# Patient Record
Sex: MALE | Race: WHITE | NOT HISPANIC OR LATINO | Employment: UNEMPLOYED | URBAN - METROPOLITAN AREA
[De-identification: names, ages, dates, MRNs, and addresses within clinical notes are randomized per-mention and may not be internally consistent; named-entity substitution may affect disease eponyms.]

---

## 2019-11-15 ENCOUNTER — OFFICE VISIT (OUTPATIENT)
Dept: FAMILY MEDICINE CLINIC | Facility: CLINIC | Age: 64
End: 2019-11-15

## 2019-11-15 VITALS
OXYGEN SATURATION: 98 % | HEIGHT: 71 IN | TEMPERATURE: 97.7 F | BODY MASS INDEX: 29.26 KG/M2 | WEIGHT: 209 LBS | DIASTOLIC BLOOD PRESSURE: 108 MMHG | HEART RATE: 88 BPM | SYSTOLIC BLOOD PRESSURE: 182 MMHG

## 2019-11-15 DIAGNOSIS — Z91.199 NONCOMPLIANCE: ICD-10-CM

## 2019-11-15 DIAGNOSIS — Z12.5 PROSTATE CANCER SCREENING: ICD-10-CM

## 2019-11-15 DIAGNOSIS — Z13.220 LIPID SCREENING: ICD-10-CM

## 2019-11-15 DIAGNOSIS — I10 ESSENTIAL HYPERTENSION: ICD-10-CM

## 2019-11-15 DIAGNOSIS — Z11.59 NEED FOR HEPATITIS C SCREENING TEST: ICD-10-CM

## 2019-11-15 DIAGNOSIS — Z00.00 HEALTHCARE MAINTENANCE: Primary | ICD-10-CM

## 2019-11-15 PROBLEM — Z85.820 HISTORY OF MELANOMA: Status: ACTIVE | Noted: 2019-11-15

## 2019-11-15 LAB
ALBUMIN SERPL-MCNC: 4.7 G/DL (ref 3.5–5.2)
ALBUMIN/GLOB SERPL: 1.5 G/DL
ALP SERPL-CCNC: 57 U/L (ref 39–117)
ALT SERPL W P-5'-P-CCNC: 13 U/L (ref 1–41)
ANION GAP SERPL CALCULATED.3IONS-SCNC: 13 MMOL/L (ref 5–15)
AST SERPL-CCNC: 18 U/L (ref 1–40)
BASOPHILS # BLD AUTO: 0.09 10*3/MM3 (ref 0–0.2)
BASOPHILS NFR BLD AUTO: 1.3 % (ref 0–1.5)
BILIRUB BLD-MCNC: NEGATIVE MG/DL
BILIRUB SERPL-MCNC: 0.3 MG/DL (ref 0.2–1.2)
BUN BLD-MCNC: 18 MG/DL (ref 8–23)
BUN/CREAT SERPL: 20.5 (ref 7–25)
CALCIUM SPEC-SCNC: 9.4 MG/DL (ref 8.6–10.5)
CHLORIDE SERPL-SCNC: 97 MMOL/L (ref 98–107)
CHOLEST SERPL-MCNC: 184 MG/DL (ref 0–200)
CLARITY, POC: CLEAR
CO2 SERPL-SCNC: 28 MMOL/L (ref 22–29)
COLOR UR: YELLOW
CREAT BLD-MCNC: 0.88 MG/DL (ref 0.76–1.27)
DEPRECATED RDW RBC AUTO: 46.6 FL (ref 37–54)
EOSINOPHIL # BLD AUTO: 0.31 10*3/MM3 (ref 0–0.4)
EOSINOPHIL NFR BLD AUTO: 4.5 % (ref 0.3–6.2)
ERYTHROCYTE [DISTWIDTH] IN BLOOD BY AUTOMATED COUNT: 13.6 % (ref 12.3–15.4)
EXPIRATION DATE: NORMAL
GFR SERPL CREATININE-BSD FRML MDRD: 87 ML/MIN/1.73
GLOBULIN UR ELPH-MCNC: 3.1 GM/DL
GLUCOSE BLD-MCNC: 91 MG/DL (ref 65–99)
GLUCOSE UR STRIP-MCNC: NEGATIVE MG/DL
HBA1C MFR BLD: 5.3 % (ref 4.8–5.6)
HCT VFR BLD AUTO: 39.7 % (ref 37.5–51)
HCV AB SER DONR QL: NORMAL
HDLC SERPL QL: 2.52
HDLC SERPL-MCNC: 73 MG/DL (ref 40–60)
HGB BLD-MCNC: 13.1 G/DL (ref 13–17.7)
IMM GRANULOCYTES # BLD AUTO: 0.02 10*3/MM3 (ref 0–0.05)
IMM GRANULOCYTES NFR BLD AUTO: 0.3 % (ref 0–0.5)
KETONES UR QL: NEGATIVE
LDLC SERPL CALC-MCNC: 101 MG/DL (ref 0–100)
LEUKOCYTE EST, POC: NEGATIVE
LYMPHOCYTES # BLD AUTO: 1.56 10*3/MM3 (ref 0.7–3.1)
LYMPHOCYTES NFR BLD AUTO: 22.7 % (ref 19.6–45.3)
Lab: NORMAL
MCH RBC QN AUTO: 31 PG (ref 26.6–33)
MCHC RBC AUTO-ENTMCNC: 33 G/DL (ref 31.5–35.7)
MCV RBC AUTO: 93.9 FL (ref 79–97)
MONOCYTES # BLD AUTO: 0.58 10*3/MM3 (ref 0.1–0.9)
MONOCYTES NFR BLD AUTO: 8.4 % (ref 5–12)
NEUTROPHILS # BLD AUTO: 4.31 10*3/MM3 (ref 1.7–7)
NEUTROPHILS NFR BLD AUTO: 62.8 % (ref 42.7–76)
NITRITE UR-MCNC: NEGATIVE MG/ML
NRBC BLD AUTO-RTO: 0 /100 WBC (ref 0–0.2)
PH UR: 7 [PH] (ref 5–8)
PLATELET # BLD AUTO: 305 10*3/MM3 (ref 140–450)
PMV BLD AUTO: 9.5 FL (ref 6–12)
POTASSIUM BLD-SCNC: 4.8 MMOL/L (ref 3.5–5.2)
PROT SERPL-MCNC: 7.8 G/DL (ref 6–8.5)
PROT UR STRIP-MCNC: NEGATIVE MG/DL
PSA SERPL-MCNC: 0.4 NG/ML (ref 0–4)
RBC # BLD AUTO: 4.23 10*6/MM3 (ref 4.14–5.8)
RBC # UR STRIP: NEGATIVE /UL
SODIUM BLD-SCNC: 138 MMOL/L (ref 136–145)
SP GR UR: 1.01 (ref 1–1.03)
TRIGL SERPL-MCNC: 48 MG/DL (ref 0–150)
TSH SERPL DL<=0.05 MIU/L-ACNC: 25.7 UIU/ML (ref 0.27–4.2)
UROBILINOGEN UR QL: NORMAL
VIT B12 BLD-MCNC: 419 PG/ML (ref 211–946)
VLDLC SERPL-MCNC: 9.6 MG/DL (ref 5–40)
WBC NRBC COR # BLD: 6.87 10*3/MM3 (ref 3.4–10.8)

## 2019-11-15 PROCEDURE — 81003 URINALYSIS AUTO W/O SCOPE: CPT | Performed by: NURSE PRACTITIONER

## 2019-11-15 PROCEDURE — 99203 OFFICE O/P NEW LOW 30 MIN: CPT | Performed by: NURSE PRACTITIONER

## 2019-11-15 PROCEDURE — 80061 LIPID PANEL: CPT | Performed by: NURSE PRACTITIONER

## 2019-11-15 PROCEDURE — 84443 ASSAY THYROID STIM HORMONE: CPT | Performed by: NURSE PRACTITIONER

## 2019-11-15 PROCEDURE — 80053 COMPREHEN METABOLIC PANEL: CPT | Performed by: NURSE PRACTITIONER

## 2019-11-15 PROCEDURE — 83036 HEMOGLOBIN GLYCOSYLATED A1C: CPT | Performed by: NURSE PRACTITIONER

## 2019-11-15 PROCEDURE — 82607 VITAMIN B-12: CPT | Performed by: NURSE PRACTITIONER

## 2019-11-15 PROCEDURE — 82652 VIT D 1 25-DIHYDROXY: CPT | Performed by: NURSE PRACTITIONER

## 2019-11-15 PROCEDURE — 86803 HEPATITIS C AB TEST: CPT | Performed by: NURSE PRACTITIONER

## 2019-11-15 PROCEDURE — G0103 PSA SCREENING: HCPCS | Performed by: NURSE PRACTITIONER

## 2019-11-15 PROCEDURE — 85025 COMPLETE CBC W/AUTO DIFF WBC: CPT | Performed by: NURSE PRACTITIONER

## 2019-11-15 NOTE — PROGRESS NOTES
"Monty Nagel is a 64 y.o. male who presents today to establish care and get referral for MRI.    Chief Complaint   Patient presents with   • Melanoma   • Eye Problem       Patient is an old patient of Dr. Ryan's and is here today to establish care. He is also here to get a referral for an MRI or CT scan. Patient had treatment for retinal melanoma in 2017 and states that he pronounced \"free and clear\", although we do not have those records. He brings with him today a paper outlining instructions from his surgeon for follow up care as she has moved to [place], [place]. She wanted him to have a CT scan with contrast of his abdomen yearly for 5 years to check for metastasis. Patient states that he had contrast one time and that it made him \"deathly sick\" and that it took his kidneys almost one year to recover. He states that he has no other problems but I pointed out to him that his blood pressure is very high today and he probably needed to be treated for that.    Hypertension   This is a chronic problem. The problem has been waxing and waning since onset. The problem is uncontrolled. There are no associated agents to hypertension. Risk factors for coronary artery disease include male gender and sedentary lifestyle. Past treatments include nothing. Current antihypertension treatment includes nothing. The current treatment provides no improvement. Compliance problems include exercise, diet and psychosocial issues.        The following portions of the patient's history were reviewed and updated as appropriate: allergies, current medications, past family history, past medical history, past social history, past surgical history and problem list.     Past Medical History:   Diagnosis Date   • Arthritis    • Hypertension    • Melanoma (CMS/HCC) 2017    retina       Past Surgical History:   Procedure Laterality Date   • EYE SURGERY  2017    EYE CANCER RIGHT EYE   • TONSILLECTOMY         Family History   Problem Relation Age " "of Onset   • Diabetes Mother    • Heart disease Father        Social History     Socioeconomic History   • Marital status: Single     Spouse name: Not on file   • Number of children: Not on file   • Years of education: Not on file   • Highest education level: Not on file   Tobacco Use   • Smoking status: Former Smoker     Last attempt to quit: 1990     Years since quittin.9   • Smokeless tobacco: Former User     Quit date: 1990   Substance and Sexual Activity   • Alcohol use: Yes     Comment: WEEKLY DRINKS   • Drug use: No   • Sexual activity: No       No Known Allergies    No current outpatient medications on file.    Health Maintenance   Topic Date Due   • TDAP/TD VACCINES (1 - Tdap) 04/10/1972   • ZOSTER VACCINE (1 of 2) 04/10/2003   • PNEUMOCOCCAL VACCINES (65+ LOW/MEDIUM RISK) (1 of 2 - PCV13) 04/10/2018   • MEDICARE ANNUAL WELLNESS  2019   • COLONOSCOPY  2019   • AAA SCREEN (ONE-TIME)  11/15/2019   • HEPATITIS C SCREENING  Completed   • INFLUENZA VACCINE  Addressed        ROS    Review of Systems   Musculoskeletal: Positive for arthralgias and myalgias.   Psychiatric/Behavioral: Positive for agitation.   All other systems reviewed and are negative.      Visit Vitals  BP (!) 182/108   Pulse 88   Temp 97.7 °F (36.5 °C)   Ht 180.3 cm (71\")   Wt 94.8 kg (209 lb)   SpO2 98%   BMI 29.15 kg/m²       Physical Exam     Physical Exam   Constitutional: He is oriented to person, place, and time. He appears well-developed and well-nourished.   HENT:   Head: Normocephalic and atraumatic.   Right Ear: External ear normal.   Left Ear: External ear normal.   Nose: Nose normal.   Mouth/Throat: Oropharynx is clear and moist.   Eyes: Conjunctivae and EOM are normal. Pupils are equal, round, and reactive to light.   Neck: Normal range of motion. Neck supple. No JVD present. No tracheal deviation present. No thyromegaly present.   Cardiovascular: Normal rate, regular rhythm and normal heart sounds. Exam " "reveals no gallop and no friction rub.   No murmur heard.  Pulmonary/Chest: Effort normal. No stridor. No respiratory distress. He has decreased breath sounds. He has no wheezes. He has no rales.   Abdominal: Soft. Bowel sounds are normal. He exhibits no distension and no mass. There is no tenderness. There is no rebound and no guarding. No hernia.   Lymphadenopathy:     He has no cervical adenopathy.   Neurological: He is alert and oriented to person, place, and time.   Skin: Skin is dry.   Psychiatric: His speech is normal. Thought content normal. His affect is blunt. He is agitated.   Nursing note and vitals reviewed.      Assessment/Plan    Problem List Items Addressed This Visit        Cardiovascular and Mediastinum    Essential hypertension      Other Visit Diagnoses     Healthcare maintenance    -  Primary    Relevant Orders    CBC Auto Differential (Completed)    Comprehensive Metabolic Panel (Completed)    TSH (Completed)    Lipid Panel With / Chol / HDL Ratio (Completed)    Vitamin D 1,25 Dihydroxy (Completed)    Vitamin B12 (Completed)    POC Urinalysis Dipstick, Automated (Completed)    PSA Screen (Completed)    Hemoglobin A1c (Completed)    Lipid screening        Relevant Orders    Lipid Panel With / Chol / HDL Ratio (Completed)    Prostate cancer screening        Relevant Orders    PSA Screen (Completed)    Need for hepatitis C screening test        Relevant Orders    Hepatitis C Antibody (Completed)    Noncompliance          Patient's doctor recommend yearly CT scans of his liver with contrast for 5 year. Patient refuses to have contrast because he states it is \"poison\". He will agree to have an MRI instead. Spoke with Dr. Chi, radiologist at [facility], regarding this and he recommends that if patient refuses the contrast the MRI is the best choice to be able to visualize any lesions that may be present.  Strongly advised patient that he needed to be on an anti-hypertensive " "for his blood pressure. I advised him that his blood pressure today was 182/108 and his comment was \"that's not that high\". He refused any medication for his blood pressure. He was warned about the risks of letting his blood pressure go untreated.     Will contact patient with lab results and referral for further testing.    LYNETTE Ernandez  "

## 2019-11-18 LAB — 1,25(OH)2D3 SERPL-MCNC: 27.4 PG/ML (ref 19.9–79.3)

## 2019-11-27 ENCOUNTER — TELEPHONE (OUTPATIENT)
Dept: FAMILY MEDICINE CLINIC | Facility: CLINIC | Age: 64
End: 2019-11-27

## 2020-01-02 ENCOUNTER — TELEPHONE (OUTPATIENT)
Dept: FAMILY MEDICINE CLINIC | Facility: CLINIC | Age: 65
End: 2020-01-02

## 2020-01-02 NOTE — TELEPHONE ENCOUNTER
Pt called in regards to a medication that Kailyn was suppose to call in for him at his last appt (11/27/2019) and states he never received that medication and would like to know if she could call that into his [facility] pharm that is in his chart? If there is any questions or concerns the patient can be reached at [phone]

## 2020-01-03 DIAGNOSIS — E03.9 ACQUIRED HYPOTHYROIDISM: Primary | ICD-10-CM

## 2020-01-03 RX ORDER — LEVOTHYROXINE SODIUM 0.05 MG/1
50 TABLET ORAL DAILY
Qty: 30 TABLET | Refills: 2 | Status: SHIPPED | OUTPATIENT
Start: 2020-01-03 | End: 2020-03-19

## 2020-01-03 NOTE — TELEPHONE ENCOUNTER
I do not know what medication he is talking about. I am not his PCP nor do I want to be. He was very argumentative and non-compliant with treatment.

## 2020-01-03 NOTE — TELEPHONE ENCOUNTER
Patient advised med sent and to return in 1 month to recheck his levels. He has an appt for Feb 11th.

## 2020-03-17 ENCOUNTER — OFFICE VISIT (OUTPATIENT)
Dept: FAMILY MEDICINE CLINIC | Facility: CLINIC | Age: 65
End: 2020-03-17

## 2020-03-17 VITALS
BODY MASS INDEX: 29.12 KG/M2 | DIASTOLIC BLOOD PRESSURE: 98 MMHG | WEIGHT: 208 LBS | HEART RATE: 75 BPM | SYSTOLIC BLOOD PRESSURE: 144 MMHG | RESPIRATION RATE: 14 BRPM | HEIGHT: 71 IN | TEMPERATURE: 97.6 F | OXYGEN SATURATION: 94 %

## 2020-03-17 DIAGNOSIS — E89.0 POSTABLATIVE HYPOTHYROIDISM: Primary | ICD-10-CM

## 2020-03-17 LAB
T4 FREE SERPL-MCNC: 0.92 NG/DL (ref 0.93–1.7)
TSH SERPL DL<=0.05 MIU/L-ACNC: 10.5 UIU/ML (ref 0.27–4.2)

## 2020-03-17 PROCEDURE — 84439 ASSAY OF FREE THYROXINE: CPT | Performed by: NURSE PRACTITIONER

## 2020-03-17 PROCEDURE — 99214 OFFICE O/P EST MOD 30 MIN: CPT | Performed by: NURSE PRACTITIONER

## 2020-03-17 PROCEDURE — 84443 ASSAY THYROID STIM HORMONE: CPT | Performed by: NURSE PRACTITIONER

## 2020-03-17 NOTE — PROGRESS NOTES
"Monty Nagel is a 64 y.o. male who presents for follow up of hypothyroid and hypertension.    Chief Complaint   Patient presents with   • Hyperthyroidism     fu       Hypothyroidism   This is a chronic problem. The current episode started more than 1 year ago. The problem occurs constantly. Associated symptoms include fatigue. Nothing aggravates the symptoms. Treatments tried: Levothyroxine. The treatment provided mild relief.   Hypertension   This is a recurrent problem. The current episode started more than 1 year ago. The problem has been waxing and waning since onset. The problem is uncontrolled. (Patient states she has no symptoms  ) Agents associated with hypertension include thyroid hormones. Risk factors for coronary artery disease include obesity and male gender. Past treatments include nothing. Treatments tried: patient has refused medication. The current treatment provides mild improvement. Compliance problems include exercise, diet and psychosocial issues.  Identifiable causes of hypertension include a thyroid problem.         No Known Allergies    Health Maintenance   Topic Date Due   • TDAP/TD VACCINES (1 - Tdap) 04/10/1964   • ZOSTER VACCINE (1 of 2) 04/10/2003   • Pneumococcal Vaccine Once at 65 Years Old  04/10/2018   • MEDICARE ANNUAL WELLNESS  11/11/2019   • COLONOSCOPY  11/11/2019   • AAA SCREEN (ONE-TIME)  11/15/2019   • HEPATITIS C SCREENING  Completed   • INFLUENZA VACCINE  Addressed        ROS    Review of Systems   Constitutional: Positive for fatigue.       Vitals:    03/17/20 0804   BP: 144/98   Pulse: 75   Resp: 14   Temp: 97.6 °F (36.4 °C)   SpO2: 94%   Weight: 94.3 kg (208 lb)   Height: 180.3 cm (71\")         Current Outpatient Medications:   •  levothyroxine (SYNTHROID) 50 MCG tablet, Take 1 tablet by mouth Daily., Disp: 30 tablet, Rfl: 2    PE    Physical Exam   Constitutional: He is oriented to person, place, and time. He appears well-developed and well-nourished.   HENT:   Head: " "Normocephalic and atraumatic.   Neck: Normal range of motion. Neck supple. No JVD present. No tracheal deviation present. No thyromegaly present.   Cardiovascular: Normal rate, intact distal pulses and normal pulses. An irregular rhythm present.   Pulmonary/Chest: Effort normal and breath sounds normal. No stridor. No respiratory distress. He has no wheezes. He has no rales. He exhibits no tenderness.   Lymphadenopathy:     He has no cervical adenopathy.   Neurological: He is alert and oriented to person, place, and time.   Skin: Skin is warm and dry. Capillary refill takes less than 2 seconds.   Psychiatric: His speech is normal. His affect is angry. He is agitated. Thought content is paranoid. Cognition and memory are normal.   Nursing note and vitals reviewed.       A/P    Problem List Items Addressed This Visit        Endocrine    Postablative hypothyroidism - Primary    Relevant Medications    levothyroxine (SYNTHROID) 50 MCG tablet    Other Relevant Orders    T4, Free    TSH        Patient refuses to take medication for his hypertension. I advised him that he needs to be on medication to control his blood pressure and that it could prevent him from having a stroke or MI. He states that there are too many commercials on TV regarding medications. He states that he has never taken any medication for his blood pressure.   Patient refuses EKG. I advised him that his heart rate was irregular and we needed to see what his rhythm was. He states that it is probably because he is \"pissed off\" or that he only got 3 hours of sleep last night and this is his \"midnight\" so his circadian rhythm is off. He would rather wait until he feels \"normal\".    Plan of care reviewed with patient at the conclusion of today's visit. Education was provided regarding diagnosis, management and any prescribed or recommended OTC medications.  Patient verbalizes understanding of and agreement with management plan.    Return in about 4 weeks " (around 4/14/2020) for Medicare Wellness.     Kailyn West, APRN

## 2020-03-19 ENCOUNTER — TELEPHONE (OUTPATIENT)
Dept: FAMILY MEDICINE CLINIC | Facility: CLINIC | Age: 65
End: 2020-03-19

## 2020-03-19 DIAGNOSIS — E03.9 ACQUIRED HYPOTHYROIDISM: ICD-10-CM

## 2020-03-19 RX ORDER — LEVOTHYROXINE SODIUM 0.1 MG/1
100 TABLET ORAL DAILY
Qty: 30 TABLET | Refills: 5 | Status: SHIPPED | OUTPATIENT
Start: 2020-03-19 | End: 2020-03-25 | Stop reason: DRUGHIGH

## 2020-03-19 RX ORDER — LEVOTHYROXINE SODIUM 0.05 MG/1
100 TABLET ORAL DAILY
Qty: 30 TABLET | Refills: 2 | Status: SHIPPED | OUTPATIENT
Start: 2020-03-19 | End: 2020-03-19 | Stop reason: SDUPTHER

## 2020-03-19 NOTE — TELEPHONE ENCOUNTER
DONNA FROM [facility] CALLED REQUESTING A CALL BACK REGARDING THE FOLLOWING MEDICATION:    levothyroxine (SYNTHROID, LEVOTHROID) 100 MCG tablet      CONTACT: [phone]

## 2020-03-25 DIAGNOSIS — E89.0 POSTABLATIVE HYPOTHYROIDISM: Primary | ICD-10-CM

## 2020-03-25 RX ORDER — LEVOTHYROXINE SODIUM 112 UG/1
112 TABLET ORAL DAILY
Qty: 30 TABLET | Refills: 2 | Status: SHIPPED | OUTPATIENT
Start: 2020-03-25 | End: 2020-07-17 | Stop reason: SDUPTHER

## 2020-06-23 ENCOUNTER — PATIENT OUTREACH (OUTPATIENT)
Dept: FAMILY MEDICINE CLINIC | Facility: CLINIC | Age: 65
End: 2020-06-23

## 2020-06-30 ENCOUNTER — PATIENT OUTREACH (OUTPATIENT)
Dept: FAMILY MEDICINE CLINIC | Facility: CLINIC | Age: 65
End: 2020-06-30

## 2020-07-17 DIAGNOSIS — E89.0 POSTABLATIVE HYPOTHYROIDISM: ICD-10-CM

## 2020-07-17 RX ORDER — LEVOTHYROXINE SODIUM 112 UG/1
112 TABLET ORAL DAILY
Qty: 30 TABLET | Refills: 0 | Status: SHIPPED | OUTPATIENT
Start: 2020-07-17 | End: 2020-08-12 | Stop reason: SDUPTHER

## 2020-07-17 NOTE — TELEPHONE ENCOUNTER
Caller: Monty Nagel    Relationship: Self    Best call back number: [phone]     Medication needed:   Requested Prescriptions     Pending Prescriptions Disp Refills   • levothyroxine (Synthroid) 112 MCG tablet 30 tablet 2     Sig: Take 1 tablet by mouth Daily.       When do you need the refill by: SOON    What details did the patient provide when requesting the medication: PT HAS FIVE LEFT.    Does the patient have less than a 3 day supply:  [] Yes  [x] No    What is the patient's preferred pharmacy:  [facility] [address] [facility] [phone] [facility] [phone] FX

## 2020-08-12 ENCOUNTER — OFFICE VISIT (OUTPATIENT)
Dept: FAMILY MEDICINE CLINIC | Facility: CLINIC | Age: 65
End: 2020-08-12

## 2020-08-12 VITALS
HEIGHT: 71 IN | SYSTOLIC BLOOD PRESSURE: 168 MMHG | TEMPERATURE: 98 F | WEIGHT: 203.6 LBS | DIASTOLIC BLOOD PRESSURE: 108 MMHG | BODY MASS INDEX: 28.5 KG/M2 | HEART RATE: 85 BPM | OXYGEN SATURATION: 97 %

## 2020-08-12 DIAGNOSIS — I10 ESSENTIAL HYPERTENSION: ICD-10-CM

## 2020-08-12 DIAGNOSIS — E89.0 POSTABLATIVE HYPOTHYROIDISM: Primary | ICD-10-CM

## 2020-08-12 DIAGNOSIS — Z85.820 HISTORY OF MELANOMA: ICD-10-CM

## 2020-08-12 DIAGNOSIS — Z12.11 SCREEN FOR COLON CANCER: ICD-10-CM

## 2020-08-12 PROCEDURE — 86803 HEPATITIS C AB TEST: CPT | Performed by: FAMILY MEDICINE

## 2020-08-12 PROCEDURE — 80061 LIPID PANEL: CPT | Performed by: FAMILY MEDICINE

## 2020-08-12 PROCEDURE — 85025 COMPLETE CBC W/AUTO DIFF WBC: CPT | Performed by: FAMILY MEDICINE

## 2020-08-12 PROCEDURE — 99214 OFFICE O/P EST MOD 30 MIN: CPT | Performed by: FAMILY MEDICINE

## 2020-08-12 PROCEDURE — 80053 COMPREHEN METABOLIC PANEL: CPT | Performed by: FAMILY MEDICINE

## 2020-08-12 PROCEDURE — 84443 ASSAY THYROID STIM HORMONE: CPT | Performed by: FAMILY MEDICINE

## 2020-08-12 PROCEDURE — 36415 COLL VENOUS BLD VENIPUNCTURE: CPT | Performed by: FAMILY MEDICINE

## 2020-08-12 RX ORDER — LISINOPRIL AND HYDROCHLOROTHIAZIDE 20; 12.5 MG/1; MG/1
1 TABLET ORAL DAILY
Qty: 90 TABLET | Refills: 2 | Status: SHIPPED | OUTPATIENT
Start: 2020-08-12 | End: 2021-05-24 | Stop reason: SDUPTHER

## 2020-08-12 RX ORDER — LEVOTHYROXINE SODIUM 112 UG/1
112 TABLET ORAL DAILY
Qty: 90 TABLET | Refills: 3 | Status: SHIPPED | OUTPATIENT
Start: 2020-08-12 | End: 2021-06-07 | Stop reason: SDUPTHER

## 2020-08-12 NOTE — PROGRESS NOTES
Subjective   Monty Nagel is a 65 y.o. male.     Pt is here to transfer care from Kailynstanley West and  on tsh.  Refills needed on levothyroxine.    History of Present Illness this is a new patient to me today to follow-up on hypothyroidism.  He is currently on 112 mcg he is tolerating that dose well.  He does not report any changes in hair skin or nails.  No weight loss or gain.  His blood pressure has been slightly elevated the last 3 visits.    He does not report any chest pain or shortness of breath.  He does not have shortness of breath when he lays flat.  His legs are not swelling.  He does not have family history of chest pain.  He has never been on a blood pressure medicine.    He is agreeable to getting a colonoscopy.  It has been more than 10 years since his last colonoscopy.    The following portions of the patient's history were reviewed and updated as appropriate: allergies, current medications, past family history, past medical history, past social history, past surgical history and problem list.    Review of Systems   Constitutional: Negative for chills, fatigue, fever and unexpected weight change.   HENT: Negative for congestion, ear pain, nosebleeds, rhinorrhea, sinus pressure, sneezing, sore throat and trouble swallowing.    Eyes: Negative for itching and visual disturbance.   Respiratory: Negative for cough, chest tightness, shortness of breath and wheezing.    Cardiovascular: Negative for chest pain, palpitations and leg swelling.   Gastrointestinal: Negative for abdominal pain, anal bleeding, blood in stool, constipation, diarrhea, nausea and vomiting.   Endocrine: Negative for cold intolerance, heat intolerance, polydipsia and polyuria.   Genitourinary: Negative for difficulty urinating, frequency, hematuria and urgency.   Musculoskeletal: Negative for back pain, gait problem and myalgias.   Skin: Negative for rash and wound.   Neurological: Negative for dizziness, weakness, numbness and  "headaches.   Hematological: Negative for adenopathy. Does not bruise/bleed easily.   Psychiatric/Behavioral: Negative for agitation, confusion, decreased concentration and suicidal ideas. The patient is not nervous/anxious.        Objective     Vitals:    08/12/20 0836   BP: (!) 168/108   Pulse: 85   Temp: 98 °F (36.7 °C)   SpO2: 97%   Weight: 92.4 kg (203 lb 9.6 oz)   Height: 180.3 cm (71\")       Physical Exam   Constitutional: He is oriented to person, place, and time. He appears well-developed and well-nourished.   HENT:   Head: Normocephalic and atraumatic.   Eyes: Pupils are equal, round, and reactive to light. Conjunctivae and EOM are normal. Right eye exhibits no discharge. Left eye exhibits no discharge. No scleral icterus.   Neck: Normal range of motion. Neck supple. No JVD present. No tracheal deviation present. No thyromegaly present.   Cardiovascular: Normal rate, regular rhythm and normal heart sounds. Exam reveals no gallop and no friction rub.   No murmur heard.  Pulmonary/Chest: Effort normal and breath sounds normal. No respiratory distress. He has no wheezes. He has no rales. He exhibits no tenderness.   Abdominal: Soft. Bowel sounds are normal. He exhibits no distension and no mass. There is no tenderness.   Musculoskeletal: Normal range of motion. He exhibits no edema.   Neurological: He is alert and oriented to person, place, and time. He has normal reflexes. He displays normal reflexes. No cranial nerve deficit. Coordination normal.   Skin: Skin is warm and dry.   Psychiatric: He has a normal mood and affect. His behavior is normal. Judgment and thought content normal.   Nursing note and vitals reviewed.      Assessment/Plan     Problem List Items Addressed This Visit        Cardiovascular and Mediastinum    Essential hypertension    Relevant Medications    lisinopril-hydrochlorothiazide (PRINZIDE,ZESTORETIC) 20-12.5 MG per tablet       Endocrine    Postablative hypothyroidism - Primary    " Relevant Medications    levothyroxine (Synthroid) 112 MCG tablet    Other Relevant Orders    CBC & Differential (Completed)    TSH (Completed)    Comprehensive Metabolic Panel (Completed)    Lipid Panel (Completed)    Hepatitis C Antibody (Completed)    CBC Auto Differential (Completed)       Other    History of melanoma    Screen for colon cancer    Relevant Orders    Amb referral for Screening Colonoscopy

## 2020-08-13 LAB
ALBUMIN SERPL-MCNC: 4 G/DL (ref 3.5–5.2)
ALBUMIN/GLOB SERPL: 1.3 G/DL
ALP SERPL-CCNC: 56 U/L (ref 39–117)
ALT SERPL W P-5'-P-CCNC: 13 U/L (ref 1–41)
ANION GAP SERPL CALCULATED.3IONS-SCNC: 16.8 MMOL/L (ref 5–15)
AST SERPL-CCNC: 14 U/L (ref 1–40)
BASOPHILS # BLD AUTO: 0.07 10*3/MM3 (ref 0–0.2)
BASOPHILS NFR BLD AUTO: 1.2 % (ref 0–1.5)
BILIRUB SERPL-MCNC: 0.7 MG/DL (ref 0–1.2)
BUN SERPL-MCNC: 10 MG/DL (ref 8–23)
BUN/CREAT SERPL: 11.9 (ref 7–25)
CALCIUM SPEC-SCNC: 9.6 MG/DL (ref 8.6–10.5)
CHLORIDE SERPL-SCNC: 101 MMOL/L (ref 98–107)
CHOLEST SERPL-MCNC: 156 MG/DL (ref 0–200)
CO2 SERPL-SCNC: 22.2 MMOL/L (ref 22–29)
CREAT SERPL-MCNC: 0.84 MG/DL (ref 0.76–1.27)
DEPRECATED RDW RBC AUTO: 46.3 FL (ref 37–54)
EOSINOPHIL # BLD AUTO: 0.15 10*3/MM3 (ref 0–0.4)
EOSINOPHIL NFR BLD AUTO: 2.6 % (ref 0.3–6.2)
ERYTHROCYTE [DISTWIDTH] IN BLOOD BY AUTOMATED COUNT: 13.7 % (ref 12.3–15.4)
GFR SERPL CREATININE-BSD FRML MDRD: 91 ML/MIN/1.73
GLOBULIN UR ELPH-MCNC: 3 GM/DL
GLUCOSE SERPL-MCNC: 68 MG/DL (ref 65–99)
HCT VFR BLD AUTO: 37.3 % (ref 37.5–51)
HCV AB SER DONR QL: NORMAL
HDLC SERPL-MCNC: 57 MG/DL (ref 40–60)
HGB BLD-MCNC: 12.5 G/DL (ref 13–17.7)
IMM GRANULOCYTES # BLD AUTO: 0.01 10*3/MM3 (ref 0–0.05)
IMM GRANULOCYTES NFR BLD AUTO: 0.2 % (ref 0–0.5)
LDLC SERPL CALC-MCNC: 91 MG/DL (ref 0–100)
LDLC/HDLC SERPL: 1.59 {RATIO}
LYMPHOCYTES # BLD AUTO: 1.46 10*3/MM3 (ref 0.7–3.1)
LYMPHOCYTES NFR BLD AUTO: 25.7 % (ref 19.6–45.3)
MCH RBC QN AUTO: 30.9 PG (ref 26.6–33)
MCHC RBC AUTO-ENTMCNC: 33.5 G/DL (ref 31.5–35.7)
MCV RBC AUTO: 92.3 FL (ref 79–97)
MONOCYTES # BLD AUTO: 0.52 10*3/MM3 (ref 0.1–0.9)
MONOCYTES NFR BLD AUTO: 9.1 % (ref 5–12)
NEUTROPHILS NFR BLD AUTO: 3.48 10*3/MM3 (ref 1.7–7)
NEUTROPHILS NFR BLD AUTO: 61.2 % (ref 42.7–76)
NRBC BLD AUTO-RTO: 0 /100 WBC (ref 0–0.2)
PLATELET # BLD AUTO: 305 10*3/MM3 (ref 140–450)
PMV BLD AUTO: 10.2 FL (ref 6–12)
POTASSIUM SERPL-SCNC: 4.6 MMOL/L (ref 3.5–5.2)
PROT SERPL-MCNC: 7 G/DL (ref 6–8.5)
RBC # BLD AUTO: 4.04 10*6/MM3 (ref 4.14–5.8)
SODIUM SERPL-SCNC: 140 MMOL/L (ref 136–145)
TRIGL SERPL-MCNC: 41 MG/DL (ref 0–150)
TSH SERPL DL<=0.05 MIU/L-ACNC: 1.56 UIU/ML (ref 0.27–4.2)
VLDLC SERPL-MCNC: 8.2 MG/DL (ref 5–40)
WBC # BLD AUTO: 5.69 10*3/MM3 (ref 3.4–10.8)

## 2020-11-05 ENCOUNTER — LAB (OUTPATIENT)
Dept: FAMILY MEDICINE CLINIC | Facility: CLINIC | Age: 65
End: 2020-11-05

## 2020-11-05 DIAGNOSIS — Z23 NEED FOR INFLUENZA VACCINATION: ICD-10-CM

## 2020-11-05 PROCEDURE — 90694 VACC AIIV4 NO PRSRV 0.5ML IM: CPT | Performed by: FAMILY MEDICINE

## 2020-11-05 PROCEDURE — G0008 ADMIN INFLUENZA VIRUS VAC: HCPCS | Performed by: FAMILY MEDICINE

## 2021-05-24 DIAGNOSIS — I10 ESSENTIAL HYPERTENSION: ICD-10-CM

## 2021-06-01 RX ORDER — LISINOPRIL AND HYDROCHLOROTHIAZIDE 20; 12.5 MG/1; MG/1
1 TABLET ORAL DAILY
Qty: 90 TABLET | Refills: 2 | Status: SHIPPED | OUTPATIENT
Start: 2021-06-01 | End: 2021-06-07 | Stop reason: SDUPTHER

## 2021-06-03 DIAGNOSIS — I10 ESSENTIAL HYPERTENSION: ICD-10-CM

## 2021-06-04 RX ORDER — LISINOPRIL AND HYDROCHLOROTHIAZIDE 20; 12.5 MG/1; MG/1
TABLET ORAL
Qty: 30 TABLET | Refills: 1 | OUTPATIENT
Start: 2021-06-04

## 2021-06-05 DIAGNOSIS — I10 ESSENTIAL HYPERTENSION: ICD-10-CM

## 2021-06-07 DIAGNOSIS — E89.0 POSTABLATIVE HYPOTHYROIDISM: ICD-10-CM

## 2021-06-07 DIAGNOSIS — I10 ESSENTIAL HYPERTENSION: ICD-10-CM

## 2021-06-07 RX ORDER — LISINOPRIL AND HYDROCHLOROTHIAZIDE 20; 12.5 MG/1; MG/1
TABLET ORAL
Qty: 30 TABLET | Refills: 1 | OUTPATIENT
Start: 2021-06-07

## 2021-06-07 RX ORDER — LISINOPRIL AND HYDROCHLOROTHIAZIDE 20; 12.5 MG/1; MG/1
1 TABLET ORAL DAILY
Qty: 90 TABLET | Refills: 2 | Status: SHIPPED | OUTPATIENT
Start: 2021-06-07 | End: 2022-03-17 | Stop reason: SDUPTHER

## 2021-06-07 RX ORDER — LEVOTHYROXINE SODIUM 112 UG/1
112 TABLET ORAL DAILY
Qty: 90 TABLET | Refills: 3 | Status: SHIPPED | OUTPATIENT
Start: 2021-06-07 | End: 2022-03-17 | Stop reason: SDUPTHER

## 2022-03-17 ENCOUNTER — OFFICE VISIT (OUTPATIENT)
Dept: FAMILY MEDICINE CLINIC | Facility: CLINIC | Age: 67
End: 2022-03-17

## 2022-03-17 ENCOUNTER — LAB (OUTPATIENT)
Dept: LAB | Facility: HOSPITAL | Age: 67
End: 2022-03-17

## 2022-03-17 VITALS
RESPIRATION RATE: 18 BRPM | SYSTOLIC BLOOD PRESSURE: 112 MMHG | TEMPERATURE: 98.6 F | HEIGHT: 69 IN | HEART RATE: 83 BPM | OXYGEN SATURATION: 98 % | WEIGHT: 212 LBS | BODY MASS INDEX: 31.4 KG/M2 | DIASTOLIC BLOOD PRESSURE: 72 MMHG

## 2022-03-17 DIAGNOSIS — E89.0 POSTABLATIVE HYPOTHYROIDISM: ICD-10-CM

## 2022-03-17 DIAGNOSIS — I10 ESSENTIAL HYPERTENSION: ICD-10-CM

## 2022-03-17 DIAGNOSIS — Z13.220 SCREENING, LIPID: ICD-10-CM

## 2022-03-17 DIAGNOSIS — Z85.820 HISTORY OF MELANOMA: ICD-10-CM

## 2022-03-17 DIAGNOSIS — E03.9 ACQUIRED HYPOTHYROIDISM: Primary | ICD-10-CM

## 2022-03-17 DIAGNOSIS — Z12.5 PROSTATE CANCER SCREENING: ICD-10-CM

## 2022-03-17 DIAGNOSIS — M25.449 SWELLING OF HAND JOINT, UNSPECIFIED LATERALITY: ICD-10-CM

## 2022-03-17 LAB
BASOPHILS # BLD AUTO: 0.06 10*3/MM3 (ref 0–0.2)
BASOPHILS NFR BLD AUTO: 0.8 % (ref 0–1.5)
BILIRUB UR QL STRIP: NEGATIVE
CHOLEST SERPL-MCNC: 167 MG/DL (ref 0–200)
CLARITY UR: CLEAR
COLOR UR: YELLOW
DEPRECATED RDW RBC AUTO: 44.3 FL (ref 37–54)
EOSINOPHIL # BLD AUTO: 0.27 10*3/MM3 (ref 0–0.4)
EOSINOPHIL NFR BLD AUTO: 3.7 % (ref 0.3–6.2)
ERYTHROCYTE [DISTWIDTH] IN BLOOD BY AUTOMATED COUNT: 13.4 % (ref 12.3–15.4)
GLUCOSE UR STRIP-MCNC: NEGATIVE MG/DL
HCT VFR BLD AUTO: 38.3 % (ref 37.5–51)
HDLC SERPL-MCNC: 72 MG/DL (ref 40–60)
HGB BLD-MCNC: 12.6 G/DL (ref 13–17.7)
HGB UR QL STRIP.AUTO: NEGATIVE
IMM GRANULOCYTES # BLD AUTO: 0.02 10*3/MM3 (ref 0–0.05)
IMM GRANULOCYTES NFR BLD AUTO: 0.3 % (ref 0–0.5)
KETONES UR QL STRIP: ABNORMAL
LDLC SERPL CALC-MCNC: 87 MG/DL (ref 0–100)
LDLC/HDLC SERPL: 1.23 {RATIO}
LEUKOCYTE ESTERASE UR QL STRIP.AUTO: ABNORMAL
LYMPHOCYTES # BLD AUTO: 1.61 10*3/MM3 (ref 0.7–3.1)
LYMPHOCYTES NFR BLD AUTO: 22.3 % (ref 19.6–45.3)
MCH RBC QN AUTO: 29.9 PG (ref 26.6–33)
MCHC RBC AUTO-ENTMCNC: 32.9 G/DL (ref 31.5–35.7)
MCV RBC AUTO: 91 FL (ref 79–97)
MONOCYTES # BLD AUTO: 0.72 10*3/MM3 (ref 0.1–0.9)
MONOCYTES NFR BLD AUTO: 10 % (ref 5–12)
NEUTROPHILS NFR BLD AUTO: 4.54 10*3/MM3 (ref 1.7–7)
NEUTROPHILS NFR BLD AUTO: 62.9 % (ref 42.7–76)
NITRITE UR QL STRIP: NEGATIVE
NRBC BLD AUTO-RTO: 0 /100 WBC (ref 0–0.2)
PH UR STRIP.AUTO: 6.5 [PH] (ref 5–8)
PLATELET # BLD AUTO: 305 10*3/MM3 (ref 140–450)
PMV BLD AUTO: 9.6 FL (ref 6–12)
PROT UR QL STRIP: NEGATIVE
RBC # BLD AUTO: 4.21 10*6/MM3 (ref 4.14–5.8)
SP GR UR STRIP: 1.02 (ref 1–1.03)
TRIGL SERPL-MCNC: 33 MG/DL (ref 0–150)
UROBILINOGEN UR QL STRIP: ABNORMAL
VLDLC SERPL-MCNC: 8 MG/DL (ref 5–40)
WBC NRBC COR # BLD: 7.22 10*3/MM3 (ref 3.4–10.8)

## 2022-03-17 PROCEDURE — 1170F FXNL STATUS ASSESSED: CPT | Performed by: NURSE PRACTITIONER

## 2022-03-17 PROCEDURE — 80061 LIPID PANEL: CPT

## 2022-03-17 PROCEDURE — 80053 COMPREHEN METABOLIC PANEL: CPT

## 2022-03-17 PROCEDURE — 85025 COMPLETE CBC W/AUTO DIFF WBC: CPT

## 2022-03-17 PROCEDURE — 81001 URINALYSIS AUTO W/SCOPE: CPT

## 2022-03-17 PROCEDURE — G0439 PPPS, SUBSEQ VISIT: HCPCS | Performed by: NURSE PRACTITIONER

## 2022-03-17 PROCEDURE — 84443 ASSAY THYROID STIM HORMONE: CPT

## 2022-03-17 PROCEDURE — G0103 PSA SCREENING: HCPCS

## 2022-03-17 PROCEDURE — 1126F AMNT PAIN NOTED NONE PRSNT: CPT | Performed by: NURSE PRACTITIONER

## 2022-03-17 PROCEDURE — 1160F RVW MEDS BY RX/DR IN RCRD: CPT | Performed by: NURSE PRACTITIONER

## 2022-03-17 RX ORDER — LEVOTHYROXINE SODIUM 112 UG/1
112 TABLET ORAL DAILY
Qty: 90 TABLET | Refills: 3 | Status: SHIPPED | OUTPATIENT
Start: 2022-03-17 | End: 2022-04-01

## 2022-03-17 RX ORDER — LISINOPRIL AND HYDROCHLOROTHIAZIDE 20; 12.5 MG/1; MG/1
1 TABLET ORAL DAILY
Qty: 90 TABLET | Refills: 2 | Status: ON HOLD | OUTPATIENT
Start: 2022-03-17

## 2022-03-17 NOTE — PATIENT INSTRUCTIONS
Medicare Wellness  Personal Prevention Plan of Service     Date of Office Visit:    Encounter Provider:  LYNETTE Munoz  Place of Service:  [facility]  Patient Name: Monty Nagel  :  1955    As part of the Medicare Wellness portion of your visit today, we are providing you with this personalized preventive plan of services (PPPS). This plan is based upon recommendations of the [place] Preventive Services Task Force (USPSTF) and the Advisory Committee on Immunization Practices (ACIP).    This lists the preventive care services that should be considered, and provides dates of when you are due. Items listed as completed are up-to-date and do not require any further intervention.    Health Maintenance   Topic Date Due    TDAP/TD VACCINES (1 - Tdap) Never done    ZOSTER VACCINE (1 of 2) Never done    AAA SCREEN (ONE-TIME)  Never done    COLORECTAL CANCER SCREENING  2023 (Originally 1955)    Pneumococcal Vaccine 65+ (1 of 1 - PPSV23) 2024 (Originally 4/10/2018)    ANNUAL WELLNESS VISIT  2023    HEPATITIS C SCREENING  Completed    COVID-19 Vaccine  Completed    INFLUENZA VACCINE  Completed       Orders Placed This Encounter   Procedures    PSA Screen     Standing Status:   Future     Standing Expiration Date:   3/17/2023     Order Specific Question:   Release to patient     Answer:   Immediate    Lipid Panel     Standing Status:   Future     Standing Expiration Date:   3/17/2023    Comprehensive Metabolic Panel     Standing Status:   Future     Standing Expiration Date:   3/17/2023     Order Specific Question:   Release to patient     Answer:   Immediate    Urinalysis With Culture If Indicated -     Standing Status:   Future     Standing Expiration Date:   3/17/2023     Order Specific Question:   Release to patient     Answer:   Immediate    TSH     Standing Status:   Future     Standing Expiration Date:   3/17/2023     Order Specific Question:    Release to patient     Answer:   Immediate    CBC & Differential     Standing Status:   Future     Standing Expiration Date:   3/17/2023     Order Specific Question:   Manual Differential     Answer:   No       Return in about 6 months (around 9/17/2022), or htn, for Recheck.

## 2022-03-17 NOTE — PROGRESS NOTES
The ABCs of the Annual Wellness Visit  Subsequent Medicare Wellness Visit    Chief Complaint   Patient presents with   • Medicare Wellness-Initial Visit      Subjective    History of Present Illness:  Monty Nagel is a 66 y.o. male who presents for a Subsequent Medicare Wellness Visit.    The following portions of the patient's history were reviewed and   updated as appropriate: allergies, current medications, past family history, past medical history, past social history, past surgical history and problem list.    Compared to one year ago, the patient feels his physical   health is better.    Compared to one year ago, the patient feels his mental   health is the same.    Recent Hospitalizations:  He was not admitted to the hospital during the last year.       Current Medical Providers:  Patient Care Team:  Tiffany Morales MD as PCP - General (Family Medicine)  Tisha Barth MD as Consulting Physician (Ophthalmology)    Outpatient Medications Prior to Visit   Medication Sig Dispense Refill   • levothyroxine (Synthroid) 112 MCG tablet Take 1 tablet by mouth Daily. 90 tablet 3   • lisinopril-hydrochlorothiazide (PRINZIDE,ZESTORETIC) 20-12.5 MG per tablet Take 1 tablet by mouth Daily. 90 tablet 2     No facility-administered medications prior to visit.       No opioid medication identified on active medication list. I have reviewed chart for other potential  high risk medication/s and harmful drug interactions in the elderly.          Aspirin is not on active medication list.  Aspirin use is not indicated based on review of current medical condition/s. Risk of harm outweighs potential benefits.  .    Patient Active Problem List   Diagnosis   • Essential hypertension   • History of melanoma   • Postablative hypothyroidism   • Screen for colon cancer     Advance Care Planning  Advance Directive is not on file.  ACP discussion was held with the patient during this visit. Patient does not have an advance  "directive, information provided.    Review of Systems   Constitutional: Negative.    HENT: Negative.    Respiratory: Negative.    Cardiovascular: Negative.    Gastrointestinal: Negative.    Genitourinary: Positive for frequency.   Musculoskeletal: Negative.    Skin: Negative.    Hematological: Negative.    Psychiatric/Behavioral: Negative.         Objective    Vitals:    03/17/22 1319   BP: 112/72   Pulse: 83   Resp: 18   Temp: 98.6 °F (37 °C)   SpO2: 98%   Weight: 96.2 kg (212 lb)   Height: 175.3 cm (69\")     BMI Readings from Last 1 Encounters:   03/17/22 31.31 kg/m²   BMI is above normal parameters. Recommendations include: none (medical contraindication)    Does the patient have evidence of cognitive impairment? Yes    Physical Exam  Vitals reviewed.   Constitutional:       Appearance: He is well-developed. He is not ill-appearing.   HENT:      Head: Normocephalic.      Right Ear: Tympanic membrane, ear canal and external ear normal.      Left Ear: Tympanic membrane, ear canal and external ear normal.      Nose: Nose normal.      Mouth/Throat:      Mouth: Mucous membranes are moist.   Eyes:      Conjunctiva/sclera: Conjunctivae normal.      Pupils: Pupils are equal, round, and reactive to light.   Cardiovascular:      Rate and Rhythm: Normal rate and regular rhythm.      Heart sounds: Normal heart sounds.   Pulmonary:      Effort: Pulmonary effort is normal.      Breath sounds: Normal breath sounds.   Abdominal:      General: Bowel sounds are normal.      Palpations: Abdomen is soft.   Genitourinary:     Comments: Declined  exam   Musculoskeletal:         General: Swelling, tenderness and deformity present.      Cervical back: Normal range of motion.      Comments: Bilateral hands - arthritic joints      Lymphadenopathy:      Cervical: No cervical adenopathy.   Skin:     General: Skin is warm and dry.      Capillary Refill: Capillary refill takes less than 2 seconds.   Neurological:      Mental Status: He is " alert and oriented to person, place, and time.   Psychiatric:         Mood and Affect: Mood normal.         Speech: Speech normal.         Behavior: Behavior normal. Behavior is cooperative.       Lab Results   Component Value Date    TRIG 33 2022    HDL 72 (H) 2022    LDL 87 2022    VLDL 8 2022            HEALTH RISK ASSESSMENT    Smoking Status:  Social History     Tobacco Use   Smoking Status Former Smoker   • Packs/day: 1.00   • Years: 15.00   • Pack years: 15.00   • Start date: 3/26/1981   • Quit date: 1990   • Years since quittin.2   Smokeless Tobacco Former User   • Quit date: 1990     Alcohol Consumption:  Social History     Substance and Sexual Activity   Alcohol Use Yes    Comment: WEEKLY DRINKS     Fall Risk Screen:    GILDARDO Fall Risk Assessment was completed, and patient is at LOW risk for falls.Assessment completed on:3/17/2022    Depression Screening:  PHQ-2/PHQ-9 Depression Screening 3/17/2022   Retired Total Score -   Little Interest or Pleasure in Doing Things 0-->not at all   Feeling Down, Depressed or Hopeless 1-->several days   PHQ-9: Brief Depression Severity Measure Score 1       Health Habits and Functional and Cognitive Screening:  Functional & Cognitive Status 3/17/2022   Do you have difficulty preparing food and eating? No   Do you have difficulty bathing yourself, getting dressed or grooming yourself? No   Do you have difficulty using the toilet? No   Do you have difficulty moving around from place to place? No   Do you have trouble with steps or getting out of a bed or a chair? No   Current Diet Well Balanced Diet   Dental Exam Up to date   Eye Exam Up to date   Exercise (times per week) 1 times per week   Current Exercises Include Walking   Do you need help using the phone?  No   Are you deaf or do you have serious difficulty hearing?  No   Do you need help with transportation? No   Do you need help shopping? No   Do you need help preparing meals?  No    Do you need help with housework?  No   Do you need help with laundry? No   Do you need help taking your medications? No   Do you need help managing money? No   Do you ever drive or ride in a car without wearing a seat belt? No       Age-appropriate Screening Schedule:  Refer to the list below for future screening recommendations based on patient's age, sex and/or medical conditions. Orders for these recommended tests are listed in the plan section. The patient has been provided with a written plan.    Health Maintenance   Topic Date Due   • TDAP/TD VACCINES (1 - Tdap) Never done   • ZOSTER VACCINE (1 of 2) Never done   • INFLUENZA VACCINE  Completed              Assessment/Plan   CMS Preventative Services Quick Reference  Risk Factors Identified During Encounter  Alcohol Misuse  Fall Risk-High or Moderate  The above risks/problems have been discussed with the patient.  Follow up actions/plans if indicated are seen below in the Assessment/Plan Section.  Pertinent information has been shared with the patient in the After Visit Summary.    Diagnoses and all orders for this visit:    1. Acquired hypothyroidism (Primary)    2. Essential hypertension  -     lisinopril-hydrochlorothiazide (PRINZIDE,ZESTORETIC) 20-12.5 MG per tablet; Take 1 tablet by mouth Daily.  Dispense: 90 tablet; Refill: 2  -     Comprehensive Metabolic Panel; Future  -     CBC & Differential; Future  -     Urinalysis With Culture If Indicated -; Future    3. Postablative hypothyroidism  -     levothyroxine (Synthroid) 112 MCG tablet; Take 1 tablet by mouth Daily.  Dispense: 90 tablet; Refill: 3  -     TSH; Future    4. History of melanoma    5. Swelling of hand joint, unspecified laterality    6. Prostate cancer screening  -     PSA Screen; Future    7. Screening, lipid  -     Lipid Panel; Future        Follow Up:   Return in about 6 months (around 9/17/2022), or htn, for Recheck.     An After Visit Summary and PPPS were made available to the  patient.        I spent 33 minutes caring for Monty on this date of service. This time includes time spent by me in the following activities:preparing for the visit, reviewing tests, obtaining and/or reviewing a separately obtained history, performing a medically appropriate examination and/or evaluation , counseling and educating the patient/family/caregiver, ordering medications, tests, or procedures, referring and communicating with other health care professionals  and documenting information in the medical record

## 2022-03-18 LAB
ALBUMIN SERPL-MCNC: 4.4 G/DL (ref 3.5–5.2)
ALBUMIN/GLOB SERPL: 1.6 G/DL
ALP SERPL-CCNC: 72 U/L (ref 39–117)
ALT SERPL W P-5'-P-CCNC: 9 U/L (ref 1–41)
ANION GAP SERPL CALCULATED.3IONS-SCNC: 12.9 MMOL/L (ref 5–15)
AST SERPL-CCNC: 17 U/L (ref 1–40)
BACTERIA UR QL AUTO: ABNORMAL /HPF
BILIRUB SERPL-MCNC: 0.4 MG/DL (ref 0–1.2)
BUN SERPL-MCNC: 13 MG/DL (ref 8–23)
BUN/CREAT SERPL: 13 (ref 7–25)
CALCIUM SPEC-SCNC: 9.6 MG/DL (ref 8.6–10.5)
CHLORIDE SERPL-SCNC: 96 MMOL/L (ref 98–107)
CO2 SERPL-SCNC: 26.1 MMOL/L (ref 22–29)
CREAT SERPL-MCNC: 1 MG/DL (ref 0.76–1.27)
EGFRCR SERPLBLD CKD-EPI 2021: 82 ML/MIN/1.73
GLOBULIN UR ELPH-MCNC: 2.8 GM/DL
GLUCOSE SERPL-MCNC: 86 MG/DL (ref 65–99)
HYALINE CASTS UR QL AUTO: ABNORMAL /LPF
POTASSIUM SERPL-SCNC: 4.7 MMOL/L (ref 3.5–5.2)
PROT SERPL-MCNC: 7.2 G/DL (ref 6–8.5)
PSA SERPL-MCNC: 0.48 NG/ML (ref 0–4)
RBC # UR STRIP: ABNORMAL /HPF
REF LAB TEST METHOD: ABNORMAL
SODIUM SERPL-SCNC: 135 MMOL/L (ref 136–145)
SQUAMOUS #/AREA URNS HPF: ABNORMAL /HPF
TSH SERPL DL<=0.05 MIU/L-ACNC: 6.98 UIU/ML (ref 0.27–4.2)
WBC # UR STRIP: ABNORMAL /HPF

## 2022-04-01 DIAGNOSIS — E03.9 ACQUIRED HYPOTHYROIDISM: Primary | ICD-10-CM

## 2022-04-04 ENCOUNTER — TELEPHONE (OUTPATIENT)
Dept: FAMILY MEDICINE CLINIC | Facility: CLINIC | Age: 67
End: 2022-04-04

## 2022-04-14 ENCOUNTER — TELEPHONE (OUTPATIENT)
Dept: FAMILY MEDICINE CLINIC | Facility: CLINIC | Age: 67
End: 2022-04-14

## 2022-04-14 NOTE — TELEPHONE ENCOUNTER
Caller: Monty Nagel    Relationship: Self    Best call back number: [phone]     Caller requesting test results: PATIENT    What test was performed: LABS    When was the test performed: TWO WEEKS    Where was the test performed: [facility] OFFICE

## 2022-05-31 ENCOUNTER — TELEPHONE (OUTPATIENT)
Dept: FAMILY MEDICINE CLINIC | Facility: CLINIC | Age: 67
End: 2022-05-31

## 2022-05-31 NOTE — TELEPHONE ENCOUNTER
Caller: Monty Nagel    Relationship: Self    Best call back number: [phone]    What is the best time to reach you: ANYTIME    Who are you requesting to speak with (clinical staff, provider,  specific staff member): CLINICAL STAFF    Do you know the name of the person who called: SELF    What was the call regarding: PATIENT STATES THAT HE DID NOT RECEIVE HIS LAB RESULTS FROM 03/17/2022 AND HE WOULD LIKE A CALL ABOUT THIS.    Do you require a callback: YES

## 2022-06-02 ENCOUNTER — TELEPHONE (OUTPATIENT)
Dept: FAMILY MEDICINE CLINIC | Facility: CLINIC | Age: 67
End: 2022-06-02

## 2022-06-02 DIAGNOSIS — E03.9 ACQUIRED HYPOTHYROIDISM: Primary | ICD-10-CM

## 2022-06-02 RX ORDER — LEVOTHYROXINE SODIUM 0.12 MG/1
125 TABLET ORAL DAILY
Qty: 90 TABLET | Refills: 1 | Status: ON HOLD | OUTPATIENT
Start: 2022-06-02

## 2022-07-20 ENCOUNTER — TELEPHONE (OUTPATIENT)
Dept: FAMILY MEDICINE CLINIC | Facility: CLINIC | Age: 67
End: 2022-07-20

## 2022-07-20 NOTE — TELEPHONE ENCOUNTER
Caller: Monty Nagel    Relationship: Self    Best call back number: [phone]    What is the best time to reach you: anytime    Who are you requesting to speak with (clinical staff, provider,  specific staff member): Dr. Morales    Do you know the name of the person who called:     What was the call regarding: patient destroyed fit test unintentially and called to state that he needed another one    Do you require a callback: YES

## 2022-09-06 ENCOUNTER — LAB (OUTPATIENT)
Dept: LAB | Facility: HOSPITAL | Age: 67
End: 2022-09-06

## 2022-09-06 ENCOUNTER — OFFICE VISIT (OUTPATIENT)
Dept: FAMILY MEDICINE CLINIC | Facility: CLINIC | Age: 67
End: 2022-09-06

## 2022-09-06 VITALS
TEMPERATURE: 98.6 F | SYSTOLIC BLOOD PRESSURE: 104 MMHG | RESPIRATION RATE: 18 BRPM | WEIGHT: 209 LBS | DIASTOLIC BLOOD PRESSURE: 80 MMHG | HEART RATE: 60 BPM | BODY MASS INDEX: 30.86 KG/M2 | OXYGEN SATURATION: 99 %

## 2022-09-06 DIAGNOSIS — Z13.6 SCREENING FOR AAA (ABDOMINAL AORTIC ANEURYSM): ICD-10-CM

## 2022-09-06 DIAGNOSIS — R01.1 CARDIAC MURMUR: ICD-10-CM

## 2022-09-06 DIAGNOSIS — F33.8 OTHER RECURRENT DEPRESSIVE DISORDERS: ICD-10-CM

## 2022-09-06 DIAGNOSIS — Z85.820 HISTORY OF MELANOMA: ICD-10-CM

## 2022-09-06 DIAGNOSIS — E89.0 POSTABLATIVE HYPOTHYROIDISM: Chronic | ICD-10-CM

## 2022-09-06 DIAGNOSIS — E89.0 POSTABLATIVE HYPOTHYROIDISM: Primary | Chronic | ICD-10-CM

## 2022-09-06 DIAGNOSIS — I10 ESSENTIAL HYPERTENSION: ICD-10-CM

## 2022-09-06 LAB — TSH SERPL DL<=0.05 MIU/L-ACNC: 2.34 UIU/ML (ref 0.27–4.2)

## 2022-09-06 PROCEDURE — 99214 OFFICE O/P EST MOD 30 MIN: CPT | Performed by: FAMILY MEDICINE

## 2022-09-06 PROCEDURE — 84443 ASSAY THYROID STIM HORMONE: CPT

## 2022-09-06 RX ORDER — FLUOXETINE 10 MG/1
10 CAPSULE ORAL DAILY
Qty: 30 CAPSULE | Refills: 5 | Status: SHIPPED | OUTPATIENT
Start: 2022-09-06 | End: 2022-12-14 | Stop reason: SDDI

## 2022-09-06 NOTE — PROGRESS NOTES
Subjective   Monty Nagel is a 67 y.o. male.     History of Present Illness he had labs 4/1/22 and synthroid was increased from 112 mcg to 125mcg.  Pt has not noticed a difference. He his sp thyoid ablation due to graves dz 1995.  Prior to labs in 3/22 he had been off synthoid for 15 years.     He has not noticed changes in how he feels he has not noticed changes in hair skin or nail.      melanoma dx 2013.  He started seeing oncology in [place], he was treated 2017 radioactive patch on retina.  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  He has a retinal specialist that he follows up with. He has a cataract since radioactive treatment.      He got neg fit test.  8/22.     He reports 50 year alcohol addiction.  He reports mild dizziness, ringing in ears.  Headache.  Lack ambition and energy.  Feels like he is failure.  Paranoia fears panic attacks or seizures.  He is worried about alzheimer's.  He reports he drinks until he passes out.  Last drink 2 weeks ago.  Normally he drinks 10 drinks a night beer or vodka.      Smoked 20 years in 20's.       The following portions of the patient's history were reviewed and updated as appropriate: allergies, current medications, past family history, past medical history, past social history, past surgical history and problem list.    Review of Systems   Constitutional: Negative for chills, fatigue, fever and unexpected weight change.   HENT: Negative for congestion, ear pain, nosebleeds, rhinorrhea, sinus pressure, sneezing, sore throat and trouble swallowing.    Eyes: Negative for itching and visual disturbance.   Respiratory: Negative for cough, chest tightness, shortness of breath and wheezing.    Cardiovascular: Negative for chest pain, palpitations and leg swelling.   Gastrointestinal: Negative for abdominal pain, anal bleeding, blood in stool, constipation, diarrhea, nausea and vomiting.   Endocrine: Negative for cold  intolerance, heat intolerance, polydipsia and polyuria.   Genitourinary: Negative for difficulty urinating, frequency, hematuria and urgency.   Musculoskeletal: Negative for back pain, gait problem and myalgias.   Skin: Negative for rash and wound.   Neurological: Negative for dizziness, weakness, numbness and headaches.   Hematological: Negative for adenopathy. Does not bruise/bleed easily.   Psychiatric/Behavioral: Negative for agitation, confusion, decreased concentration and suicidal ideas. The patient is not nervous/anxious.        Objective     Vitals:    09/06/22 0915   BP: 104/80   Pulse: 60   Resp: 18   Temp: 98.6 °F (37 °C)   SpO2: 99%   Weight: 94.8 kg (209 lb)       Physical Exam  Vitals and nursing note reviewed.   Constitutional:       Appearance: He is well-developed.   HENT:      Head: Normocephalic and atraumatic.   Eyes:      General: No scleral icterus.        Right eye: No discharge.         Left eye: No discharge.      Conjunctiva/sclera: Conjunctivae normal.      Pupils: Pupils are equal, round, and reactive to light.   Neck:      Thyroid: No thyromegaly.      Vascular: No JVD.      Trachea: No tracheal deviation.   Cardiovascular:      Rate and Rhythm: Normal rate and regular rhythm.      Heart sounds: Normal heart sounds. No murmur heard.    No friction rub. No gallop.   Pulmonary:      Effort: Pulmonary effort is normal. No respiratory distress.      Breath sounds: Normal breath sounds. No wheezing or rales.   Chest:      Chest wall: No tenderness.   Abdominal:      General: Bowel sounds are normal. There is no distension.      Palpations: Abdomen is soft. There is no mass.      Tenderness: There is no abdominal tenderness.   Musculoskeletal:         General: Normal range of motion.      Cervical back: Normal range of motion and neck supple.   Skin:     General: Skin is warm and dry.   Neurological:      Mental Status: He is alert and oriented to person, place, and time.      Cranial Nerves:  No cranial nerve deficit.      Coordination: Coordination normal.      Deep Tendon Reflexes: Reflexes are normal and symmetric. Reflexes normal.   Psychiatric:         Behavior: Behavior normal.         Thought Content: Thought content normal.         Judgment: Judgment normal.         Assessment & Plan     Problem List Items Addressed This Visit        Cardiac and Vasculature    Essential hypertension (Chronic)    Current Assessment & Plan     Hypertension is improving with treatment.  Continue current treatment regimen.  Continue current medications.  Blood pressure will be reassessed at the next regular appointment.            Endocrine and Metabolic    Postablative hypothyroidism - Primary (Chronic)    Relevant Orders    TSH       Hematology and Neoplasia    History of melanoma       Mental Health    Other recurrent depressive disorders (HCC)    Relevant Medications    FLUoxetine (PROzac) 10 MG capsule      Other Visit Diagnoses     Cardiac murmur        Relevant Orders    Adult Transthoracic Echo Complete W/ Cont if Necessary Per Protocol    Screening for AAA (abdominal aortic aneurysm)        Relevant Orders    Abdominal Aortic Aneurysm Screening Medicare CAR

## 2022-09-07 DIAGNOSIS — Z13.6 SCREENING FOR AAA (ABDOMINAL AORTIC ANEURYSM): Primary | ICD-10-CM

## 2022-09-13 ENCOUNTER — NURSE TRIAGE (OUTPATIENT)
Dept: CALL CENTER | Facility: HOSPITAL | Age: 67
End: 2022-09-13

## 2022-09-13 NOTE — TELEPHONE ENCOUNTER
"    Answer Assessment - Initial Assessment Questions  1. ONSET: \"When did the pain start?\"      Thursday evening after a fall.  2. LOCATION: \"Where is the pain located?\"      Left Shoulder.  Left hand crippled with arthritis.    3. PAIN: \"How bad is the pain?\" (Scale 1-10; or mild, moderate, severe)    - MILD (1-3): doesn't interfere with normal activities    - MODERATE (4-7): interferes with normal activities (e.g., work or school) or awakens from sleep    - SEVERE (8-10): excruciating pain, unable to do any normal activities, unable to move arm at all due to pain      Severe.    4. WORK OR EXERCISE: \"Has there been any recent work or exercise that involved this part of the body?\"      Fell.    5. CAUSE: \"What do you think is causing the shoulder pain?\"      He thinks he has a dislocated shoulder.    6. OTHER SYMPTOMS: \"Do you have any other symptoms?\" (e.g., neck pain, swelling, rash, fever, numbness, weakness)      No neck pain, shoulder is swollen a little.  No rash or fever. Left hand and arm is weak but not numb.    7. PREGNANCY: \"Is there any chance you are pregnant?\" \"When was your last menstrual period?\"      Male    Protocols used: SHOULDER PAIN-ADULT-AH      "

## 2022-09-13 NOTE — TELEPHONE ENCOUNTER
"  Reason for Disposition  • [1] SEVERE pain AND [2] not improved 2 hours after pain medicine    Additional Information  • Negative: Passed out (i.e., lost consciousness, collapsed and was not responding)  • Negative: Shock suspected (e.g., cold/pale/clammy skin, too weak to stand, low BP, rapid pulse)  • Negative: [1] Similar pain previously AND [2] it was from \"heart attack\"  • Negative: [1] Similar pain previously AND [2] it was from \"angina\" AND [3] not relieved by nitroglycerin  • Negative: Sounds like a life-threatening emergency to the triager  • Negative: Followed a shoulder injury  • Negative: Chest pain  • Negative: Difficulty breathing or unusual sweating (e.g., sweating without exertion)  • Negative: [1] Pain lasting > 5 minutes AND [2] pain also present in chest  (Exception: pain is clearly made worse by movement)  • Negative: [1] Age > 40 AND [2] no obvious cause AND [3] pain even when not moving the arm    (Exception: pain is clearly made worse by moving arm or bending neck)    Protocols used: SHOULDER PAIN-ADULT-    "

## 2022-09-27 ENCOUNTER — APPOINTMENT (OUTPATIENT)
Dept: CARDIOLOGY | Facility: HOSPITAL | Age: 67
End: 2022-09-27

## 2022-12-06 ENCOUNTER — TELEPHONE (OUTPATIENT)
Dept: FAMILY MEDICINE CLINIC | Facility: CLINIC | Age: 67
End: 2022-12-06

## 2022-12-14 ENCOUNTER — OFFICE VISIT (OUTPATIENT)
Dept: FAMILY MEDICINE CLINIC | Facility: CLINIC | Age: 67
End: 2022-12-14

## 2022-12-14 VITALS
WEIGHT: 207 LBS | TEMPERATURE: 98.4 F | OXYGEN SATURATION: 98 % | HEART RATE: 85 BPM | SYSTOLIC BLOOD PRESSURE: 100 MMHG | DIASTOLIC BLOOD PRESSURE: 80 MMHG | RESPIRATION RATE: 20 BRPM | BODY MASS INDEX: 30.57 KG/M2

## 2022-12-14 DIAGNOSIS — F10.10 ALCOHOL ABUSE: ICD-10-CM

## 2022-12-14 DIAGNOSIS — R26.89 BALANCE PROBLEMS: ICD-10-CM

## 2022-12-14 DIAGNOSIS — F33.8 OTHER RECURRENT DEPRESSIVE DISORDERS: ICD-10-CM

## 2022-12-14 DIAGNOSIS — L84 PRE-ULCERATIVE CORN OR CALLOUS: ICD-10-CM

## 2022-12-14 DIAGNOSIS — I10 ESSENTIAL HYPERTENSION: Primary | Chronic | ICD-10-CM

## 2022-12-14 DIAGNOSIS — E89.0 POSTABLATIVE HYPOTHYROIDISM: Chronic | ICD-10-CM

## 2022-12-14 PROCEDURE — 99214 OFFICE O/P EST MOD 30 MIN: CPT | Performed by: FAMILY MEDICINE

## 2022-12-14 NOTE — PROGRESS NOTES
Subjective   Monty Nagel is a 67 y.o. male.     History of Present Illness     He reports he is worried about new things.  He took 5 of prozac. He continues to drink alcohol.  He reports he feels like he has aged.  He reports his balance is off.  He reports he cannot remember his last drink.  Trouble with balance and when he drinks he has fallen.  He didn't know what happened until 2 days later.  He noticed things knocked over in his bedroom. He is sleeping on water bed in floor with sleeping bag.  He reports he slipped and fell between headboard and wall. He does not have a computer and does not have a smart phone.  He had fall sept 9 and fractured humerus.  He was seen at [facility].  He then followed up with dr harmon.      He is taking synthroid.      He is taking bp med.      He wonders why his balance is so bad.  He has seen pt in past.  He is seeing pt for his shoulder.  He reports it was too much to get ready to go.  He is worried that MS is causing his ability to walk impairment and memory.     He reports he is having some issues with his feet. He has discolored nails and his right big toe is black and worn down.      He reports he has numbner for social servies but worreid if they come he willl get eviceted due to mess he reports his hot water heatere isnt working and he is having trouble with showering.      He reports that the roof on his houses caving in he has not been able to get that fixed.  He has been trying to get an apartment but has not been able to get that come through to fruition.  He has the number for  but has not called them.    He reports that when he broke his arm he thought that he would be admitted to the hospital and maybe they could fix his foot and his hernia but he was not admitted.    He reports that he has a very difficult time driving and that he is not quite as sharp as he used to be.    The following portions of the patient's history were reviewed and updated  as appropriate: allergies, current medications, past family history, past medical history, past social history, past surgical history and problem list.    Review of Systems   Constitutional: Positive for activity change and fatigue.   HENT: Negative.    Musculoskeletal: Positive for gait problem. Negative for joint swelling.   Skin: Positive for color change and wound.   Neurological: Positive for dizziness.   Psychiatric/Behavioral: Positive for dysphoric mood and sleep disturbance. The patient is nervous/anxious.        Objective     Vitals:    12/14/22 1325   BP: 100/80   Pulse: 85   Resp: 20   Temp: 98.4 °F (36.9 °C)   SpO2: 98%   Weight: 93.9 kg (207 lb)       Physical Exam  Vitals and nursing note reviewed.   Constitutional:       Appearance: He is well-developed.      Comments: Disheveled   HENT:      Head: Normocephalic and atraumatic.   Eyes:      General: No scleral icterus.        Right eye: No discharge.         Left eye: No discharge.      Conjunctiva/sclera: Conjunctivae normal.      Pupils: Pupils are equal, round, and reactive to light.   Neck:      Thyroid: No thyromegaly.      Vascular: No JVD.      Trachea: No tracheal deviation.   Cardiovascular:      Rate and Rhythm: Normal rate and regular rhythm.      Heart sounds: Murmur heard.     No friction rub. No gallop.   Pulmonary:      Effort: Pulmonary effort is normal. No respiratory distress.      Breath sounds: Normal breath sounds. No wheezing or rales.   Chest:      Chest wall: No tenderness.   Abdominal:      General: Bowel sounds are normal. There is no distension.      Palpations: Abdomen is soft. There is no mass.      Tenderness: There is no abdominal tenderness.   Musculoskeletal:         General: Normal range of motion.      Cervical back: Normal range of motion and neck supple.   Skin:     General: Skin is warm and dry.   Neurological:      Mental Status: He is oriented to person, place, and time.      Cranial Nerves: No cranial nerve  deficit.      Coordination: Coordination normal.      Deep Tendon Reflexes: Reflexes are normal and symmetric. Reflexes normal.   Psychiatric:         Behavior: Behavior normal.         Thought Content: Thought content normal.         Judgment: Judgment normal.         Assessment & Plan     Problem List Items Addressed This Visit        Cardiac and Vasculature    Essential hypertension - Primary (Chronic)       Endocrine and Metabolic    Postablative hypothyroidism (Chronic)    Relevant Orders    TSH       Mental Health    Other recurrent depressive disorders (HCC)    Relevant Orders    Ambulatory Referral to Psychiatry   Other Visit Diagnoses     Alcohol abuse        Relevant Orders    Comprehensive Metabolic Panel    CBC & Differential    Ambulatory Referral to Social Care Services (Amb Case Mgmt)    Balance problems        Relevant Orders    Ambulatory Referral to Physical Therapy Evaluate and treat, Vestibular    Ambulatory Referral to Social Care Services (Amb Case Mgmt)    Pre-ulcerative corn or callous        Relevant Orders    Ambulatory Referral to Podiatry    Ambulatory Referral to Social Care Services (Amb Case Mgmt)

## 2022-12-15 ENCOUNTER — REFERRAL TRIAGE (OUTPATIENT)
Dept: CASE MANAGEMENT | Facility: OTHER | Age: 67
End: 2022-12-15

## 2022-12-27 ENCOUNTER — PATIENT OUTREACH (OUTPATIENT)
Dept: CASE MANAGEMENT | Facility: OTHER | Age: 67
End: 2022-12-27

## 2022-12-27 NOTE — OUTREACH NOTE
Patient Outreach    SW made four attempts to contact pt and left voicemails requesting callback. SW will close referral at this time.    SADE KNAPP -   Ambulatory Case Management    12/27/2022, 13:09 EST

## 2022-12-28 NOTE — PROGRESS NOTES
Phone number was obtained and given to Dr. Morales who called and reported her concerns regarding the pt.

## 2023-03-11 ENCOUNTER — APPOINTMENT (OUTPATIENT)
Dept: GENERAL RADIOLOGY | Facility: HOSPITAL | Age: 68
End: 2023-03-11
Payer: MEDICARE

## 2023-03-11 ENCOUNTER — HOSPITAL ENCOUNTER (INPATIENT)
Facility: HOSPITAL | Age: 68
LOS: 17 days | Discharge: HOME | End: 2023-04-03
Attending: EMERGENCY MEDICINE | Admitting: NEUROLOGICAL SURGERY
Payer: MEDICARE

## 2023-03-11 ENCOUNTER — APPOINTMENT (OUTPATIENT)
Dept: MRI IMAGING | Facility: HOSPITAL | Age: 68
End: 2023-03-11
Payer: MEDICARE

## 2023-03-11 DIAGNOSIS — R53.1 GENERALIZED WEAKNESS: Primary | ICD-10-CM

## 2023-03-11 DIAGNOSIS — R41.0 CONFUSION: ICD-10-CM

## 2023-03-11 DIAGNOSIS — R26.2 INABILITY TO WALK: ICD-10-CM

## 2023-03-11 DIAGNOSIS — R41.841 COGNITIVE COMMUNICATION DEFICIT: ICD-10-CM

## 2023-03-11 DIAGNOSIS — Z74.09 IMPAIRED FUNCTIONAL MOBILITY, BALANCE, GAIT, AND ENDURANCE: ICD-10-CM

## 2023-03-11 DIAGNOSIS — E16.2 HYPOGLYCEMIA: ICD-10-CM

## 2023-03-11 DIAGNOSIS — F10.21 HISTORY OF ALCOHOLISM: ICD-10-CM

## 2023-03-11 DIAGNOSIS — D49.6 BRAIN TUMOR: ICD-10-CM

## 2023-03-11 DIAGNOSIS — E63.9 POOR NUTRITION: ICD-10-CM

## 2023-03-11 PROBLEM — I48.91 ATRIAL FIBRILLATION: Status: ACTIVE | Noted: 2023-03-11

## 2023-03-11 PROBLEM — R26.89 BALANCE PROBLEM: Status: ACTIVE | Noted: 2023-03-11

## 2023-03-11 LAB
ALBUMIN SERPL-MCNC: 4 G/DL (ref 3.5–5.2)
ALBUMIN/GLOB SERPL: 1.4 G/DL
ALP SERPL-CCNC: 71 U/L (ref 39–117)
ALT SERPL W P-5'-P-CCNC: 14 U/L (ref 1–41)
AMPHET+METHAMPHET UR QL: NEGATIVE
AMPHETAMINES UR QL: NEGATIVE
ANION GAP SERPL CALCULATED.3IONS-SCNC: 12 MMOL/L (ref 5–15)
AST SERPL-CCNC: 20 U/L (ref 1–40)
BARBITURATES UR QL SCN: NEGATIVE
BASOPHILS # BLD AUTO: 0.03 10*3/MM3 (ref 0–0.2)
BASOPHILS NFR BLD AUTO: 0.5 % (ref 0–1.5)
BENZODIAZ UR QL SCN: NEGATIVE
BILIRUB SERPL-MCNC: 1 MG/DL (ref 0–1.2)
BILIRUB UR QL STRIP: NEGATIVE
BUN SERPL-MCNC: 14 MG/DL (ref 8–23)
BUN/CREAT SERPL: 14.1 (ref 7–25)
BUPRENORPHINE SERPL-MCNC: NEGATIVE NG/ML
CALCIUM SPEC-SCNC: 9.3 MG/DL (ref 8.6–10.5)
CANNABINOIDS SERPL QL: NEGATIVE
CHLORIDE SERPL-SCNC: 95 MMOL/L (ref 98–107)
CLARITY UR: CLEAR
CO2 SERPL-SCNC: 28 MMOL/L (ref 22–29)
COCAINE UR QL: NEGATIVE
COLOR UR: ABNORMAL
CREAT SERPL-MCNC: 0.99 MG/DL (ref 0.76–1.27)
CRP SERPL-MCNC: <0.3 MG/DL (ref 0–0.5)
DEPRECATED RDW RBC AUTO: 44.4 FL (ref 37–54)
EGFRCR SERPLBLD CKD-EPI 2021: 82.5 ML/MIN/1.73
EOSINOPHIL # BLD AUTO: 0.07 10*3/MM3 (ref 0–0.4)
EOSINOPHIL NFR BLD AUTO: 1.3 % (ref 0.3–6.2)
ERYTHROCYTE [DISTWIDTH] IN BLOOD BY AUTOMATED COUNT: 14.3 % (ref 12.3–15.4)
ETHANOL BLD-MCNC: <10 MG/DL (ref 0–10)
GLOBULIN UR ELPH-MCNC: 2.9 GM/DL
GLUCOSE BLDC GLUCOMTR-MCNC: 65 MG/DL (ref 70–130)
GLUCOSE SERPL-MCNC: 95 MG/DL (ref 65–99)
GLUCOSE UR STRIP-MCNC: NEGATIVE MG/DL
HBA1C MFR BLD: 5.4 % (ref 4.8–5.6)
HCT VFR BLD AUTO: 43.7 % (ref 37.5–51)
HGB BLD-MCNC: 14.8 G/DL (ref 13–17.7)
HGB UR QL STRIP.AUTO: NEGATIVE
HOLD SPECIMEN: NORMAL
IMM GRANULOCYTES # BLD AUTO: 0.02 10*3/MM3 (ref 0–0.05)
IMM GRANULOCYTES NFR BLD AUTO: 0.4 % (ref 0–0.5)
KETONES UR QL STRIP: ABNORMAL
LEUKOCYTE ESTERASE UR QL STRIP.AUTO: NEGATIVE
LYMPHOCYTES # BLD AUTO: 1.82 10*3/MM3 (ref 0.7–3.1)
LYMPHOCYTES NFR BLD AUTO: 32.6 % (ref 19.6–45.3)
MAGNESIUM SERPL-MCNC: 1.8 MG/DL (ref 1.6–2.4)
MCH RBC QN AUTO: 29.2 PG (ref 26.6–33)
MCHC RBC AUTO-ENTMCNC: 33.9 G/DL (ref 31.5–35.7)
MCV RBC AUTO: 86.2 FL (ref 79–97)
METHADONE UR QL SCN: NEGATIVE
MONOCYTES # BLD AUTO: 0.53 10*3/MM3 (ref 0.1–0.9)
MONOCYTES NFR BLD AUTO: 9.5 % (ref 5–12)
NEUTROPHILS NFR BLD AUTO: 3.11 10*3/MM3 (ref 1.7–7)
NEUTROPHILS NFR BLD AUTO: 55.7 % (ref 42.7–76)
NITRITE UR QL STRIP: NEGATIVE
NRBC BLD AUTO-RTO: 0 /100 WBC (ref 0–0.2)
OPIATES UR QL: NEGATIVE
OXYCODONE UR QL SCN: NEGATIVE
PCP UR QL SCN: NEGATIVE
PH UR STRIP.AUTO: 5.5 [PH] (ref 5–8)
PLATELET # BLD AUTO: 174 10*3/MM3 (ref 140–450)
PMV BLD AUTO: 10.1 FL (ref 6–12)
POTASSIUM SERPL-SCNC: 3.5 MMOL/L (ref 3.5–5.2)
PROPOXYPH UR QL: NEGATIVE
PROT SERPL-MCNC: 6.9 G/DL (ref 6–8.5)
PROT UR QL STRIP: NEGATIVE
RBC # BLD AUTO: 5.07 10*6/MM3 (ref 4.14–5.8)
SODIUM SERPL-SCNC: 135 MMOL/L (ref 136–145)
SP GR UR STRIP: 1.02 (ref 1–1.03)
TRICYCLICS UR QL SCN: NEGATIVE
TROPONIN T SERPL HS-MCNC: 10 NG/L
UROBILINOGEN UR QL STRIP: ABNORMAL
WBC NRBC COR # BLD: 5.58 10*3/MM3 (ref 3.4–10.8)
WHOLE BLOOD HOLD COAG: NORMAL
WHOLE BLOOD HOLD SPECIMEN: NORMAL

## 2023-03-11 PROCEDURE — 71045 X-RAY EXAM CHEST 1 VIEW: CPT

## 2023-03-11 PROCEDURE — A9577 INJ MULTIHANCE: HCPCS | Performed by: EMERGENCY MEDICINE

## 2023-03-11 PROCEDURE — 93005 ELECTROCARDIOGRAM TRACING: CPT

## 2023-03-11 PROCEDURE — 25010000002 THIAMINE PER 100 MG: Performed by: PSYCHIATRY & NEUROLOGY

## 2023-03-11 PROCEDURE — 80306 DRUG TEST PRSMV INSTRMNT: CPT | Performed by: EMERGENCY MEDICINE

## 2023-03-11 PROCEDURE — 81003 URINALYSIS AUTO W/O SCOPE: CPT | Performed by: EMERGENCY MEDICINE

## 2023-03-11 PROCEDURE — 85025 COMPLETE CBC W/AUTO DIFF WBC: CPT

## 2023-03-11 PROCEDURE — 84484 ASSAY OF TROPONIN QUANT: CPT | Performed by: EMERGENCY MEDICINE

## 2023-03-11 PROCEDURE — 83036 HEMOGLOBIN GLYCOSYLATED A1C: CPT | Performed by: NURSE PRACTITIONER

## 2023-03-11 PROCEDURE — 99285 EMERGENCY DEPT VISIT HI MDM: CPT

## 2023-03-11 PROCEDURE — 70553 MRI BRAIN STEM W/O & W/DYE: CPT

## 2023-03-11 PROCEDURE — 25010000002 THIAMINE PER 100 MG: Performed by: EMERGENCY MEDICINE

## 2023-03-11 PROCEDURE — 72156 MRI NECK SPINE W/O & W/DYE: CPT

## 2023-03-11 PROCEDURE — 0 LEVETIRACETAM IN NACL 0.75% 1000 MG/100ML SOLUTION: Performed by: PEDIATRICS

## 2023-03-11 PROCEDURE — 99222 1ST HOSP IP/OBS MODERATE 55: CPT | Performed by: PSYCHIATRY & NEUROLOGY

## 2023-03-11 PROCEDURE — 82077 ASSAY SPEC XCP UR&BREATH IA: CPT | Performed by: EMERGENCY MEDICINE

## 2023-03-11 PROCEDURE — 80053 COMPREHEN METABOLIC PANEL: CPT | Performed by: EMERGENCY MEDICINE

## 2023-03-11 PROCEDURE — 82962 GLUCOSE BLOOD TEST: CPT

## 2023-03-11 PROCEDURE — 99223 1ST HOSP IP/OBS HIGH 75: CPT | Performed by: PEDIATRICS

## 2023-03-11 PROCEDURE — 83735 ASSAY OF MAGNESIUM: CPT | Performed by: EMERGENCY MEDICINE

## 2023-03-11 PROCEDURE — 93005 ELECTROCARDIOGRAM TRACING: CPT | Performed by: EMERGENCY MEDICINE

## 2023-03-11 PROCEDURE — 86140 C-REACTIVE PROTEIN: CPT | Performed by: NURSE PRACTITIONER

## 2023-03-11 PROCEDURE — 0 GADOBENATE DIMEGLUMINE 529 MG/ML SOLUTION: Performed by: EMERGENCY MEDICINE

## 2023-03-11 RX ORDER — ONDANSETRON 2 MG/ML
4 INJECTION INTRAMUSCULAR; INTRAVENOUS EVERY 6 HOURS PRN
Status: DISCONTINUED | OUTPATIENT
Start: 2023-03-11 | End: 2023-04-03 | Stop reason: HOSPADM

## 2023-03-11 RX ORDER — DEXAMETHASONE SODIUM PHOSPHATE 4 MG/ML
4 INJECTION, SOLUTION INTRA-ARTICULAR; INTRALESIONAL; INTRAMUSCULAR; INTRAVENOUS; SOFT TISSUE EVERY 6 HOURS PRN
Status: DISCONTINUED | OUTPATIENT
Start: 2023-03-11 | End: 2023-03-12

## 2023-03-11 RX ORDER — ENOXAPARIN SODIUM 100 MG/ML
40 INJECTION SUBCUTANEOUS DAILY
Status: DISCONTINUED | OUTPATIENT
Start: 2023-03-12 | End: 2023-04-03 | Stop reason: HOSPADM

## 2023-03-11 RX ORDER — ONDANSETRON 4 MG/1
4 TABLET, FILM COATED ORAL EVERY 6 HOURS PRN
Status: DISCONTINUED | OUTPATIENT
Start: 2023-03-11 | End: 2023-04-03 | Stop reason: HOSPADM

## 2023-03-11 RX ORDER — LEVOTHYROXINE SODIUM 0.12 MG/1
125 TABLET ORAL DAILY
Status: DISCONTINUED | OUTPATIENT
Start: 2023-03-12 | End: 2023-04-03 | Stop reason: HOSPADM

## 2023-03-11 RX ORDER — AMOXICILLIN 250 MG
2 CAPSULE ORAL 2 TIMES DAILY
Status: DISCONTINUED | OUTPATIENT
Start: 2023-03-11 | End: 2023-04-03 | Stop reason: HOSPADM

## 2023-03-11 RX ORDER — SODIUM CHLORIDE 0.9 % (FLUSH) 0.9 %
10 SYRINGE (ML) INJECTION EVERY 12 HOURS SCHEDULED
Status: DISCONTINUED | OUTPATIENT
Start: 2023-03-11 | End: 2023-03-17

## 2023-03-11 RX ORDER — SODIUM CHLORIDE 9 MG/ML
40 INJECTION, SOLUTION INTRAVENOUS AS NEEDED
Status: DISCONTINUED | OUTPATIENT
Start: 2023-03-11 | End: 2023-03-17

## 2023-03-11 RX ORDER — SODIUM CHLORIDE 0.9 % (FLUSH) 0.9 %
10 SYRINGE (ML) INJECTION AS NEEDED
Status: DISCONTINUED | OUTPATIENT
Start: 2023-03-11 | End: 2023-04-03 | Stop reason: HOSPADM

## 2023-03-11 RX ORDER — BISACODYL 10 MG
10 SUPPOSITORY, RECTAL RECTAL DAILY PRN
Status: DISCONTINUED | OUTPATIENT
Start: 2023-03-11 | End: 2023-04-03 | Stop reason: HOSPADM

## 2023-03-11 RX ORDER — PANTOPRAZOLE SODIUM 40 MG/1
40 TABLET, DELAYED RELEASE ORAL
Status: DISCONTINUED | OUTPATIENT
Start: 2023-03-12 | End: 2023-03-12

## 2023-03-11 RX ORDER — SODIUM CHLORIDE 0.9 % (FLUSH) 0.9 %
10 SYRINGE (ML) INJECTION AS NEEDED
Status: DISCONTINUED | OUTPATIENT
Start: 2023-03-11 | End: 2023-03-17

## 2023-03-11 RX ORDER — ACETAMINOPHEN 160 MG/5ML
650 SOLUTION ORAL EVERY 4 HOURS PRN
Status: DISCONTINUED | OUTPATIENT
Start: 2023-03-11 | End: 2023-04-03 | Stop reason: HOSPADM

## 2023-03-11 RX ORDER — CHOLECALCIFEROL (VITAMIN D3) 125 MCG
5 CAPSULE ORAL NIGHTLY PRN
Status: DISCONTINUED | OUTPATIENT
Start: 2023-03-11 | End: 2023-04-03 | Stop reason: HOSPADM

## 2023-03-11 RX ORDER — POLYETHYLENE GLYCOL 3350 17 G/17G
17 POWDER, FOR SOLUTION ORAL DAILY PRN
Status: DISCONTINUED | OUTPATIENT
Start: 2023-03-11 | End: 2023-04-03 | Stop reason: HOSPADM

## 2023-03-11 RX ORDER — LEVETIRACETAM 10 MG/ML
1000 INJECTION INTRAVASCULAR EVERY 12 HOURS SCHEDULED
Status: DISCONTINUED | OUTPATIENT
Start: 2023-03-12 | End: 2023-03-17

## 2023-03-11 RX ORDER — ACETAMINOPHEN 650 MG/1
650 SUPPOSITORY RECTAL EVERY 4 HOURS PRN
Status: DISCONTINUED | OUTPATIENT
Start: 2023-03-11 | End: 2023-04-03 | Stop reason: HOSPADM

## 2023-03-11 RX ORDER — ACETAMINOPHEN 325 MG/1
650 TABLET ORAL EVERY 4 HOURS PRN
Status: DISCONTINUED | OUTPATIENT
Start: 2023-03-11 | End: 2023-04-03 | Stop reason: HOSPADM

## 2023-03-11 RX ORDER — BISACODYL 5 MG/1
5 TABLET, DELAYED RELEASE ORAL DAILY PRN
Status: DISCONTINUED | OUTPATIENT
Start: 2023-03-11 | End: 2023-04-03 | Stop reason: HOSPADM

## 2023-03-11 RX ADMIN — THIAMINE HYDROCHLORIDE 250 MG: 100 INJECTION, SOLUTION INTRAMUSCULAR; INTRAVENOUS at 21:13

## 2023-03-11 RX ADMIN — LEVETIRACETAM 1000 MG: 10 INJECTION INTRAVASCULAR at 23:30

## 2023-03-11 RX ADMIN — Medication 10 ML: at 21:12

## 2023-03-11 RX ADMIN — SODIUM CHLORIDE, POTASSIUM CHLORIDE, SODIUM LACTATE AND CALCIUM CHLORIDE 1000 ML: 600; 310; 30; 20 INJECTION, SOLUTION INTRAVENOUS at 11:22

## 2023-03-11 RX ADMIN — THIAMINE HYDROCHLORIDE 1000 ML/HR: 100 INJECTION, SOLUTION INTRAMUSCULAR; INTRAVENOUS at 17:55

## 2023-03-11 RX ADMIN — GADOBENATE DIMEGLUMINE 18 ML: 529 INJECTION, SOLUTION INTRAVENOUS at 19:35

## 2023-03-11 NOTE — CASE MANAGEMENT/SOCIAL WORK
Discharge Planning Assessment  [facility]     Patient Name: Monty Nagel  MRN: [id number]  Today's Date: 3/11/2023    Admit Date: 3/11/2023    Plan: IDP   Discharge Needs Assessment     Row Name 03/11/23 1826       Living Environment    People in Home alone    Current Living Arrangements home    Potentially Unsafe Housing Conditions insects/pests;no hot water    Primary Care Provided by self    Provides Primary Care For no one, unable/limited ability to care for self    Family Caregiver if Needed none       Resource/Environmental Concerns    Resource/Environmental Concerns environmental       Discharge Needs Assessment    Readmission Within the Last 30 Days no previous admission in last 30 days    Equipment Currently Used at Home none    Concerns to be Addressed discharge planning               Discharge Plan     Row Name 03/11/23 1829       Plan    Plan IDP    Plan Comments MSW met with Pt at bedside. Pt lives alone in [facility]. Pt confirmed demographics and PCP is Dr. Morales. Pt has [employer] Medicare. Pt is decreasing with ability to complete ADL’s; Pt has no DME or O2. Pt reports he is unable to care for himself. Pt reports unsafe living conditions; MSW filed an APS referral; WebID [id number]. CM and SW will continue to follow.    Final Discharge Disposition Code 30 - still a patient              Continued Care and Services - Admitted Since 3/11/2023    Coordination has not been started for this encounter.          Demographic Summary     Row Name 03/11/23 1826       General Information    Arrived From home    Referral Source admission list;emergency department    Reason for Consult discharge planning    Preferred Language English               Functional Status     Row Name 03/11/23 1826       Functional Status    Usual Activity Tolerance moderate    Current Activity Tolerance moderate       Functional Status, IADL    Medications independent    Meal Preparation independent    Housekeeping independent    Laundry  independent    Shopping independent                         CARIDAD Delarosa

## 2023-03-11 NOTE — CASE MANAGEMENT/SOCIAL WORK
Continued Stay Note  [facility]     Patient Name: Monty Nagel  MRN: [id number]  Today's Date: 3/11/2023    Admit Date: 3/11/2023    Plan: Resoruces and APS   Discharge Plan     Row Name 03/11/23 1530       Plan    Plan Resoruces and APS    Plan Comments MSW contacted by MD and RN regarding possible unsafe living conditions. MSW met with Pt at bedside to obtain more information. Pt confirmed his demographics and reports he lives alone. Pt denied any family, friends, or neighbors that are able to help provide care. Pt claimed he normally walks and drives to places but lately has been having weakness and trouble with balance. Pt reported “I feel I cannot take care of myself.” Pt reports having no warm water to bath or clean. Pt reports having trouble obtaining food. Pt report’s his dogs ripped up domingo and now he has to be careful of splinters; Pt reports no bed bugs, but reports seeing a brooke or two. MSW informed him that an APS referral would be made, and if his case met criteria a worker would reach out to help provide additional resources; Pt in agreement. MSW filed APS report: WebID: [id number]. MSW provided Meals for Wheels and additional food resources for Pt at bedside. MD and RN updated. MSW following and available.    Final Discharge Disposition Code 30 - still a patient               Discharge Codes    No documentation.                     CARIDAD Delarosa

## 2023-03-11 NOTE — CONSULTS
DOS: 3/11/2023  NAME: Monty Nagel   : 1955  PCP: Tiffany Morales MD  CC: 2-week history of progressive balance issues along with falls.  Referring MD: Carey Cheatham MD    Neurological Problem and Interval History:  67 y.o. right-handed white male with a Hx of alcoholism for long time but has been sober since 2022 has noticed over the last 2 to 3 weeks that he has been having problems with balance and falls and trying to use a walker at home.  He has not been driving for the last 2 weeks as if he tries to go to the grocery to get food he tends to lose balance and falls and does not feel comfortable driving a car anymore.  For that reason he does not have much nutrition and has not been eating well and has lost weight.  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  The left eye is fine.  He also has severe psoriatic arthritis in all the joints as well as history of psoriasis in the skin..    Past Medical/Surgical Hx:  Past Medical History:   Diagnosis Date   • Arthritis    • Hypertension    • Melanoma (HCC) 2017    retina     Past Surgical History:   Procedure Laterality Date   • EYE SURGERY  2017    EYE CANCER RIGHT EYE   • TONSILLECTOMY         Review of Systems:    Constitutional: Pleasant gentleman currently lost a lot of weight with a BMI of 30.57 kg/m² resting comfortably.  Cardiovascular: Denies any chest pain or palpitations but has got poor circulation both feet.  Respiratory: Denies any shortness of breath.  Gastrointestinal: Denies any nausea and vomiting.  Genitourinary: Denies bladder incontinence but having difficulty reaching the bathroom safely.  Musculoskeletal: Has arthritis in his joints including the left shoulder which she had a fracture before and also in his neck.  Dermatological: Has stigmata of psoriasis.  Neurological: Having issues with falls and balance.  Psychiatric: Has some  short-term memory issues.  Ophthalmological: No evidence of any nystagmus but has got a right eye melanoma which has been treated.          Medications On Admission  (Not in a hospital admission)      Allergies:  No Known Allergies    Social Hx:  Social History     Socioeconomic History   • Marital status: Single   Tobacco Use   • Smoking status: Former     Packs/day: 1.00     Years: 15.00     Pack years: 15.00     Types: Cigarettes     Start date: 3/26/1981     Quit date: 1990     Years since quittin.2   • Smokeless tobacco: Former     Quit date: 1990   Substance and Sexual Activity   • Alcohol use: Yes     Comment: WEEKLY DRINKS   • Drug use: No   • Sexual activity: Never       Family Hx:  Family History   Problem Relation Age of Onset   • Diabetes Mother    • Heart disease Father        Review of Imaging (Interpretation of images not reports): Portable chest x-ray shows the following:    Findings:  The heart is not definitely enlarged. The lungs seem relatively clear. It looks like the probably are some underlying emphysematous changes. There are no pleural effusions. There is some deformity of the surgical neck of the proximal left humerus which   may relate to old trauma.     IMPRESSION:  Impression:  1.An acute pulmonary process is not apparent.  2.Some underlying emphysematous changes suggested.    Additional Tests Performed: The urine drug screen shows the following:     Latest Reference Range & Units Most Recent   Amphetamine, Urine Qual Negative  Negative  3/11/23 10:58   Barbiturates Screen, Urine Negative  Negative  3/11/23 10:58   Benzodiazepine Screen, Urine Negative  Negative  3/11/23 10:58   Buprenorphine, Screen, Urine Negative  Negative  3/11/23 10:58   Cocaine Screen, Urine Negative  Negative  3/11/23 10:58   Methamphetamine, Ur Negative  Negative  3/11/23 10:58   Methadone Screen , Urine Negative  Negative  3/11/23 10:58   Opiate Screen Negative  Negative  3/11/23 10:58   Oxycodone  "Screen, Urine Negative  Negative  3/11/23 10:58   Phencyclidine (PCP), Urine Negative  Negative  3/11/23 10:58   Propoxyphene Screen Negative  Negative  3/11/23 10:58   THC Screen, Urine Negative  Negative  3/11/23 10:58   Tricyclic Antidepressants Screen Negative  Negative  3/11/23 10:58         Laboratory Results:   Lab Results   Component Value Date    GLUCOSE 95 03/11/2023    CALCIUM 9.3 03/11/2023     (L) 03/11/2023    K 3.5 03/11/2023    CO2 28.0 03/11/2023    CL 95 (L) 03/11/2023    BUN 14 03/11/2023    CREATININE 0.99 03/11/2023    EGFRIFNONA 91 08/12/2020    BCR 14.1 03/11/2023    ANIONGAP 12.0 03/11/2023     Lab Results   Component Value Date    WBC 5.58 03/11/2023    HGB 14.8 03/11/2023    HCT 43.7 03/11/2023    MCV 86.2 03/11/2023     03/11/2023     Lab Results   Component Value Date    CHOL 167 03/17/2022    CHOL 156 08/12/2020    CHOL 184 11/15/2019     Lab Results   Component Value Date    HDL 72 (H) 03/17/2022    HDL 57 08/12/2020    HDL 73 (H) 11/15/2019     Lab Results   Component Value Date    LDL 87 03/17/2022    LDL 91 08/12/2020     (H) 11/15/2019     Lab Results   Component Value Date    TRIG 33 03/17/2022    TRIG 41 08/12/2020    TRIG 48 11/15/2019     Lab Results   Component Value Date    HGBA1C 5.30 11/15/2019     No results found for: INR, PROTIME      Physical Examination:  BP 93/68   Pulse 60   Temp 98.2 °F (36.8 °C) (Oral)   Resp 20   Ht 175.3 cm (69\")   Wt 93.9 kg (207 lb)   SpO2 97%   BMI 30.57 kg/m²   General Appearance:   Well developed, well nourished, well groomed, alert, and cooperative.  HEENT: Normocephalic.  Keeps the right eye squinted because of the recent melanoma which is being treated..  Neck and Spine: Normal range of motion.  Normal alignment. No mass or tenderness. No bruits.  Cardiac: Irregular rate and rhythm. No murmurs.  Peripheral Vasculature: Radial  are equal and symmetric.  Has bilateral cold feet with loss of pulses in the arteria "     dorsalis pedis bilaterally..  Extremities:    No edema or deformities. Normal joint ROM. CHINO hoses and SCD's in place  Skin:    Stigmata for psoriasis..    Neurological examination:  Higher Integrative  Function: Oriented to time, place and person. Normal registration, recall, attention span and concentration. Normal language including comprehension, spontaneous speech, repetition, reading, writing, naming and vocabulary. No neglect with normal visual-spatial function and construction. Normal fund of knowledge and higher integrative function.  CN II: The left eye shows normal pupillary response and normal visual fields.  The right eye cannot be tested because of the recent melanoma and being aggressively treated for it..    CN III IV VI: Extraocular movements are full without nystagmus.   CN V: Normal facial sensation and strength of muscles of mastication.  CN VII: Facial movements are symmetric. No weakness.  CN VIII:   Auditory acuity is normal.  CN IX & X:   Symmetric palatal movement.  CN XI: Sternocleidomastoid and trapezius are normal.  No weakness.  CN XII:   The tongue is midline.  No atrophy or fasciculations.  Motor: The muscle bulk is poor because of weight loss and the muscle power and tone seems to be normal.  Reflexes: 3+ in the upper and lower extremities. Plantar responses are flexor.  Sensation: Normal to light touch, pinprick, vibration, temperature, and proprioception in arms and legs. Normal graphesthesia and no extinction on DSS.  Station and Gait: He walks with a wide base and he needs assistance in getting up and walking which we noticed when he walked from his room to the bathroom..    Coordination: Finger to nose test shows no dysmetria.      He has full range of motion of the neck without any signs of meningismus.    Diagnoses / Discussion:  67 y.o. who presents with Sx of progressive falls along with memory issues and wide-based gait.  As per Dr. Lopez he tends to  confabulate.    Plan:  We will get an MRI of the brain with and without contrast with thin sections through bilateral mamillary bodies.    We will also get an MRI of the cervical spine with and without contrast to look for any myelopathy..    We will check vitamin B12 and folic acid levels along with TSH.    We will also check a full rheumatoid panel which includes sed rate, TORIE titer, double-stranded DNA, Sjogren's factors, rheumatoid factor, anti-Bowen antibody.    We will have physical therapy and Occupational Therapy evaluate him to look for rehab potential    Nutritional consultation has also been requested by the hospitalist.    Also starting him on high-dose intravenous thiamine at 250 mg every 8 hours for 5 days.    I have discussed the above with the patient and hospitalist team and will follow-up.  Time spent with patient: 70 minutes in face-to-face evaluation and management of the patient.    Coding    Dictated using Dragon dictation.

## 2023-03-11 NOTE — H&P
[facility] Medicine Services  HISTORY AND PHYSICAL    Patient Name: Monty Nagel  : 1955  MRN: [id number]  Primary Care Physician: Tiffany Morales MD  Date of admission: 3/11/2023    Subjective   Subjective     Chief Complaint:  Balance difficulty    HPI:  Monty Nagel is a 67 y.o. male PMH significant for balance problems, alcohol abuse, depression, HTN, hypothyroidism s/p thyroidectomy for Graves dz who presents to [facility] ED with a 2-week history of difficulty with balance and gait.  Patient states he has been having to hang onto the walls walk from his bedroom to his bathroom.  He notes accompanying weakness feels this is due to to not the past couple weeks as he has not been to the grocery.  Denies any recent illness, fever/chills, headache.  He had an episode of diarrhea last night where he had an episode of vomiting 3 days ago that he attributes to some food he ate. Patient denies falling since the onset of symptoms.  He does note a bad fall in 2022 at which time he dislocated his shoulder and fractured his humerus secondary to alcohol intoxication.  He says he has since given up alcohol and has been sober since then.  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  He also notes a history of rheumatoid and psoriatic arthritis but has never been treated for either condition.  At time of exam, patient has noticeable contractures in both hands and feet bilaterally, R>L, and right eye ptosis.  Patient is in A-fib on the monitor but denies history of A-fib.  He states his PCP told him he had a murmur, and has click and murmur on physical exam. He has noticeably cool BLE and mikel pedal pulses bilaterally. He notes some numbness in hands and feet bilaterally. Neurology has been consulted, and Dr. Siddiqui has evaluated patient in ED.    Review of Systems   Constitutional: Positive for activity change and  appetite change. Negative for chills and fever.   HENT: Negative for trouble swallowing.    Eyes: Positive for visual disturbance (blind in right eye).   Respiratory: Negative for cough, chest tightness, shortness of breath and wheezing.    Cardiovascular: Negative for chest pain, palpitations and leg swelling.   Gastrointestinal: Positive for diarrhea. Negative for abdominal pain, nausea and vomiting.   Genitourinary: Positive for decreased urine volume. Negative for difficulty urinating.   Musculoskeletal: Positive for gait problem and joint swelling (contractures hands and feet bilaterally). Negative for neck pain and neck stiffness.   Skin: Negative for rash and wound.   Neurological: Positive for weakness. Negative for dizziness, syncope and headaches.   Psychiatric/Behavioral: Negative for confusion. The patient is not nervous/anxious.         Personal History     Past Medical History:   Diagnosis Date   • Arthritis    • Hypertension    • Melanoma (HCC) 2017    retina         Past Surgical History:   Procedure Laterality Date   • EYE SURGERY  2017    EYE CANCER RIGHT EYE   • TONSILLECTOMY         Family History:  family history includes Diabetes in his mother; Heart disease in his father.     Social History:  reports that he quit smoking about 33 years ago. His smoking use included cigarettes. He started smoking about 41 years ago. He has a 15.00 pack-year smoking history. He quit smokeless tobacco use about 33 years ago. He reports that he does not currently use alcohol. He reports that he does not use drugs.  Social History     Social History Narrative   • Not on file       Medications:  levothyroxine and lisinopril-hydrochlorothiazide    No Known Allergies    Objective   Objective     Vital Signs:   Temp:  [98.2 °F (36.8 °C)] 98.2 °F (36.8 °C)  Heart Rate:  [] 59  Resp:  [20] 20  BP: ()/(66-93) 114/84    Physical Exam   Constitutional: Awake, alert  Eyes: PERRLA, sclerae anicteric, no  conjunctival injection, ptosis right eye  HENT: NCAT, mucous membranes moist  Neck: Supple, no thyromegaly, no lymphadenopathy, trachea midline  Respiratory: Clear to auscultation bilaterally, nonlabored respirations, room air   Cardiovascular: Regularly irregular rhythm, +murmur, + click, 1+/5 pedal pulses bilaterally  Gastrointestinal: Positive bowel sounds, soft, nontender, nondistended  Musculoskeletal: No bilateral ankle edema, contractures bilateral hands and feet, BLE cool to touch  Psychiatric: Appropriate affect, cooperative  Neurologic: Oriented x 3, strength symmetric in all extremities, Cranial Nerves grossly intact to confrontation, speech clear  Skin: No rashes      Result Review:  I have personally reviewed the results from the time of this admission to 3/11/2023 19:13 EST and agree with these findings:  [x]  Laboratory list / accordion  []  Microbiology  [x]  Radiology  [x]  EKG/Telemetry   []  Cardiology/Vascular   []  Pathology  [x]  Old records  []  Other:  Most notable findings include:   LAB RESULTS:      Lab 03/11/23  1052   WBC 5.58   HEMOGLOBIN 14.8   HEMATOCRIT 43.7   PLATELETS 174   NEUTROS ABS 3.11   IMMATURE GRANS (ABS) 0.02   LYMPHS ABS 1.82   MONOS ABS 0.53   EOS ABS 0.07   MCV 86.2         Lab 03/11/23  1052   SODIUM 135*   POTASSIUM 3.5   CHLORIDE 95*   CO2 28.0   ANION GAP 12.0   BUN 14   CREATININE 0.99   EGFR 82.5   GLUCOSE 95   CALCIUM 9.3   MAGNESIUM 1.8         Lab 03/11/23  1052   TOTAL PROTEIN 6.9   ALBUMIN 4.0   GLOBULIN 2.9   ALT (SGPT) 14   AST (SGOT) 20   BILIRUBIN 1.0   ALK PHOS 71         Lab 03/11/23  1052   HSTROP T 10                 Brief Urine Lab Results  (Last result in the past 365 days)      Color   Clarity   Blood   Leuk Est   Nitrite   Protein   CREAT   Urine HCG        03/11/23 1058 Dark Yellow   Clear   Negative   Negative   Negative   Negative               Microbiology Results (last 10 days)     ** No results found for the last 240 hours. **          XR  Chest 1 View    Result Date: 3/11/2023  XR CHEST 1 VW Date of Exam: 3/11/2023 10:59 AM EST Indication: Weak/Dizzy/AMS triage protocol. Comparison: None available. Findings: The heart is not definitely enlarged. The lungs seem relatively clear. It looks like the probably are some underlying emphysematous changes. There are no pleural effusions. There is some deformity of the surgical neck of the proximal left humerus which may relate to old trauma.     Impression: Impression: 1.An acute pulmonary process is not apparent. 2.Some underlying emphysematous changes suggested. Electronically Signed: Jimmy Alec  3/11/2023 11:46 AM EST  Workstation ID: [id number]      Assessment & Plan   Assessment & Plan       Balance problem    Essential hypertension    Postablative hypothyroidism    Other recurrent depressive disorders (HCC)    Generalized weakness    Atrial fibrillation (HCC)    Monty Nagel is a 67 y.o. male PMH significant for balance problems, alcohol abuse, depression, HTN, hypothyroidism s/p thyroidectomy for Graves dz who presents to [facility] ED with a 2-week history of difficulty with balance and gait. He notes accompanying weakness feels this is due to to not the past couple weeks as he has not been to the grocery.  Denies any recent illness, fever/chills, headache.  Patient is in A-fib on the monitor but denies history of A-fib.      Balance problem  Generalized weakness  -- H/o melanoma right retina, blind in right eye  -- S/p LR bolus, thiamine bolus in ED  -- Labs mostly unremarkable  -- Neurology consulted, Dr. Siddiqui evaluated in ED  -- Continue IV thiamine  -- MRI brain and cervical spine pending  -- AM BMP, CBC, TSH, iron profile, vitamin B12, folate, vitamin D  -- Check A1c for BG 65  -- Check ABIs for cool BLE, 1/5 pedal pulses bilaterally  -- PT, OT, SLP consults  -- Nutrition consult  -- CM consult, APS referral made from ED  -- Fall precautions, up with assist    Atrial fibrillation  -- No history of  A-fib  -- Rate controlled  -- Echocardiogram, bilateral carotid duplex  -- EKG in a.m.  -- AM lipid panel, TSH, iron profile  -- Lovenox    HTN  -- Lisinopril-HCTZ 20 mg-12.5mg at home  -- Hold home meds, BPs running low and patient has not taken meds in 2 days    Hypothyroidism  H/o ablation for Graves  -- Continue home levothyroxine  -- Check TSH    Psoriatic arthritis  Rheumatoid arthritis  -- Patient states he has never received treatment  -- Neurology checking multiple autoimmune labs  -- Check CRP, ESR    H/o EtOH abuse  -- States sober since September 2022  -- Start PPI     DVT prophylaxis:  Medical and mechanical    CODE STATUS:    Level Of Support Discussed With: Patient  Code Status (Patient has no pulse and is not breathing): CPR (Attempt to Resuscitate)  Medical Interventions (Patient has pulse or is breathing): Full Support      Expected Discharge  Expected Discharge Date and Time     Expected Discharge Date Expected Discharge Time    Mar 13, 2023             This note has been completed as part of a split-shared workflow.     Signature: Electronically signed by LYNETTE Esquivel, 03/11/23, 5:04 PM EST        Attending   Admission Attestation       I have performed an independent face-to-face diagnostic evaluation including performing an independent physical examination as documented here.  The documented plan of care above was reviewed and developed with the advanced practice clinician (APC).      Brief Summary Statement:   Monty Nagel is a 67 y.o. male with a history of alcohol abuse, hypertension and hypothyroidism who presents with a 2-week history of progressive weakness and issues with his balance.  He also admits to having some intermittent confusion.  He states that due to his weakness in his balance he has been unable to make it to the grocery store and has not been able to care for himself properly.  He does not have anyone that is able to help him either.  He denies any fevers.  He  does admit to having worsening contractures especially on the right side in his upper and lower extremities.     Patient was seen by neurology in the emergency department and there was concern for possible nutritional deficiencies as well as concern for some autoimmune illnesses.  An MRI of the brain and cervical spine was ordered as well as a multitude of testing.    Remainder of detailed HPI is as noted by APC and has been reviewed and/or edited by me for completeness.    Attending Physical Exam:  Temp:  [97.4 °F (36.3 °C)-98.2 °F (36.8 °C)] 97.4 °F (36.3 °C)  Heart Rate:  [] 67  Resp:  [18-20] 18  BP: ()/(66-93) 114/78    Constitutional: Awake, alert, eating dinner, poor hygiene  Eyes: PERRLA, sclerae anicteric,   HENT: NCAT, mucous membranes moist  Neck: Supple, trachea midline  Respiratory: Clear to auscultation bilaterally, nonlabored respirations   Cardiovascular: Irregular rate and rhythm, 2 out of 6 systolic murmur, palpable pedal pulses bilaterally  Gastrointestinal: Positive bowel sounds, soft, nontender, nondistended  Musculoskeletal: No bilateral ankle edema, severe contractures in his right upper hand, less so noticeable in the left upper extremity.  He has 4 out of 5 strength in all extremities, slightly limited due to the contractures.   Psychiatric: Appropriate affect, cooperative  Neurologic: Oriented x 3, Cranial Nerves grossly intact to confrontation, speech clear  Skin: No rashes      Brief Assessment/Plan :  See detailed assessment and plan developed with APC which I have reviewed and/or edited for completeness.    Patient has a multitude of labs that are currently pending.  These will need to be followed up and addressed in the morning.    MRI results came back and show a 6.4 homogeneous enhancing mass in the right frontal convexity, favoring a meningioma.  It does cause significant mass effect and vasogenic edema in the right frontal lobe.  I called and spoke with neurosurgery and  had them look at the scans.  They suggested that I go ahead and start patient on Keppra IV as well as Decadron IV.  Most likely this has been going on for a while, and they will consult and see the patient in the morning.    Patient with high medical complexity and medical decision making, with possible life-threatening finding of a brain mass with significant mass effect.    Carey Mayer MD  03/12/23                       Yes

## 2023-03-11 NOTE — ED PROVIDER NOTES
Subjective   History of Present Illness  Patient is a pleasant 67-year-old male with a history of hypertension who presents today with generalized weakness.  He is not an excellent historian but is fully oriented.  He says that for the past 2 weeks he is felt generally weak to the point that he has not gone to the grocery and got food.  He is feeling more weak over the past 2 days and eventually decided to come to the emergency department for evaluation of this.  He says that he has not fallen over the past 2 weeks but has fallen in the past, most recently 1 year ago.  He states that he lives alone and is not homeless, although he does not appear to have bathed in the recent past.  He states that he has not gone to the grocery to get food or going out at all as of late due to fear of falling and significant generalized weakness.    Denies fever, chills, chest pain, difficulty breathing, abdominal pain, vomiting, or diarrhea.  Denies unilateral deficits, visual disturbance, or other acute complaint.        Review of Systems   All other systems reviewed and are negative.      Past Medical History:   Diagnosis Date   • Arthritis    • Hypertension    • Melanoma (HCC) 2017    retina       No Known Allergies    Past Surgical History:   Procedure Laterality Date   • EYE SURGERY  2017    EYE CANCER RIGHT EYE   • TONSILLECTOMY         Family History   Problem Relation Age of Onset   • Diabetes Mother    • Heart disease Father        Social History     Socioeconomic History   • Marital status: Single   Tobacco Use   • Smoking status: Former     Packs/day: 1.00     Years: 15.00     Pack years: 15.00     Types: Cigarettes     Start date: 3/26/1981     Quit date: 1990     Years since quittin.2   • Smokeless tobacco: Former     Quit date: 1990   Vaping Use   • Vaping Use: Never used   Substance and Sexual Activity   • Alcohol use: Not Currently   • Drug use: No   • Sexual activity: Never           Objective   Physical  Exam  Vitals and nursing note reviewed.   Constitutional:       General: He is not in acute distress.     Appearance: Normal appearance. He is ill-appearing.      Comments: Mild generalized weakness.  Poor hygiene.   HENT:      Head: Normocephalic and atraumatic.      Mouth/Throat:      Mouth: Mucous membranes are moist.   Eyes:      Extraocular Movements: Extraocular movements intact.      Conjunctiva/sclera: Conjunctivae normal.   Neck:      Thyroid: No thyromegaly.   Cardiovascular:      Rate and Rhythm: Normal rate and regular rhythm.      Heart sounds: Normal heart sounds. No murmur heard.    No friction rub. No gallop.   Pulmonary:      Effort: Pulmonary effort is normal. No respiratory distress.      Breath sounds: Normal breath sounds.   Abdominal:      General: Bowel sounds are normal.      Palpations: Abdomen is soft.      Tenderness: There is no abdominal tenderness.   Musculoskeletal:         General: Normal range of motion.      Cervical back: Normal range of motion and neck supple.   Lymphadenopathy:      Cervical: No cervical adenopathy.   Skin:     General: Skin is warm and dry.      Capillary Refill: Capillary refill takes less than 2 seconds.   Neurological:      General: No focal deficit present.      Mental Status: He is alert and oriented to person, place, and time.      Motor: Weakness (Generalized weakness.  Weakness is most pronounced in the lower extremities) present.   Psychiatric:         Behavior: Behavior normal.         Thought Content: Thought content normal.         Procedures           ED Course  ED Course as of 03/12/23 0940   Sat Mar 11, 2023   1642 Very odd presentation.  Patient appears that he has very poor hygiene.  He does not have a hot water in his house and he states that he is try to do his own plumbing and this has not worked out very well.  2 weeks ago he was able to drive to the grocery store and although he was not the best  he says he was able to functionally do  this was able to walk into the grocery store.  He would use the cart to stabilize himself.  At this time he has difficulty walking at all without the walker.  And he states that he feels like his reflexes are much slower and he would not be safe on the road.  I agree.      Patient used to have heavy alcohol problem.  He says he drank heavily during visit  Functional alcoholic for 50 years.  With his history and his inability to walk, working Korsakoff syndrome is also a concern.  The patient is confused and states that he has difficulty remembering basic things even from last week, but he is able to carry on a relatively fluent conversation.  He does have to stop frequently and does get sidetracked and relevant topics, but I would expect his level of confusion to be higher with the Korsakoff presentation.     [CP]   1738 Hello.  Very unusual case.  Unusual armaan.  He came in with confusion and with generalized weakness inability to take care of himself and walk.  He has probably been in a poor state of health for some time as... upon chart review I noticed Adult Protective Services consult several months ago (we also placed 1 today before admission disposition was decided).  [CP]   0518 He has a long history of alcoholism, 50 years of fairly heavy daily drinking, but states he has not drank in several months at this point.  Warnicke Korsakoff syndrome was an initial concern but his mentation is much better and I would expect and he does not have nystagmus.  He does have some difficulty remembering details of previous weeks but he is technically fully oriented.  In summary, nonspecific mental status changes, most importantly inability to walk.  As of 2 weeks ago he was able to drive to the grocery store and walk to the grocery store to get his food.  He says he has not been able to walk for the past 2 weeks due to this weakness and confusion.  I spoke with Dr. Siddiqui, neurology.  He has been to give the patient  high-dose thiamine and he is going to order an MRI to be performed upstairs.  Neurology will consult on the patient [CP]      ED Course User Index  [CP] Lamonte Lopez DO      Recent Results (from the past 24 hour(s))   Comprehensive Metabolic Panel    Collection Time: 03/11/23 10:52 AM    Specimen: Blood   Result Value Ref Range    Glucose 95 65 - 99 mg/dL    BUN 14 8 - 23 mg/dL    Creatinine 0.99 0.76 - 1.27 mg/dL    Sodium 135 (L) 136 - 145 mmol/L    Potassium 3.5 3.5 - 5.2 mmol/L    Chloride 95 (L) 98 - 107 mmol/L    CO2 28.0 22.0 - 29.0 mmol/L    Calcium 9.3 8.6 - 10.5 mg/dL    Total Protein 6.9 6.0 - 8.5 g/dL    Albumin 4.0 3.5 - 5.2 g/dL    ALT (SGPT) 14 1 - 41 U/L    AST (SGOT) 20 1 - 40 U/L    Alkaline Phosphatase 71 39 - 117 U/L    Total Bilirubin 1.0 0.0 - 1.2 mg/dL    Globulin 2.9 gm/dL    A/G Ratio 1.4 g/dL    BUN/Creatinine Ratio 14.1 7.0 - 25.0    Anion Gap 12.0 5.0 - 15.0 mmol/L    eGFR 82.5 >60.0 mL/min/1.73   Single High Sensitivity Troponin T    Collection Time: 03/11/23 10:52 AM    Specimen: Blood   Result Value Ref Range    HS Troponin T 10 <15 ng/L   Magnesium    Collection Time: 03/11/23 10:52 AM    Specimen: Blood   Result Value Ref Range    Magnesium 1.8 1.6 - 2.4 mg/dL   Green Top (Gel)    Collection Time: 03/11/23 10:52 AM   Result Value Ref Range    Extra Tube Hold for add-ons.    Lavender Top    Collection Time: 03/11/23 10:52 AM   Result Value Ref Range    Extra Tube hold for add-on    Gold Top - SST    Collection Time: 03/11/23 10:52 AM   Result Value Ref Range    Extra Tube Hold for add-ons.    Gray Top    Collection Time: 03/11/23 10:52 AM   Result Value Ref Range    Extra Tube Hold for add-ons.    Light Blue Top    Collection Time: 03/11/23 10:52 AM   Result Value Ref Range    Extra Tube Hold for add-ons.    CBC Auto Differential    Collection Time: 03/11/23 10:52 AM    Specimen: Blood   Result Value Ref Range    WBC 5.58 3.40 - 10.80 10*3/mm3    RBC 5.07 4.14 - 5.80 10*6/mm3     Hemoglobin 14.8 13.0 - 17.7 g/dL    Hematocrit 43.7 37.5 - 51.0 %    MCV 86.2 79.0 - 97.0 fL    MCH 29.2 26.6 - 33.0 pg    MCHC 33.9 31.5 - 35.7 g/dL    RDW 14.3 12.3 - 15.4 %    RDW-SD 44.4 37.0 - 54.0 fl    MPV 10.1 6.0 - 12.0 fL    Platelets 174 140 - 450 10*3/mm3    Neutrophil % 55.7 42.7 - 76.0 %    Lymphocyte % 32.6 19.6 - 45.3 %    Monocyte % 9.5 5.0 - 12.0 %    Eosinophil % 1.3 0.3 - 6.2 %    Basophil % 0.5 0.0 - 1.5 %    Immature Grans % 0.4 0.0 - 0.5 %    Neutrophils, Absolute 3.11 1.70 - 7.00 10*3/mm3    Lymphocytes, Absolute 1.82 0.70 - 3.10 10*3/mm3    Monocytes, Absolute 0.53 0.10 - 0.90 10*3/mm3    Eosinophils, Absolute 0.07 0.00 - 0.40 10*3/mm3    Basophils, Absolute 0.03 0.00 - 0.20 10*3/mm3    Immature Grans, Absolute 0.02 0.00 - 0.05 10*3/mm3    nRBC 0.0 0.0 - 0.2 /100 WBC   Ethanol    Collection Time: 03/11/23 10:52 AM    Specimen: Blood   Result Value Ref Range    Ethanol <10 0 - 10 mg/dL   Hemoglobin A1c    Collection Time: 03/11/23 10:52 AM    Specimen: Blood   Result Value Ref Range    Hemoglobin A1C 5.40 4.80 - 5.60 %   C-reactive Protein    Collection Time: 03/11/23 10:52 AM    Specimen: Blood   Result Value Ref Range    C-Reactive Protein <0.30 0.00 - 0.50 mg/dL   ECG 12 Lead ED Triage Standing Order; Weak / Dizzy / AMS    Collection Time: 03/11/23 10:52 AM   Result Value Ref Range    QT Interval 380 ms    QTC Interval 416 ms   Urinalysis With Microscopic If Indicated (No Culture) - Urine, Clean Catch    Collection Time: 03/11/23 10:58 AM    Specimen: Urine, Clean Catch   Result Value Ref Range    Color, UA Dark Yellow (A) Yellow, Straw    Appearance, UA Clear Clear    pH, UA 5.5 5.0 - 8.0    Specific Gravity, UA 1.023 1.001 - 1.030    Glucose, UA Negative Negative    Ketones, UA Trace (A) Negative    Bilirubin, UA Negative Negative    Blood, UA Negative Negative    Protein, UA Negative Negative    Leuk Esterase, UA Negative Negative    Nitrite, UA Negative Negative    Urobilinogen, UA 1.0  E.U./dL 0.2 - 1.0 E.U./dL   Urine Drug Screen - Urine, Clean Catch    Collection Time: 03/11/23 10:58 AM    Specimen: Urine, Clean Catch   Result Value Ref Range    THC, Screen, Urine Negative Negative    Phencyclidine (PCP), Urine Negative Negative    Cocaine Screen, Urine Negative Negative    Methamphetamine, Ur Negative Negative    Opiate Screen Negative Negative    Amphetamine Screen, Urine Negative Negative    Benzodiazepine Screen, Urine Negative Negative    Tricyclic Antidepressants Screen Negative Negative    Methadone Screen, Urine Negative Negative    Barbiturates Screen, Urine Negative Negative    Oxycodone Screen, Urine Negative Negative    Propoxyphene Screen Negative Negative    Buprenorphine, Screen, Urine Negative Negative   POC Glucose Once    Collection Time: 03/11/23 11:02 AM    Specimen: Blood   Result Value Ref Range    Glucose 65 (L) 70 - 130 mg/dL   Bilateral Carotid Duplex    Collection Time: 03/12/23  8:51 AM   Result Value Ref Range    Target HR (85%) 128 bpm    Max. Pred. HR (100%) 151 bpm    Prox CCA PSV 45.7 cm/sec    Prox CCA EDV 12.3 cm/sec    Right Mid CCA PSV 39.5 cm/sec    right Mid CCA EDV 13.2 cm/sec    Dist CCA PSV 32.6 cm/sec    Dist CCA EDV 12.6 cm/sec    Prox ICA PSV 30.1 cm/sec    Prox ICA EDV 9.5 cm/sec    Mid ICA PSV 30.5 cm/sec    Mid ICA EDV 13.0 cm/sec    Dist ICA PSV 48.0 cm/sec    Dist ICA EDV 20.3 cm/sec    Prox ECA PSV 36.8 cm/sec    Prox ECA EDV 8.8 cm/sec    Vertebral A PSV 22.8 cm/sec    Vertebral A EDV 8.1 cm/sec    Prox SCLA PSV 48.3 cm/sec    Prox CCA PSV 38.3 cm/sec    Prox CCA EDV 10.0 cm/sec    left Mid CCA PSV 35.7 cm/sec    left Mid CCA EDV 12.5 cm/sec    Dist CCA PSV 34.2 cm/sec    Dist CCA EDV 11.3 cm/sec    Prox ICA PSV 29.5 cm/sec    Prox ICA EDV 11.3 cm/sec    Mid ICA PSV 36.1 cm/sec    Mid ICA EDV 19.8 cm/sec    Dist ICA PSV 36.7 cm/sec    Dist ICA EDV 15.4 cm/sec    Prox ECA PSV 38.6 cm/sec    Prox ECA EDV 7.8 cm/sec    Vertebral A PSV 23.0 cm/sec  "   Vertebral A EDV 7.2 cm/sec    Prox SCLA PSV 80.6 cm/sec    ICA/CCA ratio 1.20     ICA/CCA ratio 1.00     Right arm BP 95/78 mmHg     Note: In addition to lab results from this visit, the labs listed above may include labs taken at another facility or during a different encounter within the last 24 hours. Please correlate lab times with ED admission and discharge times for further clarification of the services performed during this visit.    MRI Brain With & Without Contrast   Final Result   Impression:    1.6.4 cm homogeneous enhancing mass that appears to be extra-axial along right frontal convexity, favored to represent meningioma. This mass results in significant mass effect and vasogenic edema in the right frontal lobe with 1.3 cm right to left    midline shift.   2.Findings suggestive of mild chronic small vessel ischemic disease.      Electronically Signed: Monty Zhou     3/11/2023 8:01 PM EST     Workstation ID: [id number]      XR Chest 1 View   Final Result   Impression:   1.An acute pulmonary process is not apparent.   2.Some underlying emphysematous changes suggested.      Electronically Signed: Jimmy Michael     3/11/2023 11:46 AM EST     Workstation ID: [id number]      MRI Cervical Spine With & Without Contrast    (Results Pending)     Vitals:    03/12/23 0700 03/12/23 0800 03/12/23 0850 03/12/23 0900   BP:  95/78     BP Location:  Left arm     Patient Position:  Lying     Pulse: 72 67  77   Resp:  16     Temp:  97.8 °F (36.6 °C)     TempSrc:  Oral     SpO2: 97% 99%  98%   Weight:   93.9 kg (207 lb 0.2 oz)    Height:   175.3 cm (69.02\")      Medications   sodium chloride 0.9 % flush 10 mL (has no administration in time range)   thiamine (B-1) 250 mg in sodium chloride 0.9 % 100 mL IVPB (250 mg Intravenous New Bag 3/12/23 0510)   levothyroxine (SYNTHROID, LEVOTHROID) tablet 125 mcg (125 mcg Oral Given 3/12/23 0809)   sodium chloride 0.9 % flush 10 mL (10 mL Intravenous Given 3/11/23 2112)   sodium " chloride 0.9 % flush 10 mL (has no administration in time range)   sodium chloride 0.9 % infusion 40 mL (40 mL Intravenous Given 3/12/23 0510)   Enoxaparin Sodium (LOVENOX) syringe 40 mg (40 mg Subcutaneous Given 3/12/23 0808)   acetaminophen (TYLENOL) tablet 650 mg (has no administration in time range)     Or   acetaminophen (TYLENOL) 160 MG/5ML solution 650 mg (has no administration in time range)     Or   acetaminophen (TYLENOL) suppository 650 mg (has no administration in time range)   Potassium Replacement - Initiate Nurse / BPA Driven Protocol (has no administration in time range)   Magnesium Standard Dose Replacement - Initiate Nurse / BPA Driven Protocol (has no administration in time range)   Phosphorus Replacement - Initiate Nurse / BPA Driven Protocol (has no administration in time range)   melatonin tablet 5 mg (has no administration in time range)   sennosides-docusate (PERICOLACE) 8.6-50 MG per tablet 2 tablet (2 tablets Oral Not Given 3/12/23 0810)     And   polyethylene glycol (MIRALAX) packet 17 g (has no administration in time range)     And   bisacodyl (DULCOLAX) EC tablet 5 mg (has no administration in time range)     And   bisacodyl (DULCOLAX) suppository 10 mg (has no administration in time range)   ondansetron (ZOFRAN) tablet 4 mg (has no administration in time range)     Or   ondansetron (ZOFRAN) injection 4 mg (has no administration in time range)   pantoprazole (PROTONIX) EC tablet 40 mg (40 mg Oral Given 3/12/23 0633)   levETIRAcetam in NaCl 0.75% (KEPPRA) IVPB 1,000 mg (1,000 mg Intravenous New Bag 3/12/23 0809)   dexamethasone (DECADRON) injection 4 mg (4 mg Intravenous Given 3/12/23 0336)   lactated ringers bolus 1,000 mL (0 mL Intravenous Stopped 3/11/23 1317)   thiamine (B-1) 100 mg, folic acid 1 mg in sodium chloride 0.9 % 1,000 mL infusion (1,000 mL/hr Intravenous New Bag 3/11/23 7225)   gadobenate dimeglumine (MULTIHANCE) injection 18 mL (18 mL Intravenous Given 3/11/23 1935)      ECG/EMG Results (last 24 hours)     Procedure Component Value Units Date/Time    ECG 12 Lead ED Triage Standing Order; Weak / Dizzy / AMS [[id number]] Collected: 03/11/23 1052     Updated: 03/11/23 1051     QT Interval 380 ms      QTC Interval 416 ms     Narrative:      Test Reason : ED Triage Standing Order~  Blood Pressure :   */*   mmHG  Vent. Rate :  72 BPM     Atrial Rate :  80 BPM     P-R Int :   * ms          QRS Dur :  88 ms      QT Int : 380 ms       P-R-T Axes :   * -23  37 degrees     QTc Int : 416 ms    ** Poor data quality, interpretation may be adversely affected  Atrial fibrillation  Septal infarct , age undetermined  Abnormal ECG  No previous ECGs available    Referred By: EDMD           Confirmed By:         ECG 12 Lead ED Triage Standing Order; Weak / Dizzy / AMS   Preliminary Result   Test Reason : ED Triage Standing Order~   Blood Pressure :   */*   mmHG   Vent. Rate :  72 BPM     Atrial Rate :  80 BPM      P-R Int :   * ms          QRS Dur :  88 ms       QT Int : 380 ms       P-R-T Axes :   * -23  37 degrees      QTc Int : 416 ms      ** Poor data quality, interpretation may be adversely affected   Atrial fibrillation   Septal infarct , age undetermined   Abnormal ECG   No previous ECGs available      Referred By: EDMD           Confirmed By:       ECG 12 Lead Rhythm Change    (Results Pending)         Medical Decision Making  Case discussed with neurology and internal medicine attending.  Banana bag with thiamine given in the ED.  Patient remains a high fall risk and is not capable of caring for himself at home.  He does not have family or others to assist him.  Patient will be admitted for further evaluation and management.    Confusion: complicated acute illness or injury  Generalized weakness: complicated acute illness or injury  History of alcoholism (HCC): acute illness or injury  Hypoglycemia: complicated acute illness or injury  Inability to walk: complicated acute illness or  injury  Poor nutrition: complicated acute illness or injury  Amount and/or Complexity of Data Reviewed  Labs: ordered. Decision-making details documented in ED Course.  Radiology: ordered and independent interpretation performed. Decision-making details documented in ED Course.  ECG/medicine tests: ordered and independent interpretation performed. Decision-making details documented in ED Course.      Risk  Prescription drug management.  Decision regarding hospitalization.          Final diagnoses:   Generalized weakness   History of alcoholism (HCC)   Inability to walk   Confusion   Hypoglycemia   Poor nutrition       ED Disposition  ED Disposition     ED Disposition   Decision to Admit    Condition   --    Comment   Level of Care: Telemetry [5]   Diagnosis: Generalized weakness [645151]   Admitting Physician: ANDREA HARP [[id number]]   Attending Physician: ANDREA HARP [[id number]]                Lamonte Lopez DO  03/12/23 0941

## 2023-03-12 ENCOUNTER — APPOINTMENT (OUTPATIENT)
Dept: CARDIOLOGY | Facility: HOSPITAL | Age: 68
End: 2023-03-12
Payer: MEDICARE

## 2023-03-12 LAB
ANION GAP SERPL CALCULATED.3IONS-SCNC: 10 MMOL/L (ref 5–15)
ASCENDING AORTA: 4.2 CM
BH CV ECHO MEAS - AI P1/2T: 630.2 MSEC
BH CV ECHO MEAS - AO MAX PG: 18.7 MMHG
BH CV ECHO MEAS - AO MEAN PG: 10.3 MMHG
BH CV ECHO MEAS - AO ROOT AREA (BSA CORRECTED): 1.7 CM2
BH CV ECHO MEAS - AO ROOT DIAM: 3.4 CM
BH CV ECHO MEAS - AO V2 MAX: 215.7 CM/SEC
BH CV ECHO MEAS - AO V2 VTI: 40.9 CM
BH CV ECHO MEAS - AVA(I,D): 1.64 CM2
BH CV ECHO MEAS - EDV(CUBED): 64 ML
BH CV ECHO MEAS - EDV(MOD-SP2): 124 ML
BH CV ECHO MEAS - EDV(MOD-SP4): 136 ML
BH CV ECHO MEAS - EF(MOD-BP): 50.3 %
BH CV ECHO MEAS - EF(MOD-SP2): 52.3 %
BH CV ECHO MEAS - EF(MOD-SP4): 47.7 %
BH CV ECHO MEAS - ESV(CUBED): 29.8 ML
BH CV ECHO MEAS - ESV(MOD-SP2): 59.2 ML
BH CV ECHO MEAS - ESV(MOD-SP4): 71.1 ML
BH CV ECHO MEAS - FS: 22.5 %
BH CV ECHO MEAS - IVS/LVPW: 1 CM
BH CV ECHO MEAS - IVSD: 1 CM
BH CV ECHO MEAS - LA DIMENSION: 4.6 CM
BH CV ECHO MEAS - LAT PEAK E' VEL: 13.5 CM/SEC
BH CV ECHO MEAS - LV DIASTOLIC VOL/BSA (35-75): 69.3 CM2
BH CV ECHO MEAS - LV MASS(C)D: 127.1 GRAMS
BH CV ECHO MEAS - LV MAX PG: 5.4 MMHG
BH CV ECHO MEAS - LV MEAN PG: 2.7 MMHG
BH CV ECHO MEAS - LV SYSTOLIC VOL/BSA (12-30): 36.3 CM2
BH CV ECHO MEAS - LV V1 MAX: 115.7 CM/SEC
BH CV ECHO MEAS - LV V1 VTI: 21.4 CM
BH CV ECHO MEAS - LVIDD: 4 CM
BH CV ECHO MEAS - LVIDS: 3.1 CM
BH CV ECHO MEAS - LVOT AREA: 3.1 CM2
BH CV ECHO MEAS - LVOT DIAM: 2 CM
BH CV ECHO MEAS - LVPWD: 1 CM
BH CV ECHO MEAS - MED PEAK E' VEL: 8.8 CM/SEC
BH CV ECHO MEAS - MV DEC TIME: 0.19 MSEC
BH CV ECHO MEAS - MV E MAX VEL: 89.4 CM/SEC
BH CV ECHO MEAS - PA ACC TIME: 0.1 SEC
BH CV ECHO MEAS - PA PR(ACCEL): 33.1 MMHG
BH CV ECHO MEAS - RAP SYSTOLE: 3 MMHG
BH CV ECHO MEAS - RVSP: 15 MMHG
BH CV ECHO MEAS - SI(MOD-SP2): 33 ML/M2
BH CV ECHO MEAS - SI(MOD-SP4): 33.1 ML/M2
BH CV ECHO MEAS - SV(LVOT): 67.1 ML
BH CV ECHO MEAS - SV(MOD-SP2): 64.8 ML
BH CV ECHO MEAS - SV(MOD-SP4): 64.9 ML
BH CV ECHO MEAS - TAPSE (>1.6): 1.64 CM
BH CV ECHO MEAS - TR MAX PG: 12.4 MMHG
BH CV ECHO MEAS - TR MAX VEL: 170.3 CM/SEC
BH CV ECHO MEASUREMENTS AVERAGE E/E' RATIO: 8.02
BH CV LOWER ARTERIAL LEFT ABI RATIO: 1.24
BH CV LOWER ARTERIAL LEFT DORSALIS PEDIS SYS MAX: 99
BH CV LOWER ARTERIAL LEFT GREAT TOE SYS MAX: 82
BH CV LOWER ARTERIAL LEFT POST TIBIAL SYS MAX: 118
BH CV LOWER ARTERIAL LEFT TBI RATIO: 0.86
BH CV LOWER ARTERIAL RIGHT ABI RATIO: 1.31
BH CV LOWER ARTERIAL RIGHT DORSALIS PEDIS SYS MAX: 109
BH CV LOWER ARTERIAL RIGHT GREAT TOE SYS MAX: 91
BH CV LOWER ARTERIAL RIGHT POST TIBIAL SYS MAX: 124
BH CV LOWER ARTERIAL RIGHT TBI RATIO: 0.96
BH CV VAS BP RIGHT ARM: NORMAL MMHG
BH CV XLRA - RV BASE: 4.2 CM
BH CV XLRA - RV LENGTH: 7.6 CM
BH CV XLRA - RV MID: 3.4 CM
BH CV XLRA - TDI S': 7.5 CM/SEC
BH CV XLRA MEAS LEFT DIST CCA EDV: 11.3 CM/SEC
BH CV XLRA MEAS LEFT DIST CCA PSV: 34.2 CM/SEC
BH CV XLRA MEAS LEFT DIST ICA EDV: 15.4 CM/SEC
BH CV XLRA MEAS LEFT DIST ICA PSV: 36.7 CM/SEC
BH CV XLRA MEAS LEFT ICA/CCA RATIO: 1
BH CV XLRA MEAS LEFT MID CCA EDV: 12.5 CM/SEC
BH CV XLRA MEAS LEFT MID CCA PSV: 35.7 CM/SEC
BH CV XLRA MEAS LEFT MID ICA EDV: 19.8 CM/SEC
BH CV XLRA MEAS LEFT MID ICA PSV: 36.1 CM/SEC
BH CV XLRA MEAS LEFT PROX CCA EDV: 10 CM/SEC
BH CV XLRA MEAS LEFT PROX CCA PSV: 38.3 CM/SEC
BH CV XLRA MEAS LEFT PROX ECA EDV: 7.8 CM/SEC
BH CV XLRA MEAS LEFT PROX ECA PSV: 38.6 CM/SEC
BH CV XLRA MEAS LEFT PROX ICA EDV: 11.3 CM/SEC
BH CV XLRA MEAS LEFT PROX ICA PSV: 29.5 CM/SEC
BH CV XLRA MEAS LEFT PROX SCLA PSV: 80.6 CM/SEC
BH CV XLRA MEAS LEFT VERTEBRAL A EDV: 7.2 CM/SEC
BH CV XLRA MEAS LEFT VERTEBRAL A PSV: 23 CM/SEC
BH CV XLRA MEAS RIGHT DIST CCA EDV: 12.6 CM/SEC
BH CV XLRA MEAS RIGHT DIST CCA PSV: 32.6 CM/SEC
BH CV XLRA MEAS RIGHT DIST ICA EDV: 20.3 CM/SEC
BH CV XLRA MEAS RIGHT DIST ICA PSV: 48 CM/SEC
BH CV XLRA MEAS RIGHT ICA/CCA RATIO: 1.2
BH CV XLRA MEAS RIGHT MID CCA EDV: 13.2 CM/SEC
BH CV XLRA MEAS RIGHT MID CCA PSV: 39.5 CM/SEC
BH CV XLRA MEAS RIGHT MID ICA EDV: 13 CM/SEC
BH CV XLRA MEAS RIGHT MID ICA PSV: 30.5 CM/SEC
BH CV XLRA MEAS RIGHT PROX CCA EDV: 12.3 CM/SEC
BH CV XLRA MEAS RIGHT PROX CCA PSV: 45.7 CM/SEC
BH CV XLRA MEAS RIGHT PROX ECA EDV: 8.8 CM/SEC
BH CV XLRA MEAS RIGHT PROX ECA PSV: 36.8 CM/SEC
BH CV XLRA MEAS RIGHT PROX ICA EDV: 9.5 CM/SEC
BH CV XLRA MEAS RIGHT PROX ICA PSV: 30.1 CM/SEC
BH CV XLRA MEAS RIGHT PROX SCLA PSV: 48.3 CM/SEC
BH CV XLRA MEAS RIGHT VERTEBRAL A EDV: 8.1 CM/SEC
BH CV XLRA MEAS RIGHT VERTEBRAL A PSV: 22.8 CM/SEC
BUN SERPL-MCNC: 10 MG/DL (ref 8–23)
BUN/CREAT SERPL: 13.3 (ref 7–25)
CALCIUM SPEC-SCNC: 8.9 MG/DL (ref 8.6–10.5)
CHLORIDE SERPL-SCNC: 100 MMOL/L (ref 98–107)
CHOLEST SERPL-MCNC: 233 MG/DL (ref 0–200)
CHROMATIN AB SERPL-ACNC: <10 IU/ML (ref 0–14)
CO2 SERPL-SCNC: 28 MMOL/L (ref 22–29)
CREAT SERPL-MCNC: 0.75 MG/DL (ref 0.76–1.27)
DEPRECATED RDW RBC AUTO: 45.7 FL (ref 37–54)
EGFRCR SERPLBLD CKD-EPI 2021: 97.7 ML/MIN/1.73
ERYTHROCYTE [DISTWIDTH] IN BLOOD BY AUTOMATED COUNT: 14.4 % (ref 12.3–15.4)
ERYTHROCYTE [SEDIMENTATION RATE] IN BLOOD: 16 MM/HR (ref 0–20)
FOLATE SERPL-MCNC: 11 NG/ML (ref 4.78–24.2)
GLUCOSE SERPL-MCNC: 140 MG/DL (ref 65–99)
HCT VFR BLD AUTO: 39.5 % (ref 37.5–51)
HDLC SERPL-MCNC: 47 MG/DL (ref 40–60)
HGB BLD-MCNC: 13.3 G/DL (ref 13–17.7)
IRON 24H UR-MRATE: 122 MCG/DL (ref 59–158)
IRON SATN MFR SERPL: 56 % (ref 20–50)
LDLC SERPL CALC-MCNC: 178 MG/DL (ref 0–100)
LDLC/HDLC SERPL: 3.74 {RATIO}
LEFT ATRIUM VOLUME INDEX: 49.1 ML/M2
LV EF 2D ECHO EST: 55 %
MAXIMAL PREDICTED HEART RATE: 151 BPM
MCH RBC QN AUTO: 29.4 PG (ref 26.6–33)
MCHC RBC AUTO-ENTMCNC: 33.7 G/DL (ref 31.5–35.7)
MCV RBC AUTO: 87.4 FL (ref 79–97)
PLATELET # BLD AUTO: 152 10*3/MM3 (ref 140–450)
PMV BLD AUTO: 10.1 FL (ref 6–12)
POTASSIUM SERPL-SCNC: 4 MMOL/L (ref 3.5–5.2)
RBC # BLD AUTO: 4.52 10*6/MM3 (ref 4.14–5.8)
RIGHT ARM BP: NORMAL MMHG
SODIUM SERPL-SCNC: 138 MMOL/L (ref 136–145)
STRESS TARGET HR: 128 BPM
T4 FREE SERPL-MCNC: 0.59 NG/DL (ref 0.93–1.7)
TIBC SERPL-MCNC: 219 MCG/DL (ref 298–536)
TRANSFERRIN SERPL-MCNC: 147 MG/DL (ref 200–360)
TRIGL SERPL-MCNC: 50 MG/DL (ref 0–150)
TSH SERPL DL<=0.05 MIU/L-ACNC: 9.21 UIU/ML (ref 0.27–4.2)
UPPER ARTERIAL RIGHT ARM BRACHIAL SYS MAX: 95 MMHG
URATE SERPL-MCNC: 5.3 MG/DL (ref 3.4–7)
VIT B12 BLD-MCNC: 1689 PG/ML (ref 211–946)
VLDLC SERPL-MCNC: 8 MG/DL (ref 5–40)
WBC NRBC COR # BLD: 5.83 10*3/MM3 (ref 3.4–10.8)

## 2023-03-12 PROCEDURE — 99221 1ST HOSP IP/OBS SF/LOW 40: CPT | Performed by: NEUROLOGICAL SURGERY

## 2023-03-12 PROCEDURE — G0378 HOSPITAL OBSERVATION PER HR: HCPCS

## 2023-03-12 PROCEDURE — A9270 NON-COVERED ITEM OR SERVICE: HCPCS | Performed by: NURSE PRACTITIONER

## 2023-03-12 PROCEDURE — 25010000002 DEXAMETHASONE PER 1 MG: Performed by: PEDIATRICS

## 2023-03-12 PROCEDURE — 86431 RHEUMATOID FACTOR QUANT: CPT | Performed by: PSYCHIATRY & NEUROLOGY

## 2023-03-12 PROCEDURE — 93880 EXTRACRANIAL BILAT STUDY: CPT | Performed by: INTERNAL MEDICINE

## 2023-03-12 PROCEDURE — 84466 ASSAY OF TRANSFERRIN: CPT | Performed by: NURSE PRACTITIONER

## 2023-03-12 PROCEDURE — 99232 SBSQ HOSP IP/OBS MODERATE 35: CPT | Performed by: INTERNAL MEDICINE

## 2023-03-12 PROCEDURE — 85027 COMPLETE CBC AUTOMATED: CPT | Performed by: NURSE PRACTITIONER

## 2023-03-12 PROCEDURE — 0 LEVETIRACETAM IN NACL 0.75% 1000 MG/100ML SOLUTION: Performed by: PEDIATRICS

## 2023-03-12 PROCEDURE — 97162 PT EVAL MOD COMPLEX 30 MIN: CPT

## 2023-03-12 PROCEDURE — 93306 TTE W/DOPPLER COMPLETE: CPT | Performed by: INTERNAL MEDICINE

## 2023-03-12 PROCEDURE — 99232 SBSQ HOSP IP/OBS MODERATE 35: CPT | Performed by: PSYCHIATRY & NEUROLOGY

## 2023-03-12 PROCEDURE — 83540 ASSAY OF IRON: CPT | Performed by: NURSE PRACTITIONER

## 2023-03-12 PROCEDURE — 63710000001 LEVOTHYROXINE 125 MCG TABLET: Performed by: NURSE PRACTITIONER

## 2023-03-12 PROCEDURE — 93922 UPR/L XTREMITY ART 2 LEVELS: CPT | Performed by: INTERNAL MEDICINE

## 2023-03-12 PROCEDURE — 86038 ANTINUCLEAR ANTIBODIES: CPT | Performed by: PSYCHIATRY & NEUROLOGY

## 2023-03-12 PROCEDURE — 93880 EXTRACRANIAL BILAT STUDY: CPT

## 2023-03-12 PROCEDURE — 80048 BASIC METABOLIC PNL TOTAL CA: CPT | Performed by: NURSE PRACTITIONER

## 2023-03-12 PROCEDURE — 63710000001 PANTOPRAZOLE 40 MG TABLET DELAYED-RELEASE: Performed by: NURSE PRACTITIONER

## 2023-03-12 PROCEDURE — 86235 NUCLEAR ANTIGEN ANTIBODY: CPT | Performed by: PSYCHIATRY & NEUROLOGY

## 2023-03-12 PROCEDURE — 84443 ASSAY THYROID STIM HORMONE: CPT | Performed by: PSYCHIATRY & NEUROLOGY

## 2023-03-12 PROCEDURE — 82652 VIT D 1 25-DIHYDROXY: CPT | Performed by: NURSE PRACTITIONER

## 2023-03-12 PROCEDURE — 84550 ASSAY OF BLOOD/URIC ACID: CPT | Performed by: PSYCHIATRY & NEUROLOGY

## 2023-03-12 PROCEDURE — 25010000002 ENOXAPARIN PER 10 MG: Performed by: NURSE PRACTITIONER

## 2023-03-12 PROCEDURE — 85652 RBC SED RATE AUTOMATED: CPT | Performed by: PSYCHIATRY & NEUROLOGY

## 2023-03-12 PROCEDURE — 92523 SPEECH SOUND LANG COMPREHEN: CPT

## 2023-03-12 PROCEDURE — 84439 ASSAY OF FREE THYROXINE: CPT | Performed by: PSYCHIATRY & NEUROLOGY

## 2023-03-12 PROCEDURE — 25010000002 THIAMINE PER 100 MG: Performed by: PSYCHIATRY & NEUROLOGY

## 2023-03-12 PROCEDURE — 86225 DNA ANTIBODY NATIVE: CPT | Performed by: PSYCHIATRY & NEUROLOGY

## 2023-03-12 PROCEDURE — 86037 ANCA TITER EACH ANTIBODY: CPT | Performed by: PSYCHIATRY & NEUROLOGY

## 2023-03-12 PROCEDURE — 80061 LIPID PANEL: CPT | Performed by: NURSE PRACTITIONER

## 2023-03-12 PROCEDURE — 93922 UPR/L XTREMITY ART 2 LEVELS: CPT

## 2023-03-12 PROCEDURE — 97116 GAIT TRAINING THERAPY: CPT

## 2023-03-12 PROCEDURE — 82746 ASSAY OF FOLIC ACID SERUM: CPT | Performed by: PSYCHIATRY & NEUROLOGY

## 2023-03-12 PROCEDURE — 82607 VITAMIN B-12: CPT | Performed by: PSYCHIATRY & NEUROLOGY

## 2023-03-12 PROCEDURE — 93306 TTE W/DOPPLER COMPLETE: CPT

## 2023-03-12 RX ORDER — PANTOPRAZOLE SODIUM 40 MG/10ML
40 INJECTION, POWDER, LYOPHILIZED, FOR SOLUTION INTRAVENOUS
Status: DISCONTINUED | OUTPATIENT
Start: 2023-03-13 | End: 2023-03-17

## 2023-03-12 RX ORDER — DEXAMETHASONE SODIUM PHOSPHATE 4 MG/ML
4 INJECTION, SOLUTION INTRA-ARTICULAR; INTRALESIONAL; INTRAMUSCULAR; INTRAVENOUS; SOFT TISSUE EVERY 6 HOURS SCHEDULED
Status: DISCONTINUED | OUTPATIENT
Start: 2023-03-12 | End: 2023-03-17

## 2023-03-12 RX ADMIN — DEXAMETHASONE SODIUM PHOSPHATE 4 MG: 4 INJECTION INTRA-ARTICULAR; INTRALESIONAL; INTRAMUSCULAR; INTRAVENOUS; SOFT TISSUE at 17:17

## 2023-03-12 RX ADMIN — DEXAMETHASONE SODIUM PHOSPHATE 4 MG: 4 INJECTION INTRA-ARTICULAR; INTRALESIONAL; INTRAMUSCULAR; INTRAVENOUS; SOFT TISSUE at 03:36

## 2023-03-12 RX ADMIN — SODIUM CHLORIDE 40 ML: 9 INJECTION, SOLUTION INTRAVENOUS at 05:10

## 2023-03-12 RX ADMIN — LEVETIRACETAM 1000 MG: 10 INJECTION INTRAVASCULAR at 08:09

## 2023-03-12 RX ADMIN — DEXAMETHASONE SODIUM PHOSPHATE 4 MG: 4 INJECTION INTRA-ARTICULAR; INTRALESIONAL; INTRAMUSCULAR; INTRAVENOUS; SOFT TISSUE at 21:03

## 2023-03-12 RX ADMIN — Medication 10 ML: at 21:04

## 2023-03-12 RX ADMIN — THIAMINE HYDROCHLORIDE 250 MG: 100 INJECTION, SOLUTION INTRAMUSCULAR; INTRAVENOUS at 21:03

## 2023-03-12 RX ADMIN — THIAMINE HYDROCHLORIDE 250 MG: 100 INJECTION, SOLUTION INTRAMUSCULAR; INTRAVENOUS at 05:10

## 2023-03-12 RX ADMIN — PANTOPRAZOLE SODIUM 40 MG: 40 TABLET, DELAYED RELEASE ORAL at 06:33

## 2023-03-12 RX ADMIN — THIAMINE HYDROCHLORIDE 250 MG: 100 INJECTION, SOLUTION INTRAMUSCULAR; INTRAVENOUS at 14:26

## 2023-03-12 RX ADMIN — LEVETIRACETAM 1000 MG: 10 INJECTION INTRAVASCULAR at 21:03

## 2023-03-12 RX ADMIN — DEXAMETHASONE SODIUM PHOSPHATE 4 MG: 4 INJECTION INTRA-ARTICULAR; INTRALESIONAL; INTRAMUSCULAR; INTRAVENOUS; SOFT TISSUE at 11:35

## 2023-03-12 RX ADMIN — LEVOTHYROXINE SODIUM 125 MCG: 125 TABLET ORAL at 08:09

## 2023-03-12 RX ADMIN — ENOXAPARIN SODIUM 40 MG: 40 INJECTION SUBCUTANEOUS at 08:08

## 2023-03-12 NOTE — CONSULTS
"                    Clinical Nutrition     Patient Name: Monty Nagel  YOB: 1955  MRN: [id number]  Date of Encounter: 03/12/23 12:29 EDT  Admission date: 3/11/2023    Reason for Visit   Identified at risk by screening criteria, Reduced oral intake    EMR Reviewed: Yes     Diet Nutrition Related History:      Pt admitted d/t balance difficulties. Pt reports UBW of 206# in December. Observed bed wt of 167.6# (not zeroed). Pt is unsure on current body wt or if he has had wt change. Pt reports difficulties going to the store and buying $200 worth of groceries and bringing it back home. Pt states at some point during the last month he did not eat for two weeks d/t not going to the grocery store. Diet tech attempted to pin point exact two weeks within the last month and pt stated he is not good with dates and times. NKFA or difficulty chewing/swallowing per pt. Wrote RN to get standing scale wt or zero bed wt. Will add Boost + Plus TID. Will get RD to f/up with NFPE d/t suspected wasting.     Anthropometrics   Height: Height: 175.3 cm (69.02\")  Admission Weight:   Flowsheet Rows    Flowsheet Row First Filed Value   Admission Height 175.3 cm (69\") Documented at 03/11/2023 1049   Admission Weight 93.9 kg (207 lb) Documented at 03/11/2023 1049        Last Filed Weight:Weight: 93.9 kg (207 lb 0.2 oz) (03/12/23 1004)  Weight Method: Stated  BMI: BMI (Calculated): 30.6  BMI classification: Obese Class I: 30-34.9kg/m2   IBW: Ideal Body Weight (IBW) (kg): 73.73  EMR wts:  Weight Weight (kg) Weight (lbs) Weight Method VISIT REPORT   3/12/2023 93.9 kg 207 lb 0.2 oz - -   3/12/2023 93.9 kg 207 lb 0.2 oz - -   3/11/2023 93.895 kg 207 lb Stated -   12/14/2022 93.895 kg 207 lb - Report   9/6/2022 94.802 kg 209 lb - Report   3/17/2022 96.163 kg 212 lb - Report   8/12/2020 92.352 kg 203 lb 9.6 oz - Report   3/17/2020 94.348 kg 208 lb - Report   11/15/2019 94.802 kg 209 lb - Report     UBW: 206# per pt in December     " Current Nutrition Prescription     Diet: Regular/House Diet; Texture: Regular Texture (IDDSI 7); Fluid Consistency: Thin (IDDSI 0)    ONS: Will order Boost Plus TID    Average Intake from [facility]% x 1 meal     Intake History:     Intake Category  Unable to determine at this time.    Actions   Appropriateness for MSA:  Malnutrition exam warranted based on observation, RDN to assess/evaluate per protocol    Interventions:   Follow treatment progress, Care plan reviewed, Menu provided, Encourage intake, Supplement provided, obtain wt to determine current wt    Hyacinth Juarez,   Time Spent: 25 min

## 2023-03-12 NOTE — PLAN OF CARE
Problem: Fall Injury Risk  Goal: Absence of Fall and Fall-Related Injury  Outcome: Ongoing, Progressing  Intervention: Identify and Manage Contributors  Recent Flowsheet Documentation  Taken 3/11/2023 2000 by Amarilys Barber RN  Medication Review/Management: medications reviewed  Intervention: Promote Injury-Free Environment  Recent Flowsheet Documentation  Taken 3/12/2023 0400 by Amarilys Barber, RN  Safety Promotion/Fall Prevention:   assistive device/personal items within reach   clutter free environment maintained   fall prevention program maintained   nonskid shoes/slippers when out of bed   room organization consistent   safety round/check completed  Taken 3/12/2023 0159 by Amarilys Barber, RN  Safety Promotion/Fall Prevention:   activity supervised   assistive device/personal items within reach   clutter free environment maintained   fall prevention program maintained   nonskid shoes/slippers when out of bed   room organization consistent   safety round/check completed  Taken 3/12/2023 0000 by Amarilys Barber, RN  Safety Promotion/Fall Prevention:   activity supervised   assistive device/personal items within reach   clutter free environment maintained   fall prevention program maintained   nonskid shoes/slippers when out of bed   safety round/check completed   room organization consistent  Taken 3/11/2023 2200 by Amarilys Barber, RN  Safety Promotion/Fall Prevention:   activity supervised   assistive device/personal items within reach   clutter free environment maintained   fall prevention program maintained   nonskid shoes/slippers when out of bed   safety round/check completed   room organization consistent  Taken 3/11/2023 2000 by Amarilys Barber, RN  Safety Promotion/Fall Prevention:   activity supervised   assistive device/personal items within reach   clutter free environment maintained   fall prevention program maintained   nonskid shoes/slippers when out of bed   room organization consistent   safety round/check  completed   Goal Outcome Evaluation:              Outcome Evaluation: VSS, RA, A&O x4 with intermittent confusion, Keppra and thiamine infusing during shift, a-fib on monitor, Neuro consult due to MRI results, using urinal at bedside.  Will continue to follow plan of care.

## 2023-03-12 NOTE — CONSULTS
Reason for consultation: Meningioma    Referring Physician:  No ref. provider found     Chief complaint dizziness, unsteadiness    Admit Diagnosis:   Generalized weakness [R53.1]     History of present illness:  This is a 67-year-old man with a longstanding history of progressive balance difficulties and dizziness.  He ultimately presented to [facility] where he underwent imaging which demonstrated a large right frontal lobe lesion.  Neurosurgical consultation is requested.    His past medical history is significant for 50 years of alcohol abuse although he reports that he stopped drinking in September.  He also has some high blood pressure.    ROS:  A 12 point review of systems was performed.  All systems reviewed were negative with the exception of those mentioned in the history of present illness.    Past medical history:  Past Medical History:   Diagnosis Date   • Arthritis    • Hypertension    • Melanoma (HCC) 2017    retina       Past surgical history:  Past Surgical History:   Procedure Laterality Date   • EYE SURGERY  2017    EYE CANCER RIGHT EYE   • TONSILLECTOMY         Social history:  Social History     Socioeconomic History   • Marital status: Single   Tobacco Use   • Smoking status: Former     Packs/day: 1.00     Years: 15.00     Pack years: 15.00     Types: Cigarettes     Start date: 3/26/1981     Quit date: 1990     Years since quittin.2   • Smokeless tobacco: Former     Quit date: 1990   Vaping Use   • Vaping Use: Never used   Substance and Sexual Activity   • Alcohol use: Not Currently   • Drug use: No   • Sexual activity: Never       Family history:  Family History   Problem Relation Age of Onset   • Diabetes Mother    • Heart disease Father        Allergies:  No Known Allergies    Outpatient medications:  Scheduled Meds:dexamethasone, 4 mg, Intravenous, Q6H  enoxaparin, 40 mg, Subcutaneous, Daily  levETIRAcetam, 1,000 mg, Intravenous, Q12H  levothyroxine, 125 mcg, Oral,  Daily  pantoprazole, 40 mg, Oral, QAM AC  senna-docusate sodium, 2 tablet, Oral, BID  sodium chloride, 10 mL, Intravenous, Q12H  thiamine (B-1) IV, 250 mg, Intravenous, Q8H      Continuous Infusions:   PRN Meds:.•  acetaminophen **OR** acetaminophen **OR** acetaminophen  •  senna-docusate sodium **AND** polyethylene glycol **AND** bisacodyl **AND** bisacodyl  •  Magnesium Standard Dose Replacement - Initiate Nurse / BPA Driven Protocol  •  melatonin  •  ondansetron **OR** ondansetron  •  Phosphorus Replacement - Initiate Nurse / BPA Driven Protocol  •  Potassium Replacement - Initiate Nurse / BPA Driven Protocol  •  sodium chloride  •  sodium chloride  •  sodium chloride    Physical Examination:  General:  Vitals:    23 0900   BP:    Pulse: 77   Resp:    Temp:    SpO2: 98%     Systolic (24hrs), Av , Min:93 , Max:119     Diastolic (24hrs), Av, Min:66, Max:91    Pulse  Av.7  Min: 59  Max: 81    Temp (24hrs), Av.7 °F (36.5 °C), Min:97.2 °F (36.2 °C), Max:98.3 °F (36.8 °C)      He sitting up in bed eating breakfast.  He has significant weakness of his left upper extremity.    Imaging:  Available for my review is a MRI of the brain which was performed yesterday with and without contrast.  There is a large approximately 5 cm extra-axial homogeneously enhancing lesion consistent with a meningioma.      Assessment and Plan:    Generalized weakness [R53.1]     This tumor will need to come out.  My preference would be to treat him with 5 days of high-dose Decadron to reduce the perilesional edema.  He should also probably have an MMA embolization.  This can be scheduled as an outpatient.    He lives alone and does have some family in [place], so having him transferred I would certainly seems like a possibility.  We could also perform the surgery here depending on what his preferences.  He is cleared to be discharged home from my standpoint at this time.  I would like to follow-up with him next week to  discuss the surgery in more detail.  I can also arrange for him to have neurosurgical follow-up in [place] if necessary as well.

## 2023-03-12 NOTE — PLAN OF CARE
Goal Outcome Evaluation:  Plan of Care Reviewed With: patient           Outcome Evaluation: Pt presents with weakness, difficulty walking, and decreased safety awareness and is below baseline level of function for mobility. Pt amb a short distance in room, dem unsafe gait, dificulty with turning needing physical assist with walker. Pt limited by low BP this date and chronic R eye blindness. Pt alert and oriented, however dem situational confusion. Pt will benefit from IPPT services to address limitations. PT rec d/c rehab pending progress and possible surgery to remove meningioma, as well as dispo placement as patient has unsafe living conditions and unable to care for himself. Pt will benefit from IPPT services to address limitations.

## 2023-03-12 NOTE — PROGRESS NOTES
"DOS: 3/12/2023  NAME: Monty Nagel   : 1955  PCP: Tiffany Morales MD  Chief Complaint   Patient presents with   • Weakness - Generalized       Chief complaint: He was asleep when I came to see him this morning as he ate a good hearty breakfast.  Subjective: Easily arousable and then he was able to sit up at the side of the bed without assistance.  When he got up it seems that he was unsteady and he was walking with spastic gait with ataxia with his feet spread wide apart.  With 1 person assisting him he was able to walk from the bed to the door in a clumsy manner and returned back to the bed and I noticed that he is at a high risk of fall specially if he is living all by himself at home without any kind of help.  He says he has got family in [place], [place] where he can return once he had all his medical work-up completed and neurosurgical intervention completed.  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  Patient denies any headaches or any kind of seizure activities at any time.    Objective:  Vital signs: BP 95/78 (BP Location: Left arm, Patient Position: Lying)   Pulse 77   Temp 97.8 °F (36.6 °C) (Oral)   Resp 16   Ht 175.3 cm (69.02\")   Wt 93.9 kg (207 lb 0.2 oz)   SpO2 98%   BMI 30.56 kg/m²    Gen: NAD, vitals reviewed  MS: Seems normal.  CN: Cranials 2-12: No focal deficits noted.  Motor: Muscles of both upper and lower extremities show good bulk power and tone.  Sensory: No sensory loss noted.  DTRs: 3+ bilaterally with downgoing toes.  Coordination: Normal finger-to-nose coordination noted.  Gait: He is able to get up and walk with a wide base and a spastic gait which seems to be clumsy and he is at a high risk of fall if he is allowed to walk himself but with help he was able to get out of the bed to " the door and back.    ROS:  General: Pleasant gentleman has been losing a lot of weight as he is not eating properly.  Neurological: Mass effect from the effects of the meningeal sarcoma with midline shift.    Laboratory results:  Lab Results   Component Value Date    GLUCOSE 140 (H) 03/12/2023    CALCIUM 8.9 03/12/2023     03/12/2023    K 4.0 03/12/2023    CO2 28.0 03/12/2023     03/12/2023    BUN 10 03/12/2023    CREATININE 0.75 (L) 03/12/2023    EGFRIFNONA 91 08/12/2020    BCR 13.3 03/12/2023    ANIONGAP 10.0 03/12/2023     Lab Results   Component Value Date    WBC 5.83 03/12/2023    HGB 13.3 03/12/2023    HCT 39.5 03/12/2023    MCV 87.4 03/12/2023     03/12/2023     Lab Results   Component Value Date     (H) 03/12/2023    LDL 87 03/17/2022    LDL 91 08/12/2020         Lab 03/11/23  1052   HEMOGLOBIN A1C 5.40        Review of labs: The hemoglobin hematocrit and kidney functions are normal and the LDL is elevated at 178.  The sed rate is 16.  The iron studies are normal.  The total cholesterol is elevated 233.  The C-reactive protein is less than 0.3.  The urine analysis is unremarkable.    Review and interpretation of imaging: Reviewed the MRI of the brain with and without contrast that shows the following:    Findings:   No acute infarction, intracranial hemorrhage, or extra-axial collection is identified. There is a 5.0 x 6.4 x 4.2 cm homogeneously enhancing mass that appears to be extra-axial along the right frontal convexity with significant mass effect and vasogenic   edema along the right frontal lobe resulting in 1.3 cm right to left midline shift. The basal cisterns appear patent. Scattered foci of periventricular and subcortical white matter FLAIR hyperintensities are nonspecific, but likely the sequela of mild   chronic small vessel ischemic disease. The globes and orbits appear intact. The intracranial vascular flow-voids appear patent. The mammary bodies appear within normal  limits.      IMPRESSION:  Impression:   1.6.4 cm homogeneous enhancing mass that appears to be extra-axial along right frontal convexity, favored to represent meningioma. This mass results in significant mass effect and vasogenic edema in the right frontal lobe with 1.3 cm right to left   midline shift.  2.Findings suggestive of mild chronic small vessel ischemic disease    Workup to date: The MRI of the cervical spine performed with and without contrast showed the following:    Findings:   The alignment is anatomic. The craniocervical junction appears intact. The vertebral body heights appear normal. There are degenerative endplate changes at C3-4. There are moderate discogenic changes at C3-4, C5-6, and C6-7. The partially visualized   posterior fossa contents are unremarkable. The spinal cord appears normal in signal throughout. No pathological enhancement or mass is identified. The prevertebral soft tissues are unremarkable.     C2-3: No spinal canal or neural foraminal stenosis.     C3-4: Mild disc osteophyte complex with small left paracentral disc protrusion. Mild right and severe left facet arthropathy. Mild right and moderate left uncovertebral hypertrophy. No spinal canal stenosis. Moderate right and severe left neural   foraminal stenosis.     C4-5: Mild disc osteophyte complex with superimposed small central disc protrusion. Mild right and moderate left facet arthropathy with fluid in the left facet joint. Mild bilateral uncovertebral hypertrophy. Mild spinal canal stenosis. Mild to moderate   right and moderate left neural foraminal stenosis.     C5-6: Mild disc osteophyte complex. Mild right and moderate left facet arthropathy. Mild bilateral uncovertebral hypertrophy. No spinal canal stenosis. Moderate right and mild left neural foraminal stenosis.     C6-7: Mild disc osteophyte complex. Mild bilateral facet arthropathy. Mild left uncovertebral hypertrophy. No spinal canal stenosis. No neural  foraminal stenosis.     C7-T1: No spinal canal or neural foraminal stenosis.     IMPRESSION:  Impression:   1.Moderate multilevel degenerative changes of cervical spine as described above.  2.Fluid in left C4-5 facet joint, suggesting some nonspecific inflammation.    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      Comment: Patient also have cervical disc disease with arthritis involving the C4 and C5 with inflammation.    Plan:  1.  Patient is currently on dexamethasone 4 mg intravenous every 6 hours.  2.  Patient is also on Protonix 40 mg daily to prevent any ulcers.  3.  He will need debulking surgery for that huge brain tumor.  He is being followed by neurosurgery at this point.  4.  If the patient is going to be discharged from our hospital facility prior to his middle meningeal artery embolization and then debulking of the tumor as suggested by Dr. Mckee, the neurosurgeon, the intravenous steroids at the current dose of 4 mg every 6 hours could be switched to the oral dose every 6 hours for a week followed by 4 mg every 8 hours for the next 7 days followed by 4 mg every 12 hours for the third week and then 4 mg after that indefinitely.  5.  Patient does not have any family support in [facility] but he has family in [facility] and if he prefers to go back there he could get his surgical intervention down there or if he prefers to get it done here then Dr. Mckee will be seeing him back in the outpatient neurology clinic in a week's time.  6.  He will also be on Protonix extended release 40 mg daily for the next 8 weeks.  7.  Because he is so unsteady to get up and walk around he will need inpatient rehab assessment or nursing home placement as he is unsafe to go home independently.    Discussed with the patient and his nurse and   Rebecca Toledo and I do not have any further recommendations and will be signing off at this point.    Spent a total of 30 minutes in face-to-face evaluation and management of the patient.    Selin Siddiqui MD

## 2023-03-12 NOTE — PROGRESS NOTES
[facility] Medicine Services  PROGRESS NOTE    Patient Name: Monty Nagel  : 1955  MRN: [id number]    Date of Admission: 3/11/2023  Primary Care Physician: Tiffany Morales MD    Subjective   Subjective     CC:  Follow-up for balance issues    HPI:  I have seen and evaluated the patient this morning.  Comfortable in bed.  Denies any headache, dizziness.  Updated him about the results of the MRI showing large sized meningioma with midline shift and swelling and he was not happy about that.  No acute events overnight    ROS:  General : no fevers, chills  CVS: No chest pain, palpitations  Respiratory: No cough, dyspnea  GI: No N/V/D, abd pain  10 point review of systems is negative except for what is mentioned in HPI    Objective   Objective     Vital Signs:   Temp:  [97.2 °F (36.2 °C)-98.3 °F (36.8 °C)] 97.7 °F (36.5 °C)  Heart Rate:  [59-84] 84  Resp:  [16-18] 18  BP: ()/(66-91) 96/68     Physical Exam:  General: Comfortable, not in distress, conversant and cooperative  Head: Atraumatic and normocephalic  Eyes: No Icterus. No pallor  Ears:  Ears appear intact with no abnormalities noted  Throat: No oral lesions, no thrush  Neck: Supple, trachea midline  Lungs: Clear to auscultation bilaterally, equal air entry, no wheezing or crackles  Heart:  Normal S1 and S2, no murmur, no gallop, No JVD, no lower extremity swelling  Abdomen:  Soft, no tenderness, no organomegaly, normal bowel sounds, no organomegaly  Extremities: Poor dorsalis pedis bilaterally  Skin: No bleeding, bruising or rash, normal skin turgor and elasticity  Neurologic: Cranial nerves appear intact with no evidence of facial asymmetry, normal motor and sensory functions in all 4 extremities, ataxic gait  Psych: Alert and oriented x 3, normal mood    Results Reviewed:  LAB RESULTS:      Lab 23  1033 23  1052   WBC 5.83 5.58   HEMOGLOBIN 13.3 14.8   HEMATOCRIT 39.5 43.7   PLATELETS 152 174   NEUTROS ABS   --  3.11   IMMATURE GRANS (ABS)  --  0.02   LYMPHS ABS  --  1.82   MONOS ABS  --  0.53   EOS ABS  --  0.07   MCV 87.4 86.2   SED RATE 16  --    CRP  --  <0.30         Lab 03/12/23  1033 03/11/23  1052   SODIUM 138 135*   POTASSIUM 4.0 3.5   CHLORIDE 100 95*   CO2 28.0 28.0   ANION GAP 10.0 12.0   BUN 10 14   CREATININE 0.75* 0.99   EGFR 97.7 82.5   GLUCOSE 140* 95   CALCIUM 8.9 9.3   MAGNESIUM  --  1.8   HEMOGLOBIN A1C  --  5.40   TSH 9.210*  --          Lab 03/11/23  1052   TOTAL PROTEIN 6.9   ALBUMIN 4.0   GLOBULIN 2.9   ALT (SGPT) 14   AST (SGOT) 20   BILIRUBIN 1.0   ALK PHOS 71         Lab 03/11/23  1052   HSTROP T 10         Lab 03/12/23  1033   CHOLESTEROL 233*   LDL CHOL 178*   HDL CHOL 47   TRIGLYCERIDES 50         Lab 03/12/23  1033   IRON 122   IRON SATURATION 56*   TIBC 219*   TRANSFERRIN 147*         Brief Urine Lab Results  (Last result in the past 365 days)      Color   Clarity   Blood   Leuk Est   Nitrite   Protein   CREAT   Urine HCG        03/11/23 1058 Dark Yellow   Clear   Negative   Negative   Negative   Negative                 Microbiology Results Abnormal     None          MRI Brain With & Without Contrast    Result Date: 3/11/2023  MRI BRAIN W WO CONTRAST Date of Exam: 3/11/2023 6:59 PM EST Indication: With thin sections through bilateral mammillary bodies.  Comparison: None available. Technique:  Routine multiplanar/multisequence sequence images of the brain were obtained before and after the uneventful administration of  18 mL Multihance. Findings: No acute infarction, intracranial hemorrhage, or extra-axial collection is identified. There is a 5.0 x 6.4 x 4.2 cm homogeneously enhancing mass that appears to be extra-axial along the right frontal convexity with significant mass effect and vasogenic edema along the right frontal lobe resulting in 1.3 cm right to left midline shift. The basal cisterns appear patent. Scattered foci of periventricular and subcortical white matter FLAIR  hyperintensities are nonspecific, but likely the sequela of mild chronic small vessel ischemic disease. The globes and orbits appear intact. The intracranial vascular flow-voids appear patent. The mammary bodies appear within normal limits.     Impression: Impression: 1.6.4 cm homogeneous enhancing mass that appears to be extra-axial along right frontal convexity, favored to represent meningioma. This mass results in significant mass effect and vasogenic edema in the right frontal lobe with 1.3 cm right to left midline shift. 2.Findings suggestive of mild chronic small vessel ischemic disease. Electronically Signed: Monty Zhou  3/11/2023 8:01 PM EST  Workstation ID: [id number]    MRI Cervical Spine With & Without Contrast    Result Date: 3/12/2023  MRI CERVICAL SPINE W WO CONTRAST Date of Exam: 3/11/2023 7:00 PM EST Indication: Cervical myelopathy..  Comparison: None available. Technique:  Routine multiplanar/multisequence sequence images of the cervical spine were obtained before and after the uneventful administration of  18 mL Multihance.  Findings: The alignment is anatomic. The craniocervical junction appears intact. The vertebral body heights appear normal. There are degenerative endplate changes at C3-4. There are moderate discogenic changes at C3-4, C5-6, and C6-7. The partially visualized posterior fossa contents are unremarkable. The spinal cord appears normal in signal throughout. No pathological enhancement or mass is identified. The prevertebral soft tissues are unremarkable. C2-3: No spinal canal or neural foraminal stenosis. C3-4: Mild disc osteophyte complex with small left paracentral disc protrusion. Mild right and severe left facet arthropathy. Mild right and moderate left uncovertebral hypertrophy. No spinal canal stenosis. Moderate right and severe left neural foraminal stenosis. C4-5: Mild disc osteophyte complex with superimposed small central disc protrusion. Mild right and moderate left  facet arthropathy with fluid in the left facet joint. Mild bilateral uncovertebral hypertrophy. Mild spinal canal stenosis. Mild to moderate right and moderate left neural foraminal stenosis. C5-6: Mild disc osteophyte complex. Mild right and moderate left facet arthropathy. Mild bilateral uncovertebral hypertrophy. No spinal canal stenosis. Moderate right and mild left neural foraminal stenosis. C6-7: Mild disc osteophyte complex. Mild bilateral facet arthropathy. Mild left uncovertebral hypertrophy. No spinal canal stenosis. No neural foraminal stenosis. C7-T1: No spinal canal or neural foraminal stenosis.     Impression: Impression: 1.Moderate multilevel degenerative changes of cervical spine as described above. 2.Fluid in left C4-5 facet joint, suggesting some nonspecific inflammation. Electronically Signed: Monty Zhou  3/12/2023 10:48 AM EDT  Workstation ID: [id number]    XR Chest 1 View    Result Date: 3/11/2023  XR CHEST 1 VW Date of Exam: 3/11/2023 10:59 AM EST Indication: Weak/Dizzy/AMS triage protocol. Comparison: None available. Findings: The heart is not definitely enlarged. The lungs seem relatively clear. It looks like the probably are some underlying emphysematous changes. There are no pleural effusions. There is some deformity of the surgical neck of the proximal left humerus which may relate to old trauma.     Impression: Impression: 1.An acute pulmonary process is not apparent. 2.Some underlying emphysematous changes suggested. Electronically Signed: Jimmy Michael  3/11/2023 11:46 AM EST  Workstation ID: [id number]    Bilateral Carotid Duplex    Result Date: 3/12/2023  •  No evidence of hemodynamically significant stenosis of bilateral carotid arteries. •  Intimal thickening and mild bilateral scattered plaque is noted.           Current medications:  Scheduled Meds:dexamethasone, 4 mg, Intravenous, Q6H  enoxaparin, 40 mg, Subcutaneous, Daily  levETIRAcetam, 1,000 mg, Intravenous,  Q12H  levothyroxine, 125 mcg, Oral, Daily  pantoprazole, 40 mg, Oral, QAM AC  senna-docusate sodium, 2 tablet, Oral, BID  sodium chloride, 10 mL, Intravenous, Q12H  thiamine (B-1) IV, 250 mg, Intravenous, Q8H      Continuous Infusions:   PRN Meds:.•  acetaminophen **OR** acetaminophen **OR** acetaminophen  •  senna-docusate sodium **AND** polyethylene glycol **AND** bisacodyl **AND** bisacodyl  •  Magnesium Standard Dose Replacement - Initiate Nurse / BPA Driven Protocol  •  melatonin  •  ondansetron **OR** ondansetron  •  Phosphorus Replacement - Initiate Nurse / BPA Driven Protocol  •  Potassium Replacement - Initiate Nurse / BPA Driven Protocol  •  sodium chloride  •  sodium chloride  •  sodium chloride    Assessment & Plan   Assessment & Plan     Active Hospital Problems    Diagnosis  POA   • **Balance problem [R26.89]  Yes   • Generalized weakness [R53.1]  Yes   • Atrial fibrillation (HCC) [I48.91]  Yes   • Other recurrent depressive disorders (HCC) [F33.8]  Yes   • Postablative 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        Brief Hospital Course to date:  Monty Nagel is a 67 y.o. male PMH significant for balance problems, alcohol abuse, depression, HTN, hypothyroidism s/p thyroidectomy for Graves dz who presents to [facility] ED with a 2-week history of difficulty with balance and gait.      Assessment and plan:  Right frontal meningioma with vasogenic edema and midline shift  Generalized weakness  Ataxia with frequent falls  · MRI brain showed: 6.4 cm homogeneous enhancing mass that appears to be extra-axial along right frontal convexity, favored to represent meningioma. This mass results in significant mass effect and vasogenic edema in the right frontal lobe with 1.3 cm right to left midline shift.  · Start Decadron. Per Dr Siddiqui, he recommended Decadron 4 mg QID for 1 week, then TID for 1 week, then BID for 1 week then  once daily thereafter till surgery is done  · Neurosurgery consultation  · Neurology team/Dr. Siddiqui following.  Appreciate the help  · Continue high-dose thiamine per neurology  · Follow carotid ultrasound  · PT and OT consultation.  Might need rehab placement since he lives alone  · CM consult, APS referral made from ED    Possible peripheral tear disease  · Follow ABIs for cool BLE, 1/5 pedal pulses bilaterally    Moderate protein energy malnutrition  · Nutrition consult     Atrial fibrillation  · New diagnosis  · Currently rate controlled without medications  · Follow echocardiogram, bilateral carotid duplex  · Hold off anticoagulation given his new finding of extensive meningioma that will likely need surgical intervention  · Cardiology referral upon discharge    Dyslipidemia  ·   · Start high intensity statin     Essential hypertension  · Hold antihypertensive medication given his borderline blood pressure     Hypothyroidism  H/o ablation for Graves  · Thyroid panel is indicative of possible medication noncompliance (TSH elevated and free T4 is low)  · Continue levothyroxine 125 zaynab daily.  I had to increase the dose given his A-fib     Psoriatic arthritis  Rheumatoid arthritis  · Patient states he has never received treatment  · Neurology checking multiple autoimmune labs  · check CRP, ESR     H/o EtOH abuse  · States sober since September 2022    Acute debility  · PT and OT  · Patient is living alone and high risk for fall. I think he will benefit from acute rehab upon discharge     Total time spent: 50 min  Time spent includes time reviewing chart, face-to-face time, counseling patient/family/caregiver, ordering medications/tests/procedures, communicating with other health care professionals, documenting clinical information in the electronic health record, and coordination of care.      Expected Discharge Location and Transportation: Rehab  Expected Discharge   Expected Discharge Date and Time      Expected Discharge Date Expected Discharge Time    Mar 16, 2023            DVT prophylaxis:  Medical and mechanical DVT prophylaxis orders are present.     AM-PAC 6 Clicks Score (PT): 20 (03/11/23 2000)    CODE STATUS:   Code Status and Medical Interventions:   Ordered at: 03/11/23 1819     Level Of Support Discussed With:    Patient     Code Status (Patient has no pulse and is not breathing):    CPR (Attempt to Resuscitate)     Medical Interventions (Patient has pulse or is breathing):    Full Support       Richard Toledo MD  03/12/23

## 2023-03-12 NOTE — PLAN OF CARE
Goal Outcome Evaluation:  Plan of Care Reviewed With: patient            SLP evaluation completed. Will continue to address cog-comm deficits. Please see note for further details and recommendations.

## 2023-03-12 NOTE — ACP (ADVANCE CARE PLANNING)
Pt asked help in completing a living will.  A copy of pt's living will was faxed to HIM to be placed in pt's medical records.

## 2023-03-12 NOTE — THERAPY EVALUATION
Patient Name: Monty Nagel  : 1955    MRN: [id number]                              Today's Date: 3/12/2023       Admit Date: 3/11/2023    Visit Dx:     ICD-10-CM ICD-9-CM   1. Generalized weakness  R53.1 780.79   2. History of alcoholism (HCC)  F10.21 V11.3   3. Inability to walk  R26.2 719.7   4. Confusion  R41.0 298.9   5. Hypoglycemia  E16.2 251.2   6. Poor nutrition  E63.9 269.9   7. Cognitive communication deficit  R41.841 799.52   8. Impaired functional mobility, balance, gait, and endurance  Z74.09 V49.89     Patient Active Problem List   Diagnosis   • Essential hypertension   • History of melanoma   • Postablative hypothyroidism   • Screen for colon cancer   • Other recurrent depressive disorders (HCC)   • Balance problem   • Generalized weakness   • Atrial fibrillation (HCC)     Past Medical History:   Diagnosis Date   • Arthritis    • Hypertension    • Melanoma (HCC) 2017    retina     Past Surgical History:   Procedure Laterality Date   • EYE SURGERY  2017    EYE CANCER RIGHT EYE   • TONSILLECTOMY        General Information     Row Name 23 [facility]          Physical Therapy Time and Intention    Document Type evaluation  -     Mode of Treatment individual therapy;physical therapy  -     Row Name 23 1421          General Information    Patient Profile Reviewed yes  -SJ     Prior Level of Function independent:;all household mobility;gait;transfer;bed mobility  reports having much difficulty getting around his home, not currently using an assistive device  -     Existing Precautions/Restrictions fall;other (see comments)  monitor BP  -     Barriers to Rehab medically complex;previous functional deficit;cognitive status;environmental barriers;family issues  unsafe home dispo with no hot water, difficulty with self-care  -     Row Name 23 1421          Living Environment    People in Home alone  -     Row Name 23 1421          Stairs Within Home, Primary    Number of  "Stairs, Within Home, Primary none  -     Row Name 03/12/23 1421          Cognition    Orientation Status (Cognition) oriented x 3  situational confusion noted  -     Row Name 03/12/23 1421          Safety Issues, Functional Mobility    Safety Issues Affecting Function (Mobility) awareness of need for assistance;insight into deficits/self-awareness;judgment;positioning of assistive device;sequencing abilities;safety precaution awareness;problem-solving  -     Impairments Affecting Function (Mobility) balance;cognition;endurance/activity tolerance;range of motion (ROM);postural/trunk control;motor control  -     Cognitive Impairments, Mobility Safety/Performance problem-solving/reasoning;safety precaution awareness;safety precaution follow-through;attention  -     Comment, Safety Issues/Impairments (Mobility) BP low this date. Pt dem decreased safety awareness with mobility (pulled out IV during bed mobility) and with home safety. Pt stated in an emergency he would dial 911 but would make sure to dial the area code first, said he \"would dial [phone].\" Pt educated that he would just need to dial 911 with no area code.  -           User Key  (r) = Recorded By, (t) = Taken By, (c) = Cosigned By    Initials Name Provider Type    Samara Diaz, PT Physical Therapist               Mobility     Row Name 03/12/23 1525          Bed Mobility    Bed Mobility scooting/bridging;supine-sit;sit-supine  -SJ     Scooting/Bridging [place] (Bed Mobility) standby assist;verbal cues  -SJ     Supine-Sit [place] (Bed Mobility) standby assist;verbal cues  -SJ     Sit-Supine [place] (Bed Mobility) standby assist;verbal cues  -SJ     Assistive Device (Bed Mobility) bed rails  -     Comment, (Bed Mobility) Extra time and effort needed for mobility, decreased safety awareness throughout.  -     Row Name 03/12/23 1525          Transfers    Comment, (Transfers) STS from EOB x2 reps; 1st attempt with modA x1, " 2nd attempt with CGA. Orthostatics taken. BP overall low, slight drop in systolic, diastolic stable.  -SJ     Row Name 03/12/23 1525          Sit-Stand Transfer    Sit-Stand [place] (Transfers) moderate assist (50% patient effort);contact guard;verbal cues;nonverbal cues (demo/gesture);1 person assist;other (see comments)  -     Assistive Device (Sit-Stand Transfers) walker, front-wheeled  -SJ     Row Name 03/12/23 1525          Gait/Stairs (Locomotion)    [place] Level (Gait) minimum assist (75% patient effort);1 person assist;verbal cues  -SJ     Assistive Device (Gait) walker, front-wheeled  -SJ     Distance in Feet (Gait) 16ft  -SJ     Deviations/Abnormal Patterns (Gait) bilateral deviations;base of support, narrow;ataxic;dima decreased;festinating/shuffling;gait speed decreased;stride length decreased  -     Bilateral Gait Deviations forward flexed posture;heel strike decreased  -SJ     Comment, (Gait/Stairs) pt amb a short distance in room from EOB <-> door with RW and Randy. Pt dem unsafe gait, dificulty with turning needing physical assist with walker. Pt keeps R eye shut due to blindness. Upon approach to bed, pt seemed to freeze while turning and was unable to step all the way back to the edge of bed. Therapist assisted pt into sitting EOB.  -           User Key  (r) = Recorded By, (t) = Taken By, (c) = Cosigned By    Initials Name Provider Type    Samara Diaz PT Physical Therapist               Obj/Interventions     Row Name 03/12/23 1531          Range of Motion Comprehensive    General Range of Motion bilateral lower extremity ROM WFL;hand range of motion deficits identified  -     Comment, General Range of Motion decreased hand ROM due to RA, however able to  RW bilat  -     Row Name 03/12/23 1531          Strength Comprehensive (MMT)    General Manual Muscle Testing (MMT) Assessment lower extremity strength deficits identified  -     Comment, General Manual Muscle  Testing (MMT) Assessment [facility]'s 4+/5  -SJ     Row Name 03/12/23 1531          Balance    Balance Assessment --  -SJ     Static Sitting Balance --  -SJ     Position, Sitting Balance --  -SJ     Static Standing Balance --  -SJ     Position/Device Used, Standing Balance --  -SJ           User Key  (r) = Recorded By, (t) = Taken By, (c) = Cosigned By    Initials Name Provider Type    SJ Samara Harrison, PT Physical Therapist               Goals/Plan     Row Name 03/12/23 1540          Bed Mobility Goal 1 (PT)    Activity/Assistive Device (Bed Mobility Goal 1, PT) sit to supine;supine to sit;scooting  -SJ     [place] Level/Cues Needed (Bed Mobility Goal 1, PT) modified independence  -SJ     Time Frame (Bed Mobility Goal 1, PT) long term goal (LTG);2 weeks  -SJ     Row Name 03/12/23 1540          Transfer Goal 1 (PT)    Activity/Assistive Device (Transfer Goal 1, PT) sit-to-stand/stand-to-sit;bed-to-chair/chair-to-bed;walker, rolling  -SJ     [place] Level/Cues Needed (Transfer Goal 1, PT) modified independence  -SJ     Time Frame (Transfer Goal 1, PT) long term goal (LTG);2 weeks  -SJ     Row Name 03/12/23 1540          Gait Training Goal 1 (PT)    Activity/Assistive Device (Gait Training Goal 1, PT) gait (walking locomotion);improve balance and speed;walker, rolling  -SJ     [place] Level (Gait Training Goal 1, PT) standby assist  -SJ     Distance (Gait Training Goal 1, PT) 300  -SJ     Time Frame (Gait Training Goal 1, PT) long term goal (LTG);2 weeks  -SJ     Row Name 03/12/23 1540          Therapy Assessment/Plan (PT)    Planned Therapy Interventions (PT) balance training;bed mobility training;gait training;home exercise program;patient/family education;transfer training;neuromuscular re-education;strengthening;stair training;motor coordination training  -SJ           User Key  (r) = Recorded By, (t) = Taken By, (c) = Cosigned By    Initials Name Provider Type    Samara Diaz, PT Physical  "Therapist               Clinical Impression     Row Name 03/12/23 1532          Pain    Pretreatment Pain Rating 0/10 - no pain  -SJ     Posttreatment Pain Rating 0/10 - no pain  -SJ     Row Name 03/12/23 1532          Plan of Care Review    Plan of Care Reviewed With patient  -     Outcome Evaluation Pt presents with weakness, difficulty walking, and decreased safety awareness and is below baseline level of function for mobility. Pt amb a short distance in room, dem unsafe gait, dificulty with turning needing physical assist with walker. Pt limited by low BP this date and chronic R eye blindness. Pt alert and oriented, however dem situational confusion. Pt will benefit from IPPT services to address limitations. PT rec d/c rehab pending progress and possible surgery to remove meningioma, as well as dispo placement as patient has unsafe living conditions and unable to care for himself. Pt will benefit from IPPT services to address limitations.  -     Row Name 03/12/23 1532          Therapy Assessment/Plan (PT)    Patient/Family Therapy Goals Statement (PT) Pt reports he would like to return to live with family in [place], but that it's \"a pipe dream.\"  -     Rehab Potential (PT) fair, will monitor progress closely  -     Criteria for Skilled Interventions Met (PT) yes;skilled treatment is necessary  -     Therapy Frequency (PT) daily  -     Predicted Duration of Therapy Intervention (PT) 2wks  -     Row Name 03/12/23 1532          Vital Signs    Pre Systolic BP Rehab 91  supine  -SJ     Pre Treatment Diastolic BP 63  -SJ     Intra Systolic BP Rehab 84   sitting  -SJ     Intra Treatment Diastolic BP 70  -SJ     Post Systolic BP Rehab 82   standing. Once returned to supine 91/63  -SJ     Post Treatment Diastolic BP 65  -SJ     Pretreatment Heart Rate (beats/min) 70  -SJ     Intratreatment Heart Rate (beats/min) 91  -SJ     Pre SpO2 (%) 97  -SJ     O2 Delivery Pre Treatment room air  -SJ     Post SpO2 (%) 97  " -SJ     O2 Delivery Post Treatment room air  -SJ     Pre Patient Position Supine  -SJ     Intra Patient Position Sitting  -SJ     Post Patient Position Standing  -SJ     Row Name 03/12/23 1532          Positioning and Restraints    Pre-Treatment Position in bed  -SJ     Post Treatment Position bed  -SJ     In Bed notified nsg;supine;call light within reach;encouraged to call for assist;exit alarm on  -           User Key  (r) = Recorded By, (t) = Taken By, (c) = Cosigned By    Initials Name Provider Type    Samara Diaz PT Physical Therapist               Outcome Measures     Row Name 03/12/23 1541          How much help from another person do you currently need...    Turning from your back to your side while in flat bed without using bedrails? 4  -SJ     Moving from lying on back to sitting on the side of a flat bed without bedrails? 4  -SJ     Moving to and from a bed to a chair (including a wheelchair)? 3  -SJ     Standing up from a chair using your arms (e.g., wheelchair, bedside chair)? 3  -SJ     Climbing 3-5 steps with a railing? 3  -SJ     To walk in hospital room? 3  -SJ     AM-PAC 6 Clicks Score (PT) 20  -     Highest level of mobility 6 --> Walked 10 steps or more  -     Row Name 03/12/23 1541          Functional Assessment    Outcome Measure Options AM-PAC 6 Clicks Basic Mobility (PT)  -           User Key  (r) = Recorded By, (t) = Taken By, (c) = Cosigned By    Initials Name Provider Type    Samara Diaz PT Physical Therapist                             Physical Therapy Education     Title: PT OT SLP Therapies (In Progress)     Topic: Physical Therapy (In Progress)     Point: Mobility training (Done)     Learning Progress Summary           Patient Acceptance, E, VU,NR by  at 3/12/2023 1542                   Point: Home exercise program (Not Started)     Learner Progress:  Not documented in this visit.          Point: Body mechanics (Done)     Learning Progress Summary            Patient Acceptance, E, VU,NR by  at 3/12/2023 1542                   Point: Precautions (Done)     Learning Progress Summary           Patient Acceptance, E, VU,NR by  at 3/12/2023 1542                               User Key     Initials Effective Dates Name Provider Type Discipline     02/03/23 -  Samara Harrison, PT Physical Therapist PT              PT Recommendation and Plan  Planned Therapy Interventions (PT): balance training, bed mobility training, gait training, home exercise program, patient/family education, transfer training, neuromuscular re-education, strengthening, stair training, motor coordination training  Plan of Care Reviewed With: patient  Outcome Evaluation: Pt presents with weakness, difficulty walking, and decreased safety awareness and is below baseline level of function for mobility. Pt amb a short distance in room, dem unsafe gait, dificulty with turning needing physical assist with walker. Pt limited by low BP this date and chronic R eye blindness. Pt alert and oriented, however dem situational confusion. Pt will benefit from IPPT services to address limitations. PT rec d/c rehab pending progress and possible surgery to remove meningioma, as well as dispo placement as patient has unsafe living conditions and unable to care for himself. Pt will benefit from IPPT services to address limitations.     Time Calculation:    PT Charges     Row Name 03/12/23 1543             Time Calculation    Start Time 1421  -      PT Non-Billable Time (min) 55 min  -      PT Received On 03/12/23  -      PT Goal Re-Cert Due Date 03/22/23  -         Time Calculation- PT    Total Timed Code Minutes- PT 15 minute(s)  -SJ         Timed Charges    49052 - Gait Training Minutes  15  -SJ         Total Minutes    Timed Charges Total Minutes 15  -SJ       Total Minutes 15  -SJ            User Key  (r) = Recorded By, (t) = Taken By, (c) = Cosigned By    Initials Name Provider Type    Samara Diaz,  PT Physical Therapist              Therapy Charges for Today     Code Description Service Date Service Provider Modifiers Qty    24118160810 HC GAIT TRAINING EA 15 MIN 3/12/2023 Samara Harrison, PT GP 1    53873031513 HC PT EVAL MOD COMPLEXITY 4 3/12/2023 Samara Harrison, PT GP 1          PT G-Codes  Outcome Measure Options: AM-PAC 6 Clicks Basic Mobility (PT)  AM-PAC 6 Clicks Score (PT): 20  PT Discharge Summary  Anticipated Discharge Disposition (PT): inpatient rehabilitation facility    Samara Harrison PT  3/12/2023

## 2023-03-12 NOTE — THERAPY EVALUATION
Acute Care - Speech Language Pathology Initial Evaluation  [facility]   Cognitive-Communication Evaluation     Patient Name: Monty Nagel  : 1955  MRN: [id number]  Today's Date: 3/12/2023               Admit Date: 3/11/2023     Visit Dx:    ICD-10-CM ICD-9-CM   1. Generalized weakness  R53.1 780.79   2. History of alcoholism (HCC)  F10.21 V11.3   3. Inability to walk  R26.2 719.7   4. Confusion  R41.0 298.9   5. Hypoglycemia  E16.2 251.2   6. Poor nutrition  E63.9 269.9   7. Cognitive communication deficit  R41.841 799.52     Patient Active Problem List   Diagnosis   • Essential hypertension   • History of melanoma   • Postablative hypothyroidism   • Screen for colon cancer   • Other recurrent depressive disorders (HCC)   • Balance problem   • Generalized weakness   • Atrial fibrillation (HCC)     Past Medical History:   Diagnosis Date   • Arthritis    • Hypertension    • Melanoma (HCC) 2017    retina     Past Surgical History:   Procedure Laterality Date   • EYE SURGERY  2017    EYE CANCER RIGHT EYE   • TONSILLECTOMY         SLP Recommendation and Plan  SLP Diagnosis: mild-moderate, cognitive-linguistic disorder (23 1140)           [facility] Criteria for Skilled Therapy Interventions Met: yes (23 1140)                                      Plan of Care Reviewed With: patient (23 1357)      SLP EVALUATION (last 72 hours)     SLP SLC Evaluation     Row Name 23 1140                   Communication Assessment/Intervention    Document Type evaluation  -SM        Subjective Information no complaints  -SM        Patient Observations alert;cooperative  -SM           General Information    Patient Profile Reviewed yes  -SM        Pertinent History Of Current Problem Consulted as ? Warnicke Korsakoff syndrome. Pt with ataxia and impaired memory. Further impaging revealed R frontal memingioma.  -SM        Precautions/Limitations, Vision vision impairment, right  -SM        Plans/Goals Discussed with  patient  -SM           Pain    Additional Documentation Pain Scale: Numbers Pre/Post-Treatment (Group)  -           Pain Scale: Numbers Pre/Post-Treatment    Pretreatment Pain Rating 0/10 - no pain  -SM        Posttreatment Pain Rating 0/10 - no pain  -           Comprehension Assessment/Intervention    Comprehension Assessment/Intervention Auditory Comprehension  -           Auditory Comprehension Assessment/Intervention    Answers Questions (Communication) yes/no;wh questions;personal;simple;WFL;complex;abstract;mild impairment  -SM        Able to Follow Commands (Communication) 1-step;2-step;WFL  -SM        Narrative Discourse conversational level;WFL;mild impairment  -SM        Successful Auditory Strategies (Communication) other (see comments)  allowing for additional time  -        Auditory Comprehension Communication, Comment dealeyd responses  -           Expression Assessment/Intervention    Expression Assessment/Intervention verbal expression  -           Verbal Expression Assessment/Intervention    Conversational Discourse/Fluency WFL;mild impairment  -SM        Verbal Expression, Comment slowed and delayed at times  -           Motor Speech Assessment/Intervention    Motor Speech Function mild impairment  -SM        Characteristics Consistent with Dysarthria decreased intensity;other (see comments)  flat, more reflective of cognitive deficits  -           Standardized Tests    Cognitive/Memory Tests MOCA: [place] Cognitive Assessment  -           MOCA: [place] Cognitive Assessment    MOCA Total Score 23/30  -        MOCA Total Score Indicative Of: Mild Cognitive Impairment  -        MOCA Comments Errors related to memory/recall and mental manipulation. Pt reports has not been managing household tasks r/t decreased drive to initiate/manage, which he attributes to mental health.  -           SLP Evaluation Clinical Impressions    SLP Diagnosis  mild-moderate;cognitive-linguistic disorder  -SM        Rehab Potential/Prognosis good  -SM        SLC Criteria for Skilled Therapy Interventions Met yes  -SM        Functional Impact difficulty completing home management task  -SM              User Key  (r) = Recorded By, (t) = Taken By, (c) = Cosigned By    Initials Name Effective Dates    DARIO Briggs Brittni SWEENEY MS CCC-SLP 02/03/23 -                    EDUCATION  The patient has been educated in the following areas:     Cognitive Impairment Communication Impairment.           SLP GOALS     Row Name 03/12/23 1140             Patient will demonstrate functional cognitive-linguistic skills for return to discharge environment    [place] with minimal cues  -SM      Time frame by discharge  -         Memory Skills Goal 1 (SLP)    Improve Memory Skills Through Goal 1 (SLP) use memory strategies;80%;with minimal cues (75-90%)  -SM      Time Frame (Memory Skills Goal 1, SLP) short term goal (STG)  -         Executive Functional Skills Goal 1 (SLP)    Improve Executive Function Skills Goal 1 (SLP) identify anticipated needs;home management activity;exhibit cognitive flexibility;perform self-correction;perform self-evaluation;80%;with minimal cues (75-90%)  -      Time Frame (Executive Function Skills Goal 1, SLP) short term goal (STG)  -SM            User Key  (r) = Recorded By, (t) = Taken By, (c) = Cosigned By    Initials Name Provider Type    DARIO MolizzytinoBrittni MS CCC-SLP Speech and Language Pathologist                        Time Calculation:      Time Calculation- SLP     Row Name 03/12/23 1358             Time Calculation- SLP    SLP Start Time 1130  -SM      SLP Received On 03/12/23  -         Untimed Charges    85486-PL Eval Speech and Production w/ Language Minutes 55  -SM         Total Minutes    Untimed Charges Total Minutes 55  -SM       Total Minutes 55  -SM            User Key  (r) = Recorded By, (t) = Taken By, (c) = Cosigned By     Initials Name Provider Type     Brittni Briggs, MS CCC-SLP Speech and Language Pathologist                Therapy Charges for Today     Code Description Service Date Service Provider Modifiers Qty    22907079314 HC ST EVAL SPEECH AND PROD W LANG  4 3/12/2023 Brittni Briggs, MS CCC-SLP GN 1                     Brittni rBiggs, MS CCC-SLP  3/12/2023

## 2023-03-13 PROBLEM — E43 SEVERE MALNUTRITION: Status: ACTIVE | Noted: 2023-03-13

## 2023-03-13 LAB
ALBUMIN SERPL-MCNC: 3.5 G/DL (ref 3.5–5.2)
ALBUMIN/GLOB SERPL: 1.3 G/DL
ALP SERPL-CCNC: 61 U/L (ref 39–117)
ALT SERPL W P-5'-P-CCNC: 13 U/L (ref 1–41)
ANA SER QL: NEGATIVE
ANION GAP SERPL CALCULATED.3IONS-SCNC: 8 MMOL/L (ref 5–15)
AST SERPL-CCNC: 17 U/L (ref 1–40)
BASOPHILS # BLD AUTO: 0.01 10*3/MM3 (ref 0–0.2)
BASOPHILS NFR BLD AUTO: 0.1 % (ref 0–1.5)
BILIRUB SERPL-MCNC: 0.6 MG/DL (ref 0–1.2)
BUN SERPL-MCNC: 14 MG/DL (ref 8–23)
BUN/CREAT SERPL: 16.9 (ref 7–25)
CALCIUM SPEC-SCNC: 8.8 MG/DL (ref 8.6–10.5)
CHLORIDE SERPL-SCNC: 100 MMOL/L (ref 98–107)
CO2 SERPL-SCNC: 25 MMOL/L (ref 22–29)
CREAT SERPL-MCNC: 0.83 MG/DL (ref 0.76–1.27)
DEPRECATED RDW RBC AUTO: 46.8 FL (ref 37–54)
DSDNA AB SER-ACNC: 1 IU/ML (ref 0–9)
EGFRCR SERPLBLD CKD-EPI 2021: 94.7 ML/MIN/1.73
ENA SM AB SER-ACNC: <0.2 AI (ref 0–0.9)
ENA SS-A AB SER-ACNC: <0.2 AI (ref 0–0.9)
ENA SS-B AB SER-ACNC: <0.2 AI (ref 0–0.9)
EOSINOPHIL # BLD AUTO: 0 10*3/MM3 (ref 0–0.4)
EOSINOPHIL NFR BLD AUTO: 0 % (ref 0.3–6.2)
ERYTHROCYTE [DISTWIDTH] IN BLOOD BY AUTOMATED COUNT: 14.5 % (ref 12.3–15.4)
GLOBULIN UR ELPH-MCNC: 2.7 GM/DL
GLUCOSE SERPL-MCNC: 105 MG/DL (ref 65–99)
HCT VFR BLD AUTO: 38.3 % (ref 37.5–51)
HGB BLD-MCNC: 12.9 G/DL (ref 13–17.7)
IMM GRANULOCYTES # BLD AUTO: 0.05 10*3/MM3 (ref 0–0.05)
IMM GRANULOCYTES NFR BLD AUTO: 0.5 % (ref 0–0.5)
LYMPHOCYTES # BLD AUTO: 0.71 10*3/MM3 (ref 0.7–3.1)
LYMPHOCYTES NFR BLD AUTO: 6.8 % (ref 19.6–45.3)
MAGNESIUM SERPL-MCNC: 1.6 MG/DL (ref 1.6–2.4)
MCH RBC QN AUTO: 29.7 PG (ref 26.6–33)
MCHC RBC AUTO-ENTMCNC: 33.7 G/DL (ref 31.5–35.7)
MCV RBC AUTO: 88 FL (ref 79–97)
MONOCYTES # BLD AUTO: 0.3 10*3/MM3 (ref 0.1–0.9)
MONOCYTES NFR BLD AUTO: 2.9 % (ref 5–12)
NEUTROPHILS NFR BLD AUTO: 89.7 % (ref 42.7–76)
NEUTROPHILS NFR BLD AUTO: 9.41 10*3/MM3 (ref 1.7–7)
NRBC BLD AUTO-RTO: 0 /100 WBC (ref 0–0.2)
PHOSPHATE SERPL-MCNC: 2.4 MG/DL (ref 2.5–4.5)
PLATELET # BLD AUTO: 174 10*3/MM3 (ref 140–450)
PMV BLD AUTO: 11.1 FL (ref 6–12)
POTASSIUM SERPL-SCNC: 4.2 MMOL/L (ref 3.5–5.2)
PROT SERPL-MCNC: 6.2 G/DL (ref 6–8.5)
RBC # BLD AUTO: 4.35 10*6/MM3 (ref 4.14–5.8)
SODIUM SERPL-SCNC: 133 MMOL/L (ref 136–145)
WBC NRBC COR # BLD: 10.48 10*3/MM3 (ref 3.4–10.8)

## 2023-03-13 PROCEDURE — 97165 OT EVAL LOW COMPLEX 30 MIN: CPT

## 2023-03-13 PROCEDURE — 0 MAGNESIUM SULFATE 4 GM/100ML SOLUTION: Performed by: INTERNAL MEDICINE

## 2023-03-13 PROCEDURE — 0 LEVETIRACETAM IN NACL 0.75% 1000 MG/100ML SOLUTION: Performed by: PEDIATRICS

## 2023-03-13 PROCEDURE — 25010000002 THIAMINE PER 100 MG: Performed by: PSYCHIATRY & NEUROLOGY

## 2023-03-13 PROCEDURE — 97530 THERAPEUTIC ACTIVITIES: CPT

## 2023-03-13 PROCEDURE — G0378 HOSPITAL OBSERVATION PER HR: HCPCS

## 2023-03-13 PROCEDURE — 80053 COMPREHEN METABOLIC PANEL: CPT | Performed by: INTERNAL MEDICINE

## 2023-03-13 PROCEDURE — 99233 SBSQ HOSP IP/OBS HIGH 50: CPT | Performed by: INTERNAL MEDICINE

## 2023-03-13 PROCEDURE — 83735 ASSAY OF MAGNESIUM: CPT | Performed by: INTERNAL MEDICINE

## 2023-03-13 PROCEDURE — 84100 ASSAY OF PHOSPHORUS: CPT | Performed by: INTERNAL MEDICINE

## 2023-03-13 PROCEDURE — 63710000001 DIPHENHYDRAMINE PER 50 MG: Performed by: INTERNAL MEDICINE

## 2023-03-13 PROCEDURE — 25010000002 DEXAMETHASONE PER 1 MG: Performed by: PEDIATRICS

## 2023-03-13 PROCEDURE — 25010000002 ENOXAPARIN PER 10 MG: Performed by: NURSE PRACTITIONER

## 2023-03-13 PROCEDURE — 85025 COMPLETE CBC W/AUTO DIFF WBC: CPT | Performed by: INTERNAL MEDICINE

## 2023-03-13 RX ORDER — DIPHENHYDRAMINE HCL 50 MG
50 CAPSULE ORAL NIGHTLY
Status: DISCONTINUED | OUTPATIENT
Start: 2023-03-13 | End: 2023-03-17

## 2023-03-13 RX ORDER — MAGNESIUM SULFATE HEPTAHYDRATE 40 MG/ML
4 INJECTION, SOLUTION INTRAVENOUS ONCE
Status: COMPLETED | OUTPATIENT
Start: 2023-03-13 | End: 2023-03-13

## 2023-03-13 RX ADMIN — LEVETIRACETAM 1000 MG: 10 INJECTION INTRAVASCULAR at 20:24

## 2023-03-13 RX ADMIN — LEVETIRACETAM 1000 MG: 10 INJECTION INTRAVASCULAR at 08:01

## 2023-03-13 RX ADMIN — LEVOTHYROXINE SODIUM 125 MCG: 125 TABLET ORAL at 08:01

## 2023-03-13 RX ADMIN — Medication 10 ML: at 20:36

## 2023-03-13 RX ADMIN — DEXAMETHASONE SODIUM PHOSPHATE 4 MG: 4 INJECTION INTRA-ARTICULAR; INTRALESIONAL; INTRAMUSCULAR; INTRAVENOUS; SOFT TISSUE at 19:45

## 2023-03-13 RX ADMIN — ENOXAPARIN SODIUM 40 MG: 40 INJECTION SUBCUTANEOUS at 08:01

## 2023-03-13 RX ADMIN — DEXAMETHASONE SODIUM PHOSPHATE 4 MG: 4 INJECTION INTRA-ARTICULAR; INTRALESIONAL; INTRAMUSCULAR; INTRAVENOUS; SOFT TISSUE at 05:23

## 2023-03-13 RX ADMIN — THIAMINE HYDROCHLORIDE 250 MG: 100 INJECTION, SOLUTION INTRAMUSCULAR; INTRAVENOUS at 05:24

## 2023-03-13 RX ADMIN — PANTOPRAZOLE SODIUM 40 MG: 40 INJECTION, POWDER, LYOPHILIZED, FOR SOLUTION INTRAVENOUS at 05:23

## 2023-03-13 RX ADMIN — DIPHENHYDRAMINE HYDROCHLORIDE 50 MG: 50 CAPSULE ORAL at 20:24

## 2023-03-13 RX ADMIN — THIAMINE HYDROCHLORIDE 250 MG: 100 INJECTION, SOLUTION INTRAMUSCULAR; INTRAVENOUS at 14:49

## 2023-03-13 RX ADMIN — THIAMINE HYDROCHLORIDE 250 MG: 100 INJECTION, SOLUTION INTRAMUSCULAR; INTRAVENOUS at 20:24

## 2023-03-13 RX ADMIN — DEXAMETHASONE SODIUM PHOSPHATE 4 MG: 4 INJECTION INTRA-ARTICULAR; INTRALESIONAL; INTRAMUSCULAR; INTRAVENOUS; SOFT TISSUE at 11:21

## 2023-03-13 RX ADMIN — MAGNESIUM SULFATE HEPTAHYDRATE 4 G: 40 INJECTION, SOLUTION INTRAVENOUS at 11:22

## 2023-03-13 NOTE — PROGRESS NOTES
Malnutrition Severity Assessment    Patient Name:  Monty Nagel  YOB: 1955  MRN: [id number]  Admit Date:  3/11/2023    Patient meets criteria for : Severe Malnutrition (Pt meets criteria for severe acute malnutrition based on wt intake hx w wasting.)    Comments:      Malnutrition Severity Assessment  Malnutrition Type: Acute Disease or Injury - Related Malnutrition  Malnutrition Type (last 8 hours)     Malnutrition Severity Assessment     Row Name 03/12/23 2312       Malnutrition Severity Assessment    Malnutrition Type Acute Disease or Injury - Related Malnutrition    Row Name 03/12/23 2312       Insufficient Energy Intake     Insufficient Energy Intake Findings Severe    Insufficient Energy Intake  < or equal to 50% of est. energy requirement for > or equal to 5d)    Row Name 03/12/23 2312       Unintentional Weight Loss     Unintentional Weight Loss Findings Severe    Unintentional Weight Loss  Weight loss greater than 7.5% in three months    Row Name 03/12/23 2312       Muscle Loss    Loss of Muscle Mass Findings Mild    [place] Region --  mild    Clavicle Bone Region --  mild    Acromion Bone Region Moderate - acromion may slightly protrude    Scapular Bone Region --  mild    Dorsal Hand Region --  mild    Patellar Region --  mild    Anterior Thigh Region None    Posterior Calf Region --  mild    Row Name 03/12/23 2312       Fat Loss    Subcutaneous Fat Loss Findings Moderate    Orbital Region  Moderate -  somewhat hollowness, slightly dark circles    Upper Arm Region --  mild    Thoracic & Lumbar Region Moderate - ribs visible with mild depressions, iliac crest somewhat prominent    Row Name 03/12/23 2312       Criteria Met (Must meet criteria for severity in at least 2 of these categories: M Wasting, Fat Loss, Fluid, Secondary Signs, Wt. Status, Intake)    Patient meets criteria for  Severe Malnutrition  Pt meets criteria for severe acute malnutrition based on wt intake hx w wasting.                 Electronically signed by:  Alma Norris RD  03/12/23 23:33 EDT

## 2023-03-13 NOTE — THERAPY EVALUATION
Patient Name: Monty Nagel  : 1955    MRN: [id number]                              Today's Date: 3/13/2023       Admit Date: 3/11/2023    Visit Dx:     ICD-10-CM ICD-9-CM   1. Generalized weakness  R53.1 780.79   2. History of alcoholism (HCC)  F10.21 V11.3   3. Inability to walk  R26.2 719.7   4. Confusion  R41.0 298.9   5. Hypoglycemia  E16.2 251.2   6. Poor nutrition  E63.9 269.9   7. Cognitive communication deficit  R41.841 799.52   8. Impaired functional mobility, balance, gait, and endurance  Z74.09 V49.89     Patient Active Problem List   Diagnosis   • Essential hypertension   • History of melanoma   • Postablative hypothyroidism   • Screen for colon cancer   • Other recurrent depressive disorders (HCC)   • Balance problem   • Generalized weakness   • Atrial fibrillation (HCC)   • Severe malnutrition (HCC)     Past Medical History:   Diagnosis Date   • Arthritis    • Hypertension    • Melanoma (HCC) 2017    retina     Past Surgical History:   Procedure Laterality Date   • EYE SURGERY  2017    EYE CANCER RIGHT EYE   • TONSILLECTOMY        General Information     Row Name 23 1139          OT Time and Intention    Document Type evaluation  -HOPE     Mode of Treatment individual therapy;occupational therapy  -HOPE     Row Name 23 1139          General Information    Patient Profile Reviewed yes  -HOPE     Prior Level of Function independent:;all household mobility;community mobility;gait;transfer;bed mobility;feeding;grooming;dressing;bathing;driving;shopping  per pt. all task have progressively gotten more difficult, furniture walks, per pt. does microwave meals  -HOPE     Existing Precautions/Restrictions fall;other (see comments)  monitor BP  -HOPE     Barriers to Rehab medically complex;previous functional deficit;cognitive status;environmental barriers;family issues  -HOPE     Row Name 23 1139          Occupational Profile    Environmental Supports and Barriers (Occupational Profile) pt.  reports can't use kitchen or bathroom sink, tried to fix hot water heater and unable so no hot water, can only sponge off with a pan of heated water, tub shower available, but not using with no hot water, per pt. many items accumulated in his home  -     Row Name 03/13/23 1139          Living Environment    People in Home alone  -     Row Name 03/13/23 1139          Stairs Within Home, Primary    Number of Stairs, Within Home, Primary none  -     Row Name 03/13/23 1139          Cognition    Orientation Status (Cognition) oriented x 3  -     Row Name 03/13/23 1139          Safety Issues, Functional Mobility    Safety Issues Affecting Function (Mobility) safety precaution awareness;safety precautions follow-through/compliance;awareness of need for assistance;insight into deficits/self-awareness;judgment;problem-solving  -     Impairments Affecting Function (Mobility) balance;cognition;endurance/activity tolerance;strength;range of motion (ROM);motor control  -     Cognitive Impairments, Mobility Safety/Performance safety precaution awareness;safety precaution follow-through;judgment  -     Comment, Safety Issues/Impairments (Mobility) cues not to leave walker behind  -           User Key  (r) = Recorded By, (t) = Taken By, (c) = Cosigned By    Initials Name Provider Type    HOPE Monae Cohen, OT Occupational Therapist                 Mobility/ADL's     Row Name 03/13/23 1143          Bed Mobility    Supine-Sit [place] (Bed Mobility) independent  -     Sit-Supine [place] (Bed Mobility) independent  -     Assistive Device (Bed Mobility) bed rails;head of bed elevated  -     Row Name 03/13/23 1143          Transfers    Transfers sit-stand transfer;stand-sit transfer  -     Row Name 03/13/23 1143          Sit-Stand Transfer    Sit-Stand [place] (Transfers) contact guard  -     Assistive Device (Sit-Stand Transfers) walker, front-wheeled  -     Row Name 03/13/23 1143           Stand-Sit Transfer    Stand-Sit [place] (Transfers) contact guard;verbal cues  -     Assistive Device (Stand-Sit Transfers) walker, front-wheeled  -HOPE     Row Name 03/13/23 1143          Functional Mobility    Functional Mobility- Ind. Level contact guard assist  -HOPE     Functional Mobility- Device walker, front-wheeled  -HOPE     Functional Mobility-Distance (Feet) 20  -HOPE     Functional Mobility- Safety Issues step length decreased;weight-shifting ability decreased  -     Row Name 03/13/23 1143          Activities of Daily Living    BADL Assessment/Intervention lower body dressing;grooming;feeding;upper body dressing  -     Row Name 03/13/23 1143          Lower Body Dressing Assessment/Training    [place] Level (Lower Body Dressing) doff;don;socks;independent  -HOPE     Position (Lower Body Dressing) edge of bed sitting  -HOPE     Comment, (Lower Body Dressing) L and R big toes with nails curling out, per pt. difficulty getting shoes to fit right, using wire cutters to trim  -     Row Name 03/13/23 1143          Grooming Assessment/Training    [place] Level (Grooming) hair care, combing/brushing;contact guard assist  -HOPE     Position (Grooming) sink side  -     Row Name 03/13/23 1143          Self-Feeding Assessment/Training    [place] Level (Feeding) prepare tray/open items;moderate assist (50% patient effort);liquids to mouth;scoop food and bring to mouth;independent  -     Row Name 03/13/23 1143          Upper Body Dressing Assessment/Training    [place] Level (Upper Body Dressing) doff;don;pajama/robe;maximum assist (25% patient effort)  -HOPE     Position (Upper Body Dressing) edge of bed sitting  -HOPE           User Key  (r) = Recorded By, (t) = Taken By, (c) = Cosigned By    Initials Name Provider Type    Moane Pierce, OT Occupational Therapist               Obj/Interventions     Row Name 03/13/23 1145          Sensory Assessment (Somatosensory)    Sensory Assessment  (Somatosensory) UE sensation intact  -     Row Name 03/13/23 1145          Vision Assessment/Intervention    Vision Assessment Comment did not get to formally assess, pt. focused on eating lunch tray that arrived, pt. report poor R eye vision from cataracts and prior right eye melanoma  -     Row Name 03/13/23 1145          Range of Motion Comprehensive    General Range of Motion upper extremity range of motion deficits identified  -HOPE     Comment, General Range of Motion R  limited from arthritis grossly 20% ROM, but able to , L shoulder with prior fall and shoulder fracture per pt., shoulder abd grossly 90 degrees  -     Row Name 03/13/23 1145          Strength Comprehensive (MMT)    General Manual Muscle Testing (MMT) Assessment upper extremity strength deficits identified  -HOPE     Comment, General Manual Muscle Testing (MMT) Assessment BUE grossly 4 to 4+/5 minus L shoulder only gave min resistance due to still painful with mvmt.  -     Row Name 03/13/23 1141          Balance    Balance Assessment sitting static balance;sitting dynamic balance;standing static balance;standing dynamic balance  -HOPE     Static Sitting Balance independent  -HOPE     Dynamic Sitting Balance independent  LBD  -HOPE     Position, Sitting Balance unsupported;sitting edge of bed  -HOPE     Static Standing Balance contact guard  -HOPE     Dynamic Standing Balance contact guard  grooming sinkside  -HOPE     Position/Device Used, Standing Balance walker, rolling;supported  -HOPE           User Key  (r) = Recorded By, (t) = Taken By, (c) = Cosigned By    Initials Name Provider Type    Monae Pierce OT Occupational Therapist               Goals/Plan     Row Name 03/13/23 2771          Transfer Goal 1 (OT)    Activity/Assistive Device (Transfer Goal 1, OT) toilet;sit-to-stand/stand-to-sit;walker, rolling  -HOPE     Petroleum Level/Cues Needed (Transfer Goal 1, OT) standby assist  -HOPE     Time Frame (Transfer Goal 1, OT) long term  goal (LTG);10 days  -HOPE     Strategies/Barriers (Transfers Goal 1, OT) good safety  -HOPE     Progress/Outcome (Transfer Goal 1, OT) new goal  -HOPE     Row Name 03/13/23 1153          Bathing Goal 1 (OT)    Activity/Device (Bathing Goal 1, OT) bathing skills, all  -HOPE     [place] Level/Cues Needed (Bathing Goal 1, OT) minimum assist (75% or more patient effort);set-up required  -HOPE     Time Frame (Bathing Goal 1, OT) long term goal (LTG);10 days  -HOPE     Progress/Outcomes (Bathing Goal 1, OT) new goal  -HOPE     Row Name 03/13/23 1153          Dressing Goal 1 (OT)    Activity/Device (Dressing Goal 1, OT) lower body dressing  -HOPE     [place]/Cues Needed (Dressing Goal 1, OT) standby assist  -HOPE     Time Frame (Dressing Goal 1, OT) long term goal (LTG);10 days  -HOPE     Strategies/Barriers (Dressing Goal 1, OT) undergarment/pants  -HOPE     Progress/Outcome (Dressing Goal 1, OT) new goal  -HOPE     Row Name 03/13/23 1153          Toileting Goal 1 (OT)    Activity/Device (Toileting Goal 1, OT) toileting skills, all;commode;grab bar/safety frame  -HOPE     [place] Level/Cues Needed (Toileting Goal 1, OT) standby assist  -HOPE     Time Frame (Toileting Goal 1, OT) long term goal (LTG);10 days  -HOPE     Progress/Outcome (Toileting Goal 1, OT) new goal  -HOPE     Row Name 03/13/23 1153          Grooming Goal 1 (OT)    Activity/Device (Grooming Goal 1, OT) oral care;wash face, hands  -HOPE     [place] (Grooming Goal 1, OT) standby assist  -HOPE     Time Frame (Grooming Goal 1, OT) long term goal (LTG);10 days  -HOPE     Strategies/Barriers (Grooming Goal 1, OT) sinkside  -HOPE     Progress/Outcome (Grooming Goal 1, OT) new goal  -HOPE     Row Name 03/13/23 1153          Therapy Assessment/Plan (OT)    Planned Therapy Interventions (OT) activity tolerance training;functional balance retraining;occupation/activity based interventions;ROM/therapeutic exercise;strengthening exercise;patient/caregiver education/training;BADL  retraining;transfer/mobility retraining  -           User Key  (r) = Recorded By, (t) = Taken By, (c) = Cosigned By    Initials Name Provider Type    Monae Pierce, OT Occupational Therapist               Clinical Impression     Row Name 03/13/23 1149          Pain Assessment    Pretreatment Pain Rating 0/10 - no pain  -HOPE     Posttreatment Pain Rating 0/10 - no pain  -HOPE     Row Name 03/13/23 1149          Plan of Care Review    Plan of Care Reviewed With patient  -HOPE     Progress no change  -HOPE     Outcome Evaluation OT evaluation completed with pt. presenting with impaired balance, endurance and safety impacting prior ADL task performance.  Pt. with prior R eye vision loss and limited R hand ROM from arthritis and L shoulder from prior brake.  Pt. appears with unsafe home situation with no fully working sink or hot water with no family support. Recommend SNF at discharge.  -     Row Name 03/13/23 1149          Therapy Assessment/Plan (OT)    Patient/Family Therapy Goal Statement (OT) return to higher ADL independence level  -     Rehab Potential (OT) good, to achieve stated therapy goals  -     Criteria for Skilled Therapeutic Interventions Met (OT) yes;meets criteria;skilled treatment is necessary  -     Therapy Frequency (OT) daily  -     Row Name 03/13/23 1149          Therapy Plan Review/Discharge Plan (OT)    Anticipated Discharge Disposition (OT) skilled nursing facility  -     Row Name 03/13/23 1149          Vital Signs    Pre Systolic BP Rehab 107  -HOPE     Pre Treatment Diastolic BP 75  -HOPE     Intra Systolic BP Rehab 122  -HOPE     Intra Treatment Diastolic BP 85  -HOPE     Pretreatment Heart Rate (beats/min) 81  -HOPE     Posttreatment Heart Rate (beats/min) 86  -HOPE     O2 Delivery Pre Treatment room air  -HOPE     O2 Delivery Intra Treatment room air  -HOPE     Post SpO2 (%) 95  -HOPE     O2 Delivery Post Treatment room air  -HOPE     Pre Patient Position Supine  -HOPE     Intra Patient Position  Standing  -HOPE     Post Patient Position Sitting  -HOPE     Row Name 03/13/23 1149          Positioning and Restraints    Pre-Treatment Position in bed  -HOPE     Post Treatment Position bed  -HOPE     In Bed sitting EOB;encouraged to call for assist;call light within reach;notified nsg  nurse cleared pt. to sit at EOB to eat his lunch  -HOPE           User Key  (r) = Recorded By, (t) = Taken By, (c) = Cosigned By    Initials Name Provider Type    Monae Pierce OT Occupational Therapist               Outcome Measures     Row Name 03/13/23 1154          How much help from another is currently needed...    Putting on and taking off regular lower body clothing? 3  -HOPE     Bathing (including washing, rinsing, and drying) 3  -HOPE     Toileting (which includes using toilet bed pan or urinal) 3  -HOPE     Putting on and taking off regular upper body clothing 3  -HOPE     Taking care of personal grooming (such as brushing teeth) 3  -HOPE     Eating meals 3  -HOPE     AM-PAC 6 Clicks Score (OT) 18  -HOPE     Row Name 03/13/23 1154          Functional Assessment    Outcome Measure Options AM-PAC 6 Clicks Daily Activity (OT)  -HOPE           User Key  (r) = Recorded By, (t) = Taken By, (c) = Cosigned By    Initials Name Provider Type    Monae Pierce OT Occupational Therapist                Occupational Therapy Education     Title: PT OT SLP Therapies (In Progress)     Topic: Occupational Therapy (In Progress)     Point: ADL training (Done)     Description:   Instruct learner(s) on proper safety adaptation and remediation techniques during self care or transfers.   Instruct in proper use of assistive devices.              Learning Progress Summary           Patient Acceptance, E, VU,NR by HOPE at 3/13/2023 1156    Comment: reason for consult, noted deficits, walker safety, concern over home return                   Point: Home exercise program (Not Started)     Description:   Instruct learner(s) on appropriate technique for monitoring,  assisting and/or progressing therapeutic exercises/activities.              Learner Progress:  Not documented in this visit.          Point: Precautions (Done)     Description:   Instruct learner(s) on prescribed precautions during self-care and functional transfers.              Learning Progress Summary           Patient Acceptance, E, VU,NR by HOPE at 3/13/2023 8510    Comment: reason for consult, noted deficits, walker safety, concern over home return                   Point: Body mechanics (Not Started)     Description:   Instruct learner(s) on proper positioning and spine alignment during self-care, functional mobility activities and/or exercises.              Learner Progress:  Not documented in this visit.                      User Key     Initials Effective Dates Name Provider Type Discipline     02/03/23 -  Monae Cohen, OT Occupational Therapist OT              OT Recommendation and Plan  Planned Therapy Interventions (OT): activity tolerance training, functional balance retraining, occupation/activity based interventions, ROM/therapeutic exercise, strengthening exercise, patient/caregiver education/training, BADL retraining, transfer/mobility retraining  Therapy Frequency (OT): daily  Plan of Care Review  Plan of Care Reviewed With: patient  Progress: no change  Outcome Evaluation: OT evaluation completed with pt. presenting with impaired balance, endurance and safety impacting prior ADL task performance.  Pt. with prior R eye vision loss and limited R hand ROM from arthritis and L shoulder from prior brake.  Pt. appears with unsafe home situation with no fully working sink or hot water with no family support. Recommend SNF at discharge.     Time Calculation:    Time Calculation- OT     Row Name 03/13/23 1157             Time Calculation- OT    OT Start Time 1100  -HOPE      OT Received On 03/13/23  -HOPE      OT Goal Re-Cert Due Date 03/23/23  -HOPE         Timed Charges    30169 - OT Therapeutic Activity  Minutes 6  -HOPE      44212 - OT Self Care/Mgmt Minutes 6  -HOPE         Untimed Charges    OT Eval/Re-eval Minutes 50  -HOPE         Total Minutes    Timed Charges Total Minutes 12  -HOPE      Untimed Charges Total Minutes 50  -HOPE       Total Minutes 62  -HOPE            User Key  (r) = Recorded By, (t) = Taken By, (c) = Cosigned By    Initials Name Provider Type    Monae Pierce, OT Occupational Therapist              Therapy Charges for Today     Code Description Service Date Service Provider Modifiers Qty    [id number]  OT THERAPEUTIC ACT EA 15 MIN 3/13/2023 Monae Cohen, OT GO 1    [id number]  OT EVAL LOW COMPLEXITY 4 3/13/2023 Monae Cohen, OT GO 1               Monae Cohen OT  3/13/2023

## 2023-03-13 NOTE — PLAN OF CARE
Goal Outcome Evaluation:  Plan of Care Reviewed With: patient        Progress: no change  Outcome Evaluation: OT evaluation completed with pt. presenting with impaired balance, endurance and safety impacting prior ADL task performance.  Pt. with prior R eye vision loss and limited R hand ROM from arthritis and L shoulder from prior brake.  Pt. appears with unsafe home situation with no fully working sink or hot water with no family support. Recommend SNF at discharge.

## 2023-03-13 NOTE — PROGRESS NOTES
Clinical Nutrition     Nutrition Support Assessment  Reason for Visit: Malnutrition Severity Assessment      Patient Name: Monty Nagel  YOB: 1955  MRN: [id number]  Date of Encounter: 03/12/23 23:17 EDT  Admission date: 3/11/2023    Comments: Pt meets criteria for severe acute malnutrition based on wt intake hx w wasting. See MSA note.    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 Fall in Sept w dislocated shoulder and fx humerus    PSH:  He  has a past surgical history that includes Eye surgery (2017) and Tonsillectomy.      Applicable Nutrition Concerns:   Skin:  Oral:  GI:      Applicable Interval History:  Some contracture hands/feet noted on adm.   3/12 noted per SLP mild cognative linguistic d/o        Reported/Observed/Food/Nutrition Related History:     Pt allows period of no intake was about 5 days within last month, not 2 wk as had reported earlier today. But, was indeed period of ~ no intake. Rpts liking hospital food very much, eating now w enthusiasm.       Labs    Labs Reviewed: Yes     Results from last 7 days   Lab Units 03/12/23  1033 03/11/23  1052   GLUCOSE mg/dL 140* 95   BUN mg/dL 10 14   CREATININE mg/dL 0.75* 0.99   SODIUM mmol/L 138 135*   CHLORIDE mmol/L 100 95*   POTASSIUM mmol/L 4.0 3.5   MAGNESIUM mg/dL  --  1.8   ALT (SGPT) U/L  --  14       Results from last 7 days   Lab Units 03/12/23  1033 03/11/23  1052   ALBUMIN g/dL  --  4.0   CRP mg/dL  --  <0.30   CHOLESTEROL mg/dL 233*  --   "  TRIGLYCERIDES mg/dL 50  --        Results from last 7 days   Lab Units 03/11/23  1102   GLUCOSE mg/dL 65*     Lab Results   Lab Value Date/Time    HGBA1C 5.40 03/11/2023 1052    HGBA1C 5.30 11/15/2019 0936     Results from last 7 days   Lab Units 03/12/23  1033   URIC ACID mg/dL 5.3             Medications    Medications Reviewed: Yes  Pertinent: Steroid, Keppra, Protonix, Pericolace, Thiamin  Infusion:  PRN:       Intake/Ouptut 24 hrs (0701 - 0700)   I&O's Reviewed: Yes     Intake & Output (last day)       03/12 0701  03/13 0700    P.O. 500    IV Piggyback     Total Intake(mL/kg) 500 (6.2)    Urine (mL/kg/hr) 550 (0.4)    Total Output 550    Net -50             No stool recorded    Anthropometrics     Admission Height 175.3 cm (69\") Documented at 03/11/2023 1049   Admission Weight 93.9 kg (207 lb) Documented at 03/11/2023 1049Wt fr   Wt from Dec 2023      Height: Height: 175.3 cm (69.02\")  Last Filed Weight: Weight: 80.6 kg (177 lb 11.1 oz) (03/12/23 1450)  Method: Weight Method: Standing scale  BMI: BMI (Calculated): 26.2  BMI classification: Overweight: 25.0-29.9kg/m2     UBW: Wt of 207 lbs on 12/14/22  Weight change: Loss of 29 lbs 14% body wt over 3mo    Nutrition Focused Physical Exam     Date:     Patient meets criteria for severe acute alnutrition diagnosis, see MSA note.    Current Nutrition Prescription     PO: Diet: Regular/House Diet; Texture: Regular Texture (IDDSI 7); Fluid Consistency: Thin (IDDSI 0)  Oral Nutrition Supplement: Boost Plus 3x/da  Intake: 1 Day: 100% x 3 meals      Nutrition Diagnosis   Date: 3/12 Updated:   Problem Malnutrition  acute severe   Etiology Period of severe depressed intake    Signs/Symptoms Wt intake hx w wasting   Status:       Goal:   General: Nutrition to support treatment  PO: Maintain intake  EN/PN: N/A    Nutrition Intervention      Follow treatment progress, Care plan reviewed, Advise alternate selection, Menu provided        Monitoring/Evaluation:   Per protocol, " I&O, PO intake, Supplement intake, Pertinent labs, Weight, Symptoms      Alma Norris RD  Time Spent: 30 min

## 2023-03-13 NOTE — PROGRESS NOTES
[facility] Medicine Services  PROGRESS NOTE    Patient Name: Monty Nagel  : 1955  MRN: [id number]    Date of Admission: 3/11/2023  Primary Care Physician: Tiffany Morales MD    Subjective   Subjective     CC:  Follow-up for balance issues    HPI:  He thinks his speech is better.  No headaches.  Big appetite on steroids.     ROS:  General : no fevers, chills  CVS: No chest pain, palpitations  Respiratory: No cough, dyspnea  GI: No N/V/D, abd pain  Neuro - poor sleep (chronic)  GI - former etohic x 50 yrs, quit 5 months ago     10 point review of systems is negative except for what is mentioned in HPI    Objective   Objective     Vital Signs:   Temp:  [97.4 °F (36.3 °C)-97.8 °F (36.6 °C)] 97.5 °F (36.4 °C)  Heart Rate:  [] 68  Resp:  [16-18] 18  BP: ()/(54-78) 101/69     Physical Exam:    Gen:  Thin, looks chronically ill but NAD, speech clear   Neuro: alert and oriented, clear speech, follows commands, grossly nonfocal  HEENT:  NC/AT  Neck:  Supple, no LAD  Heart RRR  Lungs nonlabored on RA   Abd:  Soft, nontender, no rebound or guarding, pos BS  Extrem:  No c/c/e      Results Reviewed:  LAB RESULTS:      Lab 23  1033 23  1052   WBC 5.83 5.58   HEMOGLOBIN 13.3 14.8   HEMATOCRIT 39.5 43.7   PLATELETS 152 174   NEUTROS ABS  --  3.11   IMMATURE GRANS (ABS)  --  0.02   LYMPHS ABS  --  1.82   MONOS ABS  --  0.53   EOS ABS  --  0.07   MCV 87.4 86.2   SED RATE 16  --    CRP  --  <0.30         Lab 23  1033 23  1052   SODIUM 138 135*   POTASSIUM 4.0 3.5   CHLORIDE 100 95*   CO2 28.0 28.0   ANION GAP 10.0 12.0   BUN 10 14   CREATININE 0.75* 0.99   EGFR 97.7 82.5   GLUCOSE 140* 95   CALCIUM 8.9 9.3   MAGNESIUM  --  1.8   HEMOGLOBIN A1C  --  5.40   TSH 9.210*  --          Lab 23  1052   TOTAL PROTEIN 6.9   ALBUMIN 4.0   GLOBULIN 2.9   ALT (SGPT) 14   AST (SGOT) 20   BILIRUBIN 1.0   ALK PHOS 71         Lab 23  1052   HSTROP T 10         Lab  03/12/23  1033   CHOLESTEROL 233*   LDL CHOL 178*   HDL CHOL 47   TRIGLYCERIDES 50         Lab 03/12/23  1033   IRON 122   IRON SATURATION 56*   TIBC 219*   TRANSFERRIN 147*   FOLATE 11.00   VITAMIN B 12 1,689*         Brief Urine Lab Results  (Last result in the past 365 days)      Color   Clarity   Blood   Leuk Est   Nitrite   Protein   CREAT   Urine HCG        03/11/23 1058 Dark Yellow   Clear   Negative   Negative   Negative   Negative                 Microbiology Results Abnormal     None          Adult Transthoracic Echo Complete w/ Color, Spectral and Contrast if necessary per protocol    Result Date: 3/12/2023  •  Left ventricular systolic function is normal. Estimated left ventricular EF = 55% •  Biatrial enlargement. •  Calcified aortic valve with mild aortic stenosis, mean gradient 10 mmHg, BANG 1.6 cm². •  Moderate aortic insufficiency. •  Mild mitral regurgitation. •  Mild tricuspid regurgitation with normal RVSP. •  Mild dilation of the ascending aorta (4.2 cm) is present.     MRI Brain With & Without Contrast    Result Date: 3/11/2023  MRI BRAIN W WO CONTRAST Date of Exam: 3/11/2023 6:59 PM EST Indication: With thin sections through bilateral mammillary bodies.  Comparison: None available. Technique:  Routine multiplanar/multisequence sequence images of the brain were obtained before and after the uneventful administration of  18 mL Multihance. Findings: No acute infarction, intracranial hemorrhage, or extra-axial collection is identified. There is a 5.0 x 6.4 x 4.2 cm homogeneously enhancing mass that appears to be extra-axial along the right frontal convexity with significant mass effect and vasogenic edema along the right frontal lobe resulting in 1.3 cm right to left midline shift. The basal cisterns appear patent. Scattered foci of periventricular and subcortical white matter FLAIR hyperintensities are nonspecific, but likely the sequela of mild chronic small vessel ischemic disease. The globes  and orbits appear intact. The intracranial vascular flow-voids appear patent. The mammary bodies appear within normal limits.     Impression: Impression: 1.6.4 cm homogeneous enhancing mass that appears to be extra-axial along right frontal convexity, favored to represent meningioma. This mass results in significant mass effect and vasogenic edema in the right frontal lobe with 1.3 cm right to left midline shift. 2.Findings suggestive of mild chronic small vessel ischemic disease. Electronically Signed: Monty Zhou  3/11/2023 8:01 PM EST  Workstation ID: [id number]    MRI Cervical Spine With & Without Contrast    Result Date: 3/12/2023  MRI CERVICAL SPINE W WO CONTRAST Date of Exam: 3/11/2023 7:00 PM EST Indication: Cervical myelopathy..  Comparison: None available. Technique:  Routine multiplanar/multisequence sequence images of the cervical spine were obtained before and after the uneventful administration of  18 mL Multihance.  Findings: The alignment is anatomic. The craniocervical junction appears intact. The vertebral body heights appear normal. There are degenerative endplate changes at C3-4. There are moderate discogenic changes at C3-4, C5-6, and C6-7. The partially visualized posterior fossa contents are unremarkable. The spinal cord appears normal in signal throughout. No pathological enhancement or mass is identified. The prevertebral soft tissues are unremarkable. C2-3: No spinal canal or neural foraminal stenosis. C3-4: Mild disc osteophyte complex with small left paracentral disc protrusion. Mild right and severe left facet arthropathy. Mild right and moderate left uncovertebral hypertrophy. No spinal canal stenosis. Moderate right and severe left neural foraminal stenosis. C4-5: Mild disc osteophyte complex with superimposed small central disc protrusion. Mild right and moderate left facet arthropathy with fluid in the left facet joint. Mild bilateral uncovertebral hypertrophy. Mild spinal canal  stenosis. Mild to moderate right and moderate left neural foraminal stenosis. C5-6: Mild disc osteophyte complex. Mild right and moderate left facet arthropathy. Mild bilateral uncovertebral hypertrophy. No spinal canal stenosis. Moderate right and mild left neural foraminal stenosis. C6-7: Mild disc osteophyte complex. Mild bilateral facet arthropathy. Mild left uncovertebral hypertrophy. No spinal canal stenosis. No neural foraminal stenosis. C7-T1: No spinal canal or neural foraminal stenosis.     Impression: Impression: 1.Moderate multilevel degenerative changes of cervical spine as described above. 2.Fluid in left C4-5 facet joint, suggesting some nonspecific inflammation. Electronically Signed: Monty Zhou  3/12/2023 10:48 AM EDT  Workstation ID: [id number]    XR Chest 1 View    Result Date: 3/11/2023  XR CHEST 1 VW Date of Exam: 3/11/2023 10:59 AM EST Indication: Weak/Dizzy/AMS triage protocol. Comparison: None available. Findings: The heart is not definitely enlarged. The lungs seem relatively clear. It looks like the probably are some underlying emphysematous changes. There are no pleural effusions. There is some deformity of the surgical neck of the proximal left humerus which may relate to old trauma.     Impression: Impression: 1.An acute pulmonary process is not apparent. 2.Some underlying emphysematous changes suggested. Electronically Signed: Jimmy Michael  3/11/2023 11:46 AM EST  Workstation ID: [id number]    Bilateral Carotid Duplex    Result Date: 3/12/2023  •  No evidence of hemodynamically significant stenosis of bilateral carotid arteries. •  Intimal thickening and mild bilateral scattered plaque is noted.     Doppler Ankle Brachial Index Single Level CAR    Result Date: 3/12/2023  •  Right Conclusion: The right MATTHEW is normal. •  Left Conclusion: The left MATTHEW is normal.       Results for orders placed during the hospital encounter of 03/11/23    Adult Transthoracic Echo Complete w/ Color, Spectral  and Contrast if necessary per protocol    Interpretation Summary  •  Left ventricular systolic function is normal. Estimated left ventricular EF = 55%  •  Biatrial enlargement.  •  Calcified aortic valve with mild aortic stenosis, mean gradient 10 mmHg, BANG 1.6 cm².  •  Moderate aortic insufficiency.  •  Mild mitral regurgitation.  •  Mild tricuspid regurgitation with normal RVSP.  •  Mild dilation of the ascending aorta (4.2 cm) is present.      Current medications:  Scheduled Meds:dexamethasone, 4 mg, Intravenous, Q6H  enoxaparin, 40 mg, Subcutaneous, Daily  levETIRAcetam, 1,000 mg, Intravenous, Q12H  levothyroxine, 125 mcg, Oral, Daily  pantoprazole, 40 mg, Intravenous, Q AM  senna-docusate sodium, 2 tablet, Oral, BID  sodium chloride, 10 mL, Intravenous, Q12H  thiamine (B-1) IV, 250 mg, Intravenous, Q8H      Continuous Infusions:   PRN Meds:.•  acetaminophen **OR** acetaminophen **OR** acetaminophen  •  senna-docusate sodium **AND** polyethylene glycol **AND** bisacodyl **AND** bisacodyl  •  Magnesium Standard Dose Replacement - Initiate Nurse / BPA Driven Protocol  •  melatonin  •  ondansetron **OR** ondansetron  •  Phosphorus Replacement - Initiate Nurse / BPA Driven Protocol  •  Potassium Replacement - Initiate Nurse / BPA Driven Protocol  •  sodium chloride  •  sodium chloride  •  sodium chloride    Assessment & Plan   Assessment & Plan     Active Hospital Problems    Diagnosis  POA   • **Balance problem [R26.89]  Yes   • Generalized weakness [R53.1]  Yes   • Atrial fibrillation (HCC) [I48.91]  Yes   • Other recurrent depressive disorders (HCC) [F33.8]  Yes   • Postablative 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        Brief Hospital Course to date:  Monty Nagel is a 67 y.o. male PMH significant for balance problems, alcohol abuse, depression, HTN, hypothyroidism s/p thyroidectomy for Graves dz who presents to [facility]  ED with a several month history of difficulty with balance and gait.      Assessment and plan:    Right frontal meningioma with vasogenic edema and midline shift  Generalized weakness  Ataxia with frequent falls  · MRI brain showed: 6.4 cm homogeneous enhancing mass that appears to be extra-axial along right frontal convexity, favored to represent meningioma. This mass results in significant mass effect and vasogenic edema in the right frontal lobe with 1.3 cm right to left midline shift.  · Decadron. Recs per Dr Siddiqui:  decadron 4 mg QID for 1 week, then TID for 1 week, then BID for 1 week then once daily thereafter till surgery is done  · Neurosurgery consultation:  Recommending surgery; timing TBD   · - I had a long discussion w him re his inclination:  He thinks he does want surgery and will likely stay in [place] for it.    · - Did discuss timing w Dr Mckee.  Plan tentatively would be MMA on Monday, surgery on Tuesday (next week).  OK to DC from NS perspective.      Etohic x 50 yrs  Severe malnutrition   Unclear compliance w synthroid   - quit 5 months ago  · Continue high-dose thiamine per neurology  · PT and OT consultation.  Might need rehab placement since he lives alone  · CM consult, APS referral made from ED    Possible PAD  · Follow ABIs for cool BLE, 1/5 pedal pulses bilaterally  · - Carotid US no signif stenosis      Atrial fibrillation  · New diagnosis; currently rate controlled without medications  · Echo:  EF 55, biatrial enlargement, mod AI  · Hold off anticoagulation given his new finding of extensive meningioma that will likely need surgical intervention; discuss timing w NS   · Cardiology referral upon discharge    Dyslipidemia  ·   · Started high intensity statin     Essential hypertension  · Hold antihypertensive medication given his borderline blood pressure     Hypothyroidism  H/o ablation for Graves  · Thyroid panel is indicative of possible medication noncompliance (TSH  elevated and free T4 is low)  · Continue levothyroxine 125 zaynab daily     Psoriatic arthritis  Rheumatoid arthritis  · Patient states he has never received treatment  · Neurology checking multiple autoimmune labs  · Normal  CRP, ESR     H/o EtOH abuse  · States sober since September 2022    Acute debility  · PT and OT  · Patient is living alone and high risk for fall. I think he will benefit from acute rehab upon discharge     Total time spent: 50 min  Time spent includes time reviewing chart, face-to-face time, counseling patient/family/caregiver, ordering medications/tests/procedures, communicating with other health care professionals, documenting clinical information in the electronic health record, and coordination of care.      Expected Discharge Location and Transportation: Rehab  Expected Discharge   Expected Discharge Date and Time     Expected Discharge Date Expected Discharge Time    Mar 16, 2023            DVT prophylaxis:  Medical and mechanical DVT prophylaxis orders are present.     AM-PAC 6 Clicks Score (PT): 20 (03/12/23 2000)    CODE STATUS:   Code Status and Medical Interventions:   Ordered at: 03/11/23 7079     Level Of Support Discussed With:    Patient     Code Status (Patient has no pulse and is not breathing):    CPR (Attempt to Resuscitate)     Medical Interventions (Patient has pulse or is breathing):    Full Support       Ronda Royal MD  03/13/23

## 2023-03-13 NOTE — PLAN OF CARE
Goal Outcome Evaluation:              Outcome Evaluation: VSS. AOx4, but slow to respond. RA. Afib on monitor. Generalized weakness with ambulation. No complaints of pain or nausea. Able to tolerate ambulation in room. Pt recieved bed bath during the night and verbalized feeling better after hygiene care. No acute changes. Will continue to monitor and provide care as appropriate per provider orders.

## 2023-03-14 PROBLEM — D49.6 BRAIN TUMOR: Status: ACTIVE | Noted: 2023-03-11

## 2023-03-14 LAB
1,25(OH)2D SERPL-MCNC: 38.3 PG/ML (ref 24.8–81.5)
C-ANCA TITR SER IF: ABNORMAL TITER
MAGNESIUM SERPL-MCNC: 2 MG/DL (ref 1.6–2.4)
P-ANCA ATYPICAL TITR SER IF: ABNORMAL TITER
P-ANCA TITR SER IF: ABNORMAL TITER
QT INTERVAL: 380 MS
QTC INTERVAL: 416 MS

## 2023-03-14 PROCEDURE — 25010000002 THIAMINE PER 100 MG: Performed by: PSYCHIATRY & NEUROLOGY

## 2023-03-14 PROCEDURE — 25010000002 ENOXAPARIN PER 10 MG: Performed by: NURSE PRACTITIONER

## 2023-03-14 PROCEDURE — G0378 HOSPITAL OBSERVATION PER HR: HCPCS

## 2023-03-14 PROCEDURE — 63710000001 DIPHENHYDRAMINE PER 50 MG: Performed by: INTERNAL MEDICINE

## 2023-03-14 PROCEDURE — 99232 SBSQ HOSP IP/OBS MODERATE 35: CPT | Performed by: INTERNAL MEDICINE

## 2023-03-14 PROCEDURE — 0 LEVETIRACETAM IN NACL 0.75% 1000 MG/100ML SOLUTION: Performed by: PEDIATRICS

## 2023-03-14 PROCEDURE — 83735 ASSAY OF MAGNESIUM: CPT | Performed by: INTERNAL MEDICINE

## 2023-03-14 PROCEDURE — 25010000002 DEXAMETHASONE PER 1 MG: Performed by: PEDIATRICS

## 2023-03-14 PROCEDURE — 97530 THERAPEUTIC ACTIVITIES: CPT

## 2023-03-14 RX ORDER — SODIUM CHLORIDE 9 MG/ML
40 INJECTION, SOLUTION INTRAVENOUS AS NEEDED
Status: CANCELLED | OUTPATIENT
Start: 2023-03-14

## 2023-03-14 RX ORDER — SODIUM CHLORIDE 0.9 % (FLUSH) 0.9 %
10 SYRINGE (ML) INJECTION EVERY 12 HOURS SCHEDULED
Status: CANCELLED | OUTPATIENT
Start: 2023-03-14

## 2023-03-14 RX ADMIN — LEVETIRACETAM 1000 MG: 10 INJECTION INTRAVASCULAR at 08:35

## 2023-03-14 RX ADMIN — ENOXAPARIN SODIUM 40 MG: 40 INJECTION SUBCUTANEOUS at 08:35

## 2023-03-14 RX ADMIN — DIPHENHYDRAMINE HYDROCHLORIDE 50 MG: 50 CAPSULE ORAL at 20:20

## 2023-03-14 RX ADMIN — LEVETIRACETAM 1000 MG: 10 INJECTION INTRAVASCULAR at 20:19

## 2023-03-14 RX ADMIN — DEXAMETHASONE SODIUM PHOSPHATE 4 MG: 4 INJECTION INTRA-ARTICULAR; INTRALESIONAL; INTRAMUSCULAR; INTRAVENOUS; SOFT TISSUE at 08:35

## 2023-03-14 RX ADMIN — LEVOTHYROXINE SODIUM 125 MCG: 125 TABLET ORAL at 08:35

## 2023-03-14 RX ADMIN — PANTOPRAZOLE SODIUM 40 MG: 40 INJECTION, POWDER, LYOPHILIZED, FOR SOLUTION INTRAVENOUS at 06:04

## 2023-03-14 RX ADMIN — SENNOSIDES AND DOCUSATE SODIUM 2 TABLET: 8.6; 5 TABLET ORAL at 20:20

## 2023-03-14 RX ADMIN — THIAMINE HYDROCHLORIDE 250 MG: 100 INJECTION, SOLUTION INTRAMUSCULAR; INTRAVENOUS at 20:19

## 2023-03-14 RX ADMIN — THIAMINE HYDROCHLORIDE 250 MG: 100 INJECTION, SOLUTION INTRAMUSCULAR; INTRAVENOUS at 14:11

## 2023-03-14 RX ADMIN — THIAMINE HYDROCHLORIDE 250 MG: 100 INJECTION, SOLUTION INTRAMUSCULAR; INTRAVENOUS at 06:04

## 2023-03-14 RX ADMIN — Medication 10 ML: at 20:20

## 2023-03-14 RX ADMIN — DEXAMETHASONE SODIUM PHOSPHATE 4 MG: 4 INJECTION INTRA-ARTICULAR; INTRALESIONAL; INTRAMUSCULAR; INTRAVENOUS; SOFT TISSUE at 02:15

## 2023-03-14 RX ADMIN — DEXAMETHASONE SODIUM PHOSPHATE 4 MG: 4 INJECTION INTRA-ARTICULAR; INTRALESIONAL; INTRAMUSCULAR; INTRAVENOUS; SOFT TISSUE at 15:48

## 2023-03-14 RX ADMIN — DEXAMETHASONE SODIUM PHOSPHATE 4 MG: 4 INJECTION INTRA-ARTICULAR; INTRALESIONAL; INTRAMUSCULAR; INTRAVENOUS; SOFT TISSUE at 20:21

## 2023-03-14 NOTE — THERAPY TREATMENT NOTE
Patient Name: Monty Nagel  : 1955    MRN: [id number]                              Today's Date: 3/14/2023       Admit Date: 3/11/2023    Visit Dx:     ICD-10-CM ICD-9-CM   1. Generalized weakness  R53.1 780.79   2. History of alcoholism (HCC)  F10.21 V11.3   3. Inability to walk  R26.2 719.7   4. Confusion  R41.0 298.9   5. Hypoglycemia  E16.2 251.2   6. Poor nutrition  E63.9 269.9   7. Cognitive communication deficit  R41.841 799.52   8. Impaired functional mobility, balance, gait, and endurance  Z74.09 V49.89   9. Brain tumor (HCC)  D49.6 239.6     Patient Active Problem List   Diagnosis   • Essential hypertension   • History of melanoma   • Postablative hypothyroidism   • Screen for colon cancer   • Other recurrent depressive disorders (HCC)   • Balance problem   • Generalized weakness   • Atrial fibrillation (HCC)   • Severe malnutrition (HCC)   • Brain tumor (HCC)     Past Medical History:   Diagnosis Date   • Arthritis    • Hypertension    • Melanoma (HCC) 2017    retina     Past Surgical History:   Procedure Laterality Date   • EYE SURGERY  2017    EYE CANCER RIGHT EYE   • TONSILLECTOMY        General Information     Row Name 23 1524          Physical Therapy Time and Intention    Document Type therapy note (daily note)  -KG     Mode of Treatment physical therapy  -KG     Row Name 23 1524          General Information    Existing Precautions/Restrictions fall;other (see comments)  confusion; impulsive  -KG     Barriers to Rehab cognitive status  -KG     Row Name 23 1524          Cognition    Orientation Status (Cognition) oriented x 3  -KG     Row Name 23 1524          Safety Issues, Functional Mobility    Safety Issues Affecting Function (Mobility) awareness of need for assistance;insight into deficits/self-awareness;safety precaution awareness;safety precautions follow-through/compliance  -KG     Impairments Affecting Function (Mobility)  balance;cognition;coordination;endurance/activity tolerance;postural/trunk control;strength  -KG     Cognitive Impairments, Mobility Safety/Performance attention;awareness, need for assistance;insight into deficits/self-awareness;safety precaution awareness;safety precaution follow-through  -KG           User Key  (r) = Recorded By, (t) = Taken By, (c) = Cosigned By    Initials Name Provider Type    KG Cherelle Gaitan, PT Physical Therapist               Mobility     Row Name 03/14/23 1524          Bed Mobility    Bed Mobility supine-sit;sit-supine  -KG     Supine-Sit [place] (Bed Mobility) standby assist;verbal cues  -KG     Sit-Supine [place] (Bed Mobility) standby assist;verbal cues  -KG     Assistive Device (Bed Mobility) bed rails;head of bed elevated  -KG     Comment, (Bed Mobility) VC's for sequencing.  -KG     Row Name 03/14/23 1524          Transfers    Comment, (Transfers) VC's for sequencing and safe hand placement. Cues to push up from bed prior to reaching for RW.  -KG     Row Name 03/14/23 1524          Sit-Stand Transfer    Sit-Stand [place] (Transfers) contact guard;verbal cues  -KG     Assistive Device (Sit-Stand Transfers) walker, front-wheeled  -KG     Row Name 03/14/23 1524          Gait/Stairs (Locomotion)    [place] Level (Gait) minimum assist (75% patient effort);verbal cues  -KG     Assistive Device (Gait) walker, front-wheeled  -KG     Distance in Feet (Gait) 50  -KG     Deviations/Abnormal Patterns (Gait) bilateral deviations;base of support, narrow;dima decreased;festinating/shuffling;stride length decreased  -KG     Bilateral Gait Deviations forward flexed posture;heel strike decreased  -KG     Left Sided Gait Deviations foot drop/toe drag  -KG     Comment, (Gait/Stairs) Pt demonstrated step through gait pattern with slow dima and short, shuffled steps with very forward flexed posture. Pt required consistent cues for upright posture with forward gaze and to  keep RW close with feet inside middle. Minimal to no improvement despite cues. Pt with tendency to drap L LE; cues for weight shifting to properly advance L LE. Pt with mild instability throughout ambulation. Limited by c/o impaired balance and weakness.  -KG           User Key  (r) = Recorded By, (t) = Taken By, (c) = Cosigned By    Initials Name Provider Type    KG Cherelle Gaitan, PT Physical Therapist               Obj/Interventions     Row Name 03/14/23 1526          Balance    Balance Assessment sitting static balance;standing static balance;standing dynamic balance  -KG     Static Sitting Balance supervision  -KG     Position, Sitting Balance unsupported;sitting edge of bed  -KG     Static Standing Balance contact guard  -KG     Dynamic Standing Balance minimal assist  -KG     Position/Device Used, Standing Balance supported;walker, rolling  -KG           User Key  (r) = Recorded By, (t) = Taken By, (c) = Cosigned By    Initials Name Provider Type    KG Cherelle Gaitan, PT Physical Therapist               Goals/Plan    No documentation.                Clinical Impression     Row Name 03/14/23 1527          Pain    Pretreatment Pain Rating 0/10 - no pain  -KG     Posttreatment Pain Rating 0/10 - no pain  -KG     Row Name 03/14/23 1527          Plan of Care Review    Plan of Care Reviewed With patient  -KG     Progress improving  -KG     Outcome Evaluation Pt increased ambulation distance to 50ft with RW and Randy. Pt required frequent cues for upright posture, proper positioning of RW, and advancement of L LE. Pt limited by c/o impaired balance and weakness. Continues to be limited by confusion. Continue to recommend PT skilled care and D/C to IP rehab facility.  -KG     Row Name 03/14/23 1527          Vital Signs    Pre Systolic BP Rehab --  VSS; RN cleared for treatment  -KG     Pretreatment Heart Rate (beats/min) 90  -KG     Posttreatment Heart Rate (beats/min) 92  -KG     Pre SpO2 (%) 96  -KG      O2 Delivery Pre Treatment room air  -KG     Post SpO2 (%) 94  -KG     O2 Delivery Post Treatment room air  -KG     Pre Patient Position Supine  -KG     Intra Patient Position Standing  -KG     Post Patient Position Supine  -KG     Row Name 03/14/23 1527          Positioning and Restraints    Pre-Treatment Position in bed  -KG     Post Treatment Position bed  -KG     In Bed notified nsg;supine;call light within reach;encouraged to call for assist;exit alarm on;side rails up x2  -KG           User Key  (r) = Recorded By, (t) = Taken By, (c) = Cosigned By    Initials Name Provider Type    Cherelle Otero PT Physical Therapist               Outcome Measures     Row Name 03/14/23 1529 03/14/23 0820       How much help from another person do you currently need...    Turning from your back to your side while in flat bed without using bedrails? 3  -KG 4  -AM    Moving from lying on back to sitting on the side of a flat bed without bedrails? 3  -KG 4  -AM    Moving to and from a bed to a chair (including a wheelchair)? 3  -KG 3  -AM    Standing up from a chair using your arms (e.g., wheelchair, bedside chair)? 3  -KG 3  -AM    Climbing 3-5 steps with a railing? 2  -KG 2  -AM    To walk in hospital room? 3  -KG 3  -AM    AM-PAC 6 Clicks Score (PT) 17  -KG 19  -AM    Highest level of mobility 5 --> Static standing  -KG 6 --> Walked 10 steps or more  -AM    Row Name 03/14/23 1529          Functional Assessment    Outcome Measure Options AM-PAC 6 Clicks Basic Mobility (PT)  -KG           User Key  (r) = Recorded By, (t) = Taken By, (c) = Cosigned By    Initials Name Provider Type    Cherelle Otero, PT Physical Therapist    Sophie Arciniega RN Registered Nurse                             Physical Therapy Education     Title: PT OT SLP Therapies (In Progress)     Topic: Physical Therapy (In Progress)     Point: Mobility training (In Progress)     Learning Progress Summary           Patient Acceptance, E, NR  by KG at 3/14/2023 1428    Acceptance, E, VU,NR by  at 3/12/2023 1542                   Point: Home exercise program (In Progress)     Learning Progress Summary           Patient Acceptance, E, NR by KG at 3/14/2023 1428                   Point: Body mechanics (In Progress)     Learning Progress Summary           Patient Acceptance, E, NR by KG at 3/14/2023 1428    Acceptance, E, VU,NR by  at 3/12/2023 1542                   Point: Precautions (In Progress)     Learning Progress Summary           Patient Acceptance, E, NR by KG at 3/14/2023 1428    Acceptance, E, VU,NR by  at 3/12/2023 1542                               User Key     Initials Effective Dates Name Provider Type Discipline     02/03/23 - 03/12/23 Samara Harrison, PT Physical Therapist PT     05/22/20 -  Cherelle Gaitan PT Physical Therapist PT              PT Recommendation and Plan     Plan of Care Reviewed With: patient  Progress: improving  Outcome Evaluation: Pt increased ambulation distance to 50ft with RW and Randy. Pt required frequent cues for upright posture, proper positioning of RW, and advancement of L LE. Pt limited by c/o impaired balance and weakness. Continues to be limited by confusion. Continue to recommend PT skilled care and D/C to IP rehab facility.     Time Calculation:    PT Charges     Row Name 03/14/23 1428             Time Calculation    Start Time 1428  -KG      PT Received On 03/14/23  -KG      PT Goal Re-Cert Due Date 03/22/23  -KG         Time Calculation- PT    Total Timed Code Minutes- PT 24 minute(s)  -KG         Timed Charges    23283 - PT Therapeutic Activity Minutes 24  -KG         Total Minutes    Timed Charges Total Minutes 24  -KG       Total Minutes 24  -KG            User Key  (r) = Recorded By, (t) = Taken By, (c) = Cosigned By    Initials Name Provider Type    KG Cherelle Gaitan, PT Physical Therapist              Therapy Charges for Today     Code Description Service Date Service  Provider Modifiers Qty    [id number] HC PT THERAPEUTIC ACT EA 15 MIN 3/14/2023 Cherelle Gaitan, PT GP 2          PT G-Codes  Outcome Measure Options: AM-PAC 6 Clicks Basic Mobility (PT)  AM-PAC 6 Clicks Score (PT): 17  AM-PAC 6 Clicks Score (OT): 18       Melissa Gaitan, RIC  3/14/2023

## 2023-03-14 NOTE — PLAN OF CARE
Goal Outcome Evaluation:  Plan of Care Reviewed With: patient        Progress: no change  Outcome Evaluation: VSS. RA. No complaint of pain. Therapy worked with pt. Mag improved.

## 2023-03-14 NOTE — PROGRESS NOTES
[facility] Medicine Services  PROGRESS NOTE    Patient Name: Monty Nagel  : 1955  MRN: [id number]    Date of Admission: 3/11/2023  Primary Care Physician: Tiffany Morales MD    Subjective   Subjective     CC:  Follow-up for balance issues    HPI:  No new complaints.  States he doesn't have any questions.  Agrees that he is not safe to return home due to unsteadiness.      ROS:  Sleep - he is not sure if benadryl helped  No chest pain   No headache  Still eating very well  GI - former etohic x 50 yrs, quit 5 months ago     Objective   Objective     Vital Signs:   Temp:  [97.1 °F (36.2 °C)-97.8 °F (36.6 °C)] 97.7 °F (36.5 °C)  Heart Rate:  [] 80  Resp:  [14-18] 16  BP: (100-122)/(73-91) 117/91     Physical Exam:    Gen:  Thin, looks chronically ill but NAD, speech clear , ate everything for breakfast   Neuro: alert and oriented, clear speech, follows commands, grossly nonfocal  HEENT:  NC/AT  Neck:  Supple, no LAD  Heart RRR  Lungs nonlabored on RA   Abd:  Soft, nontender, no rebound or guarding, pos BS  Extrem:  No c/c/e      Results Reviewed:  LAB RESULTS:      Lab 23  0736 23  1033 23  1052   WBC 10.48 5.83 5.58   HEMOGLOBIN 12.9* 13.3 14.8   HEMATOCRIT 38.3 39.5 43.7   PLATELETS 174 152 174   NEUTROS ABS 9.41*  --  3.11   IMMATURE GRANS (ABS) 0.05  --  0.02   LYMPHS ABS 0.71  --  1.82   MONOS ABS 0.30  --  0.53   EOS ABS 0.00  --  0.07   MCV 88.0 87.4 86.2   SED RATE  --  16  --    CRP  --   --  <0.30         Lab 23  0736 23  1033 23  1052   SODIUM 133* 138 135*   POTASSIUM 4.2 4.0 3.5   CHLORIDE 100 100 95*   CO2 25.0 28.0 28.0   ANION GAP 8.0 10.0 12.0   BUN 14 10 14   CREATININE 0.83 0.75* 0.99   EGFR 94.7 97.7 82.5   GLUCOSE 105* 140* 95   CALCIUM 8.8 8.9 9.3   MAGNESIUM 1.6  --  1.8   PHOSPHORUS 2.4*  --   --    HEMOGLOBIN A1C  --   --  5.40   TSH  --  9.210*  --          Lab 23  0736 23  1052   TOTAL PROTEIN 6.2 6.9    ALBUMIN 3.5 4.0   GLOBULIN 2.7 2.9   ALT (SGPT) 13 14   AST (SGOT) 17 20   BILIRUBIN 0.6 1.0   ALK PHOS 61 71         Lab 03/11/23  1052   HSTROP T 10         Lab 03/12/23  1033   CHOLESTEROL 233*   LDL CHOL 178*   HDL CHOL 47   TRIGLYCERIDES 50         Lab 03/12/23  1033   IRON 122   IRON SATURATION 56*   TIBC 219*   TRANSFERRIN 147*   FOLATE 11.00   VITAMIN B 12 1,689*         Brief Urine Lab Results  (Last result in the past 365 days)      Color   Clarity   Blood   Leuk Est   Nitrite   Protein   CREAT   Urine HCG        03/11/23 1058 Dark Yellow   Clear   Negative   Negative   Negative   Negative                 Microbiology Results Abnormal     None          Adult Transthoracic Echo Complete w/ Color, Spectral and Contrast if necessary per protocol    Result Date: 3/12/2023  •  Left ventricular systolic function is normal. Estimated left ventricular EF = 55% •  Biatrial enlargement. •  Calcified aortic valve with mild aortic stenosis, mean gradient 10 mmHg, BANG 1.6 cm². •  Moderate aortic insufficiency. •  Mild mitral regurgitation. •  Mild tricuspid regurgitation with normal RVSP. •  Mild dilation of the ascending aorta (4.2 cm) is present.     Bilateral Carotid Duplex    Result Date: 3/12/2023  •  No evidence of hemodynamically significant stenosis of bilateral carotid arteries. •  Intimal thickening and mild bilateral scattered plaque is noted.     Doppler Ankle Brachial Index Single Level CAR    Result Date: 3/12/2023  •  Right Conclusion: The right MATTHEW is normal. •  Left Conclusion: The left MATTHEW is normal.       Results for orders placed during the hospital encounter of 03/11/23    Adult Transthoracic Echo Complete w/ Color, Spectral and Contrast if necessary per protocol    Interpretation Summary  •  Left ventricular systolic function is normal. Estimated left ventricular EF = 55%  •  Biatrial enlargement.  •  Calcified aortic valve with mild aortic stenosis, mean gradient 10 mmHg, BANG 1.6 cm².  •   Moderate aortic insufficiency.  •  Mild mitral regurgitation.  •  Mild tricuspid regurgitation with normal RVSP.  •  Mild dilation of the ascending aorta (4.2 cm) is present.      Current medications:  Scheduled Meds:dexamethasone, 4 mg, Intravenous, Q6H  diphenhydrAMINE, 50 mg, Oral, Nightly  enoxaparin, 40 mg, Subcutaneous, Daily  levETIRAcetam, 1,000 mg, Intravenous, Q12H  levothyroxine, 125 mcg, Oral, Daily  pantoprazole, 40 mg, Intravenous, Q AM  senna-docusate sodium, 2 tablet, Oral, BID  sodium chloride, 10 mL, Intravenous, Q12H  thiamine (B-1) IV, 250 mg, Intravenous, Q8H      Continuous Infusions:   PRN Meds:.•  acetaminophen **OR** acetaminophen **OR** acetaminophen  •  senna-docusate sodium **AND** polyethylene glycol **AND** bisacodyl **AND** bisacodyl  •  Magnesium Standard Dose Replacement - Initiate Nurse / BPA Driven Protocol  •  melatonin  •  ondansetron **OR** ondansetron  •  Phosphorus Replacement - Initiate Nurse / BPA Driven Protocol  •  Potassium Replacement - Initiate Nurse / BPA Driven Protocol  •  sodium chloride  •  sodium chloride  •  sodium chloride    Assessment & Plan   Assessment & Plan     Active Hospital Problems    Diagnosis  POA   • **Balance problem [R26.89]  Yes   • Severe malnutrition (HCC) [E43]  Yes   • Generalized weakness [R53.1]  Yes   • Atrial fibrillation (HCC) [I48.91]  Yes   • Other recurrent depressive disorders (HCC) [F33.8]  Yes   • Postablative 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        Brief Hospital Course to date:  Monty Nagel is a 67 y.o. male PMH significant for balance problems, alcohol abuse, depression, HTN, hypothyroidism s/p thyroidectomy for Graves dz who presents to [facility] ED with a several month history of difficulty with balance and gait.      Assessment and plan:    Right frontal meningioma with vasogenic edema and midline shift  Generalized weakness  Ataxia  with frequent falls  · MRI brain showed: 6.4 cm homogeneous enhancing mass that appears to be extra-axial along right frontal convexity, favored to represent meningioma. This mass results in significant mass effect and vasogenic edema in the right frontal lobe with 1.3 cm right to left midline shift.  · Decadron. Recs per Dr Siddiqui:  decadron 4 mg QID for 1 week, then TID for 1 week, then BID for 1 week then once daily thereafter till surgery is done  · Neurosurgery consultation:  Recommending surgery  · - I had a long discussion w him re his inclination:  He thinks he does want surgery and will likely stay in [place] for it.    · - Did discuss timing w Dr Mckee.  Plan tentatively would be MMA on Monday, surgery on Tuesday (next week).  OK to DC from NS perspective.      Etohic x 50 yrs  Severe malnutrition   Unclear compliance w synthroid   - quit 5 months ago  · Continue high-dose thiamine per neurology  · PT and OT consultation.  Might need rehab placement since he lives alone  · CM consult, APS referral made from ED    Possible PAD  · Follow ABIs for cool BLE, 1/5 pedal pulses bilaterally  · - Carotid US no signif stenosis      Atrial fibrillation  CHadsVasc of 2   · New diagnosis; currently rate controlled without medications  · Echo:  EF 55, biatrial enlargement, mod AI  · Hold off oral anticoagulation given his new finding of extensive meningioma that will likely need surgical intervention;   · Cardiology referral upon discharge    Dyslipidemia  ·   · Started high intensity statin     Essential hypertension  · Hold antihypertensive medication given his borderline blood pressure     Hypothyroidism  H/o ablation for Graves  · Thyroid panel is indicative of possible medication noncompliance (TSH elevated and free T4 is low)  · Continue levothyroxine 125 zaynab daily     Psoriatic arthritis  Rheumatoid arthritis  · Patient states he has never received treatment  · RF TORIE and Smith undetectable   · Normal   CRP, ESR     H/o EtOH abuse  · States sober since September 2022    Acute debility  · PT and OT  · Patient is living alone and high risk for fall. I think he will benefit from acute rehab upon discharge       Expected Discharge Location and Transportation: Rehab  Expected Discharge   Expected Discharge Date and Time     Expected Discharge Date Expected Discharge Time    Mar 16, 2023            DVT prophylaxis:  Medical and mechanical DVT prophylaxis orders are present.     AM-PAC 6 Clicks Score (PT): 19 (03/13/23 2000)    CODE STATUS:   Code Status and Medical Interventions:   Ordered at: 03/11/23 7585     Level Of Support Discussed With:    Patient     Code Status (Patient has no pulse and is not breathing):    CPR (Attempt to Resuscitate)     Medical Interventions (Patient has pulse or is breathing):    Full Support       Ronda Royal MD  03/14/23

## 2023-03-14 NOTE — CASE MANAGEMENT/SOCIAL WORK
Continued Stay Note  [facility]     Patient Name: Monty Nagel  MRN: [id number]  Today's Date: 3/14/2023    Admit Date: 3/11/2023    Plan: CM update   Discharge Plan     Row Name 03/14/23 1615       Plan    Plan CM update    Plan Comments CM continues to follow for dc planning- he is not safe to dc home at this time due to impaired balance and mobility but he is unable to go to rehab because he needs to return for further procedures on Monday of next week and rehab facilites are unable to accept for just a matter of 3-4 daysbefore discharging him back to the hospital. Will ask  to follow and assist with his social issues in the meantime- CM will continue to follow- [phone]               Discharge Codes    No documentation.               Expected Discharge Date and Time     Expected Discharge Date Expected Discharge Time    Mar 16, 2023             Gil Martínez RN

## 2023-03-14 NOTE — PROGRESS NOTES
"    [facility]    Monty Winter  1955  [id number]      Balance problem    Essential hypertension    Postablative hypothyroidism    Other recurrent depressive disorders (HCC)    Generalized weakness    Atrial fibrillation (HCC)    Severe malnutrition (HCC)      Admit Diagnosis: Generalized weakness [R53.1]  Date of Admission: 3/11/2023 10:43 AM     Interval History: Patient would like to stay in [place] the surgery done.  He has no new symptoms or concerns.  No acute events overnight.    Physical Exam:  Blood pressure 117/91, pulse 87, temperature 97.7 °F (36.5 °C), temperature source Oral, resp. rate 16, height 175.3 cm (69.02\"), weight 80.6 kg (177 lb 11.1 oz), SpO2 97 %.  I/O this shift:  In: 600 [P.O.:600]  Out: -   Physical Exam   Patient is sitting upright in the bed, he is pleasant, conversational, responds all commands.  Moves extremities spontaneously, has good strength and sensation in them.  Cranial nerves grossly intact.    Data Review:   (All imaging reviewed independently unless state otherwise)    Lab Results   Component Value Date    HGBA1C 5.40 03/11/2023       Tobacco Use: Medium Risk   • Smoking Tobacco Use: Former   • Smokeless Tobacco Use: Former   • Passive Exposure: Not on file       Body mass index is 26.23 kg/m².        Lab 03/13/23  0736 03/12/23  1033 03/11/23  1052   PLATELETS 174 152 174       Diagnosis:  (R53.1) Generalized weakness    (F10.21) History of alcoholism (HCC)    (R26.2) Inability to walk    (R41.0) Confusion    (E16.2) Hypoglycemia    (E63.9) Poor nutrition    (R41.841) Cognitive communication deficit    (Z74.09) Impaired functional mobility, balance, gait, and endurance  Generalized weakness [R53.1]      Medical Decision Making:   He will undergo embolization on next Monday, 3/20/2023 I will put the patient on for craniotomy for tumor resection next Tuesday, 3/21/2023.  He will be made n.p.o. that same day at 0001.  " Continue steroids up until that point.    Sherman Booth PA-C  3/14/2023

## 2023-03-14 NOTE — PLAN OF CARE
Goal Outcome Evaluation:              Outcome Evaluation: VSS on RA. NSR on monitor. No complaints of pain or nausea. Pt slept well during the night. Will continue to monitor closely and provide care as appropriate per provider orders.

## 2023-03-14 NOTE — PLAN OF CARE
Goal Outcome Evaluation:  Plan of Care Reviewed With: patient        Progress: improving  Outcome Evaluation: Pt increased ambulation distance to 50ft with RW and Randy. Pt required frequent cues for upright posture, proper positioning of RW, and advancement of L LE. Pt limited by c/o impaired balance and weakness. Continues to be limited by confusion. Continue to recommend PT skilled care and D/C to IP rehab facility.

## 2023-03-15 PROCEDURE — 97116 GAIT TRAINING THERAPY: CPT

## 2023-03-15 PROCEDURE — 97110 THERAPEUTIC EXERCISES: CPT

## 2023-03-15 PROCEDURE — 92507 TX SP LANG VOICE COMM INDIV: CPT

## 2023-03-15 PROCEDURE — 63710000001 DIPHENHYDRAMINE PER 50 MG: Performed by: INTERNAL MEDICINE

## 2023-03-15 PROCEDURE — 25010000002 ENOXAPARIN PER 10 MG: Performed by: NURSE PRACTITIONER

## 2023-03-15 PROCEDURE — 99232 SBSQ HOSP IP/OBS MODERATE 35: CPT | Performed by: INTERNAL MEDICINE

## 2023-03-15 PROCEDURE — 25010000002 THIAMINE PER 100 MG: Performed by: PSYCHIATRY & NEUROLOGY

## 2023-03-15 PROCEDURE — 0 LEVETIRACETAM IN NACL 0.75% 1000 MG/100ML SOLUTION: Performed by: PEDIATRICS

## 2023-03-15 PROCEDURE — 25010000002 DEXAMETHASONE PER 1 MG: Performed by: PEDIATRICS

## 2023-03-15 PROCEDURE — G0378 HOSPITAL OBSERVATION PER HR: HCPCS

## 2023-03-15 RX ADMIN — LEVETIRACETAM 1000 MG: 10 INJECTION INTRAVASCULAR at 21:01

## 2023-03-15 RX ADMIN — DEXAMETHASONE SODIUM PHOSPHATE 4 MG: 4 INJECTION INTRA-ARTICULAR; INTRALESIONAL; INTRAMUSCULAR; INTRAVENOUS; SOFT TISSUE at 01:23

## 2023-03-15 RX ADMIN — PANTOPRAZOLE SODIUM 40 MG: 40 INJECTION, POWDER, LYOPHILIZED, FOR SOLUTION INTRAVENOUS at 06:08

## 2023-03-15 RX ADMIN — THIAMINE HYDROCHLORIDE 250 MG: 100 INJECTION, SOLUTION INTRAMUSCULAR; INTRAVENOUS at 21:01

## 2023-03-15 RX ADMIN — LEVETIRACETAM 1000 MG: 10 INJECTION INTRAVASCULAR at 09:24

## 2023-03-15 RX ADMIN — Medication 10 ML: at 21:07

## 2023-03-15 RX ADMIN — THIAMINE HYDROCHLORIDE 250 MG: 100 INJECTION, SOLUTION INTRAMUSCULAR; INTRAVENOUS at 12:11

## 2023-03-15 RX ADMIN — DEXAMETHASONE SODIUM PHOSPHATE 4 MG: 4 INJECTION INTRA-ARTICULAR; INTRALESIONAL; INTRAMUSCULAR; INTRAVENOUS; SOFT TISSUE at 09:19

## 2023-03-15 RX ADMIN — DEXAMETHASONE SODIUM PHOSPHATE 4 MG: 4 INJECTION INTRA-ARTICULAR; INTRALESIONAL; INTRAMUSCULAR; INTRAVENOUS; SOFT TISSUE at 21:01

## 2023-03-15 RX ADMIN — DIPHENHYDRAMINE HYDROCHLORIDE 50 MG: 50 CAPSULE ORAL at 21:00

## 2023-03-15 RX ADMIN — DEXAMETHASONE SODIUM PHOSPHATE 4 MG: 4 INJECTION INTRA-ARTICULAR; INTRALESIONAL; INTRAMUSCULAR; INTRAVENOUS; SOFT TISSUE at 15:09

## 2023-03-15 RX ADMIN — ENOXAPARIN SODIUM 40 MG: 40 INJECTION SUBCUTANEOUS at 09:26

## 2023-03-15 RX ADMIN — LEVOTHYROXINE SODIUM 125 MCG: 125 TABLET ORAL at 09:23

## 2023-03-15 RX ADMIN — THIAMINE HYDROCHLORIDE 250 MG: 100 INJECTION, SOLUTION INTRAMUSCULAR; INTRAVENOUS at 06:08

## 2023-03-15 NOTE — PLAN OF CARE
Goal Outcome Evaluation:              Outcome Evaluation: VSS on RA. Afib on monitor. No complaints of pain or nausea. Pt mildly anxious throughout the night. Will continue to monitor closely and provide care as appropriate per provider orders.

## 2023-03-15 NOTE — THERAPY TREATMENT NOTE
Acute Care - Speech Language Pathology Treatment Note  [facility]     Patient Name: Monty Nagel  : 1955  MRN: [id number]  Today's Date: 3/15/2023               Admit Date: 3/11/2023     Visit Dx:    ICD-10-CM ICD-9-CM   1. Generalized weakness  R53.1 780.79   2. History of alcoholism (HCC)  F10.21 V11.3   3. Inability to walk  R26.2 719.7   4. Confusion  R41.0 298.9   5. Hypoglycemia  E16.2 251.2   6. Poor nutrition  E63.9 269.9   7. Cognitive communication deficit  R41.841 799.52   8. Impaired functional mobility, balance, gait, and endurance  Z74.09 V49.89   9. Brain tumor (HCC)  D49.6 239.6     Patient Active Problem List   Diagnosis   • Essential hypertension   • History of melanoma   • Postablative hypothyroidism   • Screen for colon cancer   • Other recurrent depressive disorders (HCC)   • Balance problem   • Generalized weakness   • Atrial fibrillation (HCC)   • Severe malnutrition (HCC)   • Brain tumor (HCC)     Past Medical History:   Diagnosis Date   • Arthritis    • Hypertension    • Melanoma (HCC) 2017    retina     Past Surgical History:   Procedure Laterality Date   • EYE SURGERY  2017    EYE CANCER RIGHT EYE   • TONSILLECTOMY         SLP Recommendation and Plan                 Anticipated Discharge Disposition (SLP): anticipate therapy at next level of care (03/15/23 114)                          Treatment Assessment (SLP): cognitive-linguistic disorder (03/15/23 114)  Treatment Assessment Comments (SLP): Discussion w/ pt re: hold on SLP services until post-crani & tumor resection, as deficits may improve/resolve. Pt in agrmt. Will f/u after procedure 3/21. (03/15/23 1140)  Plan for Continued Treatment (SLP): other (see comments) (hold on services until crani/resection 3/21) (03/15/23 1140)  Plan of Care Reviewed With: patient (03/15/23 3184)      SLP EVALUATION (last 72 hours)     SLP SLC Evaluation     Row Name 03/15/23 1140                   Communication Assessment/Intervention     Document Type therapy note (daily note)  -MP        Subjective Information no complaints  -MP        Patient Observations alert;cooperative  -MP        Patient Effort fair  -MP           Pain    Additional Documentation Pain Scale: FACES Pre/Post-Treatment (Group)  -MP           Pain Scale: FACES Pre/Post-Treatment    Pain: FACES Scale, Pretreatment 0-->no hurt  -MP        Posttreatment Pain Rating 0-->no hurt  -MP           SLP Treatment Clinical Impressions    Treatment Assessment (SLP) cognitive-linguistic disorder  -MP        Treatment Assessment Comments (SLP) Discussion w/ pt re: hold on SLP services until post-crani & tumor resection, as deficits may improve/resolve. Pt in agrmt. Will f/u after procedure 3/21.  -MP        Plan for Continued Treatment (SLP) other (see comments)  hold on services until crani/resection 3/21  -MP        Care Plan Review care plan/treatment goals reviewed;patient/other agree to care plan  -MP           Recommendations    Therapy Frequency (SLP SLC) PRN;other (see comments)  hold for now  -MP        Anticipated Discharge Disposition (SLP) anticipate therapy at next level of care  -MP              User Key  (r) = Recorded By, (t) = Taken By, (c) = Cosigned By    Initials Name Effective Dates    MP Sloan Aquino, MS CCC-SLP 12/28/21 -                    EDUCATION  The patient has been educated in the following areas:     Cognitive Impairment Communication Impairment.           SLP GOALS     Row Name 03/15/23 1140             Memory Skills Goal 1 (SLP)    Improve Memory Skills Through Goal 1 (SLP) use memory strategies;80%;with minimal cues (75-90%)  -MP      Time Frame (Memory Skills Goal 1, SLP) short term goal (STG)  -MP      Comment (Memory Skills Goal 1, SLP) hold for now  -MP         Executive Functional Skills Goal 1 (SLP)    Improve Executive Function Skills Goal 1 (SLP) identify anticipated needs;home management activity;exhibit cognitive flexibility;perform  self-correction;perform self-evaluation;80%;with minimal cues (75-90%)  -MP      Time Frame (Executive Function Skills Goal 1, SLP) short term goal (STG)  -MP      Comment (Executive Function Skills Goal 1, SLP) hold for now  -MP            User Key  (r) = Recorded By, (t) = Taken By, (c) = Cosigned By    Initials Name Provider Type    Sloan Victoria MS CCC-SLP Speech and Language Pathologist                        Time Calculation:      Time Calculation- SLP     Row Name 03/15/23 1155             Time Calculation- SLP    SLP Start Time 1140  -MP      SLP Received On 03/15/23  -MP         Untimed Charges    83604-ZD Treatment/ST Modification Prosth Aug Alter  12  -MP         Total Minutes    Untimed Charges Total Minutes 12  -MP       Total Minutes 12  -MP            User Key  (r) = Recorded By, (t) = Taken By, (c) = Cosigned By    Initials Name Provider Type    Sloan Victoria MS CCC-SLP Speech and Language Pathologist                Therapy Charges for Today     Code Description Service Date Service Provider Modifiers Qty    [id number]  ST TREATMENT SPEECH 1 3/15/2023 Sloan Aquino MS CCC-SLP GN 1                     MS MALIK Moreno  3/15/2023

## 2023-03-15 NOTE — PLAN OF CARE
Goal Outcome Evaluation:  Plan of Care Reviewed With: patient        Progress: no change  Outcome Evaluation: VSS. RA. No complaints of pain. Med BM this AM. worked with therapy.

## 2023-03-15 NOTE — PLAN OF CARE
Goal Outcome Evaluation:  Plan of Care Reviewed With: patient            SLP  education/treatment  completed. Will  hold on further SLP intervention until crani/resection next week . Will then f/u for cognitive-communication re-eval & determine needs @ that time. Please see note for further details and recommendations.

## 2023-03-15 NOTE — CASE MANAGEMENT/SOCIAL WORK
Continued Stay Note  [facility]     Patient Name: Monty Nagel  MRN: [id number]  Today's Date: 3/15/2023    Admit Date: 3/11/2023    Plan:    Discharge Plan     Row Name 03/15/23 0952       Plan    Plan SW    Plan Comments SW’er met with patient at bedside. Patient reports needing assistance with cleaning “junk” out of this house. Patient explains he is trying to get his mail but he wasn’t able to walk and check mail box. Per patients chart it is documented that patient may have a APS worker/case with Maame Nick, [phone]. SW’er will explore options to assist. SW’er contacted APS worker awaiting response. ’er available      @10:25 spoke with APS Maame Nick, [phone] who explains she is following and assisting patient. 'er available.                Discharge Codes    No documentation.               Expected Discharge Date and Time     Expected Discharge Date Expected Discharge Time    Mar 16, 2023             CARIDAD Oconnor (Kay)

## 2023-03-15 NOTE — PROGRESS NOTES
[facility] Medicine Services  PROGRESS NOTE    Patient Name: Monty Nagel  : 1955  MRN: [id number]    Date of Admission: 3/11/2023  Primary Care Physician: Tiffany Morales MD    Subjective   Subjective     CC:  Follow-up for balance issues    HPI:  No complaints.  Thought breakfast was good.  States he does not feel able to walk without falling and has nobody to help him at home.  In PT yesterday he continued to require assistance to walk 50 feet; poor balance noted, consistent cueing required.      ROS:  Sleep - benadryl not helping.   No pain anywhere.   GI - former etohic x 50 yrs, quit 5 months ago     Objective   Objective     Vital Signs:   Temp:  [97.8 °F (36.6 °C)-98.4 °F (36.9 °C)] 98.4 °F (36.9 °C)  Heart Rate:  [66-94] 94  Resp:  [16-18] 18  BP: (107-136)/(69-95) 136/91     Physical Exam:    Gen:  Thin, looks chronically ill , NAD, no change  Neuro: alert and oriented, clear speech, OVIEDO, no change   HEENT:  NC/AT  Lungs nonlabored on RA   Abd:  Soft, nontender, no rebound or guarding, pos BS  Extrem:  No c/c/e      Results Reviewed:  LAB RESULTS:      Lab 23  0736 23  1033 23  1052   WBC 10.48 5.83 5.58   HEMOGLOBIN 12.9* 13.3 14.8   HEMATOCRIT 38.3 39.5 43.7   PLATELETS 174 152 174   NEUTROS ABS 9.41*  --  3.11   IMMATURE GRANS (ABS) 0.05  --  0.02   LYMPHS ABS 0.71  --  1.82   MONOS ABS 0.30  --  0.53   EOS ABS 0.00  --  0.07   MCV 88.0 87.4 86.2   SED RATE  --  16  --    CRP  --   --  <0.30         Lab 23  0658 23  0736 23  1033 23  1052   SODIUM  --  133* 138 135*   POTASSIUM  --  4.2 4.0 3.5   CHLORIDE  --  100 100 95*   CO2  --  25.0 28.0 28.0   ANION GAP  --  8.0 10.0 12.0   BUN  --  14 10 14   CREATININE  --  0.83 0.75* 0.99   EGFR  --  94.7 97.7 82.5   GLUCOSE  --  105* 140* 95   CALCIUM  --  8.8 8.9 9.3   MAGNESIUM 2.0 1.6  --  1.8   PHOSPHORUS  --  2.4*  --   --    HEMOGLOBIN A1C  --   --   --  5.40   TSH  --   --   9.210*  --          Lab 03/13/23  0736 03/11/23  1052   TOTAL PROTEIN 6.2 6.9   ALBUMIN 3.5 4.0   GLOBULIN 2.7 2.9   ALT (SGPT) 13 14   AST (SGOT) 17 20   BILIRUBIN 0.6 1.0   ALK PHOS 61 71         Lab 03/11/23  1052   HSTROP T 10         Lab 03/12/23  1033   CHOLESTEROL 233*   LDL CHOL 178*   HDL CHOL 47   TRIGLYCERIDES 50         Lab 03/12/23  1033   IRON 122   IRON SATURATION 56*   TIBC 219*   TRANSFERRIN 147*   FOLATE 11.00   VITAMIN B 12 1,689*         Brief Urine Lab Results  (Last result in the past 365 days)      Color   Clarity   Blood   Leuk Est   Nitrite   Protein   CREAT   Urine HCG        03/11/23 1058 Dark Yellow   Clear   Negative   Negative   Negative   Negative                 Microbiology Results Abnormal     None          No radiology results from the last 24 hrs    Results for orders placed during the hospital encounter of 03/11/23    Adult Transthoracic Echo Complete w/ Color, Spectral and Contrast if necessary per protocol    Interpretation Summary  •  Left ventricular systolic function is normal. Estimated left ventricular EF = 55%  •  Biatrial enlargement.  •  Calcified aortic valve with mild aortic stenosis, mean gradient 10 mmHg, BANG 1.6 cm².  •  Moderate aortic insufficiency.  •  Mild mitral regurgitation.  •  Mild tricuspid regurgitation with normal RVSP.  •  Mild dilation of the ascending aorta (4.2 cm) is present.      Current medications:  Scheduled Meds:dexamethasone, 4 mg, Intravenous, Q6H  diphenhydrAMINE, 50 mg, Oral, Nightly  enoxaparin, 40 mg, Subcutaneous, Daily  levETIRAcetam, 1,000 mg, Intravenous, Q12H  levothyroxine, 125 mcg, Oral, Daily  pantoprazole, 40 mg, Intravenous, Q AM  senna-docusate sodium, 2 tablet, Oral, BID  sodium chloride, 10 mL, Intravenous, Q12H  thiamine (B-1) IV, 250 mg, Intravenous, Q8H      Continuous Infusions:   PRN Meds:.•  acetaminophen **OR** acetaminophen **OR** acetaminophen  •  senna-docusate sodium **AND** polyethylene glycol **AND** bisacodyl  **AND** bisacodyl  •  Magnesium Standard Dose Replacement - Initiate Nurse / BPA Driven Protocol  •  melatonin  •  ondansetron **OR** ondansetron  •  Phosphorus Replacement - Initiate Nurse / BPA Driven Protocol  •  Potassium Replacement - Initiate Nurse / BPA Driven Protocol  •  sodium chloride  •  sodium chloride  •  sodium chloride    Assessment & Plan   Assessment & Plan     Active Hospital Problems    Diagnosis  POA   • **Balance problem [R26.89]  Yes   • Severe malnutrition (HCC) [E43]  Yes   • Generalized weakness [R53.1]  Yes   • Atrial fibrillation (HCC) [I48.91]  Yes   • Brain tumor (HCC) [D49.6]  Unknown   • Other recurrent depressive disorders (HCC) [F33.8]  Yes   • Postablative 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        Brief Hospital Course to date:  Monty Nagel is a 67 y.o. male PMH significant for balance problems, alcohol abuse, depression, HTN, hypothyroidism s/p thyroidectomy for Graves dz who presents to [facility] ED with a several month history of difficulty with balance and gait.      Assessment and plan:    Right frontal meningioma with vasogenic edema and midline shift  Generalized weakness  Ataxia with frequent falls  · MRI brain showed: 6.4 cm homogeneous enhancing mass that appears to be extra-axial along right frontal convexity, favored to represent meningioma. This mass results in significant mass effect and vasogenic edema in the right frontal lobe with 1.3 cm right to left midline shift.  · Decadron. Recs per Dr Siddiqui:  decadron 4 mg QID for 1 week, then TID for 1 week, then BID for 1 week then once daily thereafter till surgery is done  · Neurosurgery consultation:  Recommending surgery  · - I had a long discussion w him re his inclination:  He thinks he does want surgery and will likely stay in [place] for it.    · - Did discuss timing w Dr Mckee.  Plan tentatively would be MMA on Monday,  surgery on Tuesday (next week).  OK to DC from NS perspective.  Dr Mckee will come by and talk to patient again.      Etohic x 50 yrs  Severe malnutrition   Unclear compliance w synthroid   - quit 5 months ago  · Continue high-dose thiamine per neurology  · PT and OT consultation.  Might need rehab placement since he lives alone  · CM consult, APS referral made from ED    Possible PAD  · Follow ABIs for cool BLE, 1/5 pedal pulses bilaterally  · - Carotid US no signif stenosis      Atrial fibrillation  CHadsVasc of 2   · New diagnosis; currently rate controlled without medications  · Echo:  EF 55, biatrial enlargement, mod AI  · Hold off oral anticoagulation given his new finding of extensive meningioma that will likely need surgical intervention;   · Cardiology referral upon discharge    Dyslipidemia  ·   · Started high intensity statin     Essential hypertension  · Hold antihypertensive medication given his borderline blood pressure     Hypothyroidism  H/o ablation for Graves  · Thyroid panel is indicative of possible medication noncompliance (TSH elevated and free T4 is low)  · Continue levothyroxine 125 zaynab daily     Psoriatic arthritis  Rheumatoid arthritis  · Patient states he has never received treatment  · RF TORIE and Smith undetectable   · Normal  CRP, ESR     H/o EtOH abuse  · States sober since September 2022    Acute debility  · PT and OT  · Patient is living alone and high risk for fall. I think he will benefit from acute rehab upon discharge       Expected Discharge Location and Transportation: Rehab  Expected Discharge   Expected Discharge Date and Time     Expected Discharge Date Expected Discharge Time    Mar 16, 2023            DVT prophylaxis:  Medical and mechanical DVT prophylaxis orders are present.     AM-PAC 6 Clicks Score (PT): 17 (03/15/23 7345)    CODE STATUS:   Code Status and Medical Interventions:   Ordered at: 03/11/23 0209     Level Of Support Discussed With:    Patient     Code  Status (Patient has no pulse and is not breathing):    CPR (Attempt to Resuscitate)     Medical Interventions (Patient has pulse or is breathing):    Full Support       Ronda Royal MD  03/15/23

## 2023-03-15 NOTE — THERAPY TREATMENT NOTE
Patient Name: Monty Nagel  : 1955    MRN: [id number]                              Today's Date: 3/15/2023       Admit Date: 3/11/2023    Visit Dx:     ICD-10-CM ICD-9-CM   1. Generalized weakness  R53.1 780.79   2. History of alcoholism (HCC)  F10.21 V11.3   3. Inability to walk  R26.2 719.7   4. Confusion  R41.0 298.9   5. Hypoglycemia  E16.2 251.2   6. Poor nutrition  E63.9 269.9   7. Cognitive communication deficit  R41.841 799.52   8. Impaired functional mobility, balance, gait, and endurance  Z74.09 V49.89   9. Brain tumor (HCC)  D49.6 239.6     Patient Active Problem List   Diagnosis   • Essential hypertension   • History of melanoma   • Postablative hypothyroidism   • Screen for colon cancer   • Other recurrent depressive disorders (HCC)   • Balance problem   • Generalized weakness   • Atrial fibrillation (HCC)   • Severe malnutrition (HCC)   • Brain tumor (HCC)     Past Medical History:   Diagnosis Date   • Arthritis    • Hypertension    • Melanoma (HCC) 2017    retina     Past Surgical History:   Procedure Laterality Date   • EYE SURGERY  2017    EYE CANCER RIGHT EYE   • TONSILLECTOMY        General Information     Row Name 03/15/23 1353          Physical Therapy Time and Intention    Document Type therapy note (daily note)  -     Mode of Treatment physical therapy;individual therapy  -     Row Name 03/15/23 1355          General Information    Existing Precautions/Restrictions fall;other (see comments)  confusion; impulsive  -     Barriers to Rehab cognitive status;visual deficit  -     Row Name 03/15/23 9937          Cognition    Orientation Status (Cognition) oriented x 3  -     Row Name 03/15/23 135          Safety Issues, Functional Mobility    Safety Issues Affecting Function (Mobility) awareness of need for assistance;insight into deficits/self-awareness;judgment;positioning of assistive device;safety precaution awareness;safety precautions follow-through/compliance;sequencing  abilities  -     Impairments Affecting Function (Mobility) balance;cognition;coordination;endurance/activity tolerance;postural/trunk control;strength  -           User Key  (r) = Recorded By, (t) = Taken By, (c) = Cosigned By    Initials Name Provider Type     Tristian Ravi, PT Physical Therapist               Mobility     Row Name 03/15/23 2123          Bed Mobility    Bed Mobility supine-sit;sit-supine  -     Supine-Sit Nash (Bed Mobility) supervision  -     Sit-Supine Nash (Bed Mobility) supervision  -     Assistive Device (Bed Mobility) bed rails;head of bed elevated  -     Comment, (Bed Mobility) Pt required multiple cues to continue with task as pt is easily distracted.  -     Row Name 03/15/23 0374          Sit-Stand Transfer    Sit-Stand Nash (Transfers) contact guard;verbal cues  -     Assistive Device (Sit-Stand Transfers) walker, front-wheeled  -     Comment, (Sit-Stand Transfer) Pt performed STS x2 from EOB requiring cues for proper hand placement. Cues to remain within RW during return to sitting.  -     Row Name 03/15/23 6910          Gait/Stairs (Locomotion)    Nash Level (Gait) minimum assist (75% patient effort);verbal cues  -     Assistive Device (Gait) walker, front-wheeled  -     Distance in Feet (Gait) 60  -     Deviations/Abnormal Patterns (Gait) bilateral deviations;base of support, narrow;dima decreased;festinating/shuffling;stride length decreased  -     Bilateral Gait Deviations forward flexed posture;heel strike decreased  -     Left Sided Gait Deviations foot drop/toe drag  -     Nash Level (Stairs) not tested  -     Comment, (Gait/Stairs) Pt ambulates with considerable L knee flexion and L ankle plantar flexion throughout gait cycle. Pt required frequent cues to increase L foot clearance as pt with L foot drag throughout and very short LLE step length. Pt required occasional assist for proper positioning  of RW. Distance limited d/t fatigue and weakness.  -           User Key  (r) = Recorded By, (t) = Taken By, (c) = Cosigned By    Initials Name Provider Type     Tristian Ravi, PT Physical Therapist               Obj/Interventions     Row Name 03/15/23 1403          Motor Skills    Therapeutic Exercise hip;knee;ankle  -[facility] Name 03/15/23 1403          Hip (Therapeutic Exercise)    Hip (Therapeutic Exercise) strengthening exercise  -     Hip Strengthening (Therapeutic Exercise) bilateral;marching while standing;standing;10 repetitions  -[facility] Name 03/15/23 1403          Knee (Therapeutic Exercise)    Knee (Therapeutic Exercise) strengthening exercise  -     Knee Strengthening (Therapeutic Exercise) bilateral;LAQ (long arc quad);sitting;10 repetitions  -[facility] Name 03/15/23 1403          Ankle (Therapeutic Exercise)    Ankle (Therapeutic Exercise) AROM (active range of motion)  -     Ankle AROM (Therapeutic Exercise) bilateral;plantarflexion;standing;10 repetitions  -[facility] Name 03/15/23 1403          Balance    Balance Assessment sitting static balance;sitting dynamic balance;standing static balance;standing dynamic balance  -     Static Sitting Balance supervision  -     Dynamic Sitting Balance supervision  -     Position, Sitting Balance unsupported;sitting edge of bed  -     Static Standing Balance contact guard  -     Dynamic Standing Balance minimal assist  -     Position/Device Used, Standing Balance supported;walker, rolling  -     Comment, Balance Pt with mild/moderate instability during ambulation d/t L foot drag and decreased ability to maintain safe positioning within RW for support.  -           User Key  (r) = Recorded By, (t) = Taken By, (c) = Cosigned By    Initials Name Provider Type     Tristian Ravi, PT Physical Therapist               Goals/Plan    No documentation.                Clinical Impression     Row Name 03/15/23 1405          Pain     Pretreatment Pain Rating 0/10 - no pain  -     Posttreatment Pain Rating 0/10 - no pain  -     Row Name 03/15/23 1405          Plan of Care Review    Plan of Care Reviewed With patient  -     Progress improving  -     Outcome Evaluation Pt progressed ambulation distance to 60ft with RW and MinAx although pt continuing with considerable safety deficits during OOB mobility d/t L foot drag and poor ability to maintain safe positioning of RW. PT reviewed HEP. PT plans to continue progressing PT POC as tolerated.  -     Row Name 03/15/23 1405          Vital Signs    Pre Systolic BP Rehab 120  -LH     Pre Treatment Diastolic BP 93  -     Pretreatment Heart Rate (beats/min) 96  -     Posttreatment Heart Rate (beats/min) 91  -LH     Pre SpO2 (%) 96  -     O2 Delivery Pre Treatment room air  -     O2 Delivery Intra Treatment room air  -     Post SpO2 (%) 96  -LH     O2 Delivery Post Treatment room air  -LH     Pre Patient Position Supine  -     Intra Patient Position Standing  -     Post Patient Position Supine  -     Row Name 03/15/23 1405          Positioning and Restraints    Pre-Treatment Position in bed  -     Post Treatment Position bed  -     In Bed fowlers;call light within reach;encouraged to call for assist;exit alarm on  [facility]           User Key  (r) = Recorded By, (t) = Taken By, (c) = Cosigned By    Initials Name Provider Type     Tristian Ravi, PT Physical Therapist               Outcome Measures     Row Name 03/15/23 1407 03/15/23 0850       How much help from another person do you currently need...    Turning from your back to your side while in flat bed without using bedrails? 3  - 3  -AM    Moving from lying on back to sitting on the side of a flat bed without bedrails? 3  - 3  -AM    Moving to and from a bed to a chair (including a wheelchair)? 3  - 3  -AM    Standing up from a chair using your arms (e.g., wheelchair, bedside chair)? 3  - 3  -AM    Climbing 3-5  steps with a railing? 2  - 2  -AM    To walk in hospital room? 3  - 3  -AM    AM-PAC 6 Clicks Score (PT) 17  - 17  -AM    Highest level of mobility 5 --> Static standing  - 5 --> Static standing  -AM    Row Name 03/15/23 1407          Functional Assessment    Outcome Measure Options AM-PAC 6 Clicks Basic Mobility (PT)  -           User Key  (r) = Recorded By, (t) = Taken By, (c) = Cosigned By    Initials Name Provider Type    AM Sophie Elizabeth, RN Registered Nurse     rTistian Ravi, PT Physical Therapist                             Physical Therapy Education     Title: PT OT SLP Therapies (In Progress)     Topic: Physical Therapy (Done)     Point: Mobility training (Done)     Learning Progress Summary           Patient Acceptance, E, VU,NR by  at 3/15/2023 1407    Acceptance, E, NR by  at 3/14/2023 1428    Acceptance, E, VU,NR by  at 3/12/2023 1542                   Point: Home exercise program (Done)     Learning Progress Summary           Patient Acceptance, E, VU,NR by  at 3/15/2023 1407    Acceptance, E, NR by KG at 3/14/2023 1428                   Point: Body mechanics (Done)     Learning Progress Summary           Patient Acceptance, E, VU,NR by  at 3/15/2023 1407    Acceptance, E, NR by KG at 3/14/2023 1428    Acceptance, E, VU,NR by  at 3/12/2023 1542                   Point: Precautions (Done)     Learning Progress Summary           Patient Acceptance, E, VU,NR by  at 3/15/2023 1407    Acceptance, E, NR by KG at 3/14/2023 1428    Acceptance, E, VU,NR by  at 3/12/2023 1542                               User Key     Initials Effective Dates Name Provider Type Discipline     02/03/23 - 03/12/23 Samara Harrison, PT Physical Therapist PT     05/22/20 -  Cherelle Gaitan, PT Physical Therapist PT     09/21/21 -  Tristian Ravi, PT Physical Therapist PT              PT Recommendation and Plan     Plan of Care Reviewed With: patient  Progress: improving  Outcome  Evaluation: Pt progressed ambulation distance to 60ft with RW and MinAx although pt continuing with considerable safety deficits during OOB mobility d/t L foot drag and poor ability to maintain safe positioning of RW. PT reviewed HEP. PT plans to continue progressing PT POC as tolerated.     Time Calculation:    PT Charges     Row Name 03/15/23 1408             Time Calculation    Start Time 1326  -LH      PT Received On 03/15/23  -      PT Goal Re-Cert Due Date 03/22/23  -         Timed Charges    70226 - PT Therapeutic Exercise Minutes 6  -LH      79209 - Gait Training Minutes  12  -LH      96596 - PT Therapeutic Activity Minutes 6  -         Total Minutes    Timed Charges Total Minutes 24  -       Total Minutes 24  -            User Key  (r) = Recorded By, (t) = Taken By, (c) = Cosigned By    Initials Name Provider Type     Tristian Ravi, PT Physical Therapist              Therapy Charges for Today     Code Description Service Date Service Provider Modifiers Qty    55994816371 HC PT THER PROC EA 15 MIN 3/15/2023 Tristian Ravi, PT GP 1    86335483846 HC GAIT TRAINING EA 15 MIN 3/15/2023 Tristian Ravi, PT GP 1          PT G-Codes  Outcome Measure Options: AM-PAC 6 Clicks Basic Mobility (PT)  AM-PAC 6 Clicks Score (PT): 17  AM-PAC 6 Clicks Score (OT): 18  PT Discharge Summary  Anticipated Discharge Disposition (PT): inpatient rehabilitation facility    Tristian Ravi PT  3/15/2023

## 2023-03-15 NOTE — PROGRESS NOTES
Chief complaint: Brain mass, clinically significant cerebral edema    Admit Diagnosis:   Generalized weakness [R53.1]     Subjective: No events overnight.  He is complaining of some worsening problems with hemorrhoids.  He is also very concerned about what is happening with the mail at his house.    Objective:    Vitals:    03/15/23 1512   BP: 111/89   Pulse: 82   Resp: 16   Temp: 98.2 °F (36.8 °C)   SpO2: 94%     Pulse  Av.1  Min: 66  Max: 94  Systolic (24hrs), Av , Min:111 , Max:136     Diastolic (24hrs), Av, Min:89, Max:95    Temp (24hrs), Av.3 °F (36.8 °C), Min:98.1 °F (36.7 °C), Max:98.4 °F (36.9 °C)      He is sleeping but arouses easily to voice.  He still has significant left upper extremity weakness.    Lab Results   Component Value Date     (L) 2023       A/P:   Admit Diagnosis:   Generalized weakness [R53.1]     Informed consent for a right middle meningeal artery embolization and a right-sided craniotomy for tumor resection was obtained from the patient.  He is tentatively scheduled for his embolization on Monday and surgery at 71 morning.    Hold aspirin and/or Plavix.  VTE prophylaxis is okay.  N.p.o. at midnight Monday morning.

## 2023-03-15 NOTE — PLAN OF CARE
Goal Outcome Evaluation:  Plan of Care Reviewed With: patient        Progress: improving  Outcome Evaluation: Pt progressed ambulation distance to 60ft with RW and MinAx although pt continuing with considerable safety deficits during OOB mobility d/t L foot drag and poor ability to maintain safe positioning of RW. PT reviewed HEP. PT plans to continue progressing PT POC as tolerated.

## 2023-03-16 PROCEDURE — G0378 HOSPITAL OBSERVATION PER HR: HCPCS

## 2023-03-16 PROCEDURE — 25010000002 THIAMINE PER 100 MG: Performed by: PSYCHIATRY & NEUROLOGY

## 2023-03-16 PROCEDURE — 63710000001 DIPHENHYDRAMINE PER 50 MG: Performed by: INTERNAL MEDICINE

## 2023-03-16 PROCEDURE — 25010000002 DEXAMETHASONE PER 1 MG: Performed by: PEDIATRICS

## 2023-03-16 PROCEDURE — 25010000002 ENOXAPARIN PER 10 MG: Performed by: NURSE PRACTITIONER

## 2023-03-16 PROCEDURE — 97530 THERAPEUTIC ACTIVITIES: CPT | Performed by: OCCUPATIONAL THERAPIST

## 2023-03-16 PROCEDURE — 97535 SELF CARE MNGMENT TRAINING: CPT | Performed by: OCCUPATIONAL THERAPIST

## 2023-03-16 PROCEDURE — 99232 SBSQ HOSP IP/OBS MODERATE 35: CPT | Performed by: INTERNAL MEDICINE

## 2023-03-16 PROCEDURE — 0 LEVETIRACETAM IN NACL 0.75% 1000 MG/100ML SOLUTION: Performed by: PEDIATRICS

## 2023-03-16 RX ORDER — POLYETHYLENE GLYCOL 3350 17 G/17G
17 POWDER, FOR SOLUTION ORAL DAILY
Status: DISCONTINUED | OUTPATIENT
Start: 2023-03-16 | End: 2023-04-03 | Stop reason: HOSPADM

## 2023-03-16 RX ADMIN — Medication 5 MG: at 20:09

## 2023-03-16 RX ADMIN — POLYETHYLENE GLYCOL 3350 17 G: 17 POWDER, FOR SOLUTION ORAL at 15:32

## 2023-03-16 RX ADMIN — DEXAMETHASONE SODIUM PHOSPHATE 4 MG: 4 INJECTION INTRA-ARTICULAR; INTRALESIONAL; INTRAMUSCULAR; INTRAVENOUS; SOFT TISSUE at 15:32

## 2023-03-16 RX ADMIN — DEXAMETHASONE SODIUM PHOSPHATE 4 MG: 4 INJECTION INTRA-ARTICULAR; INTRALESIONAL; INTRAMUSCULAR; INTRAVENOUS; SOFT TISSUE at 02:14

## 2023-03-16 RX ADMIN — LEVOTHYROXINE SODIUM 125 MCG: 125 TABLET ORAL at 08:54

## 2023-03-16 RX ADMIN — THIAMINE HYDROCHLORIDE 250 MG: 100 INJECTION, SOLUTION INTRAMUSCULAR; INTRAVENOUS at 06:04

## 2023-03-16 RX ADMIN — DIPHENHYDRAMINE HYDROCHLORIDE 50 MG: 50 CAPSULE ORAL at 20:09

## 2023-03-16 RX ADMIN — PANTOPRAZOLE SODIUM 40 MG: 40 INJECTION, POWDER, LYOPHILIZED, FOR SOLUTION INTRAVENOUS at 06:04

## 2023-03-16 RX ADMIN — DEXAMETHASONE SODIUM PHOSPHATE 4 MG: 4 INJECTION INTRA-ARTICULAR; INTRALESIONAL; INTRAMUSCULAR; INTRAVENOUS; SOFT TISSUE at 20:09

## 2023-03-16 RX ADMIN — LEVETIRACETAM 1000 MG: 10 INJECTION INTRAVASCULAR at 20:09

## 2023-03-16 RX ADMIN — LEVETIRACETAM 1000 MG: 10 INJECTION INTRAVASCULAR at 08:54

## 2023-03-16 RX ADMIN — SENNOSIDES AND DOCUSATE SODIUM 2 TABLET: 8.6; 5 TABLET ORAL at 20:09

## 2023-03-16 RX ADMIN — ENOXAPARIN SODIUM 40 MG: 40 INJECTION SUBCUTANEOUS at 08:53

## 2023-03-16 RX ADMIN — THIAMINE HYDROCHLORIDE 250 MG: 100 INJECTION, SOLUTION INTRAMUSCULAR; INTRAVENOUS at 15:32

## 2023-03-16 RX ADMIN — Medication 10 ML: at 20:09

## 2023-03-16 RX ADMIN — DEXAMETHASONE SODIUM PHOSPHATE 4 MG: 4 INJECTION INTRA-ARTICULAR; INTRALESIONAL; INTRAMUSCULAR; INTRAVENOUS; SOFT TISSUE at 08:53

## 2023-03-16 NOTE — PLAN OF CARE
Goal Outcome Evaluation:           Progress: no change  Outcome Evaluation: Patient has had a decent shift, sleeping on and off tonight. VSS, and patient has been alert and oriented times four. No complaints of pain tonight. Nursing staff will continue to monitor and assess the patient.

## 2023-03-16 NOTE — THERAPY TREATMENT NOTE
Patient Name: Monty Nagel  : 1955    MRN: [id number]                              Today's Date: 3/16/2023       Admit Date: 3/11/2023    Visit Dx:     ICD-10-CM ICD-9-CM   1. Generalized weakness  R53.1 780.79   2. History of alcoholism (HCC)  F10.21 V11.3   3. Inability to walk  R26.2 719.7   4. Confusion  R41.0 298.9   5. Hypoglycemia  E16.2 251.2   6. Poor nutrition  E63.9 269.9   7. Cognitive communication deficit  R41.841 799.52   8. Impaired functional mobility, balance, gait, and endurance  Z74.09 V49.89   9. Brain tumor (HCC)  D49.6 239.6     Patient Active Problem List   Diagnosis   • Essential hypertension   • History of melanoma   • Postablative hypothyroidism   • Screen for colon cancer   • Other recurrent depressive disorders (HCC)   • Balance problem   • Generalized weakness   • Atrial fibrillation (HCC)   • Severe malnutrition (HCC)   • Brain tumor (HCC)     Past Medical History:   Diagnosis Date   • Arthritis    • Hypertension    • Melanoma (HCC) 2017    retina     Past Surgical History:   Procedure Laterality Date   • EYE SURGERY  2017    EYE CANCER RIGHT EYE   • TONSILLECTOMY        General Information     Row Name 23 1027          OT Time and Intention    Document Type therapy note (daily note)  -SD     Mode of Treatment occupational therapy  -SD     Row Name 23 1027          General Information    Patient Profile Reviewed yes  -SD     Existing Precautions/Restrictions fall;other (see comments)  confusion  -SD     Barriers to Rehab cognitive status;visual deficit;medically complex  -SD     Row Name 23 1027          Cognition    Orientation Status (Cognition) oriented x 3  -SD     Row Name 23 1027          Safety Issues, Functional Mobility    Safety Issues Affecting Function (Mobility) awareness of need for assistance;insight into deficits/self-awareness;safety precaution awareness;safety precautions follow-through/compliance;sequencing abilities  -SD      Impairments Affecting Function (Mobility) balance;cognition;coordination;endurance/activity tolerance;strength;postural/trunk control  -SD     Cognitive Impairments, Mobility Safety/Performance attention;awareness, need for assistance;insight into deficits/self-awareness;safety precaution awareness;safety precaution follow-through;sequencing abilities  -SD           User Key  (r) = Recorded By, (t) = Taken By, (c) = Cosigned By    Initials Name Provider Type    Veena Moulton OT Occupational Therapist                 Mobility/ADL's     Row Name 03/16/23 1118          Bed Mobility    Bed Mobility rolling left;scooting/bridging;supine-sit  -SD     Rolling Left [place] (Bed Mobility) supervision  -SD     Scooting/Bridging [place] (Bed Mobility) supervision  -SD     Supine-Sit [place] (Bed Mobility) supervision  -SD     Assistive Device (Bed Mobility) bed rails;head of bed elevated  -SD     Row Name 03/16/23 1118          Transfers    Transfers sit-stand transfer;stand-sit transfer;bed-chair transfer  -SD     Row Name 03/16/23 1118          Bed-Chair Transfer    Bed-Chair [place] (Transfers) contact guard;verbal cues;1 person assist  -SD     Assistive Device (Bed-Chair Transfers) walker, front-wheeled  -SD     Row Name 03/16/23 1118          Sit-Stand Transfer    Sit-Stand [place] (Transfers) contact guard;verbal cues;1 person assist  -SD     Assistive Device (Sit-Stand Transfers) walker, front-wheeled  -SD     Row Name 03/16/23 1118          Stand-Sit Transfer    Stand-Sit [place] (Transfers) contact guard;verbal cues;1 person assist  -SD     Assistive Device (Stand-Sit Transfers) walker, front-wheeled  -SD     Row Name 03/16/23 1118          Activities of Daily Living    BADL Assessment/Intervention bathing;upper body dressing;lower body dressing;grooming;toileting  -SD     Row Name 03/16/23 1118          Lower Body Dressing Assessment/Training    [place] Level (Lower  Body Dressing) doff;don;socks;maximum assist (25% patient effort)  -SD     Position (Lower Body Dressing) edge of bed sitting  -SD     Row Name 03/16/23 1118          Grooming Assessment/Training    [place] Level (Grooming) hair care, combing/brushing;wash face, hands;set up  -SD     Position (Grooming) edge of bed sitting  -SD     Row Name 03/16/23 1118          Upper Body Dressing Assessment/Training    [place] Level (Upper Body Dressing) doff;don;pajama/robe;minimum assist (75% patient effort);verbal cues  -SD     Position (Upper Body Dressing) edge of bed sitting  -SD     Row Name 03/16/23 1118          Bathing Assessment/Intervention    [place] Level (Bathing) upper extremities;chest/trunk;set up;verbal cues;distal lower extremities/feet;perineal area;maximum assist (25% patient effort)  -SD     Position (Bathing) edge of bed sitting;supported standing  -SD     Row Name 03/16/23 1118          Toileting Assessment/Training    [place] Level (Toileting) perform perineal hygiene;maximum assist (25% patient effort)  -SD     Position (Toileting) supported standing  -SD           User Key  (r) = Recorded By, (t) = Taken By, (c) = Cosigned By    Initials Name Provider Type    Veena Moulton OT Occupational Therapist               Obj/Interventions     Row Name 03/16/23 1120          Balance    Static Sitting Balance supervision  -SD     Dynamic Sitting Balance supervision  -SD     Position, Sitting Balance unsupported;sitting edge of bed  -SD     Static Standing Balance contact guard  -SD     Dynamic Standing Balance contact guard  -SD     Position/Device Used, Standing Balance walker, front-wheeled  -SD     Balance Interventions sitting;occupation based/functional task;dynamic reaching;UE activity with balance activity  -SD           User Key  (r) = Recorded By, (t) = Taken By, (c) = Cosigned By    Initials Name Provider Type    Veena Moulton OT Occupational Therapist                Goals/Plan    No documentation.                Clinical Impression     Row Name 03/16/23 1121          Pain Assessment    Pretreatment Pain Rating 0/10 - no pain  -SD     Posttreatment Pain Rating 0/10 - no pain  -SD     [facility] Name 03/16/23 1121          Plan of Care Review    Plan of Care Reviewed With patient  -SD     Progress improving  -SD     Outcome Evaluation Pt. with increasing activity tolerance, improving balance during self care tasks, and increasing independence with transfers. Pt. talkative throughout tx session, asking appropriate questions, but lost train of thought multiple times. Will cont to progress pt as able per POC. Recommend IPRF upon d/c.  -SD     [facility] Name 03/16/23 1121          Therapy Assessment/Plan (OT)    Rehab Potential (OT) good, to achieve stated therapy goals  -SD     Criteria for Skilled Therapeutic Interventions Met (OT) yes;meets criteria;skilled treatment is necessary  -SD     Therapy Frequency (OT) daily  -SD     Row Name 03/16/23 1121          Therapy Plan Review/Discharge Plan (OT)    Anticipated Discharge Disposition (OT) inpatient rehabilitation facility  -SD     [facility] Name 03/16/23 1121          Vital Signs    Pre Systolic BP Rehab 108  -SD     Pre Treatment Diastolic BP 81  -SD     Posttreatment Heart Rate (beats/min) 75  -SD     O2 Delivery Pre Treatment room air  -SD     O2 Delivery Intra Treatment room air  -SD     Post SpO2 (%) 97  -SD     O2 Delivery Post Treatment room air  -SD     Pre Patient Position Supine  -SD     Intra Patient Position Sitting  -SD     Post Patient Position Sitting  -SD     Row Name 03/16/23 1121          Positioning and Restraints    Pre-Treatment Position in bed  -SD     Post Treatment Position chair  -SD     In Chair notified nsg;reclined;call light within reach;encouraged to call for assist;exit alarm on;legs elevated;waffle cushion  -SD           User Key  (r) = Recorded By, (t) = Taken By, (c) = Cosigned By    Initials Name Provider  Type    Veena Moulton, LUCERO Occupational Therapist               Outcome Measures     Row Name 03/16/23 1027          How much help from another is currently needed...    Putting on and taking off regular lower body clothing? 2  -SD     Bathing (including washing, rinsing, and drying) 3  -SD     Toileting (which includes using toilet bed pan or urinal) 3  -SD     Putting on and taking off regular upper body clothing 3  -SD     Taking care of personal grooming (such as brushing teeth) 3  -SD     Eating meals 3  -SD     AM-PAC 6 Clicks Score (OT) 17  -SD     Row Name 03/16/23 1027          Functional Assessment    Outcome Measure Options AM-PAC 6 Clicks Daily Activity (OT)  -SD           User Key  (r) = Recorded By, (t) = Taken By, (c) = Cosigned By    Initials Name Provider Type    Veena Moulton OT Occupational Therapist                Occupational Therapy Education     Title: PT OT SLP Therapies (In Progress)     Topic: Occupational Therapy (In Progress)     Point: ADL training (Done)     Description:   Instruct learner(s) on proper safety adaptation and remediation techniques during self care or transfers.   Instruct in proper use of assistive devices.              Learning Progress Summary           Patient Acceptance, E, VU,NR by [facility] at 3/13/2023 1156    Comment: reason for consult, noted deficits, walker safety, concern over home return                   Point: Home exercise program (Not Started)     Description:   Instruct learner(s) on appropriate technique for monitoring, assisting and/or progressing therapeutic exercises/activities.              Learner Progress:  Not documented in this visit.          Point: Precautions (Done)     Description:   Instruct learner(s) on prescribed precautions during self-care and functional transfers.              Learning Progress Summary           Patient Acceptance, E, VU,NR by [facility] at 3/13/2023 1156    Comment: reason for consult, noted deficits,  walker safety, concern over home return                   Point: Body mechanics (Not Started)     Description:   Instruct learner(s) on proper positioning and spine alignment during self-care, functional mobility activities and/or exercises.              Learner Progress:  Not documented in this visit.                      User Key     Initials Effective Dates Name Provider Type Discipline    [facility] 02/03/23 -  Monae Cohen OT Occupational Therapist OT              OT Recommendation and Plan  Therapy Frequency (OT): daily  Plan of Care Review  Plan of Care Reviewed With: patient  Progress: improving  Outcome Evaluation: Pt. with increasing activity tolerance, improving balance during self care tasks, and increasing independence with transfers. Pt. talkative throughout tx session, asking appropriate questions, but lost train of thought multiple times. Will cont to progress pt as able per POC. Recommend IPRF upon d/c.     Time Calculation:    Time Calculation- OT     Row Name 03/16/23 1124             Time Calculation- OT    OT Received On 03/16/23  -SD      OT Goal Re-Cert Due Date 03/23/23  -SD         Timed Charges    22998 - OT Therapeutic Activity Minutes 19  -SD      38149 - OT Self Care/Mgmt Minutes 30  -SD         Total Minutes    Timed Charges Total Minutes 49  -SD       Total Minutes 49  -SD            User Key  (r) = Recorded By, (t) = Taken By, (c) = Cosigned By    Initials Name Provider Type    Veena Moulton OT Occupational Therapist              Therapy Charges for Today     Code Description Service Date Service Provider Modifiers Qty    22433453675 HC OT THERAPEUTIC ACT EA 15 MIN 3/16/2023 Veena Saeed OT GO 1    41664794190 HC OT SELF CARE/MGMT/TRAIN EA 15 MIN 3/16/2023 Veena Saeed OT GO 2               Veena Saeed OT  3/16/2023

## 2023-03-16 NOTE — PLAN OF CARE
Problem: Adult Inpatient Plan of Care  Goal: Plan of Care Review  Recent Flowsheet Documentation  Taken 3/16/2023 1121 by Veena Saeed OT  Progress: improving  Plan of Care Reviewed With: patient  Outcome Evaluation: Pt. with increasing activity tolerance, improving balance during self care tasks, and increasing independence with transfers. Pt. talkative throughout tx session, asking appropriate questions, but lost train of thought multiple times. Will cont to progress pt as able per POC. Recommend IPRF upon d/c.   Goal Outcome Evaluation:  Plan of Care Reviewed With: patient        Progress: improving  Outcome Evaluation: Pt. with increasing activity tolerance, improving balance during self care tasks, and increasing independence with transfers. Pt. talkative throughout tx session, asking appropriate questions, but lost train of thought multiple times. Will cont to progress pt as able per POC. Recommend IPRF upon d/c.

## 2023-03-16 NOTE — PROGRESS NOTES
[facility] Medicine Services  PROGRESS NOTE    Patient Name: Monty Nagel  : 1955  MRN: [id number]    Date of Admission: 3/11/2023  Primary Care Physician: Tiffany Morales MD    Subjective   Subjective     CC:  Follow-up for balance issues    HPI:  No complaints. Up in chair awaiting lunch.     ROS:  Denies pain     Objective   Objective     Vital Signs:   Temp:  [97.6 °F (36.4 °C)-98.6 °F (37 °C)] 97.6 °F (36.4 °C)  Heart Rate:  [59-93] 76  Resp:  [16-18] 18  BP: ()/(73-93) 97/73     Physical Exam:    Gen:  Thin, looks chronically ill , NAD, no change, up in chair   Neuro: alert and oriented, clear speech, OVIEDO, no change   HEENT:  NC/AT  Lungs nonlabored on RA   Abd:  Soft, nontender, no rebound or guarding, pos BS  Extrem:  No c/c/e      Results Reviewed:  LAB RESULTS:      Lab 23  0736 23  1033 23  1052   WBC 10.48 5.83 5.58   HEMOGLOBIN 12.9* 13.3 14.8   HEMATOCRIT 38.3 39.5 43.7   PLATELETS 174 152 174   NEUTROS ABS 9.41*  --  3.11   IMMATURE GRANS (ABS) 0.05  --  0.02   LYMPHS ABS 0.71  --  1.82   MONOS ABS 0.30  --  0.53   EOS ABS 0.00  --  0.07   MCV 88.0 87.4 86.2   SED RATE  --  16  --    CRP  --   --  <0.30         Lab 23  0658 23  0736 23  1033 23  1052   SODIUM  --  133* 138 135*   POTASSIUM  --  4.2 4.0 3.5   CHLORIDE  --  100 100 95*   CO2  --  25.0 28.0 28.0   ANION GAP  --  8.0 10.0 12.0   BUN  --  14 10 14   CREATININE  --  0.83 0.75* 0.99   EGFR  --  94.7 97.7 82.5   GLUCOSE  --  105* 140* 95   CALCIUM  --  8.8 8.9 9.3   MAGNESIUM 2.0 1.6  --  1.8   PHOSPHORUS  --  2.4*  --   --    HEMOGLOBIN A1C  --   --   --  5.40   TSH  --   --  9.210*  --          Lab 23  0736 23  1052   TOTAL PROTEIN 6.2 6.9   ALBUMIN 3.5 4.0   GLOBULIN 2.7 2.9   ALT (SGPT) 13 14   AST (SGOT) 17 20   BILIRUBIN 0.6 1.0   ALK PHOS 61 71         Lab 23  1052   HSTROP T 10         Lab 23  1033   CHOLESTEROL 233*   LDL CHOL  178*   HDL CHOL 47   TRIGLYCERIDES 50         Lab 03/12/23  1033   IRON 122   IRON SATURATION 56*   TIBC 219*   TRANSFERRIN 147*   FOLATE 11.00   VITAMIN B 12 1,689*         Brief Urine Lab Results  (Last result in the past 365 days)      Color   Clarity   Blood   Leuk Est   Nitrite   Protein   CREAT   Urine HCG        03/11/23 1058 Dark Yellow   Clear   Negative   Negative   Negative   Negative                 Microbiology Results Abnormal     None          No radiology results from the last 24 hrs    Results for orders placed during the hospital encounter of 03/11/23    Adult Transthoracic Echo Complete w/ Color, Spectral and Contrast if necessary per protocol    Interpretation Summary  •  Left ventricular systolic function is normal. Estimated left ventricular EF = 55%  •  Biatrial enlargement.  •  Calcified aortic valve with mild aortic stenosis, mean gradient 10 mmHg, BANG 1.6 cm².  •  Moderate aortic insufficiency.  •  Mild mitral regurgitation.  •  Mild tricuspid regurgitation with normal RVSP.  •  Mild dilation of the ascending aorta (4.2 cm) is present.      Current medications:  Scheduled Meds:dexamethasone, 4 mg, Intravenous, Q6H  diphenhydrAMINE, 50 mg, Oral, Nightly  enoxaparin, 40 mg, Subcutaneous, Daily  levETIRAcetam, 1,000 mg, Intravenous, Q12H  levothyroxine, 125 mcg, Oral, Daily  pantoprazole, 40 mg, Intravenous, Q AM  senna-docusate sodium, 2 tablet, Oral, BID  sodium chloride, 10 mL, Intravenous, Q12H  thiamine (B-1) IV, 250 mg, Intravenous, Q8H      Continuous Infusions:   PRN Meds:.•  acetaminophen **OR** acetaminophen **OR** acetaminophen  •  senna-docusate sodium **AND** polyethylene glycol **AND** bisacodyl **AND** bisacodyl  •  Magnesium Standard Dose Replacement - Initiate Nurse / BPA Driven Protocol  •  melatonin  •  ondansetron **OR** ondansetron  •  Phosphorus Replacement - Initiate Nurse / BPA Driven Protocol  •  Potassium Replacement - Initiate Nurse / BPA Driven Protocol  •  sodium  chloride  •  sodium chloride  •  sodium chloride    Assessment & Plan   Assessment & Plan     Active Hospital Problems    Diagnosis  POA   • **Balance problem [R26.89]  Yes   • Severe malnutrition (HCC) [E43]  Yes   • Generalized weakness [R53.1]  Yes   • Atrial fibrillation (HCC) [I48.91]  Yes   • Brain tumor (HCC) [D49.6]  Unknown   • Other recurrent depressive disorders (HCC) [F33.8]  Yes   • Postablative 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        Brief Hospital Course to date:  Monty Nagel is a 67 y.o. male PMH significant for balance problems, alcohol abuse, depression, HTN, hypothyroidism s/p thyroidectomy for Graves dz who presents to [facility] ED with a several month history of difficulty with balance and gait.      Assessment and plan:    Right frontal meningioma with vasogenic edema and midline shift  Generalized weakness  Ataxia with frequent falls  · MRI brain showed: 6.4 cm homogeneous enhancing mass that appears to be extra-axial along right frontal convexity, favored to represent meningioma. This mass results in significant mass effect and vasogenic edema in the right frontal lobe with 1.3 cm right to left midline shift.  · Decadron. Continue QID  ·  Neurosurgery consultation:  Recommending surgery.  Patient amenable.  Plan tentatively would be MMA on Monday, surgery on Tuesday (next week).    · Unable to discharge patient because he cannot walk safely - requiring assistance and constant cueing when up with PT, lives alone and has no friends or family to help.  Could DC to SNF but none are available given that he is planned for CNS surgery in 4 days     Etohic x 50 yrs  Severe malnutrition   Unclear compliance w synthroid   - quit 5 months ago  · Continue high-dose thiamine per neurology  · PT and OT   · CM consult, APS referral made from ED, poor living conditions     Possible PAD  · - Carotid US no signif  stenosis      Atrial fibrillation  CHadsVasc of 2   · New diagnosis; currently rate controlled without medications  · Echo:  EF 55, biatrial enlargement, mod AI  · Hold off oral anticoagulation given his new finding of extensive meningioma that will likely need surgical intervention;   · Cardiology referral upon discharge    Dyslipidemia  ·   · Started high intensity statin     Essential hypertension  · Hold antihypertensive medication given his borderline blood pressure     Hypothyroidism  H/o ablation for Graves  · Thyroid panel is indicative of possible medication noncompliance (TSH elevated and free T4 is low)  · Continue levothyroxine 125 zaynab daily     Psoriatic arthritis  Rheumatoid arthritis  · Patient states he has never received treatment  · RF TORIE and Smith undetectable   · Normal  CRP, ESR     H/o EtOH abuse  · States sober since September 2022    Acute debility  · PT and OT  · Patient is living alone and high risk for fall. I think he will benefit from acute rehab upon discharge       Expected Discharge Location and Transportation: Rehab  Expected Discharge   Expected Discharge Date and Time     Expected Discharge Date Expected Discharge Time    Mar 24, 2023            DVT prophylaxis:  Medical and mechanical DVT prophylaxis orders are present.     AM-PAC 6 Clicks Score (PT): 17 (03/16/23 0800)    CODE STATUS:   Code Status and Medical Interventions:   Ordered at: 03/11/23 1819     Level Of Support Discussed With:    Patient     Code Status (Patient has no pulse and is not breathing):    CPR (Attempt to Resuscitate)     Medical Interventions (Patient has pulse or is breathing):    Full Support       Ronda Royal MD  03/16/23

## 2023-03-17 PROCEDURE — 97116 GAIT TRAINING THERAPY: CPT

## 2023-03-17 PROCEDURE — 63710000001 DEXAMETHASONE PER 0.25 MG: Performed by: INTERNAL MEDICINE

## 2023-03-17 PROCEDURE — 99232 SBSQ HOSP IP/OBS MODERATE 35: CPT | Performed by: NURSE PRACTITIONER

## 2023-03-17 PROCEDURE — 97530 THERAPEUTIC ACTIVITIES: CPT

## 2023-03-17 RX ORDER — ZOLPIDEM TARTRATE 5 MG/1
5 TABLET ORAL NIGHTLY
Status: COMPLETED | OUTPATIENT
Start: 2023-03-17 | End: 2023-03-23

## 2023-03-17 RX ORDER — PANTOPRAZOLE SODIUM 40 MG/1
40 TABLET, DELAYED RELEASE ORAL
Status: DISCONTINUED | OUTPATIENT
Start: 2023-03-17 | End: 2023-04-03 | Stop reason: HOSPADM

## 2023-03-17 RX ORDER — LEVETIRACETAM 500 MG/1
1000 TABLET ORAL EVERY 12 HOURS SCHEDULED
Status: DISCONTINUED | OUTPATIENT
Start: 2023-03-17 | End: 2023-04-03 | Stop reason: HOSPADM

## 2023-03-17 RX ORDER — DEXAMETHASONE 4 MG/1
4 TABLET ORAL EVERY 6 HOURS SCHEDULED
Status: DISCONTINUED | OUTPATIENT
Start: 2023-03-17 | End: 2023-03-31

## 2023-03-17 RX ORDER — ATORVASTATIN CALCIUM 40 MG/1
40 TABLET, FILM COATED ORAL NIGHTLY
Status: DISCONTINUED | OUTPATIENT
Start: 2023-03-17 | End: 2023-04-03 | Stop reason: HOSPADM

## 2023-03-17 RX ADMIN — DEXAMETHASONE 4 MG: 4 TABLET ORAL at 10:42

## 2023-03-17 RX ADMIN — LEVETIRACETAM 1000 MG: 500 TABLET, FILM COATED ORAL at 10:42

## 2023-03-17 RX ADMIN — ATORVASTATIN CALCIUM 40 MG: 40 TABLET, FILM COATED ORAL at 21:30

## 2023-03-17 RX ADMIN — LEVETIRACETAM 1000 MG: 500 TABLET, FILM COATED ORAL at 21:30

## 2023-03-17 RX ADMIN — DEXAMETHASONE 4 MG: 4 TABLET ORAL at 18:50

## 2023-03-17 RX ADMIN — ZOLPIDEM TARTRATE 5 MG: 5 TABLET, FILM COATED ORAL at 21:30

## 2023-03-17 RX ADMIN — PANTOPRAZOLE SODIUM 40 MG: 40 TABLET, DELAYED RELEASE ORAL at 10:42

## 2023-03-17 NOTE — NURSING NOTE
"Patient is increasingly confused and irritable. He insisted on speaking to the \"head nurse\" and refused to speak with this writer or anyone else about his concerns. He is upset that he is continually being offered a stool softener because \"I have never taken a stool softener in my life so why am I supposed to start now?\" Patient not easily redirected or educated to understand his current situation. Monitoring is ongoing.  "

## 2023-03-17 NOTE — PLAN OF CARE
Goal Outcome Evaluation:  Plan of Care Reviewed With: patient        Progress: improving  Outcome Evaluation: Patient increased ambulation distance to 90' with Randy and FWW with frequent safety cues to complete task. He continues to mobilize below his baseline indicating IPPT intervention. Will continue with current PT POC with D/C rec to IPR.

## 2023-03-17 NOTE — CASE MANAGEMENT/SOCIAL WORK
Continued Stay Note  [facility]     Patient Name: Monty Nagel  MRN: [id number]  Today's Date: 3/17/2023    Admit Date: 3/11/2023    Plan: CM Update   Discharge Plan     Row Name 03/17/23 1432       Plan    Plan CM Update    Plan Comments Patient is not ready for discharge at this time - Unable to return home due to being unsafe home alone with no support. Currently at this time, plan tentatively would be MMA on Monday, surgery on Tuesday (next week). Anticipate he will need rehab upon discharge - CM will continue to follow for dc planning- gil [phone]    Final Discharge Disposition Code 30 - still a patient               Discharge Codes    No documentation.               Expected Discharge Date and Time     Expected Discharge Date Expected Discharge Time    Mar 24, 2023             Gil Martínez RN

## 2023-03-17 NOTE — PROGRESS NOTES
[facility] Medicine Services  PROGRESS NOTE    Patient Name: Monty Nagel  : 1955  MRN: [id number]    Date of Admission: 3/11/2023  Primary Care Physician: Tiffany Morales MD    Subjective   Subjective     CC:  Follow-up for balance issues    HPI:  No new issues.  Aggrevated that he can't get up to the restroom    ROS:  Gen- No fevers, chills  CV- No chest pain, palpitations  Resp- No cough, dyspnea  GI- No N/V/D, abd pain      Objective   Objective     Vital Signs:   Temp:  [97.3 °F (36.3 °C)-98.2 °F (36.8 °C)] 97.7 °F (36.5 °C)  Heart Rate:  [70-87] 76  Resp:  [16-18] 16  BP: (101-150)/() 119/89     Physical Exam:  Constitutional: No acute distress, awake, alert  HENT: NCAT, mucous membranes moist  Respiratory: Clear to auscultation bilaterally, respiratory effort normal   Cardiovascular: RRR, no murmurs, rubs, or gallops  Gastrointestinal: Positive bowel sounds, soft, nontender, nondistended  Musculoskeletal: No bilateral ankle edema  Psychiatric: Appropriate affect, cooperative  Neurologic: Oriented x 3, OVIEDO, speech clear  Skin: No rashes noted    Results Reviewed:  LAB RESULTS:      Lab 23  0736 23  1033 23  1052   WBC 10.48 5.83 5.58   HEMOGLOBIN 12.9* 13.3 14.8   HEMATOCRIT 38.3 39.5 43.7   PLATELETS 174 152 174   NEUTROS ABS 9.41*  --  3.11   IMMATURE GRANS (ABS) 0.05  --  0.02   LYMPHS ABS 0.71  --  1.82   MONOS ABS 0.30  --  0.53   EOS ABS 0.00  --  0.07   MCV 88.0 87.4 86.2   SED RATE  --  16  --    CRP  --   --  <0.30         Lab 23  0658 23  0736 23  1033 23  1052   SODIUM  --  133* 138 135*   POTASSIUM  --  4.2 4.0 3.5   CHLORIDE  --  100 100 95*   CO2  --  25.0 28.0 28.0   ANION GAP  --  8.0 10.0 12.0   BUN  --  14 10 14   CREATININE  --  0.83 0.75* 0.99   EGFR  --  94.7 97.7 82.5   GLUCOSE  --  105* 140* 95   CALCIUM  --  8.8 8.9 9.3   MAGNESIUM 2.0 1.6  --  1.8   PHOSPHORUS  --  2.4*  --   --    HEMOGLOBIN A1C  --    --   --  5.40   TSH  --   --  9.210*  --          Lab 03/13/23  0736 03/11/23  1052   TOTAL PROTEIN 6.2 6.9   ALBUMIN 3.5 4.0   GLOBULIN 2.7 2.9   ALT (SGPT) 13 14   AST (SGOT) 17 20   BILIRUBIN 0.6 1.0   ALK PHOS 61 71         Lab 03/11/23  1052   HSTROP T 10         Lab 03/12/23  1033   CHOLESTEROL 233*   LDL CHOL 178*   HDL CHOL 47   TRIGLYCERIDES 50         Lab 03/12/23  1033   IRON 122   IRON SATURATION 56*   TIBC 219*   TRANSFERRIN 147*   FOLATE 11.00   VITAMIN B 12 1,689*         Brief Urine Lab Results  (Last result in the past 365 days)      Color   Clarity   Blood   Leuk Est   Nitrite   Protein   CREAT   Urine HCG        03/11/23 1058 Dark Yellow   Clear   Negative   Negative   Negative   Negative                 Microbiology Results Abnormal     None          No radiology results from the last 24 hrs    Results for orders placed during the hospital encounter of 03/11/23    Adult Transthoracic Echo Complete w/ Color, Spectral and Contrast if necessary per protocol    Interpretation Summary  •  Left ventricular systolic function is normal. Estimated left ventricular EF = 55%  •  Biatrial enlargement.  •  Calcified aortic valve with mild aortic stenosis, mean gradient 10 mmHg, BANG 1.6 cm².  •  Moderate aortic insufficiency.  •  Mild mitral regurgitation.  •  Mild tricuspid regurgitation with normal RVSP.  •  Mild dilation of the ascending aorta (4.2 cm) is present.      Current medications:  Scheduled Meds:dexamethasone, 4 mg, Oral, Q6H  enoxaparin, 40 mg, Subcutaneous, Daily  levETIRAcetam, 1,000 mg, Oral, Q12H  levothyroxine, 125 mcg, Oral, Daily  pantoprazole, 40 mg, Oral, Q AM  polyethylene glycol, 17 g, Oral, Daily  senna-docusate sodium, 2 tablet, Oral, BID  zolpidem, 5 mg, Oral, Nightly      Continuous Infusions:   PRN Meds:.•  acetaminophen **OR** acetaminophen **OR** acetaminophen  •  senna-docusate sodium **AND** polyethylene glycol **AND** bisacodyl **AND** bisacodyl  •  Magnesium Standard Dose  Replacement - Initiate Nurse / BPA Driven Protocol  •  melatonin  •  ondansetron **OR** ondansetron  •  Phosphorus Replacement - Initiate Nurse / BPA Driven Protocol  •  Potassium Replacement - Initiate Nurse / BPA Driven Protocol  •  sodium chloride    Assessment & Plan   Assessment & Plan     Active Hospital Problems    Diagnosis  POA   • **Balance problem [R26.89]  Yes   • Severe malnutrition (HCC) [E43]  Yes   • Generalized weakness [R53.1]  Yes   • Atrial fibrillation (HCC) [I48.91]  Yes   • Brain tumor (HCC) [D49.6]  Unknown   • Other recurrent depressive disorders (HCC) [F33.8]  Yes   • Postablative 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        Brief Hospital Course to date:  Monty Nagel is a 67 y.o. male PMH significant for balance problems, alcohol abuse, depression, HTN, hypothyroidism s/p thyroidectomy for Graves dz who presents to [facility] ED with a several month history of difficulty with balance and gait.      Assessment and plan:    Right frontal meningioma with vasogenic edema and midline shift  Generalized weakness  Ataxia with frequent falls  · MRI brain showed: 6.4 cm homogeneous enhancing mass that appears to be extra-axial along right frontal convexity, favored to represent meningioma. This mass results in significant mass effect and vasogenic edema in the right frontal lobe with 1.3 cm right to left midline shift.  · Decadron. Continue QID  · Neurosurgery consultation:  Recommending surgery.  Patient amenable.  Plan tentatively would be MMA on Monday, surgery on Tuesday (next week).    · Unable to discharge patient because he cannot walk safely - requiring assistance and constant cueing when up with PT, lives alone and has no friends or family to help.  Could DC to SNF but none are available given that he is planned for CNS surgery in 4 days     Etohic x 50 yrs  Severe malnutrition   Unclear compliance w  synthroid   · quit 5 months ago  · s/p IV thiamine  · PT and OT   · CM consult, APS referral made from ED, poor living conditions     Possible PAD  · Carotid US no signif stenosis      Atrial fibrillation  CHadsVasc of 2   · New diagnosis; currently rate controlled without medications  · Echo:  EF 55, biatrial enlargement, mod AI  · Hold off oral anticoagulation given his new finding of extensive meningioma that will likely need surgical intervention;   · Cardiology referral upon discharge    Dyslipidemia  ·   · Started high intensity statin, Lipitor     Essential hypertension  · Hold antihypertensive medication given his borderline blood pressure     Hypothyroidism  H/o ablation for Graves  · Thyroid panel is indicative of possible medication noncompliance (TSH elevated and free T4 is low)  · Continue levothyroxine 125 mcg daily     Psoriatic arthritis  Rheumatoid arthritis  · Patient states he has never received treatment  · RF TORIE and Smith undetectable   · Normal  CRP, ESR     H/o EtOH abuse  · States sober since September 2022    Acute debility  · PT and OT  · Patient is living alone and high risk for fall. I think he will benefit from acute rehab upon discharge       Expected Discharge Location and Transportation: Rehab  Expected Discharge   Expected Discharge Date and Time     Expected Discharge Date Expected Discharge Time    Mar 24, 2023            DVT prophylaxis:  Medical and mechanical DVT prophylaxis orders are present.     AM-PAC 6 Clicks Score (PT): 17 (03/17/23 0800)    CODE STATUS:   Code Status and Medical Interventions:   Ordered at: 03/11/23 1819     Level Of Support Discussed With:    Patient     Code Status (Patient has no pulse and is not breathing):    CPR (Attempt to Resuscitate)     Medical Interventions (Patient has pulse or is breathing):    Full Support       Ashwini Mckeon, LYNETTE  03/17/23

## 2023-03-17 NOTE — THERAPY TREATMENT NOTE
Patient Name: Monty Nagel  : 1955    MRN: [id number]                              Today's Date: 3/17/2023       Admit Date: 3/11/2023    Visit Dx:     ICD-10-CM ICD-9-CM   1. Generalized weakness  R53.1 780.79   2. History of alcoholism (HCC)  F10.21 V11.3   3. Inability to walk  R26.2 719.7   4. Confusion  R41.0 298.9   5. Hypoglycemia  E16.2 251.2   6. Poor nutrition  E63.9 269.9   7. Cognitive communication deficit  R41.841 799.52   8. Impaired functional mobility, balance, gait, and endurance  Z74.09 V49.89   9. Brain tumor (HCC)  D49.6 239.6     Patient Active Problem List   Diagnosis   • Essential hypertension   • History of melanoma   • Postablative hypothyroidism   • Screen for colon cancer   • Other recurrent depressive disorders (HCC)   • Balance problem   • Generalized weakness   • Atrial fibrillation (HCC)   • Severe malnutrition (HCC)   • Brain tumor (HCC)     Past Medical History:   Diagnosis Date   • Arthritis    • Hypertension    • Melanoma (HCC) 2017    retina     Past Surgical History:   Procedure Laterality Date   • EYE SURGERY  2017    EYE CANCER RIGHT EYE   • TONSILLECTOMY        General Information     Row Name 23 1504          Physical Therapy Time and Intention    Document Type therapy note (daily note)  -CM     Mode of Treatment physical therapy;individual therapy  -CM     Row Name 23 1504          General Information    Patient Profile Reviewed yes  -CM     Existing Precautions/Restrictions fall;other (see comments)  confused and impulsive  -CM     Barriers to Rehab medically complex;cognitive status;visual deficit  -CM     Row Name 23 1504          Cognition    Orientation Status (Cognition) oriented to;person  -CM     Row Name 23 1504          Safety Issues, Functional Mobility    Safety Issues Affecting Function (Mobility) awareness of need for assistance;impulsivity;insight into deficits/self-awareness;judgment;problem-solving;safety precaution  awareness;safety precautions follow-through/compliance;sequencing abilities  -CM     Impairments Affecting Function (Mobility) balance;cognition;coordination;endurance/activity tolerance;strength;postural/trunk control  -CM           User Key  (r) = Recorded By, (t) = Taken By, (c) = Cosigned By    Initials Name Provider Type    Yanet Mo, PT Physical Therapist               Mobility     Row Name 03/17/23 1506          Bed Mobility    Bed Mobility sit-supine  -CM     Scooting/Bridging [place] (Bed Mobility) supervision;verbal cues  -CM     Sit-Supine [place] (Bed Mobility) supervision;verbal cues  -CM     Assistive Device (Bed Mobility) bed rails;head of bed elevated  -CM     Comment, (Bed Mobility) standing in bathroom upon entry, for sit to sup no physical assist required, cues for task completion and sequencing  -CM     Row Name 03/17/23 1506          Sit-Stand Transfer    Sit-Stand [place] (Transfers) contact guard;verbal cues;1 person assist  -CM     Assistive Device (Sit-Stand Transfers) walker, front-wheeled  -CM     Comment, (Sit-Stand Transfer) standing in bathroom upon entry, CGA for stand to sit  -CM     Row Name 03/17/23 1506          Gait/Stairs (Locomotion)    [place] Level (Gait) minimum assist (75% patient effort);verbal cues;1 person assist  -CM     Assistive Device (Gait) walker, front-wheeled  -CM     Distance in Feet (Gait) 90  -CM     Deviations/Abnormal Patterns (Gait) bilateral deviations;base of support, narrow;dima decreased;festinating/shuffling;stride length decreased  -CM     Bilateral Gait Deviations forward flexed posture;heel strike decreased  -CM     Left Sided Gait Deviations foot drop/toe drag  -CM     Comment, (Gait/Stairs) Patient ambulated in hallway with variable gait pattern varying from step through to step to pattern with decreased LLE step length. Foot drag on L foot noted throughout ambulation. Cues provided for improved foot clearance  on LLE, upright posture, safe proximity of AD, and decreased gait speed as patient is initially impulsive and very quick with decreased safety awareness.  -CM           User Key  (r) = Recorded By, (t) = Taken By, (c) = Cosigned By    Initials Name Provider Type    Yanet Mo, PT Physical Therapist               Obj/Interventions     Row Name 03/17/23 1512          Balance    Balance Assessment sitting static balance;standing static balance;standing dynamic balance  -CM     Static Sitting Balance supervision  -CM     Position, Sitting Balance unsupported;sitting edge of bed  -CM     Static Standing Balance contact guard  -CM     Dynamic Standing Balance minimal assist  -CM     Position/Device Used, Standing Balance supported;walker, front-wheeled  -CM     Comment, Balance unsteady on feet with dynamic standing activity  -CM           User Key  (r) = Recorded By, (t) = Taken By, (c) = Cosigned By    Initials Name Provider Type    Yanet Mo, PT Physical Therapist               Goals/Plan    No documentation.                Clinical Impression     Row Name 03/17/23 1513          Pain Scale: FACES Pre/Post-Treatment    Pain: FACES Scale, Pretreatment 0-->no hurt  -CM     Posttreatment Pain Rating 0-->no hurt  -CM     Row Name 03/17/23 1513          Plan of Care Review    Plan of Care Reviewed With patient  -CM     Progress improving  -CM     Outcome Evaluation Patient increased ambulation distance to 90' with Randy and FWW with frequent safety cues to complete task. He continues to mobilize below his baseline indicating IPPT intervention. Will continue with current PT POC with D/C rec to IPR.  -CM     Row Name 03/17/23 1513          Vital Signs    Pre Systolic BP Rehab --  disconnected from telemetry, RN aware  -CM     Row Name 03/17/23 1513          Positioning and Restraints    Pre-Treatment Position standing in room  -CM     Post Treatment Position bed  -CM     In Bed supine;call light  within reach;encouraged to call for assist;exit alarm on;notified nsg  -CM           User Key  (r) = Recorded By, (t) = Taken By, (c) = Cosigned By    Initials Name Provider Type    Yanet Mo, RIC Physical Therapist               Outcome Measures     Row Name 03/17/23 1517 03/17/23 0800       How much help from another person do you currently need...    Turning from your back to your side while in flat bed without using bedrails? 3  -CM 3  -AN    Moving from lying on back to sitting on the side of a flat bed without bedrails? 3  -CM 3  -AN    Moving to and from a bed to a chair (including a wheelchair)? 3  -CM 3  -AN    Standing up from a chair using your arms (e.g., wheelchair, bedside chair)? 3  -CM 3  -AN    Climbing 3-5 steps with a railing? 2  -CM 2  -AN    To walk in hospital room? 3  -CM 3  -AN    AM-PAC 6 Clicks Score (PT) 17  -CM 17  -AN    Highest level of mobility 5 --> Static standing  -CM 5 --> Static standing  -AN    Row Name 03/17/23 1517          Functional Assessment    Outcome Measure Options AM-PAC 6 Clicks Basic Mobility (PT)  -CM           User Key  (r) = Recorded By, (t) = Taken By, (c) = Cosigned By    Initials Name Provider Type    Linda Fabian, RN Registered Nurse    Yanet Mo, RIC Physical Therapist                             Physical Therapy Education     Title: PT OT SLP Therapies (In Progress)     Topic: Physical Therapy (Done)     Point: Mobility training (Done)     Learning Progress Summary           Patient Acceptance, E, VU by CM at 3/17/2023 1517    Acceptance, E, VU,NR by LH at 3/15/2023 1407    Acceptance, E, NR by KG at 3/14/2023 1428    Acceptance, E, VU,NR by  at 3/12/2023 1542                   Point: Home exercise program (Done)     Learning Progress Summary           Patient Acceptance, E, VU,NR by  at 3/15/2023 1407    Acceptance, E, NR by KG at 3/14/2023 1428                   Point: Body mechanics (Done)     Learning Progress Summary            Patient Acceptance, E, VU by CM at 3/17/2023 1517    Acceptance, E, VU,NR by  at 3/15/2023 1407    Acceptance, E, NR by KG at 3/14/2023 1428    Acceptance, E, VU,NR by  at 3/12/2023 1542                   Point: Precautions (Done)     Learning Progress Summary           Patient Acceptance, E, VU by CM at 3/17/2023 1517    Acceptance, E, VU,NR by  at 3/15/2023 1407    Acceptance, E, NR by KG at 3/14/2023 1428    Acceptance, E, VU,NR by  at 3/12/2023 1542                               User Key     Initials Effective Dates Name Provider Type Discipline     02/03/23 - 03/12/23 Samara Harrison, PT Physical Therapist PT     05/22/20 -  Cherelle Gaitan, PT Physical Therapist PT     09/21/21 -  Tristian Ravi, PT Physical Therapist PT     09/22/22 -  Yanet Calvin, RIC Physical Therapist PT              PT Recommendation and Plan     Plan of Care Reviewed With: patient  Progress: improving  Outcome Evaluation: Patient increased ambulation distance to 90' with Randy and FWW with frequent safety cues to complete task. He continues to mobilize below his baseline indicating IPPT intervention. Will continue with current PT POC with D/C rec to IPR.     Time Calculation:    PT Charges     Row Name 03/17/23 1518             Time Calculation    Start Time 1435  -CM      PT Received On 03/17/23  -CM      PT Goal Re-Cert Due Date 03/22/23  -CM         Timed Charges    74755 - Gait Training Minutes  13  -CM      92713 - PT Therapeutic Activity Minutes 10  -CM         Total Minutes    Timed Charges Total Minutes 23  -CM       Total Minutes 23  -CM            User Key  (r) = Recorded By, (t) = Taken By, (c) = Cosigned By    Initials Name Provider Type    CM Yanet Calvin, PT Physical Therapist              Therapy Charges for Today     Code Description Service Date Service Provider Modifiers Qty    [id number] HC GAIT TRAINING EA 15 MIN 3/17/2023 Yanet Calvin, PT GP 1    [id number]  HC PT THERAPEUTIC ACT EA 15 MIN 3/17/2023 Yanet Calvin, PT GP 1          PT G-Codes  Outcome Measure Options: AM-PAC 6 Clicks Basic Mobility (PT)  AM-PAC 6 Clicks Score (PT): 17  AM-PAC 6 Clicks Score (OT): 17       Yanet Calvin, PT  3/17/2023

## 2023-03-17 NOTE — PLAN OF CARE
Goal Outcome Evaluation:      Patient has slept intermittently throughout shift with periods of increased confusion when awake. He has been argumentative and irritable at times. He is increasingly suspicious of staff and the treatments we offer to him. Monitoring is ongoing.

## 2023-03-17 NOTE — PAYOR COMM NOTE
"Converted from OBS to IP 3/17    Jonathan Barrera (67 y.o. Male)     Date of Birth   1955    Social Security Number       Address   [address] [place] [address]    Home Phone   [phone]    MRN   [id number]       Gnosticism   None    Marital Status   Single                            Admission Date   3/11/23    Admission Type   Emergency    Admitting Provider   Ronda Royal MD    Attending Provider   Ronda Royal MD    Department, Room/Bed   [facility] 2F, S204/1       Discharge Date       Discharge Disposition       Discharge Destination                               Attending Provider: Ronda Royal MD    Allergies: No Known Allergies    Isolation: None   Infection: None   Code Status: CPR    Ht: 175.3 cm (69.02\")   Wt: 80.6 kg (177 lb 11.1 oz)    Admission Cmt: None   Principal Problem: Balance problem [R26.89]                 Active Insurance as of 3/11/2023     Primary Coverage     Payor Plan Insurance Group Employer/Plan Group    ANTHEM MEDICARE REPLACEMENT ANTHEM MEDICARE ADVANTAGE [id number]     Payor Plan Address Payor Plan Phone Number Payor Plan Fax Number Effective Dates    [address] [phone]  2020 - None Entered    [facility] [address]       Subscriber Name Subscriber Birth Date Member ID       JONATHAN BARRERA 1955 [id number]                 Emergency Contacts      (Rel.) Home Phone Work Phone Mobile Phone    Marva Ku (Sister) [phone] -- [phone]               History & Physical      Linden-Carey Munguia MD at [address] [place] [address] Medicine Services  HISTORY AND PHYSICAL    Patient Name: Jonathan Barrera  : 1955  MRN: [id number]  Primary Care Physician: Tiffany Morales MD  Date of admission: 3/11/2023    Subjective   Subjective     Chief Complaint:  Balance difficulty    HPI:  Jonathan Barrera is a 67 y.o. male PMH significant for balance problems, alcohol abuse, depression, " HTN, hypothyroidism s/p thyroidectomy for Graves dz who presents to [facility] ED with a 2-week history of difficulty with balance and gait.  Patient states he has been having to hang onto the walls walk from his bedroom to his bathroom.  He notes accompanying weakness feels this is due to to not the past couple weeks as he has not been to the grocery.  Denies any recent illness, fever/chills, headache.  He had an episode of diarrhea last night where he had an episode of vomiting 3 days ago that he attributes to some food he ate. Patient denies falling since the onset of symptoms.  He does note a bad fall in September 2022 at which time he dislocated his shoulder and fractured his humerus secondary to alcohol intoxication.  He says he has since given up alcohol and has been sober since then.  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  He also notes a history of rheumatoid and psoriatic arthritis but has never been treated for either condition.  At time of exam, patient has noticeable contractures in both hands and feet bilaterally, R>L, and right eye ptosis.  Patient is in A-fib on the monitor but denies history of A-fib.  He states his PCP told him he had a murmur, and has click and murmur on physical exam. He has noticeably cool BLE and mikel pedal pulses bilaterally. He notes some numbness in hands and feet bilaterally. Neurology has been consulted, and Dr. Siddiqui has evaluated patient in ED.    Review of Systems   Constitutional: Positive for activity change and appetite change. Negative for chills and fever.   HENT: Negative for trouble swallowing.    Eyes: Positive for visual disturbance (blind in right eye).   Respiratory: Negative for cough, chest tightness, shortness of breath and wheezing.    Cardiovascular: Negative for chest pain, palpitations and leg swelling.   Gastrointestinal: Positive for diarrhea. Negative for abdominal pain,  nausea and vomiting.   Genitourinary: Positive for decreased urine volume. Negative for difficulty urinating.   Musculoskeletal: Positive for gait problem and joint swelling (contractures hands and feet bilaterally). Negative for neck pain and neck stiffness.   Skin: Negative for rash and wound.   Neurological: Positive for weakness. Negative for dizziness, syncope and headaches.   Psychiatric/Behavioral: Negative for confusion. The patient is not nervous/anxious.         Personal History     Past Medical History:   Diagnosis Date   • Arthritis    • Hypertension    • Melanoma (HCC) 2017    retina         Past Surgical History:   Procedure Laterality Date   • EYE SURGERY  2017    EYE CANCER RIGHT EYE   • TONSILLECTOMY         Family History:  family history includes Diabetes in his mother; Heart disease in his father.     Social History:  reports that he quit smoking about 33 years ago. His smoking use included cigarettes. He started smoking about 41 years ago. He has a 15.00 pack-year smoking history. He quit smokeless tobacco use about 33 years ago. He reports that he does not currently use alcohol. He reports that he does not use drugs.  Social History     Social History Narrative   • Not on file       Medications:  levothyroxine and lisinopril-hydrochlorothiazide    No Known Allergies    Objective   Objective     Vital Signs:   Temp:  [98.2 °F (36.8 °C)] 98.2 °F (36.8 °C)  Heart Rate:  [] 59  Resp:  [20] 20  BP: ()/(66-93) 114/84    Physical Exam   Constitutional: Awake, alert  Eyes: PERRLA, sclerae anicteric, no conjunctival injection, ptosis right eye  HENT: NCAT, mucous membranes moist  Neck: Supple, no thyromegaly, no lymphadenopathy, trachea midline  Respiratory: Clear to auscultation bilaterally, nonlabored respirations, room air   Cardiovascular: Regularly irregular rhythm, +murmur, + click, 1+/5 pedal pulses bilaterally  Gastrointestinal: Positive bowel sounds, soft, nontender,  nondistended  Musculoskeletal: No bilateral ankle edema, contractures bilateral hands and feet, BLE cool to touch  Psychiatric: Appropriate affect, cooperative  Neurologic: Oriented x 3, strength symmetric in all extremities, Cranial Nerves grossly intact to confrontation, speech clear  Skin: No rashes      Result Review:  I have personally reviewed the results from the time of this admission to 3/11/2023 19:13 EST and agree with these findings:  [x]  Laboratory list / accordion  []  Microbiology  [x]  Radiology  [x]  EKG/Telemetry   []  Cardiology/Vascular   []  Pathology  [x]  Old records  []  Other:  Most notable findings include:   LAB RESULTS:      Lab 03/11/23  1052   WBC 5.58   HEMOGLOBIN 14.8   HEMATOCRIT 43.7   PLATELETS 174   NEUTROS ABS 3.11   IMMATURE GRANS (ABS) 0.02   LYMPHS ABS 1.82   MONOS ABS 0.53   EOS ABS 0.07   MCV 86.2         Lab 03/11/23  1052   SODIUM 135*   POTASSIUM 3.5   CHLORIDE 95*   CO2 28.0   ANION GAP 12.0   BUN 14   CREATININE 0.99   EGFR 82.5   GLUCOSE 95   CALCIUM 9.3   MAGNESIUM 1.8         Lab 03/11/23  1052   TOTAL PROTEIN 6.9   ALBUMIN 4.0   GLOBULIN 2.9   ALT (SGPT) 14   AST (SGOT) 20   BILIRUBIN 1.0   ALK PHOS 71         Lab 03/11/23  1052   HSTROP T 10                 Brief Urine Lab Results  (Last result in the past 365 days)      Color   Clarity   Blood   Leuk Est   Nitrite   Protein   CREAT   Urine HCG        03/11/23 1058 Dark Yellow   Clear   Negative   Negative   Negative   Negative               Microbiology Results (last 10 days)     ** No results found for the last 240 hours. **          XR Chest 1 View    Result Date: 3/11/2023  XR CHEST 1 VW Date of Exam: 3/11/2023 10:59 AM EST Indication: Weak/Dizzy/AMS triage protocol. Comparison: None available. Findings: The heart is not definitely enlarged. The lungs seem relatively clear. It looks like the probably are some underlying emphysematous changes. There are no pleural effusions. There is some deformity of the  surgical neck of the proximal left humerus which may relate to old trauma.     Impression: Impression: 1.An acute pulmonary process is not apparent. 2.Some underlying emphysematous changes suggested. Electronically Signed: Jimmy Michael  3/11/2023 11:46 AM EST  Workstation ID: [id number]      Assessment & Plan   Assessment & Plan       Balance problem    Essential hypertension    Postablative hypothyroidism    Other recurrent depressive disorders (HCC)    Generalized weakness    Atrial fibrillation (HCC)    Monty Nagel is a 67 y.o. male PMH significant for balance problems, alcohol abuse, depression, HTN, hypothyroidism s/p thyroidectomy for Graves dz who presents to [facility] ED with a 2-week history of difficulty with balance and gait. He notes accompanying weakness feels this is due to to not the past couple weeks as he has not been to the grocery.  Denies any recent illness, fever/chills, headache.  Patient is in A-fib on the monitor but denies history of A-fib.      Balance problem  Generalized weakness  -- H/o melanoma right retina, blind in right eye  -- S/p LR bolus, thiamine bolus in ED  -- Labs mostly unremarkable  -- Neurology consulted, Dr. Siddiqui evaluated in ED  -- Continue IV thiamine  -- MRI brain and cervical spine pending  -- AM BMP, CBC, TSH, iron profile, vitamin B12, folate, vitamin D  -- Check A1c for BG 65  -- Check ABIs for cool BLE, 1/5 pedal pulses bilaterally  -- PT, OT, SLP consults  -- Nutrition consult  -- CM consult, APS referral made from ED  -- Fall precautions, up with assist    Atrial fibrillation  -- No history of A-fib  -- Rate controlled  -- Echocardiogram, bilateral carotid duplex  -- EKG in a.m.  -- AM lipid panel, TSH, iron profile  -- Lovenox    HTN  -- Lisinopril-HCTZ 20 mg-12.5mg at home  -- Hold home meds, BPs running low and patient has not taken meds in 2 days    Hypothyroidism  H/o ablation for Graves  -- Continue home levothyroxine  -- Check TSH    Psoriatic  arthritis  Rheumatoid arthritis  -- Patient states he has never received treatment  -- Neurology checking multiple autoimmune labs  -- Check CRP, ESR    H/o EtOH abuse  -- States sober since September 2022  -- Start PPI     DVT prophylaxis:  Medical and mechanical    CODE STATUS:    Level Of Support Discussed With: Patient  Code Status (Patient has no pulse and is not breathing): CPR (Attempt to Resuscitate)  Medical Interventions (Patient has pulse or is breathing): Full Support      Expected Discharge  Expected Discharge Date and Time     Expected Discharge Date Expected Discharge Time    Mar 13, 2023             This note has been completed as part of a split-shared workflow.     Signature: Electronically signed by LYNETTE Esquivel, 03/11/23, 5:04 PM EST        Attending   Admission Attestation       I have performed an independent face-to-face diagnostic evaluation including performing an independent physical examination as documented here.  The documented plan of care above was reviewed and developed with the advanced practice clinician (APC).      Brief Summary Statement:   Monty Nagel is a 67 y.o. male with a history of alcohol abuse, hypertension and hypothyroidism who presents with a 2-week history of progressive weakness and issues with his balance.  He also admits to having some intermittent confusion.  He states that due to his weakness in his balance he has been unable to make it to the grocery store and has not been able to care for himself properly.  He does not have anyone that is able to help him either.  He denies any fevers.  He does admit to having worsening contractures especially on the right side in his upper and lower extremities.     Patient was seen by neurology in the emergency department and there was concern for possible nutritional deficiencies as well as concern for some autoimmune illnesses.  An MRI of the brain and cervical spine was ordered as well as a multitude of  testing.    Remainder of detailed HPI is as noted by APC and has been reviewed and/or edited by me for completeness.    Attending Physical Exam:  Temp:  [97.4 °F (36.3 °C)-98.2 °F (36.8 °C)] 97.4 °F (36.3 °C)  Heart Rate:  [] 67  Resp:  [18-20] 18  BP: ()/(66-93) 114/78    Constitutional: Awake, alert, eating dinner, poor hygiene  Eyes: PERRLA, sclerae anicteric,   HENT: NCAT, mucous membranes moist  Neck: Supple, trachea midline  Respiratory: Clear to auscultation bilaterally, nonlabored respirations   Cardiovascular: Irregular rate and rhythm, 2 out of 6 systolic murmur, palpable pedal pulses bilaterally  Gastrointestinal: Positive bowel sounds, soft, nontender, nondistended  Musculoskeletal: No bilateral ankle edema, severe contractures in his right upper hand, less so noticeable in the left upper extremity.  He has 4 out of 5 strength in all extremities, slightly limited due to the contractures.   Psychiatric: Appropriate affect, cooperative  Neurologic: Oriented x 3, Cranial Nerves grossly intact to confrontation, speech clear  Skin: No rashes      Brief Assessment/Plan :  See detailed assessment and plan developed with APC which I have reviewed and/or edited for completeness.    Patient has a multitude of labs that are currently pending.  These will need to be followed up and addressed in the morning.    MRI results came back and show a 6.4 homogeneous enhancing mass in the right frontal convexity, favoring a meningioma.  It does cause significant mass effect and vasogenic edema in the right frontal lobe.  I called and spoke with neurosurgery and had them look at the scans.  They suggested that I go ahead and start patient on Keppra IV as well as Decadron IV.  Most likely this has been going on for a while, and they will consult and see the patient in the morning.    Patient with high medical complexity and medical decision making, with possible life-threatening finding of a brain mass with  significant mass effect.    Carey Mayer MD  23                        Electronically signed by Carey Mayer MD at 23 0022       Lab Results (last 72 hours)     Procedure Component Value Units Date/Time    Calcitriol (1,25 di-OH Vitamin D) [[id number]] Collected: 23 1033    Specimen: Blood Updated: 23 1412     1,25-Dihydroxy, Vitamin D 38.3 pg/mL     Narrative:      Performed at:  [address]  [id number]  : Elliott Schaffer MD, Phone:  [phone]          Imaging Results (Last 72 Hours)     ** No results found for the last 72 hours. **           Physician Progress Notes (last 72 hours)      Ronda Royal MD at 23 1208              [facility] Medicine Services  PROGRESS NOTE    Patient Name: Monty Nagel  : 1955  MRN: [id number]    Date of Admission: 3/11/2023  Primary Care Physician: Tiffany Morales MD    Subjective   Subjective     CC:  Follow-up for balance issues    HPI:  No complaints. Up in chair awaiting lunch.     ROS:  Denies pain     Objective   Objective     Vital Signs:   Temp:  [97.6 °F (36.4 °C)-98.6 °F (37 °C)] 97.6 °F (36.4 °C)  Heart Rate:  [59-93] 76  Resp:  [16-18] 18  BP: ()/(73-93) 97/73     Physical Exam:    Gen:  Thin, looks chronically ill , NAD, no change, up in chair   Neuro: alert and oriented, clear speech, OVIEDO, no change   HEENT:  NC/AT  Lungs nonlabored on RA   Abd:  Soft, nontender, no rebound or guarding, pos BS  Extrem:  No c/c/e      Results Reviewed:  LAB RESULTS:      Lab 23  0736 23  1033 23  1052   WBC 10.48 5.83 5.58   HEMOGLOBIN 12.9* 13.3 14.8   HEMATOCRIT 38.3 39.5 43.7   PLATELETS 174 152 174   NEUTROS ABS 9.41*  --  3.11   IMMATURE GRANS (ABS) 0.05  --  0.02   LYMPHS ABS 0.71  --  1.82   MONOS ABS 0.30  --  0.53   EOS ABS 0.00  --  0.07   MCV 88.0 87.4 86.2   SED RATE  --  16  --    CRP  --   --  <0.30         Lab  03/14/23  0658 03/13/23  0736 03/12/23  1033 03/11/23  1052   SODIUM  --  133* 138 135*   POTASSIUM  --  4.2 4.0 3.5   CHLORIDE  --  100 100 95*   CO2  --  25.0 28.0 28.0   ANION GAP  --  8.0 10.0 12.0   BUN  --  14 10 14   CREATININE  --  0.83 0.75* 0.99   EGFR  --  94.7 97.7 82.5   GLUCOSE  --  105* 140* 95   CALCIUM  --  8.8 8.9 9.3   MAGNESIUM 2.0 1.6  --  1.8   PHOSPHORUS  --  2.4*  --   --    HEMOGLOBIN A1C  --   --   --  5.40   TSH  --   --  9.210*  --          Lab 03/13/23  0736 03/11/23  1052   TOTAL PROTEIN 6.2 6.9   ALBUMIN 3.5 4.0   GLOBULIN 2.7 2.9   ALT (SGPT) 13 14   AST (SGOT) 17 20   BILIRUBIN 0.6 1.0   ALK PHOS 61 71         Lab 03/11/23  1052   HSTROP T 10         Lab 03/12/23  1033   CHOLESTEROL 233*   LDL CHOL 178*   HDL CHOL 47   TRIGLYCERIDES 50         Lab 03/12/23  1033   IRON 122   IRON SATURATION 56*   TIBC 219*   TRANSFERRIN 147*   FOLATE 11.00   VITAMIN B 12 1,689*         Brief Urine Lab Results  (Last result in the past 365 days)      Color   Clarity   Blood   Leuk Est   Nitrite   Protein   CREAT   Urine HCG        03/11/23 1058 Dark Yellow   Clear   Negative   Negative   Negative   Negative                 Microbiology Results Abnormal     None          No radiology results from the last 24 hrs    Results for orders placed during the hospital encounter of 03/11/23    Adult Transthoracic Echo Complete w/ Color, Spectral and Contrast if necessary per protocol    Interpretation Summary  •  Left ventricular systolic function is normal. Estimated left ventricular EF = 55%  •  Biatrial enlargement.  •  Calcified aortic valve with mild aortic stenosis, mean gradient 10 mmHg, BANG 1.6 cm².  •  Moderate aortic insufficiency.  •  Mild mitral regurgitation.  •  Mild tricuspid regurgitation with normal RVSP.  •  Mild dilation of the ascending aorta (4.2 cm) is present.      Current medications:  Scheduled Meds:dexamethasone, 4 mg, Intravenous, Q6H  diphenhydrAMINE, 50 mg, Oral,  Nightly  enoxaparin, 40 mg, Subcutaneous, Daily  levETIRAcetam, 1,000 mg, Intravenous, Q12H  levothyroxine, 125 mcg, Oral, Daily  pantoprazole, 40 mg, Intravenous, Q AM  senna-docusate sodium, 2 tablet, Oral, BID  sodium chloride, 10 mL, Intravenous, Q12H  thiamine (B-1) IV, 250 mg, Intravenous, Q8H      Continuous Infusions:   PRN Meds:.•  acetaminophen **OR** acetaminophen **OR** acetaminophen  •  senna-docusate sodium **AND** polyethylene glycol **AND** bisacodyl **AND** bisacodyl  •  Magnesium Standard Dose Replacement - Initiate Nurse / BPA Driven Protocol  •  melatonin  •  ondansetron **OR** ondansetron  •  Phosphorus Replacement - Initiate Nurse / BPA Driven Protocol  •  Potassium Replacement - Initiate Nurse / BPA Driven Protocol  •  sodium chloride  •  sodium chloride  •  sodium chloride    Assessment & Plan   Assessment & Plan     Active Hospital Problems    Diagnosis  POA   • **Balance problem [R26.89]  Yes   • Severe malnutrition (HCC) [E43]  Yes   • Generalized weakness [R53.1]  Yes   • Atrial fibrillation (HCC) [I48.91]  Yes   • Brain tumor (HCC) [D49.6]  Unknown   • Other recurrent depressive disorders (HCC) [F33.8]  Yes   • Postablative 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        Brief Hospital Course to date:  Monty Nagel is a 67 y.o. male PMH significant for balance problems, alcohol abuse, depression, HTN, hypothyroidism s/p thyroidectomy for Graves dz who presents to [facility] ED with a several month history of difficulty with balance and gait.      Assessment and plan:    Right frontal meningioma with vasogenic edema and midline shift  Generalized weakness  Ataxia with frequent falls  · MRI brain showed: 6.4 cm homogeneous enhancing mass that appears to be extra-axial along right frontal convexity, favored to represent meningioma. This mass results in significant mass effect and vasogenic edema in the  right frontal lobe with 1.3 cm right to left midline shift.  · Decadron. Continue QID  ·  Neurosurgery consultation:  Recommending surgery.  Patient amenable.  Plan tentatively would be MMA on Monday, surgery on Tuesday (next week).    · Unable to discharge patient because he cannot walk safely - requiring assistance and constant cueing when up with PT, lives alone and has no friends or family to help.  Could DC to SNF but none are available given that he is planned for CNS surgery in 4 days     Etohic x 50 yrs  Severe malnutrition   Unclear compliance w synthroid   - quit 5 months ago  · Continue high-dose thiamine per neurology  · PT and OT   · CM consult, APS referral made from ED, poor living conditions     Possible PAD  · - Carotid US no signif stenosis      Atrial fibrillation  CHadsVasc of 2   · New diagnosis; currently rate controlled without medications  · Echo:  EF 55, biatrial enlargement, mod AI  · Hold off oral anticoagulation given his new finding of extensive meningioma that will likely need surgical intervention;   · Cardiology referral upon discharge    Dyslipidemia  ·   · Started high intensity statin     Essential hypertension  · Hold antihypertensive medication given his borderline blood pressure     Hypothyroidism  H/o ablation for Graves  · Thyroid panel is indicative of possible medication noncompliance (TSH elevated and free T4 is low)  · Continue levothyroxine 125 zaynab daily     Psoriatic arthritis  Rheumatoid arthritis  · Patient states he has never received treatment  · RF TORIE and Smith undetectable   · Normal  CRP, ESR     H/o EtOH abuse  · States sober since September 2022    Acute debility  · PT and OT  · Patient is living alone and high risk for fall. I think he will benefit from acute rehab upon discharge       Expected Discharge Location and Transportation: Rehab  Expected Discharge   Expected Discharge Date and Time     Expected Discharge Date Expected Discharge Time    Mar  2023            DVT prophylaxis:  Medical and mechanical DVT prophylaxis orders are present.     AM-PAC 6 Clicks Score (PT): 17 (23 0800)    CODE STATUS:   Code Status and Medical Interventions:   Ordered at: 23 1819     Level Of Support Discussed With:    Patient     Code Status (Patient has no pulse and is not breathing):    CPR (Attempt to Resuscitate)     Medical Interventions (Patient has pulse or is breathing):    Full Support       Ronda Royal MD  23        Electronically signed by Ronda Royal MD at 23 1212     Marlon Mckee MD at 03/15/23 1619          Chief complaint: Brain mass, clinically significant cerebral edema    Admit Diagnosis:   Generalized weakness [R53.1]     Subjective: No events overnight.  He is complaining of some worsening problems with hemorrhoids.  He is also very concerned about what is happening with the mail at his house.    Objective:    Vitals:    03/15/23 1512   BP: 111/89   Pulse: 82   Resp: 16   Temp: 98.2 °F (36.8 °C)   SpO2: 94%     Pulse  Av.1  Min: 66  Max: 94  Systolic (24hrs), Av , Min:111 , Max:136     Diastolic (24hrs), Av, Min:89, Max:95    Temp (24hrs), Av.3 °F (36.8 °C), Min:98.1 °F (36.7 °C), Max:98.4 °F (36.9 °C)      He is sleeping but arouses easily to voice.  He still has significant left upper extremity weakness.    Lab Results   Component Value Date     (L) 2023       A/P:   Admit Diagnosis:   Generalized weakness [R53.1]     Informed consent for a right middle meningeal artery embolization and a right-sided craniotomy for tumor resection was obtained from the patient.  He is tentatively scheduled for his embolization on Monday and surgery at  morning.    Hold aspirin and/or Plavix.  VTE prophylaxis is okay.  N.p.o. at midnight Monday morning.      Electronically signed by Marlon Mceke MD at 03/15/23 1618     Ronda Royal MD at 03/15/23 1051              [facility]  [facility] Medicine Services  PROGRESS NOTE    Patient Name: Monty Nagel  : 1955  MRN: [id number]    Date of Admission: 3/11/2023  Primary Care Physician: Tiffany Morales MD    Subjective   Subjective     CC:  Follow-up for balance issues    HPI:  No complaints.  Thought breakfast was good.  States he does not feel able to walk without falling and has nobody to help him at home.  In PT yesterday he continued to require assistance to walk 50 feet; poor balance noted, consistent cueing required.      ROS:  Sleep - benadryl not helping.   No pain anywhere.   GI - former etohic x 50 yrs, quit 5 months ago     Objective   Objective     Vital Signs:   Temp:  [97.8 °F (36.6 °C)-98.4 °F (36.9 °C)] 98.4 °F (36.9 °C)  Heart Rate:  [66-94] 94  Resp:  [16-18] 18  BP: (107-136)/(69-95) 136/91     Physical Exam:    Gen:  Thin, looks chronically ill , NAD, no change  Neuro: alert and oriented, clear speech, OVIEDO, no change   HEENT:  NC/AT  Lungs nonlabored on RA   Abd:  Soft, nontender, no rebound or guarding, pos BS  Extrem:  No c/c/e      Results Reviewed:  LAB RESULTS:      Lab 23  0736 23  1033 23  1052   WBC 10.48 5.83 5.58   HEMOGLOBIN 12.9* 13.3 14.8   HEMATOCRIT 38.3 39.5 43.7   PLATELETS 174 152 174   NEUTROS ABS 9.41*  --  3.11   IMMATURE GRANS (ABS) 0.05  --  0.02   LYMPHS ABS 0.71  --  1.82   MONOS ABS 0.30  --  0.53   EOS ABS 0.00  --  0.07   MCV 88.0 87.4 86.2   SED RATE  --  16  --    CRP  --   --  <0.30         Lab 23  0658 23  0736 23  1033 23  1052   SODIUM  --  133* 138 135*   POTASSIUM  --  4.2 4.0 3.5   CHLORIDE  --  100 100 95*   CO2  --  25.0 28.0 28.0   ANION GAP  --  8.0 10.0 12.0   BUN  --  14 10 14   CREATININE  --  0.83 0.75* 0.99   EGFR  --  94.7 97.7 82.5   GLUCOSE  --  105* 140* 95   CALCIUM  --  8.8 8.9 9.3   MAGNESIUM 2.0 1.6  --  1.8   PHOSPHORUS  --  2.4*  --   --    HEMOGLOBIN A1C  --   --   --  5.40   TSH  --   --  9.210*  --           Lab 03/13/23  0736 03/11/23  1052   TOTAL PROTEIN 6.2 6.9   ALBUMIN 3.5 4.0   GLOBULIN 2.7 2.9   ALT (SGPT) 13 14   AST (SGOT) 17 20   BILIRUBIN 0.6 1.0   ALK PHOS 61 71         Lab 03/11/23  1052   HSTROP T 10         Lab 03/12/23  1033   CHOLESTEROL 233*   LDL CHOL 178*   HDL CHOL 47   TRIGLYCERIDES 50         Lab 03/12/23  1033   IRON 122   IRON SATURATION 56*   TIBC 219*   TRANSFERRIN 147*   FOLATE 11.00   VITAMIN B 12 1,689*         Brief Urine Lab Results  (Last result in the past 365 days)      Color   Clarity   Blood   Leuk Est   Nitrite   Protein   CREAT   Urine HCG        03/11/23 1058 Dark Yellow   Clear   Negative   Negative   Negative   Negative                 Microbiology Results Abnormal     None          No radiology results from the last 24 hrs    Results for orders placed during the hospital encounter of 03/11/23    Adult Transthoracic Echo Complete w/ Color, Spectral and Contrast if necessary per protocol    Interpretation Summary  •  Left ventricular systolic function is normal. Estimated left ventricular EF = 55%  •  Biatrial enlargement.  •  Calcified aortic valve with mild aortic stenosis, mean gradient 10 mmHg, BANG 1.6 cm².  •  Moderate aortic insufficiency.  •  Mild mitral regurgitation.  •  Mild tricuspid regurgitation with normal RVSP.  •  Mild dilation of the ascending aorta (4.2 cm) is present.      Current medications:  Scheduled Meds:dexamethasone, 4 mg, Intravenous, Q6H  diphenhydrAMINE, 50 mg, Oral, Nightly  enoxaparin, 40 mg, Subcutaneous, Daily  levETIRAcetam, 1,000 mg, Intravenous, Q12H  levothyroxine, 125 mcg, Oral, Daily  pantoprazole, 40 mg, Intravenous, Q AM  senna-docusate sodium, 2 tablet, Oral, BID  sodium chloride, 10 mL, Intravenous, Q12H  thiamine (B-1) IV, 250 mg, Intravenous, Q8H      Continuous Infusions:   PRN Meds:.•  acetaminophen **OR** acetaminophen **OR** acetaminophen  •  senna-docusate sodium **AND** polyethylene glycol **AND** bisacodyl **AND**  bisacodyl  •  Magnesium Standard Dose Replacement - Initiate Nurse / BPA Driven Protocol  •  melatonin  •  ondansetron **OR** ondansetron  •  Phosphorus Replacement - Initiate Nurse / BPA Driven Protocol  •  Potassium Replacement - Initiate Nurse / BPA Driven Protocol  •  sodium chloride  •  sodium chloride  •  sodium chloride    Assessment & Plan   Assessment & Plan     Active Hospital Problems    Diagnosis  POA   • **Balance problem [R26.89]  Yes   • Severe malnutrition (HCC) [E43]  Yes   • Generalized weakness [R53.1]  Yes   • Atrial fibrillation (HCC) [I48.91]  Yes   • Brain tumor (HCC) [D49.6]  Unknown   • Other recurrent depressive disorders (HCC) [F33.8]  Yes   • Postablative 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        Brief Hospital Course to date:  Monty Nagel is a 67 y.o. male PMH significant for balance problems, alcohol abuse, depression, HTN, hypothyroidism s/p thyroidectomy for Graves dz who presents to [facility] ED with a several month history of difficulty with balance and gait.      Assessment and plan:    Right frontal meningioma with vasogenic edema and midline shift  Generalized weakness  Ataxia with frequent falls  · MRI brain showed: 6.4 cm homogeneous enhancing mass that appears to be extra-axial along right frontal convexity, favored to represent meningioma. This mass results in significant mass effect and vasogenic edema in the right frontal lobe with 1.3 cm right to left midline shift.  · Decadron. Recs per Dr Siddiqui:  decadron 4 mg QID for 1 week, then TID for 1 week, then BID for 1 week then once daily thereafter till surgery is done  · Neurosurgery consultation:  Recommending surgery  · - I had a long discussion w him re his inclination:  He thinks he does want surgery and will likely stay in [place] for it.    · - Did discuss timing w Dr Mckee.  Plan tentatively would be MMA on Monday, surgery  on Tuesday (next week).  OK to DC from NS perspective.  Dr Mckee will come by and talk to patient again.      Etohic x 50 yrs  Severe malnutrition   Unclear compliance w synthroid   - quit 5 months ago  · Continue high-dose thiamine per neurology  · PT and OT consultation.  Might need rehab placement since he lives alone  · CM consult, APS referral made from ED    Possible PAD  · Follow ABIs for cool BLE, 1/5 pedal pulses bilaterally  · - Carotid US no signif stenosis      Atrial fibrillation  CHadsVasc of 2   · New diagnosis; currently rate controlled without medications  · Echo:  EF 55, biatrial enlargement, mod AI  · Hold off oral anticoagulation given his new finding of extensive meningioma that will likely need surgical intervention;   · Cardiology referral upon discharge    Dyslipidemia  ·   · Started high intensity statin     Essential hypertension  · Hold antihypertensive medication given his borderline blood pressure     Hypothyroidism  H/o ablation for Graves  · Thyroid panel is indicative of possible medication noncompliance (TSH elevated and free T4 is low)  · Continue levothyroxine 125 zaynab daily     Psoriatic arthritis  Rheumatoid arthritis  · Patient states he has never received treatment  · RF TORIE and Smith undetectable   · Normal  CRP, ESR     H/o EtOH abuse  · States sober since September 2022    Acute debility  · PT and OT  · Patient is living alone and high risk for fall. I think he will benefit from acute rehab upon discharge       Expected Discharge Location and Transportation: Rehab  Expected Discharge   Expected Discharge Date and Time     Expected Discharge Date Expected Discharge Time    Mar 16, 2023            DVT prophylaxis:  Medical and mechanical DVT prophylaxis orders are present.     AM-PAC 6 Clicks Score (PT): 17 (03/15/23 2146)    CODE STATUS:   Code Status and Medical Interventions:   Ordered at: 03/11/23 5702     Level Of Support Discussed With:    Patient     Code Status  (Patient has no pulse and is not breathing):    CPR (Attempt to Resuscitate)     Medical Interventions (Patient has pulse or is breathing):    Full Support       Ronda Royal MD  03/15/23        Electronically signed by Ronda Royal MD at 03/15/23 1318       Consult Notes (last 72 hours)  Notes from 03/14/23 1350 through 03/17/23 1350   No notes of this type exist for this encounter.

## 2023-03-17 NOTE — PROGRESS NOTES
Clinical Nutrition     Nutrition Support Assessment  Reason for Visit: Follow-up protocol      Patient Name: Monty Nagel  YOB: 1955  MRN: [id number]  Date of Encounter: 03/17/23 11:44 EDT  Admission date: 3/11/2023    Comments:         Nutrition Assessment   Admission Diagnosis:  Generalized weakness [R53.1]    Problem List:    Balance problem    Essential hypertension    Postablative hypothyroidism    Other recurrent depressive disorders (HCC)    Generalized weakness    Atrial fibrillation (HCC)    Severe malnutrition (HCC)    Brain tumor (HCC)    Blind in rt eye    Grade III Meningioma w edema - dx per Neuro 3/12      PMH:   He  has a past medical history of Arthritis, Hypertension, and Melanoma (HCC) (2017).Melanoma excised RA and Psoriatic Arthritis Hx EtOH use discontinued in Sept. Fall in Sept w dislocated shoulder and fx humerus    PSH:  He  has a past surgical history that includes Eye surgery (2017) and Tonsillectomy.      Applicable Nutrition Concerns:   Skin:  Oral:  GI:      Applicable Interval History:  Some contracture hands/feet noted on adm.   3/12 noted per SLP mild cognative linguistic d/o        Reported/Observed/Food/Nutrition Related History:     3/17  Pt with a good PO intake - observed 100% of breakfast. Expressed concern with consistency of oatmeal - educated on availability of food, verbalized understanding. Denies N/V/D.     3/12  Pt allows period of no intake was about 5 days within last month, not 2 wk as had reported earlier today. But, was indeed period of ~ no intake. Rpts liking hospital food very much, eating now w enthusiasm.       Labs    Labs Reviewed: Yes     Results from last 7 days   Lab Units 03/14/23  0658 03/13/23  0736 03/12/23  1033 03/11/23  1052   GLUCOSE mg/dL  --  105* 140* 95   BUN mg/dL  --  14 10 14   CREATININE mg/dL  --  0.83 0.75* 0.99   SODIUM mmol/L  --  133* 138 135*   CHLORIDE mmol/L  --  100 100 95*   POTASSIUM mmol/L  --   "4.2 4.0 3.5   PHOSPHORUS mg/dL  --  2.4*  --   --    MAGNESIUM mg/dL 2.0 1.6  --  1.8   ALT (SGPT) U/L  --  13  --  14       Results from last 7 days   Lab Units 03/13/23  0736 03/12/23  1033 03/11/23  1052   ALBUMIN g/dL 3.5  --  4.0   CRP mg/dL  --   --  <0.30   CHOLESTEROL mg/dL  --  233*  --    TRIGLYCERIDES mg/dL  --  50  --        Results from last 7 days   Lab Units 03/11/23  1102   GLUCOSE mg/dL 65*     Lab Results   Lab Value Date/Time    HGBA1C 5.40 03/11/2023 1052    HGBA1C 5.30 11/15/2019 0936     Results from last 7 days   Lab Units 03/12/23  1033   URIC ACID mg/dL 5.3             Medications    Medications Reviewed: Yes  Pertinent: Steroid, Keppra, Protonix, Pericolace, Thiamin  Infusion:  PRN:       Intake/Ouptut 24 hrs (0701 - 0700)   I&O's Reviewed: Yes     Intake & Output (last day)       03/16 0701  03/17 0700 03/17 0701  03/18 0700    P.O.  600    Total Intake(mL/kg)  600 (7.4)    Urine (mL/kg/hr) 800 (0.4)     Stool 0     Total Output 800     Net -800 +600          Urine Unmeasured Occurrence 1 x     Stool Unmeasured Occurrence 1 x         No stool recorded    Anthropometrics     Admission Height 175.3 cm (69\") Documented at 03/11/2023 1049   Admission Weight 93.9 kg (207 lb) Documented at 03/11/2023 1049Wt fr   Wt from Dec 2023      Height: Height: 175.3 cm (69.02\")  Last Filed Weight: Weight: 80.6 kg (177 lb 11.1 oz) (03/12/23 1450)  Method: Weight Method: Standing scale  BMI: BMI (Calculated): 26.2  BMI classification: Overweight: 25.0-29.9kg/m2     UBW: Wt of 207 lbs on 12/14/22  Weight change: Loss of 29 lbs 14% body wt over 3mo    Nutrition Focused Physical Exam     Date: 3/12    Patient meets criteria for severe acute alnutrition diagnosis, see MSA note.    Current Nutrition Prescription     PO: Diet: Regular/House Diet; Texture: Regular Texture (IDDSI 7); Fluid Consistency: Thin (IDDSI 0)  Oral Nutrition Supplement: Boost Plus 3x/da  Intake: 1 Day: 100% x 3 meals      Nutrition Diagnosis "   Date: 3/12 Updated:   Problem Malnutrition  acute severe   Etiology Period of severe depressed intake    Signs/Symptoms Wt intake hx w wasting   Status:       Goal:   General: Nutrition to support treatment  PO: Maintain intake  EN/PN: N/A    Nutrition Intervention      Follow treatment progress, Care plan reviewed, Advise alternate selection, Advised available snacks, Encourage intake        Monitoring/Evaluation:   Per protocol, I&O, PO intake, Supplement intake, Pertinent labs, Weight, Symptoms      Nicolle Benson, MS,RD,LD  Time Spent: 25 min

## 2023-03-17 NOTE — NURSING NOTE
"Charge RN spoke with patient about his concerns. Patient cannot specify what his exact complaints are only that \"what we're doing is not working for him\" and that he \"has tried to comply\". Charge RN did discern that patient is unhappy having his IV hooked up continuous and that he has a bed alarm on. Charge RN explained the need for the bed alarm for patient safety and unhooked the KVO IV fluids. Patient is oriented to self and place, somewhat oriented to situation. Patient still very repetitive in voicing his concerns even after education. Bed alarm remains on for patient safety.   "

## 2023-03-18 PROCEDURE — 99232 SBSQ HOSP IP/OBS MODERATE 35: CPT | Performed by: INTERNAL MEDICINE

## 2023-03-18 PROCEDURE — 63710000001 DEXAMETHASONE PER 0.25 MG: Performed by: INTERNAL MEDICINE

## 2023-03-18 PROCEDURE — 25010000002 ENOXAPARIN PER 10 MG: Performed by: NURSE PRACTITIONER

## 2023-03-18 RX ADMIN — THIAMINE HCL TAB 100 MG 100 MG: 100 TAB at 12:33

## 2023-03-18 RX ADMIN — DEXAMETHASONE 4 MG: 4 TABLET ORAL at 00:25

## 2023-03-18 RX ADMIN — ATORVASTATIN CALCIUM 40 MG: 40 TABLET, FILM COATED ORAL at 21:08

## 2023-03-18 RX ADMIN — PANTOPRAZOLE SODIUM 40 MG: 40 TABLET, DELAYED RELEASE ORAL at 06:13

## 2023-03-18 RX ADMIN — DEXAMETHASONE 4 MG: 4 TABLET ORAL at 12:33

## 2023-03-18 RX ADMIN — DEXAMETHASONE 4 MG: 4 TABLET ORAL at 06:13

## 2023-03-18 RX ADMIN — LEVETIRACETAM 1000 MG: 500 TABLET, FILM COATED ORAL at 21:07

## 2023-03-18 RX ADMIN — ZOLPIDEM TARTRATE 5 MG: 5 TABLET, FILM COATED ORAL at 21:09

## 2023-03-18 RX ADMIN — DEXAMETHASONE 4 MG: 4 TABLET ORAL at 17:26

## 2023-03-18 RX ADMIN — ENOXAPARIN SODIUM 40 MG: 40 INJECTION SUBCUTANEOUS at 09:57

## 2023-03-18 RX ADMIN — LEVOTHYROXINE SODIUM 125 MCG: 125 TABLET ORAL at 09:57

## 2023-03-18 RX ADMIN — LEVETIRACETAM 1000 MG: 500 TABLET, FILM COATED ORAL at 09:57

## 2023-03-18 NOTE — PROGRESS NOTES
[facility]  PROGRESS NOTE    Patient Name: Monty Nagel  : 1955  MRN: [id number]    Date of Admission: 3/11/2023  Primary Care Physician: Tiffany Morales MD    Subjective   Subjective     CC:  Follow-up for balance issues    HPI:  No new pain, no dyspnea, no n/v/d  ROS:  Gen- No fevers, chills  CV- No chest pain, palpitations  Resp- No cough, dyspnea  GI- No N/V/D, abd pain      Objective   Objective     Vital Signs:   Temp:  [97.8 °F (36.6 °C)-98.3 °F (36.8 °C)] 98.3 °F (36.8 °C)  Heart Rate:  [74-92] (P) 81  Resp:  [18] 18  BP: (112-135)/(70-98) 112/70     Physical Exam:  Constitutional: No acute distress, awake, alert, no distress, normal work of breathing  HENT: NCAT, mucous membranes moist  Respiratory: Clear to auscultation bilaterally, respiratory effort normal   Cardiovascular: RRR, no murmurs, rubs, or gallops  Gastrointestinal: Positive bowel sounds, soft, nontender, nondistended  Musculoskeletal: No bilateral ankle edema  Psychiatric: Appropriate affect, cooperative  Neurologic: Oriented x 3, OVIEDO, speech clear  Skin: No rashes noted    Results Reviewed:  LAB RESULTS:      Lab 23  0736 23  1033   WBC 10.48 5.83   HEMOGLOBIN 12.9* 13.3   HEMATOCRIT 38.3 39.5   PLATELETS 174 152   NEUTROS ABS 9.41*  --    IMMATURE GRANS (ABS) 0.05  --    LYMPHS ABS 0.71  --    MONOS ABS 0.30  --    EOS ABS 0.00  --    MCV 88.0 87.4   SED RATE  --  16         Lab 23  0658 23  0736 23  1033   SODIUM  --  133* 138   POTASSIUM  --  4.2 4.0   CHLORIDE  --  100 100   CO2  --  25.0 28.0   ANION GAP  --  8.0 10.0   BUN  --  14 10   CREATININE  --  0.83 0.75*   EGFR  --  94.7 97.7   GLUCOSE  --  105* 140*   CALCIUM  --  8.8 8.9   MAGNESIUM 2.0 1.6  --    PHOSPHORUS  --  2.4*  --    TSH  --   --  9.210*         Lab 23  0736   TOTAL PROTEIN 6.2   ALBUMIN 3.5   GLOBULIN 2.7   ALT (SGPT) 13   AST (SGOT) 17   BILIRUBIN 0.6   ALK PHOS 61              Lab 03/12/23  1033   CHOLESTEROL 233*   LDL CHOL 178*   HDL CHOL 47   TRIGLYCERIDES 50         Lab 03/12/23  1033   IRON 122   IRON SATURATION 56*   TIBC 219*   TRANSFERRIN 147*   FOLATE 11.00   VITAMIN B 12 1,689*         Brief Urine Lab Results  (Last result in the past 365 days)      Color   Clarity   Blood   Leuk Est   Nitrite   Protein   CREAT   Urine HCG        03/11/23 1058 Dark Yellow   Clear   Negative   Negative   Negative   Negative                 Microbiology Results Abnormal     None          No radiology results from the last 24 hrs    Results for orders placed during the hospital encounter of 03/11/23    Adult Transthoracic Echo Complete w/ Color, Spectral and Contrast if necessary per protocol    Interpretation Summary  •  Left ventricular systolic function is normal. Estimated left ventricular EF = 55%  •  Biatrial enlargement.  •  Calcified aortic valve with mild aortic stenosis, mean gradient 10 mmHg, BANG 1.6 cm².  •  Moderate aortic insufficiency.  •  Mild mitral regurgitation.  •  Mild tricuspid regurgitation with normal RVSP.  •  Mild dilation of the ascending aorta (4.2 cm) is present.      Current medications:  Scheduled Meds:atorvastatin, 40 mg, Oral, Nightly  dexamethasone, 4 mg, Oral, Q6H  enoxaparin, 40 mg, Subcutaneous, Daily  levETIRAcetam, 1,000 mg, Oral, Q12H  levothyroxine, 125 mcg, Oral, Daily  pantoprazole, 40 mg, Oral, Q AM  polyethylene glycol, 17 g, Oral, Daily  senna-docusate sodium, 2 tablet, Oral, BID  zolpidem, 5 mg, Oral, Nightly      Continuous Infusions:   PRN Meds:.•  acetaminophen **OR** acetaminophen **OR** acetaminophen  •  senna-docusate sodium **AND** polyethylene glycol **AND** bisacodyl **AND** bisacodyl  •  Magnesium Standard Dose Replacement - Initiate Nurse / BPA Driven Protocol  •  melatonin  •  ondansetron **OR** ondansetron  •  Phosphorus Replacement - Initiate Nurse / BPA Driven Protocol  •  Potassium Replacement - Initiate Nurse / BPA Driven Protocol  •   sodium chloride    Assessment & Plan   Assessment & Plan     Active Hospital Problems    Diagnosis  POA   • **Balance problem [R26.89]  Yes   • Severe malnutrition (HCC) [E43]  Yes   • Generalized weakness [R53.1]  Yes   • Atrial fibrillation (HCC) [I48.91]  Yes   • Brain tumor (HCC) [D49.6]  Unknown   • Other recurrent depressive disorders (HCC) [F33.8]  Yes   • Postablative 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        Brief Hospital Course to date:  Monty Nagel is a 67 y.o. male PMH significant for balance problems, alcohol abuse, depression, HTN, hypothyroidism s/p thyroidectomy for Graves dz who presents to [facility] ED with a several month history of difficulty with balance and gait.      Assessment and plan:    Right frontal meningioma with vasogenic edema and midline shift  Generalized weakness  Ataxia with frequent falls  · MRI brain showed: 6.4 cm homogeneous enhancing mass that appears to be extra-axial along right frontal convexity, favored to represent meningioma. This mass results in significant mass effect and vasogenic edema in the right frontal lobe with 1.3 cm right to left midline shift.  · Decadron. Continue QID and empiric keppra  · Neurosurgery consultation:  Recommending surgery.  Patient amenable.  Plan tentatively would be embolization on Monday 3/20/23 and , right craniotomy for tumor resection 07:30 Tuesday morning.  Holding asa/plavix (per neurosurg recs), dvt prophylaxis ok  · Unable to discharge patient because he cannot walk safely - requiring assistance and constant cueing when up with PT, lives alone and has no friends or family to help.      Etohic x 50 yrs  Severe malnutrition   Unclear compliance w synthroid   · quit 5 months ago  · s/p IV thiamine  · PT and OT   · CM consult, APS referral made from ED, poor living conditions   · thiamine    Possible PAD  · Carotid US no signif stenosis       Atrial fibrillation  CHadsVasc of 2   · New diagnosis; currently rate controlled without medications  · Echo:  EF 55, biatrial enlargement, mod AI  · Hold off oral anticoagulation given his new finding of extensive meningioma that will likely need surgical intervention;   · Cardiology referral upon discharge    Dyslipidemia  ·   · Started high intensity statin, Lipitor     Essential hypertension  · Hold antihypertensive medication given his borderline blood pressure     Hypothyroidism  H/o ablation for Graves  · Thyroid panel is indicative of possible medication noncompliance (TSH elevated and free T4 is low)  · Continue levothyroxine 125 mcg daily     Psoriatic arthritis  Rheumatoid arthritis  · Patient states he has never received treatment  · RF TORIE and Smith undetectable   · Normal  CRP, ESR     H/o EtOH abuse  · States sober since September 2022    Acute debility  · PT and OT  · Patient is living alone and high risk for fall. I think he will benefit from acute rehab upon discharge     Am labs: cbc,bmp,mag      Expected Discharge Location and Transportation: Rehab  Expected Discharge   Expected Discharge Date and Time     Expected Discharge Date Expected Discharge Time    Mar 24, 2023            DVT prophylaxis:  Medical and mechanical DVT prophylaxis orders are present.     AM-PAC 6 Clicks Score (PT): 17 (03/18/23 0800)    CODE STATUS:   Code Status and Medical Interventions:   Ordered at: 03/11/23 1819     Level Of Support Discussed With:    Patient     Code Status (Patient has no pulse and is not breathing):    CPR (Attempt to Resuscitate)     Medical Interventions (Patient has pulse or is breathing):    Full Support       Jay Jay Box MD  03/18/23

## 2023-03-18 NOTE — PLAN OF CARE
Goal Outcome Evaluation:   Patient is very unsteady on his feet. Encouraged use of the walker instead of the cane for better control. RA, non-tele, no IV access. VSS. Will continue to monitor.

## 2023-03-18 NOTE — PLAN OF CARE
Goal Outcome Evaluation:              Outcome Evaluation: (P) VSS on RA. Tolerating regular diet well. Ambulates with cane to bathroom, unsteady gait. Patient slept most of day. No complaints of pain or nausea. Plan of care ongoing.

## 2023-03-19 LAB
ANION GAP SERPL CALCULATED.3IONS-SCNC: 6 MMOL/L (ref 5–15)
BUN SERPL-MCNC: 22 MG/DL (ref 8–23)
BUN/CREAT SERPL: 26.8 (ref 7–25)
CALCIUM SPEC-SCNC: 7.9 MG/DL (ref 8.6–10.5)
CHLORIDE SERPL-SCNC: 97 MMOL/L (ref 98–107)
CO2 SERPL-SCNC: 29 MMOL/L (ref 22–29)
CREAT SERPL-MCNC: 0.82 MG/DL (ref 0.76–1.27)
DEPRECATED RDW RBC AUTO: 47.6 FL (ref 37–54)
EGFRCR SERPLBLD CKD-EPI 2021: 95.1 ML/MIN/1.73
ERYTHROCYTE [DISTWIDTH] IN BLOOD BY AUTOMATED COUNT: 15 % (ref 12.3–15.4)
GLUCOSE SERPL-MCNC: 109 MG/DL (ref 65–99)
HCT VFR BLD AUTO: 34.7 % (ref 37.5–51)
HGB BLD-MCNC: 11.9 G/DL (ref 13–17.7)
MAGNESIUM SERPL-MCNC: 1.9 MG/DL (ref 1.6–2.4)
MCH RBC QN AUTO: 30.1 PG (ref 26.6–33)
MCHC RBC AUTO-ENTMCNC: 34.3 G/DL (ref 31.5–35.7)
MCV RBC AUTO: 87.6 FL (ref 79–97)
PLATELET # BLD AUTO: 186 10*3/MM3 (ref 140–450)
PMV BLD AUTO: 9.5 FL (ref 6–12)
POTASSIUM SERPL-SCNC: 4.7 MMOL/L (ref 3.5–5.2)
RBC # BLD AUTO: 3.96 10*6/MM3 (ref 4.14–5.8)
SODIUM SERPL-SCNC: 132 MMOL/L (ref 136–145)
WBC NRBC COR # BLD: 7.48 10*3/MM3 (ref 3.4–10.8)

## 2023-03-19 PROCEDURE — 63710000001 DEXAMETHASONE PER 0.25 MG: Performed by: INTERNAL MEDICINE

## 2023-03-19 PROCEDURE — 80048 BASIC METABOLIC PNL TOTAL CA: CPT | Performed by: INTERNAL MEDICINE

## 2023-03-19 PROCEDURE — 97110 THERAPEUTIC EXERCISES: CPT

## 2023-03-19 PROCEDURE — 99232 SBSQ HOSP IP/OBS MODERATE 35: CPT | Performed by: INTERNAL MEDICINE

## 2023-03-19 PROCEDURE — 83735 ASSAY OF MAGNESIUM: CPT | Performed by: INTERNAL MEDICINE

## 2023-03-19 PROCEDURE — 97116 GAIT TRAINING THERAPY: CPT

## 2023-03-19 PROCEDURE — 25010000002 ENOXAPARIN PER 10 MG: Performed by: NURSE PRACTITIONER

## 2023-03-19 PROCEDURE — 85027 COMPLETE CBC AUTOMATED: CPT | Performed by: INTERNAL MEDICINE

## 2023-03-19 RX ADMIN — ZOLPIDEM TARTRATE 5 MG: 5 TABLET, FILM COATED ORAL at 20:19

## 2023-03-19 RX ADMIN — DEXAMETHASONE 4 MG: 4 TABLET ORAL at 12:38

## 2023-03-19 RX ADMIN — PANTOPRAZOLE SODIUM 40 MG: 40 TABLET, DELAYED RELEASE ORAL at 05:56

## 2023-03-19 RX ADMIN — DEXAMETHASONE 4 MG: 4 TABLET ORAL at 23:28

## 2023-03-19 RX ADMIN — LEVETIRACETAM 1000 MG: 500 TABLET, FILM COATED ORAL at 07:57

## 2023-03-19 RX ADMIN — ATORVASTATIN CALCIUM 40 MG: 40 TABLET, FILM COATED ORAL at 20:19

## 2023-03-19 RX ADMIN — ENOXAPARIN SODIUM 40 MG: 40 INJECTION SUBCUTANEOUS at 08:00

## 2023-03-19 RX ADMIN — DEXAMETHASONE 4 MG: 4 TABLET ORAL at 18:22

## 2023-03-19 RX ADMIN — DEXAMETHASONE 4 MG: 4 TABLET ORAL at 00:03

## 2023-03-19 RX ADMIN — POLYETHYLENE GLYCOL 3350 17 G: 17 POWDER, FOR SOLUTION ORAL at 07:57

## 2023-03-19 RX ADMIN — LEVOTHYROXINE SODIUM 125 MCG: 125 TABLET ORAL at 07:57

## 2023-03-19 RX ADMIN — LEVETIRACETAM 1000 MG: 500 TABLET, FILM COATED ORAL at 20:19

## 2023-03-19 RX ADMIN — THIAMINE HCL TAB 100 MG 100 MG: 100 TAB at 07:57

## 2023-03-19 RX ADMIN — DEXAMETHASONE 4 MG: 4 TABLET ORAL at 05:56

## 2023-03-19 NOTE — PLAN OF CARE
Goal Outcome Evaluation:  Plan of Care Reviewed With: patient        Progress: improving  Outcome Evaluation: Patient able to progress ambulation distance to 75'+75' with modAx1 and FWW taking 1 standing rest break. He continues to require multiple safety cues for completion of all mobility. He is primarily limited by his safety awareness, but also demonstrates fatigue with mobility. IPPT is indicated to address functional mobility below baseline and improve safety awareness. Will continue with current PT POC with D/C rec to IPR.

## 2023-03-19 NOTE — PROGRESS NOTES
Psychiatric Medicine Services  PROGRESS NOTE    Patient Name: Monty Nagel  : 1955  MRN: [id number]    Date of Admission: 3/11/2023  Primary Care Physician: Tiffany Morales MD    Subjective   Subjective     CC:  Follow-up for balance issues    HPI:  No pain, no dyspnea  ROS:  Gen- No fevers, chills  CV- No chest pain, palpitations  Resp- No cough, dyspnea  GI- No N/V/D, abd pain      Objective   Objective     Vital Signs:   Temp:  [97.8 °F (36.6 °C)-98.5 °F (36.9 °C)] 98.5 °F (36.9 °C)  Heart Rate:  [60-93] 90  Resp:  [16] 16  BP: (103-147)/() 117/80     Physical Exam:  Constitutional: No acute distress, awake, alert, no distress,normal work of breathing  HENT: NCAT, mucous membranes moist  Respiratory: ctab  Cardiovascular: RRR, no murmurs, rubs, or gallops  Gastrointestinal: Positive bowel sounds, soft, nontender, nondistended  Musculoskeletal: No bilateral ankle edema  Psychiatric: Appropriate affect, cooperative  Neurologic: oriented x 3, moves all extremities  Skin: No rashes noted    Results Reviewed:  LAB RESULTS:      Lab 23  0706 23  0736   WBC 7.48 10.48   HEMOGLOBIN 11.9* 12.9*   HEMATOCRIT 34.7* 38.3   PLATELETS 186 174   NEUTROS ABS  --  9.41*   IMMATURE GRANS (ABS)  --  0.05   LYMPHS ABS  --  0.71   MONOS ABS  --  0.30   EOS ABS  --  0.00   MCV 87.6 88.0         Lab 23  0706 23  0658 23  0736   SODIUM 132*  --  133*   POTASSIUM 4.7  --  4.2   CHLORIDE 97*  --  100   CO2 29.0  --  25.0   ANION GAP 6.0  --  8.0   BUN 22  --  14   CREATININE 0.82  --  0.83   EGFR 95.1  --  94.7   GLUCOSE 109*  --  105*   CALCIUM 7.9*  --  8.8   MAGNESIUM 1.9 2.0 1.6   PHOSPHORUS  --   --  2.4*         Lab 23  0736   TOTAL PROTEIN 6.2   ALBUMIN 3.5   GLOBULIN 2.7   ALT (SGPT) 13   AST (SGOT) 17   BILIRUBIN 0.6   ALK PHOS 61                     Brief Urine Lab Results  (Last result in the past 365 days)      Color   Clarity   Blood   Leuk Est    Nitrite   Protein   CREAT   Urine HCG        03/11/23 1058 Dark Yellow   Clear   Negative   Negative   Negative   Negative                 Microbiology Results Abnormal     None          No radiology results from the last 24 hrs    Results for orders placed during the hospital encounter of 03/11/23    Adult Transthoracic Echo Complete w/ Color, Spectral and Contrast if necessary per protocol    Interpretation Summary  •  Left ventricular systolic function is normal. Estimated left ventricular EF = 55%  •  Biatrial enlargement.  •  Calcified aortic valve with mild aortic stenosis, mean gradient 10 mmHg, BANG 1.6 cm².  •  Moderate aortic insufficiency.  •  Mild mitral regurgitation.  •  Mild tricuspid regurgitation with normal RVSP.  •  Mild dilation of the ascending aorta (4.2 cm) is present.      Current medications:  Scheduled Meds:atorvastatin, 40 mg, Oral, Nightly  dexamethasone, 4 mg, Oral, Q6H  enoxaparin, 40 mg, Subcutaneous, Daily  levETIRAcetam, 1,000 mg, Oral, Q12H  levothyroxine, 125 mcg, Oral, Daily  pantoprazole, 40 mg, Oral, Q AM  polyethylene glycol, 17 g, Oral, Daily  senna-docusate sodium, 2 tablet, Oral, BID  thiamine, 100 mg, Oral, Daily  zolpidem, 5 mg, Oral, Nightly      Continuous Infusions:   PRN Meds:.•  acetaminophen **OR** acetaminophen **OR** acetaminophen  •  senna-docusate sodium **AND** polyethylene glycol **AND** bisacodyl **AND** bisacodyl  •  Magnesium Standard Dose Replacement - Initiate Nurse / BPA Driven Protocol  •  melatonin  •  ondansetron **OR** ondansetron  •  Phosphorus Replacement - Initiate Nurse / BPA Driven Protocol  •  Potassium Replacement - Initiate Nurse / BPA Driven Protocol  •  sodium chloride    Assessment & Plan   Assessment & Plan     Active Hospital Problems    Diagnosis  POA   • **Balance problem [R26.89]  Yes   • Severe malnutrition (HCC) [E43]  Yes   • Generalized weakness [R53.1]  Yes   • Atrial fibrillation (HCC) [I48.91]  Yes   • Brain tumor (HCC) [D49.6]   Unknown   • Other recurrent depressive disorders (HCC) [F33.8]  Yes   • Postablative 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        Brief Hospital Course to date:  Monty Nagel is a 67 y.o. male PMH significant for balance problems, alcohol abuse, depression, HTN, hypothyroidism s/p thyroidectomy for Graves dz who presents to [facility] ED with a several month history of difficulty with balance and gait.      Assessment and plan:    Right frontal meningioma with vasogenic edema and midline shift  Generalized weakness  Ataxia with frequent falls  · MRI brain showed: 6.4 cm homogeneous enhancing mass that appears to be extra-axial along right frontal convexity, favored to represent meningioma. This mass results in significant mass effect and vasogenic edema in the right frontal lobe with 1.3 cm right to left midline shift.  · Continue decadron 4mg q6h.. Continue QID and empiric Keppra  · Neurosurgery consultation:  Recommending surgery.  Patient amenable.  Plan tentatively would be embolization on Monday 3/20/23 and , right craniotomy for tumor resection 07:30 Tuesday morning.  Holding asa/plavix (per neurosurg recs), dvt prophylaxis ok  · Unable to discharge patient because he cannot walk safely - requiring assistance and constant cueing when up with PT, lives alone and has no friends or family to help    Etohic x 50 yrs  Severe malnutrition   Unclear compliance w synthroid   · quit 5 months ago  · s/p IV thiamine  · PT and OT   · CM consult, APS referral made from ED, poor living conditions   · thiamine    Possible PAD  · Carotid US no signif stenosis      Atrial fibrillation  CHadsVasc of 2   · New diagnosis; currently rate controlled without medications  · Echo:  EF 55, biatrial enlargement, mod AI  · Hold off oral anticoagulation given his new finding of extensive meningioma that will likely need surgical intervention;    · Cardiology referral upon discharge    Dyslipidemia  ·   · Started high intensity statin, Lipitor     Essential hypertension  · Hold antihypertensive medication given his borderline blood pressure     Hypothyroidism  H/o ablation for Graves  · Thyroid panel is indicative of possible medication noncompliance (TSH elevated and free T4 is low)  · Continue levothyroxine 125 mcg daily     Psoriatic arthritis  Rheumatoid arthritis  · Patient states he has never received treatment  · RF TORIE and Smith undetectable   · Normal  CRP, ESR     H/o EtOH abuse  · States sober since September 2022    Acute debility  · PT and OT  · Patient is living alone and high risk for fall. I think he will benefit from acute rehab upon discharge         Expected Discharge Location and Transportation: Rehab  Expected Discharge ? 3/24/23 when ok w/ neurosurgery after procedure    DVT prophylaxis:  Medical and mechanical DVT prophylaxis orders are present.     AM-PAC 6 Clicks Score (PT): 17 (03/19/23 1149)    CODE STATUS:   Code Status and Medical Interventions:   Ordered at: 03/11/23 7719     Level Of Support Discussed With:    Patient     Code Status (Patient has no pulse and is not breathing):    CPR (Attempt to Resuscitate)     Medical Interventions (Patient has pulse or is breathing):    Full Support       Jay Jay Box MD  03/19/23

## 2023-03-19 NOTE — NURSING NOTE
Patient mag level 1.9. Per protocol Magnesium should be replaced. Patient continues to refuse having IV placed. Dr. Box notified. No new orders.

## 2023-03-19 NOTE — PLAN OF CARE
Goal Outcome Evaluation:           Progress: improving  Outcome Evaluation: VSS on RA. Ambulates to bathroom with walker and standby assist. Patient slept in and off throughout the night. No complaints of pain or nausea. Will continue with plan of care.

## 2023-03-19 NOTE — THERAPY TREATMENT NOTE
Patient Name: Monty Nagel  : 1955    MRN: [id number]                              Today's Date: 3/19/2023       Admit Date: 3/11/2023    Visit Dx:     ICD-10-CM ICD-9-CM   1. Generalized weakness  R53.1 780.79   2. History of alcoholism (HCC)  F10.21 V11.3   3. Inability to walk  R26.2 719.7   4. Confusion  R41.0 298.9   5. Hypoglycemia  E16.2 251.2   6. Poor nutrition  E63.9 269.9   7. Cognitive communication deficit  R41.841 799.52   8. Impaired functional mobility, balance, gait, and endurance  Z74.09 V49.89   9. Brain tumor (HCC)  D49.6 239.6     Patient Active Problem List   Diagnosis   • Essential hypertension   • History of melanoma   • Postablative hypothyroidism   • Screen for colon cancer   • Other recurrent depressive disorders (HCC)   • Balance problem   • Generalized weakness   • Atrial fibrillation (HCC)   • Severe malnutrition (HCC)   • Brain tumor (HCC)     Past Medical History:   Diagnosis Date   • Arthritis    • Hypertension    • Melanoma (HCC) 2017    retina     Past Surgical History:   Procedure Laterality Date   • EYE SURGERY  2017    EYE CANCER RIGHT EYE   • TONSILLECTOMY        General Information     Row Name 23 1137          Physical Therapy Time and Intention    Document Type therapy note (daily note)  -CM     Mode of Treatment physical therapy;individual therapy  -CM     Row Name 23 1137          General Information    Patient Profile Reviewed yes  -CM     Existing Precautions/Restrictions fall;other (see comments)  confused and impulsive  -CM     Barriers to Rehab medically complex;cognitive status;visual deficit  -CM     Row Name 23 1137          Cognition    Orientation Status (Cognition) oriented to;person  -CM     Row Name 23 1137          Safety Issues, Functional Mobility    Safety Issues Affecting Function (Mobility) awareness of need for assistance;insight into deficits/self-awareness;judgment;problem-solving;safety precaution awareness;safety  precautions follow-through/compliance;sequencing abilities  -CM     Impairments Affecting Function (Mobility) balance;cognition;coordination;endurance/activity tolerance;strength;postural/trunk control  -CM           User Key  (r) = Recorded By, (t) = Taken By, (c) = Cosigned By    Initials Name Provider Type    CM Yanet Calvin, PT Physical Therapist               Mobility     Row Name 03/19/23 1138          Bed Mobility    Bed Mobility supine-sit-supine  -CM     Supine-Sit-Supine Gill (Bed Mobility) supervision  -CM     Assistive Device (Bed Mobility) bed rails  -CM     Comment, (Bed Mobility) VCs provided for sequencing, no physical assist required  -CM     Row Name 03/19/23 1138          Sit-Stand Transfer    Sit-Stand Gill (Transfers) contact guard;verbal cues;1 person assist  -CM     Assistive Device (Sit-Stand Transfers) walker, front-wheeled  -CM     Comment, (Sit-Stand Transfer) cues for safe hand and foot placement inside walker, patient impulsive and stands with feet outside walker pulling up on walker with both hands. Patient is responsive to cues upon return to bed with ability to bring walker all the way back to bed and reach for bedrail to sit  -CM     Row Name 03/19/23 1138          Gait/Stairs (Locomotion)    Gill Level (Gait) moderate assist (50% patient effort);1 person assist;verbal cues  primarily Arturo Padilla for 2 LOB  -CM     Assistive Device (Gait) walker, front-wheeled  -CM     Distance in Feet (Gait) 75+75  -CM     Deviations/Abnormal Patterns (Gait) bilateral deviations;base of support, narrow;dima decreased;festinating/shuffling;stride length decreased  -CM     Bilateral Gait Deviations forward flexed posture;heel strike decreased  -CM     Left Sided Gait Deviations foot drop/toe drag  -CM     Comment, (Gait/Stairs) Patient ambulated in hallway with one standing rest break. He demonstrates a variable gait pattern initially step through progressing to step  to depending on LLE clearance. He demonstrates L foot drag requiring cues for improved foot clearance with moderate improvement in gait quickly reverting back to decreased foot clearance. He has 2 LOB due to L foot dragging ground requiring modA to correct, otherwise Randy. Cues for upright posture and safe proximity of AD, however patient minimally receptive to postural cues and continues to require assist with management of AD.  -CM           User Key  (r) = Recorded By, (t) = Taken By, (c) = Cosigned By    Initials Name Provider Type    CM Yanet Calvin, PT Physical Therapist               Obj/Interventions     Row Name 03/19/23 1144          Hip (Therapeutic Exercise)    Hip (Therapeutic Exercise) strengthening exercise  -CM     Hip Strengthening (Therapeutic Exercise) bilateral;marching while seated;aBduction;aDduction;10 repetitions  -CM     Row Name 03/19/23 1144          Knee (Therapeutic Exercise)    Knee (Therapeutic Exercise) strengthening exercise  -CM     Knee Strengthening (Therapeutic Exercise) LAQ (long arc quad);10 repetitions  -CM     Row Name 03/19/23 1144          Ankle (Therapeutic Exercise)    Ankle (Therapeutic Exercise) strengthening exercise  -CM     Ankle Strengthening (Therapeutic Exercise) bilateral;dorsiflexion;plantarflexion;10 repetitions  -CM     Row Name 03/19/23 1144          Balance    Balance Assessment sitting static balance;standing static balance;standing dynamic balance  -CM     Static Sitting Balance supervision  -CM     Dynamic Sitting Balance contact guard  -CM     Position, Sitting Balance unsupported;sitting edge of bed  -CM     Static Standing Balance contact guard  -CM     Dynamic Standing Balance moderate assist  -CM     Position/Device Used, Standing Balance supported;walker, front-wheeled  -CM     Comment, Balance 2 LOB with foot drag requiring modA to correct  -CM           User Key  (r) = Recorded By, (t) = Taken By, (c) = Cosigned By    Initials Name  Provider Type    Yanet Mo, PT Physical Therapist               Goals/Plan    No documentation.                Clinical Impression     Row Name 03/19/23 1144          Pain Scale: FACES Pre/Post-Treatment    Pain: FACES Scale, Pretreatment 0-->no hurt  -CM     Posttreatment Pain Rating 0-->no hurt  -CM     Row Name 03/19/23 1144          Plan of Care Review    Plan of Care Reviewed With patient  -CM     Progress improving  -CM     Outcome Evaluation Patient able to progress ambulation distance to 75'+75' with modAx1 and FWW taking 1 standing rest break. He continues to require multiple safety cues for completion of all mobility. He is primarily limited by his safety awareness, but also demonstrates fatigue with mobility. IPPT is indicated to address functional mobility below baseline and improve safety awareness. Will continue with current PT POC with D/C rec to IPR.  -CM     Row Name 03/19/23 1144          Vital Signs    Pre Systolic BP Rehab --  LIZZY RN cleared PT for treatment  -CM     O2 Delivery Pre Treatment room air  -CM     O2 Delivery Intra Treatment room air  -CM     O2 Delivery Post Treatment room air  -CM     Pre Patient Position Supine  -CM     Intra Patient Position Standing  -CM     Post Patient Position Supine  -CM     Row Name 03/19/23 1144          Positioning and Restraints    Pre-Treatment Position in bed  -CM     Post Treatment Position bed  -CM     In Bed fowlers;call light within reach;encouraged to call for assist;exit alarm on  -CM           User Key  (r) = Recorded By, (t) = Taken By, (c) = Cosigned By    Initials Name Provider Type    Yanet Mo, PT Physical Therapist               Outcome Measures     Row Name 03/19/23 1149 03/19/23 0800       How much help from another person do you currently need...    Turning from your back to your side while in flat bed without using bedrails? 4  -CM 3  -MR    Moving from lying on back to sitting on the side of a flat bed  without bedrails? 3  -CM 3  -MR    Moving to and from a bed to a chair (including a wheelchair)? 3  -CM 3  -MR    Standing up from a chair using your arms (e.g., wheelchair, bedside chair)? 3  -CM 3  -MR    Climbing 3-5 steps with a railing? 2  -CM 2  -MR    To walk in hospital room? 2  -CM 3  -MR    AM-PAC 6 Clicks Score (PT) 17  -CM 17  -MR    Highest level of mobility 5 --> Static standing  -CM 5 --> Static standing  -MR          User Key  (r) = Recorded By, (t) = Taken By, (c) = Cosigned By    Initials Name Provider Type    Sloan Shaffer, RN Registered Nurse    CM Yanet Calvin, PT Physical Therapist                             Physical Therapy Education     Title: PT OT SLP Therapies (In Progress)     Topic: Physical Therapy (Done)     Point: Mobility training (Done)     Learning Progress Summary           Patient Acceptance, E, VU,NR by CM at 3/19/2023 1149    Acceptance, E, VU by CM at 3/17/2023 1517    Acceptance, E, VU,NR by  at 3/15/2023 1407    Acceptance, E, NR by KG at 3/14/2023 1428    Acceptance, E, VU,NR by SJ at 3/12/2023 1542                   Point: Home exercise program (Done)     Learning Progress Summary           Patient Acceptance, E, VU,NR by CM at 3/19/2023 1149    Acceptance, E, VU,NR by LH at 3/15/2023 1407    Acceptance, E, NR by KG at 3/14/2023 1428                   Point: Body mechanics (Done)     Learning Progress Summary           Patient Acceptance, E, VU,NR by CM at 3/19/2023 1149    Acceptance, E, VU by CM at 3/17/2023 1517    Acceptance, E, VU,NR by  at 3/15/2023 1407    Acceptance, E, NR by KG at 3/14/2023 1428    Acceptance, E, VU,NR by  at 3/12/2023 1542                   Point: Precautions (Done)     Learning Progress Summary           Patient Acceptance, E, VU,NR by CM at 3/19/2023 1149    Acceptance, E, VU by CM at 3/17/2023 1517    Acceptance, E, VU,NR by  at 3/15/2023 1407    Acceptance, E, NR by KG at 3/14/2023 1428    Acceptance, E, VU,NR by BRUCE at  3/12/2023 1542                               User Key     Initials Effective Dates Name Provider Type Discipline    SJ 02/03/23 - 03/12/23 Samara Harrison, PT Physical Therapist PT    KG 05/22/20 -  Cherelle Gaitan, PT Physical Therapist PT    LH 09/21/21 -  Tristian Ravi, PT Physical Therapist PT    CM 09/22/22 -  Yanet Calvin PT Physical Therapist PT              PT Recommendation and Plan     Plan of Care Reviewed With: patient  Progress: improving  Outcome Evaluation: Patient able to progress ambulation distance to 75'+75' with modAx1 and FWW taking 1 standing rest break. He continues to require multiple safety cues for completion of all mobility. He is primarily limited by his safety awareness, but also demonstrates fatigue with mobility. IPPT is indicated to address functional mobility below baseline and improve safety awareness. Will continue with current PT POC with D/C rec to IPR.     Time Calculation:    PT Charges     Row Name 03/19/23 1149             Time Calculation    Start Time 1114  -CM      PT Received On 03/19/23  -CM      PT Goal Re-Cert Due Date 03/22/23  -CM         Timed Charges    74496 - PT Therapeutic Exercise Minutes 10  -CM      01489 - Gait Training Minutes  13  -CM         Total Minutes    Timed Charges Total Minutes 23  -CM       Total Minutes 23  -CM            User Key  (r) = Recorded By, (t) = Taken By, (c) = Cosigned By    Initials Name Provider Type    CM Yanet Calvin, RIC Physical Therapist              Therapy Charges for Today     Code Description Service Date Service Provider Modifiers Qty    47642013701 HC PT THER PROC EA 15 MIN 3/19/2023 Yanet Calvin, PT GP 1    67447246034 HC GAIT TRAINING EA 15 MIN 3/19/2023 Yanet Calvin, PT GP 1          PT G-Codes  Outcome Measure Options: AM-PAC 6 Clicks Basic Mobility (PT)  AM-PAC 6 Clicks Score (PT): 17  AM-PAC 6 Clicks Score (OT): 17  PT Discharge Summary  Anticipated Discharge Disposition  (PT): inpatient rehabilitation facility    Yanet Calvin, PT  3/19/2023

## 2023-03-19 NOTE — PLAN OF CARE
Goal Outcome Evaluation:               VSS. Awake and alert. On room air. Refuses IV access. Refuses to wear telemetry. MD aware. No complaints at present.

## 2023-03-20 ENCOUNTER — TELEPHONE (OUTPATIENT)
Dept: FAMILY MEDICINE CLINIC | Facility: CLINIC | Age: 68
End: 2023-03-20
Payer: MEDICARE

## 2023-03-20 ENCOUNTER — ANESTHESIA EVENT (OUTPATIENT)
Dept: PERIOP | Facility: HOSPITAL | Age: 68
End: 2023-03-20
Payer: MEDICARE

## 2023-03-20 PROBLEM — L40.50 PSORIATIC ARTHRITIS: Status: ACTIVE | Noted: 2023-03-20

## 2023-03-20 PROBLEM — D32.9 MENINGIOMA: Status: ACTIVE | Noted: 2023-03-11

## 2023-03-20 PROCEDURE — 36223 PLACE CATH CAROTID/INOM ART: CPT | Performed by: RADIOLOGY

## 2023-03-20 PROCEDURE — 76937 US GUIDE VASCULAR ACCESS: CPT | Performed by: RADIOLOGY

## 2023-03-20 PROCEDURE — 25010000002 HEPARIN (PORCINE) PER 1000 UNITS: Performed by: RADIOLOGY

## 2023-03-20 PROCEDURE — C1769 GUIDE WIRE: HCPCS | Performed by: RADIOLOGY

## 2023-03-20 PROCEDURE — B31CZZZ FLUOROSCOPY OF BILATERAL EXTERNAL CAROTID ARTERIES: ICD-10-PCS | Performed by: RADIOLOGY

## 2023-03-20 PROCEDURE — 61624 TCAT PERM OCCLS/EMBOLJ CNS: CPT | Performed by: RADIOLOGY

## 2023-03-20 PROCEDURE — 25010000002 FENTANYL CITRATE (PF) 50 MCG/ML SOLUTION: Performed by: RADIOLOGY

## 2023-03-20 PROCEDURE — C1887 CATHETER, GUIDING: HCPCS | Performed by: RADIOLOGY

## 2023-03-20 PROCEDURE — 99153 MOD SED SAME PHYS/QHP EA: CPT | Performed by: RADIOLOGY

## 2023-03-20 PROCEDURE — 99232 SBSQ HOSP IP/OBS MODERATE 35: CPT | Performed by: INTERNAL MEDICINE

## 2023-03-20 PROCEDURE — 36227 PLACE CATH XTRNL CAROTID: CPT | Performed by: RADIOLOGY

## 2023-03-20 PROCEDURE — 25010000002 MIDAZOLAM PER 1 MG: Performed by: RADIOLOGY

## 2023-03-20 PROCEDURE — 0 IODIXANOL PER 1 ML: Performed by: RADIOLOGY

## 2023-03-20 PROCEDURE — 75894 X-RAYS TRANSCATH THERAPY: CPT | Performed by: RADIOLOGY

## 2023-03-20 PROCEDURE — 63710000001 DEXAMETHASONE PER 0.25 MG

## 2023-03-20 PROCEDURE — C1894 INTRO/SHEATH, NON-LASER: HCPCS | Performed by: RADIOLOGY

## 2023-03-20 PROCEDURE — B315ZZZ FLUOROSCOPY OF BILATERAL COMMON CAROTID ARTERIES: ICD-10-PCS | Performed by: RADIOLOGY

## 2023-03-20 PROCEDURE — C1889 IMPLANT/INSERT DEVICE, NOC: HCPCS | Performed by: RADIOLOGY

## 2023-03-20 PROCEDURE — 75898 FOLLOW-UP ANGIOGRAPHY: CPT | Performed by: RADIOLOGY

## 2023-03-20 PROCEDURE — 03VG3DZ RESTRICTION OF INTRACRANIAL ARTERY WITH INTRALUMINAL DEVICE, PERCUTANEOUS APPROACH: ICD-10-PCS | Performed by: RADIOLOGY

## 2023-03-20 PROCEDURE — 63710000001 DEXAMETHASONE PER 0.25 MG: Performed by: INTERNAL MEDICINE

## 2023-03-20 PROCEDURE — 25010000002 LORAZEPAM PER 2 MG: Performed by: RADIOLOGY

## 2023-03-20 PROCEDURE — 99232 SBSQ HOSP IP/OBS MODERATE 35: CPT | Performed by: NURSE PRACTITIONER

## 2023-03-20 PROCEDURE — 99152 MOD SED SAME PHYS/QHP 5/>YRS: CPT | Performed by: RADIOLOGY

## 2023-03-20 DEVICE — 360 SOFT DETACHABLE COIL
Type: IMPLANTABLE DEVICE | Status: FUNCTIONAL
Brand: TARGET XL

## 2023-03-20 DEVICE — PARTICL EMB CONTRL PVA 250/355MH BX/5: Type: IMPLANTABLE DEVICE | Status: FUNCTIONAL

## 2023-03-20 RX ORDER — SODIUM CHLORIDE 9 MG/ML
40 INJECTION, SOLUTION INTRAVENOUS AS NEEDED
Status: DISCONTINUED | OUTPATIENT
Start: 2023-03-20 | End: 2023-04-03 | Stop reason: HOSPADM

## 2023-03-20 RX ORDER — FENTANYL CITRATE 50 UG/ML
INJECTION, SOLUTION INTRAMUSCULAR; INTRAVENOUS
Status: DISCONTINUED | OUTPATIENT
Start: 2023-03-20 | End: 2023-03-20 | Stop reason: HOSPADM

## 2023-03-20 RX ORDER — SODIUM CHLORIDE 0.9 % (FLUSH) 0.9 %
10 SYRINGE (ML) INJECTION EVERY 12 HOURS SCHEDULED
Status: DISCONTINUED | OUTPATIENT
Start: 2023-03-20 | End: 2023-04-03 | Stop reason: HOSPADM

## 2023-03-20 RX ORDER — SODIUM CHLORIDE 0.9 % (FLUSH) 0.9 %
10 SYRINGE (ML) INJECTION AS NEEDED
Status: DISCONTINUED | OUTPATIENT
Start: 2023-03-20 | End: 2023-04-03 | Stop reason: HOSPADM

## 2023-03-20 RX ORDER — NICARDIPINE HCL-0.9% SOD CHLOR 1 MG/10 ML
SYRINGE (ML) INTRAVENOUS
Status: DISCONTINUED | OUTPATIENT
Start: 2023-03-20 | End: 2023-03-20 | Stop reason: HOSPADM

## 2023-03-20 RX ORDER — LORAZEPAM 2 MG/ML
INJECTION INTRAMUSCULAR
Status: DISCONTINUED | OUTPATIENT
Start: 2023-03-20 | End: 2023-03-20 | Stop reason: HOSPADM

## 2023-03-20 RX ORDER — IODIXANOL 320 MG/ML
INJECTION, SOLUTION INTRAVASCULAR
Status: DISCONTINUED | OUTPATIENT
Start: 2023-03-20 | End: 2023-03-20 | Stop reason: HOSPADM

## 2023-03-20 RX ORDER — MIDAZOLAM HYDROCHLORIDE 1 MG/ML
INJECTION INTRAMUSCULAR; INTRAVENOUS
Status: DISCONTINUED | OUTPATIENT
Start: 2023-03-20 | End: 2023-03-20 | Stop reason: HOSPADM

## 2023-03-20 RX ORDER — LIDOCAINE HYDROCHLORIDE 10 MG/ML
INJECTION, SOLUTION EPIDURAL; INFILTRATION; INTRACAUDAL; PERINEURAL
Status: DISCONTINUED | OUTPATIENT
Start: 2023-03-20 | End: 2023-03-20 | Stop reason: HOSPADM

## 2023-03-20 RX ORDER — SODIUM CHLORIDE 9 MG/ML
75 INJECTION, SOLUTION INTRAVENOUS CONTINUOUS
Status: ACTIVE | OUTPATIENT
Start: 2023-03-20 | End: 2023-03-20

## 2023-03-20 RX ORDER — HEPARIN SODIUM 1000 [USP'U]/ML
INJECTION, SOLUTION INTRAVENOUS; SUBCUTANEOUS
Status: DISCONTINUED | OUTPATIENT
Start: 2023-03-20 | End: 2023-03-20 | Stop reason: HOSPADM

## 2023-03-20 RX ADMIN — DEXAMETHASONE 4 MG: 4 TABLET ORAL at 19:25

## 2023-03-20 RX ADMIN — DEXAMETHASONE 4 MG: 4 TABLET ORAL at 23:54

## 2023-03-20 RX ADMIN — DEXAMETHASONE 4 MG: 4 TABLET ORAL at 04:39

## 2023-03-20 RX ADMIN — ZOLPIDEM TARTRATE 5 MG: 5 TABLET, FILM COATED ORAL at 20:11

## 2023-03-20 RX ADMIN — LEVOTHYROXINE SODIUM 125 MCG: 125 TABLET ORAL at 04:39

## 2023-03-20 RX ADMIN — ATORVASTATIN CALCIUM 40 MG: 40 TABLET, FILM COATED ORAL at 20:11

## 2023-03-20 RX ADMIN — PANTOPRAZOLE SODIUM 40 MG: 40 TABLET, DELAYED RELEASE ORAL at 04:39

## 2023-03-20 RX ADMIN — SODIUM CHLORIDE 75 ML/HR: 9 INJECTION, SOLUTION INTRAVENOUS at 13:00

## 2023-03-20 RX ADMIN — LEVETIRACETAM 1000 MG: 500 TABLET, FILM COATED ORAL at 20:11

## 2023-03-20 RX ADMIN — Medication 10 ML: at 20:11

## 2023-03-20 NOTE — BRIEF OP NOTE
CV EMBOLIZATION CEREBRAL IR  Progress Note    Monty Winter  3/20/2023    Pre-op Diagnosis:   Meningioma       Post-Op Diagnosis Codes:  Meningioma    Procedure/CPT® Codes:        Procedure(s):  Tumor Emoblization radial access        Surgeon(s):  Ozzy Sebastian MD    Anesthesia: * No anesthesia type entered *    Staff:   Circulator: Belinda Ordaz RN; Nils Jimenez RN  Scrub Person: Micaela Stovall  Documenter: Kelsie Avila RN         Estimated Blood Loss: minimal    Urine Voided: * No values recorded between 3/20/2023 10:00 AM and 3/20/2023 11:17 AM *    Specimens:                None          Drains: * No LDAs found *    Findings: There is a large, hypervascular mass at the right frontal convexity, consistent with meningioma.  Arterial supply to the tumor is largely via the bilateral middle meningeal arteries (right greater than left).  The middle meningeal arteries were pre-- operatively embolized with PVA particles ranging in size from 250-350 µm, along with placement of Target XL microcoils, resulting in significant reduction in tumor vascularity.    Complications: None apparent.          Ozzy Sebastian MD     Date: 3/20/2023  Time: 11:28 EDT

## 2023-03-20 NOTE — PLAN OF CARE
Goal Outcome Evaluation:              Outcome Evaluation: Arrived to unit on stretcher approx. 19. with TR band in place on right wrist. deflated per protocal without difficulty or bleeding. Patient very has periods of impulsiveness attempting to climb out of bed. Can be demanding. Has areas to toes that are red and blanchable that he requests be looked at. WOC order placed. NPO after mid for crani tomorrow. Pt refused to sign consent until am. Consent on clipboard

## 2023-03-20 NOTE — PROGRESS NOTES
"Intensive Care Follow-up     Hospital:  LOS: 3 days   Mr. Monty Nagel, 67 y.o. male is followed for:   Meningioma (HCC)          History of present illness:   Monty Nagel is a 67 y.o. 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  He has had c/o gait instability and generalized weakness for 2-3 weeks.  He had lost weight because he is afraid to drive and go to the grocery due to his balance issues.  He does not have friends or family here to help him.  He has family in [place].  APS referral was made in the ED for poor living conditions.  He underwent MRI brain that revealed 6.4 cm homogeneous enhancing mass that appears to be extra-axial along right frontal convexity, favored to represent meningioma.  MRI c spine revealed moderate multi-level degenerative changes of cervical spine and fluid in left C4-5 facet suggesting some nonspecific inflammation.  NS was consulted for resection.  They recommended discharge with outpatient follow up and returning for surgery.  However, he was too weak and unable to walk safely so he stayed inpatient.  Today, he underwent a right middle meningeal artery embolization in anticipation for right crani for tumor resection tomorrow by Dr. Mckee.  He was transferred to the ICU post procedure.    Subjective   Interval History:  In the ICU, he is alert and slightly confused.  He wants an \"advocate\" for second opinions and bathroom privileges.   He states that his family all live in [place] or [place] and moving to be with them would be \"complicated\".  He lives alone with no one to help him.  He has no complaints.  States that the \"pressure\" in his head is better.       The patient's past medical, surgical and social history were reviewed and updated in Epic as appropriate.    Review of " "Systems - General ROS: negative for - chills, fatigue, fever or night sweats  Respiratory ROS: no cough, shortness of breath, or wheezing  Cardiovascular ROS: no chest pain or dyspnea on exertion  Gastrointestinal ROS: no abdominal pain, change in bowel habits, or black or bloody stools       Objective     Infusions:  sodium chloride, 75 mL/hr      Medications:  atorvastatin, 40 mg, Oral, Nightly  dexamethasone, 4 mg, Oral, Q6H  enoxaparin, 40 mg, Subcutaneous, Daily  levETIRAcetam, 1,000 mg, Oral, Q12H  levothyroxine, 125 mcg, Oral, Daily  pantoprazole, 40 mg, Oral, Q AM  polyethylene glycol, 17 g, Oral, Daily  senna-docusate sodium, 2 tablet, Oral, BID  sodium chloride, 10 mL, Intravenous, Q12H  thiamine, 100 mg, Oral, Daily  zolpidem, 5 mg, Oral, Nightly        Vital Sign Min/Max for last 24 hours  Temp  Min: 97.1 °F (36.2 °C)  Max: 98.4 °F (36.9 °C)   BP  Min: 110/88  Max: 141/98   Pulse  Min: 57  Max: 100   Resp  Min: 16  Max: 20   SpO2  Min: 83 %  Max: 99 %   Flow (L/min)  Min: 3  Max: 3       Input/Output for last 24 hour shift  03/19 0701 - 03/20 0700  In: 600 [P.O.:600]  Out: 100 [Urine:100]         Objective:  General Appearance:  In no acute distress.    Vital signs: (most recent): Blood pressure 110/88, pulse 60, temperature 97.6 °F (36.4 °C), temperature source Axillary, resp. rate 18, height 175.3 cm (69.02\"), weight 80.6 kg (177 lb 11.1 oz), SpO2 94 %.  Vital signs are normal.    HEENT: Normal HEENT exam.    Lungs:  Normal effort and normal respiratory rate.  Breath sounds clear to auscultation.  He is not in respiratory distress.  No rales, wheezes or rhonchi.    Heart: Bradycardia.  Irregular rhythm.  Positive for murmur.  No friction rub.   Abdomen: Abdomen is soft and non-distended.  Bowel sounds are normal.   There is no abdominal tenderness.     Extremities: (BUE hand and arm contractures  Right wrist site is without hematoma or ecchymosis)  Pulses: Distal pulses are intact.    Neurological: " Patient is alert.  GCS score is 14.    Pupils:  Pupils are equal, round, and reactive to light.  (Right eye blind  Left eye pupil is 4 mm and round).    Skin:  Warm and dry.  (Bilateral hand psoriasis)            Results from last 7 days   Lab Units 03/19/23  0706   WBC 10*3/mm3 7.48   HEMOGLOBIN g/dL 11.9*   PLATELETS 10*3/mm3 186     Results from last 7 days   Lab Units 03/19/23  0706 03/14/23  0658   SODIUM mmol/L 132*  --    POTASSIUM mmol/L 4.7  --    CO2 mmol/L 29.0  --    BUN mg/dL 22  --    CREATININE mg/dL 0.82  --    MAGNESIUM mg/dL 1.9 2.0   GLUCOSE mg/dL 109*  --      Estimated Creatinine Clearance: 96.9 mL/min (by C-G formula based on SCr of 0.82 mg/dL).          Images:  I reviewed the patient's results and images.     Assessment & Plan   Impression        Meningioma (HCC)    Atrial fibrillation (HCC)    Essential hypertension    Postablative hypothyroidism    Rheumatoid vs Psoriatic arthritis (HCC)    History of right retinal melanoma with right eye blindness    Other recurrent depressive disorders (HCC)       Plan        Meningioma s/p tumor embolization 3/20/23 by Dr. Sebastian  Gait Instability  Generalized weakness   Frequent Falls  · Hold ASA/Plavix for surgery, DVT prophylaxis is ok per NS  · NPO after midnight, Crani for tumor resection tomorrow by Dr. Mckee  · Continue Decadron  · Continue Keppra  · Continue PT/OT  · Will most likely need inpatient rehab at discharge +/- Assistance with living arrangements in SNF  · AM labs    HTN  HLP  Atrial Fibrillation (new onset) - slow rate  · Restart home antihypertensives when needed  · Continue Lipitor  · No anticoagulation in anticipation of surgery tomorrow    Hypothyroidism  H/O ablation 2* Graves   · Continue Levothyroxine    Psoriatic Arthritis  · Never been treated  · Normal CRP, ESR  · RF, Smith Ab and TORIE undetectable    Former heavy ETOH Use (last drink 9/2022)  Possible Medical Noncompliance  Poor living conditions  · APS consult in ED  · No  family or friends to help at home  · Lives alone      DVT prophylaxis:  Continue Lovenox SQ and SCDs  GI prophylaxis:  Continue PPI  Disposition:  Keep in ICU    Plan of care and goals reviewed with multidisciplinary/antibiotic stewardship team during rounds.   I discussed the patient's findings and my recommendations with patient, nursing staff and primary care team     Time: 35 minutes, face to face, with the patient or on the queen coordinating care with other health care providers.     I spent > 50% percent of this time, counseling and discussing evaluation, current status and management.      LYNETTE Rosado, AGACNP-BC, FNP-BC  Pulmonary and Critical Care Service

## 2023-03-20 NOTE — PROGRESS NOTES
[facility]  PROGRESS NOTE    Patient Name: Monty Nagel  : 1955  MRN: [id number]    Date of Admission: 3/11/2023  Primary Care Physician: Tiffany Morales MD    Subjective   Subjective     CC:  Follow-up for balance issues    HPI:  No pain or dyspnea. No n/v. No abd pain  ROS:  Gen- No fevers, chills  CV- No chest pain, palpitations  Resp- No cough, dyspnea  GI- No N/V/D, abd pain      Objective   Objective     Vital Signs:   Temp:  [97.2 °F (36.2 °C)-98.4 °F (36.9 °C)] 97.2 °F (36.2 °C)  Heart Rate:  [] 70  Resp:  [16-20] 18  BP: (118-141)/() 121/99  Flow (L/min):  [3] 3     Physical Exam:  Unchanged from 3/19/23  Constitutional: No acute distress, awake, alert, no distress,normal work of breathing  HENT: NCAT, mucous membranes moist  Respiratory: ctab  Cardiovascular: RRR, no murmurs, rubs, or gallops  Gastrointestinal: Positive bowel sounds, soft, nontender, nondistended  Musculoskeletal: No bilateral ankle edema  Psychiatric: Appropriate affect, cooperative  Neurologic: oriented x 3, moves all extremities  Skin: No rashes noted    Results Reviewed:  LAB RESULTS:      Lab 23  0706   WBC 7.48   HEMOGLOBIN 11.9*   HEMATOCRIT 34.7*   PLATELETS 186   MCV 87.6         Lab 23  0706 23  0658   SODIUM 132*  --    POTASSIUM 4.7  --    CHLORIDE 97*  --    CO2 29.0  --    ANION GAP 6.0  --    BUN 22  --    CREATININE 0.82  --    EGFR 95.1  --    GLUCOSE 109*  --    CALCIUM 7.9*  --    MAGNESIUM 1.9 2.0                         Brief Urine Lab Results  (Last result in the past 365 days)      Color   Clarity   Blood   Leuk Est   Nitrite   Protein   CREAT   Urine HCG        23 1058 Dark Yellow   Clear   Negative   Negative   Negative   Negative                 Microbiology Results Abnormal     None          Invasive peripheral vascular study    Addendum Date: 3/20/2023    Clinical Indication: 67-year-old male with a large intracranial  "tumor, likely meningioma, presents for pre-- operative embolization : Dr. Ozzy Sebastian. Access: Right radial artery.  Ultrasound was used to identify the right radial artery for access. It was patent, appropriate for catheterization, and used for guidance of the micropuncture. Successful cannulation was achieved and images were saved to PACS for review.  Please note that the ultrasound images are stored in the \"Cardiology\" PACS system. Conscious sedation: Moderate sedation was provided by me for a total of 55 minutes.  Physical vitals were continuously monitored by an independently trained observer.  A total of 2 mg of Ativan, 4 mg of Versed, and 100 ug of fentanyl were administered intravenously. Estimated blood loss: Negligible Complication: None apparent. Procedures: 1.  Selective bilateral common carotid artery catheterization with subsequent diagnostic bilateral extracranial and intracranial carotid angiography. 2.  Selective bilateral external carotid artery catheterization with subsequent diagnostic bilateral external carotid angiography. 3.  Selective microcatheterization of the bilateral middle meningeal arteries (third order innominate/carotid), with microcatheter angiography, for intervention 4.  Intracranial embolization 5.  Follow-up angiography after embolization x2 6.  Conscious sedation. 7.  Ultrasound-guided arterial access, right radial artery. Technique: Formal written consent for the procedure was obtained from the patient/patient's family after explained risks to include bleeding, infection, contrast reaction, vascular injury, and stroke.  The right wrist region was prepped and draped in the usual, sterile fashion.  Local anesthesia with 1% lidocaine infiltration was achieved.  Additionally, intravenous fentanyl and Versed were given for patient comfort throughout the procedure, the details of which are documented in the nursing record.  Utilizing Ultrasound guidance and Seldinger " "technique, a 6 Taiwanese vascular sheath was placed into the right radial artery without difficulty.  A radial \"cocktail\" was then administered, per protocol, the details of which are documented in the nursing record.  Subsequently, a 5 Taiwanese King 2 catheter was advanced into the aortic arch and used to select the bilateral common carotid and bilateral external carotid arteries under fluoroscopic guidance.  Appropriate angiographic sequences were filmed following contrast injection. Findings: Selective right common carotid artery catheterization and carotid angiography: The cervical portions of the right carotid vasculature demonstrate minimal atherosclerotic plaque formation at the carotid bifurcation, resulting in no appreciable stenosis utilizing NASCET criteria.  The intracranial circulation was opacified via a right common carotid injection, demonstrating no aneurysm or vascular malformation.  No significant stenosis or large vessel occlusion.  No changes of vasculitis or vasospasm.  The dural venous sinuses are patent.  There is a sizable nearly 6 cm hypervascular mass, centered at the right frontal convexity, consistent with meningioma.  There is displacement of the bilateral MONICA vasculature secondary to edema/tumor.  Selective right external carotid artery catheterization and angiography: The right external carotid angiogram demonstrates parasitized branches of the middle meningeal artery supplying the aforementioned large, hypervascular mass at the right frontal convexity, again consistent with meningioma.  No additional vascular masses or lesions are identified. The 5 Taiwanese Berenstein catheter was then exchanged over a Berry wire for a Benchmark intermediate catheter which was positioned in the high cervical right external carotid artery without difficulty.  Under continuous fluoroscopic/roadmap guidance, a Renegade microcatheter was navigated over Transcend EX soft tip microwire into the right middle " "meningeal artery without difficulty.  Microcatheter angiography confirms appropriate placement within the middle meningeal artery, with again noted intense tumor blush.  The tumor was then pre-- operatively embolized with PVA particles ranging in size from 250-350 µm.  Attempted placement of a 2.5 mm Axium microcoil was unsuccessful, with a coil \"bunching\" up and the microcatheter.  However, the embolization procedure was completed with placement of a two 2 mm Target XL microcoils, with follow-up angiography (2 total follow-ups filmed) demonstrating occlusion of the right middle meningeal artery and marked reduction in tumor blush.  There were no complicating features evident. The benchmark intermediate catheter was then navigated over a LurnQ 2 catheter, and used to select the left common carotid and external carotid arteries without difficulty. Selective left common carotid artery catheterization and carotid angiography: The cervical portions of the left carotid vasculature demonstrate mild plaque formation at the left carotid bifurcation, but without appreciable stenosis.  The intracranial circulation was opacified via a left common carotid injection, demonstrating no aneurysm or vascular malformation.  There are no changes of vasculitis or vasospasm.  The dural venous sinuses are patent. Selective left external carotid artery catheterization and external carotid angiography: The left external carotid angiogram demonstrates parasitized branches of the left middle meningeal artery which coursed to the convexity to supply the medial/superior aspects of the aforementioned right frontal convexity hypervascular mass/tumor.  No additional vascular abnormalities or hypervascular lesions are identified. With the Benchmark intermediate catheter purchased in the mid cervical portions of the left external carotid artery, the Renegade microcatheter was advanced over the Transcend microwire into the aforementioned " "\"culprit\" left middle meningeal artery without difficulty.  Microcatheter angiography confirms appropriate placement within the left middle meningeal artery, with again noted intense tumor blush at the convexity.  The left middle meningeal artery was then prophylactically embolized with PVA particles ranging in size from 250-350 µm, followed by placement of a single Target XL microcoil.  Follow-up angiography demonstrates occlusion of the middle meningeal artery with marked reduction in tumor blush.  There were no complicating features evident. At the end of the procedure, all catheters and sheaths were removed from the right wrist region, and hemostasis was achieved utilizing manual compression and placement of a radial \"TR\" band.  The patient tolerated the procedure well without apparent complication. IMPRESSION: Large hypervascular mass (in excess of 6 cm) at the right frontal convexity, fed predominantly via parasitized branches of the bilateral (right greater than left) middle meningeal arteries, consistent with a meningioma.  The middle meningeal arteries were prophylactically embolized with PVA particles ranging in size from 250-350 µm, along with placement of Target XL microcoils, resulting in significant reduction in tumor vascularity.  The patient tolerated the procedure well without apparent complication.    Result Date: 3/20/2023  Clinical Indication: 67-year-old male with a large intracranial tumor, likely meningioma, presents for pre-- operative embolization : Dr. Ozzy Sebastian. Access: Right radial artery.  Ultrasound was used to identify the right radial artery for access. It was patent, appropriate for catheterization, and used for guidance of the micropuncture. Successful cannulation was achieved and images were saved to PACS for review. Conscious sedation: Moderate sedation was provided by me for a total of 55 minutes.  Physical vitals were continuously monitored by an independently trained " "observer.  A total of 2 mg of Ativan, 4 mg of Versed, and 100 ug of fentanyl were administered intravenously. Estimated blood loss: Negligible Complication: None apparent. Procedures: 1.  Selective bilateral common carotid artery catheterization with subsequent diagnostic bilateral extracranial and intracranial carotid angiography. 2.  Selective bilateral external carotid artery catheterization with subsequent diagnostic bilateral external carotid angiography. 3.  Selective microcatheterization of the bilateral middle meningeal arteries (third order innominate/carotid), with microcatheter angiography, for intervention 4.  Intracranial embolization 5.  Follow-up angiography after embolization x2 6.  Conscious sedation. 7.  Ultrasound-guided arterial access, right radial artery. Technique: Formal written consent for the procedure was obtained from the patient/patient's family after explained risks to include bleeding, infection, contrast reaction, vascular injury, and stroke.  The right wrist region was prepped and draped in the usual, sterile fashion.  Local anesthesia with 1% lidocaine infiltration was achieved.  Additionally, intravenous fentanyl and Versed were given for patient comfort throughout the procedure, the details of which are documented in the nursing record.  Utilizing Ultrasound guidance and Seldinger technique, a 6 Thai vascular sheath was placed into the right radial artery without difficulty.  A radial \"cocktail\" was then administered, per protocol, the details of which are documented in the nursing record.  Subsequently, a 5 Thai King 2 catheter was advanced into the aortic arch and used to select the bilateral common carotid and bilateral external carotid arteries under fluoroscopic guidance.  Appropriate angiographic sequences were filmed following contrast injection. Findings: Selective right common carotid artery catheterization and carotid angiography: The cervical portions of the " "right carotid vasculature demonstrate minimal atherosclerotic plaque formation at the carotid bifurcation, resulting in no appreciable stenosis utilizing NASCET criteria.  The intracranial circulation was opacified via a right common carotid injection, demonstrating no aneurysm or vascular malformation.  No significant stenosis or large vessel occlusion.  No changes of vasculitis or vasospasm.  The dural venous sinuses are patent.  There is a sizable nearly 6 cm hypervascular mass, centered at the right frontal convexity, consistent with meningioma.  There is displacement of the bilateral MONICA vasculature secondary to edema/tumor.  Selective right external carotid artery catheterization and angiography: The right external carotid angiogram demonstrates parasitized branches of the middle meningeal artery supplying the aforementioned large, hypervascular mass at the right frontal convexity, again consistent with meningioma.  No additional vascular masses or lesions are identified. The 5 Paraguayan Berenstein catheter was then exchanged over a Berry wire for a Benchmark intermediate catheter which was positioned in the high cervical right external carotid artery without difficulty.  Under continuous fluoroscopic/roadmap guidance, a Renegade microcatheter was navigated over Transcend EX soft tip microwire into the right middle meningeal artery without difficulty.  Microcatheter angiography confirms appropriate placement within the middle meningeal artery, with again noted intense tumor blush.  The tumor was then pre-- operatively embolized with PVA particles ranging in size from 250-350 µm.  Attempted placement of a 2.5 mm Axium microcoil was unsuccessful, with a coil \"bunching\" up and the microcatheter.  However, the embolization procedure was completed with placement of a two 2 mm Target XL microcoils, with follow-up angiography (2 total follow-ups filmed) demonstrating occlusion of the right middle meningeal artery and " "marked reduction in tumor blush.  There were no complicating features evident. The benchmark intermediate catheter was then navigated over a 1Lays 2 catheter, and used to select the left common carotid and external carotid arteries without difficulty. Selective left common carotid artery catheterization and carotid angiography: The cervical portions of the left carotid vasculature demonstrate mild plaque formation at the left carotid bifurcation, but without appreciable stenosis.  The intracranial circulation was opacified via a left common carotid injection, demonstrating no aneurysm or vascular malformation.  There are no changes of vasculitis or vasospasm.  The dural venous sinuses are patent. Selective left external carotid artery catheterization and external carotid angiography: The left external carotid angiogram demonstrates parasitized branches of the left middle meningeal artery which coursed to the convexity to supply the medial/superior aspects of the aforementioned right frontal convexity hypervascular mass/tumor.  No additional vascular abnormalities or hypervascular lesions are identified. With the Benchmark intermediate catheter purchased in the mid cervical portions of the left external carotid artery, the Renegade microcatheter was advanced over the Transcend microwire into the aforementioned \"culprit\" left middle meningeal artery without difficulty.  Microcatheter angiography confirms appropriate placement within the left middle meningeal artery, with again noted intense tumor blush at the convexity.  The left middle meningeal artery was then prophylactically embolized with PVA particles ranging in size from 250-350 µm, followed by placement of a single Target XL microcoil.  Follow-up angiography demonstrates occlusion of the middle meningeal artery with marked reduction in tumor blush.  There were no complicating features evident. At the end of the procedure, all catheters and sheaths " "were removed from the right wrist region, and hemostasis was achieved utilizing manual compression and placement of a radial \"TR\" band.  The patient tolerated the procedure well without apparent complication.     Impression:  Large hypervascular mass (in excess of 6 cm) at the right frontal convexity, fed predominantly via parasitized branches of the bilateral (right greater than left) middle meningeal arteries, consistent with a meningioma.  The middle meningeal arteries were prophylactically embolized with PVA particles ranging in size from 250-350 µm, along with placement of Target XL microcoils, resulting in significant reduction in tumor vascularity.  The patient tolerated the procedure well without apparent complication.       Results for orders placed during the hospital encounter of 03/11/23    Adult Transthoracic Echo Complete w/ Color, Spectral and Contrast if necessary per protocol    Interpretation Summary  •  Left ventricular systolic function is normal. Estimated left ventricular EF = 55%  •  Biatrial enlargement.  •  Calcified aortic valve with mild aortic stenosis, mean gradient 10 mmHg, BANG 1.6 cm².  •  Moderate aortic insufficiency.  •  Mild mitral regurgitation.  •  Mild tricuspid regurgitation with normal RVSP.  •  Mild dilation of the ascending aorta (4.2 cm) is present.      Current medications:  Scheduled Meds:[MAR Hold] atorvastatin, 40 mg, Oral, Nightly  [MAR Hold] dexamethasone, 4 mg, Oral, Q6H  [MAR Hold] enoxaparin, 40 mg, Subcutaneous, Daily  levETIRAcetam, 1,000 mg, Oral, Q12H  [MAR Hold] levothyroxine, 125 mcg, Oral, Daily  [MAR Hold] pantoprazole, 40 mg, Oral, Q AM  [MAR Hold] polyethylene glycol, 17 g, Oral, Daily  [MAR Hold] senna-docusate sodium, 2 tablet, Oral, BID  [MAR Hold] thiamine, 100 mg, Oral, Daily  [MAR Hold] zolpidem, 5 mg, Oral, Nightly      Continuous Infusions:   PRN Meds:.•  [MAR Hold] acetaminophen **OR** [MAR Hold] acetaminophen **OR** [MAR Hold] acetaminophen  •  " [MAR Hold] senna-docusate sodium **AND** [MAR Hold] polyethylene glycol **AND** [MAR Hold] bisacodyl **AND** [MAR Hold] bisacodyl  •  [MAR Hold] Magnesium Standard Dose Replacement - Initiate Nurse / BPA Driven Protocol  •  [MAR Hold] melatonin  •  [MAR Hold] ondansetron **OR** [MAR Hold] ondansetron  •  [MAR Hold] Phosphorus Replacement - Initiate Nurse / BPA Driven Protocol  •  Potassium Replacement - Initiate Nurse / BPA Driven Protocol  •  [MAR Hold] sodium chloride    Assessment & Plan   Assessment & Plan     Active Hospital Problems    Diagnosis  POA   • **Balance problem [R26.89]  Yes   • Severe malnutrition (HCC) [E43]  Yes   • Generalized weakness [R53.1]  Yes   • Atrial fibrillation (HCC) [I48.91]  Yes   • Brain tumor (HCC) [D49.6]  Unknown   • Other recurrent depressive disorders (HCC) [F33.8]  Yes   • Postablative 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        Brief Hospital Course to date:  Monty Nagel is a 67 y.o. male PMH significant for balance problems, alcohol abuse, depression, HTN, hypothyroidism s/p thyroidectomy for Graves dz who presents to [facility] ED with a several month history of difficulty with balance and gait.      Assessment and plan:    Right frontal meningioma with vasogenic edema and midline shift  Generalized weakness  Ataxia with frequent falls  · MRI brain showed: 6.4 cm homogeneous enhancing mass that appears to be extra-axial along right frontal convexity, favored to represent meningioma. This mass results in significant mass effect and vasogenic edema in the right frontal lobe with 1.3 cm right to left midline shift.  · Continue decadron 4mg q6h.. Continue QID and empiric Keppra  · Neurosurgery following:  Recommending surgery.  Patient amenable.  Going fo embolization on today 3/20/23; then further plans are right craniotomy for tumor resection 07:30 Tuesday morning. Holding asa/plavix  (per neurosurg recs), dvt prophylaxis ok  · Unable to discharge patient because he cannot walk safely - requiring assistance and constant cueing when up with PT, lives alone and has no friends or family to help    Etohic x 50 yrs  Severe malnutrition   Unclear compliance w synthroid   · quit 5 months ago  · s/p IV thiamine  · PT and OT   · CM consult, APS referral made from ED, poor living conditions   · thiamine    Possible PAD  · Carotid US no signif stenosis      Atrial fibrillation  CHadsVasc of 2   · New diagnosis; currently rate controlled without medications  · Echo:  EF 55, biatrial enlargement, mod AI  · Hold off oral anticoagulation given his new finding of extensive meningioma that will likely need surgical intervention;   · Cardiology referral upon discharge    Dyslipidemia  ·   · Started high intensity statin, Lipitor     Essential hypertension  · Hold antihypertensive medication given his borderline blood pressure     Hypothyroidism  H/o ablation for Graves  · Thyroid panel is indicative of possible medication noncompliance (TSH elevated and free T4 is low)  · Continue levothyroxine 125 mcg daily     Psoriatic arthritis  Rheumatoid arthritis  · Patient states he has never received treatment  · RF TORIE and Smith undetectable   · Normal  CRP, ESR     H/o EtOH abuse  · States sober since September 2022    Acute debility  · PT and OT  · Patient is living alone and high risk for fall. I think he will benefit from acute rehab upon discharge     Am labs: cbc,bmp        Expected Discharge Location and Transportation: Rehab  Expected Discharge ? 3/24/23 when ok w/ neurosurgery after procedure    DVT prophylaxis:  Medical and mechanical DVT prophylaxis orders are present.     AM-PAC 6 Clicks Score (PT): 17 (03/20/23 0975)    CODE STATUS:   Code Status and Medical Interventions:   Ordered at: 03/11/23 9998     Level Of Support Discussed With:    Patient     Code Status (Patient has no pulse and is not  breathing):    CPR (Attempt to Resuscitate)     Medical Interventions (Patient has pulse or is breathing):    Full Support       Jay Jay Box MD  03/20/23

## 2023-03-21 ENCOUNTER — ANESTHESIA EVENT CONVERTED (OUTPATIENT)
Dept: ANESTHESIOLOGY | Facility: HOSPITAL | Age: 68
End: 2023-03-21
Payer: MEDICARE

## 2023-03-21 ENCOUNTER — APPOINTMENT (OUTPATIENT)
Dept: CT IMAGING | Facility: HOSPITAL | Age: 68
End: 2023-03-21
Payer: MEDICARE

## 2023-03-21 ENCOUNTER — ANESTHESIA (OUTPATIENT)
Dept: PERIOP | Facility: HOSPITAL | Age: 68
End: 2023-03-21
Payer: MEDICARE

## 2023-03-21 LAB
ABO GROUP BLD: NORMAL
ABO GROUP BLD: NORMAL
ANION GAP SERPL CALCULATED.3IONS-SCNC: 5 MMOL/L (ref 5–15)
BASE EXCESS BLDA CALC-SCNC: 2 MMOL/L (ref -5–5)
BLD GP AB SCN SERPL QL: NEGATIVE
BUN SERPL-MCNC: 24 MG/DL (ref 8–23)
BUN/CREAT SERPL: 31.6 (ref 7–25)
CA-I BLDA-SCNC: 1.1 MMOL/L (ref 1.2–1.32)
CALCIUM SPEC-SCNC: 7.7 MG/DL (ref 8.6–10.5)
CHLORIDE SERPL-SCNC: 97 MMOL/L (ref 98–107)
CO2 BLDA-SCNC: 28 MMOL/L (ref 24–29)
CO2 SERPL-SCNC: 30 MMOL/L (ref 22–29)
CREAT SERPL-MCNC: 0.76 MG/DL (ref 0.76–1.27)
DEPRECATED RDW RBC AUTO: 48.7 FL (ref 37–54)
EGFRCR SERPLBLD CKD-EPI 2021: 97.3 ML/MIN/1.73
ERYTHROCYTE [DISTWIDTH] IN BLOOD BY AUTOMATED COUNT: 15.8 % (ref 12.3–15.4)
GLUCOSE BLDC GLUCOMTR-MCNC: 124 MG/DL (ref 70–130)
GLUCOSE SERPL-MCNC: 110 MG/DL (ref 65–99)
HCO3 BLDA-SCNC: 27 MMOL/L (ref 22–26)
HCT VFR BLD AUTO: 33.5 % (ref 37.5–51)
HCT VFR BLDA CALC: 31 % (ref 38–51)
HGB BLD-MCNC: 11.4 G/DL (ref 13–17.7)
HGB BLDA-MCNC: 10.5 G/DL (ref 12–17)
MAGNESIUM SERPL-MCNC: 2 MG/DL (ref 1.6–2.4)
MCH RBC QN AUTO: 29.8 PG (ref 26.6–33)
MCHC RBC AUTO-ENTMCNC: 34 G/DL (ref 31.5–35.7)
MCV RBC AUTO: 87.5 FL (ref 79–97)
PCO2 BLDA: 43.4 MM HG (ref 35–45)
PH BLDA: 7.4 PH UNITS (ref 7.35–7.6)
PHOSPHATE SERPL-MCNC: 3.5 MG/DL (ref 2.5–4.5)
PLATELET # BLD AUTO: 200 10*3/MM3 (ref 140–450)
PMV BLD AUTO: 9.3 FL (ref 6–12)
PO2 BLDA: 201 MMHG (ref 80–105)
POTASSIUM BLDA-SCNC: 3.9 MMOL/L (ref 3.5–4.9)
POTASSIUM SERPL-SCNC: 4.3 MMOL/L (ref 3.5–5.2)
RBC # BLD AUTO: 3.83 10*6/MM3 (ref 4.14–5.8)
RH BLD: POSITIVE
RH BLD: POSITIVE
SAO2 % BLDA: 100 % (ref 95–98)
SODIUM BLD-SCNC: 131 MMOL/L (ref 138–146)
SODIUM SERPL-SCNC: 132 MMOL/L (ref 136–145)
T&S EXPIRATION DATE: NORMAL
WBC NRBC COR # BLD: 9.2 10*3/MM3 (ref 3.4–10.8)

## 2023-03-21 PROCEDURE — 63710000001 DEXAMETHASONE PER 0.25 MG

## 2023-03-21 PROCEDURE — 85027 COMPLETE CBC AUTOMATED: CPT | Performed by: INTERNAL MEDICINE

## 2023-03-21 PROCEDURE — 92523 SPEECH SOUND LANG COMPREHEN: CPT

## 2023-03-21 PROCEDURE — 86901 BLOOD TYPING SEROLOGIC RH(D): CPT | Performed by: NEUROLOGICAL SURGERY

## 2023-03-21 PROCEDURE — 61512 CRNEC TREPH EXC MNGIOMA STTL: CPT

## 2023-03-21 PROCEDURE — 84132 ASSAY OF SERUM POTASSIUM: CPT

## 2023-03-21 PROCEDURE — C1713 ANCHOR/SCREW BN/BN,TIS/BN: HCPCS | Performed by: NEUROLOGICAL SURGERY

## 2023-03-21 PROCEDURE — 25010000002 FENTANYL CITRATE (PF) 100 MCG/2ML SOLUTION: Performed by: ANESTHESIOLOGY

## 2023-03-21 PROCEDURE — 25010000002 DEXAMETHASONE SODIUM PHOSPHATE 100 MG/10ML SOLUTION

## 2023-03-21 PROCEDURE — 25010000002 FUROSEMIDE PER 20 MG: Performed by: ANESTHESIOLOGY

## 2023-03-21 PROCEDURE — 82803 BLOOD GASES ANY COMBINATION: CPT

## 2023-03-21 PROCEDURE — 61512 CRNEC TREPH EXC MNGIOMA STTL: CPT | Performed by: NEUROLOGICAL SURGERY

## 2023-03-21 PROCEDURE — 25010000002 HYDROMORPHONE 1 MG/ML SOLUTION: Performed by: NEUROLOGICAL SURGERY

## 2023-03-21 PROCEDURE — 00B20ZZ EXCISION OF DURA MATER, OPEN APPROACH: ICD-10-PCS | Performed by: NEUROLOGICAL SURGERY

## 2023-03-21 PROCEDURE — 25010000002 PHENYLEPHRINE 10 MG/ML SOLUTION 5 ML VIAL: Performed by: NEUROLOGICAL SURGERY

## 2023-03-21 PROCEDURE — 86900 BLOOD TYPING SEROLOGIC ABO: CPT | Performed by: NEUROLOGICAL SURGERY

## 2023-03-21 PROCEDURE — 25010000002 ALBUMIN HUMAN 5% PER 50 ML: Performed by: ANESTHESIOLOGY

## 2023-03-21 PROCEDURE — 82330 ASSAY OF CALCIUM: CPT

## 2023-03-21 PROCEDURE — 86901 BLOOD TYPING SEROLOGIC RH(D): CPT

## 2023-03-21 PROCEDURE — 82947 ASSAY GLUCOSE BLOOD QUANT: CPT

## 2023-03-21 PROCEDURE — 84100 ASSAY OF PHOSPHORUS: CPT | Performed by: NURSE PRACTITIONER

## 2023-03-21 PROCEDURE — 25010000002 CEFAZOLIN IN DEXTROSE 2-4 GM/100ML-% SOLUTION

## 2023-03-21 PROCEDURE — 61781 SCAN PROC CRANIAL INTRA: CPT | Performed by: NEUROLOGICAL SURGERY

## 2023-03-21 PROCEDURE — 88307 TISSUE EXAM BY PATHOLOGIST: CPT | Performed by: NEUROLOGICAL SURGERY

## 2023-03-21 PROCEDURE — 86850 RBC ANTIBODY SCREEN: CPT | Performed by: NEUROLOGICAL SURGERY

## 2023-03-21 PROCEDURE — 25010000002 PROPOFOL 10 MG/ML EMULSION: Performed by: ANESTHESIOLOGY

## 2023-03-21 PROCEDURE — C1889 IMPLANT/INSERT DEVICE, NOC: HCPCS | Performed by: NEUROLOGICAL SURGERY

## 2023-03-21 PROCEDURE — 63710000001 DEXAMETHASONE PER 0.25 MG: Performed by: NEUROLOGICAL SURGERY

## 2023-03-21 PROCEDURE — 99233 SBSQ HOSP IP/OBS HIGH 50: CPT | Performed by: NURSE PRACTITIONER

## 2023-03-21 PROCEDURE — 80048 BASIC METABOLIC PNL TOTAL CA: CPT | Performed by: INTERNAL MEDICINE

## 2023-03-21 PROCEDURE — 25010000002 ONDANSETRON PER 1 MG: Performed by: ANESTHESIOLOGY

## 2023-03-21 PROCEDURE — 86900 BLOOD TYPING SEROLOGIC ABO: CPT

## 2023-03-21 PROCEDURE — 85014 HEMATOCRIT: CPT

## 2023-03-21 PROCEDURE — P9041 ALBUMIN (HUMAN),5%, 50ML: HCPCS | Performed by: ANESTHESIOLOGY

## 2023-03-21 PROCEDURE — 83735 ASSAY OF MAGNESIUM: CPT | Performed by: NURSE PRACTITIONER

## 2023-03-21 PROCEDURE — 84295 ASSAY OF SERUM SODIUM: CPT

## 2023-03-21 PROCEDURE — 70450 CT HEAD/BRAIN W/O DYE: CPT

## 2023-03-21 PROCEDURE — 25010000002 FENTANYL CITRATE (PF) 50 MCG/ML SOLUTION

## 2023-03-21 DEVICE — SCRW MATRIXNEURO SD TI 4MM: Type: IMPLANTABLE DEVICE | Site: BRAIN | Status: FUNCTIONAL

## 2023-03-21 DEVICE — PLT MATRIXNEURO STR TI 2HL 12MM: Type: IMPLANTABLE DEVICE | Site: BRAIN | Status: FUNCTIONAL

## 2023-03-21 DEVICE — DURAGEN® PLUS DURAL REGENERATION MATRIX, 3 IN X 3 IN (7.5 CM X 7.5 CM)
Type: IMPLANTABLE DEVICE | Site: BRAIN | Status: FUNCTIONAL
Brand: DURAGEN® PLUS

## 2023-03-21 DEVICE — FLOSEAL HEMOSTATIC MATRIX, 10ML
Type: IMPLANTABLE DEVICE | Site: BRAIN | Status: FUNCTIONAL
Brand: FLOSEAL HEMOSTATIC MATRIX

## 2023-03-21 DEVICE — PLT CVR BURHL MATRIXNEURO 12MM: Type: IMPLANTABLE DEVICE | Site: BRAIN | Status: FUNCTIONAL

## 2023-03-21 DEVICE — ADHERUS AUTOSPRAY EXTENDED TIP (ET) DURAL SEALANT IS A STERILE, SINGLE-USE, ELECTROMECHANICAL, BATTERY OPERATED DEVICE WITH INTERNAL SYSTEM COMPONENTS THAT PROVIDE AIR FLOW TO AID IN THE DELIVERY OF A SYNTHETIC, ABSORBABLE, TWO-COMPONENT HYDROGEL SEALANT SYSTEM AND ALLOW DELIVERY TO BE INTERRUPTED WITHOUT CLOGGING.
Type: IMPLANTABLE DEVICE | Site: BRAIN | Status: FUNCTIONAL
Brand: ADHERUS AUTOSPRAY ET DURAL SEALANT

## 2023-03-21 DEVICE — AVITENE MICROFIBRILLAR COLLAGEN HEMOSTAT FLOUR
Type: IMPLANTABLE DEVICE | Site: BRAIN | Status: FUNCTIONAL
Brand: AVITENE FLOUR

## 2023-03-21 DEVICE — HEMOST ABS SURGIFOAM SZ100 8X12 10MM: Type: IMPLANTABLE DEVICE | Site: CRANIAL | Status: FUNCTIONAL

## 2023-03-21 RX ORDER — SODIUM CHLORIDE 9 MG/ML
40 INJECTION, SOLUTION INTRAVENOUS AS NEEDED
Status: DISCONTINUED | OUTPATIENT
Start: 2023-03-21 | End: 2023-03-21

## 2023-03-21 RX ORDER — FENTANYL CITRATE 50 UG/ML
INJECTION, SOLUTION INTRAMUSCULAR; INTRAVENOUS AS NEEDED
Status: DISCONTINUED | OUTPATIENT
Start: 2023-03-21 | End: 2023-03-21 | Stop reason: SURG

## 2023-03-21 RX ORDER — FENTANYL CITRATE 50 UG/ML
50 INJECTION, SOLUTION INTRAMUSCULAR; INTRAVENOUS
Status: COMPLETED | OUTPATIENT
Start: 2023-03-21 | End: 2023-03-21

## 2023-03-21 RX ORDER — FUROSEMIDE 10 MG/ML
INJECTION INTRAMUSCULAR; INTRAVENOUS AS NEEDED
Status: DISCONTINUED | OUTPATIENT
Start: 2023-03-21 | End: 2023-03-21 | Stop reason: SURG

## 2023-03-21 RX ORDER — ONDANSETRON 2 MG/ML
INJECTION INTRAMUSCULAR; INTRAVENOUS AS NEEDED
Status: DISCONTINUED | OUTPATIENT
Start: 2023-03-21 | End: 2023-03-21 | Stop reason: SURG

## 2023-03-21 RX ORDER — IPRATROPIUM BROMIDE AND ALBUTEROL SULFATE 2.5; .5 MG/3ML; MG/3ML
3 SOLUTION RESPIRATORY (INHALATION) ONCE AS NEEDED
Status: DISCONTINUED | OUTPATIENT
Start: 2023-03-21 | End: 2023-03-21 | Stop reason: HOSPADM

## 2023-03-21 RX ORDER — SODIUM CHLORIDE, SODIUM LACTATE, POTASSIUM CHLORIDE, CALCIUM CHLORIDE 600; 310; 30; 20 MG/100ML; MG/100ML; MG/100ML; MG/100ML
9 INJECTION, SOLUTION INTRAVENOUS CONTINUOUS
Status: DISCONTINUED | OUTPATIENT
Start: 2023-03-21 | End: 2023-03-22

## 2023-03-21 RX ORDER — ALBUMIN, HUMAN INJ 5% 5 %
SOLUTION INTRAVENOUS CONTINUOUS PRN
Status: DISCONTINUED | OUTPATIENT
Start: 2023-03-21 | End: 2023-03-21 | Stop reason: SURG

## 2023-03-21 RX ORDER — LABETALOL HYDROCHLORIDE 5 MG/ML
5 INJECTION, SOLUTION INTRAVENOUS
Status: DISCONTINUED | OUTPATIENT
Start: 2023-03-21 | End: 2023-03-21 | Stop reason: HOSPADM

## 2023-03-21 RX ORDER — ROCURONIUM BROMIDE 10 MG/ML
INJECTION, SOLUTION INTRAVENOUS AS NEEDED
Status: DISCONTINUED | OUTPATIENT
Start: 2023-03-21 | End: 2023-03-21 | Stop reason: SURG

## 2023-03-21 RX ORDER — MANNITOL 20 G/100ML
INJECTION, SOLUTION INTRAVENOUS CONTINUOUS PRN
Status: DISCONTINUED | OUTPATIENT
Start: 2023-03-21 | End: 2023-03-21 | Stop reason: SURG

## 2023-03-21 RX ORDER — SODIUM CHLORIDE 0.9 % (FLUSH) 0.9 %
10 SYRINGE (ML) INJECTION AS NEEDED
Status: DISCONTINUED | OUTPATIENT
Start: 2023-03-21 | End: 2023-03-21

## 2023-03-21 RX ORDER — MAGNESIUM HYDROXIDE 1200 MG/15ML
LIQUID ORAL AS NEEDED
Status: DISCONTINUED | OUTPATIENT
Start: 2023-03-21 | End: 2023-03-21 | Stop reason: HOSPADM

## 2023-03-21 RX ORDER — PROPOFOL 10 MG/ML
VIAL (ML) INTRAVENOUS AS NEEDED
Status: DISCONTINUED | OUTPATIENT
Start: 2023-03-21 | End: 2023-03-21 | Stop reason: SURG

## 2023-03-21 RX ORDER — LIDOCAINE HYDROCHLORIDE AND EPINEPHRINE 5; 5 MG/ML; UG/ML
INJECTION, SOLUTION INFILTRATION; PERINEURAL AS NEEDED
Status: DISCONTINUED | OUTPATIENT
Start: 2023-03-21 | End: 2023-03-21 | Stop reason: HOSPADM

## 2023-03-21 RX ORDER — CEFAZOLIN SODIUM 2 G/100ML
2 INJECTION, SOLUTION INTRAVENOUS ONCE
Status: COMPLETED | OUTPATIENT
Start: 2023-03-21 | End: 2023-03-21

## 2023-03-21 RX ORDER — ONDANSETRON 2 MG/ML
4 INJECTION INTRAMUSCULAR; INTRAVENOUS ONCE AS NEEDED
Status: DISCONTINUED | OUTPATIENT
Start: 2023-03-21 | End: 2023-03-21 | Stop reason: HOSPADM

## 2023-03-21 RX ORDER — DOCUSATE SODIUM 100 MG/1
100 CAPSULE, LIQUID FILLED ORAL 2 TIMES DAILY PRN
Status: DISCONTINUED | OUTPATIENT
Start: 2023-03-21 | End: 2023-04-03 | Stop reason: HOSPADM

## 2023-03-21 RX ORDER — FENTANYL CITRATE 50 UG/ML
INJECTION, SOLUTION INTRAMUSCULAR; INTRAVENOUS
Status: COMPLETED
Start: 2023-03-21 | End: 2023-03-21

## 2023-03-21 RX ORDER — MIDAZOLAM HYDROCHLORIDE 1 MG/ML
0.5 INJECTION INTRAMUSCULAR; INTRAVENOUS
Status: DISCONTINUED | OUTPATIENT
Start: 2023-03-21 | End: 2023-03-21 | Stop reason: HOSPADM

## 2023-03-21 RX ORDER — SODIUM CHLORIDE 0.9 % (FLUSH) 0.9 %
10 SYRINGE (ML) INJECTION EVERY 12 HOURS SCHEDULED
Status: DISCONTINUED | OUTPATIENT
Start: 2023-03-21 | End: 2023-03-21

## 2023-03-21 RX ORDER — FAMOTIDINE 20 MG/1
20 TABLET, FILM COATED ORAL ONCE
Status: DISCONTINUED | OUTPATIENT
Start: 2023-03-21 | End: 2023-03-21

## 2023-03-21 RX ORDER — LIDOCAINE HYDROCHLORIDE 10 MG/ML
0.5 INJECTION, SOLUTION EPIDURAL; INFILTRATION; INTRACAUDAL; PERINEURAL ONCE AS NEEDED
Status: DISCONTINUED | OUTPATIENT
Start: 2023-03-21 | End: 2023-03-21 | Stop reason: HOSPADM

## 2023-03-21 RX ORDER — NALOXONE HCL 0.4 MG/ML
0.4 VIAL (ML) INJECTION
Status: DISCONTINUED | OUTPATIENT
Start: 2023-03-21 | End: 2023-04-03 | Stop reason: HOSPADM

## 2023-03-21 RX ORDER — CEFAZOLIN SODIUM 2 G/100ML
2 INJECTION, SOLUTION INTRAVENOUS EVERY 8 HOURS
Status: COMPLETED | OUTPATIENT
Start: 2023-03-21 | End: 2023-03-22

## 2023-03-21 RX ORDER — FAMOTIDINE 10 MG/ML
20 INJECTION, SOLUTION INTRAVENOUS ONCE
Status: DISCONTINUED | OUTPATIENT
Start: 2023-03-21 | End: 2023-03-21

## 2023-03-21 RX ORDER — LIDOCAINE HYDROCHLORIDE 10 MG/ML
INJECTION, SOLUTION EPIDURAL; INFILTRATION; INTRACAUDAL; PERINEURAL AS NEEDED
Status: DISCONTINUED | OUTPATIENT
Start: 2023-03-21 | End: 2023-03-21 | Stop reason: SURG

## 2023-03-21 RX ORDER — SODIUM CHLORIDE 9 MG/ML
INJECTION, SOLUTION INTRAVENOUS AS NEEDED
Status: DISCONTINUED | OUTPATIENT
Start: 2023-03-21 | End: 2023-03-21 | Stop reason: HOSPADM

## 2023-03-21 RX ORDER — PHENYLEPHRINE HCL IN 0.9% NACL 1 MG/10 ML
SYRINGE (ML) INTRAVENOUS AS NEEDED
Status: DISCONTINUED | OUTPATIENT
Start: 2023-03-21 | End: 2023-03-21 | Stop reason: SURG

## 2023-03-21 RX ORDER — BACITRACIN ZINC AND POLYMYXIN B SULFATE 500; 10000 [USP'U]/G; [USP'U]/G
OINTMENT OPHTHALMIC AS NEEDED
Status: DISCONTINUED | OUTPATIENT
Start: 2023-03-21 | End: 2023-03-21 | Stop reason: HOSPADM

## 2023-03-21 RX ADMIN — Medication 10 ML: at 20:14

## 2023-03-21 RX ADMIN — DEXAMETHASONE 4 MG: 4 TABLET ORAL at 12:59

## 2023-03-21 RX ADMIN — ONDANSETRON 4 MG: 2 INJECTION INTRAMUSCULAR; INTRAVENOUS at 10:04

## 2023-03-21 RX ADMIN — ROCURONIUM BROMIDE 60 MG: 10 INJECTION INTRAVENOUS at 07:34

## 2023-03-21 RX ADMIN — FENTANYL CITRATE 50 MCG: 50 INJECTION, SOLUTION INTRAMUSCULAR; INTRAVENOUS at 10:53

## 2023-03-21 RX ADMIN — PHENYLEPHRINE HYDROCHLORIDE 0.5 MCG/KG/MIN: 10 INJECTION INTRAVENOUS at 07:56

## 2023-03-21 RX ADMIN — PROPOFOL 100 MG: 10 INJECTION, EMULSION INTRAVENOUS at 07:45

## 2023-03-21 RX ADMIN — CEFAZOLIN SODIUM 2 G: 2 INJECTION, SOLUTION INTRAVENOUS at 07:47

## 2023-03-21 RX ADMIN — DEXAMETHASONE 4 MG: 4 TABLET ORAL at 23:38

## 2023-03-21 RX ADMIN — Medication 10 ML: at 06:00

## 2023-03-21 RX ADMIN — PROPOFOL 170 MG: 10 INJECTION, EMULSION INTRAVENOUS at 07:34

## 2023-03-21 RX ADMIN — SODIUM CHLORIDE, POTASSIUM CHLORIDE, SODIUM LACTATE AND CALCIUM CHLORIDE 9 ML/HR: 600; 310; 30; 20 INJECTION, SOLUTION INTRAVENOUS at 05:52

## 2023-03-21 RX ADMIN — ZOLPIDEM TARTRATE 5 MG: 5 TABLET, FILM COATED ORAL at 20:14

## 2023-03-21 RX ADMIN — LIDOCAINE HYDROCHLORIDE 50 MG: 10 INJECTION, SOLUTION EPIDURAL; INFILTRATION; INTRACAUDAL; PERINEURAL at 07:34

## 2023-03-21 RX ADMIN — MANNITOL: 20 INJECTION, SOLUTION INTRAVENOUS at 07:55

## 2023-03-21 RX ADMIN — ROCURONIUM BROMIDE 10 MG: 10 INJECTION INTRAVENOUS at 09:28

## 2023-03-21 RX ADMIN — DEXAMETHASONE 4 MG: 4 TABLET ORAL at 18:51

## 2023-03-21 RX ADMIN — FENTANYL CITRATE 50 MCG: 50 INJECTION, SOLUTION INTRAMUSCULAR; INTRAVENOUS at 07:34

## 2023-03-21 RX ADMIN — DEXAMETHASONE 4 MG: 4 TABLET ORAL at 05:00

## 2023-03-21 RX ADMIN — LEVETIRACETAM 1000 MG: 500 TABLET, FILM COATED ORAL at 20:14

## 2023-03-21 RX ADMIN — HYDROMORPHONE HYDROCHLORIDE 0.5 MG: 1 INJECTION, SOLUTION INTRAMUSCULAR; INTRAVENOUS; SUBCUTANEOUS at 16:56

## 2023-03-21 RX ADMIN — Medication 2 G: at 23:38

## 2023-03-21 RX ADMIN — NICARDIPINE HYDROCHLORIDE 5 MG/HR: 0.1 INJECTION, SOLUTION INTRAVENOUS at 10:16

## 2023-03-21 RX ADMIN — ATORVASTATIN CALCIUM 40 MG: 40 TABLET, FILM COATED ORAL at 20:14

## 2023-03-21 RX ADMIN — FENTANYL CITRATE 50 MCG: 50 INJECTION, SOLUTION INTRAMUSCULAR; INTRAVENOUS at 10:11

## 2023-03-21 RX ADMIN — PANTOPRAZOLE SODIUM 40 MG: 40 TABLET, DELAYED RELEASE ORAL at 05:00

## 2023-03-21 RX ADMIN — FENTANYL CITRATE 50 MCG: 50 INJECTION, SOLUTION INTRAMUSCULAR; INTRAVENOUS at 11:21

## 2023-03-21 RX ADMIN — LEVETIRACETAM 1000 MG: 500 TABLET, FILM COATED ORAL at 05:01

## 2023-03-21 RX ADMIN — FUROSEMIDE 20 MG: 10 INJECTION, SOLUTION INTRAMUSCULAR; INTRAVENOUS at 08:14

## 2023-03-21 RX ADMIN — ALBUMIN HUMAN: 0.05 INJECTION, SOLUTION INTRAVENOUS at 09:31

## 2023-03-21 RX ADMIN — ROCURONIUM BROMIDE 20 MG: 10 INJECTION INTRAVENOUS at 08:43

## 2023-03-21 RX ADMIN — Medication 2 G: at 15:53

## 2023-03-21 RX ADMIN — SUGAMMADEX 200 MG: 100 INJECTION, SOLUTION INTRAVENOUS at 10:17

## 2023-03-21 RX ADMIN — LEVOTHYROXINE SODIUM 125 MCG: 125 TABLET ORAL at 05:00

## 2023-03-21 RX ADMIN — Medication 100 MCG: at 07:55

## 2023-03-21 RX ADMIN — ROCURONIUM BROMIDE 20 MG: 10 INJECTION INTRAVENOUS at 08:14

## 2023-03-21 RX ADMIN — SODIUM CHLORIDE, POTASSIUM CHLORIDE, SODIUM LACTATE AND CALCIUM CHLORIDE: 600; 310; 30; 20 INJECTION, SOLUTION INTRAVENOUS at 09:31

## 2023-03-21 NOTE — ANESTHESIA PROCEDURE NOTES
Airway  Urgency: elective    Date/Time: 3/21/2023 7:36 AM  Airway not difficult    General Information and Staff    Patient location during procedure: OR  SRNA: Saray Gandhi SRNA  Indications and Patient Condition  Indications for airway management: airway protection    Preoxygenated: yes  MILS not maintained throughout  Mask difficulty assessment: 2 - vent by mask + OA or adjuvant +/- NMBA    Final Airway Details  Final airway type: endotracheal airway      Successful airway: ETT  Cuffed: yes   Successful intubation technique: video laryngoscopy  Facilitating devices/methods: intubating stylet  Endotracheal tube insertion site: oral  Blade: Solorzano  Blade size: 3  ETT size (mm): 7.5  Cormack-Lehane Classification: grade IIa - partial view of glottis  Placement verified by: chest auscultation and capnometry   Cuff volume (mL): 7  Measured from: lips  ETT/EBT  to lips (cm): 22  Number of attempts at approach: 1  Assessment: lips, teeth, and gum same as pre-op and atraumatic intubation    Additional Comments  Negative epigastric sounds, Breath sound equal bilaterally with symmetric chest rise and fall

## 2023-03-21 NOTE — CASE MANAGEMENT/SOCIAL WORK
Continued Stay Note  [facility]     Patient Name: Monty Nagel  MRN: [id number]  Today's Date: 3/21/2023    Admit Date: 3/11/2023    Plan: Ongoing   Discharge Plan     Row Name 03/21/23 5400       Plan    Plan Ongoing      Plan Comments Mr. Nagel currently has an open APS case for unsafe living conditions. Social work is following. Post op physical and occupational therapy evaluations are needed for discharge planning. If he is unable to return to his home, he would likely need long term care placement. Case management will continue to follow Mr. aNgel's progress and provide for him a safe discharge plan.           Expected Discharge Date and Time     Expected Discharge Date Expected Discharge Time    Mar 24, 2023             Loan Leal RN

## 2023-03-21 NOTE — ANESTHESIA PREPROCEDURE EVALUATION
Anesthesia Evaluation     Patient summary reviewed and Nursing notes reviewed   NPO Solid Status: > 8 hours  NPO Liquid Status: > 8 hours           Airway   Mallampati: II  TM distance: >3 FB  Neck ROM: limited  Possible difficult intubation  Dental    (+) poor dentition    Pulmonary    (+) decreased breath sounds,   Cardiovascular   Exercise tolerance: poor (<4 METS)    Rhythm: regular  Rate: normal    (+) valvular problems/murmurs AS and murmur, dysrhythmias Atrial Fib, murmur,       Neuro/Psych  GI/Hepatic/Renal/Endo      Musculoskeletal     Abdominal    Substance History      OB/GYN          Other                        Anesthesia Plan    ASA 3     general     intravenous induction     Anesthetic plan, risks, benefits, and alternatives have been provided, discussed and informed consent has been obtained with: patient.    A LINE   RETURN TO ICU POST OP    CODE STATUS:    Level Of Support Discussed With: Patient  Code Status (Patient has no pulse and is not breathing): CPR (Attempt to Resuscitate)  Medical Interventions (Patient has pulse or is breathing): Full Support

## 2023-03-21 NOTE — PROGRESS NOTES
Chief complaint: Brain tumor, clinically significant cerebral edema, left hemiparesis    Admit Diagnosis:   Generalized weakness [R53.1]     Subjective: No events overnight    Objective:    Vitals:    23 0612   BP: 101/71   Pulse: 59   Resp: 16   Temp: 97.4 °F (36.3 °C)   SpO2: 96%     Pulse  Av.9  Min: 57  Max: 100  Systolic (24hrs), Av , Min:86 , Max:140     Diastolic (24hrs), Av, Min:64, Max:103    Temp (24hrs), Av.6 °F (36.4 °C), Min:96.7 °F (35.9 °C), Max:98.5 °F (36.9 °C)      He has left-sided pronator drift although the movement with his left arm seems to have improved since starting steroids.  He still has impaired proprioception of his left lower extremity with proximal and distal weakness    Lab Results   Component Value Date     (L) 2023       A/P:   Admit Diagnosis:   Generalized weakness [R53.1]     Informed consent was once again obtained for a right frontal craniotomy for resection of the presumed meningioma.  All questions were answered.  No guarantees were given or implied.  The patient consents to proceed with surgery.

## 2023-03-21 NOTE — THERAPY RE-EVALUATION
Acute Care - Speech Language Pathology Re-Evaluation  [facility]   Cognitive-Communication Evaluation     Patient Name: Monty Nagel  : 1955  MRN: [id number]  Today's Date: 3/21/2023               Admit Date: 3/11/2023     Visit Dx:    ICD-10-CM ICD-9-CM   1. Generalized weakness  R53.1 780.79   2. History of alcoholism (HCC)  F10.21 V11.3   3. Inability to walk  R26.2 719.7   4. Confusion  R41.0 298.9   5. Hypoglycemia  E16.2 251.2   6. Poor nutrition  E63.9 269.9   7. Cognitive communication deficit  R41.841 799.52   8. Impaired functional mobility, balance, gait, and endurance  Z74.09 V49.89   9. 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   • Postablative hypothyroidism   • Screen for colon cancer   • Other recurrent depressive disorders (HCC)   • Balance problem   • Generalized weakness   • Atrial fibrillation (HCC)   • Severe malnutrition (HCC)   • Meningioma (HCC)   • Rheumatoid vs Psoriatic arthritis (HCC)     Past Medical History:   Diagnosis Date   • Arthritis    • Hepatitis A    • Hypertension    • Melanoma (HCC) 2017    retina     Past Surgical History:   Procedure Laterality Date   • EYE SURGERY  2017    EYE CANCER RIGHT EYE   • FINGER SURGERY Left     Pointer finger re-attached in 3rd Grade   • TONSILLECTOMY         SLP Recommendation and Plan  SLP Diagnosis: mild-moderate, cognitive-linguistic disorder (23 140)           [employer] Criteria for Skilled Therapy Interventions Met: yes (23)  Anticipated Discharge Disposition (SLP): skilled nursing facility, anticipate therapy at next level of care (23)        Predicted Duration Therapy Intervention (Days): until discharge (23 140)  SLC Diagnostic Follow-Up Needed: higher-level cognitive-linguistic, other (see comments) (continued assessment as gets further out from surgery) (23)                        Plan of Care Reviewed With: patient (03/21/23 8307)      SLP EVALUATION (last 72 hours)     SLP SLC Evaluation     Row Name 03/21/23 5127                   Communication Assessment/Intervention    Document Type evaluation  -EN        Patient Observations agree to therapy;alert  -EN        Patient/Family/Caregiver Comments/Observations none present  -EN        Patient Effort fair  -EN        Symptoms Noted During/After Treatment none  -EN           General Information    Patient Profile Reviewed yes  -EN        Pertinent History Of Current Problem See initial evaluation. Pt is s/p craniectomy (R frontal tumor resection); at last eval, it was explained to pt that SLP would f/u s/p sx to assess current function and aniticipated d/c needs.  -EN        Precautions/Limitations, Vision vision impairment, right  -EN        Precautions/Limitations, Hearing WFL  -EN        Prior Level of Function-Communication cognitive-linguistic impairment;other (see comments)  pt states dementia however suspect deficits related to brain tumor  -EN        Plans/Goals Discussed with patient  -EN        Barriers to Rehab cognitive status  -EN        Patient's Goals for Discharge take care of myself at home  -EN           Pain    Additional Documentation Pain Scale: FACES Pre/Post-Treatment (Group)  -EN           Pain Scale: FACES Pre/Post-Treatment    Pain: FACES Scale, Pretreatment 0-->no hurt  -EN        Posttreatment Pain Rating 0-->no hurt  -EN           Comprehension Assessment/Intervention    Comprehension Assessment/Intervention Auditory Comprehension  -EN           Auditory Comprehension Assessment/Intervention    Answers Questions (Communication) yes/no;personal;wh questions;WFL  -EN        Able to Follow Commands (Communication) 2-step;WFL  -EN        Narrative Discourse conversational level;WFL;mild impairment  -EN        Successful Auditory Strategies (Communication) other (see comments)  additional response time  alotted  -EN        Auditory Comprehension Communication, Comment delayed responses  -EN           Expression Assessment/Intervention    Expression Assessment/Intervention verbal expression  -EN           Verbal Expression Assessment/Intervention    Conversational Discourse/Fluency WFL  -EN           Oral Motor Structure and Function    Oral Motor Structure and Function WFL  -EN        Dentition Assessment natural, present and adequate  -EN        Mucosal Quality moist, healthy  -EN           Motor Speech Assessment/Intervention    Motor Speech Function mild impairment  -EN        Characteristics Consistent with Dysarthria slow rate  -EN           Cursory Voice Assessment/Intervention    Quality and Resonance (Voice) WFL  -EN           Cognitive Assessment Intervention- SLP    Cognitive Function (Cognition) mild impairment;moderate impairment  -EN        Memory (Cognitive) delayed;mental manipulation;mild impairment;moderate impairment  -EN        Reasoning (Cognitive) mental flexibility;mild impairment;moderate impairment  -EN        Executive Function (Cognition) deficit awareness;home management activities;mild impairment;moderate impairment  -EN           SLP Evaluation Clinical Impressions    SLP Diagnosis mild-moderate;cognitive-linguistic disorder  -EN        Rehab Potential/Prognosis good  -EN        SLC Criteria for Skilled Therapy Interventions Met yes  -EN        Functional Impact difficulty completing home management task  -EN           Recommendations    Therapy Frequency (SLP SLC) 3 days per week  -EN        Predicted Duration Therapy Intervention (Days) until discharge  -EN        Anticipated Discharge Disposition (SLP) skilled nursing facility;anticipate therapy at next level of care  -EN        SLC Diagnostic Follow-Up Needed higher-level cognitive-linguistic;other (see comments)  continued assessment as gets further out from surgery  -EN              User Key  (r) = Recorded By, (t) = Taken By, (c)  = Cosigned By    Initials Name Effective Dates    EN Vik, Irina LEMON, MS CCC-SLP 06/22/22 -                    EDUCATION  The patient has been educated in the following areas:     Cognitive Impairment Communication Impairment.           SLP GOALS     Row Name 03/21/23 8516             Patient will demonstrate functional language skills for return to discharge environment     [place] with minimal cues  -EN      Time frame by discharge  -EN      Progress/Outcomes new goal  -EN         Patient will demonstrate functional cognitive-linguistic skills for return to discharge environment    [place] with minimal cues  -EN      Time frame by discharge  -EN      Progress/Outcomes goal ongoing  -EN         Connected Speech to Express Thoughts Goal 1 (SLP)    Improve Narrative Discourse to Express Thoughts By Goal 1 (SLP) conversational task on a given topic;conversational task, self-directed;80%;with minimal cues (75-90%)  -EN      Time Frame (Connected Speech Goal 1, SLP) short term goal (STG)  -EN      Progress/Outcomes (Connected Speech Goal 1, SLP) new goal  -EN         [place] Goal 1 (SLP)    Improve [place] by Goal 1 (SLP) increasing rate;80%;with minimal cues (75-90%)  -EN      Time Frame ([place] Goal 1, SLP) short term goal (STG)  -EN      Progress/Outcomes ([place] Goal 1, SLP) new goal  -EN         Memory Skills Goal 1 (SLP)    Improve Memory Skills Through Goal 1 (SLP) use memory strategies;recall details of the day;visual memory task;80%;with minimal cues (75-90%)  -EN      Time Frame (Memory Skills Goal 1, SLP) short term goal (STG)  -EN      Progress/Outcomes (Memory Skills Goal 1, SLP) goal ongoing  -EN         Reasoning Goal 1 (SLP)    Improve Reasoning Through Goal 1 (SLP) complete basic reasoning task;complete mental flexibility task;complete deductive reasoning task;80%;with minimal cues (75-90%)  -EN      Time Frame (Reasoning Goal 1, SLP) short term goal (STG)  -EN      Progress/Outcomes  (Reasoning Goal 1, SLP) new goal  -EN         Executive Functional Skills Goal 1 (SLP)    Improve Executive Function Skills Goal 1 (SLP) identify anticipated needs;home management activity;exhibit cognitive flexibility;perform self-correction;perform self-evaluation;80%;with minimal cues (75-90%)  -EN      Time Frame (Executive Function Skills Goal 1, SLP) short term goal (STG)  -EN      Progress/Outcomes (Executive Function Skills Goal 1, SLP) goal ongoing  -EN            User Key  (r) = Recorded By, (t) = Taken By, (c) = Cosigned By    Initials Name Provider Type    Irina Parra MS CCC-SLP Speech and Language Pathologist                        Time Calculation:      Time Calculation- SLP     Row Name 03/21/23 1431             Time Calculation- SLP    SLP Start Time 1405  -EN      SLP Received On 03/21/23  -EN         Untimed Charges    SLP Eval/Re-eval  ST Eval Speech and Production w/ Language - 93183  -EN      [id number] Eval Speech and Production w/ Language Minutes 30  -EN         Total Minutes    Untimed Charges Total Minutes 30  -EN       Total Minutes 30  -EN            User Key  (r) = Recorded By, (t) = Taken By, (c) = Cosigned By    Initials Name Provider Type    Irina Parra MS CCC-SLP Speech and Language Pathologist                Therapy Charges for Today     Code Description Service Date Service Provider Modifiers Qty    52602461761 HC ST EVAL SPEECH AND PROD W LANG  2 3/21/2023 Irina Chery MS CCC-SLP GN 1                     Irina Chery MS CCC-SLP  3/21/2023

## 2023-03-21 NOTE — PLAN OF CARE
Goal Outcome Evaluation:  Plan of Care Reviewed With: patient        Progress: no change  Outcome Evaluation: Patient is oriented. NPO since MN, Crani planned for in the morning. VSS.

## 2023-03-21 NOTE — ANESTHESIA POSTPROCEDURE EVALUATION
Patient: Monty Nagel    Procedure Summary     Date: 03/21/23 Room / Location:  [facility] /  [facility]    Anesthesia Start: 0727 Anesthesia Stop: 1032    Procedure: CRANIOTOMY FOR TUMOR STEREOTACTIC WITH STEALTH (Head) Diagnosis:       Brain tumor (HCC)      (Brain tumor (HCC) [D49.6])    Surgeons: Marlon Mckee MD Provider: Sb Spencer MD    Anesthesia Type: general ASA Status: 3          Anesthesia Type: general    Vitals  Vitals Value Taken Time   /78 03/21/23 1030   Temp     Pulse 92 03/21/23 1032   Resp     SpO2 91 % 03/21/23 1032   Vitals shown include unvalidated device data.        Post Anesthesia Care and Evaluation    Patient location during evaluation: PACU  Patient participation: complete - patient participated  Level of consciousness: awake and alert  Pain management: adequate    Airway patency: patent  Anesthetic complications: No anesthetic complications  PONV Status: none  Cardiovascular status: hemodynamically stable and acceptable  Respiratory status: nonlabored ventilation, acceptable and nasal cannula  Hydration status: acceptable

## 2023-03-21 NOTE — OP NOTE
Preoperative diagnosis: Right frontal lobe brain tumor, left hemiparesis, clinically significant cerebral edema  Postoperative diagnosis: Same    Procedures performed:  1.  Right frontal craniotomy for resection of dural mass  2.  Use of intraoperative navigation for intracranial target    Estimated blood loss: 100 cc    Indication for procedure: This is a 67-year-old man who presented to [facility] with a several month history of progressive gait instability.  He was evaluated with a head CT which demonstrated a large right frontal lobe mass.  He was admitted to the hospital and placed on high-dose Decadron to manage his perilesional edema.  Surgical intervention is indicated.    Informed consent for this procedure was obtained from the patient.  He acknowledges risk of stroke, death, pain, paralysis, coma, blindness, permanent neurologic disability, bleeding, infection, spinal fluid leak, failure of benefit of the operation, inability to completely resect the tumor, or need for additional procedures.  All questions were answered prior to beginning the procedure.  No guarantees were given or implied.  The patient consents to proceed with surgery.    Procedure in detail: The patient was identified in the preoperative holding area and brought to the operating suite where he underwent the uneventful induction of general, endotracheal anesthesia.  Venodyne's and a Cole catheter were placed by the nursing staff.  An arterial line was placed by the attending anesthesiologist.  The bed was rotated 90 degrees from anesthesia and the patient's head was gently rotated to the left exposing the right hemicranium.  The patient's head was then placed in the [place] head ambrocio with 3 points of fixation.  The patient's face was registered with the navigation system.  The skin overlying the right frontal lobe tumor was then marked out.  The overlying skin was then shaved, prepped, and draped in the usual, sterile  fashion.    A timeout was performed.  Intravenous antibiotics were ministered.  A curvilinear, modified pterional style skin incision was then marked out and the skin was anesthetized.  A #15 blade was used to make the skin incision.  Hemostasis was achieved using bipolar and monopolar electrocautery.  A myocutaneous flap was then reflected anteriorly and held in place using fishhooks.    A bur hole was placed over the temporal lobe and over the anterior and medial aspect of the skin opening.  The craniotomy was then turned in the standard fashion.  There was some bleeding from the superior sagittal sinus which was controlled using Gelfoam and thrombin-soaked foam.    The dura was coagulated and opened in a curvilinear fashion.  The anterior aspect of the tumor was densely adherent to the andreia.  This was gently coagulated and sharply opened using a 11 blade.  I was then able to develop a nice plane medially and anteriorly slowly bipolar in the tumor and working posteriorly.  After I had dissected around approximately 75% of the tumor beginning medially and working all the way around to the posterior lateral aspect going down about a centimeter from the cortical surface, I then used the ultrasonic aspirator and debulked the inner approximately 30% of the tumor.  This allowed me to mobilize the tumor more effectively and at this point I was able to continue my dissection identifying the Tuohy anterior and deep aspect of the tumor.  The underlying white matter was very edematous.  I was able to free the tumor medially, anteriorly, and laterally quite easily.  There was a significant amount of tumor that had run underneath the limits of my bony exposure posteriorly, so I amputated the tumor with a Metzenbaum scissor.  I then used the ultrasonic aspirator to remove the remainder of the tumor posteriorly.    The wound was copiously irrigated with saline.  Hemostasis was excellent.  The tumor resection cavity was lined with  Surgicel.  The exposed aspect of the superior sagittal sinus was packed with Avitene and Gelfoam.  A small piece of DuraGen was then placed over the dural defect.Adherus was subsequently applied to the dural patch.  The bone flap was reattached using the plating system.  The temporalis fascia and galea were then closed in layers.      Glue and a sterile dressing were then applied sponge, instrument, and needle counts were all correct at the conclusion of the case.    Sherman Booth, physician assistant was responsible for performing the following activities: Retraction, Suction, Irrigation, Suturing and Closing and their skilled assistance was necessary for the success of this case.

## 2023-03-21 NOTE — ANESTHESIA PROCEDURE NOTES
Arterial Line      Patient reassessed immediately prior to procedure    Patient location during procedure: holding area   Performed By   Anesthesiologist: Sb Spencer MD  CRNA/CAA: Vasyl Fernandez CRNA   Preanesthetic Checklist  Completed: patient identified, IV checked, site marked, risks and benefits discussed, surgical consent, monitors and equipment checked, pre-op evaluation and timeout performed  Arterial Line Prep    Sterile Tech: cap, gloves and mask  Prep: DuraPrep  Patient monitoring: blood pressure monitoring, continuous pulse oximetry and EKG  Arterial Line Procedure   Laterality:left  Location:  radial artery  Catheter size: 20 G   Guidance: ultrasound guided  PROCEDURE NOTE/ULTRASOUND INTERPRETATION.  Using ultrasound guidance the potential vascular sites for insertion of the catheter were visualized to determine the patency of the vessel to be used for vascular access.  After selecting the appropriate site for insertion, the needle was visualized under ultrasound being inserted into the radial artery, followed by ultrasound confirmation of wire and catheter placement. There were no abnormalities seen on ultrasound; an image was taken; and the patient tolerated the procedure with no complications.   Number of attempts: 1  Successful placement: yes Images: still images not obtained  Post Assessment   Dressing Type: line sutured, occlusive dressing applied, secured with tape and wrist guard applied.   Complications no  Circ/Move/Sens Assessment: unchanged and normal.   Patient Tolerance: patient tolerated the procedure well with no apparent complications

## 2023-03-21 NOTE — PLAN OF CARE
Goal Outcome Evaluation:  Plan of Care Reviewed With: patient      SLP re-evaluation completed. Will address cognitive communication. Please see note for further details and recommendations.

## 2023-03-21 NOTE — NURSING NOTE
WOC consult for: Other/per patient request to look at toes, cirrhosis    Called and spoke with bedside RN who states patient is in surgery for craniotomy at this time.  Recommended to bedside RN to consult WOC team if patient still desires assessment after surgery.  Recommend application of betadine to periwound skin and leave open to air, apply heel boots bilaterally while in bed and put pillow under knees to prevent hyperextension if not contraindicated.    Sensory Perception: 4-->no impairment  Moisture: 3-->occasionally moist  Activity: 3-->walks occasionally  Mobility: 3-->slightly limited  Nutrition: 3-->adequate  Friction and Shear: 3-->no apparent problem  Nick Score: 19 (03/20/23 2000)    Please implement pressure injury prevention interventions for patients with a Nick score of 18 or less, given patient is having surgery his Nick will be less than the current score of 18.  See orders for recommendations.    Thank you for the consult.  WOC team will sign off.  If alteration to skin integrity or change in wound bed presentation please consult the WOC team.

## 2023-03-21 NOTE — PROGRESS NOTES
Pulmonary/Critical Care History and Physical Exam     LOS: 4 days   Patient Care Team:  Tiffany Morales MD as PCP - General (Family Medicine)  Tisha Barth MD as Consulting Physician (Ophthalmology)    Chief Complaint:    Chief Complaint   Patient presents with   • Weakness - Generalized       Subjective     HPI:   Mr. Monty Nagel, is a 67 y.o. male with PMH of HTN, right eye blindness due to right retinal melanoma, shoulder dislocation and humerus fx due to fall while intoxicated 9/2022 (stopped drinking at this time), and hypothyroidism who presents back to the ICU s/p craniotomy with Dr. Mckee.  He has had c/o gait instability and generalized weakness for 2-3 weeks.  He had lost weight because he is afraid to drive and go to the grocery due to his balance issues.  He does not have friends or family here to help him.  He has family in [place].  APS referral was made in the ED for poor living conditions.  He underwent MRI brain that revealed 6.4 cm homogeneous enhancing mass that appears to be extra-axial along right frontal convexity, favored to represent meningioma.  MRI c spine revealed moderate multi-level degenerative changes of cervical spine and fluid in left C4-5 facet suggesting some nonspecific inflammation.  NS was consulted for resection.  They recommended discharge with outpatient follow up and returning for surgery.  However, he was too weak and unable to walk safely so he stayed inpatient.  3/20, he underwent a right middle meningeal artery embolization. During that they found a large hypervascular mass at the right frontal convexity, consistent with meningioma. Arterial supply to the tumor was largely via the bilateral middle meningeal arteries (R>L). The middle meningeal arteries were pre-operatively embolized with PVA particles ranging in size from 250-350 um, along with placement of Target XL microcoil's resulting in significant reduction in tumor vascularity. Post op he was brought to  "the ICU for management.    Overnight his DAPT was held but he was continue don his decadron, keppra and levothyroxine.       Interval History:  Patient is brought back to the ICU s/p crani with Dr. Mckee. Multiple complaints including his SCDs (\"bat wings\"), headache and inability to urinate despite mullins catheter.     VSS on 2L NC.     History taken from: patient    Past Medical History:   Diagnosis Date   • Arthritis    • Hepatitis A    • Hypertension    • Melanoma (HCC) 2017    retina       Past Surgical History:   Procedure Laterality Date   • EYE SURGERY  2017    EYE CANCER RIGHT EYE   • FINGER SURGERY Left     Pointer finger re-attached in 3rd Grade   • TONSILLECTOMY         Family History   Problem Relation Age of Onset   • Diabetes Mother    • Heart disease Father        Social History     Socioeconomic History   • Marital status: Single   Tobacco Use   • Smoking status: Former     Packs/day: 1.00     Years: 15.00     Pack years: 15.00     Types: Cigarettes     Start date: 3/26/1981     Quit date: 1990     Years since quittin.2   • Smokeless tobacco: Former     Quit date: 1990   Vaping Use   • Vaping Use: Never used   Substance and Sexual Activity   • Alcohol use: Not Currently     Comment: Quit Sept 10, 2022, had been using for 50 years   • Drug use: No   • Sexual activity: Never       No Known Allergies    PMH/FH/SocH were reviewed by me and updates were made.     Review of Systems:    All systems were reviewed and negative except as noted in subjective.    Objective     Vital Signs  Temp:  [96.7 °F (35.9 °C)-98.5 °F (36.9 °C)] 97.5 °F (36.4 °C)  Heart Rate:  [57-95] 95  Resp:  [16-20] 16  BP: ()/() 111/83    Physical Exam:     General Appearance:    Resting in bed. Alert, cooperative, in no acute distress   Head:    Right sided dressing in place, cdi. Normocephalic, without obvious abnormality,   Eyes:            Lids and lashes normal, conjunctivae and sclerae normal, no   " icterus, no pallor, corneas clear, PERRLA   ENMT:   Ears appear intact with no abnormalities noted      No oral lesions, no thrush, oral mucosa moist      No adenopathy, supple, trachea midline, no thyromegaly, no   carotid bruit, no JVD       Lungs/resp:     Normal effort, symmetric chest rise, no crepitus, clear to      auscultation bilaterally, no chest wall tenderness, resonant to percussion throughout.                  Heart/CV:    Regular rhythm and normal rate, normal S1 and S2, no            murmur, no gallop, no rub, no click   Abdomen/GI:     Normal bowel sounds, no masses, no organomegaly, soft        non-tender, non-distended, no guarding, no rebound                tenderness   G/U:     Cole catheter   Extremities/MSK:   No clubbing, cyanosis or edema.  No deformities.    Pulses:   Pulses palpable and equal bilaterally   Skin:   No bleeding, bruising or rash   Hem/Lymph:   No cervical or supraclavicular adenopathy.    Neurologic:   Moves all extremities with no obvious focal motor deficit.  Cranial nerves 2 - 12 grossly intact            Psychiatric:  Normal mood and affect, oriented x 3.      Results Review:     I reviewed the patient's new clinical results.   Results from last 7 days   Lab Units 03/21/23  0421 03/19/23  0706   SODIUM mmol/L 132* 132*   POTASSIUM mmol/L 4.3 4.7   CHLORIDE mmol/L 97* 97*   CO2 mmol/L 30.0* 29.0   BUN mg/dL 24* 22   CREATININE mg/dL 0.76 0.82   CALCIUM mg/dL 7.7* 7.9*   GLUCOSE mg/dL 110* 109*     Results from last 7 days   Lab Units 03/21/23  0421 03/19/23  0706   WBC 10*3/mm3 9.20 7.48   HEMOGLOBIN g/dL 11.4* 11.9*   HEMATOCRIT % 33.5* 34.7*   PLATELETS 10*3/mm3 200 186           I reviewed the patient's new imaging including images and reports.    Medication Review:   atorvastatin, 40 mg, Oral, Nightly  dexamethasone, 4 mg, Oral, Q6H  enoxaparin, 40 mg, Subcutaneous, Daily  levETIRAcetam, 1,000 mg, Oral, Q12H  levothyroxine, 125 mcg, Oral, Daily  pantoprazole, 40 mg,  Oral, Q AM  polyethylene glycol, 17 g, Oral, Daily  senna-docusate sodium, 2 tablet, Oral, BID  sodium chloride, 10 mL, Intravenous, Q12H  thiamine, 100 mg, Oral, Daily  zolpidem, 5 mg, Oral, Nightly      lactated ringers, 9 mL/hr, Last Rate: Stopped (03/21/23 1027)  niCARdipine, 5-15 mg/hr, Last Rate: 2.5 mg/hr (03/21/23 1054)  niCARdipine, 5-15 mg/hr, Last Rate: 2.5 mg/hr (03/21/23 1035)        Assessment & Plan       Meningioma (HCC)    Essential hypertension    History of right retinal melanoma with right eye blindness    Postablative hypothyroidism    Other recurrent depressive disorders (HCC)    Atrial fibrillation (HCC)    Rheumatoid vs Psoriatic arthritis (HCC)    Meningioma s/p tumor embolization 3/20/23 by Dr. Sebastian and Crani 3/21 by Dr. Mckee  Gait Instability  Generalized weakness   Frequent Falls  • Continue holding DAPT until cleared by NS; DVT ppx is okay  • Continue Decadron  • Continue Keppra  Neurochecks per protocol  S/P Crani 3/21  1. BP management goal <140; Nicardipine as needed  2. Aggressive pulmonary toilet  3. Pain management with PRN Tylenol and Dilaudid  4. Advance diet as tolerated  5. Mobilize patient per protocl  6. SCDs for DVT ppx  7. Protonix   8. Home meds per primary team  9. Continue PT/OT:Will most likely need inpatient rehab at discharge +/- Assistance with living arrangements in SNF     HTN  HLP  Atrial Fibrillation (new onset) - slow rate  • Restart home antihypertensives when needed  • Continue Lipitor  • No anticoagulation in anticipation of surgery tomorrow     Hypothyroidism  H/O ablation 2* Graves   • Continue Levothyroxine     Psoriatic Arthritis  • Never been treated  • Normal CRP, ESR  • RF, Bowen Ab and TORIE undetectable     Former heavy ETOH Use (last drink 9/2022)  Possible Medical Noncompliance  Poor living conditions  • APS consult in ED  • No family or friends to help at home  • Lives alone     F: Regular  A: PRN Tylenol and Dilaudid   S: NA  T: Lovenox &  SCDs  H: HOB elevated  U: Pantoprazole  G: as needed  S: NA  B: Docusate, Miralax, dulcolax scheduled and PRN  I: PIV's, Cole catheter and right scalp incision  D: NA    Dispo: Continue ICU    AM Labs ordered    LYNETTE Regalado  03/21/23  11:08 EDT      Level of Risk High due to: Risk to life and/or neurological decline    Electronically signed by LYNETTE Regalado, 03/21/23, 11:08 AM EDT.    Copied text in this note has been reviewed and is accurate as of 03/21/23.

## 2023-03-22 LAB
ANION GAP SERPL CALCULATED.3IONS-SCNC: 7 MMOL/L (ref 5–15)
BASOPHILS # BLD AUTO: 0.02 10*3/MM3 (ref 0–0.2)
BASOPHILS NFR BLD AUTO: 0.1 % (ref 0–1.5)
BUN SERPL-MCNC: 26 MG/DL (ref 8–23)
BUN/CREAT SERPL: 41.3 (ref 7–25)
CA-I SERPL ISE-MCNC: 1.16 MMOL/L (ref 1.12–1.32)
CALCIUM SPEC-SCNC: 7.9 MG/DL (ref 8.6–10.5)
CHLORIDE SERPL-SCNC: 96 MMOL/L (ref 98–107)
CO2 SERPL-SCNC: 29 MMOL/L (ref 22–29)
CREAT SERPL-MCNC: 0.63 MG/DL (ref 0.76–1.27)
DEPRECATED RDW RBC AUTO: 47.6 FL (ref 37–54)
EGFRCR SERPLBLD CKD-EPI 2021: 103 ML/MIN/1.73
EOSINOPHIL # BLD AUTO: 0 10*3/MM3 (ref 0–0.4)
EOSINOPHIL NFR BLD AUTO: 0 % (ref 0.3–6.2)
ERYTHROCYTE [DISTWIDTH] IN BLOOD BY AUTOMATED COUNT: 15.7 % (ref 12.3–15.4)
GLUCOSE SERPL-MCNC: 156 MG/DL (ref 65–99)
HCT VFR BLD AUTO: 35.8 % (ref 37.5–51)
HGB BLD-MCNC: 12.6 G/DL (ref 13–17.7)
IMM GRANULOCYTES # BLD AUTO: 0.15 10*3/MM3 (ref 0–0.05)
IMM GRANULOCYTES NFR BLD AUTO: 0.9 % (ref 0–0.5)
LYMPHOCYTES # BLD AUTO: 0.47 10*3/MM3 (ref 0.7–3.1)
LYMPHOCYTES NFR BLD AUTO: 2.9 % (ref 19.6–45.3)
MAGNESIUM SERPL-MCNC: 2 MG/DL (ref 1.6–2.4)
MCH RBC QN AUTO: 30 PG (ref 26.6–33)
MCHC RBC AUTO-ENTMCNC: 35.2 G/DL (ref 31.5–35.7)
MCV RBC AUTO: 85.2 FL (ref 79–97)
MONOCYTES # BLD AUTO: 0.71 10*3/MM3 (ref 0.1–0.9)
MONOCYTES NFR BLD AUTO: 4.4 % (ref 5–12)
NEUTROPHILS NFR BLD AUTO: 14.66 10*3/MM3 (ref 1.7–7)
NEUTROPHILS NFR BLD AUTO: 91.7 % (ref 42.7–76)
NRBC BLD AUTO-RTO: 0 /100 WBC (ref 0–0.2)
PHOSPHATE SERPL-MCNC: 3.2 MG/DL (ref 2.5–4.5)
PLATELET # BLD AUTO: 211 10*3/MM3 (ref 140–450)
PMV BLD AUTO: 9.3 FL (ref 6–12)
POTASSIUM SERPL-SCNC: 4.3 MMOL/L (ref 3.5–5.2)
RBC # BLD AUTO: 4.2 10*6/MM3 (ref 4.14–5.8)
SODIUM SERPL-SCNC: 132 MMOL/L (ref 136–145)
WBC NRBC COR # BLD: 16.01 10*3/MM3 (ref 3.4–10.8)

## 2023-03-22 PROCEDURE — 83735 ASSAY OF MAGNESIUM: CPT | Performed by: NURSE PRACTITIONER

## 2023-03-22 PROCEDURE — 84100 ASSAY OF PHOSPHORUS: CPT | Performed by: NURSE PRACTITIONER

## 2023-03-22 PROCEDURE — 25010000002 HYDROMORPHONE 1 MG/ML SOLUTION: Performed by: NEUROLOGICAL SURGERY

## 2023-03-22 PROCEDURE — 85025 COMPLETE CBC W/AUTO DIFF WBC: CPT | Performed by: NEUROLOGICAL SURGERY

## 2023-03-22 PROCEDURE — 97168 OT RE-EVAL EST PLAN CARE: CPT

## 2023-03-22 PROCEDURE — 97530 THERAPEUTIC ACTIVITIES: CPT

## 2023-03-22 PROCEDURE — 82330 ASSAY OF CALCIUM: CPT | Performed by: NURSE PRACTITIONER

## 2023-03-22 PROCEDURE — 99232 SBSQ HOSP IP/OBS MODERATE 35: CPT

## 2023-03-22 PROCEDURE — 97164 PT RE-EVAL EST PLAN CARE: CPT

## 2023-03-22 PROCEDURE — 25010000002 ENOXAPARIN PER 10 MG: Performed by: NEUROLOGICAL SURGERY

## 2023-03-22 PROCEDURE — 63710000001 DEXAMETHASONE PER 0.25 MG: Performed by: NEUROLOGICAL SURGERY

## 2023-03-22 PROCEDURE — 80048 BASIC METABOLIC PNL TOTAL CA: CPT | Performed by: NEUROLOGICAL SURGERY

## 2023-03-22 RX ORDER — DEXAMETHASONE 2 MG/1
TABLET ORAL
Qty: 36 TABLET | Refills: 0 | Status: SHIPPED | OUTPATIENT
Start: 2023-03-22 | End: 2023-04-19

## 2023-03-22 RX ORDER — DEXAMETHASONE 2 MG/1
2 TABLET ORAL
Qty: 4 TABLET | Refills: 0 | Status: SHIPPED | OUTPATIENT
Start: 2023-03-22 | End: 2023-03-26

## 2023-03-22 RX ORDER — DEXAMETHASONE 4 MG/1
4 TABLET ORAL
Qty: 4 TABLET | Refills: 0 | Status: SHIPPED | OUTPATIENT
Start: 2023-03-22 | End: 2023-03-26

## 2023-03-22 RX ORDER — DEXAMETHASONE 4 MG/1
4 TABLET ORAL 2 TIMES DAILY WITH MEALS
Qty: 8 TABLET | Refills: 0 | Status: SHIPPED | OUTPATIENT
Start: 2023-03-22 | End: 2023-03-26

## 2023-03-22 RX ADMIN — ACETAMINOPHEN 325MG 650 MG: 325 TABLET ORAL at 08:45

## 2023-03-22 RX ADMIN — LEVOTHYROXINE SODIUM 125 MCG: 125 TABLET ORAL at 05:25

## 2023-03-22 RX ADMIN — DEXAMETHASONE 4 MG: 4 TABLET ORAL at 18:54

## 2023-03-22 RX ADMIN — HYDROMORPHONE HYDROCHLORIDE 0.5 MG: 1 INJECTION, SOLUTION INTRAMUSCULAR; INTRAVENOUS; SUBCUTANEOUS at 08:42

## 2023-03-22 RX ADMIN — DEXAMETHASONE 4 MG: 4 TABLET ORAL at 12:21

## 2023-03-22 RX ADMIN — THIAMINE HCL TAB 100 MG 100 MG: 100 TAB at 08:41

## 2023-03-22 RX ADMIN — ENOXAPARIN SODIUM 40 MG: 40 INJECTION SUBCUTANEOUS at 08:42

## 2023-03-22 RX ADMIN — LEVETIRACETAM 1000 MG: 500 TABLET, FILM COATED ORAL at 21:27

## 2023-03-22 RX ADMIN — BISACODYL 5 MG: 5 TABLET, COATED ORAL at 21:27

## 2023-03-22 RX ADMIN — HYDROMORPHONE HYDROCHLORIDE 0.5 MG: 1 INJECTION, SOLUTION INTRAMUSCULAR; INTRAVENOUS; SUBCUTANEOUS at 01:34

## 2023-03-22 RX ADMIN — ATORVASTATIN CALCIUM 40 MG: 40 TABLET, FILM COATED ORAL at 21:27

## 2023-03-22 RX ADMIN — Medication 10 ML: at 21:27

## 2023-03-22 RX ADMIN — DEXAMETHASONE 4 MG: 4 TABLET ORAL at 05:25

## 2023-03-22 RX ADMIN — PANTOPRAZOLE SODIUM 40 MG: 40 TABLET, DELAYED RELEASE ORAL at 05:25

## 2023-03-22 RX ADMIN — ZOLPIDEM TARTRATE 5 MG: 5 TABLET, FILM COATED ORAL at 21:27

## 2023-03-22 RX ADMIN — Medication 10 ML: at 08:48

## 2023-03-22 RX ADMIN — LEVETIRACETAM 1000 MG: 500 TABLET, FILM COATED ORAL at 08:41

## 2023-03-22 NOTE — THERAPY RE-EVALUATION
Patient Name: Monty Nagel  : 1955    MRN: [id number]                              Today's Date: 3/22/2023       Admit Date: 3/11/2023    Visit Dx:     ICD-10-CM ICD-9-CM   1. Generalized weakness  R53.1 780.79   2. History of alcoholism (HCC)  F10.21 V11.3   3. Inability to walk  R26.2 719.7   4. Confusion  R41.0 298.9   5. Hypoglycemia  E16.2 251.2   6. Poor nutrition  E63.9 269.9   7. Cognitive communication deficit  R41.841 799.52   8. Impaired functional mobility, balance, gait, and endurance  Z74.09 V49.89   9. 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   • Postablative hypothyroidism   • Screen for colon cancer   • Other recurrent depressive disorders (HCC)   • Balance problem   • Generalized weakness   • Atrial fibrillation (HCC)   • Severe malnutrition (HCC)   • Meningioma (HCC)   • Rheumatoid vs Psoriatic arthritis (HCC)     Past Medical History:   Diagnosis Date   • Arthritis    • Hepatitis A    • Hypertension    • Melanoma (HCC) 2017    retina     Past Surgical History:   Procedure Laterality Date   • CRANIOTOMY FOR TUMOR N/A 3/21/2023    Procedure: CRANIOTOMY FOR TUMOR STEREOTACTIC WITH STEALTH;  Surgeon: Marlon Mckee MD;  Location: [facility];  Service: Neurosurgery;  Laterality: N/A;   • EYE SURGERY  2017    EYE CANCER RIGHT EYE   • FINGER SURGERY Left     Pointer finger re-attached in 3rd Grade   • TONSILLECTOMY        General Information     Row Name 23 1129          Physical Therapy Time and Intention    Document Type re-evaluation  -AY     Mode of Treatment physical therapy  -AY     Row Name 23 1129          General Information    Patient Profile Reviewed yes  -AY     Prior Level of Function --  see IE  -AY     Existing Precautions/Restrictions fall  impulsive, L sided weakness/inattention, blind R eye baseline  -AY     Barriers to Rehab  medically complex;cognitive status;visual deficit  -AY     Row Name 03/22/23 1129          Living Environment    People in Home --  see IE  -AY     Row Name 03/22/23 1129          Stairs Within Home, Primary    Number of Stairs, Within Home, Primary none  -AY     Row Name 03/22/23 1129          Cognition    Orientation Status (Cognition) oriented to;person;place;situation  -AY     Row Name 03/22/23 1129          Safety Issues, Functional Mobility    Safety Issues Affecting Function (Mobility) insight into deficits/self-awareness;safety precautions follow-through/compliance;sequencing abilities;awareness of need for assistance;safety precaution awareness;impulsivity  -AY     Impairments Affecting Function (Mobility) balance;cognition;coordination;endurance/activity tolerance;strength;postural/trunk control;motor planning;visual/perceptual  -AY     Cognitive Impairments, Mobility Safety/Performance awareness, need for assistance;attention;safety precaution awareness;safety precaution follow-through;insight into deficits/self-awareness;sequencing abilities;impulsivity  -AY           User Key  (r) = Recorded By, (t) = Taken By, (c) = Cosigned By    Initials Name Provider Type    AY Sandee Spencer, PT Physical Therapist               Mobility     Row Name 03/22/23 1130          Sit-Stand Transfer    Sit-Stand Gould (Transfers) minimum assist (75% patient effort);verbal cues  -AY     Assistive Device (Sit-Stand Transfers) other (see comments)  R HHA  -AY     Comment, (Sit-Stand Transfer) after ambulation, pt sat impuslvily and did not listen to cueing/direction for safety and tried to sit impulsivly without chair behind him. Required mod A for stand to sit.  -AY     Row Name 03/22/23 1130          Gait/Stairs (Locomotion)    Gould Level (Gait) moderate assist (50% patient effort);1 person assist;1 person to manage equipment  -AY     Assistive Device (Gait) other (see comments)  R HHA  -AY     Distance in Feet  (Gait) 50  -AY     Deviations/Abnormal Patterns (Gait) bilateral deviations;base of support, narrow;dima decreased;festinating/shuffling;stride length decreased  -AY     Bilateral Gait Deviations forward flexed posture;heel strike decreased  -AY     Left Sided Gait Deviations foot drop/toe drag  -AY     Comment, (Gait/Stairs) pt demonstrated step to gait pattern with R foot leading. Pt ambulated almost laterally with L foot drag, which pt reported as baseline. Cueing for posture, increased stride length, increased L hip flexion to allow for improved foot clearance, and heel strike. Pt resistent to some cueing, mainly for L foot drag, despite education and h/o falls due to foot drag. Two LOB requiring mod A to correct, otherwise, pt required min A with R HHA. Gait distance limited by fatigue and worsening balance.  -AY           User Key  (r) = Recorded By, (t) = Taken By, (c) = Cosigned By    Initials Name Provider Type    Sandee Davidson PT Physical Therapist               Obj/Interventions     Row Name 03/22/23 1133          Range of Motion Comprehensive    General Range of Motion bilateral lower extremity ROM WFL  -AY     Row Name 03/22/23 1133          Strength Comprehensive (MMT)    General Manual Muscle Testing (MMT) Assessment lower extremity strength deficits identified  -AY     Comment, General Manual Muscle Testing (MMT) Assessment LLE grossly 3-/5 (DF 3+/5). RLE grossly 3/5  -AY     Row Name 03/22/23 1133          Motor Skills    Motor Skills coordination  -AY     Coordination gross motor deficit;left;lower extremity;moderate impairment;right;minimal impairment;heel to galloway  -AY     Row Name 03/22/23 1133          Balance    Balance Assessment sitting static balance;sitting dynamic balance;sit to stand dynamic balance;standing dynamic balance;standing static balance  -AY     Static Sitting Balance standby assist  -AY     Dynamic Sitting Balance standby assist  -AY     Position, Sitting Balance  unsupported;sitting in chair  -AY     Sit to Stand Dynamic Balance contact guard  -AY     Static Standing Balance contact guard  -AY     Dynamic Standing Balance moderate assist  -AY     Position/Device Used, Standing Balance supported  -AY     Row Name 03/22/23 1133          Sensory Assessment (Somatosensory)    Sensory Assessment (Somatosensory) LE sensation intact  -AY           User Key  (r) = Recorded By, (t) = Taken By, (c) = Cosigned By    Initials Name Provider Type    AY Sandee Spencer, PT Physical Therapist               Goals/Plan     Row Name 03/22/23 1136          Bed Mobility Goal 1 (PT)    Activity/Assistive Device (Bed Mobility Goal 1, PT) sit to supine/supine to sit  -AY     [place] Level/Cues Needed (Bed Mobility Goal 1, PT) contact guard required  -AY     Time Frame (Bed Mobility Goal 1, PT) long term goal (LTG);10 days  -AY     Progress/Outcomes (Bed Mobility Goal 1, PT) goal revised this date;medical status inhibiting progress  -AY     Row Name 03/22/23 1136          Transfer Goal 1 (PT)    Activity/Assistive Device (Transfer Goal 1, PT) sit-to-stand/stand-to-sit;bed-to-chair/chair-to-bed;walker, rolling  -AY     [place] Level/Cues Needed (Transfer Goal 1, PT) modified independence  -AY     Time Frame (Transfer Goal 1, PT) long term goal (LTG);10 days  -AY     Progress/Outcome (Transfer Goal 1, PT) goal revised this date;medical status inhibiting progress  -AY     Row Name 03/22/23 1136          Gait Training Goal 1 (PT)    Activity/Assistive Device (Gait Training Goal 1, PT) gait (walking locomotion);assistive device use;walker, rolling  -AY     [place] Level (Gait Training Goal 1, PT) contact guard required  -AY     Distance (Gait Training Goal 1, PT) 150  -AY     Time Frame (Gait Training Goal 1, PT) long term goal (LTG);10 days  -AY     Progress/Outcome (Gait Training Goal 1, PT) goal revised this date;medical status inhibiting progress  -AY     Row Name 03/22/23 1136           Therapy Assessment/Plan (PT)    Planned Therapy Interventions (PT) balance training;bed mobility training;gait training;home exercise program;postural re-education;transfer training;patient/family education;strengthening;stair training;neuromuscular re-education  -AY           User Key  (r) = Recorded By, (t) = Taken By, (c) = Cosigned By    Initials Name Provider Type    AY Sandee Spencer, PT Physical Therapist               Clinical Impression     Row Name 03/22/23 1134          Pain    Pretreatment Pain Rating 0/10 - no pain  -AY     Posttreatment Pain Rating 0/10 - no pain  -AY     Pain Intervention(s) Ambulation/increased activity  -AY     Additional Documentation Pain Scale: Numbers Pre/Post-Treatment (Group)  -AY     Row Name 03/22/23 1135          Plan of Care Review    Plan of Care Reviewed With patient  -AY     Progress improving  -AY     Outcome Evaluation PT re-eval completed. Pt limited by L weakness/incoordination, vision deficits, balance deficit, and poor safety awareness compared to baseline. Pt ambulated 50ft with mod A and 2 LOB. Impulsivity noted throughout all mobility. Rec continued skilled PT to increase indep with mobility. d/c rec for IRF.  -AY     Row Name 03/22/23 1131          Therapy Assessment/Plan (PT)    Rehab Potential (PT) fair, will monitor progress closely  -AY     Criteria for Skilled Interventions Met (PT) yes;skilled treatment is necessary  -AY     Therapy Frequency (PT) daily  -AY     Row Name 03/22/23 1136          Vital Signs    Pre Systolic BP Rehab 98  -AY     Pre Treatment Diastolic BP 72  -AY     Post Systolic BP Rehab 112  -AY     Post Treatment Diastolic BP 63  -AY     Pretreatment Heart Rate (beats/min) 86  -AY     Posttreatment Heart Rate (beats/min) 96  -AY     Pre SpO2 (%) 94  -AY     O2 Delivery Pre Treatment room air  -AY     O2 Delivery Intra Treatment room air  -AY     Post SpO2 (%) 97  -AY     O2 Delivery Post Treatment room air  -AY     Pre Patient Position  Sitting  -AY     Intra Patient Position Standing  -AY     Post Patient Position Sitting  -AY     Row Name 03/22/23 1134          Positioning and Restraints    Pre-Treatment Position sitting in chair/recliner  -AY     Post Treatment Position chair  -AY     In Chair notified nsg;reclined;sitting;call light within reach;encouraged to call for assist;exit alarm on;legs elevated;LUE elevated;RUE elevated;on mechanical lift sling;waffle cushion  -AY           User Key  (r) = Recorded By, (t) = Taken By, (c) = Cosigned By    Initials Name Provider Type    Sandee Davidson, PT Physical Therapist               Outcome Measures     Row Name 03/22/23 1140 03/22/23 0739       How much help from another person do you currently need...    Turning from your back to your side while in flat bed without using bedrails? 4  -AY 4  -AS (r) MW (t) AS (c)    Moving from lying on back to sitting on the side of a flat bed without bedrails? 3  -AY 3  -AS (r) MW (t) AS (c)    Moving to and from a bed to a chair (including a wheelchair)? 3  -AY 3  -AS (r) MW (t) AS (c)    Standing up from a chair using your arms (e.g., wheelchair, bedside chair)? 3  -AY 3  -AS (r) MW (t) AS (c)    Climbing 3-5 steps with a railing? 2  -AY 2  -AS (r) MW (t) AS (c)    To walk in hospital room? 2  -AY 2  -AS (r) MW (t) AS (c)    AM-PAC 6 Clicks Score (PT) 17  -AY 17  -AS (r) MW (t)    Highest level of mobility 5 --> Static standing  -AY 5 --> Static standing  -AS (r) MW (t)    Row Name 03/22/23 1140 03/22/23 1108       Functional Assessment    Outcome Measure Options AM-PAC 6 Clicks Basic Mobility (PT)  -AY AM-PAC 6 Clicks Daily Activity (OT)  -CS          User Key  (r) = Recorded By, (t) = Taken By, (c) = Cosigned By    Initials Name Provider Type    Phyllis De Dios, OT Occupational Therapist    AS Ashley Stevens, RN Registered Nurse    Sandee Davidson, PT Physical Therapist    Ximena Orlando, Nursing Student Nursing Student                              Physical Therapy Education     Title: PT OT SLP Therapies (In Progress)     Topic: Physical Therapy (In Progress)     Point: Mobility training (In Progress)     Learning Progress Summary           Patient Acceptance, E,TB, NR by AY at 3/22/2023 1141    Acceptance, E, VU,NR by CM at 3/19/2023 1149    Acceptance, E, VU by CM at 3/17/2023 1517    Acceptance, E, VU,NR by LH at 3/15/2023 1407    Acceptance, E, NR by KG at 3/14/2023 1428    Acceptance, E, VU,NR by  at 3/12/2023 1542                   Point: Home exercise program (In Progress)     Learning Progress Summary           Patient Acceptance, E,TB, NR by AY at 3/22/2023 1141    Acceptance, E, VU,NR by CM at 3/19/2023 1149    Acceptance, E, VU,NR by LH at 3/15/2023 1407    Acceptance, E, NR by KG at 3/14/2023 1428                   Point: Body mechanics (In Progress)     Learning Progress Summary           Patient Acceptance, E,TB, NR by AY at 3/22/2023 1141    Acceptance, E, VU,NR by CM at 3/19/2023 1149    Acceptance, E, VU by CM at 3/17/2023 1517    Acceptance, E, VU,NR by  at 3/15/2023 1407    Acceptance, E, NR by KG at 3/14/2023 1428    Acceptance, E, VU,NR by  at 3/12/2023 1542                   Point: Precautions (In Progress)     Learning Progress Summary           Patient Acceptance, E,TB, NR by AY at 3/22/2023 1141    Acceptance, E, VU,NR by CM at 3/19/2023 1149    Acceptance, E, VU by CM at 3/17/2023 1517    Acceptance, E, VU,NR by  at 3/15/2023 1407    Acceptance, E, NR by KG at 3/14/2023 1428    Acceptance, E, VU,NR by  at 3/12/2023 1542                               User Key     Initials Effective Dates Name Provider Type Discipline     02/03/23 - 03/12/23 Samara Harrison, PT Physical Therapist PT    KG 05/22/20 -  Cherelle Gaitan, PT Physical Therapist PT    AY 11/10/20 -  Sandee Spencer, PT Physical Therapist PT    LH 09/21/21 -  Tristian Ravi, PT Physical Therapist PT    CM 09/22/22 -  Yanet Calvin, PT Physical  Therapist PT              PT Recommendation and Plan  Planned Therapy Interventions (PT): balance training, bed mobility training, gait training, home exercise program, postural re-education, transfer training, patient/family education, strengthening, stair training, neuromuscular re-education  Plan of Care Reviewed With: patient  Progress: improving  Outcome Evaluation: PT re-eval completed. Pt limited by L weakness/incoordination, vision deficits, balance deficit, and poor safety awareness compared to baseline. Pt ambulated 50ft with mod A and 2 LOB. Impulsivity noted throughout all mobility. Rec continued skilled PT to increase indep with mobility. d/c rec for IRF.     Time Calculation:    PT Charges     Row Name 03/22/23 1141             Time Calculation    Start Time 1020  -AY      PT Received On 03/22/23  -AY      PT Goal Re-Cert Due Date 04/01/23  -AY         Timed Charges    23557 - PT Therapeutic Activity Minutes 10  -AY         Untimed Charges    PT Eval/Re-eval Minutes 20  -AY         Total Minutes    Timed Charges Total Minutes 10  -AY      Untimed Charges Total Minutes 20  -AY       Total Minutes 30  -AY            User Key  (r) = Recorded By, (t) = Taken By, (c) = Cosigned By    Initials Name Provider Type    AY Sandee Spencer, RIC Physical Therapist              Therapy Charges for Today     Code Description Service Date Service Provider Modifiers Qty    00727572036 HC PT THERAPEUTIC ACT EA 15 MIN 3/22/2023 Sandee Spencer, PT GP 1    20199465029 HC PT RE-EVAL ESTABLISHED PLAN 2 3/22/2023 Sandee Spencer PT GP 1          PT G-Codes  Outcome Measure Options: AM-PAC 6 Clicks Basic Mobility (PT)  AM-PAC 6 Clicks Score (PT): 17  AM-PAC 6 Clicks Score (OT): 14  PT Discharge Summary  Anticipated Discharge Disposition (PT): inpatient rehabilitation facility    Sandee Spencer PT  3/22/2023

## 2023-03-22 NOTE — PLAN OF CARE
Goal Outcome Evaluation:  Plan of Care Reviewed With: patient        Progress: no change  Outcome Evaluation: Patient oriented with questions but is confused in conversations. Dilaudid given for pain with desired result. Scant drainage from incision and dressing was reinforced.

## 2023-03-22 NOTE — PROGRESS NOTES
Intensive Care Follow-up     Hospital:  LOS: 5 days   Mr. Monty Nagel, 67 y.o. male is followed for:   Meningioma (HCC)          Subjective   Interval History:  Chart reviewed. VSS.  Patient sitting up in chair.  In no acute distress.  Patient reports fullness and bloating in his abdomen.  Refusing stool softeners or bowel regimen.  Denies pain. No complaints at this time.     The patient's past medical, surgical and social history were reviewed and updated in Epic as appropriate.       Objective     Infusions:  niCARdipine, 5-15 mg/hr, Last Rate: Stopped (03/21/23 1650)      Medications:  atorvastatin, 40 mg, Oral, Nightly  dexamethasone, 4 mg, Oral, Q6H  enoxaparin, 40 mg, Subcutaneous, Daily  levETIRAcetam, 1,000 mg, Oral, Q12H  levothyroxine, 125 mcg, Oral, Daily  pantoprazole, 40 mg, Oral, Q AM  polyethylene glycol, 17 g, Oral, Daily  senna-docusate sodium, 2 tablet, Oral, BID  sodium chloride, 10 mL, Intravenous, Q12H  thiamine, 100 mg, Oral, Daily  zolpidem, 5 mg, Oral, Nightly      I reviewed the patient's medications.    Vital Sign Min/Max for last 24 hours  Temp  Min: 96.8 °F (36 °C)  Max: 97.9 °F (36.6 °C)   BP  Min: 86/60  Max: 137/106   Pulse  Min: 60  Max: 98   Resp  Min: 16  Max: 18   SpO2  Min: 83 %  Max: 99 %   Flow (L/min)  Min: 2  Max: 2       Input/Output for last 24 hour shift  03/21 0701 - 03/22 0700  In: 2760.4 [P.O.:780; I.V.:1248.4]  Out: 8050 [Urine:7925]      Physical Exam:  GENERAL: Patient sitting up in chair. No acute distress.   HEENT: Normocephalic and atraumatic. Right frontal incision dressing C/D/I. Trachea midline. PER. EOM WNL.   LUNGS: Chest rise of normal depth and symmetric. Lungs clear to auscultation bilaterally. No wheezes, rhonchi, or rales.   HEART: S1,S2 detected. Regular rate and rhythm. No rub, murmur, or gallop.   ABDOMEN: Soft, round, nondistended, and nontender. Bowel sounds present.   EXTREMITIES: No clubbing, edema, or cyanosis. Peripheral pulses present. Skin  warm and dry.    NEURO/PSYCH: Alert and oriented. Follows commands. Moves all extremities. No focal deficits.      Results from last 7 days   Lab Units 03/22/23  0435 03/21/23  0955 03/21/23 0421 03/19/23  0706   WBC 10*3/mm3 16.01*  --  9.20 7.48   HEMOGLOBIN g/dL 12.6*  --  11.4* 11.9*   HEMOGLOBIN, POC g/dL  --  10.5*  --   --    PLATELETS 10*3/mm3 211  --  200 186     Results from last 7 days   Lab Units 03/22/23  0435 03/21/23 0421 03/19/23  0706   SODIUM mmol/L 132* 132* 132*   POTASSIUM mmol/L 4.3 4.3 4.7   CO2 mmol/L 29.0 30.0* 29.0   BUN mg/dL 26* 24* 22   CREATININE mg/dL 0.63* 0.76 0.82   MAGNESIUM mg/dL 2.0 2.0 1.9   PHOSPHORUS mg/dL 3.2 3.5  --    GLUCOSE mg/dL 156* 110* 109*     Estimated Creatinine Clearance: 123.2 mL/min (A) (by C-G formula based on SCr of 0.63 mg/dL (L)).    Results from last 7 days   Lab Units 03/21/23  0955   PH, ARTERIAL pH units 7.40       I reviewed the patient's new clinical results.  I reviewed the patient's new imaging results/reports including actual images and agree with reports.     Imaging Results (Last 24 Hours)     Procedure Component Value Units Date/Time    CT Head Without Contrast [[id number]] Collected: 03/21/23 1501     Updated: 03/21/23 1513    Narrative:      CT HEAD WO CONTRAST    Date of Exam: 3/21/2023 2:24 PM EDT    Indication: Post Op Craniotomoy Evaluation.    Comparison: MRI 3/11/2023    Technique: Axial CT images were obtained of the head without contrast administration.  Reconstructed coronal and sagittal images were also obtained. Automated exposure control and iterative construction methods were used.    Findings:  Postoperative changes are noted from interval right frontal craniotomy for resection of previously noted large enhancing right frontal lobe dural based mass, presumed meningioma. There is pneumocephalus and minimal extra-axial fluid and hemorrhage along   the craniotomy margins. There is persistent edema seen in the right frontal lobe,  with 11 mm of leftward midline shift, improved from preoperative comparison. There is no unexpected hemorrhage, new mass or evidence of territorial ischemia. There is   persistent effacement of the right lateral ventricle, without evidence of entrapment or hydrocephalus. The orbits are normal. The paranasal sinuses are clear.      Impression:      Impression:  Expected postoperative changes from interval right frontal craniotomy for resection of previously noted large enhancing dural based mass, presumed meningioma. There is persistent right frontal lobe edema, mildly improved, with decreased midline shift.   There is no unexpected hemorrhage, new mass or definite territorial ischemia.    Electronically Signed: Leodan Howard    3/21/2023 3:10 PM EDT    Workstation ID: [id number]          Assessment & Plan   Impression      Meningioma (HCC)    Essential hypertension    History of right retinal melanoma with right eye blindness    Postablative hypothyroidism    Other recurrent depressive disorders (HCC)    Atrial fibrillation (HCC)    Rheumatoid vs Psoriatic arthritis (HCC)       Plan        Monty Nagel is a 67 y.o. 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  He has had c/o gait instability and generalized weakness for 2-3 weeks.  He had lost weight because he is afraid to drive and go to the grocery due to his balance issues.  He does not have friends or family here to help him.  He has family in [place].  APS referral was made in the ED for poor living conditions.  He underwent MRI brain that revealed 6.4 cm homogeneous enhancing mass that appears to be extra-axial along right frontal convexity, favored to represent meningioma.  MRI c spine revealed moderate multi-level degenerative changes of cervical spine and  fluid in left C4-5 facet suggesting some nonspecific inflammation.  NS was consulted for resection.  They recommended discharge with outpatient follow up and returning for surgery.  However, he was too weak and unable to walk safely so he stayed inpatient.  Today, he underwent a right middle meningeal artery embolization in anticipation for right crani for tumor resection tomorrow by Dr. Mckee.  He was transferred to the ICU post procedure.  He underwent right frontal craniotomy for resection of meningioma on 3/21/2023.  No events overnight.    Meningioma s/p tumor embolization 3/20/23 by Dr. Sebastian and resection on 3/21/23 by Dr. Mckee  Gait Instability  Generalized weakness   Frequent Falls  • Restart ASA/Plavix when appropriate per NS.  • Continue Decadron.  Will need to taper upon discharge.  • Continue Keppra  • Continue PT/OT; mobilize as tolerated  • Aggressive pulmonary toilet  • Will most likely need inpatient rehab at discharge +/- Assistance with living arrangements in SNF  • AM labs     HTN  HLP  Atrial Fibrillation (new onset) - slow rate  • Restart home antihypertensives when needed; current BP 98/72  • Blood pressure parameters relaxed to systolic <160  • Continue Lipitor     Hypothyroidism  H/O ablation 2* Graves   • Continue Levothyroxine     Psoriatic Arthritis  • Never been treated  • Normal CRP, ESR  • RF, Bowen Ab and TORIE undetectable     Former heavy ETOH Use (last drink 9/2022)  Possible Medical Noncompliance  Poor living conditions  • APS consult in ED  • No family or friends to help at home  • Lives alone      DVT Prophylaxis: SQ Lovenox, SCDs  GI Prophylaxis: PPI  Dispo: Transfer to telemetry.    Time spent: 36 minutes  Plan of care and goals reviewed with multidisciplinary/antibiotic stewardship team during rounds.   I discussed the patient's findings and my recommendations with patient and nursing staff     Junie Sher, MSN, APRN, ACNPC-AG  Pulmonary and Critical Care  Medicine  Electronically signed by Verónica Sher, LYNETTE, 03/22/23, 11:32 AM EDT.

## 2023-03-22 NOTE — PLAN OF CARE
Goal Outcome Evaluation:              Outcome Evaluation: (P) Pt still limited by Lt weakness/incoordination, balance deficit, poor safety awareness compared to baseline. Neurological status unchanged, ongoing complaints of HA, ongoing refusal of care, healthy appetite. Dressing remains loosely intact w/dried drainage marked.

## 2023-03-22 NOTE — PROGRESS NOTES
"    [facility]    Monty Winter  1955  [id number]      Meningioma (HCC)    Essential hypertension    History of right retinal melanoma with right eye blindness    Postablative hypothyroidism    Other recurrent depressive disorders (HCC)    Atrial fibrillation (HCC)    Rheumatoid vs Psoriatic arthritis (HCC)      Admit Diagnosis: Generalized weakness [R53.1]  Date of Admission: 3/11/2023 10:43 AM     Interval History: No acute events overnight.      Post-Op Day: 1  Surgery Performed: Right frontal craniotomy for resection of dural mass    Physical Exam:  Blood pressure 98/72, pulse 76, temperature 97.1 °F (36.2 °C), temperature source Oral, resp. rate 18, height 175.3 cm (69\"), weight 90.7 kg (200 lb), SpO2 91 %.  I/O this shift:  In: 480 [P.O.:480]  Out: -   Physical Exam   Patient is sitting upright in the chair, he is conversational.  He is alert and oriented to person, place, time.  No aphasia or dysarthria noted.  Minor weakness noted in the left lower extremity, but otherwise his strength and sensation is acceptable.  Incision is clean, dry, intact.    Data Review:   (All imaging reviewed independently unless state otherwise)  CT Head Without Contrast    Result Date: 3/21/2023  CT HEAD WO CONTRAST Date of Exam: 3/21/2023 2:24 PM EDT Indication: Post Op Craniotomoy Evaluation. Comparison: MRI 3/11/2023 Technique: Axial CT images were obtained of the head without contrast administration.  Reconstructed coronal and sagittal images were also obtained. Automated exposure control and iterative construction methods were used. Findings: Postoperative changes are noted from interval right frontal craniotomy for resection of previously noted large enhancing right frontal lobe dural based mass, presumed meningioma. There is pneumocephalus and minimal extra-axial fluid and hemorrhage along the craniotomy margins. There is persistent edema seen in the right frontal " lobe, with 11 mm of leftward midline shift, improved from preoperative comparison. There is no unexpected hemorrhage, new mass or evidence of territorial ischemia. There is persistent effacement of the right lateral ventricle, without evidence of entrapment or hydrocephalus. The orbits are normal. The paranasal sinuses are clear.     Impression: Impression: Expected postoperative changes from interval right frontal craniotomy for resection of previously noted large enhancing dural based mass, presumed meningioma. There is persistent right frontal lobe edema, mildly improved, with decreased midline shift. There is no unexpected hemorrhage, new mass or definite territorial ischemia. Electronically Signed: Leodan Howard  3/21/2023 3:10 PM EDT  Workstation ID: [id number]        Diagnosis:  (R53.1) Generalized weakness    (F10.21) History of alcoholism (HCC)    (R26.2) Inability to walk    (R41.0) Confusion    (E16.2) Hypoglycemia    (E63.9) Poor nutrition    (R41.841) Cognitive communication deficit    (Z74.09) Impaired functional mobility, balance, gait, and endurance    (D49.6) Brain tumor (HCC) - Plan: Case Request, Case Request, Tissue Pathology Exam, Tissue Pathology Exam  Generalized weakness [R53.1]    Medical Decision Making:   Patient is cleared to be transferred to rehab from a neurosurgical point of view.  We do not recommend further imaging at this time unless clinically indicated.  Patient is to be sent home on a Decadron taper which I will write in his postop orders.  He is to continue on his Keppra.  He will follow-up with Dr. Jose Mckee in 2 weeks for an incision check.    Sherman Booth PA-C  3/22/2023  Surgeon: Marlon Mckee MD

## 2023-03-22 NOTE — PLAN OF CARE
Goal Outcome Evaluation:  Plan of Care Reviewed With: patient        Progress: improving  Outcome Evaluation: PT re-eval completed. Pt limited by L weakness/incoordination, vision deficits, balance deficit, and poor safety awareness compared to baseline. Pt ambulated 50ft with mod A and 2 LOB. Impulsivity noted throughout all mobility. Rec continued skilled PT to increase indep with mobility. d/c rec for IRF.

## 2023-03-22 NOTE — THERAPY RE-EVALUATION
Patient Name: Monty Nagel  : 1955    MRN: [id number]                              Today's Date: 3/22/2023       Admit Date: 3/11/2023    Visit Dx:     ICD-10-CM ICD-9-CM   1. Generalized weakness  R53.1 780.79   2. History of alcoholism (HCC)  F10.21 V11.3   3. Inability to walk  R26.2 719.7   4. Confusion  R41.0 298.9   5. Hypoglycemia  E16.2 251.2   6. Poor nutrition  E63.9 269.9   7. Cognitive communication deficit  R41.841 799.52   8. Impaired functional mobility, balance, gait, and endurance  Z74.09 V49.89   9. 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   • Postablative hypothyroidism   • Screen for colon cancer   • Other recurrent depressive disorders (HCC)   • Balance problem   • Generalized weakness   • Atrial fibrillation (HCC)   • Severe malnutrition (HCC)   • Meningioma (HCC)   • Rheumatoid vs Psoriatic arthritis (HCC)     Past Medical History:   Diagnosis Date   • Arthritis    • Hepatitis A    • Hypertension    • Melanoma (HCC) 2017    retina     Past Surgical History:   Procedure Laterality Date   • CRANIOTOMY FOR TUMOR N/A 3/21/2023    Procedure: CRANIOTOMY FOR TUMOR STEREOTACTIC WITH STEALTH;  Surgeon: Marlon Mckee MD;  [facility];  Service: Neurosurgery;  Laterality: N/A;   • EYE SURGERY  2017    EYE CANCER RIGHT EYE   • FINGER SURGERY Left     Pointer finger re-attached in 3rd Grade   • TONSILLECTOMY        General Information     Row Name 23 1059          OT Time and Intention    Document Type re-evaluation  -CS     Mode of Treatment speech-language pathology  -CS     Row Name 23 1059          General Information    Patient Profile Reviewed yes  -CS     Prior Level of Function --  see IE  -CS     Existing Precautions/Restrictions fall  impulsive, L sided weakness/inattention, blind R eye baseline  -CS     Barriers to Rehab medically  complex;cognitive status;visual deficit  -     Row Name 03/22/23 1059          Living Environment    People in Home --  see IE  -     Row Name 03/22/23 1059          Cognition    Orientation Status (Cognition) oriented to;person;place;situation  -     Row Name 03/22/23 1059          Safety Issues, Functional Mobility    Impairments Affecting Function (Mobility) balance;cognition;coordination;endurance/activity tolerance;strength;postural/trunk control;motor planning;visual/perceptual  -CS     Cognitive Impairments, Mobility Safety/Performance attention;impulsivity;insight into deficits/self-awareness;sequencing abilities;awareness, need for assistance;safety precaution awareness;safety precaution follow-through;judgment;problem-solving/reasoning  -           User Key  (r) = Recorded By, (t) = Taken By, (c) = Cosigned By    Initials Name Provider Type    CS Phyllis Bowen, LUCERO Occupational Therapist                 Mobility/ADL's     Row Name 03/22/23 1101          Transfers    Transfers sit-stand transfer;stand-sit transfer  -     Comment, (Transfers) HHA  -     Row Name 03/22/23 1101          Bed-Chair Transfer    Bed-Chair [place] (Transfers) verbal cues;minimum assist (75% patient effort)  -     Row Name 03/22/23 1101          Sit-Stand Transfer    Sit-Stand [place] (Transfers) minimum assist (75% patient effort);verbal cues  -     Row Name 03/22/23 1101          Activities of Daily Living    BADL Assessment/Intervention lower body dressing;grooming  -     Row Name 03/22/23 1101          Lower Body Dressing Assessment/Training    [place] Level (Lower Body Dressing) doff;don;socks;maximum assist (25% patient effort)  -CS     Position (Lower Body Dressing) supported sitting  -     Row Name 03/22/23 1101          Grooming Assessment/Training    [place] Level (Grooming) hair care, combing/brushing;maximum assist (25% patient effort)  -CS     Position (Grooming) supported sitting   -           User Key  (r) = Recorded By, (t) = Taken By, (c) = Cosigned By    Initials Name Provider Type    CS Phyllis Bowen OT Occupational Therapist               Obj/Interventions     Row Name 03/22/23 1101          Sensory Assessment (Somatosensory)    Sensory Assessment (Somatosensory) UE sensation intact  -[facility] Name 03/22/23 1101          Vision Assessment/Intervention    Visual Impairment/Limitations peripheral vision impaired right  -     Visual Processing Deficit visual attention, left  -[facility] Name 03/22/23 1101          Range of Motion Comprehensive    General Range of Motion bilateral upper extremity ROM WFL  -[facility] Name 03/22/23 1101          Strength Comprehensive (MMT)    Comment, General Manual Muscle Testing (MMT) Assessment LUE grossly 3+/5, RUE grossly 4/5  -[facility] Name 03/22/23 1101          Motor Skills    Motor Skills coordination  -     Coordination left;upper extremity;minimal impairment  -[facility] Name 03/22/23 1101          Balance    Balance Assessment sitting static balance;sitting dynamic balance;standing dynamic balance;standing static balance  -     Static Sitting Balance standby assist  -     Dynamic Sitting Balance standby assist  -CS     Position, Sitting Balance sitting in chair  -     Static Standing Balance contact guard  -     Dynamic Standing Balance minimal assist  -CS     Position/Device Used, Standing Balance supported  -     Balance Interventions sitting;standing;static;dynamic;dynamic reaching;occupation based/functional task;weight shifting activity  -           User Key  (r) = Recorded By, (t) = Taken By, (c) = Cosigned By    Initials Name Provider Type     Phyllis Bowen OT Occupational Therapist               Goals/Plan     [facility] Name 03/22/23 1107          Transfer Goal 1 (OT)    Activity/Assistive Device (Transfer Goal 1, OT) sit-to-stand/stand-to-sit;toilet  -     [place] Level/Cues Needed (Transfer Goal 1, OT)  standby assist  -CS     Time Frame (Transfer Goal 1, OT) long term goal (LTG);10 days  -CS     Progress/Outcome (Transfer Goal 1, OT) goal ongoing  -CS     Row Name 03/22/23 1107          Bathing Goal 1 (OT)    Progress/Outcomes (Bathing Goal 1, OT) goal no longer appropriate  -CS     Row Name 03/22/23 1107          Dressing Goal 1 (OT)    Activity/Device (Dressing Goal 1, OT) lower body dressing  -CS     [place]/Cues Needed (Dressing Goal 1, OT) standby assist  -CS     Time Frame (Dressing Goal 1, OT) long term goal (LTG);10 days  -CS     Strategies/Barriers (Dressing Goal 1, OT) undergarment/pants  -CS     Progress/Outcome (Dressing Goal 1, OT) goal ongoing  -CS     Row Name 03/22/23 1107          Toileting Goal 1 (OT)    Progress/Outcome (Toileting Goal 1, OT) goal ongoing  -CS     Row Name 03/22/23 1107          Grooming Goal 1 (OT)    Progress/Outcome (Grooming Goal 1, OT) goal ongoing  -CS     Row Name 03/22/23 1107          Therapy Assessment/Plan (OT)    Planned Therapy Interventions (OT) activity tolerance training;adaptive equipment training;BADL retraining;functional balance retraining;occupation/activity based interventions;ROM/therapeutic exercise;strengthening exercise;transfer/mobility retraining  -CS           User Key  (r) = Recorded By, (t) = Taken By, (c) = Cosigned By    Initials Name Provider Type    Phyllis De Dios, OT Occupational Therapist               Clinical Impression     Row Name 03/22/23 1104          Pain Scale: FACES Pre/Post-Treatment    Pain: FACES Scale, Pretreatment 0-->no hurt  -CS     Posttreatment Pain Rating 0-->no hurt  -CS     Row Name 03/22/23 1104          Plan of Care Review    Plan of Care Reviewed With patient  -CS     Progress improving  -CS     Outcome Evaluation OT Re-eval complete. Pt presents w/ mild L sided weakness, visual deficits, and balance deficits limiting functional independence from baseline. Pt requires frequent cueing throughout session for  safety awareness. Recommend cont skilled IPOT POC to facilitate return to PLOF. Recommend pt DC to IP rehab.  -CS     Row Name 03/22/23 1104          Therapy Assessment/Plan (OT)    Patient/Family Therapy Goal Statement (OT) Return to PLOF  -CS     Rehab Potential (OT) good, to achieve stated therapy goals  -CS     Criteria for Skilled Therapeutic Interventions Met (OT) yes;skilled treatment is necessary  -CS     Therapy Frequency (OT) daily  -CS     Row Name 03/22/23 1104          Therapy Plan Review/Discharge Plan (OT)    Anticipated Discharge Disposition (OT) inpatient rehabilitation facility  -CS     Row Name 03/22/23 1104          Vital Signs    Pre Systolic BP Rehab --  VSS  -CS     O2 Delivery Pre Treatment room air  -CS     O2 Delivery Post Treatment room air  -CS     Pre Patient Position Sitting  -CS     Intra Patient Position Standing  -CS     Post Patient Position Sitting  -CS     Row Name 03/22/23 1104          Positioning and Restraints    Pre-Treatment Position sitting in chair/recliner  -CS     Post Treatment Position chair  -CS     In Chair notified nsg;call light within reach;encouraged to call for assist;sitting;with PT;exit alarm on;waffle cushion  -CS           User Key  (r) = Recorded By, (t) = Taken By, (c) = Cosigned By    Initials Name Provider Type    CS Phyllis Bowen, OT Occupational Therapist               Outcome Measures     Row Name 03/22/23 1108          How much help from another is currently needed...    Putting on and taking off regular lower body clothing? 2  -CS     Bathing (including washing, rinsing, and drying) 2  -CS     Toileting (which includes using toilet bed pan or urinal) 2  -CS     Putting on and taking off regular upper body clothing 3  -CS     Taking care of personal grooming (such as brushing teeth) 2  -CS     Eating meals 3  -CS     AM-PAC 6 Clicks Score (OT) 14  -CS     Row Name 03/22/23 0979          How much help from another person do you currently need...     Turning from your back to your side while in flat bed without using bedrails? 4  -AS (r) MW (t) AS (c)     Moving from lying on back to sitting on the side of a flat bed without bedrails? 3  -AS (r) MW (t) AS (c)     Moving to and from a bed to a chair (including a wheelchair)? 3  -AS (r) MW (t) AS (c)     Standing up from a chair using your arms (e.g., wheelchair, bedside chair)? 3  -AS (r) MW (t) AS (c)     Climbing 3-5 steps with a railing? 2  -AS (r) MW (t) AS (c)     To walk in hospital room? 2  -AS (r) MW (t) AS (c)     AM-PAC 6 Clicks Score (PT) 17  -AS (r) MW (t)     Highest level of mobility 5 --> Static standing  -AS (r) MW (t)     Row Name 03/22/23 1108          Functional Assessment    Outcome Measure Options AM-PAC 6 Clicks Daily Activity (OT)  -CS           User Key  (r) = Recorded By, (t) = Taken By, (c) = Cosigned By    Initials Name Provider Type    CS Phyllis Bowen OT Occupational Therapist    AS Ashley Stevens, RN Registered Nurse    Ximena Orlando, Nursing Student Nursing Student                Occupational Therapy Education     Title: PT OT SLP Therapies (In Progress)     Topic: Occupational Therapy (In Progress)     Point: ADL training (In Progress)     Description:   Instruct learner(s) on proper safety adaptation and remediation techniques during self care or transfers.   Instruct in proper use of assistive devices.              Learning Progress Summary           Patient Acceptance, E, NR by CS at 3/22/2023 1108    Acceptance, E, VU,NR by [facility] at 3/13/2023 1156    Comment: reason for consult, noted deficits, walker safety, concern over home return                   Point: Home exercise program (Not Started)     Description:   Instruct learner(s) on appropriate technique for monitoring, assisting and/or progressing therapeutic exercises/activities.              Learner Progress:  Not documented in this visit.          Point: Precautions (In Progress)     Description:   Instruct learner(s) on  prescribed precautions during self-care and functional transfers.              Learning Progress Summary           Patient Acceptance, E, NR by  at 3/22/2023 1108    Acceptance, E, VU,NR by [facility]OPE at 3/13/2023 1156    Comment: reason for consult, noted deficits, walker safety, concern over home return                   Point: Body mechanics (In Progress)     Description:   Instruct learner(s) on proper positioning and spine alignment during self-care, functional mobility activities and/or exercises.              Learning Progress Summary           Patient Acceptance, E, NR by  at 3/22/2023 1108                               User Key     Initials Effective Dates Name Provider Type Discipline     02/03/23 -  Monae Cohen, OT Occupational Therapist OT     09/02/21 -  Phyllis Bowen OT Occupational Therapist OT              OT Recommendation and Plan  Planned Therapy Interventions (OT): activity tolerance training, adaptive equipment training, BADL retraining, functional balance retraining, occupation/activity based interventions, ROM/therapeutic exercise, strengthening exercise, transfer/mobility retraining  Therapy Frequency (OT): daily  Plan of Care Review  Plan of Care Reviewed With: patient  Progress: improving  Outcome Evaluation: OT Re-eval complete. Pt presents w/ mild L sided weakness, visual deficits, and balance deficits limiting functional independence from baseline. Pt requires frequent cueing throughout session for safety awareness. Recommend cont skilled IPOT POC to facilitate return to PLOF. Recommend pt DC to IP rehab.     Time Calculation:    Time Calculation- OT     Row Name 03/22/23 1109             Time Calculation- OT    OT Start Time 0958  -      OT Received On 03/22/23  -      OT Goal Re-Cert Due Date 04/01/23  -         Timed Charges    71370 - OT Therapeutic Activity Minutes 5  -CS      62195 - OT Self Care/Mgmt Minutes 5  -CS         Untimed Charges    OT Eval/Re-eval Minutes 20   -CS         Total Minutes    Timed Charges Total Minutes 10  -CS      Untimed Charges Total Minutes 20  -CS       Total Minutes 30  -CS            User Key  (r) = Recorded By, (t) = Taken By, (c) = Cosigned By    Initials Name Provider Type    CS Phyllis Bowen OT Occupational Therapist              Therapy Charges for Today     Code Description Service Date Service Provider Modifiers Qty    27616574025  OT THERAPEUTIC ACT EA 15 MIN 3/22/2023 Phyllis Bowen OT GO 1    [id number]  OT RE-EVAL 2 3/22/2023 Phyllis Bowen OT GO 1               Phyllis Bowen OT  3/22/2023

## 2023-03-22 NOTE — PLAN OF CARE
Goal Outcome Evaluation:  Plan of Care Reviewed With: patient        Progress: improving  Outcome Evaluation: OT Re-eval complete. Pt presents w/ mild L sided weakness, visual deficits, and balance deficits limiting functional independence from baseline. Pt requires frequent cueing throughout session for safety awareness. Recommend cont skilled IPOT POC to facilitate return to PLOF. Recommend pt DC to IP rehab.

## 2023-03-23 LAB
ALBUMIN SERPL-MCNC: 2.8 G/DL (ref 3.5–5.2)
ALBUMIN/GLOB SERPL: 1.4 G/DL
ALP SERPL-CCNC: 52 U/L (ref 39–117)
ALT SERPL W P-5'-P-CCNC: 17 U/L (ref 1–41)
ANION GAP SERPL CALCULATED.3IONS-SCNC: 8 MMOL/L (ref 5–15)
AST SERPL-CCNC: 17 U/L (ref 1–40)
BILIRUB SERPL-MCNC: 0.6 MG/DL (ref 0–1.2)
BUN SERPL-MCNC: 25 MG/DL (ref 8–23)
BUN/CREAT SERPL: 39.1 (ref 7–25)
CALCIUM SPEC-SCNC: 7.5 MG/DL (ref 8.6–10.5)
CHLORIDE SERPL-SCNC: 94 MMOL/L (ref 98–107)
CO2 SERPL-SCNC: 28 MMOL/L (ref 22–29)
CREAT SERPL-MCNC: 0.64 MG/DL (ref 0.76–1.27)
CYTO UR: NORMAL
DEPRECATED RDW RBC AUTO: 49.8 FL (ref 37–54)
EGFRCR SERPLBLD CKD-EPI 2021: 102.5 ML/MIN/1.73
ERYTHROCYTE [DISTWIDTH] IN BLOOD BY AUTOMATED COUNT: 16.1 % (ref 12.3–15.4)
GLOBULIN UR ELPH-MCNC: 2 GM/DL
GLUCOSE SERPL-MCNC: 121 MG/DL (ref 65–99)
HCT VFR BLD AUTO: 36.9 % (ref 37.5–51)
HGB BLD-MCNC: 12.7 G/DL (ref 13–17.7)
LAB AP CASE REPORT: NORMAL
LAB AP CLINICAL INFORMATION: NORMAL
LAB AP DIAGNOSIS COMMENT: NORMAL
MAGNESIUM SERPL-MCNC: 2 MG/DL (ref 1.6–2.4)
MCH RBC QN AUTO: 30.2 PG (ref 26.6–33)
MCHC RBC AUTO-ENTMCNC: 34.4 G/DL (ref 31.5–35.7)
MCV RBC AUTO: 87.6 FL (ref 79–97)
PATH REPORT.FINAL DX SPEC: NORMAL
PATH REPORT.GROSS SPEC: NORMAL
PHOSPHATE SERPL-MCNC: 1.9 MG/DL (ref 2.5–4.5)
PHOSPHATE SERPL-MCNC: 2.2 MG/DL (ref 2.5–4.5)
PLATELET # BLD AUTO: 169 10*3/MM3 (ref 140–450)
PMV BLD AUTO: 9.7 FL (ref 6–12)
POTASSIUM SERPL-SCNC: 4.7 MMOL/L (ref 3.5–5.2)
PROT SERPL-MCNC: 4.8 G/DL (ref 6–8.5)
RBC # BLD AUTO: 4.21 10*6/MM3 (ref 4.14–5.8)
SODIUM SERPL-SCNC: 130 MMOL/L (ref 136–145)
WBC NRBC COR # BLD: 18.31 10*3/MM3 (ref 3.4–10.8)

## 2023-03-23 PROCEDURE — 80053 COMPREHEN METABOLIC PANEL: CPT

## 2023-03-23 PROCEDURE — 97116 GAIT TRAINING THERAPY: CPT

## 2023-03-23 PROCEDURE — 99232 SBSQ HOSP IP/OBS MODERATE 35: CPT | Performed by: INTERNAL MEDICINE

## 2023-03-23 PROCEDURE — 84100 ASSAY OF PHOSPHORUS: CPT | Performed by: INTERNAL MEDICINE

## 2023-03-23 PROCEDURE — 25010000002 ENOXAPARIN PER 10 MG: Performed by: NEUROLOGICAL SURGERY

## 2023-03-23 PROCEDURE — 83735 ASSAY OF MAGNESIUM: CPT

## 2023-03-23 PROCEDURE — 84100 ASSAY OF PHOSPHORUS: CPT

## 2023-03-23 PROCEDURE — 85027 COMPLETE CBC AUTOMATED: CPT

## 2023-03-23 PROCEDURE — 63710000001 DEXAMETHASONE PER 0.25 MG: Performed by: NEUROLOGICAL SURGERY

## 2023-03-23 RX ORDER — POVIDONE-IODINE 10 MG/ML
1 SOLUTION TOPICAL DAILY
Status: DISCONTINUED | OUTPATIENT
Start: 2023-03-23 | End: 2023-04-03 | Stop reason: HOSPADM

## 2023-03-23 RX ADMIN — DEXAMETHASONE 4 MG: 4 TABLET ORAL at 17:35

## 2023-03-23 RX ADMIN — ACETAMINOPHEN 325MG 650 MG: 325 TABLET ORAL at 04:32

## 2023-03-23 RX ADMIN — SODIUM PHOSPHATE, MONOBASIC, MONOHYDRATE AND SODIUM PHOSPHATE, DIBASIC, ANHYDROUS 15 MMOL: 142; 276 INJECTION, SOLUTION INTRAVENOUS at 22:27

## 2023-03-23 RX ADMIN — LEVETIRACETAM 1000 MG: 500 TABLET, FILM COATED ORAL at 08:29

## 2023-03-23 RX ADMIN — ATORVASTATIN CALCIUM 40 MG: 40 TABLET, FILM COATED ORAL at 20:28

## 2023-03-23 RX ADMIN — THIAMINE HCL TAB 100 MG 100 MG: 100 TAB at 08:29

## 2023-03-23 RX ADMIN — POVIDONE-IODINE 1 EACH: 10 SOLUTION TOPICAL at 20:28

## 2023-03-23 RX ADMIN — PANTOPRAZOLE SODIUM 40 MG: 40 TABLET, DELAYED RELEASE ORAL at 06:09

## 2023-03-23 RX ADMIN — ZOLPIDEM TARTRATE 5 MG: 5 TABLET, FILM COATED ORAL at 20:28

## 2023-03-23 RX ADMIN — Medication 10 ML: at 20:29

## 2023-03-23 RX ADMIN — POLYETHYLENE GLYCOL 3350 17 G: 17 POWDER, FOR SOLUTION ORAL at 08:29

## 2023-03-23 RX ADMIN — LEVOTHYROXINE SODIUM 125 MCG: 125 TABLET ORAL at 06:09

## 2023-03-23 RX ADMIN — ENOXAPARIN SODIUM 40 MG: 40 INJECTION SUBCUTANEOUS at 08:29

## 2023-03-23 RX ADMIN — DEXAMETHASONE 4 MG: 4 TABLET ORAL at 00:45

## 2023-03-23 RX ADMIN — DEXAMETHASONE 4 MG: 4 TABLET ORAL at 06:09

## 2023-03-23 RX ADMIN — DEXAMETHASONE 4 MG: 4 TABLET ORAL at 12:00

## 2023-03-23 RX ADMIN — LEVETIRACETAM 1000 MG: 500 TABLET, FILM COATED ORAL at 20:28

## 2023-03-23 RX ADMIN — SENNOSIDES AND DOCUSATE SODIUM 2 TABLET: 8.6; 5 TABLET ORAL at 08:29

## 2023-03-23 NOTE — NURSING NOTE
WOC team consulted for:  Fungal? Growth on toe with black spot. Pt denies any recent trauma to toe.    Patient in bed, alert, agreeable to assessment.    Identified approximated incision to patient's scalp, open to air, scant dark red dried drainage present.  Periwound skin is intact, blanchable.    Scalp      Identified callus/trauma/fungal/psoriatic/cancerous growth and toenail deformity to patient's right great toe.  Toe's skin is thickened, dry, hard, cauliflower-appearance, and crusty and loose over plantar side of toe.  Partially debrided loose crust with cleansing (stuck to gauze) and was able to assess a dark spot under the thickened, dry skin.  Patient responded that the wound will heal or nearly heal and that he will traumatize it again and that it will begin to bleed and open back up. Growth is proximal to patient's toenail which has signs of fungal infection.  Cleansed with normal saline, patted dry, applied betadine to periwound skin and around patient's toenail.            See orders for recommendations.  Followed up with bedside RN and recommended that he speak with the hospitalist to see about possible biopsy to determine exactly what this growth is.    Sensory Perception: 4-->no impairment  Moisture: 4-->rarely moist  Activity: 3-->walks occasionally  Mobility: 3-->slightly limited  Nutrition: 3-->adequate  Friction and Shear: 3-->no apparent problem  Nick Score: 20 (03/23/23 0800)    Please implement pressure injury prevention interventions per protocol.    Thank you for the consult.  WOC team will sign off.  If alteration to skin integrity or change in wound bed presentation please consult WOC team.

## 2023-03-23 NOTE — PROGRESS NOTES
[facility] Medicine Services  PROGRESS NOTE    Patient Name: Monty Nagel  : 1955  MRN: [id number]    Date of Admission: 3/11/2023  Primary Care Physician: Tiffany Morales MD    Subjective   Subjective     CC:  ICU transfer, meningioma    HPI:  No specific complaints, still having abdominal discomfort but better    ROS:  Gen- No fevers, chills  CV- No chest pain, palpitations  Resp- No cough, dyspnea        Objective   Objective     Vital Signs:   Temp:  [97.3 °F (36.3 °C)-98.5 °F (36.9 °C)] 98.3 °F (36.8 °C)  Heart Rate:  [72-88] 87  Resp:  [16-20] 20  BP: (107-135)/() 107/76     Physical Exam:  Constitutional: No acute distress, awake  HENT: Right frontal skin incision,  mucous membranes moist  Respiratory: Clear to auscultation bilaterally, respiratory effort normal   Cardiovascular: RRR, no murmurs, rubs, or gallops  Gastrointestinal: Positive bowel sounds, soft, nontender, nondistended  Musculoskeletal: No bilateral ankle edema  Psychiatric: Odd affect, cooperative  Neurologic: Oriented x 3, speech clear  Skin: No rashes      Results Reviewed:  LAB RESULTS:      Lab 23  0955 23  0706   WBC 18.31* 16.01*  --  9.20 7.48   HEMOGLOBIN 12.7* 12.6*  --  11.4* 11.9*   HEMOGLOBIN, POC  --   --  10.5*  --   --    HEMATOCRIT 36.9* 35.8*  --  33.5* 34.7*   HEMATOCRIT POC  --   --  31*  --   --    PLATELETS 169 211  --  200 186   NEUTROS ABS  --  14.66*  --   --   --    IMMATURE GRANS (ABS)  --  0.15*  --   --   --    LYMPHS ABS  --  0.47*  --   --   --    MONOS ABS  --  0.71  --   --   --    EOS ABS  --  0.00  --   --   --    MCV 87.6 85.2  --  87.5 87.6         Lab 235 23 23  0706   SODIUM 130* 132* 132* 132*   POTASSIUM 4.7 4.3 4.3 4.7   CHLORIDE 94* 96* 97* 97*   CO2 28.0 29.0 30.0* 29.0   ANION GAP 8.0 7.0 5.0 6.0   BUN 25* 26* 24* 22   CREATININE 0.64* 0.63* 0.76 0.82   EGFR  102.5 103.0 97.3 95.1   GLUCOSE 121* 156* 110* 109*   CALCIUM 7.5* 7.9* 7.7* 7.9*   IONIZED CALCIUM  --  1.16  --   --    MAGNESIUM 2.0 2.0 2.0 1.9   PHOSPHORUS 2.2* 3.2 3.5  --          Lab 03/23/23  0412   TOTAL PROTEIN 4.8*   ALBUMIN 2.8*   GLOBULIN 2.0   ALT (SGPT) 17   AST (SGOT) 17   BILIRUBIN 0.6   ALK PHOS 52                 Lab 03/21/23  0805   ABO TYPING A   RH TYPING Positive   ANTIBODY SCREEN Negative         Lab 03/21/23  0955   PH, ARTERIAL 7.40     Brief Urine Lab Results  (Last result in the past 365 days)        Color   Clarity   Blood   Leuk Est   Nitrite   Protein   CREAT   Urine HCG        03/11/23 1058 Dark Yellow   Clear   Negative   Negative   Negative   Negative                   Microbiology Results Abnormal       None            CT Head Without Contrast    Result Date: 3/21/2023  CT HEAD WO CONTRAST Date of Exam: 3/21/2023 2:24 PM EDT Indication: Post Op Craniotomoy Evaluation. Comparison: MRI 3/11/2023 Technique: Axial CT images were obtained of the head without contrast administration.  Reconstructed coronal and sagittal images were also obtained. Automated exposure control and iterative construction methods were used. Findings: Postoperative changes are noted from interval right frontal craniotomy for resection of previously noted large enhancing right frontal lobe dural based mass, presumed meningioma. There is pneumocephalus and minimal extra-axial fluid and hemorrhage along the craniotomy margins. There is persistent edema seen in the right frontal lobe, with 11 mm of leftward midline shift, improved from preoperative comparison. There is no unexpected hemorrhage, new mass or evidence of territorial ischemia. There is persistent effacement of the right lateral ventricle, without evidence of entrapment or hydrocephalus. The orbits are normal. The paranasal sinuses are clear.     Impression: Impression: Expected postoperative changes from interval right frontal craniotomy for resection  of previously noted large enhancing dural based mass, presumed meningioma. There is persistent right frontal lobe edema, mildly improved, with decreased midline shift. There is no unexpected hemorrhage, new mass or definite territorial ischemia. Electronically Signed: Leodan Howard  3/21/2023 3:10 PM EDT  Workstation ID: [id number]      Results for orders placed during the hospital encounter of 03/11/23    Adult Transthoracic Echo Complete w/ Color, Spectral and Contrast if necessary per protocol    Interpretation Summary    Left ventricular systolic function is normal. Estimated left ventricular EF = 55%    Biatrial enlargement.    Calcified aortic valve with mild aortic stenosis, mean gradient 10 mmHg, BANG 1.6 cm².    Moderate aortic insufficiency.    Mild mitral regurgitation.    Mild tricuspid regurgitation with normal RVSP.    Mild dilation of the ascending aorta (4.2 cm) is present.      Current medications:  Scheduled Meds:atorvastatin, 40 mg, Oral, Nightly  dexamethasone, 4 mg, Oral, Q6H  enoxaparin, 40 mg, Subcutaneous, Daily  levETIRAcetam, 1,000 mg, Oral, Q12H  levothyroxine, 125 mcg, Oral, Daily  pantoprazole, 40 mg, Oral, Q AM  polyethylene glycol, 17 g, Oral, Daily  senna-docusate sodium, 2 tablet, Oral, BID  sodium chloride, 10 mL, Intravenous, Q12H  sodium phosphate, 15 mmol, Intravenous, Once  thiamine, 100 mg, Oral, Daily  zolpidem, 5 mg, Oral, Nightly      Continuous Infusions:   PRN Meds:.  acetaminophen **OR** acetaminophen **OR** acetaminophen    senna-docusate sodium **AND** polyethylene glycol **AND** bisacodyl **AND** bisacodyl    docusate sodium    HYDROmorphone **AND** naloxone    magnesium hydroxide    Magnesium Standard Dose Replacement - Follow Nurse / BPA Driven Protocol    melatonin    ondansetron **OR** ondansetron    Phosphorus Replacement - Follow Nurse / BPA Driven Protocol    Potassium Replacement - Follow Nurse / BPA Driven Protocol    sodium chloride    sodium chloride    sodium  chloride    Assessment & Plan   Assessment & Plan     Active Hospital Problems    Diagnosis  POA    **Meningioma (HCC) [D32.9]  Yes    Rheumatoid vs Psoriatic arthritis (HCC) [L40.50]  Yes    Atrial fibrillation (HCC) [I48.91]  Yes    Other recurrent depressive disorders (HCC) [F33.8]  Yes    Postablative hypothyroidism [E89.0]  Yes    Essential hypertension [I10]  Yes    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        Brief Hospital Course to date:  Monty Nagel is a 67 y.o. male with history of hypertension, retinal melanoma and subsequent right eye blindness, previous alcohol use who was admitted for meningioma, gait instability and general weakness.  Brain MRI showed 6.4 enhancing mass and he was evaluated by neurosurgery.  He underwent right frontal craniotomy on 3/21 as well as right middle meningeal artery embolization with Dr. Mckee in anticipation for tumor resection.  He was transferred from ICU to medical floor on 9/22    Meningioma,'s s/p tumor embolization 3/20 by Dr. Sebastian and resection 3/21 by Dr. Mckee  Generalized weakness, frequent falls secondary to above  -Continue ASA Plavix when okay with neurosurgery  -Continue Decadron and Keppra  -PT/OT following, plan for rehab    Hypertension  Dyslipidemia  New A-fib  -Currently on subcu Lovenox    Hypothyroidism  History of thyroid ablation secondary to Graves' disease    Former EtOH, heavy use  -Stopped drinking 9/22    Hyponatremia, not significant  -Monitor while on Keppra, encourage protein intake  -Check in a.m.    Expected Discharge Location and Transportation: rehab  Expected Discharge   Expected Discharge Date and Time       Expected Discharge Date Expected Discharge Time    Mar 24, 2023              DVT prophylaxis:  Medical and mechanical DVT prophylaxis orders are present.     AM-PAC 6 Clicks Score (PT): 19 (03/23/23 1127)    CODE  STATUS:   Code Status and Medical Interventions:   Ordered at: 03/21/23 1235     Level Of Support Discussed With:    Patient     Code Status (Patient has no pulse and is not breathing):    CPR (Attempt to Resuscitate)     Medical Interventions (Patient has pulse or is breathing):    Full       Adelina Lama, DO  03/23/23

## 2023-03-23 NOTE — PLAN OF CARE
Goal Outcome Evaluation:  Plan of Care Reviewed With: patient        Progress: no change  Outcome Evaluation: Pt amb in [facility] with FWW and Lynsey. Pt continues to demonstrated decreased safety awareness and balance deficts putting pt at increased risk for falls. Activity limited by fatigue. Continue to recommend d/c to IRF to address functional deficits and promote increased independence with functional mobility prior to returning home with no support.

## 2023-03-23 NOTE — PLAN OF CARE
Problem: Fall Injury Risk  Goal: Absence of Fall and Fall-Related Injury  Outcome: Ongoing, Progressing  Intervention: Identify and Manage Contributors  Description: Develop a fall prevention plan with the patient and caregiver/family.  Provide reorientation, appropriate sensory stimulation and routines with changes in mental status to decrease risk of fall.  Promote use of personal vision and auditory aids.  Assess assistance level required for safe and effective self-care; provide support as needed, such as toileting, mobilization. For age 65 and older, implement timed toileting with assistance.  Encourage physical activity, such as performance of mobility and self-care at highest level of patient ability, multicomponent exercise program and provision of appropriate assistive devices.  If fall occurs, assess the severity of injury; implement fall injury protocol. Determine the cause and revise fall injury prevention plan.  Regularly review medication contribution to fall risk; adjust medication administration times to minimize risk of falling.  Consider risk related to polypharmacy and age.  Balance adequate pain management with potential for oversedation.  Intervention: Promote Injury-Free Environment  Description: Provide a safe, barrier-free environment that encourages independent activity.  Keep care area uncluttered and well-lighted.  Determine need for increased observation or monitoring.  Avoid use of devices that minimize mobility, such as restraints or indwelling urinary catheter.     Problem: Skin Injury Risk Increased  Goal: Skin Health and Integrity  Outcome: Ongoing, Progressing  Intervention: Optimize Skin Protection  Description: Perform a full pressure injury risk assessment, as indicated by screening, upon admission to care unit.  Reassess skin (injury risk, full inspection) frequently (e.g., scheduled interval, with change in condition) to provide optimal early detection and  prevention.  Maintain adequate tissue perfusion (e.g., encourage fluid balance; avoid crossing legs, constrictive clothing or devices) to promote tissue oxygenation.  Maintain head of bed at lowest degree of elevation tolerated, considering medical condition and other restrictions.  Avoid positioning onto an area that remains reddened.  Minimize incontinence and moisture (e.g., toileting schedule; moisture-wicking pad, diaper or incontinence collection device; skin moisture barrier).  Cleanse skin promptly and gently when soiled utilizing a pH-balanced cleanser.  Relieve and redistribute pressure (e.g., scheduled position changes, weight shifts, use of support surface, medical device repositioning, protective dressing application, use of positioning device, microclimate control, use of pressure-injury-monitor  Encourage increased activity, such as sitting in a chair at the bedside or early mobilization, when able to tolerate.   Goal Outcome Evaluation:      Patient refuses some care, with repetitive complaints,

## 2023-03-23 NOTE — DISCHARGE PLACEMENT REQUEST
"Jonathan Barrera (67 y.o. Male)     Date of Birth   1955    Social Security Number       Address   [address] [place] [address]    Home Phone   [phone]    MRN   [id number]       Sabianist   None    Marital Status   Single                            Admission Date   3/11/23    Admission Type   Emergency    Admitting Provider   Marlon Mckee MD    Attending Provider   Adelina Lama DO    Department, Room/Bed   [facility] 3H, S371/1       Discharge Date       Discharge Disposition       Discharge Destination                               Attending Provider: Adelina Lama DO    Allergies: No Known Allergies    Isolation: None   Infection: None   Code Status: CPR    Ht: 180.3 cm (71\")   Wt: 93.4 kg (206 lb)    Admission Cmt: None   Principal Problem: Meningioma (HCC) [D32.9]                 Active Insurance as of 3/11/2023     Primary Coverage     Payor Plan Insurance Group Employer/Plan Group    ANTHEM MEDICARE REPLACEMENT ANTHEM MEDICARE ADVANTAGE [id number]     Payor Plan Address Payor Plan Phone Number Payor Plan Fax Number Effective Dates    [address] [phone]  2020 - None Entered    [facility] [id number]       Subscriber Name Subscriber Birth Date Member ID       JONATHAN BARRERA 1955 [id number]                 Emergency Contacts      (Rel.) Home Phone Work Phone Mobile Phone    Marva Ku (Sister) [phone] -- [phone]               History & Physical      Linden-Carey Munguia MD at 23 [facility]              [facility] Medicine Services  HISTORY AND PHYSICAL    Patient Name: Jonathan Barrera  : 1955  MRN: [id number]  Primary Care Physician: Tiffany Morales MD  Date of admission: 3/11/2023    Subjective   Subjective     Chief Complaint:  Balance difficulty    HPI:  Jonathan Barrera is a 67 y.o. male PMH significant for balance problems, alcohol abuse, depression, HTN, " hypothyroidism s/p thyroidectomy for Graves dz who presents to [facility] ED with a 2-week history of difficulty with balance and gait.  Patient states he has been having to hang onto the walls walk from his bedroom to his bathroom.  He notes accompanying weakness feels this is due to to not the past couple weeks as he has not been to the grocery.  Denies any recent illness, fever/chills, headache.  He had an episode of diarrhea last night where he had an episode of vomiting 3 days ago that he attributes to some food he ate. Patient denies falling since the onset of symptoms.  He does note a bad fall in September 2022 at which time he dislocated his shoulder and fractured his humerus secondary to alcohol intoxication.  He says he has since given up alcohol and has been sober since then.  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  He also notes a history of rheumatoid and psoriatic arthritis but has never been treated for either condition.  At time of exam, patient has noticeable contractures in both hands and feet bilaterally, R>L, and right eye ptosis.  Patient is in A-fib on the monitor but denies history of A-fib.  He states his PCP told him he had a murmur, and has click and murmur on physical exam. He has noticeably cool BLE and mikel pedal pulses bilaterally. He notes some numbness in hands and feet bilaterally. Neurology has been consulted, and Dr. Siddiqui has evaluated patient in ED.    Review of Systems   Constitutional: Positive for activity change and appetite change. Negative for chills and fever.   HENT: Negative for trouble swallowing.    Eyes: Positive for visual disturbance (blind in right eye).   Respiratory: Negative for cough, chest tightness, shortness of breath and wheezing.    Cardiovascular: Negative for chest pain, palpitations and leg swelling.   Gastrointestinal: Positive for diarrhea. Negative for abdominal pain, nausea and  vomiting.   Genitourinary: Positive for decreased urine volume. Negative for difficulty urinating.   Musculoskeletal: Positive for gait problem and joint swelling (contractures hands and feet bilaterally). Negative for neck pain and neck stiffness.   Skin: Negative for rash and wound.   Neurological: Positive for weakness. Negative for dizziness, syncope and headaches.   Psychiatric/Behavioral: Negative for confusion. The patient is not nervous/anxious.         Personal History     Past Medical History:   Diagnosis Date   • Arthritis    • Hypertension    • Melanoma (HCC) 2017    retina         Past Surgical History:   Procedure Laterality Date   • EYE SURGERY  2017    EYE CANCER RIGHT EYE   • TONSILLECTOMY         Family History:  family history includes Diabetes in his mother; Heart disease in his father.     Social History:  reports that he quit smoking about 33 years ago. His smoking use included cigarettes. He started smoking about 41 years ago. He has a 15.00 pack-year smoking history. He quit smokeless tobacco use about 33 years ago. He reports that he does not currently use alcohol. He reports that he does not use drugs.  Social History     Social History Narrative   • Not on file       Medications:  levothyroxine and lisinopril-hydrochlorothiazide    No Known Allergies    Objective   Objective     Vital Signs:   Temp:  [98.2 °F (36.8 °C)] 98.2 °F (36.8 °C)  Heart Rate:  [] 59  Resp:  [20] 20  BP: ()/(66-93) 114/84    Physical Exam   Constitutional: Awake, alert  Eyes: PERRLA, sclerae anicteric, no conjunctival injection, ptosis right eye  HENT: NCAT, mucous membranes moist  Neck: Supple, no thyromegaly, no lymphadenopathy, trachea midline  Respiratory: Clear to auscultation bilaterally, nonlabored respirations, room air   Cardiovascular: Regularly irregular rhythm, +murmur, + click, 1+/5 pedal pulses bilaterally  Gastrointestinal: Positive bowel sounds, soft, nontender,  nondistended  Musculoskeletal: No bilateral ankle edema, contractures bilateral hands and feet, BLE cool to touch  Psychiatric: Appropriate affect, cooperative  Neurologic: Oriented x 3, strength symmetric in all extremities, Cranial Nerves grossly intact to confrontation, speech clear  Skin: No rashes      Result Review:  I have personally reviewed the results from the time of this admission to 3/11/2023 19:13 EST and agree with these findings:  [x]  Laboratory list / accordion  []  Microbiology  [x]  Radiology  [x]  EKG/Telemetry   []  Cardiology/Vascular   []  Pathology  [x]  Old records  []  Other:  Most notable findings include:   LAB RESULTS:      Lab 03/11/23  1052   WBC 5.58   HEMOGLOBIN 14.8   HEMATOCRIT 43.7   PLATELETS 174   NEUTROS ABS 3.11   IMMATURE GRANS (ABS) 0.02   LYMPHS ABS 1.82   MONOS ABS 0.53   EOS ABS 0.07   MCV 86.2         Lab 03/11/23  1052   SODIUM 135*   POTASSIUM 3.5   CHLORIDE 95*   CO2 28.0   ANION GAP 12.0   BUN 14   CREATININE 0.99   EGFR 82.5   GLUCOSE 95   CALCIUM 9.3   MAGNESIUM 1.8         Lab 03/11/23  1052   TOTAL PROTEIN 6.9   ALBUMIN 4.0   GLOBULIN 2.9   ALT (SGPT) 14   AST (SGOT) 20   BILIRUBIN 1.0   ALK PHOS 71         Lab 03/11/23  1052   HSTROP T 10                 Brief Urine Lab Results  (Last result in the past 365 days)      Color   Clarity   Blood   Leuk Est   Nitrite   Protein   CREAT   Urine HCG        03/11/23 1058 Dark Yellow   Clear   Negative   Negative   Negative   Negative               Microbiology Results (last 10 days)     ** No results found for the last 240 hours. **          XR Chest 1 View    Result Date: 3/11/2023  XR CHEST 1 VW Date of Exam: 3/11/2023 10:59 AM EST Indication: Weak/Dizzy/AMS triage protocol. Comparison: None available. Findings: The heart is not definitely enlarged. The lungs seem relatively clear. It looks like the probably are some underlying emphysematous changes. There are no pleural effusions. There is some deformity of the  surgical neck of the proximal left humerus which may relate to old trauma.     Impression: Impression: 1.An acute pulmonary process is not apparent. 2.Some underlying emphysematous changes suggested. Electronically Signed: Jimmy Michael  3/11/2023 11:46 AM EST  Workstation ID: [id number]      Assessment & Plan   Assessment & Plan       Balance problem    Essential hypertension    Postablative hypothyroidism    Other recurrent depressive disorders (HCC)    Generalized weakness    Atrial fibrillation (HCC)    Monty Nagel is a 67 y.o. male PMH significant for balance problems, alcohol abuse, depression, HTN, hypothyroidism s/p thyroidectomy for Graves dz who presents to [facility] ED with a 2-week history of difficulty with balance and gait. He notes accompanying weakness feels this is due to to not the past couple weeks as he has not been to the grocery.  Denies any recent illness, fever/chills, headache.  Patient is in A-fib on the monitor but denies history of A-fib.      Balance problem  Generalized weakness  -- H/o melanoma right retina, blind in right eye  -- S/p LR bolus, thiamine bolus in ED  -- Labs mostly unremarkable  -- Neurology consulted, Dr. Siddiqui evaluated in ED  -- Continue IV thiamine  -- MRI brain and cervical spine pending  -- AM BMP, CBC, TSH, iron profile, vitamin B12, folate, vitamin D  -- Check A1c for BG 65  -- Check ABIs for cool BLE, 1/5 pedal pulses bilaterally  -- PT, OT, SLP consults  -- Nutrition consult  -- CM consult, APS referral made from ED  -- Fall precautions, up with assist    Atrial fibrillation  -- No history of A-fib  -- Rate controlled  -- Echocardiogram, bilateral carotid duplex  -- EKG in a.m.  -- AM lipid panel, TSH, iron profile  -- Lovenox    HTN  -- Lisinopril-HCTZ 20 mg-12.5mg at home  -- Hold home meds, BPs running low and patient has not taken meds in 2 days    Hypothyroidism  H/o ablation for Graves  -- Continue home levothyroxine  -- Check TSH    Psoriatic  arthritis  Rheumatoid arthritis  -- Patient states he has never received treatment  -- Neurology checking multiple autoimmune labs  -- Check CRP, ESR    H/o EtOH abuse  -- States sober since September 2022  -- Start PPI     DVT prophylaxis:  Medical and mechanical    CODE STATUS:    Level Of Support Discussed With: Patient  Code Status (Patient has no pulse and is not breathing): CPR (Attempt to Resuscitate)  Medical Interventions (Patient has pulse or is breathing): Full Support      Expected Discharge  Expected Discharge Date and Time     Expected Discharge Date Expected Discharge Time    Mar 13, 2023             This note has been completed as part of a split-shared workflow.     Signature: Electronically signed by LYNETTE Esquivel, 03/11/23, 5:04 PM EST        Attending   Admission Attestation       I have performed an independent face-to-face diagnostic evaluation including performing an independent physical examination as documented here.  The documented plan of care above was reviewed and developed with the advanced practice clinician (APC).      Brief Summary Statement:   Monty Nagel is a 67 y.o. male with a history of alcohol abuse, hypertension and hypothyroidism who presents with a 2-week history of progressive weakness and issues with his balance.  He also admits to having some intermittent confusion.  He states that due to his weakness in his balance he has been unable to make it to the grocery store and has not been able to care for himself properly.  He does not have anyone that is able to help him either.  He denies any fevers.  He does admit to having worsening contractures especially on the right side in his upper and lower extremities.     Patient was seen by neurology in the emergency department and there was concern for possible nutritional deficiencies as well as concern for some autoimmune illnesses.  An MRI of the brain and cervical spine was ordered as well as a multitude of  testing.    Remainder of detailed HPI is as noted by APC and has been reviewed and/or edited by me for completeness.    Attending Physical Exam:  Temp:  [97.4 °F (36.3 °C)-98.2 °F (36.8 °C)] 97.4 °F (36.3 °C)  Heart Rate:  [] 67  Resp:  [18-20] 18  BP: ()/(66-93) 114/78    Constitutional: Awake, alert, eating dinner, poor hygiene  Eyes: PERRLA, sclerae anicteric,   HENT: NCAT, mucous membranes moist  Neck: Supple, trachea midline  Respiratory: Clear to auscultation bilaterally, nonlabored respirations   Cardiovascular: Irregular rate and rhythm, 2 out of 6 systolic murmur, palpable pedal pulses bilaterally  Gastrointestinal: Positive bowel sounds, soft, nontender, nondistended  Musculoskeletal: No bilateral ankle edema, severe contractures in his right upper hand, less so noticeable in the left upper extremity.  He has 4 out of 5 strength in all extremities, slightly limited due to the contractures.   Psychiatric: Appropriate affect, cooperative  Neurologic: Oriented x 3, Cranial Nerves grossly intact to confrontation, speech clear  Skin: No rashes      Brief Assessment/Plan :  See detailed assessment and plan developed with APC which I have reviewed and/or edited for completeness.    Patient has a multitude of labs that are currently pending.  These will need to be followed up and addressed in the morning.    MRI results came back and show a 6.4 homogeneous enhancing mass in the right frontal convexity, favoring a meningioma.  It does cause significant mass effect and vasogenic edema in the right frontal lobe.  I called and spoke with neurosurgery and had them look at the scans.  They suggested that I go ahead and start patient on Keppra IV as well as Decadron IV.  Most likely this has been going on for a while, and they will consult and see the patient in the morning.    Patient with high medical complexity and medical decision making, with possible life-threatening finding of a brain mass with  significant mass effect.    Carey Mayer MD  23                        Electronically signed by Carey Mayer MD at 23 0022          Physical Therapy Notes (most recent note)      Kelly Mckeon, PT at 23 1030  Version 1 of 1         Patient Name: Monty Nagel  : 1955    MRN: [id number]                              Today's Date: 3/23/2023       Admit Date: 3/11/2023    Visit Dx:     ICD-10-CM ICD-9-CM   1. Generalized weakness  R53.1 780.79   2. History of alcoholism (HCC)  F10.21 V11.3   3. Inability to walk  R26.2 719.7   4. Confusion  R41.0 298.9   5. Hypoglycemia  E16.2 251.2   6. Poor nutrition  E63.9 269.9   7. Cognitive communication deficit  R41.841 799.52   8. Impaired functional mobility, balance, gait, and endurance  Z74.09 V49.89   9. 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   • Postablative hypothyroidism   • Screen for colon cancer   • Other recurrent depressive disorders (HCC)   • Balance problem   • Generalized weakness   • Atrial fibrillation (HCC)   • Severe malnutrition (HCC)   • Meningioma (HCC)   • Rheumatoid vs Psoriatic arthritis (HCC)     Past Medical History:   Diagnosis Date   • Arthritis    • Hepatitis A    • Hypertension    • Melanoma (HCC) 2017    retina     Past Surgical History:   Procedure Laterality Date   • CRANIOTOMY FOR TUMOR N/A 3/21/2023    Procedure: CRANIOTOMY FOR TUMOR STEREOTACTIC WITH STEALTH;  Surgeon: Marlon Mckee MD;  Location: [facility];  Service: Neurosurgery;  Laterality: N/A;   • EYE SURGERY  2017    EYE CANCER RIGHT EYE   • FINGER SURGERY Left     Pointer finger re-attached in 3rd Grade   • TONSILLECTOMY        General Information     Row Name 23 1052          Physical Therapy Time and Intention    Document Type therapy note (daily note)  -HP     Mode of Treatment physical therapy   -     Row Name 03/23/23 1052          General Information    Patient Profile Reviewed yes  -     Existing Precautions/Restrictions fall  impulsive, L sided weakness/inattention, blind R eye baseline  -     Row Name 03/23/23 1052          Cognition    Orientation Status (Cognition) oriented to;person;place;situation  -     Row Name 03/23/23 1052          Safety Issues, Functional Mobility    Impairments Affecting Function (Mobility) balance;cognition;coordination;endurance/activity tolerance;strength;postural/trunk control;motor planning;visual/perceptual;pain  -           User Key  (r) = Recorded By, (t) = Taken By, (c) = Cosigned By    Initials Name Provider Type     Kelly Mckeon, PT Physical Therapist               Mobility     Row Name 03/23/23 1053          Bed Mobility    Bed Mobility sit-supine;supine-sit  -     Supine-Sit Wright (Bed Mobility) supervision  -     Sit-Supine Wright (Bed Mobility) supervision  -     Assistive Device (Bed Mobility) bed rails  -     Comment, (Bed Mobility) VC for sequencing  -     Row Name 03/23/23 1053          Transfers    Comment, (Transfers) VC for hand placement on FWW. Pt preferred the AD and demonstrated improved stability with it.  -     Row Name 03/23/23 1053          Sit-Stand Transfer    Sit-Stand Wright (Transfers) contact guard;verbal cues  -     Assistive Device (Sit-Stand Transfers) walker, front-wheeled  -     Row Name 03/23/23 1053          Gait/Stairs (Locomotion)    Wright Level (Gait) minimum assist (75% patient effort)  -     Assistive Device (Gait) walker, front-wheeled  -     Distance in Feet (Gait) 140x2  -     Deviations/Abnormal Patterns (Gait) bilateral deviations;base of support, narrow;dima decreased;festinating/shuffling;stride length decreased  -     Bilateral Gait Deviations forward flexed posture;heel strike decreased  -     Left Sided Gait Deviations foot drop/toe drag  -      Comment, (Gait/Stairs) Pt amb in halls with FWW and Min A for stability. Pt demonstrated step-through gait pattern with wide LE and forward flexed posture. Pt demosntrated minima B foot clearance or functional ankle DF througout swing phase. VC for upright posture. Activity limited by fatigue. Gait deviations worsened as pt fatigued.  -           User Key  (r) = Recorded By, (t) = Taken By, (c) = Cosigned By    Initials Name Provider Type     Kelly Mckeon PT Physical Therapist               Obj/Interventions     Row Name 03/23/23 1057          Motor Skills    Therapeutic Exercise other (see comments)  pt declined ther-ex  -HP     Row Name 03/23/23 1057          Balance    Balance Assessment sitting static balance;sitting dynamic balance;sit to stand dynamic balance;standing static balance;standing dynamic balance  -     Static Sitting Balance standby assist  -     Dynamic Sitting Balance standby assist  -     Position, Sitting Balance sitting edge of bed  -     Static Standing Balance contact guard  -     Dynamic Standing Balance minimal assist  -     Position/Device Used, Standing Balance supported;walker, rolling  -     Balance Interventions sitting;standing;sit to stand;occupation based/functional task  -     Comment, Balance addressed higher level balance activies by marching place  -           User Key  (r) = Recorded By, (t) = Taken By, (c) = Cosigned By    Initials Name Provider Type     Kelly Mckeon PT Physical Therapist               Goals/Plan    No documentation.                Clinical Impression     Row Name 03/23/23 1125          Pain    Pretreatment Pain Rating 0/10 - no pain  -     Posttreatment Pain Rating 0/10 - no pain  -     Row Name 03/23/23 1125          Plan of Care Review    Plan of Care Reviewed With patient  -     Progress no change  -     Outcome Evaluation Pt amb in mao with FWW and Lynsey. Pt continues to demonstrated decreased safety awareness and  balance deficts putting pt at increased risk for falls. Activity limited by fatigue. Continue to recommend d/c to IRF to address functional deficits and promote increased independence with functional mobility prior to returning home with no support.  -     Row Name 03/23/23 1125          Vital Signs    Pre Systolic BP Rehab --  VSS  -HP     Pre Patient Position Supine  -HP     Intra Patient Position Standing  -HP     Post Patient Position Supine  -HP     Row Name 03/23/23 1125          Positioning and Restraints    Pre-Treatment Position in bed  -HP     Post Treatment Position bed  -HP     In Bed notified nsg;fowlers;call light within reach;encouraged to call for assist;exit alarm on;side rails up x2  -HP           User Key  (r) = Recorded By, (t) = Taken By, (c) = Cosigned By    Initials Name Provider Type    Kelly Mccray PT Physical Therapist               Outcome Measures     Row Name 03/23/23 1127          How much help from another person do you currently need...    Turning from your back to your side while in flat bed without using bedrails? 4  -HP     Moving from lying on back to sitting on the side of a flat bed without bedrails? 4  -HP     Moving to and from a bed to a chair (including a wheelchair)? 3  -HP     Standing up from a chair using your arms (e.g., wheelchair, bedside chair)? 3  -HP     Climbing 3-5 steps with a railing? 2  -HP     To walk in hospital room? 3  -HP     AM-PAC 6 Clicks Score (PT) 19  -HP     Highest level of mobility 6 --> Walked 10 steps or more  -     Row Name 03/23/23 1127          Functional Assessment    Outcome Measure Options AM-PAC 6 Clicks Basic Mobility (PT)  -HP           User Key  (r) = Recorded By, (t) = Taken By, (c) = Cosigned By    Initials Name Provider Type    Kelly Mccray PT Physical Therapist                             Physical Therapy Education     Title: PT OT SLP Therapies (In Progress)     Topic: Physical Therapy (Done)     Point: Mobility  training (Done)     Learning Progress Summary           Patient Acceptance, E,D, VU,NR by HP at 3/23/2023 1127    Acceptance, E,TB, NR by AY at 3/22/2023 1141    Acceptance, E, VU,NR by CM at 3/19/2023 1149    Acceptance, E, VU by CM at 3/17/2023 1517    Acceptance, E, VU,NR by LH at 3/15/2023 1407    Acceptance, E, NR by KG at 3/14/2023 1428    Acceptance, E, VU,NR by SJ at 3/12/2023 1542                   Point: Home exercise program (Done)     Learning Progress Summary           Patient Acceptance, E,D, VU,NR by HP at 3/23/2023 1127    Acceptance, E,TB, NR by AY at 3/22/2023 1141    Acceptance, E, VU,NR by CM at 3/19/2023 1149    Acceptance, E, VU,NR by LH at 3/15/2023 1407    Acceptance, E, NR by KG at 3/14/2023 1428                   Point: Body mechanics (Done)     Learning Progress Summary           Patient Acceptance, E,D, VU,NR by HP at 3/23/2023 1127    Acceptance, E,TB, NR by AY at 3/22/2023 1141    Acceptance, E, VU,NR by CM at 3/19/2023 1149    Acceptance, E, VU by CM at 3/17/2023 1517    Acceptance, E, VU,NR by LH at 3/15/2023 1407    Acceptance, E, NR by KG at 3/14/2023 1428    Acceptance, E, VU,NR by SJ at 3/12/2023 1542                   Point: Precautions (Done)     Learning Progress Summary           Patient Acceptance, E,D, VU,NR by HP at 3/23/2023 1127    Acceptance, E,TB, NR by AY at 3/22/2023 1141    Acceptance, E, VU,NR by CM at 3/19/2023 1149    Acceptance, E, VU by CM at 3/17/2023 1517    Acceptance, E, VU,NR by LH at 3/15/2023 1407    Acceptance, E, NR by KG at 3/14/2023 1428    Acceptance, E, VU,NR by SJ at 3/12/2023 1542                               User Key     Initials Effective Dates Name Provider Type Discipline    SJ 02/03/23 - 03/12/23 Samara Harrison, PT Physical Therapist PT    KG 05/22/20 -  Cherelle Gaitan, PT Physical Therapist PT    AY 11/10/20 -  Sandee Spencer, PT Physical Therapist PT    LH 09/21/21 -  Tristian Ravi, PT Physical Therapist PT    HP 06/01/21 -   Kelly Mckeon, PT Physical Therapist PT    CM 22 -  Yanet Calvin PT Physical Therapist PT              PT Recommendation and Plan     Plan of Care Reviewed With: patient  Progress: no change  Outcome Evaluation: Pt amb in mao with FWW and Lynsey. Pt continues to demonstrated decreased safety awareness and balance deficts putting pt at increased risk for falls. Activity limited by fatigue. Continue to recommend d/c to IRF to address functional deficits and promote increased independence with functional mobility prior to returning home with no support.     Time Calculation:    PT Charges     Row Name 23 1030             Time Calculation    Start Time 1030  -HP         Timed Charges    54738 - Gait Training Minutes  7  -HP      92000 - PT Therapeutic Activity Minutes 5  -HP         Total Minutes    Timed Charges Total Minutes 12  -HP       Total Minutes 12  -HP            User Key  (r) = Recorded By, (t) = Taken By, (c) = Cosigned By    Initials Name Provider Type    HP Kelly Mckeon, RIC Physical Therapist              Therapy Charges for Today     Code Description Service Date Service Provider Modifiers Qty    87257475714 HC GAIT TRAINING EA 15 MIN 3/23/2023 Kelly Mckeon, PT GP 1          PT G-Codes  Outcome Measure Options: AM-PAC 6 Clicks Basic Mobility (PT)  AM-PAC 6 Clicks Score (PT): 19  AM-PAC 6 Clicks Score (OT): 14       Kelly Mckeon PT  3/23/2023      Electronically signed by Kelly Mckeon PT at 23 1128          Occupational Therapy Notes (most recent note)      Phyllis Bowen, OT at 23 0958          Patient Name: Monty Nagel  : 1955    MRN: [id number]                              Today's Date: 3/22/2023       Admit Date: 3/11/2023    Visit Dx:     ICD-10-CM ICD-9-CM   1. Generalized weakness  R53.1 780.79   2. History of alcoholism (HCC)  F10.21 V11.3   3. Inability to walk  R26.2 719.7   4. Confusion  R41.0 298.9   5. Hypoglycemia  E16.2 251.2   6. Poor  nutrition  E63.9 269.9   7. Cognitive communication deficit  R41.841 799.52   8. Impaired functional mobility, balance, gait, and endurance  Z74.09 V49.89   9. 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   • Postablative hypothyroidism   • Screen for colon cancer   • Other recurrent depressive disorders (HCC)   • Balance problem   • Generalized weakness   • Atrial fibrillation (HCC)   • Severe malnutrition (HCC)   • Meningioma (HCC)   • Rheumatoid vs Psoriatic arthritis (HCC)     Past Medical History:   Diagnosis Date   • Arthritis    • Hepatitis A    • Hypertension    • Melanoma (HCC) 2017    retina     Past Surgical History:   Procedure Laterality Date   • CRANIOTOMY FOR TUMOR N/A 3/21/2023    Procedure: CRANIOTOMY FOR TUMOR STEREOTACTIC WITH STEALTH;  Surgeon: Marlon Mckee MD;  Location: [facility];  Service: Neurosurgery;  Laterality: N/A;   • EYE SURGERY  2017    EYE CANCER RIGHT EYE   • FINGER SURGERY Left     Pointer finger re-attached in 3rd Grade   • TONSILLECTOMY        General Information     Row Name 03/22/23 1059          OT Time and Intention    Document Type re-evaluation  -CS     Mode of Treatment speech-language pathology  -CS     Row Name 03/22/23 1059          General Information    Patient Profile Reviewed yes  -CS     Prior Level of Function --  see IE  -CS     Existing Precautions/Restrictions fall  impulsive, L sided weakness/inattention, blind R eye baseline  -CS     Barriers to Rehab medically complex;cognitive status;visual deficit  -CS     Row Name 03/22/23 1059          Living Environment    People in Home --  see IE  -CS     Row Name 03/22/23 1059          Cognition    Orientation Status (Cognition) oriented to;person;place;situation  -CS     Row Name 03/22/23 1059          Safety Issues, Functional Mobility    Impairments Affecting Function (Mobility)  balance;cognition;coordination;endurance/activity tolerance;strength;postural/trunk control;motor planning;visual/perceptual  -CS     Cognitive Impairments, Mobility Safety/Performance attention;impulsivity;insight into deficits/self-awareness;sequencing abilities;awareness, need for assistance;safety precaution awareness;safety precaution follow-through;judgment;problem-solving/reasoning  -CS           User Key  (r) = Recorded By, (t) = Taken By, (c) = Cosigned By    Initials Name Provider Type     Phyllis Bowen OT Occupational Therapist                 Mobility/ADL's     Row Name 03/22/23 1101          Transfers    Transfers sit-stand transfer;stand-sit transfer  -     Comment, (Transfers) HHA  -     Row Name 03/22/23 1101          Bed-Chair Transfer    Bed-Chair [place] (Transfers) verbal cues;minimum assist (75% patient effort)  -     Row Name 03/22/23 1101          Sit-Stand Transfer    Sit-Stand [place] (Transfers) minimum assist (75% patient effort);verbal cues  -     Row Name 03/22/23 1101          Activities of Daily Living    BADL Assessment/Intervention lower body dressing;grooming  -     Row Name 03/22/23 [facility]          Lower Body Dressing Assessment/Training    Assumption Level (Lower Body Dressing) doff;don;socks;maximum assist (25% patient effort)  -CS     Position (Lower Body Dressing) supported sitting  -     Row Name 03/22/23 1101          Grooming Assessment/Training    Assumption Level (Grooming) hair care, combing/brushing;maximum assist (25% patient effort)  -CS     Position (Grooming) supported sitting  -           User Key  (r) = Recorded By, (t) = Taken By, (c) = Cosigned By    Initials Name Provider Type    CS Phyllis Bowen OT Occupational Therapist               Obj/Interventions     Row Name 03/22/23 1101          Sensory Assessment (Somatosensory)    Sensory Assessment (Somatosensory) UE sensation intact  -     Row Name 03/22/23 1101          Vision  Assessment/Intervention    Visual Impairment/Limitations peripheral vision impaired right  -     Visual Processing Deficit visual attention, left  -CS     Row Name 03/22/23 1101          Range of Motion Comprehensive    General Range of Motion bilateral upper extremity ROM WFL  -     Row Name 03/22/23 1101          Strength Comprehensive (MMT)    Comment, General Manual Muscle Testing (MMT) Assessment LUE grossly 3+/5, RUE grossly 4/5  -     Row Name 03/22/23 1101          Motor Skills    Motor Skills coordination  -     Coordination left;upper extremity;minimal impairment  -     Row Name 03/22/23 1101          Balance    Balance Assessment sitting static balance;sitting dynamic balance;standing dynamic balance;standing static balance  -     Static Sitting Balance standby assist  -CS     Dynamic Sitting Balance standby assist  -CS     Position, Sitting Balance sitting in chair  -     Static Standing Balance contact guard  -     Dynamic Standing Balance minimal assist  -CS     Position/Device Used, Standing Balance supported  -     Balance Interventions sitting;standing;static;dynamic;dynamic reaching;occupation based/functional task;weight shifting activity  -           User Key  (r) = Recorded By, (t) = Taken By, (c) = Cosigned By    Initials Name Provider Type    CS Phyllis Bowen, OT Occupational Therapist               Goals/Plan     [facility] Name 03/22/23 1107          Transfer Goal 1 (OT)    Activity/Assistive Device (Transfer Goal 1, OT) sit-to-stand/stand-to-sit;toilet  -     [place] Level/Cues Needed (Transfer Goal 1, OT) standby assist  -     Time Frame (Transfer Goal 1, OT) long term goal (LTG);10 days  -     Progress/Outcome (Transfer Goal 1, OT) goal ongoing  -     Row Name 03/22/23 1107          Bathing Goal 1 (OT)    Progress/Outcomes (Bathing Goal 1, OT) goal no longer appropriate  -     Row Name 03/22/23 1107          Dressing Goal 1 (OT)    Activity/Device (Dressing  Goal 1, OT) lower body dressing  -CS     [place]/Cues Needed (Dressing Goal 1, OT) standby assist  -CS     Time Frame (Dressing Goal 1, OT) long term goal (LTG);10 days  -CS     Strategies/Barriers (Dressing Goal 1, OT) undergarment/pants  -CS     Progress/Outcome (Dressing Goal 1, OT) goal ongoing  -CS     Row Name 03/22/23 1107          Toileting Goal 1 (OT)    Progress/Outcome (Toileting Goal 1, OT) goal ongoing  -CS     Row Name 03/22/23 1107          Grooming Goal 1 (OT)    Progress/Outcome (Grooming Goal 1, OT) goal ongoing  -CS     Row Name 03/22/23 1107          Therapy Assessment/Plan (OT)    Planned Therapy Interventions (OT) activity tolerance training;adaptive equipment training;BADL retraining;functional balance retraining;occupation/activity based interventions;ROM/therapeutic exercise;strengthening exercise;transfer/mobility retraining  -CS           User Key  (r) = Recorded By, (t) = Taken By, (c) = Cosigned By    Initials Name Provider Type    CS Phyllis Bowen, OT Occupational Therapist               Clinical Impression     Row Name 03/22/23 1104          Pain Scale: FACES Pre/Post-Treatment    Pain: FACES Scale, Pretreatment 0-->no hurt  -CS     Posttreatment Pain Rating 0-->no hurt  -CS     Row Name 03/22/23 1104          Plan of Care Review    Plan of Care Reviewed With patient  -CS     Progress improving  -CS     Outcome Evaluation OT Re-eval complete. Pt presents w/ mild L sided weakness, visual deficits, and balance deficits limiting functional independence from baseline. Pt requires frequent cueing throughout session for safety awareness. Recommend cont skilled IPOT POC to facilitate return to PLOF. Recommend pt DC to IP rehab.  -CS     Row Name 03/22/23 1104          Therapy Assessment/Plan (OT)    Patient/Family Therapy Goal Statement (OT) Return to PLOF  -CS     Rehab Potential (OT) good, to achieve stated therapy goals  -CS     Criteria for Skilled Therapeutic Interventions Met (OT)  yes;skilled treatment is necessary  -CS     Therapy Frequency (OT) daily  -CS     Row Name 03/22/23 1104          Therapy Plan Review/Discharge Plan (OT)    Anticipated Discharge Disposition (OT) inpatient rehabilitation facility  -CS     Row Name 03/22/23 1104          Vital Signs    Pre Systolic BP Rehab --  VSS  -CS     O2 Delivery Pre Treatment room air  -CS     O2 Delivery Post Treatment room air  -CS     Pre Patient Position Sitting  -CS     Intra Patient Position Standing  -CS     Post Patient Position Sitting  -CS     Row Name 03/22/23 1104          Positioning and Restraints    Pre-Treatment Position sitting in chair/recliner  -CS     Post Treatment Position chair  -CS     In Chair notified nsg;call light within reach;encouraged to call for assist;sitting;with PT;exit alarm on;waffle cushion  -CS           User Key  (r) = Recorded By, (t) = Taken By, (c) = Cosigned By    Initials Name Provider Type    CS Phyllis Bowen, OT Occupational Therapist               Outcome Measures     Row Name 03/22/23 1108          How much help from another is currently needed...    Putting on and taking off regular lower body clothing? 2  -CS     Bathing (including washing, rinsing, and drying) 2  -CS     Toileting (which includes using toilet bed pan or urinal) 2  -CS     Putting on and taking off regular upper body clothing 3  -CS     Taking care of personal grooming (such as brushing teeth) 2  -CS     Eating meals 3  -CS     AM-PAC 6 Clicks Score (OT) 14  -CS     Row Name 03/22/23 0739          How much help from another person do you currently need...    Turning from your back to your side while in flat bed without using bedrails? 4  -AS (r) MW (t) AS (c)     Moving from lying on back to sitting on the side of a flat bed without bedrails? 3  -AS (r) MW (t) AS (c)     Moving to and from a bed to a chair (including a wheelchair)? 3  -AS (r) MW (t) AS (c)     Standing up from a chair using your arms (e.g., wheelchair,  bedside chair)? 3  -AS (r) MW (t) AS (c)     Climbing 3-5 steps with a railing? 2  -AS (r) MW (t) AS (c)     To walk in hospital room? 2  -AS (r) MW (t) AS (c)     AM-PAC 6 Clicks Score (PT) 17  -AS (r) MW (t)     Highest level of mobility 5 --> Static standing  -AS (r) MW (t)     Row Name 03/22/23 1108          Functional Assessment    Outcome Measure Options AM-PAC 6 Clicks Daily Activity (OT)  -CS           User Key  (r) = Recorded By, (t) = Taken By, (c) = Cosigned By    Initials Name Provider Type    CS Phyllis Bowen, OT Occupational Therapist    AS Ashley Stevens, RN Registered Nurse    Ximena Orlando, Nursing Student Nursing Student                Occupational Therapy Education     Title: PT OT SLP Therapies (In Progress)     Topic: Occupational Therapy (In Progress)     Point: ADL training (In Progress)     Description:   Instruct learner(s) on proper safety adaptation and remediation techniques during self care or transfers.   Instruct in proper use of assistive devices.              Learning Progress Summary           Patient Acceptance, E, NR by CS at 3/22/2023 1108    Acceptance, E, VU,NR by HOPE at 3/13/2023 1156    Comment: reason for consult, noted deficits, walker safety, concern over home return                   Point: Home exercise program (Not Started)     Description:   Instruct learner(s) on appropriate technique for monitoring, assisting and/or progressing therapeutic exercises/activities.              Learner Progress:  Not documented in this visit.          Point: Precautions (In Progress)     Description:   Instruct learner(s) on prescribed precautions during self-care and functional transfers.              Learning Progress Summary           Patient Acceptance, E, NR by CS at 3/22/2023 1108    Acceptance, E, VU,NR by HOPE at 3/13/2023 1156    Comment: reason for consult, noted deficits, walker safety, concern over home return                   Point: Body mechanics (In Progress)      Description:   Instruct learner(s) on proper positioning and spine alignment during self-care, functional mobility activities and/or exercises.              Learning Progress Summary           Patient Acceptance, E, NR by  at 3/22/2023 1108                               User Key     Initials Effective Dates Name Provider Type Discipline    [facility]OPE 02/03/23 -  Monae Cohen OT Occupational Therapist OT    TRENA 09/02/21 -  Phyllis Bowen OT Occupational Therapist OT              OT Recommendation and Plan  Planned Therapy Interventions (OT): activity tolerance training, adaptive equipment training, BADL retraining, functional balance retraining, occupation/activity based interventions, ROM/therapeutic exercise, strengthening exercise, transfer/mobility retraining  Therapy Frequency (OT): daily  Plan of Care Review  Plan of Care Reviewed With: patient  Progress: improving  Outcome Evaluation: OT Re-eval complete. Pt presents w/ mild L sided weakness, visual deficits, and balance deficits limiting functional independence from baseline. Pt requires frequent cueing throughout session for safety awareness. Recommend cont skilled IPOT POC to facilitate return to PLOF. Recommend pt DC to IP rehab.     Time Calculation:    Time Calculation- OT     Row Name 03/22/23 1109             Time Calculation- OT    OT Start Time 0958  -CS      OT Received On 03/22/23  -CS      OT Goal Re-Cert Due Date 04/01/23  -CS         Timed Charges    25767 - OT Therapeutic Activity Minutes 5  -CS      45456 - OT Self Care/Mgmt Minutes 5  -CS         Untimed Charges    OT Eval/Re-eval Minutes 20  -CS         Total Minutes    Timed Charges Total Minutes 10  -CS      Untimed Charges Total Minutes 20  -CS       Total Minutes 30  -CS            User Key  (r) = Recorded By, (t) = Taken By, (c) = Cosigned By    Initials Name Provider Type    CS Phyllis Bowen OT Occupational Therapist              Therapy Charges for Today     Code Description Service  Date Service Provider Modifiers Qty    [id number]  OT THERAPEUTIC ACT EA 15 MIN 3/22/2023 Phyllis Bowen OT GO 1    [id number]  OT RE-EVAL 2 3/22/2023 Phyllis Bowen OT GO 1               Phyllis Bowen OT  3/22/2023    Electronically signed by Phyllis Bowen OT at 03/22/23 1110

## 2023-03-23 NOTE — CASE MANAGEMENT/SOCIAL WORK
Continued Stay Note  [facility]     Patient Name: Monty Nagel  MRN: [id number]  Today's Date: 3/23/2023    Admit Date: 3/11/2023    Plan: Rehab   Discharge Plan     Row Name 03/23/23 1516       Plan    Plan Rehab    Patient/Family in Agreement with Plan yes    Plan Comments Mr. Nagel is agreeable to go to rehab and does want to return home once he is stronger. I have faxed a referral to [facility], [facility], [facility], and [facility]. Will await bed offers or denials. Case management will continue to follow.    Final Discharge Disposition Code 03 - skilled nursing facility (SNF)               Discharge Codes    No documentation.               Expected Discharge Date and Time     Expected Discharge Date Expected Discharge Time    Mar 24, 2023             Bong Perez RN

## 2023-03-24 LAB
ALBUMIN SERPL-MCNC: 2.5 G/DL (ref 3.5–5.2)
ANION GAP SERPL CALCULATED.3IONS-SCNC: 4 MMOL/L (ref 5–15)
BASOPHILS # BLD AUTO: 0.01 10*3/MM3 (ref 0–0.2)
BASOPHILS NFR BLD AUTO: 0.1 % (ref 0–1.5)
BUN SERPL-MCNC: 20 MG/DL (ref 8–23)
BUN/CREAT SERPL: 48.8 (ref 7–25)
CALCIUM SPEC-SCNC: 7.6 MG/DL (ref 8.6–10.5)
CHLORIDE SERPL-SCNC: 97 MMOL/L (ref 98–107)
CO2 SERPL-SCNC: 30 MMOL/L (ref 22–29)
CREAT SERPL-MCNC: 0.41 MG/DL (ref 0.76–1.27)
DEPRECATED RDW RBC AUTO: 50 FL (ref 37–54)
EGFRCR SERPLBLD CKD-EPI 2021: 117.2 ML/MIN/1.73
EOSINOPHIL # BLD AUTO: 0 10*3/MM3 (ref 0–0.4)
EOSINOPHIL NFR BLD AUTO: 0 % (ref 0.3–6.2)
ERYTHROCYTE [DISTWIDTH] IN BLOOD BY AUTOMATED COUNT: 16.1 % (ref 12.3–15.4)
GLUCOSE SERPL-MCNC: 128 MG/DL (ref 65–99)
HCT VFR BLD AUTO: 34.5 % (ref 37.5–51)
HGB BLD-MCNC: 11.8 G/DL (ref 13–17.7)
IMM GRANULOCYTES # BLD AUTO: 0.11 10*3/MM3 (ref 0–0.05)
IMM GRANULOCYTES NFR BLD AUTO: 0.8 % (ref 0–0.5)
LYMPHOCYTES # BLD AUTO: 0.41 10*3/MM3 (ref 0.7–3.1)
LYMPHOCYTES NFR BLD AUTO: 2.9 % (ref 19.6–45.3)
MCH RBC QN AUTO: 29.7 PG (ref 26.6–33)
MCHC RBC AUTO-ENTMCNC: 34.2 G/DL (ref 31.5–35.7)
MCV RBC AUTO: 86.9 FL (ref 79–97)
MONOCYTES # BLD AUTO: 0.6 10*3/MM3 (ref 0.1–0.9)
MONOCYTES NFR BLD AUTO: 4.3 % (ref 5–12)
NEUTROPHILS NFR BLD AUTO: 12.89 10*3/MM3 (ref 1.7–7)
NEUTROPHILS NFR BLD AUTO: 91.9 % (ref 42.7–76)
NRBC BLD AUTO-RTO: 0 /100 WBC (ref 0–0.2)
PHOSPHATE SERPL-MCNC: 2.1 MG/DL (ref 2.5–4.5)
PHOSPHATE SERPL-MCNC: 2.1 MG/DL (ref 2.5–4.5)
PLATELET # BLD AUTO: 215 10*3/MM3 (ref 140–450)
PMV BLD AUTO: 9.5 FL (ref 6–12)
POTASSIUM SERPL-SCNC: 4.3 MMOL/L (ref 3.5–5.2)
RBC # BLD AUTO: 3.97 10*6/MM3 (ref 4.14–5.8)
SODIUM SERPL-SCNC: 131 MMOL/L (ref 136–145)
WBC NRBC COR # BLD: 14.02 10*3/MM3 (ref 3.4–10.8)

## 2023-03-24 PROCEDURE — 63710000001 DEXAMETHASONE PER 0.25 MG: Performed by: NEUROLOGICAL SURGERY

## 2023-03-24 PROCEDURE — 84100 ASSAY OF PHOSPHORUS: CPT | Performed by: NURSE PRACTITIONER

## 2023-03-24 PROCEDURE — 63710000001 DIPHENHYDRAMINE PER 50 MG

## 2023-03-24 PROCEDURE — 80069 RENAL FUNCTION PANEL: CPT | Performed by: INTERNAL MEDICINE

## 2023-03-24 PROCEDURE — 85025 COMPLETE CBC W/AUTO DIFF WBC: CPT | Performed by: INTERNAL MEDICINE

## 2023-03-24 PROCEDURE — 97116 GAIT TRAINING THERAPY: CPT

## 2023-03-24 PROCEDURE — 25010000002 ENOXAPARIN PER 10 MG: Performed by: NEUROLOGICAL SURGERY

## 2023-03-24 PROCEDURE — 99232 SBSQ HOSP IP/OBS MODERATE 35: CPT | Performed by: INTERNAL MEDICINE

## 2023-03-24 RX ORDER — DIPHENHYDRAMINE HCL 25 MG
25 CAPSULE ORAL ONCE
Status: COMPLETED | OUTPATIENT
Start: 2023-03-24 | End: 2023-03-24

## 2023-03-24 RX ADMIN — LEVETIRACETAM 1000 MG: 500 TABLET, FILM COATED ORAL at 08:03

## 2023-03-24 RX ADMIN — SENNOSIDES AND DOCUSATE SODIUM 2 TABLET: 8.6; 5 TABLET ORAL at 08:04

## 2023-03-24 RX ADMIN — POVIDONE-IODINE 1 EACH: 10 SOLUTION TOPICAL at 08:04

## 2023-03-24 RX ADMIN — DEXAMETHASONE 4 MG: 4 TABLET ORAL at 12:46

## 2023-03-24 RX ADMIN — LEVETIRACETAM 1000 MG: 500 TABLET, FILM COATED ORAL at 21:23

## 2023-03-24 RX ADMIN — DIPHENHYDRAMINE HYDROCHLORIDE 25 MG: 25 CAPSULE ORAL at 21:23

## 2023-03-24 RX ADMIN — ATORVASTATIN CALCIUM 40 MG: 40 TABLET, FILM COATED ORAL at 21:23

## 2023-03-24 RX ADMIN — PANTOPRAZOLE SODIUM 40 MG: 40 TABLET, DELAYED RELEASE ORAL at 05:37

## 2023-03-24 RX ADMIN — DEXAMETHASONE 4 MG: 4 TABLET ORAL at 00:57

## 2023-03-24 RX ADMIN — Medication 10 ML: at 08:04

## 2023-03-24 RX ADMIN — DEXAMETHASONE 4 MG: 4 TABLET ORAL at 05:37

## 2023-03-24 RX ADMIN — DEXAMETHASONE 4 MG: 4 TABLET ORAL at 23:58

## 2023-03-24 RX ADMIN — THIAMINE HCL TAB 100 MG 100 MG: 100 TAB at 08:04

## 2023-03-24 RX ADMIN — SODIUM PHOSPHATE, MONOBASIC, MONOHYDRATE AND SODIUM PHOSPHATE, DIBASIC, ANHYDROUS 15 MMOL: 142; 276 INJECTION, SOLUTION INTRAVENOUS at 21:23

## 2023-03-24 RX ADMIN — Medication 5 MG: at 21:23

## 2023-03-24 RX ADMIN — SODIUM PHOSPHATE, MONOBASIC, MONOHYDRATE AND SODIUM PHOSPHATE, DIBASIC, ANHYDROUS 15 MMOL: 142; 276 INJECTION, SOLUTION INTRAVENOUS at 08:04

## 2023-03-24 RX ADMIN — ENOXAPARIN SODIUM 40 MG: 40 INJECTION SUBCUTANEOUS at 08:04

## 2023-03-24 RX ADMIN — DEXAMETHASONE 4 MG: 4 TABLET ORAL at 17:39

## 2023-03-24 RX ADMIN — LEVOTHYROXINE SODIUM 125 MCG: 125 TABLET ORAL at 05:37

## 2023-03-24 RX ADMIN — Medication 10 ML: at 21:23

## 2023-03-24 RX ADMIN — ACETAMINOPHEN 325MG 650 MG: 325 TABLET ORAL at 08:03

## 2023-03-24 NOTE — PROGRESS NOTES
[facility] Medicine Services  PROGRESS NOTE    Patient Name: Monty Nagel  : 1955  MRN: [id number]    Date of Admission: 3/11/2023  Primary Care Physician: Tiffany Morales MD    Subjective   Subjective     CC: Follow-up meningioma    HPI: No acute events overnight, patient said he rested some has no new complaints this morning    ROS:  Gen- No fevers, chills  CV- No chest pain, palpitations  Resp- No cough, dyspnea  GI- No N/V/D, abd pain      Objective   Objective     Vital Signs:   Temp:  [97.8 °F (36.6 °C)-98.5 °F (36.9 °C)] 97.8 °F (36.6 °C)  Heart Rate:  [71-87] 82  Resp:  [20] 20  BP: (104-136)/(70-97) 132/97     Physical Exam:  Constitutional: No acute distress, awake, alert  HENT: NCAT, mucous membranes moist  Respiratory: Clear to auscultation bilaterally, respiratory effort normal   Cardiovascular: RRR, no murmurs, rubs, or gallops  Gastrointestinal: Positive bowel sounds, soft, nontender, nondistended  Musculoskeletal: No bilateral ankle edema, right great toe lesion  Psychiatric: Appropriate affect, cooperative  Neurologic: Oriented x 3, nonfocal  Skin: No rashes, cranial incision cdi    Results Reviewed:  LAB RESULTS:      Lab 23  0422 23  0412 23  0435 23  0955 23  0421 23  0706   WBC 14.02* 18.31* 16.01*  --  9.20 7.48   HEMOGLOBIN 11.8* 12.7* 12.6*  --  11.4* 11.9*   HEMOGLOBIN, POC  --   --   --  10.5*  --   --    HEMATOCRIT 34.5* 36.9* 35.8*  --  33.5* 34.7*   HEMATOCRIT POC  --   --   --  31*  --   --    PLATELETS 215 169 211  --  200 186   NEUTROS ABS 12.89*  --  14.66*  --   --   --    IMMATURE GRANS (ABS) 0.11*  --  0.15*  --   --   --    LYMPHS ABS 0.41*  --  0.47*  --   --   --    MONOS ABS 0.60  --  0.71  --   --   --    EOS ABS 0.00  --  0.00  --   --   --    MCV 86.9 87.6 85.2  --  87.5 87.6         Lab 23  1839 23  0412 23  0435 23  0421 23  0706   SODIUM  --  130* 132* 132* 132*    POTASSIUM  --  4.7 4.3 4.3 4.7   CHLORIDE  --  94* 96* 97* 97*   CO2  --  28.0 29.0 30.0* 29.0   ANION GAP  --  8.0 7.0 5.0 6.0   BUN  --  25* 26* 24* 22   CREATININE  --  0.64* 0.63* 0.76 0.82   EGFR  --  102.5 103.0 97.3 95.1   GLUCOSE  --  121* 156* 110* 109*   CALCIUM  --  7.5* 7.9* 7.7* 7.9*   IONIZED CALCIUM  --   --  1.16  --   --    MAGNESIUM  --  2.0 2.0 2.0 1.9   PHOSPHORUS 1.9* 2.2* 3.2 3.5  --          Lab 03/23/23  0412   TOTAL PROTEIN 4.8*   ALBUMIN 2.8*   GLOBULIN 2.0   ALT (SGPT) 17   AST (SGOT) 17   BILIRUBIN 0.6   ALK PHOS 52                 Lab 03/21/23  0805   ABO TYPING A   RH TYPING Positive   ANTIBODY SCREEN Negative         Lab 03/21/23  0955   PH, ARTERIAL 7.40     Brief Urine Lab Results  (Last result in the past 365 days)      Color   Clarity   Blood   Leuk Est   Nitrite   Protein   CREAT   Urine HCG        03/11/23 1058 Dark Yellow   Clear   Negative   Negative   Negative   Negative                 Microbiology Results Abnormal     None          No radiology results from the last 24 hrs    Results for orders placed during the hospital encounter of 03/11/23    Adult Transthoracic Echo Complete w/ Color, Spectral and Contrast if necessary per protocol    Interpretation Summary  •  Left ventricular systolic function is normal. Estimated left ventricular EF = 55%  •  Biatrial enlargement.  •  Calcified aortic valve with mild aortic stenosis, mean gradient 10 mmHg, BANG 1.6 cm².  •  Moderate aortic insufficiency.  •  Mild mitral regurgitation.  •  Mild tricuspid regurgitation with normal RVSP.  •  Mild dilation of the ascending aorta (4.2 cm) is present.      Current medications:  Scheduled Meds:atorvastatin, 40 mg, Oral, Nightly  dexamethasone, 4 mg, Oral, Q6H  enoxaparin, 40 mg, Subcutaneous, Daily  levETIRAcetam, 1,000 mg, Oral, Q12H  levothyroxine, 125 mcg, Oral, Daily  pantoprazole, 40 mg, Oral, Q AM  polyethylene glycol, 17 g, Oral, Daily  povidone-iodine, 1 application, Topical,  Daily  senna-docusate sodium, 2 tablet, Oral, BID  sodium chloride, 10 mL, Intravenous, Q12H  thiamine, 100 mg, Oral, Daily      Continuous Infusions:   PRN Meds:.•  acetaminophen **OR** acetaminophen **OR** acetaminophen  •  senna-docusate sodium **AND** polyethylene glycol **AND** bisacodyl **AND** bisacodyl  •  docusate sodium  •  HYDROmorphone **AND** naloxone  •  magnesium hydroxide  •  Magnesium Standard Dose Replacement - Follow Nurse / BPA Driven Protocol  •  melatonin  •  ondansetron **OR** ondansetron  •  Phosphorus Replacement - Follow Nurse / BPA Driven Protocol  •  Potassium Replacement - Follow Nurse / BPA Driven Protocol  •  sodium chloride  •  sodium chloride  •  sodium chloride    Assessment & Plan   Assessment & Plan     Active Hospital Problems    Diagnosis  POA   • **Meningioma (HCC) [D32.9]  Yes   • Rheumatoid vs Psoriatic arthritis (HCC) [L40.50]  Yes   • Atrial fibrillation (HCC) [I48.91]  Yes   • Other recurrent depressive disorders ([facility]) [F33.8]  Yes   • 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        Brief Hospital Course to date:  Monty Nagel is a 67 y.o. male with history of hypertension, retinal melanoma and subsequent right eye blindness, previous alcohol use who was admitted for meningioma, gait instability and general weakness.  Brain MRI showed 6.4 enhancing mass and he was evaluated by neurosurgery.  He underwent right frontal craniotomy on 3/21 as well as right middle meningeal artery embolization with Dr. Mckee in anticipation for tumor resection.  He was transferred from ICU to medical floor on 9/22     Meningioma,'s s/p tumor embolization 3/20 by Dr. Sebastian and resection 3/21 by Dr. Mckee  Generalized weakness, frequent falls secondary to above  -Continue ASA Plavix when okay with  neurosurgery  -Continue Decadron and Keppra  -PT/OT following, plan for rehab     Hypertension  -BP currently stable, holding home lisinopril- HCTZ for now    Hyperlipidemia  -Continue statin    New A-fib  -Rates currently controlled  -Currently on subcu Lovenox     Hypothyroidism  History of thyroid ablation secondary to Graves' disease  - continue Synthroid     Former EtOH, heavy use  -Stopped drinking 9/22     Hyponatremia, not significant  -Monitor while on Keppra, encourage protein intake  -Na+ is low but stable, will continue to monitor for now.      Right great toe lesion  -PT-wound care following  -may need a bx, will discuss with [facility]/    Expected Discharge Location and Transportation: Rehab  Expected Discharge   Expected Discharge Date and Time     Expected Discharge Date Expected Discharge Time    Mar 27, 2023            DVT prophylaxis:  Medical and mechanical DVT prophylaxis orders are present.     AM-PAC 6 Clicks Score (PT): 19 (03/23/23 1127)    CODE STATUS:   Code Status and Medical Interventions:   Ordered at: 03/21/23 1235     Level Of Support Discussed With:    Patient     Code Status (Patient has no pulse and is not breathing):    CPR (Attempt to Resuscitate)     Medical Interventions (Patient has pulse or is breathing):    Full       Fiorella Manzanares MD  03/24/23

## 2023-03-24 NOTE — PLAN OF CARE
Goal Outcome Evaluation:  Plan of Care Reviewed With: patient        Progress: no change  Outcome Evaluation: Pt continues to present with decreased functional mobility and significantly decreased safety awareness. Pt ambulated in hallway with RW and CGA this session. Pt requires consistent cues to improve safety awareness with resistance to education. Continue to progress per pt tolerance. Recommend D/C to IRF d/t pt inability to care for self and continues to be a high fall risk.

## 2023-03-24 NOTE — PLAN OF CARE
Problem: Fall Injury Risk  Goal: Absence of Fall and Fall-Related Injury  Outcome: Ongoing, Progressing  Intervention: Identify and Manage Contributors  Recent Flowsheet Documentation  Taken 3/24/2023 1606 by Christa Jacobsen RN  Self-Care Promotion: independence encouraged  Taken 3/24/2023 1241 by Christa Jacobsen RN  Self-Care Promotion: independence encouraged  Taken 3/24/2023 1002 by Christa Jacobsen RN  Self-Care Promotion: independence encouraged  Taken 3/24/2023 0833 by Christa Jacobsen RN  Medication Review/Management: medications reviewed  Self-Care Promotion: independence encouraged  Intervention: Promote Injury-Free Environment  Recent Flowsheet Documentation  Taken 3/24/2023 1606 by Christa Jacobsen RN  Safety Promotion/Fall Prevention:   activity supervised   assistive device/personal items within reach   safety round/check completed  Taken 3/24/2023 1241 by Christa Jacobsen RN  Safety Promotion/Fall Prevention:   activity supervised   assistive device/personal items within reach   safety round/check completed  Taken 3/24/2023 1002 by Christa Jacobsen RN  Safety Promotion/Fall Prevention:   activity supervised   assistive device/personal items within reach   safety round/check completed  Taken 3/24/2023 0833 by Christa Jacobsen RN  Safety Promotion/Fall Prevention:   activity supervised   assistive device/personal items within reach   clutter free environment maintained   fall prevention program maintained   lighting adjusted   muscle strengthening facilitated   nonskid shoes/slippers when out of bed   room organization consistent   safety round/check completed     Problem: Skin Injury Risk Increased  Goal: Skin Health and Integrity  Outcome: Ongoing, Progressing  Intervention: Optimize Skin Protection  Recent Flowsheet Documentation  Taken 3/24/2023 1606 by Christa Jacobsen RN  Head of Bed (HOB) Positioning: HOB elevated  Taken 3/24/2023 1241 by Christa Jacobsen RN  Head of Bed (HOB) Positioning: HOB  elevated  Taken 3/24/2023 1002 by Christa Jacobsen RN  Head of Bed (HOB) Positioning: HOB elevated  Taken 3/24/2023 0833 by Christa Jacobsen RN  Pressure Reduction Techniques: frequent weight shift encouraged  Head of Bed (HOB) Positioning: HOB elevated  Pressure Reduction Devices: pressure-redistributing mattress utilized  Skin Protection:   adhesive use limited   incontinence pads utilized     Problem: Adult Inpatient Plan of Care  Goal: Plan of Care Review  Outcome: Ongoing, Progressing  Flowsheets (Taken 3/24/2023 1758)  Progress: improving  Plan of Care Reviewed With: patient  Goal: Patient-Specific Goal (Individualized)  Outcome: Ongoing, Progressing  Goal: Absence of Hospital-Acquired Illness or Injury  Outcome: Ongoing, Progressing  Intervention: Identify and Manage Fall Risk  Recent Flowsheet Documentation  Taken 3/24/2023 1606 by Christa Jacobsen RN  Safety Promotion/Fall Prevention:   activity supervised   assistive device/personal items within reach   safety round/check completed  Taken 3/24/2023 1241 by Christa Jacobsen RN  Safety Promotion/Fall Prevention:   activity supervised   assistive device/personal items within reach   safety round/check completed  Taken 3/24/2023 1002 by Christa Jacobsen RN  Safety Promotion/Fall Prevention:   activity supervised   assistive device/personal items within reach   safety round/check completed  Taken 3/24/2023 0833 by Christa Jacobsen RN  Safety Promotion/Fall Prevention:   activity supervised   assistive device/personal items within reach   clutter free environment maintained   fall prevention program maintained   lighting adjusted   muscle strengthening facilitated   nonskid shoes/slippers when out of bed   room organization consistent   safety round/check completed  Intervention: Prevent Skin Injury  Recent Flowsheet Documentation  Taken 3/24/2023 1606 by Christa Jacobsen RN  Body Position:   sitting up in bed   position changed independently  Taken 3/24/2023  1241 by Christa Jacobsen RN  Body Position: position changed independently  Taken 3/24/2023 1002 by Christa Jacobsen RN  Body Position: sitting up in bed  Taken 3/24/2023 0833 by Christa Jacobsen RN  Body Position:   position changed independently   sitting up in bed  Skin Protection:   adhesive use limited   incontinence pads utilized  Intervention: Prevent and Manage VTE (Venous Thromboembolism) Risk  Recent Flowsheet Documentation  Taken 3/24/2023 1606 by Christa Jacobsen RN  Activity Management: activity adjusted per tolerance  Taken 3/24/2023 1241 by Christa Jacobsen RN  Activity Management: activity adjusted per tolerance  VTE Prevention/Management: patient refused intervention  Taken 3/24/2023 1002 by Christa Jacobsen RN  Activity Management: activity adjusted per tolerance  VTE Prevention/Management: patient refused intervention  Taken 3/24/2023 0833 by Christa Jacobsen RN  Activity Management: activity adjusted per tolerance  VTE Prevention/Management:   bilateral   sequential compression devices off   patient refused intervention  Range of Motion: active ROM (range of motion) encouraged  Intervention: Prevent Infection  Recent Flowsheet Documentation  Taken 3/24/2023 1606 by Christa Jacobsen RN  Infection Prevention:   hand hygiene promoted   rest/sleep promoted  Taken 3/24/2023 1241 by Christa Jacobsen RN  Infection Prevention:   hand hygiene promoted   rest/sleep promoted  Taken 3/24/2023 1002 by Christa Jacobsen RN  Infection Prevention:   hand hygiene promoted   rest/sleep promoted  Taken 3/24/2023 0833 by Christa Jacobsen RN  Infection Prevention:   hand hygiene promoted   rest/sleep promoted  Goal: Optimal Comfort and Wellbeing  Outcome: Ongoing, Progressing  Intervention: Provide Person-Centered Care  Recent Flowsheet Documentation  Taken 3/24/2023 1606 by Christa Jacobsen RN  Trust Relationship/Rapport: care explained  Taken 3/24/2023 1400 by Christa Jacobsen RN  Trust Relationship/Rapport: care  explained  Taken 3/24/2023 1241 by Christa Jacobsen RN  Trust Relationship/Rapport: care explained  Taken 3/24/2023 1002 by Christa Jacobsen RN  Trust Relationship/Rapport: care explained  Taken 3/24/2023 0833 by Christa Jacobsen RN  Trust Relationship/Rapport:   care explained   choices provided   questions answered   questions encouraged   reassurance provided  Goal: Readiness for Transition of Care  Outcome: Ongoing, Progressing     Problem: Adjustment to Surgery (Craniotomy/Craniectomy/Cranioplasty)  Goal: Optimal Coping with Surgery  Outcome: Ongoing, Progressing  Intervention: Support Psychosocial Response to Surgery  Recent Flowsheet Documentation  Taken 3/24/2023 0833 by Christa Jacobsen RN  Family/Support System Care:   self-care encouraged   support provided     Problem: Bleeding (Craniotomy/Craniectomy/Cranioplasty)  Goal: Absence of Bleeding  Outcome: Ongoing, Progressing  Intervention: Monitor and Manage Bleeding  Recent Flowsheet Documentation  Taken 3/24/2023 0833 by Christa Jacobsen RN  Bleeding Management: dressing monitored     Problem: Bowel Motility Impaired (Craniotomy/Craniectomy/Cranioplasty)  Goal: Effective Bowel Elimination  Outcome: Ongoing, Progressing     Problem: Cerebral Tissue Perfusion Risk (Craniotomy/Craniectomy/Cranioplasty)  Goal: Optimal Cerebral Tissue Perfusion  Outcome: Ongoing, Progressing     Problem: Fluid and Electrolyte Imbalance (Craniotomy/Craniectomy/Cranioplasty)  Goal: Fluid and Electrolyte Balance  Outcome: Ongoing, Progressing     Problem: Functional Ability Impaired (Craniotomy/Craniectomy/Cranioplasty)  Goal: Optimal Functional Ability  Outcome: Ongoing, Progressing  Intervention: Optimize Functional Ability  Recent Flowsheet Documentation  Taken 3/24/2023 1606 by Christa Jacobsen RN  Activity Management: activity adjusted per tolerance  Self-Care Promotion: independence encouraged  Taken 3/24/2023 1241 by Christa Jacobsen RN  Activity Management: activity  adjusted per tolerance  Self-Care Promotion: independence encouraged  Taken 3/24/2023 1002 by Christa Jacobsen RN  Activity Management: activity adjusted per tolerance  Self-Care Promotion: independence encouraged  Taken 3/24/2023 0833 by Christa Jacobsen RN  Activity Management: activity adjusted per tolerance  Self-Care Promotion: independence encouraged     Problem: Infection (Craniotomy/Craniectomy/Cranioplasty)  Goal: Absence of Infection Signs and Symptoms  Outcome: Ongoing, Progressing     Problem: Ongoing Anesthesia Effects (Craniotomy/Craniectomy/Cranioplasty)  Goal: Anesthesia/Sedation Recovery  Outcome: Ongoing, Progressing     Problem: Pain (Craniotomy/Craniectomy/Cranioplasty)  Goal: Acceptable Pain Control  Outcome: Ongoing, Progressing  Intervention: Prevent or Manage Pain  Recent Flowsheet Documentation  Taken 3/24/2023 0833 by Christa Jacobsen RN  Diversional Activities: television     Problem: Postoperative Nausea and Vomiting (Craniotomy/Craniectomy/Cranioplasty)  Goal: Nausea and Vomiting Relief  Outcome: Ongoing, Progressing     Problem: Postoperative Urinary Retention (Craniotomy/Craniectomy/Cranioplasty)  Goal: Effective Urinary Elimination  Outcome: Ongoing, Progressing     Problem: Respiratory Compromise (Craniotomy/Craniectomy/Cranioplasty)  Goal: Effective Oxygenation and Ventilation  Outcome: Ongoing, Progressing  Intervention: Optimize Oxygenation and Ventilation  Recent Flowsheet Documentation  Taken 3/24/2023 1606 by Christa Jacobsen RN  Head of Bed (HOB) Positioning: HOB elevated  Taken 3/24/2023 1241 by Christa Jacobsen RN  Head of Bed (HOB) Positioning: HOB elevated  Taken 3/24/2023 1002 by Christa Jacobsen RN  Head of Bed (HOB) Positioning: HOB elevated  Taken 3/24/2023 0833 by Christa Jacobsen RN  Head of Bed (HOB) Positioning: HOB elevated   Goal Outcome Evaluation:  Plan of Care Reviewed With: patient      Pt currently in bed resting quietly. No complaints of pain or discomfort  at this time. Incision to head CDI. Pt disoriented at times. Vitals WNL on RA. Electrolyte protocol utilized. No other observations at this time. Will continue to monitor, call bell in reach.  Progress: improving

## 2023-03-24 NOTE — THERAPY TREATMENT NOTE
Patient Name: Monty Nagel  : 1955    MRN: [id number]                              Today's Date: 3/24/2023       Admit Date: 3/11/2023    Visit Dx:     ICD-10-CM ICD-9-CM   1. Generalized weakness  R53.1 780.79   2. History of alcoholism (HCC)  F10.21 V11.3   3. Inability to walk  R26.2 719.7   4. Confusion  R41.0 298.9   5. Hypoglycemia  E16.2 251.2   6. Poor nutrition  E63.9 269.9   7. Cognitive communication deficit  R41.841 799.52   8. Impaired functional mobility, balance, gait, and endurance  Z74.09 V49.89   9. 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   • Postablative hypothyroidism   • Screen for colon cancer   • Other recurrent depressive disorders (HCC)   • Balance problem   • Generalized weakness   • Atrial fibrillation (HCC)   • Severe malnutrition (HCC)   • Meningioma (HCC)   • Rheumatoid vs Psoriatic arthritis (HCC)     Past Medical History:   Diagnosis Date   • Arthritis    • Hepatitis A    • Hypertension    • Melanoma (HCC) 2017    retina     Past Surgical History:   Procedure Laterality Date   • CRANIOTOMY FOR TUMOR N/A 3/21/2023    Procedure: CRANIOTOMY FOR TUMOR STEREOTACTIC WITH STEALTH;  Surgeon: Marlon Mckee MD;  [facility];  Service: Neurosurgery;  Laterality: N/A;   • EMBOLIZATION CEREBRAL N/A 3/20/2023    Procedure: Tumor Emoblization radial access;  Surgeon: Ozzy Sebastian MD;  Location: [facility] INVASIVE LOCATION;  Service: Interventional Radiology;  Laterality: N/A;   • EYE SURGERY  2017    EYE CANCER RIGHT EYE   • FINGER SURGERY Left     Pointer finger re-attached in 3rd Grade   • TONSILLECTOMY        General Information     Row Name 23 1543          Physical Therapy Time and Intention    Document Type therapy note (daily note)  -AE     Mode of Treatment physical therapy  -AE     Row Name 23 1543          General  Information    Patient Profile Reviewed yes  -AE     Existing Precautions/Restrictions fall  impulsive, L sided weakness/inattention, blind R eye baseline  -AE     Barriers to Rehab medically complex;cognitive status;visual deficit  -AE     Row Name 03/24/23 1543          Cognition    Orientation Status (Cognition) oriented x 3  -AE     Row Name 03/24/23 1543          Safety Issues, Functional Mobility    Safety Issues Affecting Function (Mobility) awareness of need for assistance;insight into deficits/self-awareness;safety precaution awareness;safety precautions follow-through/compliance;sequencing abilities;impulsivity  -AE     Impairments Affecting Function (Mobility) balance;cognition;coordination;endurance/activity tolerance;strength;postural/trunk control;motor planning;visual/perceptual;pain  -AE     Cognitive Impairments, Mobility Safety/Performance awareness, need for assistance;safety precaution awareness;safety precaution follow-through;impulsivity;insight into deficits/self-awareness;judgment;problem-solving/reasoning  -AE           User Key  (r) = Recorded By, (t) = Taken By, (c) = Cosigned By    Initials Name Provider Type    AE Andrew Centeno, PT Physical Therapist               Mobility     Row Name 03/24/23 1546          Bed Mobility    Bed Mobility supine-sit;sit-supine;scooting/bridging  -AE     Scooting/Bridging [place] (Bed Mobility) contact guard  -AE     Supine-Sit [place] (Bed Mobility) contact guard  -AE     Sit-Supine [place] (Bed Mobility) contact guard  -AE     Assistive Device (Bed Mobility) head of bed elevated  -AE     Comment, (Bed Mobility) VCs for hand placement and sequencing. Pt required increased cues to reduce impulsivity with all mobility.  -AE     Row Name 03/24/23 1546          Transfers    Comment, (Transfers) VCs for hand placement and sequencing. Pt very impulsive but improved with cues for transfers.  -AE     Row Name 03/24/23 1546          Sit-Stand  "Transfer    Sit-Stand [place] (Transfers) contact guard;1 person assist  -AE     Assistive Device (Sit-Stand Transfers) walker, front-wheeled  -AE     Row Name 03/24/23 1546          Gait/Stairs (Locomotion)    [place] Level (Gait) minimum assist (75% patient effort);1 person assist  -AE     Assistive Device (Gait) walker, front-wheeled  -AE     Distance in Feet (Gait) 150x2  -AE     Deviations/Abnormal Patterns (Gait) bilateral deviations;dima decreased;gait speed decreased;stride length decreased;base of support, wide  -AE     Bilateral Gait Deviations forward flexed posture;heel strike decreased  -AE     Comment, (Gait/Stairs) Pt demo significant impulsiveness while ambulating, requiring consistent cues to improve. Pt is resistant to most education on safety awareness reporting \"I need to go faster\". Pt has decreased balance with all mobility and requires frequent cues to improve positioning within RW and sequencing of AD while ambulating to reduce risk of falls. Further distance limited by fatigue.  -AE           User Key  (r) = Recorded By, (t) = Taken By, (c) = Cosigned By    Initials Name Provider Type    Andrew Mendez PT Physical Therapist               Obj/Interventions     Row Name 03/24/23 1600          Balance    Balance Assessment sitting static balance;sitting dynamic balance;sit to stand dynamic balance;standing static balance;standing dynamic balance  -AE     Static Sitting Balance contact guard  -AE     Dynamic Sitting Balance contact guard  -AE     Position, Sitting Balance unsupported;sitting edge of bed  -AE     Sit to Stand Dynamic Balance contact guard;1-person assist;verbal cues  -AE     Static Standing Balance contact guard  -AE     Dynamic Standing Balance contact guard  -AE     Position/Device Used, Standing Balance supported;walker, front-wheeled  -AE           User Key  (r) = Recorded By, (t) = Taken By, (c) = Cosigned By    Initials Name Provider Type    YULISSA Centeno" RIC Morales Physical Therapist               Goals/Plan    No documentation.                Clinical Impression     Row Name 03/24/23 1603          Pain    Pretreatment Pain Rating 0/10 - no pain  -AE     Posttreatment Pain Rating 0/10 - no pain  -AE     Row Name 03/24/23 1603          Plan of Care Review    Plan of Care Reviewed With patient  -AE     Progress no change  -AE     Outcome Evaluation Pt continues to present with decreased functional mobility and significantly decreased safety awareness. Pt ambulated in hallway with RW and CGA this session. Pt requires consistent cues to improve safety awareness with resistance to education. Continue to progress per pt tolerance. Recommend D/C to IRF d/t pt inability to care for self and continues to be a high fall risk.  -AE     Row Name 03/24/23 1603          Vital Signs    Pre Systolic BP Rehab 122  -AE     Pre Treatment Diastolic BP 91  -AE     Pretreatment Heart Rate (beats/min) 76  -AE     Posttreatment Heart Rate (beats/min) 77  -AE     Pre SpO2 (%) 95  -AE     O2 Delivery Pre Treatment room air  -AE     O2 Delivery Intra Treatment room air  -AE     Post SpO2 (%) 97  -AE     O2 Delivery Post Treatment room air  -AE     Pre Patient Position Supine  -AE     Intra Patient Position Standing  -AE     Post Patient Position Supine  -AE     Row Name 03/24/23 1603          Positioning and Restraints    Pre-Treatment Position in bed  -AE     Post Treatment Position bed  -AE     In Bed notified nsg;fowlers;call light within reach;encouraged to call for assist;exit alarm on;side rails up x2  -AE           User Key  (r) = Recorded By, (t) = Taken By, (c) = Cosigned By    Initials Name Provider Type    AE Andrew Centeno PT Physical Therapist               Outcome Measures     Row Name 03/24/23 1606          How much help from another person do you currently need...    Turning from your back to your side while in flat bed without using bedrails? 4  -AE     Moving from lying  on back to sitting on the side of a flat bed without bedrails? 3  -AE     Moving to and from a bed to a chair (including a wheelchair)? 3  -AE     Standing up from a chair using your arms (e.g., wheelchair, bedside chair)? 3  -AE     Climbing 3-5 steps with a railing? 2  -AE     To walk in hospital room? 3  -AE     AM-PAC 6 Clicks Score (PT) 18  -AE     Highest level of mobility 6 --> Walked 10 steps or more  -AE     Row Name 03/24/23 1606          Functional Assessment    Outcome Measure Options AM-PAC 6 Clicks Basic Mobility (PT)  -AE           User Key  (r) = Recorded By, (t) = Taken By, (c) = Cosigned By    Initials Name Provider Type    AE Andrew Centeno, PT Physical Therapist                             Physical Therapy Education     Title: PT OT SLP Therapies (In Progress)     Topic: Physical Therapy (In Progress)     Point: Mobility training (In Progress)     Learning Progress Summary           Patient Acceptance, E, NR by AE at 3/24/2023 1520    Acceptance, E,D, VU,NR by HP at 3/23/2023 1127    Acceptance, E,TB, NR by AY at 3/22/2023 1141    Acceptance, E, VU,NR by CM at 3/19/2023 1149    Acceptance, E, VU by CM at 3/17/2023 1517    Acceptance, E, VU,NR by LH at 3/15/2023 1407    Acceptance, E, NR by KG at 3/14/2023 1428    Acceptance, E, VU,NR by SJ at 3/12/2023 1542                   Point: Home exercise program (In Progress)     Learning Progress Summary           Patient Acceptance, E, NR by AE at 3/24/2023 1520    Acceptance, E,D, VU,NR by HP at 3/23/2023 1127    Acceptance, E,TB, NR by AY at 3/22/2023 1141    Acceptance, E, VU,NR by CM at 3/19/2023 1149    Acceptance, E, VU,NR by LH at 3/15/2023 1407    Acceptance, E, NR by KG at 3/14/2023 1428                   Point: Body mechanics (In Progress)     Learning Progress Summary           Patient Acceptance, E, NR by AE at 3/24/2023 1520    Acceptance, E,D, VU,NR by HP at 3/23/2023 1127    Acceptance, E,TB, NR by AY at 3/22/2023 1141    Acceptance, E,  VU,NR by CM at 3/19/2023 1149    Acceptance, E, VU by CM at 3/17/2023 1517    Acceptance, E, VU,NR by LH at 3/15/2023 1407    Acceptance, E, NR by KG at 3/14/2023 1428    Acceptance, E, VU,NR by SJ at 3/12/2023 1542                   Point: Precautions (In Progress)     Learning Progress Summary           Patient Acceptance, E, NR by AE at 3/24/2023 1520    Acceptance, E,D, VU,NR by HP at 3/23/2023 1127    Acceptance, E,TB, NR by AY at 3/22/2023 1141    Acceptance, E, VU,NR by CM at 3/19/2023 1149    Acceptance, E, VU by CM at 3/17/2023 1517    Acceptance, E, VU,NR by LH at 3/15/2023 1407    Acceptance, E, NR by KG at 3/14/2023 1428    Acceptance, E, VU,NR by SJ at 3/12/2023 1542                               User Key     Initials Effective Dates Name Provider Type Discipline     02/03/23 - 03/12/23 Samara Harrison, PT Physical Therapist PT    KG 05/22/20 -  Cherelle Gaitan, PT Physical Therapist PT    AY 11/10/20 -  Sandee Spencer, PT Physical Therapist PT    LH 09/21/21 -  Tristian Ravi, PT Physical Therapist PT    HP 06/01/21 -  Kelly Mckeon, PT Physical Therapist PT    AE 09/21/21 -  Andrew Centeno, PT Physical Therapist PT    CM 09/22/22 -  Yanet Calvin, PT Physical Therapist PT              PT Recommendation and Plan     Plan of Care Reviewed With: patient  Progress: no change  Outcome Evaluation: Pt continues to present with decreased functional mobility and significantly decreased safety awareness. Pt ambulated in hallway with RW and CGA this session. Pt requires consistent cues to improve safety awareness with resistance to education. Continue to progress per pt tolerance. Recommend D/C to IRF d/t pt inability to care for self and continues to be a high fall risk.     Time Calculation:    PT Charges     Row Name 03/24/23 1606             Time Calculation    Start Time 1520  -AE      PT Received On 03/24/23  -AE      PT Goal Re-Cert Due Date 04/01/23  -AE         Time Calculation- PT     Total Timed Code Minutes- PT 15 minute(s)  -AE         Timed Charges    63356 - Gait Training Minutes  15  -AE         Total Minutes    Timed Charges Total Minutes 15  -AE       Total Minutes 15  -AE            User Key  (r) = Recorded By, (t) = Taken By, (c) = Cosigned By    Initials Name Provider Type    AE Andrew Centeno, RIC Physical Therapist              Therapy Charges for Today     Code Description Service Date Service Provider Modifiers Qty    74850418914 HC GAIT TRAINING EA 15 MIN 3/24/2023 Andrew Centeno, PT GP 1    66021144835 HC PT THER SUPP EA 15 MIN 3/24/2023 Andrew Centeno, PT GP 1          PT G-Codes  Outcome Measure Options: AM-PAC 6 Clicks Basic Mobility (PT)  AM-PAC 6 Clicks Score (PT): 18  AM-PAC 6 Clicks Score (OT): 14  PT Discharge Summary  Anticipated Discharge Disposition (PT): inpatient rehabilitation facility    Andrew Centeno PT  3/24/2023

## 2023-03-24 NOTE — CASE MANAGEMENT/SOCIAL WORK
Continued Stay Note  [facility]     Patient Name: Monty Nagel  MRN: [id number]  Today's Date: 3/24/2023    Admit Date: 3/11/2023    Plan: Rehab   Discharge Plan     Row Name 03/24/23 1435       Plan    Plan Rehab    Patient/Family in Agreement with Plan yes    Plan Comments I have sent out multiple referrals for Mr. Nagel. I have followed up with and left voicemails with [facility] [place], [facility], [facility], and [facility]. Will continue to wait a bed offer. Case management will continue to follow.    Final Discharge Disposition Code 03 - skilled nursing facility (SNF)               Discharge Codes    No documentation.               Expected Discharge Date and Time     Expected Discharge Date Expected Discharge Time    Mar 27, 2023             Bong Perez RN

## 2023-03-24 NOTE — PLAN OF CARE
Problem: Fall Injury Risk  Goal: Absence of Fall and Fall-Related Injury  Outcome: Ongoing, Progressing  Intervention: Identify and Manage Contributors  Description: Develop a fall prevention plan with the patient and caregiver/family.  Provide reorientation, appropriate sensory stimulation and routines with changes in mental status to decrease risk of fall.  Promote use of personal vision and auditory aids.  Assess assistance level required for safe and effective self-care; provide support as needed, such as toileting, mobilization. For age 65 and older, implement timed toileting with assistance.  Encourage physical activity, such as performance of mobility and self-care at highest level of patient ability, multicomponent exercise program and provision of appropriate assistive devices.  If fall occurs, assess the severity of injury; implement fall injury protocol. Determine the cause and revise fall injury prevention plan.  Regularly review medication contribution to fall risk; adjust medication administration times to minimize risk of falling.  Consider risk related to polypharmacy and age.  Balance adequate pain management with potential for oversedation.  Intervention: Promote Injury-Free Environment  Description: Provide a safe, barrier-free environment that encourages independent activity.  Keep care area uncluttered and well-lighted.  Determine need for increased observation or monitoring.  Avoid use of devices that minimize mobility, such as restraints or indwelling urinary catheter.     Problem: Skin Injury Risk Increased  Goal: Skin Health and Integrity  Outcome: Ongoing, Progressing  Intervention: Optimize Skin Protection  Description: Perform a full pressure injury risk assessment, as indicated by screening, upon admission to care unit.  Reassess skin (injury risk, full inspection) frequently (e.g., scheduled interval, with change in condition) to provide optimal early detection and  prevention.  Maintain adequate tissue perfusion (e.g., encourage fluid balance; avoid crossing legs, constrictive clothing or devices) to promote tissue oxygenation.  Maintain head of bed at lowest degree of elevation tolerated, considering medical condition and other restrictions.  Avoid positioning onto an area that remains reddened.  Minimize incontinence and moisture (e.g., toileting schedule; moisture-wicking pad, diaper or incontinence collection device; skin moisture barrier).  Cleanse skin promptly and gently when soiled utilizing a pH-balanced cleanser.  Relieve and redistribute pressure (e.g., scheduled position changes, weight shifts, use of support surface, medical device repositioning, protective dressing application, use of positioning device, microclimate control, use of pressure-injury-monitor  Encourage increased activity, such as sitting in a chair at the bedside or early mobilization, when able to tolerate.   Goal Outcome Evaluation:

## 2023-03-24 NOTE — PAYOR COMM NOTE
"Ashwini Centeno RN  Utilization Management  P:[phone]  F:[phone]    Auth# [id number]  Jonathan Barrera (67 y.o. Male)     Date of Birth   1955    Social Security Number       Address   [address] [place] [address]    Home Phone   [phone]    MRN   [id number]       Catholic   None    Marital Status   Single                            Admission Date   3/11/23    Admission Type   Emergency    Admitting Provider   Marlon Mckee MD    Attending Provider   Fiorella Manzanares MD    Department, Room/Bed   [facility] 3H, S371/1       Discharge Date       Discharge Disposition       Discharge Destination                               Attending Provider: Fiorella Manzanares MD    Allergies: No Known Allergies    Isolation: None   Infection: None   Code Status: CPR    Ht: 180.3 cm (71\")   Wt: 93.4 kg (206 lb)    Admission Cmt: None   Principal Problem: Meningioma (HCC) [D32.9]                 Active Insurance as of 3/11/2023     Primary Coverage     Payor Plan Insurance Group Employer/Plan Group    ANTHEM MEDICARE REPLACEMENT ANTHEM MEDICARE ADVANTAGE [id number]     Payor Plan Address Payor Plan Phone Number Payor Plan Fax Number Effective Dates    [address] [phone]  1/1/2020 - None Entered    [facility] [id number]       Subscriber Name Subscriber Birth Date Member ID       JONATHAN BARRERA 1955 [id number]                 Emergency Contacts      (Rel.) Home Phone Work Phone Mobile Phone    Marva Ku (Sister) [phone] -- [phone]              Current Facility-Administered Medications   Medication Dose Route Frequency Provider Last Rate Last Admin   • acetaminophen (TYLENOL) tablet 650 mg  650 mg Oral Q4H PRN Marlon Mckee MD   650 mg at 03/24/23 0803    Or   • acetaminophen (TYLENOL) 160 MG/5ML solution 650 mg  650 mg Oral Q4H PRN Marlon Mckee MD        Or   • acetaminophen (TYLENOL) suppository 650 mg  650 mg Rectal " Q4H PRN Marlon Mckee MD       • atorvastatin (LIPITOR) tablet 40 mg  40 mg Oral Nightly Marlon Mckee MD   40 mg at 03/23/23 2028   • sennosides-docusate (PERICOLACE) 8.6-50 MG per tablet 2 tablet  2 tablet Oral BID Marlon Mckee MD   2 tablet at 03/24/23 0804    And   • polyethylene glycol (MIRALAX) packet 17 g  17 g Oral Daily PRN Marlon Mckee MD        And   • bisacodyl (DULCOLAX) EC tablet 5 mg  5 mg Oral Daily PRN Marlon Mckee MD   5 mg at 03/22/23 2127    And   • bisacodyl (DULCOLAX) suppository 10 mg  10 mg Rectal Daily PRN Marlon Mckee MD       • dexamethasone (DECADRON) tablet 4 mg  4 mg Oral Q6H Marlon Mckee MD   4 mg at 03/24/23 0537   • docusate sodium (COLACE) capsule 100 mg  100 mg Oral BID PRN Marlon Mckee MD       • Enoxaparin Sodium (LOVENOX) syringe 40 mg  40 mg Subcutaneous Daily Marlon Mckee MD   40 mg at 03/24/23 0804   • HYDROmorphone (DILAUDID) injection 0.5 mg  0.5 mg Intravenous Q2H PRN Marlon Mckee MD   0.5 mg at 03/22/23 0842    And   • naloxone (NARCAN) injection 0.4 mg  0.4 mg Intravenous Q5 Min PRN Marlon Mckee MD       • levETIRAcetam (KEPPRA) tablet 1,000 mg  1,000 mg Oral Q12H Marlon Mckee MD   1,000 mg at 03/24/23 0803   • levothyroxine (SYNTHROID, LEVOTHROID) tablet 125 mcg  125 mcg Oral Daily Marlon Mckee MD   125 mcg at 03/24/23 0537   • magnesium hydroxide (MILK OF MAGNESIA) suspension 10 mL  10 mL Oral Daily PRN Marlon Mckee MD       • Magnesium Standard Dose Replacement - Initiate Nurse / BPA Driven Protocol   Does not apply PRN Marlon Mckee MD       • melatonin tablet 5 mg  5 mg Oral Nightly PRN Marlon Mckee MD   5 mg at 03/16/23 2009   • ondansetron (ZOFRAN) tablet 4 mg  4 mg Oral Q6H PRN Marlon Mckee MD        Or   • ondansetron (ZOFRAN) injection 4 mg  4 mg  Intravenous Q6H PRN Marlon Mckee MD       • pantoprazole (PROTONIX) EC tablet 40 mg  40 mg Oral Q AM Marlon Mckee MD   40 mg at 03/24/23 0537   • Phosphorus Replacement - Initiate Nurse / BPA Driven Protocol   Does not apply PRN Marlon Mckee MD       • polyethylene glycol (MIRALAX) packet 17 g  17 g Oral Daily Marlon Mckee MD   17 g at 03/23/23 0829   • Potassium Replacement - Initiate Nurse / BPA Driven Protocol   Does not apply PRN Marlon Mckee MD       • povidone-iodine 10 % swab 1 each  1 application Topical Daily Adelina Lama, DO   1 each at 03/24/23 0804   • sodium chloride 0.9 % flush 10 mL  10 mL Intravenous PRN Marlon Mckee MD       • sodium chloride 0.9 % flush 10 mL  10 mL Intravenous Q12H Marlon Mckee MD   10 mL at 03/24/23 0804   • sodium chloride 0.9 % flush 10 mL  10 mL Intravenous PRN Marlon Mckee MD       • sodium chloride 0.9 % infusion 40 mL  40 mL Intravenous PRN Marlon Mckee MD       • thiamine (VITAMIN B-1) tablet 100 mg  100 mg Oral Daily Marlon Mckee MD   100 mg at 03/24/23 0804     Lab Results (last 48 hours)     Procedure Component Value Units Date/Time    Renal Function Panel [[id number]]  (Abnormal) Collected: 03/24/23 0422    Specimen: Blood Updated: 03/24/23 0646     Glucose 128 mg/dL      BUN 20 mg/dL      Creatinine 0.41 mg/dL      Sodium 131 mmol/L      Potassium 4.3 mmol/L      Chloride 97 mmol/L      CO2 30.0 mmol/L      Calcium 7.6 mg/dL      Albumin 2.5 g/dL      Phosphorus 2.1 mg/dL      Anion Gap 4.0 mmol/L      BUN/Creatinine Ratio 48.8     eGFR 117.2 mL/min/1.73     Narrative:      GFR Normal >60  Chronic Kidney Disease <60  Kidney Failure <15      CBC & Differential [[id number]]  (Abnormal) Collected: 03/24/23 0422    Specimen: Blood Updated: 03/24/23 0612    Narrative:      The following orders were created for panel order CBC &  Differential.  Procedure                               Abnormality         Status                     ---------                               -----------         ------                     CBC Auto Differential[[id number]]        Abnormal            Final result                 Please view results for these tests on the individual orders.    CBC Auto Differential [[id number]]  (Abnormal) Collected: 03/24/23 0422    Specimen: Blood Updated: 03/24/23 0612     WBC 14.02 10*3/mm3      RBC 3.97 10*6/mm3      Hemoglobin 11.8 g/dL      Hematocrit 34.5 %      MCV 86.9 fL      MCH 29.7 pg      MCHC 34.2 g/dL      RDW 16.1 %      RDW-SD 50.0 fl      MPV 9.5 fL      Platelets 215 10*3/mm3      Neutrophil % 91.9 %      Lymphocyte % 2.9 %      Monocyte % 4.3 %      Eosinophil % 0.0 %      Basophil % 0.1 %      Immature Grans % 0.8 %      Neutrophils, Absolute 12.89 10*3/mm3      Lymphocytes, Absolute 0.41 10*3/mm3      Monocytes, Absolute 0.60 10*3/mm3      Eosinophils, Absolute 0.00 10*3/mm3      Basophils, Absolute 0.01 10*3/mm3      Immature Grans, Absolute 0.11 10*3/mm3      nRBC 0.0 /100 WBC     Phosphorus [[id number]]  (Abnormal) Collected: 03/23/23 1839    Specimen: Blood Updated: 03/23/23 1921     Phosphorus 1.9 mg/dL     Tissue Pathology Exam [[id number]] Collected: 03/21/23 0947    Specimen: Tissue from Brain, Tissue from Brain Updated: 03/23/23 1328     Case Report --     Surgical Pathology Report                         Case: [id number]                                  Authorizing Provider:  Marlon Mckee,  Collected:           03/21/2023 09:47 AM                                 MD                                                                           Ordering Location:     [facility]   Received:            03/21/2023 11:32 AM                                 OR                                                                           Pathologist:           Aurea Macario DO                   "                                       Specimens:   1) - Brain, Brain tumor for permanent                                                               2) - Brain, brain tumor for permanent from sonopet                                          Clinical Information --     67 year old male with brain mass.    MRI Brain w/wo contrast (03/11/2023):  \"6.4 cm homogeneous enhancing mass that appears to be extra-axial along right frontal convexity, favored to represent meningioma. This mass results in significant mass effect and vasogenic edema in the right frontal lobe with 1.3 cm right to left midline shift.\"       Final Diagnosis --     BRAIN, RIGHT FRONTAL LOBE, RESECTION OF MASS AND SONOPET CONTENTS (1, 2):  Atypical meningioma (CNS WHO grade 2)  See comment         Comment --     Histologic sections show a meningioma with unequivocal invasion into the surrounding brain parenchyma, a finding which meets criteria for an atypical meningioma (CNS WHO grade 2).        Gross Description --     1. Brain.  Received in formalin labeled brain tumor is a 6.2 x 5 x 3.4 cm, 51 g unoriented portion of soft, red-white tissue with an attached 5 x 3.5 cm portion of dura mater.  Attached knotted blue suture material is present that has no designation.  Sectioning reveals soft, tan cut surfaces.  Representative sections are submitted in blocks 1A-1F.    2. Brain.  Received in formalin in a clear plastic suction trap labeled brain tumor from sonopet is an 8 x 8 x 0.5 cm aggregate of fragmented, white, foamy tissue.  Representative sections are submitted in blocks 2A-2C.  P         Microscopic Description --     The slides are reviewed and demonstrate histopathologic features supporting the above rendered diagnosis.        Phosphorus [[id number]]  (Abnormal) Collected: 03/23/23 0412    Specimen: Blood Updated: 03/23/23 0657     Phosphorus 2.2 mg/dL     Comprehensive Metabolic Panel [[id number]]  (Abnormal) Collected: 03/23/23 0412    " Specimen: Blood Updated: 2353     Glucose 121 mg/dL      BUN 25 mg/dL      Creatinine 0.64 mg/dL      Sodium 130 mmol/L      Potassium 4.7 mmol/L      Chloride 94 mmol/L      CO2 28.0 mmol/L      Calcium 7.5 mg/dL      Total Protein 4.8 g/dL      Albumin 2.8 g/dL      ALT (SGPT) 17 U/L      AST (SGOT) 17 U/L      Alkaline Phosphatase 52 U/L      Total Bilirubin 0.6 mg/dL      Globulin 2.0 gm/dL      Comment: Calculated Result        A/G Ratio 1.4 g/dL      BUN/Creatinine Ratio 39.1     Anion Gap 8.0 mmol/L      eGFR 102.5 mL/min/1.73     Narrative:      GFR Normal >60  Chronic Kidney Disease <60  Kidney Failure <15      Magnesium [[id number]]  (Normal) Collected: 23    Specimen: Blood Updated: 23     Magnesium 2.0 mg/dL     CBC (No Diff) [[id number]]  (Abnormal) Collected: 23    Specimen: Blood Updated: 23     WBC 18.31 10*3/mm3      RBC 4.21 10*6/mm3      Hemoglobin 12.7 g/dL      Hematocrit 36.9 %      MCV 87.6 fL      MCH 30.2 pg      MCHC 34.4 g/dL      RDW 16.1 %      RDW-SD 49.8 fl      MPV 9.7 fL      Platelets 169 10*3/mm3              Physician Progress Notes (last 48 hours)      Fiorella Manzanares MD at 23 0640              [facility]  PROGRESS NOTE    Patient Name: Monty Nagel  : 1955  MRN: [id number]    Date of Admission: 3/11/2023  Primary Care Physician: Tiffany Morales MD    Subjective   Subjective     CC: Follow-up meningioma    HPI: No acute events overnight, patient said he rested some has no new complaints this morning    ROS:  Gen- No fevers, chills  CV- No chest pain, palpitations  Resp- No cough, dyspnea  GI- No N/V/D, abd pain      Objective   Objective     Vital Signs:   Temp:  [97.8 °F (36.6 °C)-98.5 °F (36.9 °C)] 97.8 °F (36.6 °C)  Heart Rate:  [71-87] 82  Resp:  [20] 20  BP: (104-136)/(70-97) 132/97     Physical Exam:  Constitutional: No acute distress, awake, alert  HENT: NCAT, mucous  membranes moist  Respiratory: Clear to auscultation bilaterally, respiratory effort normal   Cardiovascular: RRR, no murmurs, rubs, or gallops  Gastrointestinal: Positive bowel sounds, soft, nontender, nondistended  Musculoskeletal: No bilateral ankle edema, right great toe lesion  Psychiatric: Appropriate affect, cooperative  Neurologic: Oriented x 3, nonfocal  Skin: No rashes, cranial incision cdi    Results Reviewed:  LAB RESULTS:      Lab 03/24/23  0422 03/23/23 0412 03/22/23  0435 03/21/23  0955 03/21/23  0421 03/19/23  0706   WBC 14.02* 18.31* 16.01*  --  9.20 7.48   HEMOGLOBIN 11.8* 12.7* 12.6*  --  11.4* 11.9*   HEMOGLOBIN, POC  --   --   --  10.5*  --   --    HEMATOCRIT 34.5* 36.9* 35.8*  --  33.5* 34.7*   HEMATOCRIT POC  --   --   --  31*  --   --    PLATELETS 215 169 211  --  200 186   NEUTROS ABS 12.89*  --  14.66*  --   --   --    IMMATURE GRANS (ABS) 0.11*  --  0.15*  --   --   --    LYMPHS ABS 0.41*  --  0.47*  --   --   --    MONOS ABS 0.60  --  0.71  --   --   --    EOS ABS 0.00  --  0.00  --   --   --    MCV 86.9 87.6 85.2  --  87.5 87.6         Lab 03/23/23  1839 03/23/23 0412 03/22/23  0435 03/21/23  0421 03/19/23  0706   SODIUM  --  130* 132* 132* 132*   POTASSIUM  --  4.7 4.3 4.3 4.7   CHLORIDE  --  94* 96* 97* 97*   CO2  --  28.0 29.0 30.0* 29.0   ANION GAP  --  8.0 7.0 5.0 6.0   BUN  --  25* 26* 24* 22   CREATININE  --  0.64* 0.63* 0.76 0.82   EGFR  --  102.5 103.0 97.3 95.1   GLUCOSE  --  121* 156* 110* 109*   CALCIUM  --  7.5* 7.9* 7.7* 7.9*   IONIZED CALCIUM  --   --  1.16  --   --    MAGNESIUM  --  2.0 2.0 2.0 1.9   PHOSPHORUS 1.9* 2.2* 3.2 3.5  --          Lab 03/23/23  0412   TOTAL PROTEIN 4.8*   ALBUMIN 2.8*   GLOBULIN 2.0   ALT (SGPT) 17   AST (SGOT) 17   BILIRUBIN 0.6   ALK PHOS 52                 Lab 03/21/23  0805   ABO TYPING A   RH TYPING Positive   ANTIBODY SCREEN Negative         Lab 03/21/23  0955   PH, ARTERIAL 7.40     Brief Urine Lab Results  (Last result in the past 365  days)      Color   Clarity   Blood   Leuk Est   Nitrite   Protein   CREAT   Urine HCG        03/11/23 1058 Dark Yellow   Clear   Negative   Negative   Negative   Negative                 Microbiology Results Abnormal     None          No radiology results from the last 24 hrs    Results for orders placed during the hospital encounter of 03/11/23    Adult Transthoracic Echo Complete w/ Color, Spectral and Contrast if necessary per protocol    Interpretation Summary  •  Left ventricular systolic function is normal. Estimated left ventricular EF = 55%  •  Biatrial enlargement.  •  Calcified aortic valve with mild aortic stenosis, mean gradient 10 mmHg, BANG 1.6 cm².  •  Moderate aortic insufficiency.  •  Mild mitral regurgitation.  •  Mild tricuspid regurgitation with normal RVSP.  •  Mild dilation of the ascending aorta (4.2 cm) is present.      Current medications:  Scheduled Meds:atorvastatin, 40 mg, Oral, Nightly  dexamethasone, 4 mg, Oral, Q6H  enoxaparin, 40 mg, Subcutaneous, Daily  levETIRAcetam, 1,000 mg, Oral, Q12H  levothyroxine, 125 mcg, Oral, Daily  pantoprazole, 40 mg, Oral, Q AM  polyethylene glycol, 17 g, Oral, Daily  povidone-iodine, 1 application, Topical, Daily  senna-docusate sodium, 2 tablet, Oral, BID  sodium chloride, 10 mL, Intravenous, Q12H  thiamine, 100 mg, Oral, Daily      Continuous Infusions:   PRN Meds:.•  acetaminophen **OR** acetaminophen **OR** acetaminophen  •  senna-docusate sodium **AND** polyethylene glycol **AND** bisacodyl **AND** bisacodyl  •  docusate sodium  •  HYDROmorphone **AND** naloxone  •  magnesium hydroxide  •  Magnesium Standard Dose Replacement - Follow Nurse / BPA Driven Protocol  •  melatonin  •  ondansetron **OR** ondansetron  •  Phosphorus Replacement - Follow Nurse / BPA Driven Protocol  •  Potassium Replacement - Follow Nurse / BPA Driven Protocol  •  sodium chloride  •  sodium chloride  •  sodium chloride    Assessment & Plan   Assessment & Plan     Active  Hospital Problems    Diagnosis  POA   • **Meningioma (HCC) [D32.9]  Yes   • Rheumatoid vs Psoriatic arthritis (HCC) [L40.50]  Yes   • Atrial fibrillation (HCC) [I48.91]  Yes   • Other recurrent depressive disorders (HCC) [F33.8]  Yes   • 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        Brief Hospital Course to date:  Monty Nagel is a 67 y.o. 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  Brain MRI showed 6.4 enhancing mass and he was evaluated by neurosurgery.  He underwent right frontal craniotomy on 3/21 as well as right middle meningeal artery embolization with Dr. Mckee in anticipation for tumor resection.  He was transferred from ICU to medical floor on 9/22     Meningioma,'s s/p tumor embolization 3/20 by Dr. Sebastian and resection 3/21 by Dr. Mckee  Generalized weakness, frequent falls secondary to above  -Continue ASA Plavix when okay with neurosurgery  -Continue Decadron and Keppra  -PT/OT following, plan for rehab     Hypertension  -BP currently stable, holding home lisinopril- HCTZ for now    Hyperlipidemia  -Continue statin    New A-fib  -Rates currently controlled  -Currently on subcu Lovenox     Hypothyroidism  History of thyroid ablation secondary to Graves' disease  - continue Synthroid     Former EtOH, heavy use  -Stopped drinking 9/22     Hyponatremia, not significant  -Monitor while on Keppra, encourage protein intake  -Na+ is low but stable, will continue to monitor for now.      Right great toe lesion  -PT-wound care following  -may need a bx, will discuss with [facility]/    Expected Discharge Location and Transportation: Rehab  Expected Discharge   Expected Discharge Date  and Time     Expected Discharge Date Expected Discharge Time    Mar 27, 2023            DVT prophylaxis:  Medical and mechanical DVT prophylaxis orders are present.     AM-PAC 6 Clicks Score (PT): 19 (23 1127)    CODE STATUS:   Code Status and Medical Interventions:   Ordered at: 23 1235     Level Of Support Discussed With:    Patient     Code Status (Patient has no pulse and is not breathing):    CPR (Attempt to Resuscitate)     Medical Interventions (Patient has pulse or is breathing):    Full       Fiorella Manzanares MD  23        Electronically signed by Fiorella Manzanares MD at 23 1117     Adelina Lama DO at 23 0722              [facility] Medicine Services  PROGRESS NOTE    Patient Name: Monty Nagel  : 1955  MRN: [id number]    Date of Admission: 3/11/2023  Primary Care Physician: Tiffany Morales MD    Subjective   Subjective     CC:  ICU transfer, meningioma    HPI:  No specific complaints, still having abdominal discomfort but better    ROS:  Gen- No fevers, chills  CV- No chest pain, palpitations  Resp- No cough, dyspnea        Objective   Objective     Vital Signs:   Temp:  [97.3 °F (36.3 °C)-98.5 °F (36.9 °C)] 98.3 °F (36.8 °C)  Heart Rate:  [72-88] 87  Resp:  [16-20] 20  BP: (107-135)/() 107/76     Physical Exam:  Constitutional: No acute distress, awake  HENT: Right frontal skin incision,  mucous membranes moist  Respiratory: Clear to auscultation bilaterally, respiratory effort normal   Cardiovascular: RRR, no murmurs, rubs, or gallops  Gastrointestinal: Positive bowel sounds, soft, nontender, nondistended  Musculoskeletal: No bilateral ankle edema  Psychiatric: Odd affect, cooperative  Neurologic: Oriented x 3, speech clear  Skin: No rashes      Results Reviewed:  LAB RESULTS:      Lab 23  0412 23  0435 23  0955 23  0421 23  0706   WBC 18.31* 16.01*  --  9.20 7.48   HEMOGLOBIN 12.7* 12.6*  --  11.4*  11.9*   HEMOGLOBIN, POC  --   --  10.5*  --   --    HEMATOCRIT 36.9* 35.8*  --  33.5* 34.7*   HEMATOCRIT POC  --   --  31*  --   --    PLATELETS 169 211  --  200 186   NEUTROS ABS  --  14.66*  --   --   --    IMMATURE GRANS (ABS)  --  0.15*  --   --   --    LYMPHS ABS  --  0.47*  --   --   --    MONOS ABS  --  0.71  --   --   --    EOS ABS  --  0.00  --   --   --    MCV 87.6 85.2  --  87.5 87.6         Lab 03/23/23  0412 03/22/23  0435 03/21/23  0421 03/19/23  0706   SODIUM 130* 132* 132* 132*   POTASSIUM 4.7 4.3 4.3 4.7   CHLORIDE 94* 96* 97* 97*   CO2 28.0 29.0 30.0* 29.0   ANION GAP 8.0 7.0 5.0 6.0   BUN 25* 26* 24* 22   CREATININE 0.64* 0.63* 0.76 0.82   EGFR 102.5 103.0 97.3 95.1   GLUCOSE 121* 156* 110* 109*   CALCIUM 7.5* 7.9* 7.7* 7.9*   IONIZED CALCIUM  --  1.16  --   --    MAGNESIUM 2.0 2.0 2.0 1.9   PHOSPHORUS 2.2* 3.2 3.5  --          Lab 03/23/23  0412   TOTAL PROTEIN 4.8*   ALBUMIN 2.8*   GLOBULIN 2.0   ALT (SGPT) 17   AST (SGOT) 17   BILIRUBIN 0.6   ALK PHOS 52                 Lab 03/21/23  0805   ABO TYPING A   RH TYPING Positive   ANTIBODY SCREEN Negative         Lab 03/21/23  0955   PH, ARTERIAL 7.40     Brief Urine Lab Results  (Last result in the past 365 days)      Color   Clarity   Blood   Leuk Est   Nitrite   Protein   CREAT   Urine HCG        03/11/23 1058 Dark Yellow   Clear   Negative   Negative   Negative   Negative                 Microbiology Results Abnormal     None          CT Head Without Contrast    Result Date: 3/21/2023  CT HEAD WO CONTRAST Date of Exam: 3/21/2023 2:24 PM EDT Indication: Post Op Craniotomoy Evaluation. Comparison: MRI 3/11/2023 Technique: Axial CT images were obtained of the head without contrast administration.  Reconstructed coronal and sagittal images were also obtained. Automated exposure control and iterative construction methods were used. Findings: Postoperative changes are noted from interval right frontal craniotomy for resection of previously noted large  enhancing right frontal lobe dural based mass, presumed meningioma. There is pneumocephalus and minimal extra-axial fluid and hemorrhage along the craniotomy margins. There is persistent edema seen in the right frontal lobe, with 11 mm of leftward midline shift, improved from preoperative comparison. There is no unexpected hemorrhage, new mass or evidence of territorial ischemia. There is persistent effacement of the right lateral ventricle, without evidence of entrapment or hydrocephalus. The orbits are normal. The paranasal sinuses are clear.     Impression: Impression: Expected postoperative changes from interval right frontal craniotomy for resection of previously noted large enhancing dural based mass, presumed meningioma. There is persistent right frontal lobe edema, mildly improved, with decreased midline shift. There is no unexpected hemorrhage, new mass or definite territorial ischemia. Electronically Signed: Leodan Howard  3/21/2023 3:10 PM EDT  Workstation ID: [id number]      Results for orders placed during the hospital encounter of 03/11/23    Adult Transthoracic Echo Complete w/ Color, Spectral and Contrast if necessary per protocol    Interpretation Summary  •  Left ventricular systolic function is normal. Estimated left ventricular EF = 55%  •  Biatrial enlargement.  •  Calcified aortic valve with mild aortic stenosis, mean gradient 10 mmHg, BANG 1.6 cm².  •  Moderate aortic insufficiency.  •  Mild mitral regurgitation.  •  Mild tricuspid regurgitation with normal RVSP.  •  Mild dilation of the ascending aorta (4.2 cm) is present.      Current medications:  Scheduled Meds:atorvastatin, 40 mg, Oral, Nightly  dexamethasone, 4 mg, Oral, Q6H  enoxaparin, 40 mg, Subcutaneous, Daily  levETIRAcetam, 1,000 mg, Oral, Q12H  levothyroxine, 125 mcg, Oral, Daily  pantoprazole, 40 mg, Oral, Q AM  polyethylene glycol, 17 g, Oral, Daily  senna-docusate sodium, 2 tablet, Oral, BID  sodium chloride, 10 mL, Intravenous,  Q12H  sodium phosphate, 15 mmol, Intravenous, Once  thiamine, 100 mg, Oral, Daily  zolpidem, 5 mg, Oral, Nightly      Continuous Infusions:   PRN Meds:.•  acetaminophen **OR** acetaminophen **OR** acetaminophen  •  senna-docusate sodium **AND** polyethylene glycol **AND** bisacodyl **AND** bisacodyl  •  docusate sodium  •  HYDROmorphone **AND** naloxone  •  magnesium hydroxide  •  Magnesium Standard Dose Replacement - Follow Nurse / BPA Driven Protocol  •  melatonin  •  ondansetron **OR** ondansetron  •  Phosphorus Replacement - Follow Nurse / BPA Driven Protocol  •  Potassium Replacement - Follow Nurse / BPA Driven Protocol  •  sodium chloride  •  sodium chloride  •  sodium chloride    Assessment & Plan   Assessment & Plan     Active Hospital Problems    Diagnosis  POA   • **Meningioma (HCC) [D32.9]  Yes   • Rheumatoid vs Psoriatic arthritis (HCC) [L40.50]  Yes   • Atrial fibrillation (HCC) [I48.91]  Yes   • Other recurrent depressive disorders (HCC) [F33.8]  Yes   • 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        Brief Hospital Course to date:  Monty Nagel is a 67 y.o. 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  Brain MRI showed 6.4 enhancing mass and he was evaluated by neurosurgery.  He underwent right frontal craniotomy on 3/21 as well as right middle meningeal artery embolization with Dr. Mckee in anticipation for tumor resection.  He was transferred from ICU to medical floor on 9/22    Meningioma,'s s/p tumor embolization 3/20 by Dr. Sebastian and resection 3/21 by Dr. Mckee  Generalized weakness, frequent falls secondary to above  -Continue ASA Plavix when okay with  neurosurgery  -Continue Decadron and Keppra  -PT/OT following, plan for rehab    Hypertension  Dyslipidemia  New A-fib  -Currently on subcu Lovenox    Hypothyroidism  History of thyroid ablation secondary to Graves' disease    Former EtOH, heavy use  -Stopped drinking 9/22    Hyponatremia, not significant  -Monitor while on Keppra, encourage protein intake  -Check in a.m.    Expected Discharge Location and Transportation: rehab  Expected Discharge   Expected Discharge Date and Time     Expected Discharge Date Expected Discharge Time    Mar 24, 2023            DVT prophylaxis:  Medical and mechanical DVT prophylaxis orders are present.     AM-PAC 6 Clicks Score (PT): 19 (03/23/23 1127)    CODE STATUS:   Code Status and Medical Interventions:   Ordered at: 03/21/23 1235     Level Of Support Discussed With:    Patient     Code Status (Patient has no pulse and is not breathing):    CPR (Attempt to Resuscitate)     Medical Interventions (Patient has pulse or is breathing):    Full       Adelina Lama DO  03/23/23        Electronically signed by Adelina Lama DO at 03/23/23 3319

## 2023-03-25 PROCEDURE — 99232 SBSQ HOSP IP/OBS MODERATE 35: CPT | Performed by: INTERNAL MEDICINE

## 2023-03-25 PROCEDURE — 25010000002 ENOXAPARIN PER 10 MG: Performed by: NEUROLOGICAL SURGERY

## 2023-03-25 PROCEDURE — 63710000001 DEXAMETHASONE PER 0.25 MG: Performed by: NEUROLOGICAL SURGERY

## 2023-03-25 RX ADMIN — DEXAMETHASONE 4 MG: 4 TABLET ORAL at 06:39

## 2023-03-25 RX ADMIN — LEVETIRACETAM 1000 MG: 500 TABLET, FILM COATED ORAL at 08:42

## 2023-03-25 RX ADMIN — ATORVASTATIN CALCIUM 40 MG: 40 TABLET, FILM COATED ORAL at 19:29

## 2023-03-25 RX ADMIN — PANTOPRAZOLE SODIUM 40 MG: 40 TABLET, DELAYED RELEASE ORAL at 06:39

## 2023-03-25 RX ADMIN — LEVOTHYROXINE SODIUM 125 MCG: 125 TABLET ORAL at 06:38

## 2023-03-25 RX ADMIN — DEXAMETHASONE 4 MG: 4 TABLET ORAL at 17:45

## 2023-03-25 RX ADMIN — POVIDONE-IODINE 1 EACH: 10 SOLUTION TOPICAL at 08:43

## 2023-03-25 RX ADMIN — DEXAMETHASONE 4 MG: 4 TABLET ORAL at 12:21

## 2023-03-25 RX ADMIN — THIAMINE HCL TAB 100 MG 100 MG: 100 TAB at 08:43

## 2023-03-25 RX ADMIN — LEVETIRACETAM 1000 MG: 500 TABLET, FILM COATED ORAL at 19:28

## 2023-03-25 RX ADMIN — ENOXAPARIN SODIUM 40 MG: 40 INJECTION SUBCUTANEOUS at 08:43

## 2023-03-25 RX ADMIN — Medication 10 ML: at 19:29

## 2023-03-25 RX ADMIN — DEXAMETHASONE 4 MG: 4 TABLET ORAL at 23:51

## 2023-03-25 RX ADMIN — Medication 10 ML: at 08:43

## 2023-03-25 NOTE — PLAN OF CARE
Problem: Fall Injury Risk  Goal: Absence of Fall and Fall-Related Injury  Outcome: Ongoing, Progressing     Problem: Skin Injury Risk Increased  Goal: Skin Health and Integrity  Outcome: Ongoing, Progressing     Problem: Bleeding (Craniotomy/Craniectomy/Cranioplasty)  Goal: Absence of Bleeding  Outcome: Ongoing, Progressing     Problem: Functional Ability Impaired (Craniotomy/Craniectomy/Cranioplasty)  Goal: Optimal Functional Ability  Outcome: Ongoing, Progressing   Goal Outcome Evaluation:         Patient refuses care and labs to be done

## 2023-03-25 NOTE — PLAN OF CARE
Goal Outcome Evaluation:              Outcome Evaluation: Pt VSS, No adventitious findings noted upon primary assessment. Wound on scalp intact, no dehisince noted. Patient refused cleaning the dried blood from incisional area. No adventitious findings noted upon primary assessment. will continue to monitor and follow patient and physician plan of care per md order.

## 2023-03-26 PROCEDURE — 99232 SBSQ HOSP IP/OBS MODERATE 35: CPT | Performed by: INTERNAL MEDICINE

## 2023-03-26 PROCEDURE — 63710000001 DEXAMETHASONE PER 0.25 MG: Performed by: NEUROLOGICAL SURGERY

## 2023-03-26 PROCEDURE — 25010000002 ENOXAPARIN PER 10 MG: Performed by: NEUROLOGICAL SURGERY

## 2023-03-26 RX ADMIN — LEVETIRACETAM 1000 MG: 500 TABLET, FILM COATED ORAL at 20:02

## 2023-03-26 RX ADMIN — THIAMINE HCL TAB 100 MG 100 MG: 100 TAB at 08:15

## 2023-03-26 RX ADMIN — ATORVASTATIN CALCIUM 40 MG: 40 TABLET, FILM COATED ORAL at 20:02

## 2023-03-26 RX ADMIN — ENOXAPARIN SODIUM 40 MG: 40 INJECTION SUBCUTANEOUS at 08:16

## 2023-03-26 RX ADMIN — LEVETIRACETAM 1000 MG: 500 TABLET, FILM COATED ORAL at 08:15

## 2023-03-26 RX ADMIN — DEXAMETHASONE 4 MG: 4 TABLET ORAL at 17:01

## 2023-03-26 RX ADMIN — PANTOPRAZOLE SODIUM 40 MG: 40 TABLET, DELAYED RELEASE ORAL at 05:46

## 2023-03-26 RX ADMIN — DEXAMETHASONE 4 MG: 4 TABLET ORAL at 05:46

## 2023-03-26 RX ADMIN — DEXAMETHASONE 4 MG: 4 TABLET ORAL at 12:39

## 2023-03-26 RX ADMIN — LEVOTHYROXINE SODIUM 125 MCG: 125 TABLET ORAL at 05:46

## 2023-03-26 RX ADMIN — POVIDONE-IODINE 1 EACH: 10 SOLUTION TOPICAL at 08:18

## 2023-03-26 NOTE — PROGRESS NOTES
[facility] Medicine Services  PROGRESS NOTE    Patient Name: Monty Nagel  : 1955  MRN: [id number]    Date of Admission: 3/11/2023  Primary Care Physician: Tiffany Morales MD    Subjective   Subjective     CC: Follow-up meningioma    HPI:No acute events overnight, patient states that he had a ok night, has no new complains    ROS:  Gen- No fevers, chills  CV- No chest pain, palpitations  Resp- No cough, dyspnea  GI- No N/V/D, abd pain      Objective   Objective     Vital Signs:   Temp:  [97.7 °F (36.5 °C)-99 °F (37.2 °C)] 99 °F (37.2 °C)  Heart Rate:  [75-95] 77  Resp:  [16-18] 18  BP: (128-149)/() 137/101     Physical Exam:  Constitutional: No acute distress, awake, alert  HENT: NCAT, mucous membranes moist  Respiratory: Clear to auscultation bilaterally, respiratory effort normal   Cardiovascular: RRR, no murmurs, rubs, or gallops  Gastrointestinal: Positive bowel sounds, soft, nontender, nondistended  Musculoskeletal: No bilateral ankle edema, right great toe lesion  Psychiatric: Appropriate affect, cooperative  Neurologic: Nonfocal  Skin: No rashes, cranial incision CDI    Results Reviewed:  LAB RESULTS:      Lab 23  0422 23  0412 23  0435 23  0955 23  0421   WBC 14.02* 18.31* 16.01*  --  9.20   HEMOGLOBIN 11.8* 12.7* 12.6*  --  11.4*   HEMOGLOBIN, POC  --   --   --  10.5*  --    HEMATOCRIT 34.5* 36.9* 35.8*  --  33.5*   HEMATOCRIT POC  --   --   --  31*  --    PLATELETS 215 169 211  --  200   NEUTROS ABS 12.89*  --  14.66*  --   --    IMMATURE GRANS (ABS) 0.11*  --  0.15*  --   --    LYMPHS ABS 0.41*  --  0.47*  --   --    MONOS ABS 0.60  --  0.71  --   --    EOS ABS 0.00  --  0.00  --   --    MCV 86.9 87.6 85.2  --  87.5         Lab 23  1823 23  0422 23  1839 23  04323   SODIUM  --  131*  --  130* 132* 132*   POTASSIUM  --  4.3  --  4.7 4.3 4.3   CHLORIDE  --  97*  --  94* 96* 97*   CO2   --  30.0*  --  28.0 29.0 30.0*   ANION GAP  --  4.0*  --  8.0 7.0 5.0   BUN  --  20  --  25* 26* 24*   CREATININE  --  0.41*  --  0.64* 0.63* 0.76   EGFR  --  117.2  --  102.5 103.0 97.3   GLUCOSE  --  128*  --  121* 156* 110*   CALCIUM  --  7.6*  --  7.5* 7.9* 7.7*   IONIZED CALCIUM  --   --   --   --  1.16  --    MAGNESIUM  --   --   --  2.0 2.0 2.0   PHOSPHORUS 2.1* 2.1* 1.9* 2.2* 3.2 3.5         Lab 03/24/23  0422 03/23/23  0412   TOTAL PROTEIN  --  4.8*   ALBUMIN 2.5* 2.8*   GLOBULIN  --  2.0   ALT (SGPT)  --  17   AST (SGOT)  --  17   BILIRUBIN  --  0.6   ALK PHOS  --  52                 Lab 03/21/23  0805   ABO TYPING A   RH TYPING Positive   ANTIBODY SCREEN Negative         Lab 03/21/23  0955   PH, ARTERIAL 7.40     Brief Urine Lab Results  (Last result in the past 365 days)      Color   Clarity   Blood   Leuk Est   Nitrite   Protein   CREAT   Urine HCG        03/11/23 1058 Dark Yellow   Clear   Negative   Negative   Negative   Negative                 Microbiology Results Abnormal     None          No radiology results from the last 24 hrs    Results for orders placed during the hospital encounter of 03/11/23    Adult Transthoracic Echo Complete w/ Color, Spectral and Contrast if necessary per protocol    Interpretation Summary  •  Left ventricular systolic function is normal. Estimated left ventricular EF = 55%  •  Biatrial enlargement.  •  Calcified aortic valve with mild aortic stenosis, mean gradient 10 mmHg, BANG 1.6 cm².  •  Moderate aortic insufficiency.  •  Mild mitral regurgitation.  •  Mild tricuspid regurgitation with normal RVSP.  •  Mild dilation of the ascending aorta (4.2 cm) is present.      Current medications:  Scheduled Meds:atorvastatin, 40 mg, Oral, Nightly  dexamethasone, 4 mg, Oral, Q6H  enoxaparin, 40 mg, Subcutaneous, Daily  levETIRAcetam, 1,000 mg, Oral, Q12H  levothyroxine, 125 mcg, Oral, Daily  pantoprazole, 40 mg, Oral, Q AM  polyethylene glycol, 17 g, Oral,  Daily  povidone-iodine, 1 application, Topical, Daily  senna-docusate sodium, 2 tablet, Oral, BID  sodium chloride, 10 mL, Intravenous, Q12H  thiamine, 100 mg, Oral, Daily      Continuous Infusions:   PRN Meds:.•  acetaminophen **OR** acetaminophen **OR** acetaminophen  •  senna-docusate sodium **AND** polyethylene glycol **AND** bisacodyl **AND** bisacodyl  •  docusate sodium  •  HYDROmorphone **AND** naloxone  •  magnesium hydroxide  •  Magnesium Standard Dose Replacement - Follow Nurse / BPA Driven Protocol  •  melatonin  •  ondansetron **OR** ondansetron  •  Phosphorus Replacement - Follow Nurse / BPA Driven Protocol  •  Potassium Replacement - Follow Nurse / BPA Driven Protocol  •  sodium chloride  •  sodium chloride  •  sodium chloride    Assessment & Plan   Assessment & Plan     Active Hospital Problems    Diagnosis  POA   • **Meningioma (HCC) [D32.9]  Yes   • Rheumatoid vs Psoriatic arthritis (HCC) [L40.50]  Yes   • Atrial fibrillation (HCC) [I48.91]  Yes   • Other recurrent depressive disorders (HCC) [F33.8]  Yes   • 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        Brief Hospital Course to date:  Monty Nagel is a 67 y.o. male with history of hypertension, retinal melanoma and subsequent right eye blindness, previous alcohol use who was admitted for meningioma, gait instability and general weakness.  Brain MRI showed 6.4 enhancing mass and he was evaluated by neurosurgery.  He underwent right frontal craniotomy on 3/21 as well as right middle meningeal artery embolization with Dr. Mckee in anticipation for tumor resection.  He was transferred from ICU to medical floor on 9/22     Meningioma,'s s/p tumor embolization 3/20 by Dr. Sebastian and resection 3/21 by Dr. Mckee  Generalized weakness, frequent falls secondary to  above  -Continue ASA Plavix when okay with neurosurgery  -Continue Decadron and Keppra  -PT/OT following, plan for rehab     Hypertension  -BP currently stable, holding home lisinopril- HCTZ for now     Hyperlipidemia  -Continue statin     New A-fib  -Rates currently controlled  -Currently on subcu Lovenox     Hypothyroidism  History of thyroid ablation secondary to Graves' disease  - continue Synthroid     Former EtOH, heavy use  -Stopped drinking 9/22     Hyponatremia, not significant  -Monitor while on Keppra, encourage protein intake  -Na+ is low but stable, will continue to monitor for now.       Right great toe lesion  -PT-wound care following  -may need a bx, Dr. Flores consulted     Expected Discharge Location and Transportation: Rehab  Expected Discharge   Expected Discharge Date and Time     Expected Discharge Date Expected Discharge Time    Mar 28, 2023            DVT prophylaxis:  Medical and mechanical DVT prophylaxis orders are present.     AM-PAC 6 Clicks Score (PT): 19 (03/26/23 0805)    CODE STATUS:   Code Status and Medical Interventions:   Ordered at: 03/21/23 1235     Level Of Support Discussed With:    Patient     Code Status (Patient has no pulse and is not breathing):    CPR (Attempt to Resuscitate)     Medical Interventions (Patient has pulse or is breathing):    Full       Fiorella Manzanares MD  03/26/23

## 2023-03-26 NOTE — PLAN OF CARE
Incision to frontal head free of drainage/swelling. Patient denies pain/discomfort at this time. SBP consistently in 130s, A fib on cardiac mx. Rate controlled in 7s0 to 80s. /dorsal/planter flexion equal bilaterally. Patient denies pain/numbness or tingling. BP closely monitored. Call light in reach.

## 2023-03-27 PROCEDURE — 92507 TX SP LANG VOICE COMM INDIV: CPT

## 2023-03-27 PROCEDURE — 25010000002 ENOXAPARIN PER 10 MG: Performed by: NEUROLOGICAL SURGERY

## 2023-03-27 PROCEDURE — 97116 GAIT TRAINING THERAPY: CPT

## 2023-03-27 PROCEDURE — 99232 SBSQ HOSP IP/OBS MODERATE 35: CPT | Performed by: INTERNAL MEDICINE

## 2023-03-27 PROCEDURE — 63710000001 DEXAMETHASONE PER 0.25 MG: Performed by: NEUROLOGICAL SURGERY

## 2023-03-27 PROCEDURE — 97110 THERAPEUTIC EXERCISES: CPT

## 2023-03-27 RX ORDER — HYDROCHLOROTHIAZIDE 12.5 MG/1
12.5 CAPSULE, GELATIN COATED ORAL DAILY
Status: DISCONTINUED | OUTPATIENT
Start: 2023-03-27 | End: 2023-04-03 | Stop reason: HOSPADM

## 2023-03-27 RX ADMIN — PANTOPRAZOLE SODIUM 40 MG: 40 TABLET, DELAYED RELEASE ORAL at 05:10

## 2023-03-27 RX ADMIN — DEXAMETHASONE 4 MG: 4 TABLET ORAL at 05:10

## 2023-03-27 RX ADMIN — DEXAMETHASONE 4 MG: 4 TABLET ORAL at 01:01

## 2023-03-27 RX ADMIN — ENOXAPARIN SODIUM 40 MG: 40 INJECTION SUBCUTANEOUS at 08:15

## 2023-03-27 RX ADMIN — LEVETIRACETAM 1000 MG: 500 TABLET, FILM COATED ORAL at 08:16

## 2023-03-27 RX ADMIN — ACETAMINOPHEN 325MG 650 MG: 325 TABLET ORAL at 08:15

## 2023-03-27 RX ADMIN — LEVOTHYROXINE SODIUM 125 MCG: 125 TABLET ORAL at 05:10

## 2023-03-27 RX ADMIN — Medication 5 MG: at 20:23

## 2023-03-27 RX ADMIN — Medication 10 ML: at 20:23

## 2023-03-27 RX ADMIN — ATORVASTATIN CALCIUM 40 MG: 40 TABLET, FILM COATED ORAL at 20:23

## 2023-03-27 RX ADMIN — LEVETIRACETAM 1000 MG: 500 TABLET, FILM COATED ORAL at 20:23

## 2023-03-27 RX ADMIN — DEXAMETHASONE 4 MG: 4 TABLET ORAL at 11:44

## 2023-03-27 RX ADMIN — DEXAMETHASONE 4 MG: 4 TABLET ORAL at 23:51

## 2023-03-27 RX ADMIN — THIAMINE HCL TAB 100 MG 100 MG: 100 TAB at 08:15

## 2023-03-27 RX ADMIN — DEXAMETHASONE 4 MG: 4 TABLET ORAL at 17:04

## 2023-03-27 RX ADMIN — HYDROCHLOROTHIAZIDE 12.5 MG: 12.5 CAPSULE ORAL at 08:16

## 2023-03-27 RX ADMIN — POVIDONE-IODINE 1 EACH: 10 SOLUTION TOPICAL at 08:17

## 2023-03-27 NOTE — DISCHARGE PLACEMENT REQUEST
"Jonathan Barrera (67 y.o. Male)   Dk Caceres, RN Case MAnager  [phone]    Date of Birth   1955    Social Security Number       Address   [address] [place] [address]    Home Phone   [phone]    MRN   [id number]       Tenriism   None    Marital Status   Single                            Admission Date   3/11/23    Admission Type   Emergency    Admitting Provider   Marlon Mckee MD    Attending Provider   Fiorella Manzanares MD    Department, Room/Bed   [address], S371/1       Discharge Date       Discharge Disposition       Discharge Destination                               Attending Provider: Fiorella Manzanares MD    Allergies: No Known Allergies    Isolation: None   Infection: None   Code Status: CPR    Ht: 180.3 cm (71\")   Wt: 93.4 kg (206 lb)    Admission Cmt: None   Principal Problem: Meningioma (HCC) [D32.9]                 Active Insurance as of 3/11/2023     Primary Coverage     Payor Plan Insurance Group Employer/Plan Group    ANTHEM MEDICARE REPLACEMENT ANTHEM MEDICARE ADVANTAGE [id number]     Payor Plan Address Payor Plan Phone Number Payor Plan Fax Number Effective Dates     [address] [phone]  1/1/2020 - None Entered    [facility] [id number]       Subscriber Name Subscriber Birth Date Member ID       JONATHAN BARRERA 1955 [id number]                 Emergency Contacts      (Rel.) Home Phone Work Phone Mobile Phone    Marva Ku (Sister) [phone] -- [phone]            Vital Signs (last day)     Date/Time Temp Temp src Pulse Resp BP Patient Position SpO2    03/27/23 0704 97.9 (36.6) Oral 66 18 137/94 Lying --    03/27/23 0318 98.2 (36.8) Oral 79 18 146/99 Lying 99    03/27/23 0105 98.6 (37) Oral 73 18 135/93 Lying 99    03/26/23 1915 98.2 (36.8) Oral 79 18 133/92 Lying 98    03/26/23 1512 98.2 (36.8) Oral 77 20 118/85 Lying --    03/26/23 1121 99.2 (37.3) Oral 84 18 121/81 Lying 97    03/26/23 0716 99 (37.2) Oral 77 18 " 137/101 Lying 99    03/26/23 0244 97.7 (36.5) Oral 77 16 149/95 Lying --          Current Facility-Administered Medications   Medication Dose Route Frequency Provider Last Rate Last Admin   • acetaminophen (TYLENOL) tablet 650 mg  650 mg Oral Q4H PRN Marlon Mckee MD   650 mg at 03/24/23 0803    Or   • acetaminophen (TYLENOL) 160 MG/5ML solution 650 mg  650 mg Oral Q4H PRN Marlon Mckee MD        Or   • acetaminophen (TYLENOL) suppository 650 mg  650 mg Rectal Q4H PRN Marlon Mckee MD       • atorvastatin (LIPITOR) tablet 40 mg  40 mg Oral Nightly Marlon Mckee MD   40 mg at 03/26/23 2002   • sennosides-docusate (PERICOLACE) 8.6-50 MG per tablet 2 tablet  2 tablet Oral BID Marlon Mckee MD   2 tablet at 03/24/23 0804    And   • polyethylene glycol (MIRALAX) packet 17 g  17 g Oral Daily PRN Marlon Mckee MD        And   • bisacodyl (DULCOLAX) EC tablet 5 mg  5 mg Oral Daily PRN Marlon Mckee MD   5 mg at 03/22/23 2127    And   • bisacodyl (DULCOLAX) suppository 10 mg  10 mg Rectal Daily PRN Marlon Mckee MD       • dexamethasone (DECADRON) tablet 4 mg  4 mg Oral Q6H Marlon Mckee MD   4 mg at 03/27/23 0510   • docusate sodium (COLACE) capsule 100 mg  100 mg Oral BID PRN Marlon Mckee MD       • Enoxaparin Sodium (LOVENOX) syringe 40 mg  40 mg Subcutaneous Daily Marlon Mckee MD   40 mg at 03/26/23 0816   • hydroCHLOROthiazide (MICROZIDE) capsule 12.5 mg  12.5 mg Oral Daily Fiorella Manzanares MD       • HYDROmorphone (DILAUDID) injection 0.5 mg  0.5 mg Intravenous Q2H PRN Marlon Mckee MD   0.5 mg at 03/22/23 0842    And   • naloxone (NARCAN) injection 0.4 mg  0.4 mg Intravenous Q5 Min PRN Marlon Mckee MD       • levETIRAcetam (KEPPRA) tablet 1,000 mg  1,000 mg Oral Q12H Marlon Mckee MD   1,000 mg at 03/26/23 2002   • levothyroxine (SYNTHROID,  LEVOTHROID) tablet 125 mcg  125 mcg Oral Daily Marlon Mckee MD   125 mcg at 03/27/23 0510   • magnesium hydroxide (MILK OF MAGNESIA) suspension 10 mL  10 mL Oral Daily PRN Marlon Mckee MD       • Magnesium Standard Dose Replacement - Initiate Nurse / BPA Driven Protocol   Does not apply PRN Marlon Mckee MD       • melatonin tablet 5 mg  5 mg Oral Nightly PRN Marlon Mckee MD   5 mg at 03/24/23 2123   • ondansetron (ZOFRAN) tablet 4 mg  4 mg Oral Q6H PRN Marlon Mckee MD        Or   • ondansetron (ZOFRAN) injection 4 mg  4 mg Intravenous Q6H PRN Marlon Mckee MD       • pantoprazole (PROTONIX) EC tablet 40 mg  40 mg Oral Q AM Marlon Mckee MD   40 mg at 03/27/23 0510   • Phosphorus Replacement - Initiate Nurse / BPA Driven Protocol   Does not apply PRN Marlon Mckee MD       • polyethylene glycol (MIRALAX) packet 17 g  17 g Oral Daily Marlon Mckee MD   17 g at 03/23/23 0829   • Potassium Replacement - Initiate Nurse / BPA Driven Protocol   Does not apply PRN Marlon Mckee MD       • povidone-iodine 10 % swab 1 each  1 application Topical Daily Adelina Lama, DO   1 each at 03/26/23 0818   • sodium chloride 0.9 % flush 10 mL  10 mL Intravenous PRN Marlon Mckee MD       • sodium chloride 0.9 % flush 10 mL  10 mL Intravenous Q12H Marlon Mckee MD   10 mL at 03/25/23 1929   • sodium chloride 0.9 % flush 10 mL  10 mL Intravenous PRN Marlon Mckee MD       • sodium chloride 0.9 % infusion 40 mL  40 mL Intravenous PRN Marlon Mckee MD       • thiamine (VITAMIN B-1) tablet 100 mg  100 mg Oral Daily Marlon Mckee MD   100 mg at 03/26/23 0815        Physician Progress Notes (last 24 hours)      Fiorella Manzanares MD at 03/26/23 0923              [facility] Medicine Services  PROGRESS NOTE    Patient Name: Monty  Winter  : 1955  MRN: [id number]    Date of Admission: 3/11/2023  Primary Care Physician: Tiffany Morales MD    Subjective   Subjective     CC: Follow-up meningioma    HPI:No acute events overnight, patient states that he had a ok night, has no new complains    ROS:  Gen- No fevers, chills  CV- No chest pain, palpitations  Resp- No cough, dyspnea  GI- No N/V/D, abd pain      Objective   Objective     Vital Signs:   Temp:  [97.7 °F (36.5 °C)-99 °F (37.2 °C)] 99 °F (37.2 °C)  Heart Rate:  [75-95] 77  Resp:  [16-18] 18  BP: (128-149)/() 137/101     Physical Exam:  Constitutional: No acute distress, awake, alert  HENT: NCAT, mucous membranes moist  Respiratory: Clear to auscultation bilaterally, respiratory effort normal   Cardiovascular: RRR, no murmurs, rubs, or gallops  Gastrointestinal: Positive bowel sounds, soft, nontender, nondistended  Musculoskeletal: No bilateral ankle edema, right great toe lesion  Psychiatric: Appropriate affect, cooperative  Neurologic: Nonfocal  Skin: No rashes, cranial incision CDI    Results Reviewed:  LAB RESULTS:      Lab 23  0422 23  0412 23  0435 23  0955 23  0421   WBC 14.02* 18.31* 16.01*  --  9.20   HEMOGLOBIN 11.8* 12.7* 12.6*  --  11.4*   HEMOGLOBIN, POC  --   --   --  10.5*  --    HEMATOCRIT 34.5* 36.9* 35.8*  --  33.5*   HEMATOCRIT POC  --   --   --  31*  --    PLATELETS 215 169 211  --  200   NEUTROS ABS 12.89*  --  14.66*  --   --    IMMATURE GRANS (ABS) 0.11*  --  0.15*  --   --    LYMPHS ABS 0.41*  --  0.47*  --   --    MONOS ABS 0.60  --  0.71  --   --    EOS ABS 0.00  --  0.00  --   --    MCV 86.9 87.6 85.2  --  87.5         Lab 23  4182 23  0422 23  1839 23  0412 23  0435 23   SODIUM  --  131*  --  130* 132* 132*   POTASSIUM  --  4.3  --  4.7 4.3 4.3   CHLORIDE  --  97*  --  94* 96* 97*   CO2  --  30.0*  --  28.0 29.0 30.0*   ANION GAP  --  4.0*  --  8.0 7.0 5.0   BUN  --  20  --  25*  26* 24*   CREATININE  --  0.41*  --  0.64* 0.63* 0.76   EGFR  --  117.2  --  102.5 103.0 97.3   GLUCOSE  --  128*  --  121* 156* 110*   CALCIUM  --  7.6*  --  7.5* 7.9* 7.7*   IONIZED CALCIUM  --   --   --   --  1.16  --    MAGNESIUM  --   --   --  2.0 2.0 2.0   PHOSPHORUS 2.1* 2.1* 1.9* 2.2* 3.2 3.5         Lab 03/24/23  0422 03/23/23  0412   TOTAL PROTEIN  --  4.8*   ALBUMIN 2.5* 2.8*   GLOBULIN  --  2.0   ALT (SGPT)  --  17   AST (SGOT)  --  17   BILIRUBIN  --  0.6   ALK PHOS  --  52                 Lab 03/21/23  0805   ABO TYPING A   RH TYPING Positive   ANTIBODY SCREEN Negative         Lab 03/21/23  0955   PH, ARTERIAL 7.40     Brief Urine Lab Results  (Last result in the past 365 days)      Color   Clarity   Blood   Leuk Est   Nitrite   Protein   CREAT   Urine HCG        03/11/23 1058 Dark Yellow   Clear   Negative   Negative   Negative   Negative                 Microbiology Results Abnormal     None          No radiology results from the last 24 hrs    Results for orders placed during the hospital encounter of 03/11/23    Adult Transthoracic Echo Complete w/ Color, Spectral and Contrast if necessary per protocol    Interpretation Summary  •  Left ventricular systolic function is normal. Estimated left ventricular EF = 55%  •  Biatrial enlargement.  •  Calcified aortic valve with mild aortic stenosis, mean gradient 10 mmHg, BANG 1.6 cm².  •  Moderate aortic insufficiency.  •  Mild mitral regurgitation.  •  Mild tricuspid regurgitation with normal RVSP.  •  Mild dilation of the ascending aorta (4.2 cm) is present.      Current medications:  Scheduled Meds:atorvastatin, 40 mg, Oral, Nightly  dexamethasone, 4 mg, Oral, Q6H  enoxaparin, 40 mg, Subcutaneous, Daily  levETIRAcetam, 1,000 mg, Oral, Q12H  levothyroxine, 125 mcg, Oral, Daily  pantoprazole, 40 mg, Oral, Q AM  polyethylene glycol, 17 g, Oral, Daily  povidone-iodine, 1 application, Topical, Daily  senna-docusate sodium, 2 tablet, Oral, BID  sodium  chloride, 10 mL, Intravenous, Q12H  thiamine, 100 mg, Oral, Daily      Continuous Infusions:   PRN Meds:.•  acetaminophen **OR** acetaminophen **OR** acetaminophen  •  senna-docusate sodium **AND** polyethylene glycol **AND** bisacodyl **AND** bisacodyl  •  docusate sodium  •  HYDROmorphone **AND** naloxone  •  magnesium hydroxide  •  Magnesium Standard Dose Replacement - Follow Nurse / BPA Driven Protocol  •  melatonin  •  ondansetron **OR** ondansetron  •  Phosphorus Replacement - Follow Nurse / BPA Driven Protocol  •  Potassium Replacement - Follow Nurse / BPA Driven Protocol  •  sodium chloride  •  sodium chloride  •  sodium chloride    Assessment & Plan   Assessment & Plan     Active Hospital Problems    Diagnosis  POA   • **Meningioma (HCC) [D32.9]  Yes   • Rheumatoid vs Psoriatic arthritis (HCC) [L40.50]  Yes   • Atrial fibrillation (HCC) [I48.91]  Yes   • Other recurrent depressive disorders (HCC) [F33.8]  Yes   • 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        Brief Hospital Course to date:  Motny Nagel is a 67 y.o. male with history of hypertension, retinal melatonin and subsequent right eye blindness, previous alcohol use who was admitted for meningioma, gait instability and general weakness.  Brain MRI showed 6.4 enhancing mass and he was evaluated by neurosurgery.  He underwent right frontal craniotomy on 3/21 as well as right middle meningeal artery embolization with Dr. Mckee in anticipation for tumor resection.  He was transferred from ICU to medical floor on 9/22     Meningioma,'s s/p tumor embolization 3/20 by Dr. Sebastian and resection 3/21 by Dr. Mckee  Generalized weakness, frequent falls secondary to above  -Continue ASA Plavix when okay with neurosurgery  -Continue Decadron and Keppra  -PT/OT following,  plan for rehab     Hypertension  -BP currently stable, holding home lisinopril- HCTZ for now     Hyperlipidemia  -Continue statin     New A-fib  -Rates currently controlled  -Currently on subcu Lovenox     Hypothyroidism  History of thyroid ablation secondary to Graves' disease  - continue Synthroid     Former EtOH, heavy use  -Stopped drinking      Hyponatremia, not significant  -Monitor while on Keppra, encourage protein intake  -Na+ is low but stable, will continue to monitor for now.       Right great toe lesion  -PT-wound care following  -may need a bx, Dr. Flores consulted     Expected Discharge Location and Transportation: Rehab  Expected Discharge   Expected Discharge Date and Time     Expected Discharge Date Expected Discharge Time    Mar 28, 2023            DVT prophylaxis:  Medical and mechanical DVT prophylaxis orders are present.     AM-PAC 6 Clicks Score (PT): 19 (23 0805)    CODE STATUS:   Code Status and Medical Interventions:   Ordered at: 23 1235     Level Of Support Discussed With:    Patient     Code Status (Patient has no pulse and is not breathing):    CPR (Attempt to Resuscitate)     Medical Interventions (Patient has pulse or is breathing):    Full       Fiorella Manzanares MD  23      Electronically signed by Fiorella Manzanares MD at 23 0917          Physical Therapy Notes (most recent note)      Andrew Centeno, PT at 23 1520  Version 1 of 1         Patient Name: Monty Nagel  : 1955    MRN: [id number]                              Today's Date: 3/24/2023       Admit Date: 3/11/2023    Visit Dx:     ICD-10-CM ICD-9-CM   1. Generalized weakness  R53.1 780.79   2. History of alcoholism (HCC)  F10.21 V11.3   3. Inability to walk  R26.2 719.7   4. Confusion  R41.0 298.9   5. Hypoglycemia  E16.2 251.2   6. Poor nutrition  E63.9 269.9   7. Cognitive communication deficit  R41.841 799.52   8. Impaired functional mobility, balance, gait, and endurance  Z74.09 V49.89    9. 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   • Postablative hypothyroidism   • Screen for colon cancer   • Other recurrent depressive disorders (HCC)   • Balance problem   • Generalized weakness   • Atrial fibrillation (HCC)   • Severe malnutrition (HCC)   • Meningioma (HCC)   • Rheumatoid vs Psoriatic arthritis (HCC)     Past Medical History:   Diagnosis Date   • Arthritis    • Hepatitis A    • Hypertension    • Melanoma (HCC) 2017    retina     Past Surgical History:   Procedure Laterality Date   • CRANIOTOMY FOR TUMOR N/A 3/21/2023    Procedure: CRANIOTOMY FOR TUMOR STEREOTACTIC WITH STEALTH;  Surgeon: Marlon Mckee MD;  Location:  [facility];  Service: Neurosurgery;  Laterality: N/A;   • EMBOLIZATION CEREBRAL N/A 3/20/2023    Procedure: Tumor Emoblization radial access;  Surgeon: Ozzy Sebastian MD;  Location:  [facility] CATH INVASIVE LOCATION;  Service: Interventional Radiology;  Laterality: N/A;   • EYE SURGERY  2017    EYE CANCER RIGHT EYE   • FINGER SURGERY Left     Pointer finger re-attached in 3rd Grade   • TONSILLECTOMY        General Information     Row Name 03/24/23 1543          Physical Therapy Time and Intention    Document Type therapy note (daily note)  -AE     Mode of Treatment physical therapy  -AE     Row Name 03/24/23 1543          General Information    Patient Profile Reviewed yes  -AE     Existing Precautions/Restrictions fall  impulsive, L sided weakness/inattention, blind R eye baseline  -AE     Barriers to Rehab medically complex;cognitive status;visual deficit  -AE     Row Name 03/24/23 1543          Cognition    Orientation Status (Cognition) oriented x 3  -AE     Row Name 03/24/23 1543          Safety Issues, Functional Mobility    Safety Issues Affecting Function (Mobility) awareness of need for assistance;insight into deficits/self-awareness;safety  precaution awareness;safety precautions follow-through/compliance;sequencing abilities;impulsivity  -AE     Impairments Affecting Function (Mobility) balance;cognition;coordination;endurance/activity tolerance;strength;postural/trunk control;motor planning;visual/perceptual;pain  -AE     Cognitive Impairments, Mobility Safety/Performance awareness, need for assistance;safety precaution awareness;safety precaution follow-through;impulsivity;insight into deficits/self-awareness;judgment;problem-solving/reasoning  -AE           User Key  (r) = Recorded By, (t) = Taken By, (c) = Cosigned By    Initials Name Provider Type    AE Andrew Centeno, PT Physical Therapist               Mobility     Row Name 03/24/23 1546          Bed Mobility    Bed Mobility supine-sit;sit-supine;scooting/bridging  -AE     Scooting/Bridging Stanton (Bed Mobility) contact guard  -AE     Supine-Sit Stanton (Bed Mobility) contact guard  -AE     Sit-Supine Stanton (Bed Mobility) contact guard  -AE     Assistive Device (Bed Mobility) head of bed elevated  -AE     Comment, (Bed Mobility) VCs for hand placement and sequencing. Pt required increased cues to reduce impulsivity with all mobility.  -AE     Row Name 03/24/23 1546          Transfers    Comment, (Transfers) VCs for hand placement and sequencing. Pt very impulsive but improved with cues for transfers.  -AE     Row Name 03/24/23 1546          Sit-Stand Transfer    Sit-Stand Stanton (Transfers) contact guard;1 person assist  -AE     Assistive Device (Sit-Stand Transfers) walker, front-wheeled  -AE     Row Name 03/24/23 1546          Gait/Stairs (Locomotion)    Stanton Level (Gait) minimum assist (75% patient effort);1 person assist  -AE     Assistive Device (Gait) walker, front-wheeled  -AE     Distance in Feet (Gait) 150x2  -AE     Deviations/Abnormal Patterns (Gait) bilateral deviations;dima decreased;gait speed decreased;stride length decreased;base of support,  "wide  -AE     Bilateral Gait Deviations forward flexed posture;heel strike decreased  -AE     Comment, (Gait/Stairs) Pt demo significant impulsiveness while ambulating, requiring consistent cues to improve. Pt is resistant to most education on safety awareness reporting \"I need to go faster\". Pt has decreased balance with all mobility and requires frequent cues to improve positioning within RW and sequencing of AD while ambulating to reduce risk of falls. Further distance limited by fatigue.  -AE           User Key  (r) = Recorded By, (t) = Taken By, (c) = Cosigned By    Initials Name Provider Type    AE Andrew Centeno PT Physical Therapist               Obj/Interventions     Row Name 03/24/23 1600          Balance    Balance Assessment sitting static balance;sitting dynamic balance;sit to stand dynamic balance;standing static balance;standing dynamic balance  -AE     Static Sitting Balance contact guard  -AE     Dynamic Sitting Balance contact guard  -AE     Position, Sitting Balance unsupported;sitting edge of bed  -AE     Sit to Stand Dynamic Balance contact guard;1-person assist;verbal cues  -AE     Static Standing Balance contact guard  -AE     Dynamic Standing Balance contact guard  -AE     Position/Device Used, Standing Balance supported;walker, front-wheeled  -AE           User Key  (r) = Recorded By, (t) = Taken By, (c) = Cosigned By    Initials Name Provider Type    AE Andrew Centeno PT Physical Therapist               Goals/Plan    No documentation.                Clinical Impression     Row Name 03/24/23 1603          Pain    Pretreatment Pain Rating 0/10 - no pain  -AE     Posttreatment Pain Rating 0/10 - no pain  -AE     Row Name 03/24/23 1603          Plan of Care Review    Plan of Care Reviewed With patient  -AE     Progress no change  -AE     Outcome Evaluation Pt continues to present with decreased functional mobility and significantly decreased safety awareness. Pt ambulated in hallway with " RW and CGA this session. Pt requires consistent cues to improve safety awareness with resistance to education. Continue to progress per pt tolerance. Recommend D/C to IRF d/t pt inability to care for self and continues to be a high fall risk.  -AE     Row Name 03/24/23 1603          Vital Signs    Pre Systolic BP Rehab 122  -AE     Pre Treatment Diastolic BP 91  -AE     Pretreatment Heart Rate (beats/min) 76  -AE     Posttreatment Heart Rate (beats/min) 77  -AE     Pre SpO2 (%) 95  -AE     O2 Delivery Pre Treatment room air  -AE     O2 Delivery Intra Treatment room air  -AE     Post SpO2 (%) 97  -AE     O2 Delivery Post Treatment room air  -AE     Pre Patient Position Supine  -AE     Intra Patient Position Standing  -AE     Post Patient Position Supine  -AE     Row Name 03/24/23 1603          Positioning and Restraints    Pre-Treatment Position in bed  -AE     Post Treatment Position bed  -AE     In Bed notified nsg;fowlers;call light within reach;encouraged to call for assist;exit alarm on;side rails up x2  -AE           User Key  (r) = Recorded By, (t) = Taken By, (c) = Cosigned By    Initials Name Provider Type    AE Andrew Centeno, PT Physical Therapist               Outcome Measures     Row Name 03/24/23 1606          How much help from another person do you currently need...    Turning from your back to your side while in flat bed without using bedrails? 4  -AE     Moving from lying on back to sitting on the side of a flat bed without bedrails? 3  -AE     Moving to and from a bed to a chair (including a wheelchair)? 3  -AE     Standing up from a chair using your arms (e.g., wheelchair, bedside chair)? 3  -AE     Climbing 3-5 steps with a railing? 2  -AE     To walk in hospital room? 3  -AE     AM-PAC 6 Clicks Score (PT) 18  -AE     Highest level of mobility 6 --> Walked 10 steps or more  -AE     Row Name 03/24/23 1603          Functional Assessment    Outcome Measure Options AM-PAC 6 Clicks Basic Mobility  (PT)  -AE           User Key  (r) = Recorded By, (t) = Taken By, (c) = Cosigned By    Initials Name Provider Type    AE Andrew Centeno, RIC Physical Therapist                             Physical Therapy Education     Title: PT OT SLP Therapies (In Progress)     Topic: Physical Therapy (In Progress)     Point: Mobility training (In Progress)     Learning Progress Summary           Patient Acceptance, E, NR by AE at 3/24/2023 1520    Acceptance, E,D, VU,NR by HP at 3/23/2023 1127    Acceptance, E,TB, NR by AY at 3/22/2023 1141    Acceptance, E, VU,NR by CM at 3/19/2023 1149    Acceptance, E, VU by CM at 3/17/2023 1517    Acceptance, E, VU,NR by LH at 3/15/2023 1407    Acceptance, E, NR by KG at 3/14/2023 1428    Acceptance, E, VU,NR by SJ at 3/12/2023 1542                   Point: Home exercise program (In Progress)     Learning Progress Summary           Patient Acceptance, E, NR by AE at 3/24/2023 1520    Acceptance, E,D, VU,NR by HP at 3/23/2023 1127    Acceptance, E,TB, NR by AY at 3/22/2023 1141    Acceptance, E, VU,NR by CM at 3/19/2023 1149    Acceptance, E, VU,NR by LH at 3/15/2023 1407    Acceptance, E, NR by KG at 3/14/2023 1428                   Point: Body mechanics (In Progress)     Learning Progress Summary           Patient Acceptance, E, NR by AE at 3/24/2023 1520    Acceptance, E,D, VU,NR by HP at 3/23/2023 1127    Acceptance, E,TB, NR by AY at 3/22/2023 1141    Acceptance, E, VU,NR by CM at 3/19/2023 1149    Acceptance, E, VU by CM at 3/17/2023 1517    Acceptance, E, VU,NR by LH at 3/15/2023 1407    Acceptance, E, NR by KG at 3/14/2023 1428    Acceptance, E, VU,NR by SJ at 3/12/2023 1542                   Point: Precautions (In Progress)     Learning Progress Summary           Patient Acceptance, E, NR by AE at 3/24/2023 1520    Acceptance, E,D, VU,NR by HP at 3/23/2023 1127    Acceptance, E,TB, NR by AY at 3/22/2023 1141    Acceptance, E, VU,NR by CM at 3/19/2023 1149    Acceptance, E, VU by CM at  3/17/2023 1517    Acceptance, E, VU,NR by LH at 3/15/2023 1407    Acceptance, E, NR by KG at 3/14/2023 1428    Acceptance, E, VU,NR by SJ at 3/12/2023 1542                               User Key     Initials Effective Dates Name Provider Type Discipline    SJ 02/03/23 - 03/12/23 Samara Harrison, PT Physical Therapist PT    KG 05/22/20 -  Cherelle Gaitan, PT Physical Therapist PT    AY 11/10/20 -  Sandee Spencer, PT Physical Therapist PT    LH 09/21/21 -  Tristian Ravi, PT Physical Therapist PT    HP 06/01/21 -  Kelly Mckeon, PT Physical Therapist PT    AE 09/21/21 -  Andrew Centeno, PT Physical Therapist PT    CM 09/22/22 -  Yanet Calvin, PT Physical Therapist PT              PT Recommendation and Plan     Plan of Care Reviewed With: patient  Progress: no change  Outcome Evaluation: Pt continues to present with decreased functional mobility and significantly decreased safety awareness. Pt ambulated in hallway with RW and CGA this session. Pt requires consistent cues to improve safety awareness with resistance to education. Continue to progress per pt tolerance. Recommend D/C to IRF d/t pt inability to care for self and continues to be a high fall risk.     Time Calculation:    PT Charges     Row Name 03/24/23 1606             Time Calculation    Start Time 1520  -AE      PT Received On 03/24/23  -AE      PT Goal Re-Cert Due Date 04/01/23  -AE         Time Calculation- PT    Total Timed Code Minutes- PT 15 minute(s)  -AE         Timed Charges    47878 - Gait Training Minutes  15  -AE         Total Minutes    Timed Charges Total Minutes 15  -AE       Total Minutes 15  -AE            User Key  (r) = Recorded By, (t) = Taken By, (c) = Cosigned By    Initials Name Provider Type    AE Andrew Centeno PT Physical Therapist              Therapy Charges for Today     Code Description Service Date Service Provider Modifiers Qty    77352102635 HC GAIT TRAINING EA 15 MIN 3/24/2023 Andrew Centeno PT GP  1    [id number]  PT THER SUPP EA 15 MIN 3/24/2023 Andrew Centeno, PT GP 1          PT G-Codes  Outcome Measure Options: AM-PAC 6 Clicks Basic Mobility (PT)  AM-PAC 6 Clicks Score (PT): 18  AM-PAC 6 Clicks Score (OT): 14  PT Discharge Summary  Anticipated Discharge Disposition (PT): inpatient rehabilitation facility    Andrew Centeno, PT  3/24/2023      Electronically signed by Andrew Centeno, PT at 23 1608          Occupational Therapy Notes (most recent note)      Phyllis Bowen, OT at 23 0958          Patient Name: Monty Nagel  : 1955    MRN: [id number]                              Today's Date: 3/22/2023       Admit Date: 3/11/2023    Visit Dx:     ICD-10-CM ICD-9-CM   1. Generalized weakness  R53.1 780.79   2. History of alcoholism (HCC)  F10.21 V11.3   3. Inability to walk  R26.2 719.7   4. Confusion  R41.0 298.9   5. Hypoglycemia  E16.2 251.2   6. Poor nutrition  E63.9 269.9   7. Cognitive communication deficit  R41.841 799.52   8. Impaired functional mobility, balance, gait, and endurance  Z74.09 V49.89   9. 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   • Postablative hypothyroidism   • Screen for colon cancer   • Other recurrent depressive disorders (HCC)   • Balance problem   • Generalized weakness   • Atrial fibrillation (HCC)   • Severe malnutrition (HCC)   • Meningioma (HCC)   • Rheumatoid vs Psoriatic arthritis (HCC)     Past Medical History:   Diagnosis Date   • Arthritis    • Hepatitis A    • Hypertension    • Melanoma (HCC) 2017    retina     Past Surgical History:   Procedure Laterality Date   • CRANIOTOMY FOR TUMOR N/A 3/21/2023    Procedure: CRANIOTOMY FOR TUMOR STEREOTACTIC WITH STEALTH;  Surgeon: Marlon Mckee MD;  Location: [facility];  Service: Neurosurgery;  Laterality: N/A;   • EYE SURGERY  2017    EYE CANCER RIGHT EYE   • FINGER  SURGERY Left     Pointer finger re-attached in 3rd Grade   • TONSILLECTOMY        General Information     Row Name 03/22/23 1059          OT Time and Intention    Document Type re-evaluation  -CS     Mode of Treatment speech-language pathology  -CS     Row Name 03/22/23 1059          General Information    Patient Profile Reviewed yes  -CS     Prior Level of Function --  see IE  -CS     Existing Precautions/Restrictions fall  impulsive, L sided weakness/inattention, blind R eye baseline  -CS     Barriers to Rehab medically complex;cognitive status;visual deficit  -CS     Row Name 03/22/23 1059          Living Environment    People in Home --  see IE  -CS     Row Name 03/22/23 1059          Cognition    Orientation Status (Cognition) oriented to;person;place;situation  -CS     Row Name 03/22/23 1059          Safety Issues, Functional Mobility    Impairments Affecting Function (Mobility) balance;cognition;coordination;endurance/activity tolerance;strength;postural/trunk control;motor planning;visual/perceptual  -CS     Cognitive Impairments, Mobility Safety/Performance attention;impulsivity;insight into deficits/self-awareness;sequencing abilities;awareness, need for assistance;safety precaution awareness;safety precaution follow-through;judgment;problem-solving/reasoning  -CS           User Key  (r) = Recorded By, (t) = Taken By, (c) = Cosigned By    Initials Name Provider Type    CS Phyllis Bowen, OT Occupational Therapist                 Mobility/ADL's     Row Name 03/22/23 1101          Transfers    Transfers sit-stand transfer;stand-sit transfer  -CS     Comment, (Transfers) HHA  -     Row Name 03/22/23 1101          Bed-Chair Transfer    Bed-Chair [place] (Transfers) verbal cues;minimum assist (75% patient effort)  -CS     Row Name 03/22/23 1101          Sit-Stand Transfer    Sit-Stand [place] (Transfers) minimum assist (75% patient effort);verbal cues  -     Row Name 03/22/23 1101           Activities of Daily Living    BADL Assessment/Intervention lower body dressing;grooming  -     Row Name 03/22/23 1101          Lower Body Dressing Assessment/Training    [place] Level (Lower Body Dressing) doff;don;socks;maximum assist (25% patient effort)  -     Position (Lower Body Dressing) supported sitting  -     Row Name 03/22/23 1101          Grooming Assessment/Training    [place] Level (Grooming) hair care, combing/brushing;maximum assist (25% patient effort)  -     Position (Grooming) supported sitting  -           User Key  (r) = Recorded By, (t) = Taken By, (c) = Cosigned By    Initials Name Provider Type     Phyllis Bowen, LUCERO Occupational Therapist               Obj/Interventions     Row Name 03/22/23 1101          Sensory Assessment (Somatosensory)    Sensory Assessment (Somatosensory) UE sensation intact  -[facility] Name 03/22/23 1101          Vision Assessment/Intervention    Visual Impairment/Limitations peripheral vision impaired right  -     Visual Processing Deficit visual attention, left  -     Row Name 03/22/23 1101          Range of Motion Comprehensive    General Range of Motion bilateral upper extremity ROM WFL  -     Row Name 03/22/23 1101          Strength Comprehensive (MMT)    Comment, General Manual Muscle Testing (MMT) Assessment LUE grossly 3+/5, RUE grossly 4/5  -     Row Name 03/22/23 1101          Motor Skills    Motor Skills coordination  -     Coordination left;upper extremity;minimal impairment  -     Row Name 03/22/23 1101          Balance    Balance Assessment sitting static balance;sitting dynamic balance;standing dynamic balance;standing static balance  -     Static Sitting Balance standby assist  -     Dynamic Sitting Balance standby assist  -CS     Position, Sitting Balance sitting in chair  -     Static Standing Balance contact guard  -     Dynamic Standing Balance minimal assist  -CS     Position/Device Used, Standing Balance  supported  -CS     Balance Interventions sitting;standing;static;dynamic;dynamic reaching;occupation based/functional task;weight shifting activity  -CS           User Key  (r) = Recorded By, (t) = Taken By, (c) = Cosigned By    Initials Name Provider Type    CS Phyllis Bowen OT Occupational Therapist               Goals/Plan     Row Name 03/22/23 1107          Transfer Goal 1 (OT)    Activity/Assistive Device (Transfer Goal 1, OT) sit-to-stand/stand-to-sit;toilet  -CS     [place] Level/Cues Needed (Transfer Goal 1, OT) standby assist  -CS     Time Frame (Transfer Goal 1, OT) long term goal (LTG);10 days  -CS     Progress/Outcome (Transfer Goal 1, OT) goal ongoing  -CS     Row Name 03/22/23 1107          Bathing Goal 1 (OT)    Progress/Outcomes (Bathing Goal 1, OT) goal no longer appropriate  -CS     Row Name 03/22/23 1107          Dressing Goal 1 (OT)    Activity/Device (Dressing Goal 1, OT) lower body dressing  -CS     [place]/Cues Needed (Dressing Goal 1, OT) standby assist  -CS     Time Frame (Dressing Goal 1, OT) long term goal (LTG);10 days  -CS     Strategies/Barriers (Dressing Goal 1, OT) undergarment/pants  -CS     Progress/Outcome (Dressing Goal 1, OT) goal ongoing  -CS     Row Name 03/22/23 1107          Toileting Goal 1 (OT)    Progress/Outcome (Toileting Goal 1, OT) goal ongoing  -CS     Row Name 03/22/23 1107          Grooming Goal 1 (OT)    Progress/Outcome (Grooming Goal 1, OT) goal ongoing  -CS     Row Name 03/22/23 1107          Therapy Assessment/Plan (OT)    Planned Therapy Interventions (OT) activity tolerance training;adaptive equipment training;BADL retraining;functional balance retraining;occupation/activity based interventions;ROM/therapeutic exercise;strengthening exercise;transfer/mobility retraining  -CS           User Key  (r) = Recorded By, (t) = Taken By, (c) = Cosigned By    Initials Name Provider Type    Phyllis De Dios OT Occupational Therapist               Clinical  Impression     Row Name 03/22/23 1104          Pain Scale: FACES Pre/Post-Treatment    Pain: FACES Scale, Pretreatment 0-->no hurt  -CS     Posttreatment Pain Rating 0-->no hurt  -CS     Row Name 03/22/23 1104          Plan of Care Review    Plan of Care Reviewed With patient  -CS     Progress improving  -CS     Outcome Evaluation OT Re-eval complete. Pt presents w/ mild L sided weakness, visual deficits, and balance deficits limiting functional independence from baseline. Pt requires frequent cueing throughout session for safety awareness. Recommend cont skilled IPOT POC to facilitate return to PLOF. Recommend pt DC to IP rehab.  -CS     Row Name 03/22/23 1104          Therapy Assessment/Plan (OT)    Patient/Family Therapy Goal Statement (OT) Return to PLOF  -CS     Rehab Potential (OT) good, to achieve stated therapy goals  -CS     Criteria for Skilled Therapeutic Interventions Met (OT) yes;skilled treatment is necessary  -CS     Therapy Frequency (OT) daily  -CS     Row Name 03/22/23 1104          Therapy Plan Review/Discharge Plan (OT)    Anticipated Discharge Disposition (OT) inpatient rehabilitation facility  -CS     Row Name 03/22/23 1104          Vital Signs    Pre Systolic BP Rehab --  VSS  -CS     O2 Delivery Pre Treatment room air  -CS     O2 Delivery Post Treatment room air  -CS     Pre Patient Position Sitting  -CS     Intra Patient Position Standing  -CS     Post Patient Position Sitting  -CS     Row Name 03/22/23 1104          Positioning and Restraints    Pre-Treatment Position sitting in chair/recliner  -CS     Post Treatment Position chair  -CS     In Chair notified nsg;call light within reach;encouraged to call for assist;sitting;with PT;exit alarm on;waffle cushion  -CS           User Key  (r) = Recorded By, (t) = Taken By, (c) = Cosigned By    Initials Name Provider Type    CS Phyllis Bowen, OT Occupational Therapist               Outcome Measures     Row Name 03/22/23 1108          How much  help from another is currently needed...    Putting on and taking off regular lower body clothing? 2  -CS     Bathing (including washing, rinsing, and drying) 2  -CS     Toileting (which includes using toilet bed pan or urinal) 2  -CS     Putting on and taking off regular upper body clothing 3  -CS     Taking care of personal grooming (such as brushing teeth) 2  -CS     Eating meals 3  -CS     AM-PAC 6 Clicks Score (OT) 14  -CS     Row Name 03/22/23 0739          How much help from another person do you currently need...    Turning from your back to your side while in flat bed without using bedrails? 4  -AS (r) MW (t) AS (c)     Moving from lying on back to sitting on the side of a flat bed without bedrails? 3  -AS (r) MW (t) AS (c)     Moving to and from a bed to a chair (including a wheelchair)? 3  -AS (r) MW (t) AS (c)     Standing up from a chair using your arms (e.g., wheelchair, bedside chair)? 3  -AS (r) MW (t) AS (c)     Climbing 3-5 steps with a railing? 2  -AS (r) MW (t) AS (c)     To walk in hospital room? 2  -AS (r) MW (t) AS (c)     AM-PAC 6 Clicks Score (PT) 17  -AS (r) MW (t)     Highest level of mobility 5 --> Static standing  -AS (r) MW (t)     Row Name 03/22/23 1108          Functional Assessment    Outcome Measure Options AM-PAC 6 Clicks Daily Activity (OT)  -CS           User Key  (r) = Recorded By, (t) = Taken By, (c) = Cosigned By    Initials Name Provider Type    Phyllis De Dios OT Occupational Therapist    AS Ashley Stevens, RN Registered Nurse    Ximena Orlando, Nursing Student Nursing Student                Occupational Therapy Education     Title: PT OT SLP Therapies (In Progress)     Topic: Occupational Therapy (In Progress)     Point: ADL training (In Progress)     Description:   Instruct learner(s) on proper safety adaptation and remediation techniques during self care or transfers.   Instruct in proper use of assistive devices.              Learning Progress Summary            Patient Acceptance, E, NR by  at 3/22/2023 1108    Acceptance, E, VU,NR by [facility] at 3/13/2023 1156    Comment: reason for consult, noted deficits, walker safety, concern over home return                   Point: Home exercise program (Not Started)     Description:   Instruct learner(s) on appropriate technique for monitoring, assisting and/or progressing therapeutic exercises/activities.              Learner Progress:  Not documented in this visit.          Point: Precautions (In Progress)     Description:   Instruct learner(s) on prescribed precautions during self-care and functional transfers.              Learning Progress Summary           Patient Acceptance, E, NR by  at 3/22/2023 1108    Acceptance, E, VU,NR by [facility] at 3/13/2023 1156    Comment: reason for consult, noted deficits, walker safety, concern over home return                   Point: Body mechanics (In Progress)     Description:   Instruct learner(s) on proper positioning and spine alignment during self-care, functional mobility activities and/or exercises.              Learning Progress Summary           Patient Acceptance, E, NR by  at 3/22/2023 1108                               User Key     Initials Effective Dates Name Provider Type Discipline     02/03/23 -  Monae Cohen, OT Occupational Therapist OT     09/02/21 -  Phyllis Bowen OT Occupational Therapist OT              OT Recommendation and Plan  Planned Therapy Interventions (OT): activity tolerance training, adaptive equipment training, BADL retraining, functional balance retraining, occupation/activity based interventions, ROM/therapeutic exercise, strengthening exercise, transfer/mobility retraining  Therapy Frequency (OT): daily  Plan of Care Review  Plan of Care Reviewed With: patient  Progress: improving  Outcome Evaluation: OT Re-eval complete. Pt presents w/ mild L sided weakness, visual deficits, and balance deficits limiting functional independence from baseline. Pt  requires frequent cueing throughout session for safety awareness. Recommend cont skilled IPOT POC to facilitate return to PLOF. Recommend pt DC to IP rehab.     Time Calculation:    Time Calculation- OT     Row Name 03/22/23 1109             Time Calculation- OT    OT Start Time 0958  -CS      OT Received On 03/22/23  -CS      OT Goal Re-Cert Due Date 04/01/23  -CS         Timed Charges    18882 - OT Therapeutic Activity Minutes 5  -CS      18344 - OT Self Care/Mgmt Minutes 5  -CS         Untimed Charges    OT Eval/Re-eval Minutes 20  -CS         Total Minutes    Timed Charges Total Minutes 10  -CS      Untimed Charges Total Minutes 20  -CS       Total Minutes 30  -CS            User Key  (r) = Recorded By, (t) = Taken By, (c) = Cosigned By    Initials Name Provider Type    CS Phyllis Bowen OT Occupational Therapist              Therapy Charges for Today     Code Description Service Date Service Provider Modifiers Qty    [id number]  OT THERAPEUTIC ACT EA 15 MIN 3/22/2023 Phyllis Bowen OT GO 1    [id number] HC OT RE-EVAL 2 3/22/2023 Phyllis Bowen OT GO 1               Phyllis Bowen OT  3/22/2023    Electronically signed by Phyllis Bowen OT at 03/22/23 1110

## 2023-03-27 NOTE — THERAPY TREATMENT NOTE
Patient Name: Monty Nagel  : 1955    MRN: [id number]                              Today's Date: 3/27/2023       Admit Date: 3/11/2023    Visit Dx:     ICD-10-CM ICD-9-CM   1. Generalized weakness  R53.1 780.79   2. History of alcoholism (HCC)  F10.21 V11.3   3. Inability to walk  R26.2 719.7   4. Confusion  R41.0 298.9   5. Hypoglycemia  E16.2 251.2   6. Poor nutrition  E63.9 269.9   7. Cognitive communication deficit  R41.841 799.52   8. Impaired functional mobility, balance, gait, and endurance  Z74.09 V49.89   9. 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   • Postablative hypothyroidism   • Screen for colon cancer   • Other recurrent depressive disorders (HCC)   • Balance problem   • Generalized weakness   • Atrial fibrillation (HCC)   • Severe malnutrition (HCC)   • Meningioma (HCC)   • Rheumatoid vs Psoriatic arthritis (HCC)     Past Medical History:   Diagnosis Date   • Arthritis    • Hepatitis A    • Hypertension    • Melanoma (HCC) 2017    retina     Past Surgical History:   Procedure Laterality Date   • CRANIOTOMY FOR TUMOR N/A 3/21/2023    Procedure: CRANIOTOMY FOR TUMOR STEREOTACTIC WITH STEALTH;  Surgeon: Marlon Mckee MD;  Location: [facility] OR;  Service: Neurosurgery;  Laterality: N/A;   • EMBOLIZATION CEREBRAL N/A 3/20/2023    Procedure: Tumor Emoblization radial access;  Surgeon: Ozzy Sebastian MD;  Location: [facility] INVASIVE LOCATION;  Service: Interventional Radiology;  Laterality: N/A;   • EYE SURGERY  2017    EYE CANCER RIGHT EYE   • FINGER SURGERY Left     Pointer finger re-attached in 3rd Grade   • TONSILLECTOMY        General Information     Row Name 23 1521          Physical Therapy Time and Intention    Document Type therapy note (daily note)  -SS     Mode of Treatment physical therapy  -SS     Row Name 23 1521          General  Information    Patient Profile Reviewed yes  -     Existing Precautions/Restrictions fall;other (see comments)  impulsive, L sided weakness/inattention, R visual deficit  -     Barriers to Rehab medically complex;cognitive status;visual deficit  -     Row Name 03/27/23 1521          Cognition    Orientation Status (Cognition) oriented x 3  -     Row Name 03/27/23 1521          Safety Issues, Functional Mobility    Safety Issues Affecting Function (Mobility) awareness of need for assistance;impulsivity;insight into deficits/self-awareness;judgment;positioning of assistive device;problem-solving;safety precaution awareness;safety precautions follow-through/compliance;sequencing abilities  -     Impairments Affecting Function (Mobility) balance;cognition;coordination;endurance/activity tolerance;strength;postural/trunk control;motor planning;visual/perceptual;pain  -     Cognitive Impairments, Mobility Safety/Performance awareness, need for assistance;impulsivity;insight into deficits/self-awareness;judgment;problem-solving/reasoning;safety precaution awareness;safety precaution follow-through;sequencing abilities  -           User Key  (r) = Recorded By, (t) = Taken By, (c) = Cosigned By    Initials Name Provider Type     Hyacinth King PT Physical Therapist               Mobility     Row Name 03/27/23 1525          Bed Mobility    Bed Mobility sit-supine;scooting/bridging  -     Scooting/Bridging [place] (Bed Mobility) verbal cues;standby assist  -     Sit-Supine [place] (Bed Mobility) standby assist;verbal cues  -     Assistive Device (Bed Mobility) head of bed elevated  -     Comment, (Bed Mobility) VC for hand placement  -     Row Name 03/27/23 1525          Sit-Stand Transfer    Sit-Stand [place] (Transfers) contact guard;1 person assist  -     Assistive Device (Sit-Stand Transfers) walker, front-wheeled  -     Comment, (Sit-Stand Transfer) VC for hand placement,  "appropriate alignment  -     Row Name 03/27/23 1525          Gait/Stairs (Locomotion)    [place] Level (Gait) minimum assist (75% patient effort);verbal cues;nonverbal cues (demo/gesture)  -     Assistive Device (Gait) walker, front-wheeled  -     Distance in Feet (Gait) 300  -SS     Deviations/Abnormal Patterns (Gait) bilateral deviations;dima decreased;gait speed decreased;stride length decreased;base of support, wide  -     Bilateral Gait Deviations forward flexed posture;heel strike decreased  -     Left Sided Gait Deviations foot drop/toe drag  -     Comment, (Gait/Stairs) Pt. ambulated with a step through gait pattern w/B foot flat. VC for upright posture, decreased shoulder elevation, body positioning within walker, heel toe gait pattern. Pt. requires frequent safety cueing and he does state \"I need to go faster\" again during this session. Limited carryover with cueing. Gait limited by fatigue.  -           User Key  (r) = Recorded By, (t) = Taken By, (c) = Cosigned By    Initials Name Provider Type     Hyacinth King, PT Physical Therapist               Obj/Interventions     Row Name 03/27/23 1530          Motor Skills    Therapeutic Exercise hip;knee;ankle  -     Row Name 03/27/23 1530          Hip (Therapeutic Exercise)    Hip (Therapeutic Exercise) strengthening exercise  -     Hip Strengthening (Therapeutic Exercise) bilateral;aBduction;aDduction;heel slides;marching while seated;15 repititions  -     Row Name 03/27/23 1530          Knee (Therapeutic Exercise)    Knee (Therapeutic Exercise) strengthening exercise  -     Knee Strengthening (Therapeutic Exercise) bilateral;SLR (straight leg raise);LAQ (long arc quad);15 repititions  -     Row Name 03/27/23 1530          Balance    Balance Assessment sitting static balance;sitting dynamic balance;sit to stand dynamic balance;standing static balance;standing dynamic balance  -     Static Sitting Balance standby assist  - "     Dynamic Sitting Balance standby assist  -SS     Position, Sitting Balance unsupported;sitting edge of bed  -SS     Sit to Stand Dynamic Balance contact guard  -SS     Static Standing Balance contact guard  -SS     Dynamic Standing Balance minimal assist  -SS     Position/Device Used, Standing Balance supported;walker, front-wheeled  -SS     Balance Interventions sitting;standing;sit to stand;supported;static;dynamic;dynamic reaching;occupation based/functional task  -SS     Comment, Balance pt. requires frequent cues for safety awareness/recommendations w/dual tasking activities and AD management; pt. posture noted to have B knee flexion w/COG displaced posteriorly and he verbalizes several times feeling unstable  -SS           User Key  (r) = Recorded By, (t) = Taken By, (c) = Cosigned By    Initials Name Provider Type    SS Hyacinth King, PT Physical Therapist               Goals/Plan     Row Name 03/27/23 1539          Bed Mobility Goal 1 (PT)    Activity/Assistive Device (Bed Mobility Goal 1, PT) sit to supine/supine to sit  -SS     [place] Level/Cues Needed (Bed Mobility Goal 1, PT) modified independence  -SS     Time Frame (Bed Mobility Goal 1, PT) long term goal (LTG);10 days  -SS     Progress/Outcomes (Bed Mobility Goal 1, PT) good progress toward goal;goal revised this date;goal ongoing  -SS     Row Name 03/27/23 1539          Gait Training Goal 1 (PT)    Activity/Assistive Device (Gait Training Goal 1, PT) gait (walking locomotion);assistive device use;walker, rolling;decrease fall risk;diminish gait deviation;improve balance and speed  -SS     [place] Level (Gait Training Goal 1, PT) modified independence  -SS     Distance (Gait Training Goal 1, PT) 500  -SS     Time Frame (Gait Training Goal 1, PT) long term goal (LTG);10 days  -SS     Progress/Outcome (Gait Training Goal 1, PT) good progress toward goal;goal revised this date;goal ongoing  -SS           User Key  (r) = Recorded By, (t) =  Taken By, (c) = Cosigned By    Initials Name Provider Type     Hyacinth King, PT Physical Therapist               Clinical Impression     Row Name 03/27/23 4849          Pain    Pretreatment Pain Rating 0/10 - no pain  -     Posttreatment Pain Rating 0/10 - no pain  -     Pain Intervention(s) Repositioned;Ambulation/increased activity;Elevated  -     Additional Documentation Pain Scale: Numbers Pre/Post-Treatment (Group)  -     Row Name 03/27/23 5100          Plan of Care Review    Plan of Care Reviewed With patient  -SS     Progress improving  -     Outcome Evaluation Pt. presents below baseline function w/generalized weakness, balance deficits, limited safety awareness and decreased functional endurance affecting his ability to safely participate in functional mobility. He performed bed mobility w/stand by assist, transfers w/contact guard assist and ambulated 300' w/front wheeled walker, contact guard assist. Pt. requires frequent cueing for safety recommendations w/limited carryover. He tolerated ther-ex and dual tasking activities well to improve strength and balance. Will continue IPPT to progress pt. as tolerated. Recommend inpatient rehab upon discharge.  -     Row Name 03/27/23 8437          Therapy Assessment/Plan (PT)    Rehab Potential (PT) good, to achieve stated therapy goals  -     Criteria for Skilled Interventions Met (PT) yes;skilled treatment is necessary;meets criteria  -     Therapy Frequency (PT) daily  -     Row Name 03/27/23 1948          Vital Signs    Pre Systolic BP Rehab 126  -SS     Pre Treatment Diastolic BP 91  -SS     Post Systolic BP Rehab 136  -SS     Post Treatment Diastolic BP 98  -SS     Pretreatment Heart Rate (beats/min) 99  -SS     Posttreatment Heart Rate (beats/min) 85  -SS     Pre Patient Position Supine  -     Post Patient Position Supine  -     Row Name 03/27/23 1124          Positioning and Restraints    Pre-Treatment Position bathroom  -      Post Treatment Position bed  -SS     In Bed notified nsg;fowlers;call light within reach;encouraged to call for assist;exit alarm on  pt. declined SCDs  -           User Key  (r) = Recorded By, (t) = Taken By, (c) = Cosigned By    Initials Name Provider Type    Hyacinth Torres, RIC Physical Therapist               Outcome Measures     Row Name 03/27/23 1541 03/27/23 0800       How much help from another person do you currently need...    Turning from your back to your side while in flat bed without using bedrails? 3  -SS 4  -AC    Moving from lying on back to sitting on the side of a flat bed without bedrails? 3  -SS 3  -AC    Moving to and from a bed to a chair (including a wheelchair)? 3  -SS 3  -AC    Standing up from a chair using your arms (e.g., wheelchair, bedside chair)? 3  -SS 3  -AC    Climbing 3-5 steps with a railing? 2  -SS 2  -AC    To walk in hospital room? 3  -SS 4  -AC    AM-PAC 6 Clicks Score (PT) 17  -SS 19  -AC    Highest level of mobility 5 --> Static standing  -SS 6 --> Walked 10 steps or more  -AC    Row Name 03/27/23 1541          Functional Assessment    Outcome Measure Options AM-PAC 6 Clicks Basic Mobility (PT)  -           User Key  (r) = Recorded By, (t) = Taken By, (c) = Cosigned By    Initials Name Provider Type     Karen Avila RN Registered Nurse    Hyacinth Torres PT Physical Therapist                             Physical Therapy Education     Title: PT OT SLP Therapies (In Progress)     Topic: Physical Therapy (Done)     Point: Mobility training (Done)     Learning Progress Summary           Patient Eager, E, VU,DU,NR by  at 3/27/2023 1541    Comment: Reviewed safety/technique w/bed mobility, transfers, ambulation, HEP, safety awareness/recommendations    Acceptance, E, NR by AE at 3/24/2023 1520    Acceptance, E,D, VU,NR by HP at 3/23/2023 1127    Acceptance, E,TB, NR by AY at 3/22/2023 1141    Acceptance, E, VU,NR by CM at 3/19/2023 1149    Acceptance, E, VU by CM at  3/17/2023 1517    Acceptance, E, VU,NR by LH at 3/15/2023 1407    Acceptance, E, NR by KG at 3/14/2023 1428    Acceptance, E, VU,NR by SJ at 3/12/2023 1542                   Point: Home exercise program (Done)     Learning Progress Summary           Patient Eager, E, VU,DU,NR by SS at 3/27/2023 1541    Comment: Reviewed safety/technique w/bed mobility, transfers, ambulation, HEP, safety awareness/recommendations    Acceptance, E, NR by AE at 3/24/2023 1520    Acceptance, E,D, VU,NR by HP at 3/23/2023 1127    Acceptance, E,TB, NR by AY at 3/22/2023 1141    Acceptance, E, VU,NR by CM at 3/19/2023 1149    Acceptance, E, VU,NR by LH at 3/15/2023 1407    Acceptance, E, NR by KG at 3/14/2023 1428                   Point: Body mechanics (Done)     Learning Progress Summary           Patient Eager, E, VU,DU,NR by SS at 3/27/2023 1541    Comment: Reviewed safety/technique w/bed mobility, transfers, ambulation, HEP, safety awareness/recommendations    Acceptance, E, NR by AE at 3/24/2023 1520    Acceptance, E,D, VU,NR by HP at 3/23/2023 1127    Acceptance, E,TB, NR by AY at 3/22/2023 1141    Acceptance, E, VU,NR by CM at 3/19/2023 1149    Acceptance, E, VU by CM at 3/17/2023 1517    Acceptance, E, VU,NR by LH at 3/15/2023 1407    Acceptance, E, NR by KG at 3/14/2023 1428    Acceptance, E, VU,NR by SJ at 3/12/2023 1542                   Point: Precautions (Done)     Learning Progress Summary           Patient Eager, E, VU,DU,NR by SS at 3/27/2023 1541    Comment: Reviewed safety/technique w/bed mobility, transfers, ambulation, HEP, safety awareness/recommendations    Acceptance, E, NR by AE at 3/24/2023 1520    Acceptance, E,D, VU,NR by HP at 3/23/2023 1127    Acceptance, E,TB, NR by AY at 3/22/2023 1141    Acceptance, E, VU,NR by CM at 3/19/2023 1149    Acceptance, E, VU by CM at 3/17/2023 1517    Acceptance, E, VU,NR by LH at 3/15/2023 1407    Acceptance, E, NR by KG at 3/14/2023 1428    Acceptance, E, VU,NR by SJ at  3/12/2023 1542                               User Key     Initials Effective Dates Name Provider Type Discipline    SJ 02/03/23 - 03/12/23 Samara Harrison, PT Physical Therapist PT    KG 05/22/20 -  Cherelle Gaitan, PT Physical Therapist PT    AY 11/10/20 -  Sandee Spencer, PT Physical Therapist PT    LH 09/21/21 -  Tristian Ravi, PT Physical Therapist PT    HP 06/01/21 -  Kelly Mckeon, PT Physical Therapist PT    SS 06/01/21 -  Hyacinth King, PT Physical Therapist PT    AE 09/21/21 -  Andrew Centeno, PT Physical Therapist PT    CM 09/22/22 -  Yanet Calvin, PT Physical Therapist PT              PT Recommendation and Plan     Plan of Care Reviewed With: patient  Progress: improving  Outcome Evaluation: Pt. presents below baseline function w/generalized weakness, balance deficits, limited safety awareness and decreased functional endurance affecting his ability to safely participate in functional mobility. He performed bed mobility w/stand by assist, transfers w/contact guard assist and ambulated 300' w/front wheeled walker, contact guard assist. Pt. requires frequent cueing for safety recommendations w/limited carryover. He tolerated ther-ex and dual tasking activities well to improve strength and balance. Will continue IPPT to progress pt. as tolerated. Recommend inpatient rehab upon discharge.     Time Calculation:    PT Charges     Row Name 03/27/23 1543             Time Calculation    Start Time 1421  -SS      Stop Time 1444  -SS      Time Calculation (min) 23 min  -SS      PT Received On 03/27/23  -SS         Time Calculation- PT    Total Timed Code Minutes- PT 23 minute(s)  -SS         Timed Charges    15598 - PT Therapeutic Exercise Minutes 8  -SS      98323 -  PT Neuromuscular Reeducation Minutes 5  -SS      61933 - Gait Training Minutes  10  -SS         Total Minutes    Timed Charges Total Minutes 23  -SS       Total Minutes 23  -SS            User Key  (r) = Recorded By, (t) = Taken  By, (c) = Cosigned By    Initials Name Provider Type     Hyacinth King, PT Physical Therapist              Therapy Charges for Today     Code Description Service Date Service Provider Modifiers Qty    08545978116 HC PT THER PROC EA 15 MIN 3/27/2023 Hyacinht King, PT GP 1    56498925726 HC GAIT TRAINING EA 15 MIN 3/27/2023 Hyacinth King, PT GP 1          PT G-Codes  Outcome Measure Options: AM-PAC 6 Clicks Basic Mobility (PT)  AM-PAC 6 Clicks Score (PT): 17  AM-PAC 6 Clicks Score (OT): 14  PT Discharge Summary  Anticipated Discharge Disposition (PT): inpatient rehabilitation facility    Hyacinth King PT  3/27/2023

## 2023-03-27 NOTE — PLAN OF CARE
Goal Outcome Evaluation:   SLP treatment completed. Will continue to address communication. Please see note for further details and recommendations.

## 2023-03-27 NOTE — PLAN OF CARE
Goal Outcome Evaluation:  Plan of Care Reviewed With: patient        Progress: improving  Outcome Evaluation: Pt. presents below baseline function w/generalized weakness, balance deficits, limited safety awareness and decreased functional endurance affecting his ability to safely participate in functional mobility. He performed bed mobility w/stand by assist, transfers w/contact guard assist and ambulated 300' w/front wheeled walker, contact guard assist. Pt. requires frequent cueing for safety recommendations w/limited carryover. He tolerated ther-ex and dual tasking activities well to improve strength and balance. Will continue IPPT to progress pt. as tolerated. Recommend inpatient rehab upon discharge.

## 2023-03-27 NOTE — THERAPY TREATMENT NOTE
Acute Care - Speech Language Pathology Treatment Note  [facility]     Patient Name: Monty Nagel  : 1955  MRN: [id number]  Today's Date: 3/27/2023               Admit Date: 3/11/2023     Visit Dx:    ICD-10-CM ICD-9-CM   1. Generalized weakness  R53.1 780.79   2. History of alcoholism (HCC)  F10.21 V11.3   3. Inability to walk  R26.2 719.7   4. Confusion  R41.0 298.9   5. Hypoglycemia  E16.2 251.2   6. Poor nutrition  E63.9 269.9   7. Cognitive communication deficit  R41.841 799.52   8. Impaired functional mobility, balance, gait, and endurance  Z74.09 V49.89   9. 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   • Postablative hypothyroidism   • Screen for colon cancer   • Other recurrent depressive disorders (HCC)   • Balance problem   • Generalized weakness   • Atrial fibrillation (HCC)   • Severe malnutrition (HCC)   • Meningioma (HCC)   • Rheumatoid vs Psoriatic arthritis (HCC)     Past Medical History:   Diagnosis Date   • Arthritis    • Hepatitis A    • Hypertension    • Melanoma (HCC) 2017    retina     Past Surgical History:   Procedure Laterality Date   • CRANIOTOMY FOR TUMOR N/A 3/21/2023    Procedure: CRANIOTOMY FOR TUMOR STEREOTACTIC WITH STEALTH;  Surgeon: Marlon Mckee MD;  Location: [facility] OR;  Service: Neurosurgery;  Laterality: N/A;   • EMBOLIZATION CEREBRAL N/A 3/20/2023    Procedure: Tumor Emoblization radial access;  Surgeon: Ozzy Sebastian MD;  Location: [facility] INVASIVE LOCATION;  Service: Interventional Radiology;  Laterality: N/A;   • EYE SURGERY  2017    EYE CANCER RIGHT EYE   • FINGER SURGERY Left     Pointer finger re-attached in 3rd Grade   • TONSILLECTOMY         SLP Recommendation and Plan                 Anticipated Discharge Disposition (SLP): skilled nursing facility, anticipate therapy at next level of care (23 1605)        Predicted  Duration Therapy Intervention (Days): until discharge (03/27/23 1605)     Daily Summary of Progress (SLP): progress toward functional goals as expected (03/27/23 1605)           Treatment Assessment (SLP): continued, cognitive-linguistic disorder (03/27/23 1605)  Treatment Assessment Comments (SLP): Pt cont to present w/ cognitive linguistic deficits, attention deficits thought to be impacting accuracy throughout session. Added/adjusted goals as appropriate. SLP will cont to f/u for tx (03/27/23 1605)  Plan for Continued Treatment (SLP): continue treatment per plan of care (03/27/23 1605)         SLP EVALUATION (last 72 hours)     SLP SLC Evaluation     Row Name 03/27/23 1605                   Communication Assessment/Intervention    Document Type therapy note (daily note)  -        Subjective Information no complaints  -        Patient Observations alert;cooperative  -        Patient/Family/Caregiver Comments/Observations No family present  -        Patient Effort adequate  -        Symptoms Noted During/After Treatment none  -           Pain    Additional Documentation Pain Scale: FACES Pre/Post-Treatment (Group)  -           Pain Scale: FACES Pre/Post-Treatment    Pain: FACES Scale, Pretreatment 0-->no hurt  -        Posttreatment Pain Rating 0-->no hurt  -           SLP Treatment Clinical Impressions    Treatment Assessment (SLP) continued;cognitive-linguistic disorder  -        Treatment Assessment Comments (SLP) Pt cont to present w/ cognitive linguistic deficits, attention deficits thought to be impacting accuracy throughout session. Added/adjusted goals as appropriate. SLP will cont to f/u for tx  -        Daily Summary of Progress (SLP) progress toward functional goals as expected  -        Plan for Continued Treatment (SLP) continue treatment per plan of care  -        Care Plan Review care plan/treatment goals reviewed  -           Recommendations    Therapy Frequency (SLP SLC)  3 days per week  -        Predicted Duration Therapy Intervention (Days) until discharge  -        Anticipated Discharge Disposition (SLP) skilled nursing facility;anticipate therapy at next level of care  -              User Key  (r) = Recorded By, (t) = Taken By, (c) = Cosigned By    Initials Name Effective Dates    KARINA Bharti Glover, MS, CFY-SLP 06/22/22 -                    EDUCATION  The patient has been educated in the following areas:     Cognitive Impairment Communication Impairment.           SLP GOALS     Row Name 03/27/23 1605 03/27/23 1535          Patient will demonstrate functional language skills for return to discharge environment     [place] with minimal cues  -MH --  -MH     Time frame by discharge  -MH --  -MH     Progress/Outcomes continuing progress toward goal  -MH --  -MH        Patient will demonstrate functional cognitive-linguistic skills for return to discharge environment    [place] with minimal cues  -MH --  -MH     Time frame by discharge  -MH --  -MH     Progress/Outcomes continuing progress toward goal  -MH --  -MH        Connected Speech to Express Thoughts Goal 1 (SLP)    Improve Narrative Discourse to Express Thoughts By Goal 1 (SLP) conversational task on a given topic;conversational task, self-directed;80%;with minimal cues (75-90%)  -MH --  -MH     Time Frame (Connected Speech Goal 1, SLP) short term goal (STG)  -MH --  -MH     Progress (Connected Speech Goal 1, SLP) 40%;with minimal cues (75-90%)  -MH --  -MH     Progress/Outcomes (Connected Speech Goal 1, SLP) continuing progress toward goal  -MH --  -MH        [place] Goal 1 (SLP)    Improve [place] by Goal 1 (SLP) increasing rate;80%;with minimal cues (75-90%)  -MH --  -MH     Time Frame ([place] Goal 1, SLP) short term goal (STG)  -MH --  -MH     Progress ([place] Goal 1, SLP) 30%;with minimal cues (75-90%)  -MH --  -MH     Progress/Outcomes ([place] Goal 1, SLP) continuing progress toward goal  -MH --   "-MH        Memory Skills Goal 1 (SLP)    Improve Memory Skills Through Goal 1 (SLP) use memory strategies;recall details of the day;visual memory task;80%;with minimal cues (75-90%)  -MH --  -MH     Time Frame (Memory Skills Goal 1, SLP) short term goal (STG)  -MH --  -MH     Progress (Memory Skills Goal 1, SLP) 60%;with minimal cues (75-90%)  -MH --  -MH     Progress/Outcomes (Memory Skills Goal 1, SLP) continuing progress toward goal  -MH --  -MH     Comment (Memory Skills Goal 1, SLP) Reviewed general memory strategies  -MH --  -MH        Reasoning Goal 1 (SLP)    Improve Reasoning Through Goal 1 (SLP) complete basic reasoning task;complete mental flexibility task;complete deductive reasoning task;80%;with minimal cues (75-90%)  -MH --  -MH     Time Frame (Reasoning Goal 1, SLP) short term goal (STG)  -MH --  -MH     Progress (Reasoning Goal 1, SLP) 70%;with minimal cues (75-90%)  -MH --  -MH     Progress/Outcomes (Reasoning Goal 1, SLP) continuing progress toward goal  -MH --  -MH     Comment (Reasoning Goal 1, SLP) basic/deductive reasoning  -MH --  -MH        Functional Problem Solving Skills Goal 1 (SLP)    Improve Problem Solving Through Goal 1 (SLP) determine solutions to multifactorial problems;determine multiple solutions to problems;answer \"what if\" questions;80%;with minimal cues (75-90%)  -MH --  -MH     Time Frame (Problem Solving Goal 1, SLP) short term goal (STG)  -MH --  -MH     Progress/Outcomes (Problem Solving Goal 1, SLP) new goal  -MH --  -MH        Executive Functional Skills Goal 1 (SLP)    Improve Executive Function Skills Goal 1 (SLP) identify anticipated needs;home management activity;exhibit cognitive flexibility;perform self-correction;perform self-evaluation;80%;with minimal cues (75-90%)  -MH --  -MH     Time Frame (Executive Function Skills Goal 1, SLP) short term goal (STG)  -MH --  -MH     Progress (Executive Function Skills Goal 1, SLP) 30%;with minimal cues (75-90%)  -MH --  -MH  "    Progress/Outcomes (Executive Function Skills Goal 1, SLP) continuing progress toward goal  -MH --  -MH     Comment (Executive Function Skills Goal 1, SLP) Pt demonstrates awareness of errors during conversation, inc difficulty w/ self correction  -MH --  -MH           User Key  (r) = Recorded By, (t) = Taken By, (c) = Cosigned By    Initials Name Provider Type    Bharti Hurley MS, CFY-SLP Speech and Language Pathologist                        Time Calculation:      Time Calculation- SLP     Row Name 03/27/23 1704             Time Calculation- SLP    SLP Start Time 1605  -      SLP Received On 03/27/23  -         Untimed Charges    49310-ZH Treatment/ST Modification Prosth Aug Alter  35  -MH         Total Minutes    Untimed Charges Total Minutes 35  -MH       Total Minutes 35  -MH            User Key  (r) = Recorded By, (t) = Taken By, (c) = Cosigned By    Initials Name Provider Type    Bharti Hurley MS, CFY-SLP Speech and Language Pathologist                Therapy Charges for Today     Code Description Service Date Service Provider Modifiers Qty    [id number]  ST TREATMENT SPEECH 2 3/27/2023 Bharti Glover MS, CFLUZ-SLP GN 1                     Bharti Glover MS, ADITI-SLP  3/27/2023

## 2023-03-27 NOTE — PLAN OF CARE
Incisions to mid frontal lobe NAZARIO, free of drainage/swelling. VSS on RA, A fib on cardiac mx. Rate controlled in 70s to 80s. Patient denies pain/discomfort at this time. UOP ADQ. BP closely monitored. Call light in reach.

## 2023-03-28 ENCOUNTER — APPOINTMENT (OUTPATIENT)
Dept: MRI IMAGING | Facility: HOSPITAL | Age: 68
End: 2023-03-28
Payer: MEDICARE

## 2023-03-28 PROCEDURE — 99231 SBSQ HOSP IP/OBS SF/LOW 25: CPT | Performed by: THORACIC SURGERY (CARDIOTHORACIC VASCULAR SURGERY)

## 2023-03-28 PROCEDURE — 97116 GAIT TRAINING THERAPY: CPT

## 2023-03-28 PROCEDURE — 25010000002 ENOXAPARIN PER 10 MG: Performed by: NEUROLOGICAL SURGERY

## 2023-03-28 PROCEDURE — 0 GADOBENATE DIMEGLUMINE 529 MG/ML SOLUTION: Performed by: INTERNAL MEDICINE

## 2023-03-28 PROCEDURE — 99232 SBSQ HOSP IP/OBS MODERATE 35: CPT | Performed by: INTERNAL MEDICINE

## 2023-03-28 PROCEDURE — 63710000001 DEXAMETHASONE PER 0.25 MG: Performed by: NEUROLOGICAL SURGERY

## 2023-03-28 PROCEDURE — 97535 SELF CARE MNGMENT TRAINING: CPT

## 2023-03-28 PROCEDURE — A9577 INJ MULTIHANCE: HCPCS | Performed by: INTERNAL MEDICINE

## 2023-03-28 PROCEDURE — 73720 MRI LWR EXTREMITY W/O&W/DYE: CPT

## 2023-03-28 RX ORDER — DEXAMETHASONE 2 MG/1
2 TABLET ORAL
Qty: 4 TABLET | Refills: 0 | Status: SHIPPED | OUTPATIENT
Start: 2023-03-28 | End: 2023-03-29

## 2023-03-28 RX ORDER — QUETIAPINE FUMARATE 25 MG/1
25 TABLET, FILM COATED ORAL NIGHTLY
Status: COMPLETED | OUTPATIENT
Start: 2023-03-28 | End: 2023-03-29

## 2023-03-28 RX ORDER — QUETIAPINE FUMARATE 25 MG/1
25 TABLET, FILM COATED ORAL NIGHTLY
Status: DISCONTINUED | OUTPATIENT
Start: 2023-03-28 | End: 2023-03-28

## 2023-03-28 RX ORDER — DEXAMETHASONE 4 MG/1
TABLET ORAL
Qty: 12 TABLET | Refills: 0 | Status: SHIPPED | OUTPATIENT
Start: 2023-03-28 | End: 2023-04-19 | Stop reason: HOSPADM

## 2023-03-28 RX ORDER — DEXAMETHASONE 4 MG/1
4 TABLET ORAL
Qty: 4 TABLET | Refills: 0 | Status: SHIPPED | OUTPATIENT
Start: 2023-03-28 | End: 2023-03-29

## 2023-03-28 RX ORDER — DEXAMETHASONE 2 MG/1
TABLET ORAL
Qty: 12 TABLET | Refills: 0 | Status: SHIPPED | OUTPATIENT
Start: 2023-03-28 | End: 2023-04-19 | Stop reason: HOSPADM

## 2023-03-28 RX ADMIN — DEXAMETHASONE 4 MG: 4 TABLET ORAL at 23:27

## 2023-03-28 RX ADMIN — ATORVASTATIN CALCIUM 40 MG: 40 TABLET, FILM COATED ORAL at 20:51

## 2023-03-28 RX ADMIN — PANTOPRAZOLE SODIUM 40 MG: 40 TABLET, DELAYED RELEASE ORAL at 04:43

## 2023-03-28 RX ADMIN — DOCUSATE SODIUM 100 MG: 100 CAPSULE, LIQUID FILLED ORAL at 20:50

## 2023-03-28 RX ADMIN — DEXAMETHASONE 4 MG: 4 TABLET ORAL at 11:45

## 2023-03-28 RX ADMIN — LEVETIRACETAM 1000 MG: 500 TABLET, FILM COATED ORAL at 20:50

## 2023-03-28 RX ADMIN — DEXAMETHASONE 4 MG: 4 TABLET ORAL at 17:46

## 2023-03-28 RX ADMIN — Medication 10 ML: at 20:51

## 2023-03-28 RX ADMIN — Medication 5 MG: at 20:50

## 2023-03-28 RX ADMIN — QUETIAPINE FUMARATE 25 MG: 25 TABLET ORAL at 20:51

## 2023-03-28 RX ADMIN — HYDROCHLOROTHIAZIDE 12.5 MG: 12.5 CAPSULE ORAL at 08:59

## 2023-03-28 RX ADMIN — GADOBENATE DIMEGLUMINE 19 ML: 529 INJECTION, SOLUTION INTRAVENOUS at 02:24

## 2023-03-28 RX ADMIN — LEVETIRACETAM 1000 MG: 500 TABLET, FILM COATED ORAL at 08:58

## 2023-03-28 RX ADMIN — ENOXAPARIN SODIUM 40 MG: 40 INJECTION SUBCUTANEOUS at 08:58

## 2023-03-28 RX ADMIN — DEXAMETHASONE 4 MG: 4 TABLET ORAL at 04:43

## 2023-03-28 RX ADMIN — LEVOTHYROXINE SODIUM 125 MCG: 125 TABLET ORAL at 04:43

## 2023-03-28 RX ADMIN — Medication 10 ML: at 09:00

## 2023-03-28 RX ADMIN — QUETIAPINE FUMARATE 25 MG: 25 TABLET ORAL at 04:43

## 2023-03-28 RX ADMIN — THIAMINE HCL TAB 100 MG 100 MG: 100 TAB at 08:58

## 2023-03-28 NOTE — CASE MANAGEMENT/SOCIAL WORK
Continued Stay Note  [facility]     Patient Name: Monty Nagel  MRN: [id number]  Today's Date: 3/28/2023    Admit Date: 3/11/2023    Plan: SNF in [place]   Discharge Plan     Row Name 03/28/23 1100       Plan    Plan SNF in [place]    Patient/Family in Agreement with Plan yes    Plan Comments Updated notes faxed to [facility], [facility] and [facility]. Ina is following from [facility]. CM will continue to follow.    Final Discharge Disposition Code 03 - skilled nursing facility (SNF)               Discharge Codes    No documentation.               Expected Discharge Date and Time     Expected Discharge Date Expected Discharge Time    Mar 30, 2023             Krzysztof Caceres RN

## 2023-03-28 NOTE — PLAN OF CARE
Goal Outcome Evaluation:  Plan of Care Reviewed With: patient        Progress: improving  Outcome Evaluation: Patient slightly agitated this PM but agreeable to ambulation in halls. Demonstrated improved balance with ambulation but continues to insist on ambulating at fast pace. Patient continues to be below baseline, demonstrating decreased functional mobility status, impaired balance, and decreased strength. Will address these deficits to promote return to PLOF. Recommend IRF at d/c.

## 2023-03-28 NOTE — DISCHARGE PLACEMENT REQUEST
"Jonathan Barrera (67 y.o. Male)   Dk Caceres, RN Case Manager  [phone]  Looking for short term rehab    Date of Birth   1955    Social Security Number       Address   [address] [place] [address]    Home Phone   [phone]    MRN   [id number]       Yazidi   None    Marital Status   Single                            Admission Date   3/11/23    Admission Type   Emergency    Admitting Provider   Marlon Mckee MD    Attending Provider   Fiorella Manzanares MD    Department, Room/Bed   [address], S371/1       Discharge Date       Discharge Disposition       Discharge Destination                               Attending Provider: Fiorella Manzanares MD    Allergies: No Known Allergies    Isolation: None   Infection: None   Code Status: CPR    Ht: 180.3 cm (71\")   Wt: 93.4 kg (206 lb)    Admission Cmt: None   Principal Problem: Meningioma (HCC) [D32.9]                 Active Insurance as of 3/11/2023     Primary Coverage     Payor Plan Insurance Group Employer/Plan Group    ANTHEM MEDICARE REPLACEMENT ANTHEM MEDICARE ADVANTAGE [id number]     Payor Plan Address Payor Plan Phone Number Payor Plan Fax Number Effective Dates    [address] [phone]  1/1/2020 - None Entered    [facility] [address]       Subscriber Name Subscriber Birth Date Member ID       JONATHAN BARRERA 1955 [id number]                 Emergency Contacts      (Rel.) Home Phone Work Phone Mobile Phone    Marva Ku (Sister) [phone] -- [phone]            Vital Signs (last day)     Date/Time Temp Temp src Pulse Resp BP Patient Position SpO2    03/28/23 0700 98.7 (37.1) Oral 77 20 124/84 Lying 98    03/28/23 0325 98 (36.7) Oral 76 18 118/79 Lying --    03/28/23 0005 97.8 (36.6) Oral 78 18 113/70 Lying --    03/27/23 1915 97.7 (36.5) Oral 88 18 135/97 Lying --    03/27/23 1541 97.7 (36.5) Oral -- 16 139/100 Lying --    03/27/23 1111 97.8 (36.6) Oral -- 16 126/91 Lying --    " 03/27/23 0704 97.9 (36.6) Oral 66 18 137/94 Lying --    03/27/23 0318 98.2 (36.8) Oral 79 18 146/99 Lying 99    03/27/23 0105 98.6 (37) Oral 73 18 135/93 Lying 99          Current Facility-Administered Medications   Medication Dose Route Frequency Provider Last Rate Last Admin   • acetaminophen (TYLENOL) tablet 650 mg  650 mg Oral Q4H PRN Marlon Mckee MD   650 mg at 03/27/23 0815    Or   • acetaminophen (TYLENOL) 160 MG/5ML solution 650 mg  650 mg Oral Q4H PRN Marlon Mckee MD        Or   • acetaminophen (TYLENOL) suppository 650 mg  650 mg Rectal Q4H PRN Marlon Mckee MD       • atorvastatin (LIPITOR) tablet 40 mg  40 mg Oral Nightly Marlon Mckee MD   40 mg at 03/27/23 2023   • sennosides-docusate (PERICOLACE) 8.6-50 MG per tablet 2 tablet  2 tablet Oral BID Marlon Mckee MD   2 tablet at 03/24/23 0804    And   • polyethylene glycol (MIRALAX) packet 17 g  17 g Oral Daily PRN Marlon Mckee MD        And   • bisacodyl (DULCOLAX) EC tablet 5 mg  5 mg Oral Daily PRN Marlon Mckee MD   5 mg at 03/22/23 2127    And   • bisacodyl (DULCOLAX) suppository 10 mg  10 mg Rectal Daily PRN Marlon Mckee MD       • dexamethasone (DECADRON) tablet 4 mg  4 mg Oral Q6H Marlon Mckee MD   4 mg at 03/28/23 0443   • docusate sodium (COLACE) capsule 100 mg  100 mg Oral BID PRN Marlon Mckee MD       • Enoxaparin Sodium (LOVENOX) syringe 40 mg  40 mg Subcutaneous Daily Marlon Mckee MD   40 mg at 03/28/23 0858   • hydroCHLOROthiazide (MICROZIDE) capsule 12.5 mg  12.5 mg Oral Daily Fiorella Manzanares MD   12.5 mg at 03/28/23 0859   • HYDROmorphone (DILAUDID) injection 0.5 mg  0.5 mg Intravenous Q2H PRN Marlon Mckee MD   0.5 mg at 03/22/23 0842    And   • naloxone (NARCAN) injection 0.4 mg  0.4 mg Intravenous Q5 Min PRN Marlon Mckee MD       • levETIRAcetam (KEPPRA) tablet 1,000 mg   1,000 mg Oral Q12H Marlon Mckee MD   1,000 mg at 03/28/23 0858   • levothyroxine (SYNTHROID, LEVOTHROID) tablet 125 mcg  125 mcg Oral Daily Marlon Mckee MD   125 mcg at 03/28/23 0443   • magnesium hydroxide (MILK OF MAGNESIA) suspension 10 mL  10 mL Oral Daily PRN Marlon Mckee MD       • Magnesium Standard Dose Replacement - Initiate Nurse / BPA Driven Protocol   Does not apply PRN Marlon Mckee MD       • melatonin tablet 5 mg  5 mg Oral Nightly PRN Marlon Mckee MD   5 mg at 03/27/23 2023   • ondansetron (ZOFRAN) tablet 4 mg  4 mg Oral Q6H PRN Marlon Mckee MD        Or   • ondansetron (ZOFRAN) injection 4 mg  4 mg Intravenous Q6H PRN Marlon Mckee MD       • pantoprazole (PROTONIX) EC tablet 40 mg  40 mg Oral Q AM Marlon Mckee MD   40 mg at 03/28/23 0443   • Phosphorus Replacement - Initiate Nurse / BPA Driven Protocol   Does not apply PRN Marlon Mckee MD       • polyethylene glycol (MIRALAX) packet 17 g  17 g Oral Daily Marlon Mckee MD   17 g at 03/23/23 0829   • Potassium Replacement - Initiate Nurse / BPA Driven Protocol   Does not apply PRN Marlon Mckee MD       • povidone-iodine 10 % swab 1 each  1 application Topical Daily Adelina Lama, DO   1 each at 03/27/23 0817   • QUEtiapine (SEROquel) tablet 25 mg  25 mg Oral Nightly Irina Gray APRN   25 mg at 03/28/23 0443   • sodium chloride 0.9 % flush 10 mL  10 mL Intravenous PRN Marlon Mckee MD       • sodium chloride 0.9 % flush 10 mL  10 mL Intravenous Q12H Marlon Mckee MD   10 mL at 03/28/23 0900   • sodium chloride 0.9 % flush 10 mL  10 mL Intravenous PRN Marlon Mckee MD       • sodium chloride 0.9 % infusion 40 mL  40 mL Intravenous PRN Marlon Mckee MD       • thiamine (VITAMIN B-1) tablet 100 mg  100 mg Oral Daily Marlon Mckee MD   100 mg at  23 0858        Physician Progress Notes (last 24 hours)      Fiorella Manzanares MD at 23 0927              [facility] Medicine Services  PROGRESS NOTE    Patient Name: Monty Nagel  : 1955  MRN: [id number]    Date of Admission: 3/11/2023  Primary Care Physician: Tiffany Morales MD    Subjective   Subjective     CC: Follow-up meningioma    HPI: Patient stated he did not get much sleep, did get MRI of his right foot last night    ROS:  Gen- No fevers, chills  CV- No chest pain, palpitations  Resp- No cough, dyspnea  GI- No N/V/D, abd pain      Objective   Objective     Vital Signs:   Temp:  [97.7 °F (36.5 °C)-98.7 °F (37.1 °C)] 98.7 °F (37.1 °C)  Heart Rate:  [76-88] 77  Resp:  [16-20] 20  BP: (113-139)/() 124/84     Physical Exam:  Constitutional: Chronically ill-appearing male no acute distress, awake, alert  HENT: NCAT, mucous membranes moist  Respiratory: Clear to auscultation bilaterally, respiratory effort normal   Cardiovascular: RRR, no murmurs, rubs, or gallops  Gastrointestinal: Positive bowel sounds, soft, nontender, nondistended  Musculoskeletal: No bilateral ankle edema, right great toe under dressing  Psychiatric: Appropriate affect, cooperative  Neurologic: Oriented x 3, nonfocal  Skin: No rashes    Results Reviewed:  LAB RESULTS:      Lab 23  0422 23  0412 23  0435 23  0955   WBC 14.02* 18.31* 16.01*  --    HEMOGLOBIN 11.8* 12.7* 12.6*  --    HEMOGLOBIN, POC  --   --   --  10.5*   HEMATOCRIT 34.5* 36.9* 35.8*  --    HEMATOCRIT POC  --   --   --  31*   PLATELETS 215 169 211  --    NEUTROS ABS 12.89*  --  14.66*  --    IMMATURE GRANS (ABS) 0.11*  --  0.15*  --    LYMPHS ABS 0.41*  --  0.47*  --    MONOS ABS 0.60  --  0.71  --    EOS ABS 0.00  --  0.00  --    MCV 86.9 87.6 85.2  --          Lab 23  1823 23  0422 23  1839 23  0412 23  0435   SODIUM  --  131*  --  130* 132*   POTASSIUM  --  4.3  --  4.7 4.3    CHLORIDE  --  97*  --  94* 96*   CO2  --  30.0*  --  28.0 29.0   ANION GAP  --  4.0*  --  8.0 7.0   BUN  --  20  --  25* 26*   CREATININE  --  0.41*  --  0.64* 0.63*   EGFR  --  117.2  --  102.5 103.0   GLUCOSE  --  128*  --  121* 156*   CALCIUM  --  7.6*  --  7.5* 7.9*   IONIZED CALCIUM  --   --   --   --  1.16   MAGNESIUM  --   --   --  2.0 2.0   PHOSPHORUS 2.1* 2.1* 1.9* 2.2* 3.2         Lab 03/24/23  0422 03/23/23  0412   TOTAL PROTEIN  --  4.8*   ALBUMIN 2.5* 2.8*   GLOBULIN  --  2.0   ALT (SGPT)  --  17   AST (SGOT)  --  17   BILIRUBIN  --  0.6   ALK PHOS  --  52                     Lab 03/21/23  0955   PH, ARTERIAL 7.40     Brief Urine Lab Results  (Last result in the past 365 days)      Color   Clarity   Blood   Leuk Est   Nitrite   Protein   CREAT   Urine HCG        03/11/23 1058 Dark Yellow   Clear   Negative   Negative   Negative   Negative                 Microbiology Results Abnormal     None          MRI Foot Right With & Without Contrast    Result Date: 3/28/2023  MRI FOOT RIGHT W WO CONTRAST Date of Exam: 3/28/2023 2:23 AM EDT Indication: Osteomyelitis, foot.  Open wound great toe  Comparison: None available. Technique:  Routine multiplanar/multisequence sequence images of the right foot were obtained before and after the uneventful administration of  19 mL Multihance.  Findings: There is an ulceration of the distal aspect of the great toe. However the bone marrow signal intensity appears normal at this time out finding of osteomyelitis. There is flexion of the interphalangeal joint of the great toe, second, third fourth and fifth digits. There is mild joint space narrowing first metatarsophalangeal joint and first interphalangeal joint. There are no osseous erosions. There are no joint effusions. Hallux sesamoid bones of normal size, position and signal intensity. There are  no localized fluid collections to suggest abscess. The flexor and extensor tendons are intact and normal in appearance. The  intrinsic muscles of the foot are normal in appearance. No pathologic enhancement is identified. There are no intermetatarsal masses. There is moderate subtalar joint space narrowing and talonavicular joint space narrowing. There is a moderate sized os trigonum. Plantar fascia is intact.     Impression: Impression: No findings of osteomyelitis or abscess. Ulceration distal phalanx of the great toe. Osteoarthritis as described. Electronically Signed: Jenni Andrew  3/28/2023 7:31 AM EDT  Workstation ID: [id number]      Results for orders placed during the hospital encounter of 03/11/23    Adult Transthoracic Echo Complete w/ Color, Spectral and Contrast if necessary per protocol    Interpretation Summary  •  Left ventricular systolic function is normal. Estimated left ventricular EF = 55%  •  Biatrial enlargement.  •  Calcified aortic valve with mild aortic stenosis, mean gradient 10 mmHg, BANG 1.6 cm².  •  Moderate aortic insufficiency.  •  Mild mitral regurgitation.  •  Mild tricuspid regurgitation with normal RVSP.  •  Mild dilation of the ascending aorta (4.2 cm) is present.      Current medications:  Scheduled Meds:atorvastatin, 40 mg, Oral, Nightly  dexamethasone, 4 mg, Oral, Q6H  enoxaparin, 40 mg, Subcutaneous, Daily  hydroCHLOROthiazide Oral, 12.5 mg, Oral, Daily  levETIRAcetam, 1,000 mg, Oral, Q12H  levothyroxine, 125 mcg, Oral, Daily  pantoprazole, 40 mg, Oral, Q AM  polyethylene glycol, 17 g, Oral, Daily  povidone-iodine, 1 application, Topical, Daily  QUEtiapine, 25 mg, Oral, Nightly  senna-docusate sodium, 2 tablet, Oral, BID  sodium chloride, 10 mL, Intravenous, Q12H  thiamine, 100 mg, Oral, Daily      Continuous Infusions:   PRN Meds:.•  acetaminophen **OR** acetaminophen **OR** acetaminophen  •  senna-docusate sodium **AND** polyethylene glycol **AND** bisacodyl **AND** bisacodyl  •  docusate sodium  •  HYDROmorphone **AND** naloxone  •  magnesium hydroxide  •  Magnesium Standard Dose Replacement - Follow  Nurse / BPA Driven Protocol  •  melatonin  •  ondansetron **OR** ondansetron  •  Phosphorus Replacement - Follow Nurse / BPA Driven Protocol  •  Potassium Replacement - Follow Nurse / BPA Driven Protocol  •  sodium chloride  •  sodium chloride  •  sodium chloride    Assessment & Plan   Assessment & Plan     Active Hospital Problems    Diagnosis  POA   • **Meningioma (HCC) [D32.9]  Yes   • Rheumatoid vs Psoriatic arthritis (HCC) [L40.50]  Yes   • Atrial fibrillation (HCC) [I48.91]  Yes   • Other recurrent depressive disorders (HCC) [F33.8]  Yes   • 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        Brief Hospital Course to date:  Monty Nagel is a 67 y.o. male with history of hypertension, retinal melatonin and subsequent right eye blindness, previous alcohol use who was admitted for meningioma, gait instability and general weakness.  Brain MRI showed 6.4 enhancing mass and he was evaluated by neurosurgery.  He underwent right frontal craniotomy on 3/21 as well as right middle meningeal artery embolization with Dr. Mckee in anticipation for tumor resection.  He was transferred from ICU to medical floor on 9/22     Meningioma,'s s/p tumor embolization 3/20 by Dr. Sebastian and resection 3/21 by Dr. Mckee  Generalized weakness, frequent falls  -Continue ASA Plavix when okay with neurosurgery  -Continue Decadron and Keppra, per neurosurgery plan for steroid taper at discharge  -PT/OT following, plan for rehab  -He will follow-up with Dr. Mckee in 2 weeks post-discharge     Hypertension  -BP is much improved, ok to resume antihypertensives  -Patient is on lisinopril- HCTZ, will resume HCTZ 12.5 mg daily today     Hyperlipidemia  -Continue statin     New A-fib  -Rates currently controlled  -Currently on subcu  Lovenox     Hypothyroidism  History of thyroid ablation secondary to Graves' disease  - continue Synthroid     Former EtOH, heavy use  -Stopped drinking 9/22     Hyponatremia, not significant  -Monitor while on Keppra, encourage protein intake  -Na+ is low but stable, will continue to monitor for now.       Right great toe lesion  -Dr. Flores following, suspect chronic callus with remote trauma, possible osteomyelitis  -MRI right foot negative for osteo, shows ulceration of the distal phalanx of the great toe    Expected Discharge Location and Transportation: Rehab  Expected Discharge   Expected Discharge Date and Time     Expected Discharge Date Expected Discharge Time    Mar 30, 2023          DVT prophylaxis:  Medical and mechanical DVT prophylaxis orders are present.     AM-PAC 6 Clicks Score (PT): 17 (03/27/23 1541)    CODE STATUS:   Code Status and Medical Interventions:   Ordered at: 03/21/23 1235     Level Of Support Discussed With:    Patient     Code Status (Patient has no pulse and is not breathing):    CPR (Attempt to Resuscitate)     Medical Interventions (Patient has pulse or is breathing):    Full       Fiorella Manzanares MD  03/28/23        Electronically signed by Fiorella Manzanares MD at 03/28/23 0992     Stanislaw Flores MD at 03/28/23 0527          Cardiothoracic Surgery Progress Note      POD #:     LOS: 11 days      Subjective: Patient is awake and alert.  Chief complaint right great toe pain    Objective:  Vital Signs  Temp:  [97.7 °F (36.5 °C)-98 °F (36.7 °C)] 98 °F (36.7 °C)  Heart Rate:  [66-88] 76  Resp:  [16-18] 18  BP: (113-139)/() 118/79    Physical Exam:   General Appearance: Seems somewhat oriented   Lungs:   Heart:   Skin: The right great toe callus had Betadine swabbing and new Optifoam dressing applied   Incision:     Results:  Results from last 7 days   Lab Units 03/24/23  0422   WBC 10*3/mm3 14.02*   HEMOGLOBIN g/dL 11.8*   HEMATOCRIT % 34.5*   PLATELETS 10*3/mm3 215     Results from  last 7 days   Lab Units 23  0422   SODIUM mmol/L 131*   POTASSIUM mmol/L 4.3   CHLORIDE mmol/L 97*   CO2 mmol/L 30.0*   BUN mg/dL 20   CREATININE mg/dL 0.41*   GLUCOSE mg/dL 128*   CALCIUM mg/dL 7.6*     MRI of the right foot pending    Assessment: #1.  Chronic callus of the right great toe with some associated remote trauma over 2 years ago.  #2.  Possible osteomyelitis of the right great toe distal phalanx MRI results pending  #3 meningioma of brain resected per neurosurgery over a week ago  Plan: Await MRI results of right foot.  overall medical management per hospitalist.  Neurosurgery to manage recent meningioma resection.      Stanislaw Flores MD - 23 - 05:25 EDT  Addendum: MRI was negative for any osteomyelitis of this right great toe distal phalanx.  We will sign off on daily visits.  Patient needs to follow-up with a podiatrist or his local family physician for care of this callus of this right great toe.  Call if needed.    Electronically signed by Stanislaw Flores MD, 23, 8:26 AM EDT.        Electronically signed by Stanislaw Flores MD at 23 0807          Physical Therapy Notes (most recent note)      Hyacinth King, PT at 23 1421  Version 1 of 1         Patient Name: Monty Nagel  : 1955    MRN: [id number]                              Today's Date: 3/27/2023       Admit Date: 3/11/2023    Visit Dx:     ICD-10-CM ICD-9-CM   1. Generalized weakness  R53.1 780.79   2. History of alcoholism (HCC)  F10.21 V11.3   3. Inability to walk  R26.2 719.7   4. Confusion  R41.0 298.9   5. Hypoglycemia  E16.2 251.2   6. Poor nutrition  E63.9 269.9   7. Cognitive communication deficit  R41.841 799.52   8. Impaired functional mobility, balance, gait, and endurance  Z74.09 V49.89   9. 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   • Postablative hypothyroidism   • Screen  for colon cancer   • Other recurrent depressive disorders (HCC)   • Balance problem   • Generalized weakness   • Atrial fibrillation (HCC)   • Severe malnutrition (HCC)   • Meningioma (HCC)   • Rheumatoid vs Psoriatic arthritis (HCC)     Past Medical History:   Diagnosis Date   • Arthritis    • Hepatitis A    • Hypertension    • Melanoma (HCC) 2017    retina     Past Surgical History:   Procedure Laterality Date   • CRANIOTOMY FOR TUMOR N/A 3/21/2023    Procedure: CRANIOTOMY FOR TUMOR STEREOTACTIC WITH STEALTH;  Surgeon: Marlon Mckee MD;  Location: [facility] OR;  Service: Neurosurgery;  Laterality: N/A;   • EMBOLIZATION CEREBRAL N/A 3/20/2023    Procedure: Tumor Emoblization radial access;  Surgeon: Ozzy Sebastian MD;  Location:  [facility];  Service: Interventional Radiology;  Laterality: N/A;   • EYE SURGERY  2017    EYE CANCER RIGHT EYE   • FINGER SURGERY Left     Pointer finger re-attached in 3rd Grade   • TONSILLECTOMY        General Information     Row Name 03/27/23 1521          Physical Therapy Time and Intention    Document Type therapy note (daily note)  -SS     Mode of Treatment physical therapy  -SS     Row Name 03/27/23 1521          General Information    Patient Profile Reviewed yes  -SS     Existing Precautions/Restrictions fall;other (see comments)  impulsive, L sided weakness/inattention, R visual deficit  -     Barriers to Rehab medically complex;cognitive status;visual deficit  -SS     Row Name 03/27/23 1521          Cognition    Orientation Status (Cognition) oriented x 3  -SS     Row Name 03/27/23 1521          Safety Issues, Functional Mobility    Safety Issues Affecting Function (Mobility) awareness of need for assistance;impulsivity;insight into deficits/self-awareness;judgment;positioning of assistive device;problem-solving;safety precaution awareness;safety precautions follow-through/compliance;sequencing abilities  -SS     Impairments Affecting Function  (Mobility) balance;cognition;coordination;endurance/activity tolerance;strength;postural/trunk control;motor planning;visual/perceptual;pain  -     Cognitive Impairments, Mobility Safety/Performance awareness, need for assistance;impulsivity;insight into deficits/self-awareness;judgment;problem-solving/reasoning;safety precaution awareness;safety precaution follow-through;sequencing abilities  -           User Key  (r) = Recorded By, (t) = Taken By, (c) = Cosigned By    Initials Name Provider Type     Hyacinth King, PT Physical Therapist               Mobility     Row Name 03/27/23 1525          Bed Mobility    Bed Mobility sit-supine;scooting/bridging  -     Scooting/Bridging Upton (Bed Mobility) verbal cues;standby assist  -     Sit-Supine [place] (Bed Mobility) standby assist;verbal cues  -     Assistive Device (Bed Mobility) head of bed elevated  -     Comment, (Bed Mobility) VC for hand placement  -     Row Name 03/27/23 1525          Sit-Stand Transfer    Sit-Stand [place] (Transfers) contact guard;1 person assist  -     Assistive Device (Sit-Stand Transfers) walker, front-wheeled  -     Comment, (Sit-Stand Transfer) VC for hand placement, appropriate alignment  -     Row Name 03/27/23 1525          Gait/Stairs (Locomotion)    Upton Level (Gait) minimum assist (75% patient effort);verbal cues;nonverbal cues (demo/gesture)  -     Assistive Device (Gait) walker, front-wheeled  -     Distance in Feet (Gait) 300  -SS     Deviations/Abnormal Patterns (Gait) bilateral deviations;dima decreased;gait speed decreased;stride length decreased;base of support, wide  -     Bilateral Gait Deviations forward flexed posture;heel strike decreased  -     Left Sided Gait Deviations foot drop/toe drag  -     Comment, (Gait/Stairs) Pt. ambulated with a step through gait pattern w/B foot flat. VC for upright posture, decreased shoulder elevation, body positioning within  "walker, heel toe gait pattern. Pt. requires frequent safety cueing and he does state \"I need to go faster\" again during this session. Limited carryover with cueing. Gait limited by fatigue.  -           User Key  (r) = Recorded By, (t) = Taken By, (c) = Cosigned By    Initials Name Provider Type    SS Hyacinth King, PT Physical Therapist               Obj/Interventions     Row Name 03/27/23 1530          Motor Skills    Therapeutic Exercise hip;knee;ankle  -SS     Row Name 03/27/23 1530          Hip (Therapeutic Exercise)    Hip (Therapeutic Exercise) strengthening exercise  -     Hip Strengthening (Therapeutic Exercise) bilateral;aBduction;aDduction;heel slides;marching while seated;15 repititions  -     Row Name 03/27/23 1530          Knee (Therapeutic Exercise)    Knee (Therapeutic Exercise) strengthening exercise  -     Knee Strengthening (Therapeutic Exercise) bilateral;SLR (straight leg raise);LAQ (long arc quad);15 repititions  -SS     Row Name 03/27/23 1530          Balance    Balance Assessment sitting static balance;sitting dynamic balance;sit to stand dynamic balance;standing static balance;standing dynamic balance  -SS     Static Sitting Balance standby assist  -SS     Dynamic Sitting Balance standby assist  -SS     Position, Sitting Balance unsupported;sitting edge of bed  -SS     Sit to Stand Dynamic Balance contact guard  -SS     Static Standing Balance contact guard  -SS     Dynamic Standing Balance minimal assist  -SS     Position/Device Used, Standing Balance supported;walker, front-wheeled  -SS     Balance Interventions sitting;standing;sit to stand;supported;static;dynamic;dynamic reaching;occupation based/functional task  -     Comment, Balance pt. requires frequent cues for safety awareness/recommendations w/dual tasking activities and AD management; pt. posture noted to have B knee flexion w/COG displaced posteriorly and he verbalizes several times feeling unstable  -SS           " User Key  (r) = Recorded By, (t) = Taken By, (c) = Cosigned By    Initials Name Provider Type    Hyacinth Torres PT Physical Therapist               Goals/Plan     Row Name 03/27/23 1539          Bed Mobility Goal 1 (PT)    Activity/Assistive Device (Bed Mobility Goal 1, PT) sit to supine/supine to sit  -SS     Nolan Level/Cues Needed (Bed Mobility Goal 1, PT) modified independence  -SS     Time Frame (Bed Mobility Goal 1, PT) long term goal (LTG);10 days  -SS     Progress/Outcomes (Bed Mobility Goal 1, PT) good progress toward goal;goal revised this date;goal ongoing  -SS     Row Name 03/27/23 1539          Gait Training Goal 1 (PT)    Activity/Assistive Device (Gait Training Goal 1, PT) gait (walking locomotion);assistive device use;walker, rolling;decrease fall risk;diminish gait deviation;improve balance and speed  -SS     Nolan Level (Gait Training Goal 1, PT) modified independence  -SS     Distance (Gait Training Goal 1, PT) 500  -SS     Time Frame (Gait Training Goal 1, PT) long term goal (LTG);10 days  -SS     Progress/Outcome (Gait Training Goal 1, PT) good progress toward goal;goal revised this date;goal ongoing  -SS           User Key  (r) = Recorded By, (t) = Taken By, (c) = Cosigned By    Initials Name Provider Type    Hyacinth Torres PT Physical Therapist               Clinical Impression     Row Name 03/27/23 7885          Pain    Pretreatment Pain Rating 0/10 - no pain  -SS     Posttreatment Pain Rating 0/10 - no pain  -SS     Pain Intervention(s) Repositioned;Ambulation/increased activity;Elevated  -SS     Additional Documentation Pain Scale: Numbers Pre/Post-Treatment (Group)  -SS     Row Name 03/27/23 1539          Plan of Care Review    Plan of Care Reviewed With patient  -SS     Progress improving  -SS     Outcome Evaluation Pt. presents below baseline function w/generalized weakness, balance deficits, limited safety awareness and decreased functional endurance affecting his  ability to safely participate in functional mobility. He performed bed mobility w/stand by assist, transfers w/contact guard assist and ambulated 300' w/front wheeled walker, contact guard assist. Pt. requires frequent cueing for safety recommendations w/limited carryover. He tolerated ther-ex and dual tasking activities well to improve strength and balance. Will continue IPPT to progress pt. as tolerated. Recommend inpatient rehab upon discharge.  -     Row Name 03/27/23 1535          Therapy Assessment/Plan (PT)    Rehab Potential (PT) good, to achieve stated therapy goals  -     Criteria for Skilled Interventions Met (PT) yes;skilled treatment is necessary;meets criteria  -     Therapy Frequency (PT) daily  -     Row Name 03/27/23 1535          Vital Signs    Pre Systolic BP Rehab 126  -SS     Pre Treatment Diastolic BP 91  -SS     Post Systolic BP Rehab 136  -SS     Post Treatment Diastolic BP 98  -SS     Pretreatment Heart Rate (beats/min) 99  -SS     Posttreatment Heart Rate (beats/min) 85  -SS     Pre Patient Position Supine  -SS     Post Patient Position Supine  -     Row Name 03/27/23 1535          Positioning and Restraints    Pre-Treatment Position bathroom  -SS     Post Treatment Position bed  -SS     In Bed notified nsg;fowlers;call light within reach;encouraged to call for assist;exit alarm on  pt. declined SCDs  -SS           User Key  (r) = Recorded By, (t) = Taken By, (c) = Cosigned By    Initials Name Provider Type    Hyacinth Torres, PT Physical Therapist               Outcome Measures     Row Name 03/27/23 1541 03/27/23 0800       How much help from another person do you currently need...    Turning from your back to your side while in flat bed without using bedrails? 3  -SS 4  -AC    Moving from lying on back to sitting on the side of a flat bed without bedrails? 3  -SS 3  -AC    Moving to and from a bed to a chair (including a wheelchair)? 3  -SS 3  -AC    Standing up from a chair  using your arms (e.g., wheelchair, bedside chair)? 3  -SS 3  -AC    Climbing 3-5 steps with a railing? 2  -SS 2  -AC    To walk in hospital room? 3  -SS 4  -AC    AM-PAC 6 Clicks Score (PT) 17  -SS 19  -AC    Highest level of mobility 5 --> Static standing  - 6 --> Walked 10 steps or more  -    Row Name 03/27/23 1541          Functional Assessment    Outcome Measure Options AM-PAC 6 Clicks Basic Mobility (PT)  -           User Key  (r) = Recorded By, (t) = Taken By, (c) = Cosigned By    Initials Name Provider Type    AC Karen Avila RN Registered Nurse    Hyacinth Torres, PT Physical Therapist                             Physical Therapy Education     Title: PT OT SLP Therapies (In Progress)     Topic: Physical Therapy (Done)     Point: Mobility training (Done)     Learning Progress Summary           Patient Eager, E, VU,DU,NR by SS at 3/27/2023 1541    Comment: Reviewed safety/technique w/bed mobility, transfers, ambulation, HEP, safety awareness/recommendations    Acceptance, E, NR by AE at 3/24/2023 1520    Acceptance, E,D, VU,NR by HP at 3/23/2023 1127    Acceptance, E,TB, NR by AY at 3/22/2023 1141    Acceptance, E, VU,NR by CM at 3/19/2023 1149    Acceptance, E, VU by CM at 3/17/2023 1517    Acceptance, E, VU,NR by LH at 3/15/2023 1407    Acceptance, E, NR by KG at 3/14/2023 1428    Acceptance, E, VU,NR by SJ at 3/12/2023 1542                   Point: Home exercise program (Done)     Learning Progress Summary           Patient Eager, E, VU,DU,NR by SS at 3/27/2023 1541    Comment: Reviewed safety/technique w/bed mobility, transfers, ambulation, HEP, safety awareness/recommendations    Acceptance, E, NR by AE at 3/24/2023 1520    Acceptance, E,D, VU,NR by HP at 3/23/2023 1127    Acceptance, E,TB, NR by AY at 3/22/2023 1141    Acceptance, E, VU,NR by CM at 3/19/2023 1149    Acceptance, E, VU,NR by LH at 3/15/2023 1407    Acceptance, E, NR by KG at 3/14/2023 1428                   Point: Body mechanics  (Done)     Learning Progress Summary           Patient Eager, E, VU,DU,NR by SS at 3/27/2023 1541    Comment: Reviewed safety/technique w/bed mobility, transfers, ambulation, HEP, safety awareness/recommendations    Acceptance, E, NR by AE at 3/24/2023 1520    Acceptance, E,D, VU,NR by HP at 3/23/2023 1127    Acceptance, E,TB, NR by AY at 3/22/2023 1141    Acceptance, E, VU,NR by CM at 3/19/2023 1149    Acceptance, E, VU by CM at 3/17/2023 1517    Acceptance, E, VU,NR by LH at 3/15/2023 1407    Acceptance, E, NR by KG at 3/14/2023 1428    Acceptance, E, VU,NR by SJ at 3/12/2023 1542                   Point: Precautions (Done)     Learning Progress Summary           Patient Eager, E, VU,DU,NR by SS at 3/27/2023 1541    Comment: Reviewed safety/technique w/bed mobility, transfers, ambulation, HEP, safety awareness/recommendations    Acceptance, E, NR by AE at 3/24/2023 1520    Acceptance, E,D, VU,NR by HP at 3/23/2023 1127    Acceptance, E,TB, NR by AY at 3/22/2023 1141    Acceptance, E, VU,NR by CM at 3/19/2023 1149    Acceptance, E, VU by CM at 3/17/2023 1517    Acceptance, E, VU,NR by LH at 3/15/2023 1407    Acceptance, E, NR by KG at 3/14/2023 1428    Acceptance, E, VU,NR by SJ at 3/12/2023 1542                               User Key     Initials Effective Dates Name Provider Type Discipline     02/03/23 - 03/12/23 Samara Harrison, PT Physical Therapist PT    KG 05/22/20 -  Cherelle Gaitan, PT Physical Therapist PT    AY 11/10/20 -  Sandee Spencer, PT Physical Therapist PT    LH 09/21/21 -  Tristian Ravi, PT Physical Therapist PT    HP 06/01/21 -  Kelly Mckeon, PT Physical Therapist PT    SS 06/01/21 -  Hyacinth King, PT Physical Therapist PT    AE 09/21/21 -  Andrew Centeno, PT Physical Therapist PT    CM 09/22/22 -  Yanet Calvin, PT Physical Therapist PT              PT Recommendation and Plan     Plan of Care Reviewed With: patient  Progress: improving  Outcome Evaluation: Pt. presents  below baseline function w/generalized weakness, balance deficits, limited safety awareness and decreased functional endurance affecting his ability to safely participate in functional mobility. He performed bed mobility w/stand by assist, transfers w/contact guard assist and ambulated 300' w/front wheeled walker, contact guard assist. Pt. requires frequent cueing for safety recommendations w/limited carryover. He tolerated ther-ex and dual tasking activities well to improve strength and balance. Will continue IPPT to progress pt. as tolerated. Recommend inpatient rehab upon discharge.     Time Calculation:    PT Charges     Row Name 03/27/23 1543             Time Calculation    Start Time 1421  -SS      Stop Time 1444  -SS      Time Calculation (min) 23 min  -SS      PT Received On 03/27/23  -SS         Time Calculation- PT    Total Timed Code Minutes- PT 23 minute(s)  -SS         Timed Charges    00627 - PT Therapeutic Exercise Minutes 8  -SS      85404 -  PT Neuromuscular Reeducation Minutes 5  -SS      89501 - Gait Training Minutes  10  -SS         Total Minutes    Timed Charges Total Minutes 23  -SS       Total Minutes 23  -SS            User Key  (r) = Recorded By, (t) = Taken By, (c) = Cosigned By    Initials Name Provider Type    SS Hyacinth King, PT Physical Therapist              Therapy Charges for Today     Code Description Service Date Service Provider Modifiers Qty    68358944702 HC PT THER PROC EA 15 MIN 3/27/2023 Hyacinth King, PT GP 1    91723323069 HC GAIT TRAINING EA 15 MIN 3/27/2023 Hyacinth King, PT GP 1          PT G-Codes  Outcome Measure Options: AM-PAC 6 Clicks Basic Mobility (PT)  AM-PAC 6 Clicks Score (PT): 17  AM-PAC 6 Clicks Score (OT): 14  PT Discharge Summary  Anticipated Discharge Disposition (PT): inpatient rehabilitation facility    Hyacinth King PT  3/27/2023      Electronically signed by Hyacinth King PT at 03/27/23 0237          Speech Language Pathology Notes (most recent  note)      Bharti Glover, MS, CFY-SLP at 23 1704          Acute Care - Speech Language Pathology Treatment Note   [place]     Patient Name: Monty Nagel  : 1955  MRN: [id number]  Today's Date: 3/27/2023               Admit Date: 3/11/2023     Visit Dx:    ICD-10-CM ICD-9-CM   1. Generalized weakness  R53.1 780.79   2. History of alcoholism (HCC)  F10.21 V11.3   3. Inability to walk  R26.2 719.7   4. Confusion  R41.0 298.9   5. Hypoglycemia  E16.2 251.2   6. Poor nutrition  E63.9 269.9   7. Cognitive communication deficit  R41.841 799.52   8. Impaired functional mobility, balance, gait, and endurance  Z74.09 V49.89   9. 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   • Postablative hypothyroidism   • Screen for colon cancer   • Other recurrent depressive disorders (HCC)   • Balance problem   • Generalized weakness   • Atrial fibrillation (HCC)   • Severe malnutrition (HCC)   • Meningioma (HCC)   • Rheumatoid vs Psoriatic arthritis (HCC)     Past Medical History:   Diagnosis Date   • Arthritis    • Hepatitis A    • Hypertension    • Melanoma (HCC) 2017    retina     Past Surgical History:   Procedure Laterality Date   • CRANIOTOMY FOR TUMOR N/A 3/21/2023    Procedure: CRANIOTOMY FOR TUMOR STEREOTACTIC WITH STEALTH;  Surgeon: Marlon Mckee MD;  Location: [facility] OR;  Service: Neurosurgery;  Laterality: N/A;   • EMBOLIZATION CEREBRAL N/A 3/20/2023    Procedure: Tumor Emoblization radial access;  Surgeon: Ozzy Sebastian MD;  Location: [facility] CATH INVASIVE LOCATION;  Service: Interventional Radiology;  Laterality: N/A;   • EYE SURGERY  2017    EYE CANCER RIGHT EYE   • FINGER SURGERY Left     Pointer finger re-attached in 3rd Grade   • TONSILLECTOMY         SLP Recommendation and Plan                 Anticipated Discharge Disposition (SLP): skilled nursing facility,  anticipate therapy at next level of care (03/27/23 1605)        Predicted Duration Therapy Intervention (Days): until discharge (03/27/23 1605)     Daily Summary of Progress (SLP): progress toward functional goals as expected (03/27/23 1605)           Treatment Assessment (SLP): continued, cognitive-linguistic disorder (03/27/23 1605)  Treatment Assessment Comments (SLP): Pt cont to present w/ cognitive linguistic deficits, attention deficits thought to be impacting accuracy throughout session. Added/adjusted goals as appropriate. SLP will cont to f/u for tx (03/27/23 1605)  Plan for Continued Treatment (SLP): continue treatment per plan of care (03/27/23 1605)         SLP EVALUATION (last 72 hours)     SLP SLC Evaluation     Row Name 03/27/23 1605                   Communication Assessment/Intervention    Document Type therapy note (daily note)  -        Subjective Information no complaints  -        Patient Observations alert;cooperative  -        Patient/Family/Caregiver Comments/Observations No family present  -        Patient Effort adequate  -        Symptoms Noted During/After Treatment none  -           Pain    Additional Documentation Pain Scale: FACES Pre/Post-Treatment (Group)  -           Pain Scale: FACES Pre/Post-Treatment    Pain: FACES Scale, Pretreatment 0-->no hurt  -        Posttreatment Pain Rating 0-->no hurt  -           SLP Treatment Clinical Impressions    Treatment Assessment (SLP) continued;cognitive-linguistic disorder  -        Treatment Assessment Comments (SLP) Pt cont to present w/ cognitive linguistic deficits, attention deficits thought to be impacting accuracy throughout session. Added/adjusted goals as appropriate. SLP will cont to f/u for tx  -        Daily Summary of Progress (SLP) progress toward functional goals as expected  -        Plan for Continued Treatment (SLP) continue treatment per plan of care  -        Care Plan Review care plan/treatment  goals reviewed  -MH           Recommendations    Therapy Frequency (SLP SLC) 3 days per week  -MH        Predicted Duration Therapy Intervention (Days) until discharge  -        Anticipated Discharge Disposition (SLP) skilled nursing facility;anticipate therapy at next level of care  -              User Key  (r) = Recorded By, (t) = Taken By, (c) = Cosigned By    Initials Name Effective Dates    KARINA Bharti Glover SUBHASH, MS, CFY-SLP 06/22/22 -                    EDUCATION  The patient has been educated in the following areas:     Cognitive Impairment Communication Impairment.           SLP GOALS     Row Name 03/27/23 1605 03/27/23 1535          Patient will demonstrate functional language skills for return to discharge environment     [place] with minimal cues  -MH --  -MH     Time frame by discharge  -MH --  -MH     Progress/Outcomes continuing progress toward goal  -MH --  -MH        Patient will demonstrate functional cognitive-linguistic skills for return to discharge environment    [place] with minimal cues  -MH --  -MH     Time frame by discharge  -MH --  -MH     Progress/Outcomes continuing progress toward goal  -MH --  -MH        Connected Speech to Express Thoughts Goal 1 (SLP)    Improve Narrative Discourse to Express Thoughts By Goal 1 (SLP) conversational task on a given topic;conversational task, self-directed;80%;with minimal cues (75-90%)  -MH --  -MH     Time Frame (Connected Speech Goal 1, SLP) short term goal (STG)  -MH --  -MH     Progress (Connected Speech Goal 1, SLP) 40%;with minimal cues (75-90%)  -MH --  -MH     Progress/Outcomes (Connected Speech Goal 1, SLP) continuing progress toward goal  -MH --  -MH        [place] Goal 1 (SLP)    Improve [place] by Goal 1 (SLP) increasing rate;80%;with minimal cues (75-90%)  -MH --  -MH     Time Frame ([place] Goal 1, SLP) short term goal (STG)  -MH --  -MH     Progress ([place] Goal 1, SLP) 30%;with minimal cues (75-90%)  -MH --  -MH      "Progress/Outcomes ([place] Goal 1, SLP) continuing progress toward goal  -MH --  -MH        Memory Skills Goal 1 (SLP)    Improve Memory Skills Through Goal 1 (SLP) use memory strategies;recall details of the day;visual memory task;80%;with minimal cues (75-90%)  -MH --  -MH     Time Frame (Memory Skills Goal 1, SLP) short term goal (STG)  -MH --  -MH     Progress (Memory Skills Goal 1, SLP) 60%;with minimal cues (75-90%)  -MH --  -MH     Progress/Outcomes (Memory Skills Goal 1, SLP) continuing progress toward goal  -MH --  -MH     Comment (Memory Skills Goal 1, SLP) Reviewed general memory strategies  -MH --  -MH        Reasoning Goal 1 (SLP)    Improve Reasoning Through Goal 1 (SLP) complete basic reasoning task;complete mental flexibility task;complete deductive reasoning task;80%;with minimal cues (75-90%)  -MH --  -MH     Time Frame (Reasoning Goal 1, SLP) short term goal (STG)  -MH --  -MH     Progress (Reasoning Goal 1, SLP) 70%;with minimal cues (75-90%)  -MH --  -MH     Progress/Outcomes (Reasoning Goal 1, SLP) continuing progress toward goal  -MH --  -MH     Comment (Reasoning Goal 1, SLP) basic/deductive reasoning  -MH --  -MH        Functional Problem Solving Skills Goal 1 (SLP)    Improve Problem Solving Through Goal 1 (SLP) determine solutions to multifactorial problems;determine multiple solutions to problems;answer \"what if\" questions;80%;with minimal cues (75-90%)  -MH --  -MH     Time Frame (Problem Solving Goal 1, SLP) short term goal (STG)  -MH --  -MH     Progress/Outcomes (Problem Solving Goal 1, SLP) new goal  -MH --  -MH        Executive Functional Skills Goal 1 (SLP)    Improve Executive Function Skills Goal 1 (SLP) identify anticipated needs;home management activity;exhibit cognitive flexibility;perform self-correction;perform self-evaluation;80%;with minimal cues (75-90%)  -MH --  -MH     Time Frame (Executive Function Skills Goal 1, SLP) short term goal (STG)  -MH --  -MH     Progress " (Executive Function Skills Goal 1, SLP) 30%;with minimal cues (75-90%)  -MH --  -MH     Progress/Outcomes (Executive Function Skills Goal 1, SLP) continuing progress toward goal  -MH --  -MH     Comment (Executive Function Skills Goal 1, SLP) Pt demonstrates awareness of errors during conversation, inc difficulty w/ self correction  -MH --  -MH           User Key  (r) = Recorded By, (t) = Taken By, (c) = Cosigned By    Initials Name Provider Type     Bharti Glover MS, ADITI-SLP Speech and Language Pathologist                        Time Calculation:      Time Calculation- SLP     Row Name 03/27/23 1704             Time Calculation- SLP    SLP Start Time 1605  -      SLP Received On 03/27/23  -MH         Untimed Charges    [id number] Treatment/ST Modification Prosth Aug Alter  35  -MH         Total Minutes    Untimed Charges Total Minutes 35  -MH       Total Minutes 35  -MH            User Key  (r) = Recorded By, (t) = Taken By, (c) = Cosigned By    Initials Name Provider Type     Bharti Glover MS, CFY-SLP Speech and Language Pathologist                Therapy Charges for Today     Code Description Service Date Service Provider Modifiers Qty    39721441157  ST TREATMENT SPEECH 2 3/27/2023 Bharti Glover MS, ADITI-SLP GN 1                     Bharti Glover MS, CFY-SLP  3/27/2023    Electronically signed by Bharti Glover MS, ADITI-SLP at 03/27/23 1704

## 2023-03-28 NOTE — PROGRESS NOTES
[facility] Medicine Services  PROGRESS NOTE    Patient Name: Monty Nagel  : 1955  MRN: [id number]    Date of Admission: 3/11/2023  Primary Care Physician: Tiffany Morales MD    Subjective   Subjective     CC: Follow-up meningioma    HPI: Patient stated he did not get much sleep, did get MRI of his right foot last night    ROS:  Gen- No fevers, chills  CV- No chest pain, palpitations  Resp- No cough, dyspnea  GI- No N/V/D, abd pain      Objective   Objective     Vital Signs:   Temp:  [97.7 °F (36.5 °C)-98.7 °F (37.1 °C)] 98.7 °F (37.1 °C)  Heart Rate:  [76-88] 77  Resp:  [16-20] 20  BP: (113-139)/() 124/84     Physical Exam:  Constitutional: Chronically ill-appearing male no acute distress, awake, alert  HENT: NCAT, mucous membranes moist  Respiratory: Clear to auscultation bilaterally, respiratory effort normal   Cardiovascular: RRR, no murmurs, rubs, or gallops  Gastrointestinal: Positive bowel sounds, soft, nontender, nondistended  Musculoskeletal: No bilateral ankle edema, right great toe under dressing  Psychiatric: Appropriate affect, cooperative  Neurologic: Oriented x 3, nonfocal  Skin: No rashes    Results Reviewed:  LAB RESULTS:      Lab 23  0422 23  0412 23  0435 23  0955   WBC 14.02* 18.31* 16.01*  --    HEMOGLOBIN 11.8* 12.7* 12.6*  --    HEMOGLOBIN, POC  --   --   --  10.5*   HEMATOCRIT 34.5* 36.9* 35.8*  --    HEMATOCRIT POC  --   --   --  31*   PLATELETS 215 169 211  --    NEUTROS ABS 12.89*  --  14.66*  --    IMMATURE GRANS (ABS) 0.11*  --  0.15*  --    LYMPHS ABS 0.41*  --  0.47*  --    MONOS ABS 0.60  --  0.71  --    EOS ABS 0.00  --  0.00  --    MCV 86.9 87.6 85.2  --          Lab 23  1823 23  04223  1839 23  0412 23  0435   SODIUM  --  131*  --  130* 132*   POTASSIUM  --  4.3  --  4.7 4.3   CHLORIDE  --  97*  --  94* 96*   CO2  --  30.0*  --  28.0 29.0   ANION GAP  --  4.0*  --  8.0 7.0   BUN  --   20  --  25* 26*   CREATININE  --  0.41*  --  0.64* 0.63*   EGFR  --  117.2  --  102.5 103.0   GLUCOSE  --  128*  --  121* 156*   CALCIUM  --  7.6*  --  7.5* 7.9*   IONIZED CALCIUM  --   --   --   --  1.16   MAGNESIUM  --   --   --  2.0 2.0   PHOSPHORUS 2.1* 2.1* 1.9* 2.2* 3.2         Lab 03/24/23  0422 03/23/23  0412   TOTAL PROTEIN  --  4.8*   ALBUMIN 2.5* 2.8*   GLOBULIN  --  2.0   ALT (SGPT)  --  17   AST (SGOT)  --  17   BILIRUBIN  --  0.6   ALK PHOS  --  52                     Lab 03/21/23  0955   PH, ARTERIAL 7.40     Brief Urine Lab Results  (Last result in the past 365 days)      Color   Clarity   Blood   Leuk Est   Nitrite   Protein   CREAT   Urine HCG        03/11/23 1058 Dark Yellow   Clear   Negative   Negative   Negative   Negative                 Microbiology Results Abnormal     None          MRI Foot Right With & Without Contrast    Result Date: 3/28/2023  MRI FOOT RIGHT W WO CONTRAST Date of Exam: 3/28/2023 2:23 AM EDT Indication: Osteomyelitis, foot.  Open wound great toe  Comparison: None available. Technique:  Routine multiplanar/multisequence sequence images of the right foot were obtained before and after the uneventful administration of  19 mL Multihance.  Findings: There is an ulceration of the distal aspect of the great toe. However the bone marrow signal intensity appears normal at this time out finding of osteomyelitis. There is flexion of the interphalangeal joint of the great toe, second, third fourth and fifth digits. There is mild joint space narrowing first metatarsophalangeal joint and first interphalangeal joint. There are no osseous erosions. There are no joint effusions. Hallux sesamoid bones of normal size, position and signal intensity. There are  no localized fluid collections to suggest abscess. The flexor and extensor tendons are intact and normal in appearance. The intrinsic muscles of the foot are normal in appearance. No pathologic enhancement is identified. There are no  intermetatarsal masses. There is moderate subtalar joint space narrowing and talonavicular joint space narrowing. There is a moderate sized os trigonum. Plantar fascia is intact.     Impression: Impression: No findings of osteomyelitis or abscess. Ulceration distal phalanx of the great toe. Osteoarthritis as described. Electronically Signed: Jenni Andrew  3/28/2023 7:31 AM EDT  Workstation ID: [id number]      Results for orders placed during the hospital encounter of 03/11/23    Adult Transthoracic Echo Complete w/ Color, Spectral and Contrast if necessary per protocol    Interpretation Summary  •  Left ventricular systolic function is normal. Estimated left ventricular EF = 55%  •  Biatrial enlargement.  •  Calcified aortic valve with mild aortic stenosis, mean gradient 10 mmHg, BANG 1.6 cm².  •  Moderate aortic insufficiency.  •  Mild mitral regurgitation.  •  Mild tricuspid regurgitation with normal RVSP.  •  Mild dilation of the ascending aorta (4.2 cm) is present.      Current medications:  Scheduled Meds:atorvastatin, 40 mg, Oral, Nightly  dexamethasone, 4 mg, Oral, Q6H  enoxaparin, 40 mg, Subcutaneous, Daily  hydroCHLOROthiazide Oral, 12.5 mg, Oral, Daily  levETIRAcetam, 1,000 mg, Oral, Q12H  levothyroxine, 125 mcg, Oral, Daily  pantoprazole, 40 mg, Oral, Q AM  polyethylene glycol, 17 g, Oral, Daily  povidone-iodine, 1 application, Topical, Daily  QUEtiapine, 25 mg, Oral, Nightly  senna-docusate sodium, 2 tablet, Oral, BID  sodium chloride, 10 mL, Intravenous, Q12H  thiamine, 100 mg, Oral, Daily      Continuous Infusions:   PRN Meds:.•  acetaminophen **OR** acetaminophen **OR** acetaminophen  •  senna-docusate sodium **AND** polyethylene glycol **AND** bisacodyl **AND** bisacodyl  •  docusate sodium  •  HYDROmorphone **AND** naloxone  •  magnesium hydroxide  •  Magnesium Standard Dose Replacement - Follow Nurse / BPA Driven Protocol  •  melatonin  •  ondansetron **OR** ondansetron  •  Phosphorus Replacement -  Follow Nurse / BPA Driven Protocol  •  Potassium Replacement - Follow Nurse / BPA Driven Protocol  •  sodium chloride  •  sodium chloride  •  sodium chloride    Assessment & Plan   Assessment & Plan     Active Hospital Problems    Diagnosis  POA   • **Meningioma (HCC) [D32.9]  Yes   • Rheumatoid vs Psoriatic arthritis (HCC) [L40.50]  Yes   • Atrial fibrillation (HCC) [I48.91]  Yes   • Other recurrent depressive disorders (HCC) [F33.8]  Yes   • 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        Brief Hospital Course to date:  Monty Nagel is a 67 y.o. male with history of hypertension, retinal melanoma and subsequent right eye blindness, previous alcohol use who was admitted for meningioma, gait instability and general weakness.  Brain MRI showed 6.4 enhancing mass and he was evaluated by neurosurgery.  He underwent right frontal craniotomy on 3/21 as well as right middle meningeal artery embolization with Dr. Mckee in anticipation for tumor resection.  He was transferred from ICU to medical floor on 9/22     Meningioma,'s s/p tumor embolization 3/20 by Dr. Sebastian and resection 3/21 by Dr. Mckee  Generalized weakness, frequent falls  -Continue ASA Plavix when okay with neurosurgery  -Continue Decadron and Keppra, per neurosurgery plan for steroid taper at discharge  -PT/OT following, plan for rehab  -He will follow-up with Dr. Mckee in 2 weeks post-discharge     Hypertension  -BP is much improved, ok to resume antihypertensives  -Patient is on lisinopril- HCTZ, will resume HCTZ 12.5 mg daily today     Hyperlipidemia  -Continue statin     New A-fib  -Rates currently controlled  -Currently on subcu Lovenox     Hypothyroidism  History of thyroid ablation secondary to Graves' disease  - continue Synthroid     Former EtOH, heavy  use  -Stopped drinking 9/22     Hyponatremia, not significant  -Monitor while on Keppra, encourage protein intake  -Na+ is low but stable, will continue to monitor for now.       Right great toe lesion  -Dr. Flores following, suspect chronic callus with remote trauma, possible osteomyelitis  -MRI right foot negative for osteo, shows ulceration of the distal phalanx of the great toe    Expected Discharge Location and Transportation: Rehab  Expected Discharge   Expected Discharge Date and Time     Expected Discharge Date Expected Discharge Time    Mar 30, 2023          DVT prophylaxis:  Medical and mechanical DVT prophylaxis orders are present.     AM-PAC 6 Clicks Score (PT): 17 (03/27/23 1595)    CODE STATUS:   Code Status and Medical Interventions:   Ordered at: 03/21/23 1235     Level Of Support Discussed With:    Patient     Code Status (Patient has no pulse and is not breathing):    CPR (Attempt to Resuscitate)     Medical Interventions (Patient has pulse or is breathing):    Full       Fiorella Manzanares MD  03/28/23

## 2023-03-28 NOTE — PLAN OF CARE
Goal Outcome Evaluation:         Pt hostile this PM and is now saying things that don't appear to make sense. Pt still oriented. Provider notified and seroquel added to night time medications. MRI of foot obtained.

## 2023-03-28 NOTE — PROGRESS NOTES
Cardiothoracic Surgery Progress Note      POD #:     LOS: 11 days      Subjective: Patient is awake and alert.  Chief complaint right great toe pain    Objective:  Vital Signs  Temp:  [97.7 °F (36.5 °C)-98 °F (36.7 °C)] 98 °F (36.7 °C)  Heart Rate:  [66-88] 76  Resp:  [16-18] 18  BP: (113-139)/() 118/79    Physical Exam:   General Appearance: Seems somewhat oriented   Lungs:   Heart:   Skin: The right great toe callus had Betadine swabbing and new Optifoam dressing applied   Incision:     Results:  Results from last 7 days   Lab Units 03/24/23  0422   WBC 10*3/mm3 14.02*   HEMOGLOBIN g/dL 11.8*   HEMATOCRIT % 34.5*   PLATELETS 10*3/mm3 215     Results from last 7 days   Lab Units 03/24/23  0422   SODIUM mmol/L 131*   POTASSIUM mmol/L 4.3   CHLORIDE mmol/L 97*   CO2 mmol/L 30.0*   BUN mg/dL 20   CREATININE mg/dL 0.41*   GLUCOSE mg/dL 128*   CALCIUM mg/dL 7.6*     MRI of the right foot pending    Assessment: #1.  Chronic callus of the right great toe with some associated remote trauma over 2 years ago.  #2.  Possible osteomyelitis of the right great toe distal phalanx MRI results pending  #3 meningioma of brain resected per neurosurgery over a week ago  Plan: Await MRI results of right foot.  overall medical management per hospitalist.  Neurosurgery to manage recent meningioma resection.      Stanislaw Flores MD - 03/28/23 - 05:25 EDT  Addendum: MRI was negative for any osteomyelitis of this right great toe distal phalanx.  We will sign off on daily visits.  Patient needs to follow-up with a podiatrist or his local family physician for care of this callus of this right great toe.  Call if needed.    Electronically signed by Stanislaw Flores MD, 03/28/23, 8:26 AM EDT.

## 2023-03-28 NOTE — THERAPY TREATMENT NOTE
Patient Name: Monty Nagel  : 1955    MRN: [id number]                              Today's Date: 3/28/2023       Admit Date: 3/11/2023    Visit Dx:     ICD-10-CM ICD-9-CM   1. Generalized weakness  R53.1 780.79   2. History of alcoholism (HCC)  F10.21 V11.3   3. Inability to walk  R26.2 719.7   4. Confusion  R41.0 298.9   5. Hypoglycemia  E16.2 251.2   6. Poor nutrition  E63.9 269.9   7. Cognitive communication deficit  R41.841 799.52   8. Impaired functional mobility, balance, gait, and endurance  Z74.09 V49.89   9. 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   • Postablative hypothyroidism   • Screen for colon cancer   • Other recurrent depressive disorders (HCC)   • Balance problem   • Generalized weakness   • Atrial fibrillation (HCC)   • Severe malnutrition (HCC)   • Meningioma (HCC)   • Rheumatoid vs Psoriatic arthritis (HCC)     Past Medical History:   Diagnosis Date   • Arthritis    • Hepatitis A    • Hypertension    • Melanoma (HCC) 2017    retina     Past Surgical History:   Procedure Laterality Date   • CRANIOTOMY FOR TUMOR N/A 3/21/2023    Procedure: CRANIOTOMY FOR TUMOR STEREOTACTIC WITH STEALTH;  Surgeon: Marlon Mckee MD;  Location: [facility] OR;  Service: Neurosurgery;  Laterality: N/A;   • EMBOLIZATION CEREBRAL N/A 3/20/2023    Procedure: Tumor Emoblization radial access;  Surgeon: Ozzy Sebastian MD;  Location: [facility] INVASIVE LOCATION;  Service: Interventional Radiology;  Laterality: N/A;   • EYE SURGERY  2017    EYE CANCER RIGHT EYE   • FINGER SURGERY Left     Pointer finger re-attached in 3rd Grade   • TONSILLECTOMY        General Information     Row Name 23 1542          Physical Therapy Time and Intention    Document Type therapy note (daily note)  -LR     Mode of Treatment physical therapy;individual therapy  -LR     Row Name 23 1547           General Information    Patient Profile Reviewed yes  -LR     Existing Precautions/Restrictions fall;other (see comments)  impulsive, L sided weakness/inattention, R visual deficit  -LR     Barriers to Rehab medically complex;cognitive status;visual deficit  -LR     Row Name 03/28/23 1542          Cognition    Orientation Status (Cognition) oriented x 4  -LR     Row Name 03/28/23 1542          Safety Issues, Functional Mobility    Safety Issues Affecting Function (Mobility) safety precautions follow-through/compliance;safety precaution awareness  -LR     Impairments Affecting Function (Mobility) balance;cognition;coordination;endurance/activity tolerance;strength;postural/trunk control;motor planning;visual/perceptual;pain  -LR           User Key  (r) = Recorded By, (t) = Taken By, (c) = Cosigned By    Initials Name Provider Type    LR Gabriela Smith, PT Physical Therapist               Mobility     Row Name 03/28/23 1542          Bed Mobility    Bed Mobility sit-supine;supine-sit  -LR     Supine-Sit [place] (Bed Mobility) independent  -LR     Sit-Supine [place] (Bed Mobility) independent  -LR     Supine-Sit-Supine [place] (Bed Mobility) independent  -LR     Comment, (Bed Mobility) Patient sat up to EOB impulsively. No assist required to sit up to EOB or to return to supine in bed.  -LR     Row Name 03/28/23 1542          Transfers    Comment, (Transfers) Verbal cues for correct hand placement and safety with t/f.  -LR     Row Name 03/28/23 1542          Bed-Chair Transfer    Bed-Chair [place] (Transfers) not tested  -LR     Row Name 03/28/23 1542          Sit-Stand Transfer    Sit-Stand [place] (Transfers) verbal cues;contact guard  -LR     Assistive Device (Sit-Stand Transfers) walker, front-wheeled  -LR     Row Name 03/28/23 1542          Gait/Stairs (Locomotion)    [place] Level (Gait) verbal cues;contact guard  -LR     Assistive Device (Gait) walker, front-wheeled  -LR      Distance in Feet (Gait) 300  -LR     Deviations/Abnormal Patterns (Gait) bilateral deviations;dima decreased;stride length decreased  -LR     Bilateral Gait Deviations forward flexed posture;heel strike decreased  maintains B knees in slight flexion throughout gait cycle  -LR     Left Sided Gait Deviations foot drop/toe drag  -LR     [place] Level (Stairs) not tested  -LR     Comment, (Gait/Stairs) Patient ambulated with step through gait pattern at fast pace. Patient maintained B knees in slight flexion throughout gait cycle. Educated patient on safety with mobility and how it is safer to walk at a slow pace. Patient insisted on ambulating faster, stating to allow for momentum. Cues to keep RW closer/to stay within RW. Gait limited by fatigue and weakness.  -LR           User Key  (r) = Recorded By, (t) = Taken By, (c) = Cosigned By    Initials Name Provider Type    Gabriela Parker, RIC Physical Therapist               Obj/Interventions     Row Name 03/28/23 1542          Motor Skills    Therapeutic Exercise knee;other (see comments)  cues for technique; no assist required  -LR     Row Name 03/28/23 1542          Knee (Therapeutic Exercise)    Knee Strengthening (Therapeutic Exercise) bilateral;SLR (straight leg raise);heel slides;supine;15 repititions  -LR     Row Name 03/28/23 1542          Balance    Balance Assessment sitting static balance;sitting dynamic balance;standing static balance;standing dynamic balance  -LR     Static Sitting Balance independent  -LR     Dynamic Sitting Balance independent  -LR     Position, Sitting Balance unsupported;sitting edge of bed  -LR     Static Standing Balance contact guard  -LR     Dynamic Standing Balance contact guard  -LR     Position/Device Used, Standing Balance supported;walker, rolling  -LR           User Key  (r) = Recorded By, (t) = Taken By, (c) = Cosigned By    Initials Name Provider Type    Gabriela Parker, PT Physical Therapist                Goals/Plan     Row Name 03/28/23 1542          Bed Mobility Goal 1 (PT)    Activity/Assistive Device (Bed Mobility Goal 1, PT) sit to supine/supine to sit  -LR     [place] Level/Cues Needed (Bed Mobility Goal 1, PT) modified independence  -LR     Time Frame (Bed Mobility Goal 1, PT) long term goal (LTG);10 days  -LR     Progress/Outcomes (Bed Mobility Goal 1, PT) goal met  -LR     Row Name 03/28/23 1542          Transfer Goal 1 (PT)    Activity/Assistive Device (Transfer Goal 1, PT) sit-to-stand/stand-to-sit;bed-to-chair/chair-to-bed;walker, rolling  -LR     [place] Level/Cues Needed (Transfer Goal 1, PT) modified independence  -LR     Time Frame (Transfer Goal 1, PT) long term goal (LTG);10 days  -LR     Progress/Outcome (Transfer Goal 1, PT) good progress toward goal;goal ongoing  -LR     Row Name 03/28/23 1542          Gait Training Goal 1 (PT)    Activity/Assistive Device (Gait Training Goal 1, PT) gait (walking locomotion);assistive device use;walker, rolling;decrease fall risk;diminish gait deviation;improve balance and speed  -LR     [place] Level (Gait Training Goal 1, PT) modified independence  -LR     Distance (Gait Training Goal 1, PT) 500 feet  -LR     Time Frame (Gait Training Goal 1, PT) long term goal (LTG);10 days  -LR     Progress/Outcome (Gait Training Goal 1, PT) goal ongoing;continuing progress toward goal  -LR           User Key  (r) = Recorded By, (t) = Taken By, (c) = Cosigned By    Initials Name Provider Type    LR Gabriela Smith, PT Physical Therapist               Clinical Impression     Row Name 03/28/23 1542          Pain    Pretreatment Pain Rating 0/10 - no pain  -LR     Posttreatment Pain Rating 0/10 - no pain  -LR     Pain Intervention(s) Ambulation/increased activity;Repositioned  -LR     Row Name 03/28/23 1542          Plan of Care Review    Plan of Care Reviewed With patient  -LR     Progress improving  -LR     Outcome Evaluation Patient slightly  agitated this PM but agreeable to ambulation in halls. Demonstrated improved balance with ambulation but continues to insist on ambulating at fast pace. Patient continues to be below baseline, demonstrating decreased functional mobility status, impaired balance, and decreased strength. Will address these deficits to promote return to PLOF. Recommend IRF at d/c.  -LR     Row Name 03/28/23 1542          Therapy Assessment/Plan (PT)    Rehab Potential (PT) good, to achieve stated therapy goals  -LR     Criteria for Skilled Interventions Met (PT) yes;skilled treatment is necessary;meets criteria  -LR     Therapy Frequency (PT) daily  -LR     Row Name 03/28/23 1542          Positioning and Restraints    Pre-Treatment Position in bed  -LR     Post Treatment Position bed  -LR     In Bed notified nsg;supine;call light within reach;encouraged to call for assist;exit alarm on;side rails up x2;SCD pump applied  SCDs not on patient on arrival  -LR           User Key  (r) = Recorded By, (t) = Taken By, (c) = Cosigned By    Initials Name Provider Type    LR Gabriela Smith, PT Physical Therapist               Outcome Measures     Row Name 03/28/23 1542          How much help from another person do you currently need...    Turning from your back to your side while in flat bed without using bedrails? 4  -LR     Moving from lying on back to sitting on the side of a flat bed without bedrails? 4  -LR     Moving to and from a bed to a chair (including a wheelchair)? 3  -LR     Standing up from a chair using your arms (e.g., wheelchair, bedside chair)? 3  -LR     Climbing 3-5 steps with a railing? 3  -LR     To walk in hospital room? 3  -LR     AM-PAC 6 Clicks Score (PT) 20  -LR     Highest level of mobility 6 --> Walked 10 steps or more  -LR     Row Name 03/28/23 1542 03/28/23 1315       Functional Assessment    Outcome Measure Options AM-PAC 6 Clicks Basic Mobility (PT)  -LR AM-PAC 6 Clicks Daily Activity (OT)  -MR           User Key  (r) = Recorded By, (t) = Taken By, (c) = Cosigned By    Initials Name Provider Type    LR Gabriela Smith, PT Physical Therapist    Amanda Nielson, OT Occupational Therapist                             Physical Therapy Education     Title: PT OT SLP Therapies (In Progress)     Topic: Physical Therapy (Done)     Point: Mobility training (Done)     Learning Progress Summary           Patient Acceptance, E,D, VU by LR at 3/28/2023 1542    Comment: Educated on safety with mobility, correct supine<->sit t/f technique, correct sit<->stand t/f technique, correct gait mechanics, LE HEP, benefits of mobility, and progression of POC.    Eager, E, VU,DU,NR by SS at 3/27/2023 1541    Comment: Reviewed safety/technique w/bed mobility, transfers, ambulation, HEP, safety awareness/recommendations    Acceptance, E, NR by AE at 3/24/2023 1520    Acceptance, E,D, VU,NR by HP at 3/23/2023 1127    Acceptance, E,TB, NR by AY at 3/22/2023 1141    Acceptance, E, VU,NR by CM at 3/19/2023 1149    Acceptance, E, VU by CM at 3/17/2023 1517    Acceptance, E, VU,NR by LH at 3/15/2023 1407    Acceptance, E, NR by KG at 3/14/2023 1428    Acceptance, E, VU,NR by SJ at 3/12/2023 1542                   Point: Home exercise program (Done)     Learning Progress Summary           Patient Acceptance, E,D, VU by LR at 3/28/2023 1542    Comment: Educated on safety with mobility, correct supine<->sit t/f technique, correct sit<->stand t/f technique, correct gait mechanics, LE HEP, benefits of mobility, and progression of POC.    Eager, E, VU,DU,NR by SS at 3/27/2023 1541    Comment: Reviewed safety/technique w/bed mobility, transfers, ambulation, HEP, safety awareness/recommendations    Acceptance, E, NR by AE at 3/24/2023 1520    Acceptance, E,D, VU,NR by HP at 3/23/2023 1127    Acceptance, E,TB, NR by AY at 3/22/2023 1141    Acceptance, E, VU,NR by CM at 3/19/2023 1149    Acceptance, E, VU,NR by LH at 3/15/2023 1407    Acceptance, E,  NR by KG at 3/14/2023 1428                   Point: Body mechanics (Done)     Learning Progress Summary           Patient Acceptance, E,D, VU by LR at 3/28/2023 1542    Comment: Educated on safety with mobility, correct supine<->sit t/f technique, correct sit<->stand t/f technique, correct gait mechanics, LE HEP, benefits of mobility, and progression of POC.    Eager, E, VU,DU,NR by SS at 3/27/2023 1541    Comment: Reviewed safety/technique w/bed mobility, transfers, ambulation, HEP, safety awareness/recommendations    Acceptance, E, NR by AE at 3/24/2023 1520    Acceptance, E,D, VU,NR by HP at 3/23/2023 1127    Acceptance, E,TB, NR by AY at 3/22/2023 1141    Acceptance, E, VU,NR by CM at 3/19/2023 1149    Acceptance, E, VU by CM at 3/17/2023 1517    Acceptance, E, VU,NR by LH at 3/15/2023 1407    Acceptance, E, NR by KG at 3/14/2023 1428    Acceptance, E, VU,NR by SJ at 3/12/2023 1542                   Point: Precautions (Done)     Learning Progress Summary           Patient Acceptance, E,D, VU by LR at 3/28/2023 1542    Comment: Educated on safety with mobility, correct supine<->sit t/f technique, correct sit<->stand t/f technique, correct gait mechanics, LE HEP, benefits of mobility, and progression of POC.    Eager, E, VU,DU,NR by SS at 3/27/2023 1541    Comment: Reviewed safety/technique w/bed mobility, transfers, ambulation, HEP, safety awareness/recommendations    Acceptance, E, NR by AE at 3/24/2023 1520    Acceptance, E,D, VU,NR by HP at 3/23/2023 1127    Acceptance, E,TB, NR by AY at 3/22/2023 1141    Acceptance, E, VU,NR by CM at 3/19/2023 1149    Acceptance, E, VU by CM at 3/17/2023 1517    Acceptance, E, VU,NR by LH at 3/15/2023 1407    Acceptance, E, NR by KG at 3/14/2023 1428    Acceptance, E, VU,NR by  at 3/12/2023 1542                               User Key     Initials Effective Dates Name Provider Type Discipline     02/03/23 - 03/12/23 Samara Harrison, PT Physical Therapist PT    LR 02/03/23  -  Gabriela Smith, PT Physical Therapist PT    KG 05/22/20 -  Cherelle Gaitan, PT Physical Therapist PT    AY 11/10/20 -  Sandee Spencer, PT Physical Therapist PT    LH 09/21/21 -  Tristian Ravi, PT Physical Therapist PT    HP 06/01/21 -  Kelly Mckeon, PT Physical Therapist PT    SS 06/01/21 -  Hyacinth King, PT Physical Therapist PT    AE 09/21/21 -  Andrew Centeno, PT Physical Therapist PT    CM 09/22/22 -  Yanet Calvin, PT Physical Therapist PT              PT Recommendation and Plan     Plan of Care Reviewed With: patient  Progress: improving  Outcome Evaluation: Patient slightly agitated this PM but agreeable to ambulation in halls. Demonstrated improved balance with ambulation but continues to insist on ambulating at fast pace. Patient continues to be below baseline, demonstrating decreased functional mobility status, impaired balance, and decreased strength. Will address these deficits to promote return to PLOF. Recommend IRF at d/c.     Time Calculation:    PT Charges     Row Name 03/28/23 1542             Time Calculation    Start Time 1542  -LR      PT Received On 03/28/23  -LR      PT Goal Re-Cert Due Date 04/01/23  -LR         Timed Charges    28652 - PT Therapeutic Exercise Minutes 3  -LR      71241 - Gait Training Minutes  13  -LR         Total Minutes    Timed Charges Total Minutes 16  -LR       Total Minutes 16  -LR            User Key  (r) = Recorded By, (t) = Taken By, (c) = Cosigned By    Initials Name Provider Type    LR Gabriela Smith, PT Physical Therapist              Therapy Charges for Today     Code Description Service Date Service Provider Modifiers Qty    59010433292 HC GAIT TRAINING EA 15 MIN 3/28/2023 Gabriela Smith, PT GP 1          PT G-Codes  Outcome Measure Options: AM-PAC 6 Clicks Basic Mobility (PT)  AM-PAC 6 Clicks Score (PT): 20  AM-PAC 6 Clicks Score (OT): 15  PT Discharge Summary  Anticipated Discharge Disposition (PT): inpatient  rehabilitation facility    Gabriela Smith, PT  3/28/2023

## 2023-03-28 NOTE — THERAPY TREATMENT NOTE
Patient Name: Monty Nagel  : 1955    MRN: [id number]                              Today's Date: 3/28/2023       Admit Date: 3/11/2023    Visit Dx:     ICD-10-CM ICD-9-CM   1. Generalized weakness  R53.1 780.79   2. History of alcoholism (HCC)  F10.21 V11.3   3. Inability to walk  R26.2 719.7   4. Confusion  R41.0 298.9   5. Hypoglycemia  E16.2 251.2   6. Poor nutrition  E63.9 269.9   7. Cognitive communication deficit  R41.841 799.52   8. Impaired functional mobility, balance, gait, and endurance  Z74.09 V49.89   9. 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   • Postablative hypothyroidism   • Screen for colon cancer   • Other recurrent depressive disorders (HCC)   • Balance problem   • Generalized weakness   • Atrial fibrillation (HCC)   • Severe malnutrition (HCC)   • Meningioma (HCC)   • Rheumatoid vs Psoriatic arthritis (HCC)     Past Medical History:   Diagnosis Date   • Arthritis    • Hepatitis A    • Hypertension    • Melanoma (HCC) 2017    retina     Past Surgical History:   Procedure Laterality Date   • CRANIOTOMY FOR TUMOR N/A 3/21/2023    Procedure: CRANIOTOMY FOR TUMOR STEREOTACTIC WITH STEALTH;  Surgeon: Marlon Mckee MD;  Location: [facility] OR;  Service: Neurosurgery;  Laterality: N/A;   • EMBOLIZATION CEREBRAL N/A 3/20/2023    Procedure: Tumor Emoblization radial access;  Surgeon: Ozzy Sebastian MD;  Location: [facility] INVASIVE LOCATION;  Service: Interventional Radiology;  Laterality: N/A;   • EYE SURGERY  2017    EYE CANCER RIGHT EYE   • FINGER SURGERY Left     Pointer finger re-attached in 3rd Grade   • TONSILLECTOMY        General Information     Row Name 23 1306          OT Time and Intention    Document Type therapy note (daily note)  -MR     Mode of Treatment occupational therapy  -MR     Row Name 23 1305          General Information     Patient Profile Reviewed yes  -MR     Existing Precautions/Restrictions fall;other (see comments)  impulsive, L sided weakness/inattention, R visual deficit  -MR     Barriers to Rehab medically complex;cognitive status;visual deficit  -MR     Row Name 03/28/23 1306          Cognition    Orientation Status (Cognition) oriented x 3  -MR     Row Name 03/28/23 1306          Safety Issues, Functional Mobility    Safety Issues Affecting Function (Mobility) awareness of need for assistance;impulsivity;judgment;insight into deficits/self-awareness;safety precaution awareness;safety precautions follow-through/compliance;sequencing abilities  -MR     Impairments Affecting Function (Mobility) balance;cognition;coordination;endurance/activity tolerance;strength;postural/trunk control;motor planning;visual/perceptual;pain  -MR     Cognitive Impairments, Mobility Safety/Performance awareness, need for assistance;insight into deficits/self-awareness;judgment;impulsivity;problem-solving/reasoning;safety precaution awareness;safety precaution follow-through;sequencing abilities  -MR           User Key  (r) = Recorded By, (t) = Taken By, (c) = Cosigned By    Initials Name Provider Type     Amanda Vega, OT Occupational Therapist                 Mobility/ADL's     Row Name 03/28/23 1307          Bed Mobility    Comment, (Bed Mobility) Pt deferred OOB/EOB d/t fatigue. Despite education for getting to chair for lunch.  -MR     Row Name 03/28/23 1307          Transfers    Comment, (Transfers) Pt deferred OOB/EOB d/t fatigue. Despite education for getting to chair for lunch.  -MR     Row Name 03/28/23 1307          Activities of Daily Living    BADL Assessment/Intervention upper body dressing;lower body dressing;grooming  -MR     Row Name 03/28/23 1307          Lower Body Dressing Assessment/Training    Comment, (Lower Body Dressing) Pt deferred donning pants.  -MR     Row Name 03/28/23 1307          Grooming Assessment/Training     [place] Level (Grooming) wash face, hands;set up  -MR     Position (Grooming) sitting up in bed  -MR     Row Name 03/28/23 1307          Upper Body Dressing Assessment/Training    Comment, (Upper Body Dressing) Pt deferred [facility] gown.  -MR           User Key  (r) = Recorded By, (t) = Taken By, (c) = Cosigned By    Initials Name Provider Type    Amanda Nielson, OT Occupational Therapist               Obj/Interventions    No documentation.                Goals/Plan    No documentation.                Clinical Impression     Row Name 03/28/23 1310          Pain Assessment    Pretreatment Pain Rating 0/10 - no pain  -MR     Posttreatment Pain Rating 0/10 - no pain  -MR     Pain Intervention(s) Repositioned  -MR     Row Name 03/28/23 1310          Plan of Care Review    Plan of Care Reviewed With patient  -MR     Outcome Evaluation Pt continues to present below baseline requiring assist for ADLs. Pt deferred OOB/EOB this date d/t fatigue. Pt deferred UB and LB dressing but demonstrated good effort w/ grooming tasks. Continue to progress as abler per current POC.  -MR     Row Name 03/28/23 1310          Therapy Plan Review/Discharge Plan (OT)    Anticipated Discharge Disposition (OT) inpatient rehabilitation facility  -MR     Row Name 03/28/23 1310          Vital Signs    Pre Systolic BP Rehab --  VSS  -MR     O2 Delivery Pre Treatment room air  -MR     O2 Delivery Intra Treatment room air  -MR     O2 Delivery Post Treatment room air  -MR     Pre Patient Position Supine  -MR     Intra Patient Position Standing  -MR     Post Patient Position Sitting  -MR     Row Name 03/28/23 1310          Positioning and Restraints    Pre-Treatment Position in bed  -MR     Post Treatment Position bed  -MR     In Bed notified nsg;side lying left;call light within reach;encouraged to call for assist;exit alarm on;side rails up x2  -MR           User Key  (r) = Recorded By, (t) = Taken By, (c) = Cosigned By    Initials  Name Provider Type    Amanda Nielson, OT Occupational Therapist               Outcome Measures     Row Name 03/28/23 1315          How much help from another is currently needed...    Putting on and taking off regular lower body clothing? 2  -MR     Bathing (including washing, rinsing, and drying) 2  -MR     Toileting (which includes using toilet bed pan or urinal) 2  -MR     Putting on and taking off regular upper body clothing 3  -MR     Taking care of personal grooming (such as brushing teeth) 3  -MR     Eating meals 3  -MR     AM-PAC 6 Clicks Score (OT) 15  -MR     Row Name 03/28/23 1315          Functional Assessment    Outcome Measure Options AM-PAC 6 Clicks Daily Activity (OT)  -MR           User Key  (r) = Recorded By, (t) = Taken By, (c) = Cosigned By    Initials Name Provider Type    Amanda Nielson, OT Occupational Therapist                Occupational Therapy Education     Title: PT OT SLP Therapies (In Progress)     Topic: Occupational Therapy (In Progress)     Point: ADL training (In Progress)     Description:   Instruct learner(s) on proper safety adaptation and remediation techniques during self care or transfers.   Instruct in proper use of assistive devices.              Learning Progress Summary           Patient Acceptance, E, NR by MR at 3/28/2023 1315    Acceptance, E, NR by CS at 3/22/2023 1108    Acceptance, E, VU,NR by [facility]OPE at 3/13/2023 1156    Comment: reason for consult, noted deficits, walker safety, concern over home return                   Point: Home exercise program (Not Started)     Description:   Instruct learner(s) on appropriate technique for monitoring, assisting and/or progressing therapeutic exercises/activities.              Learner Progress:  Not documented in this visit.          Point: Precautions (In Progress)     Description:   Instruct learner(s) on prescribed precautions during self-care and functional transfers.              Learning Progress Summary            Patient Acceptance, E, NR by MR at 3/28/2023 1315    Acceptance, E, NR by  at 3/22/2023 1108    Acceptance, E, VU,NR by  at 3/13/2023 1156    Comment: reason for consult, noted deficits, walker safety, concern over home return                   Point: Body mechanics (In Progress)     Description:   Instruct learner(s) on proper positioning and spine alignment during self-care, functional mobility activities and/or exercises.              Learning Progress Summary           Patient Acceptance, E, NR by MR at 3/28/2023 1315    Acceptance, E, NR by  at 3/22/2023 1108                               User Key     Initials Effective Dates Name Provider Type Discipline     02/03/23 -  Monae Cohen OT Occupational Therapist OT     09/02/21 -  Phyllis Bowen OT Occupational Therapist OT     09/22/22 -  Amanda Vega OT Occupational Therapist OT              OT Recommendation and Plan     Plan of Care Review  Plan of Care Reviewed With: patient  Outcome Evaluation: Pt continues to present below baseline requiring assist for ADLs. Pt deferred OOB/EOB this date d/t fatigue. Pt deferred UB and LB dressing but demonstrated good effort w/ grooming tasks. Continue to progress as abler per current POC.     Time Calculation:    Time Calculation- OT     Row Name 03/28/23 1315             Time Calculation- OT    OT Start Time 1056  -MR      OT Received On 03/28/23  -MR         Timed Charges    60898 - OT Self Care/Mgmt Minutes 12  -MR         Total Minutes    Timed Charges Total Minutes 12  -MR       Total Minutes 12  -MR            User Key  (r) = Recorded By, (t) = Taken By, (c) = Cosigned By    Initials Name Provider Type    MR Amanda Vega OT Occupational Therapist              Therapy Charges for Today     Code Description Service Date Service Provider Modifiers Qty    04358254952 HC OT SELF CARE/MGMT/TRAIN EA 15 MIN 3/28/2023 Amanda Vega OT GO 1               Amanda Vega OT  3/28/2023

## 2023-03-28 NOTE — PLAN OF CARE
Goal Outcome Evaluation:  Plan of Care Reviewed With: patient           Outcome Evaluation: Pt continues to present below baseline requiring assist for ADLs. Pt deferred OOB/EOB this date d/t fatigue. Pt deferred UB and LB dressing but demonstrated good effort w/ grooming tasks. Continue to progress as abler per current POC.

## 2023-03-29 LAB
ANION GAP SERPL CALCULATED.3IONS-SCNC: 9 MMOL/L (ref 5–15)
BUN SERPL-MCNC: 25 MG/DL (ref 8–23)
BUN/CREAT SERPL: 35.2 (ref 7–25)
CALCIUM SPEC-SCNC: 7.7 MG/DL (ref 8.6–10.5)
CHLORIDE SERPL-SCNC: 99 MMOL/L (ref 98–107)
CO2 SERPL-SCNC: 27 MMOL/L (ref 22–29)
CREAT SERPL-MCNC: 0.71 MG/DL (ref 0.76–1.27)
EGFRCR SERPLBLD CKD-EPI 2021: 99.3 ML/MIN/1.73
GLUCOSE SERPL-MCNC: 119 MG/DL (ref 65–99)
POTASSIUM SERPL-SCNC: 4.5 MMOL/L (ref 3.5–5.2)
SODIUM SERPL-SCNC: 135 MMOL/L (ref 136–145)

## 2023-03-29 PROCEDURE — 99231 SBSQ HOSP IP/OBS SF/LOW 25: CPT | Performed by: NURSE PRACTITIONER

## 2023-03-29 PROCEDURE — 97116 GAIT TRAINING THERAPY: CPT

## 2023-03-29 PROCEDURE — 63710000001 DEXAMETHASONE PER 0.25 MG: Performed by: NEUROLOGICAL SURGERY

## 2023-03-29 PROCEDURE — 25010000002 ENOXAPARIN PER 10 MG: Performed by: NEUROLOGICAL SURGERY

## 2023-03-29 PROCEDURE — 80048 BASIC METABOLIC PNL TOTAL CA: CPT | Performed by: INTERNAL MEDICINE

## 2023-03-29 RX ORDER — LISINOPRIL 10 MG/1
10 TABLET ORAL
Status: DISCONTINUED | OUTPATIENT
Start: 2023-03-29 | End: 2023-04-03 | Stop reason: HOSPADM

## 2023-03-29 RX ORDER — CLOPIDOGREL BISULFATE 75 MG/1
75 TABLET ORAL DAILY
Status: DISCONTINUED | OUTPATIENT
Start: 2023-03-29 | End: 2023-04-03 | Stop reason: HOSPADM

## 2023-03-29 RX ORDER — ASPIRIN 81 MG/1
81 TABLET ORAL DAILY
Status: DISCONTINUED | OUTPATIENT
Start: 2023-03-29 | End: 2023-04-03 | Stop reason: HOSPADM

## 2023-03-29 RX ADMIN — LEVETIRACETAM 1000 MG: 500 TABLET, FILM COATED ORAL at 20:50

## 2023-03-29 RX ADMIN — HYDROCHLOROTHIAZIDE 12.5 MG: 12.5 CAPSULE ORAL at 09:12

## 2023-03-29 RX ADMIN — PANTOPRAZOLE SODIUM 40 MG: 40 TABLET, DELAYED RELEASE ORAL at 06:42

## 2023-03-29 RX ADMIN — ASPIRIN 81 MG: 81 TABLET, COATED ORAL at 14:03

## 2023-03-29 RX ADMIN — DEXAMETHASONE 4 MG: 4 TABLET ORAL at 06:42

## 2023-03-29 RX ADMIN — LEVETIRACETAM 1000 MG: 500 TABLET, FILM COATED ORAL at 09:11

## 2023-03-29 RX ADMIN — Medication 10 ML: at 20:50

## 2023-03-29 RX ADMIN — ENOXAPARIN SODIUM 40 MG: 40 INJECTION SUBCUTANEOUS at 09:12

## 2023-03-29 RX ADMIN — LEVOTHYROXINE SODIUM 125 MCG: 125 TABLET ORAL at 06:42

## 2023-03-29 RX ADMIN — DEXAMETHASONE 4 MG: 4 TABLET ORAL at 14:03

## 2023-03-29 RX ADMIN — CLOPIDOGREL BISULFATE 75 MG: 75 TABLET ORAL at 14:03

## 2023-03-29 RX ADMIN — THIAMINE HCL TAB 100 MG 100 MG: 100 TAB at 09:11

## 2023-03-29 RX ADMIN — Medication 10 ML: at 09:13

## 2023-03-29 RX ADMIN — LISINOPRIL 10 MG: 10 TABLET ORAL at 14:03

## 2023-03-29 RX ADMIN — QUETIAPINE FUMARATE 25 MG: 25 TABLET ORAL at 20:50

## 2023-03-29 RX ADMIN — Medication 5 MG: at 20:54

## 2023-03-29 RX ADMIN — DEXAMETHASONE 4 MG: 4 TABLET ORAL at 18:53

## 2023-03-29 RX ADMIN — ATORVASTATIN CALCIUM 40 MG: 40 TABLET, FILM COATED ORAL at 20:50

## 2023-03-29 NOTE — PROGRESS NOTES
[facility] Medicine Services  PROGRESS NOTE    Patient Name: Monty Nagel  : 1955  MRN: [id number]    Date of Admission: 3/11/2023  Primary Care Physician: Tiffany Morales MD    Subjective   Subjective     CC: Follow-up meningioma    HPI:  Pt sitting up in bed frustrated because he can not get up out of bed by himself. MRI of right foot showed no evidence of osteomyelitis or abscess. There is an ulceration of the distal phalanx of the great toe. Spoke with neurosurgery and they instructed to restart ASA/Plavix.     ROS:  Gen- No fevers, chills  CV- No chest pain, palpitations  Resp- No cough, dyspnea  GI- No N/V/D, abd pain        Objective   Objective     Vital Signs:   Temp:  [97.9 °F (36.6 °C)-98.8 °F (37.1 °C)] 98.2 °F (36.8 °C)  Heart Rate:  [] 96  Resp:  [16-20] 18  BP: (125-151)/() 125/79     Physical Exam:  Constitutional: Awake, alert, Chronically ill appearing  HENT: NCAT, mucous membranes moist  Respiratory: Clear to auscultation bilaterally, nonlabored respirations   Cardiovascular: RRR, no murmurs, rubs, or gallop  Gastrointestinal: Positive bowel sounds, soft, nontender, nondistended  Musculoskeletal: No bilateral ankle edema, right toe with dsg  Psychiatric: Flat ffect, cooperative  Neurologic: Oriented x 3, nonfocal.   Skin: No rashes      Results Reviewed:  LAB RESULTS:      Lab 23   WBC 14.02* 18.31*   HEMOGLOBIN 11.8* 12.7*   HEMATOCRIT 34.5* 36.9*   PLATELETS 215 169   NEUTROS ABS 12.89*  --    IMMATURE GRANS (ABS) 0.11*  --    LYMPHS ABS 0.41*  --    MONOS ABS 0.60  --    EOS ABS 0.00  --    MCV 86.9 87.6         Lab 23  1823 232 23  1839 23   SODIUM 135*  --  131*  --  130*   POTASSIUM 4.5  --  4.3  --  4.7   CHLORIDE 99  --  97*  --  94*   CO2 27.0  --  30.0*  --  28.0   ANION GAP 9.0  --  4.0*  --  8.0   BUN 25*  --  20  --  25*   CREATININE 0.71*  --  0.41*  --  0.64*    EGFR 99.3  --  117.2  --  102.5   GLUCOSE 119*  --  128*  --  121*   CALCIUM 7.7*  --  7.6*  --  7.5*   MAGNESIUM  --   --   --   --  2.0   PHOSPHORUS  --  2.1* 2.1* 1.9* 2.2*         Lab 03/24/23  0422 03/23/23  0412   TOTAL PROTEIN  --  4.8*   ALBUMIN 2.5* 2.8*   GLOBULIN  --  2.0   ALT (SGPT)  --  17   AST (SGOT)  --  17   BILIRUBIN  --  0.6   ALK PHOS  --  52                     Brief Urine Lab Results  (Last result in the past 365 days)      Color   Clarity   Blood   Leuk Est   Nitrite   Protein   CREAT   Urine HCG        03/11/23 1058 Dark Yellow   Clear   Negative   Negative   Negative   Negative                 Microbiology Results Abnormal     None          MRI Foot Right With & Without Contrast    Result Date: 3/28/2023  MRI FOOT RIGHT W WO CONTRAST Date of Exam: 3/28/2023 2:23 AM EDT Indication: Osteomyelitis, foot.  Open wound great toe  Comparison: None available. Technique:  Routine multiplanar/multisequence sequence images of the right foot were obtained before and after the uneventful administration of  19 mL Multihance.  Findings: There is an ulceration of the distal aspect of the great toe. However the bone marrow signal intensity appears normal at this time out finding of osteomyelitis. There is flexion of the interphalangeal joint of the great toe, second, third fourth and fifth digits. There is mild joint space narrowing first metatarsophalangeal joint and first interphalangeal joint. There are no osseous erosions. There are no joint effusions. Hallux sesamoid bones of normal size, position and signal intensity. There are  no localized fluid collections to suggest abscess. The flexor and extensor tendons are intact and normal in appearance. The intrinsic muscles of the foot are normal in appearance. No pathologic enhancement is identified. There are no intermetatarsal masses. There is moderate subtalar joint space narrowing and talonavicular joint space narrowing. There is a moderate sized os  trigonum. Plantar fascia is intact.     Impression: Impression: No findings of osteomyelitis or abscess. Ulceration distal phalanx of the great toe. Osteoarthritis as described. Electronically Signed: Jenni Andrew  3/28/2023 7:31 AM EDT  Workstation ID: [id number]      Results for orders placed during the hospital encounter of 23    Adult Transthoracic Echo Complete w/ Color, Spectral and Contrast if necessary per protocol    Interpretation Summary  •  Left ventricular systolic function is normal. Estimated left ventricular EF = 55%  •  Biatrial enlargement.  •  Calcified aortic valve with mild aortic stenosis, mean gradient 10 mmHg, BANG 1.6 cm².  •  Moderate aortic insufficiency.  •  Mild mitral regurgitation.  •  Mild tricuspid regurgitation with normal RVSP.  •  Mild dilation of the ascending aorta (4.2 cm) is present.      Current medications:  Scheduled Meds:aspirin, 81 mg, Oral, Daily  atorvastatin, 40 mg, Oral, Nightly  clopidogrel, 75 mg, Oral, Daily  dexamethasone, 4 mg, Oral, Q6H  enoxaparin, 40 mg, Subcutaneous, Daily  hydroCHLOROthiazide Oral, 12.5 mg, Oral, Daily  levETIRAcetam, 1,000 mg, Oral, Q12H  levothyroxine, 125 mcg, Oral, Daily  lisinopril, 10 mg, Oral, Q24H  pantoprazole, 40 mg, Oral, Q AM  polyethylene glycol, 17 g, Oral, Daily  povidone-iodine, 1 application, Topical, Daily  QUEtiapine, 25 mg, Oral, Nightly  senna-docusate sodium, 2 tablet, Oral, BID  sodium chloride, 10 mL, Intravenous, Q12H  thiamine, 100 mg, Oral, Daily      Continuous Infusions:   PRN Meds:.•  acetaminophen **OR** acetaminophen **OR** acetaminophen  •  senna-docusate sodium **AND** polyethylene glycol **AND** bisacodyl **AND** bisacodyl  •  docusate sodium  •  magnesium hydroxide  •  Magnesium Standard Dose Replacement - Follow Nurse / BPA Driven Protocol  •  melatonin  •  [] HYDROmorphone **AND** naloxone  •  ondansetron **OR** ondansetron  •  Phosphorus Replacement - Follow Nurse / BPA Driven Protocol  •   Potassium Replacement - Follow Nurse / BPA Driven Protocol  •  sodium chloride  •  sodium chloride  •  sodium chloride    Assessment & Plan   Assessment & Plan     Active Hospital Problems    Diagnosis  POA   • **Meningioma (HCC) [D32.9]  Yes   • Rheumatoid vs Psoriatic arthritis (HCC) [L40.50]  Yes   • Atrial fibrillation (HCC) [I48.91]  Yes   • Other recurrent depressive disorders (HCC) [F33.8]  Yes   • 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        Brief Hospital Course to date:  Monty Nagel is a 67 y.o. male with history of hypertension, retinal melatonin and subsequent right eye blindness, previous alcohol use who was admitted for meningioma, gait instability and general weakness.  Brain MRI showed 6.4 enhancing mass and he was evaluated by neurosurgery.  He underwent right frontal craniotomy on 3/21 as well as right middle meningeal artery embolization with Dr. Mckee in anticipation for tumor resection.  He was transferred from ICU to medical floor on 9/22     Meningioma,'s s/p tumor embolization 3/20 by Dr. Sebastian and resection 3/21 by Dr. Mckee  Generalized weakness, frequent falls  -Continue ASA Plavix when okay with neurosurgery  -Continue Decadron and Keppra, per neurosurgery plan for steroid taper at discharge  -PT/OT following, plan for rehab  -He will follow-up with Dr. Mckee in 2 weeks post-discharge     Hypertension  -BP is much improved, ok to resume antihypertensives  -Patient is on lisinopril- HCTZ- now resumed.     Hyperlipidemia  -Continue statin     New A-fib  -Rates currently controlled  -Currently on subcu Lovenox     Hypothyroidism  History of thyroid ablation secondary to Graves' disease  - continue Synthroid     Former EtOH, heavy use  -Stopped drinking 9/22     Hyponatremia, not  significant  -Monitor while on Keppra, encourage protein intake  -Na+ is low but stable, will continue to monitor for now.       Right great toe lesion  -Dr. Flores following, suspect chronic callus with remote trauma, concerning for osteomyelitis but   MRI right foot negative for osteo, shows ulceration of the distal phalanx of the great toe    Expected Discharge Location and Transportation: Rehab  Expected Discharge   Expected Discharge Date and Time     Expected Discharge Date Expected Discharge Time    Mar 30, 2023          DVT prophylaxis:  Medical and mechanical DVT prophylaxis orders are present.     AM-PAC 6 Clicks Score (PT): 20 (03/29/23 1331)    CODE STATUS:   Code Status and Medical Interventions:   Ordered at: 03/21/23 1235     Level Of Support Discussed With:    Patient     Code Status (Patient has no pulse and is not breathing):    CPR (Attempt to Resuscitate)     Medical Interventions (Patient has pulse or is breathing):    Full       Laine Barth, LYNETTE  03/29/23

## 2023-03-29 NOTE — DISCHARGE PLACEMENT REQUEST
"Jonathan Barrera (67 y.o. Male)   Dk Caceres, RN Case Manager  [phone]    Date of Birth   1955    Social Security Number       Address   [address] [place] [address]    Home Phone   [phone]    MRN   [id number]       Orthodox   None    Marital Status   Single                            Admission Date   3/11/23    Admission Type   Emergency    Admitting Provider   Marlon Mckee MD    Attending Provider   Fiorella Manzanares MD    Department, Room/Bed   [facility] 3H, S371/1       Discharge Date       Discharge Disposition       Discharge Destination                               Attending Provider: Fiorella Manzanares MD    Allergies: No Known Allergies    Isolation: None   Infection: None   Code Status: CPR    Ht: 180.3 cm (71\")   Wt: 93.4 kg (206 lb)    Admission Cmt: None   Principal Problem: Meningioma (HCC) [D32.9]                 Active Insurance as of 3/11/2023     Primary Coverage     Payor Plan Insurance Group Employer/Plan Group    ANTHEM MEDICARE REPLACEMENT ANTHEM MEDICARE ADVANTAGE [id number]     Payor Plan Address Payor Plan Phone Number Payor Plan Fax Number Effective Dates    [facility] [id number] [phone]  2020 - None Entered    [facility] [id number]       Subscriber Name Subscriber Birth Date Member ID       JONATHAN BARRERA 1955 [id number]                 Emergency Contacts      (Rel.) Home Phone Work Phone Mobile Phone    Marva Ku (Sister) [phone] -- [phone]               Physician Progress Notes (last 24 hours)      Laine Batrh APRN at 23 0929              [facility] Medicine Services  PROGRESS NOTE    Patient Name: Jonathan Barrera  : 1955  MRN: [id number]    Date of Admission: 3/11/2023  Primary Care Physician: Tiffany Morales MD    Subjective   Subjective     CC: Follow-up meningioma    HPI:  Pt sitting up in bed frustrated because he can not get up out of bed by himself. MRI " of right foot showed no evidence of osteomyelitis or abscess. There is an ulceration of the distal phalanx of the great toe.     ROS:  Gen- No fevers, chills  CV- No chest pain, palpitations  Resp- No cough, dyspnea  GI- No N/V/D, abd pain        Objective   Objective     Vital Signs:   Temp:  [97.9 °F (36.6 °C)-98.8 °F (37.1 °C)] 97.9 °F (36.6 °C)  Heart Rate:  [] 92  Resp:  [16-20] 18  BP: (129-151)/() 151/104     Physical Exam:  Constitutional: Awake, alert, Chronically ill appearing  HENT: NCAT, mucous membranes moist  Respiratory: Clear to auscultation bilaterally, nonlabored respirations   Cardiovascular: RRR, no murmurs, rubs, or gallop  Gastrointestinal: Positive bowel sounds, soft, nontender, nondistended  Musculoskeletal: No bilateral ankle edema, right toe with dsg  Psychiatric: Flat ffect, cooperative  Neurologic: Oriented x 3, nonfocal.   Skin: No rashes      Results Reviewed:  LAB RESULTS:      Lab 03/24/23 0422 03/23/23 0412   WBC 14.02* 18.31*   HEMOGLOBIN 11.8* 12.7*   HEMATOCRIT 34.5* 36.9*   PLATELETS 215 169   NEUTROS ABS 12.89*  --    IMMATURE GRANS (ABS) 0.11*  --    LYMPHS ABS 0.41*  --    MONOS ABS 0.60  --    EOS ABS 0.00  --    MCV 86.9 87.6         Lab 03/29/23 0415 03/24/23  1823 03/24/23 0422 03/23/23  1839 03/23/23 0412   SODIUM 135*  --  131*  --  130*   POTASSIUM 4.5  --  4.3  --  4.7   CHLORIDE 99  --  97*  --  94*   CO2 27.0  --  30.0*  --  28.0   ANION GAP 9.0  --  4.0*  --  8.0   BUN 25*  --  20  --  25*   CREATININE 0.71*  --  0.41*  --  0.64*   EGFR 99.3  --  117.2  --  102.5   GLUCOSE 119*  --  128*  --  121*   CALCIUM 7.7*  --  7.6*  --  7.5*   MAGNESIUM  --   --   --   --  2.0   PHOSPHORUS  --  2.1* 2.1* 1.9* 2.2*         Lab 03/24/23  0422 03/23/23  0412   TOTAL PROTEIN  --  4.8*   ALBUMIN 2.5* 2.8*   GLOBULIN  --  2.0   ALT (SGPT)  --  17   AST (SGOT)  --  17   BILIRUBIN  --  0.6   ALK PHOS  --  52                     Brief Urine Lab Results  (Last result  in the past 365 days)      Color   Clarity   Blood   Leuk Est   Nitrite   Protein   CREAT   Urine HCG        03/11/23 1058 Dark Yellow   Clear   Negative   Negative   Negative   Negative                 Microbiology Results Abnormal     None          MRI Foot Right With & Without Contrast    Result Date: 3/28/2023  MRI FOOT RIGHT W WO CONTRAST Date of Exam: 3/28/2023 2:23 AM EDT Indication: Osteomyelitis, foot.  Open wound great toe  Comparison: None available. Technique:  Routine multiplanar/multisequence sequence images of the right foot were obtained before and after the uneventful administration of  19 mL Multihance.  Findings: There is an ulceration of the distal aspect of the great toe. However the bone marrow signal intensity appears normal at this time out finding of osteomyelitis. There is flexion of the interphalangeal joint of the great toe, second, third fourth and fifth digits. There is mild joint space narrowing first metatarsophalangeal joint and first interphalangeal joint. There are no osseous erosions. There are no joint effusions. Hallux sesamoid bones of normal size, position and signal intensity. There are  no localized fluid collections to suggest abscess. The flexor and extensor tendons are intact and normal in appearance. The intrinsic muscles of the foot are normal in appearance. No pathologic enhancement is identified. There are no intermetatarsal masses. There is moderate subtalar joint space narrowing and talonavicular joint space narrowing. There is a moderate sized os trigonum. Plantar fascia is intact.     Impression: Impression: No findings of osteomyelitis or abscess. Ulceration distal phalanx of the great toe. Osteoarthritis as described. Electronically Signed: Jenni Andrew  3/28/2023 7:31 AM EDT  Workstation ID: [id number]      Results for orders placed during the hospital encounter of 03/11/23    Adult Transthoracic Echo Complete w/ Color, Spectral and Contrast if necessary per  protocol    Interpretation Summary  •  Left ventricular systolic function is normal. Estimated left ventricular EF = 55%  •  Biatrial enlargement.  •  Calcified aortic valve with mild aortic stenosis, mean gradient 10 mmHg, BANG 1.6 cm².  •  Moderate aortic insufficiency.  •  Mild mitral regurgitation.  •  Mild tricuspid regurgitation with normal RVSP.  •  Mild dilation of the ascending aorta (4.2 cm) is present.      Current medications:  Scheduled Meds:atorvastatin, 40 mg, Oral, Nightly  dexamethasone, 4 mg, Oral, Q6H  enoxaparin, 40 mg, Subcutaneous, Daily  hydroCHLOROthiazide Oral, 12.5 mg, Oral, Daily  levETIRAcetam, 1,000 mg, Oral, Q12H  levothyroxine, 125 mcg, Oral, Daily  pantoprazole, 40 mg, Oral, Q AM  polyethylene glycol, 17 g, Oral, Daily  povidone-iodine, 1 application, Topical, Daily  QUEtiapine, 25 mg, Oral, Nightly  senna-docusate sodium, 2 tablet, Oral, BID  sodium chloride, 10 mL, Intravenous, Q12H  thiamine, 100 mg, Oral, Daily      Continuous Infusions:   PRN Meds:.•  acetaminophen **OR** acetaminophen **OR** acetaminophen  •  senna-docusate sodium **AND** polyethylene glycol **AND** bisacodyl **AND** bisacodyl  •  docusate sodium  •  magnesium hydroxide  •  Magnesium Standard Dose Replacement - Follow Nurse / BPA Driven Protocol  •  melatonin  •  [] HYDROmorphone **AND** naloxone  •  ondansetron **OR** ondansetron  •  Phosphorus Replacement - Follow Nurse / BPA Driven Protocol  •  Potassium Replacement - Follow Nurse / BPA Driven Protocol  •  sodium chloride  •  sodium chloride  •  sodium chloride    Assessment & Plan   Assessment & Plan     Active Hospital Problems    Diagnosis  POA   • **Meningioma (HCC) [D32.9]  Yes   • Rheumatoid vs Psoriatic arthritis (HCC) [L40.50]  Yes   • Atrial fibrillation (HCC) [I48.91]  Yes   • Other recurrent depressive disorders (HCC) [F33.8]  Yes   • Postablative hypothyroidism [E89.0]  Yes   • Essential hypertension [I10]  Yes   • History of right retinal  melanoma with right eye blindness [Z85.820]  Not Applicable      Resolved Hospital Problems   No resolved problems to display.        Brief Hospital Course to date:  Monty Nagel is a 67 y.o. male with history of hypertension, retinal melatonin and subsequent right eye blindness, previous alcohol use who was admitted for meningioma, gait instability and general weakness.  Brain MRI showed 6.4 enhancing mass and he was evaluated by neurosurgery.  He underwent right frontal craniotomy on 3/21 as well as right middle meningeal artery embolization with Dr. Mckee in anticipation for tumor resection.  He was transferred from ICU to medical floor on 9/22     Meningioma,'s s/p tumor embolization 3/20 by Dr. Sebastian and resection 3/21 by Dr. Mckee  Generalized weakness, frequent falls  -Continue ASA Plavix when okay with neurosurgery  -Continue Decadron and Keppra, per neurosurgery plan for steroid taper at discharge  -PT/OT following, plan for rehab  -He will follow-up with Dr. Mckee in 2 weeks post-discharge     Hypertension  -BP is much improved, ok to resume antihypertensives  -Patient is on lisinopril- HCTZ- now resumed.     Hyperlipidemia  -Continue statin     New A-fib  -Rates currently controlled  -Currently on subcu Lovenox     Hypothyroidism  History of thyroid ablation secondary to Graves' disease  - continue Synthroid     Former EtOH, heavy use  -Stopped drinking 9/22     Hyponatremia, not significant  -Monitor while on Keppra, encourage protein intake  -Na+ is low but stable, will continue to monitor for now.       Right great toe lesion  -Dr. Flores following, suspect chronic callus with remote trauma, concerning for osteomyelitis but   MRI right foot negative for osteo, shows ulceration of the distal phalanx of the great toe    Expected Discharge Location and Transportation: Rehab  Expected Discharge   Expected Discharge Date and Time     Expected Discharge Date Expected Discharge Time    Mar 30, 2023           DVT prophylaxis:  Medical and mechanical DVT prophylaxis orders are present.     AM-PAC 6 Clicks Score (PT): 20 (23 1542)    CODE STATUS:   Code Status and Medical Interventions:   Ordered at: 23 1235     Level Of Support Discussed With:    Patient     Code Status (Patient has no pulse and is not breathing):    CPR (Attempt to Resuscitate)     Medical Interventions (Patient has pulse or is breathing):    Full       LYNETTE Grewal  23        Electronically signed by Laine Barth APRN at 23 0901          Physical Therapy Notes (most recent note)      Gabriela Smith, PT at 23 1542  Version 1 of 1         Patient Name: Monty Nagel  : 1955    MRN: [id number]                              Today's Date: 3/28/2023       Admit Date: 3/11/2023    Visit Dx:     ICD-10-CM ICD-9-CM   1. Generalized weakness  R53.1 780.79   2. History of alcoholism (HCC)  F10.21 V11.3   3. Inability to walk  R26.2 719.7   4. Confusion  R41.0 298.9   5. Hypoglycemia  E16.2 251.2   6. Poor nutrition  E63.9 269.9   7. Cognitive communication deficit  R41.841 799.52   8. Impaired functional mobility, balance, gait, and endurance  Z74.09 V49.89   9. 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   • Postablative hypothyroidism   • Screen for colon cancer   • Other recurrent depressive disorders (HCC)   • Balance problem   • Generalized weakness   • Atrial fibrillation (HCC)   • Severe malnutrition (HCC)   • Meningioma (HCC)   • Rheumatoid vs Psoriatic arthritis (HCC)     Past Medical History:   Diagnosis Date   • Arthritis    • Hepatitis A    • Hypertension    • Melanoma (HCC) 2017    retina     Past Surgical History:   Procedure Laterality Date   • CRANIOTOMY FOR TUMOR N/A 3/21/2023    Procedure: CRANIOTOMY FOR TUMOR STEREOTACTIC WITH STEALTH;  Surgeon: Marlon Mckee  MD Jose;  Location:  [facility];  Service: Neurosurgery;  Laterality: N/A;   • EMBOLIZATION CEREBRAL N/A 3/20/2023    Procedure: Tumor Emoblization radial access;  Surgeon: Ozzy Sebastian MD;  Location:  [facility];  Service: Interventional Radiology;  Laterality: N/A;   • EYE SURGERY  2017    EYE CANCER RIGHT EYE   • FINGER SURGERY Left     Pointer finger re-attached in 3rd Grade   • TONSILLECTOMY        General Information     Row Name 03/28/23 1542          Physical Therapy Time and Intention    Document Type therapy note (daily note)  -LR     Mode of Treatment physical therapy;individual therapy  -LR     Row Name 03/28/23 1542          General Information    Patient Profile Reviewed yes  -LR     Existing Precautions/Restrictions fall;other (see comments)  impulsive, L sided weakness/inattention, R visual deficit  -LR     Barriers to Rehab medically complex;cognitive status;visual deficit  -LR     Row Name 03/28/23 1542          Cognition    Orientation Status (Cognition) oriented x 4  -LR     Row Name 03/28/23 1542          Safety Issues, Functional Mobility    Safety Issues Affecting Function (Mobility) safety precautions follow-through/compliance;safety precaution awareness  -LR     Impairments Affecting Function (Mobility) balance;cognition;coordination;endurance/activity tolerance;strength;postural/trunk control;motor planning;visual/perceptual;pain  -LR           User Key  (r) = Recorded By, (t) = Taken By, (c) = Cosigned By    Initials Name Provider Type    LR Gabriela Smith, PT Physical Therapist               Mobility     Row Name 03/28/23 1542          Bed Mobility    Bed Mobility sit-supine;supine-sit  -LR     Supine-Sit [place] (Bed Mobility) independent  -LR     Sit-Supine [place] (Bed Mobility) independent  -LR     Supine-Sit-Supine [place] (Bed Mobility) independent  -LR     Comment, (Bed Mobility) Patient sat up to EOB impulsively. No assist required  to sit up to EOB or to return to supine in bed.  -LR     Row Name 03/28/23 1542          Transfers    Comment, (Transfers) Verbal cues for correct hand placement and safety with t/f.  -LR     Row Name 03/28/23 1542          Bed-Chair Transfer    Bed-Chair [place] (Transfers) not tested  -LR     Row Name 03/28/23 1542          Sit-Stand Transfer    Sit-Stand [place] (Transfers) verbal cues;contact guard  -LR     Assistive Device (Sit-Stand Transfers) walker, front-wheeled  -LR     Row Name 03/28/23 1542          Gait/Stairs (Locomotion)    [place] Level (Gait) verbal cues;contact guard  -LR     Assistive Device (Gait) walker, front-wheeled  -LR     Distance in Feet (Gait) 300  -LR     Deviations/Abnormal Patterns (Gait) bilateral deviations;idma decreased;stride length decreased  -LR     Bilateral Gait Deviations forward flexed posture;heel strike decreased  maintains B knees in slight flexion throughout gait cycle  -LR     Left Sided Gait Deviations foot drop/toe drag  -LR     [place] Level (Stairs) not tested  -LR     Comment, (Gait/Stairs) Patient ambulated with step through gait pattern at fast pace. Patient maintained B knees in slight flexion throughout gait cycle. Educated patient on safety with mobility and how it is safer to walk at a slow pace. Patient insisted on ambulating faster, stating to allow for momentum. Cues to keep RW closer/to stay within RW. Gait limited by fatigue and weakness.  -LR           User Key  (r) = Recorded By, (t) = Taken By, (c) = Cosigned By    Initials Name Provider Type    LR Gabriela Smith, PT Physical Therapist               Obj/Interventions     Row Name 03/28/23 1542          Motor Skills    Therapeutic Exercise knee;other (see comments)  cues for technique; no assist required  -LR     Row Name 03/28/23 1542          Knee (Therapeutic Exercise)    Knee Strengthening (Therapeutic Exercise) bilateral;SLR (straight leg raise);heel slides;supine;15  repititions  -LR     Row Name 03/28/23 1542          Balance    Balance Assessment sitting static balance;sitting dynamic balance;standing static balance;standing dynamic balance  -LR     Static Sitting Balance independent  -LR     Dynamic Sitting Balance independent  -LR     Position, Sitting Balance unsupported;sitting edge of bed  -LR     Static Standing Balance contact guard  -LR     Dynamic Standing Balance contact guard  -LR     Position/Device Used, Standing Balance supported;walker, rolling  -LR           User Key  (r) = Recorded By, (t) = Taken By, (c) = Cosigned By    Initials Name Provider Type    LR Gabriela Smith, PT Physical Therapist               Goals/Plan     Row Name 03/28/23 1542          Bed Mobility Goal 1 (PT)    Activity/Assistive Device (Bed Mobility Goal 1, PT) sit to supine/supine to sit  -LR     [place] Level/Cues Needed (Bed Mobility Goal 1, PT) modified independence  -LR     Time Frame (Bed Mobility Goal 1, PT) long term goal (LTG);10 days  -LR     Progress/Outcomes (Bed Mobility Goal 1, PT) goal met  -LR     Row Name 03/28/23 1542          Transfer Goal 1 (PT)    Activity/Assistive Device (Transfer Goal 1, PT) sit-to-stand/stand-to-sit;bed-to-chair/chair-to-bed;walker, rolling  -LR     [place] Level/Cues Needed (Transfer Goal 1, PT) modified independence  -LR     Time Frame (Transfer Goal 1, PT) long term goal (LTG);10 days  -LR     Progress/Outcome (Transfer Goal 1, PT) good progress toward goal;goal ongoing  -LR     Row Name 03/28/23 1542          Gait Training Goal 1 (PT)    Activity/Assistive Device (Gait Training Goal 1, PT) gait (walking locomotion);assistive device use;walker, rolling;decrease fall risk;diminish gait deviation;improve balance and speed  -LR     [place] Level (Gait Training Goal 1, PT) modified independence  -LR     Distance (Gait Training Goal 1, PT) 500 feet  -LR     Time Frame (Gait Training Goal 1, PT) long term goal (LTG);10 days  -LR      Progress/Outcome (Gait Training Goal 1, PT) goal ongoing;continuing progress toward goal  -LR           User Key  (r) = Recorded By, (t) = Taken By, (c) = Cosigned By    Initials Name Provider Type    Gabriela Parker, PT Physical Therapist               Clinical Impression     Row Name 03/28/23 6782          Pain    Pretreatment Pain Rating 0/10 - no pain  -LR     Posttreatment Pain Rating 0/10 - no pain  -LR     Pain Intervention(s) Ambulation/increased activity;Repositioned  -LR     Row Name 03/28/23 0572          Plan of Care Review    Plan of Care Reviewed With patient  -LR     Progress improving  -LR     Outcome Evaluation Patient slightly agitated this PM but agreeable to ambulation in halls. Demonstrated improved balance with ambulation but continues to insist on ambulating at fast pace. Patient continues to be below baseline, demonstrating decreased functional mobility status, impaired balance, and decreased strength. Will address these deficits to promote return to PLOF. Recommend IRF at d/c.  -LR     Row Name 03/28/23 0172          Therapy Assessment/Plan (PT)    Rehab Potential (PT) good, to achieve stated therapy goals  -LR     Criteria for Skilled Interventions Met (PT) yes;skilled treatment is necessary;meets criteria  -LR     Therapy Frequency (PT) daily  -LR     Row Name 03/28/23 1542          Positioning and Restraints    Pre-Treatment Position in bed  -LR     Post Treatment Position bed  -LR     In Bed notified nsg;supine;call light within reach;encouraged to call for assist;exit alarm on;side rails up x2;SCD pump applied  SCDs not on patient on arrival  -LR           User Key  (r) = Recorded By, (t) = Taken By, (c) = Cosigned By    Initials Name Provider Type    Gabriela Parker, PT Physical Therapist               Outcome Measures     Row Name 03/28/23 1634          How much help from another person do you currently need...    Turning from your back to your side while in  flat bed without using bedrails? 4  -LR     Moving from lying on back to sitting on the side of a flat bed without bedrails? 4  -LR     Moving to and from a bed to a chair (including a wheelchair)? 3  -LR     Standing up from a chair using your arms (e.g., wheelchair, bedside chair)? 3  -LR     Climbing 3-5 steps with a railing? 3  -LR     To walk in hospital room? 3  -LR     AM-PAC 6 Clicks Score (PT) 20  -LR     Highest level of mobility 6 --> Walked 10 steps or more  -LR     Row Name 03/28/23 1542 03/28/23 1315       Functional Assessment    Outcome Measure Options AM-PAC 6 Clicks Basic Mobility (PT)  -LR AM-PAC 6 Clicks Daily Activity (OT)  -MR          User Key  (r) = Recorded By, (t) = Taken By, (c) = Cosigned By    Initials Name Provider Type    LR Gabriela Smith, PT Physical Therapist    Amanda Nielson, OT Occupational Therapist                             Physical Therapy Education     Title: PT OT SLP Therapies (In Progress)     Topic: Physical Therapy (Done)     Point: Mobility training (Done)     Learning Progress Summary           Patient Acceptance, E,D, VU by LR at 3/28/2023 1542    Comment: Educated on safety with mobility, correct supine<->sit t/f technique, correct sit<->stand t/f technique, correct gait mechanics, LE HEP, benefits of mobility, and progression of POC.    Eager, E, VU,DU,NR by SS at 3/27/2023 1541    Comment: Reviewed safety/technique w/bed mobility, transfers, ambulation, HEP, safety awareness/recommendations    Acceptance, E, NR by AE at 3/24/2023 1520    Acceptance, E,D, VU,NR by HP at 3/23/2023 1127    Acceptance, E,TB, NR by AY at 3/22/2023 1141    Acceptance, E, VU,NR by CM at 3/19/2023 1149    Acceptance, E, VU by CM at 3/17/2023 1517    Acceptance, E, VU,NR by LH at 3/15/2023 1407    Acceptance, E, NR by KG at 3/14/2023 1428    Acceptance, E, VU,NR by SJ at 3/12/2023 1542                   Point: Home exercise program (Done)     Learning Progress Summary            Patient Acceptance, E,D, VU by LR at 3/28/2023 1542    Comment: Educated on safety with mobility, correct supine<->sit t/f technique, correct sit<->stand t/f technique, correct gait mechanics, LE HEP, benefits of mobility, and progression of POC.    Eager, E, VU,DU,NR by SS at 3/27/2023 1541    Comment: Reviewed safety/technique w/bed mobility, transfers, ambulation, HEP, safety awareness/recommendations    Acceptance, E, NR by AE at 3/24/2023 1520    Acceptance, E,D, VU,NR by HP at 3/23/2023 1127    Acceptance, E,TB, NR by AY at 3/22/2023 1141    Acceptance, E, VU,NR by CM at 3/19/2023 1149    Acceptance, E, VU,NR by LH at 3/15/2023 1407    Acceptance, E, NR by KG at 3/14/2023 1428                   Point: Body mechanics (Done)     Learning Progress Summary           Patient Acceptance, E,D, VU by LR at 3/28/2023 1542    Comment: Educated on safety with mobility, correct supine<->sit t/f technique, correct sit<->stand t/f technique, correct gait mechanics, LE HEP, benefits of mobility, and progression of POC.    Eager, E, VU,DU,NR by SS at 3/27/2023 1541    Comment: Reviewed safety/technique w/bed mobility, transfers, ambulation, HEP, safety awareness/recommendations    Acceptance, E, NR by AE at 3/24/2023 1520    Acceptance, E,D, VU,NR by HP at 3/23/2023 1127    Acceptance, E,TB, NR by AY at 3/22/2023 1141    Acceptance, E, VU,NR by CM at 3/19/2023 1149    Acceptance, E, VU by CM at 3/17/2023 1517    Acceptance, E, VU,NR by LH at 3/15/2023 1407    Acceptance, E, NR by KG at 3/14/2023 1428    Acceptance, E, VU,NR by SJ at 3/12/2023 1542                   Point: Precautions (Done)     Learning Progress Summary           Patient Acceptance, E,D, VU by LR at 3/28/2023 1542    Comment: Educated on safety with mobility, correct supine<->sit t/f technique, correct sit<->stand t/f technique, correct gait mechanics, LE HEP, benefits of mobility, and progression of POC.    LINDA Lomeli, INGRID JOHNSON,NR by  at 3/27/2023 3095     Comment: Reviewed safety/technique w/bed mobility, transfers, ambulation, HEP, safety awareness/recommendations    Acceptance, E, NR by AE at 3/24/2023 1520    Acceptance, E,D, VU,NR by HP at 3/23/2023 1127    Acceptance, E,TB, NR by AY at 3/22/2023 1141    Acceptance, E, VU,NR by CM at 3/19/2023 1149    Acceptance, E, VU by CM at 3/17/2023 1517    Acceptance, E, VU,NR by LH at 3/15/2023 1407    Acceptance, E, NR by KG at 3/14/2023 1428    Acceptance, E, VU,NR by SJ at 3/12/2023 1542                               User Key     Initials Effective Dates Name Provider Type Discipline    SJ 02/03/23 - 03/12/23 Samara Harrison, PT Physical Therapist PT    LR 02/03/23 -  Gabriela Smith, PT Physical Therapist PT    KG 05/22/20 -  Cherelle Gaitan, PT Physical Therapist PT    AY 11/10/20 -  Sandee Spencer, PT Physical Therapist PT    LH 09/21/21 -  Tristian Ravi, PT Physical Therapist PT    HP 06/01/21 -  Kelly Mckeon, PT Physical Therapist PT    SS 06/01/21 -  Hyacinth King, PT Physical Therapist PT    AE 09/21/21 -  Andrew Centeno, PT Physical Therapist PT    CM 09/22/22 -  Yanet Calvin, PT Physical Therapist PT              PT Recommendation and Plan     Plan of Care Reviewed With: patient  Progress: improving  Outcome Evaluation: Patient slightly agitated this PM but agreeable to ambulation in halls. Demonstrated improved balance with ambulation but continues to insist on ambulating at fast pace. Patient continues to be below baseline, demonstrating decreased functional mobility status, impaired balance, and decreased strength. Will address these deficits to promote return to PLOF. Recommend IRF at d/c.     Time Calculation:    PT Charges     Row Name 03/28/23 1542             Time Calculation    Start Time 1542  -LR      PT Received On 03/28/23  -LR      PT Goal Re-Cert Due Date 04/01/23  -LR         Timed Charges    42877 - PT Therapeutic Exercise Minutes 3  -LR      29627 - Gait Training  Minutes  13  -LR         Total Minutes    Timed Charges Total Minutes 16  -LR       Total Minutes 16  -LR            User Key  (r) = Recorded By, (t) = Taken By, (c) = Cosigned By    Initials Name Provider Type    LR Gabriela Smith, PT Physical Therapist              Therapy Charges for Today     Code Description Service Date Service Provider Modifiers Qty    06535247861 HC GAIT TRAINING EA 15 MIN 3/28/2023 Gabriela Smith, PT GP 1          PT G-Codes  Outcome Measure Options: AM-PAC 6 Clicks Basic Mobility (PT)  AM-PAC 6 Clicks Score (PT): 20  AM-PAC 6 Clicks Score (OT): 15  PT Discharge Summary  Anticipated Discharge Disposition (PT): inpatient rehabilitation facility    Gabriela Smith, RIC  3/28/2023      Electronically signed by Gabriela Smith, PT at 23 1618          Occupational Therapy Notes (most recent note)      Amanda Vega, OT at 23 1056          Patient Name: Monty Nagel  : 1955    MRN: [id number]                              Today's Date: 3/28/2023       Admit Date: 3/11/2023    Visit Dx:     ICD-10-CM ICD-9-CM   1. Generalized weakness  R53.1 780.79   2. History of alcoholism (HCC)  F10.21 V11.3   3. Inability to walk  R26.2 719.7   4. Confusion  R41.0 298.9   5. Hypoglycemia  E16.2 251.2   6. Poor nutrition  E63.9 269.9   7. Cognitive communication deficit  R41.841 799.52   8. Impaired functional mobility, balance, gait, and endurance  Z74.09 V49.89   9. 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   • Postablative hypothyroidism   • Screen for colon cancer   • Other recurrent depressive disorders (HCC)   • Balance problem   • Generalized weakness   • Atrial fibrillation (HCC)   • Severe malnutrition (HCC)   • Meningioma (HCC)   • Rheumatoid vs Psoriatic arthritis (HCC)     Past Medical History:   Diagnosis Date   • Arthritis    •  Hepatitis A    • Hypertension    • Melanoma (HCC) 2017    retina     Past Surgical History:   Procedure Laterality Date   • CRANIOTOMY FOR TUMOR N/A 3/21/2023    Procedure: CRANIOTOMY FOR TUMOR STEREOTACTIC WITH STEALTH;  Surgeon: Marlon Mckee MD;  [facility];  Service: Neurosurgery;  Laterality: N/A;   • EMBOLIZATION CEREBRAL N/A 3/20/2023    Procedure: Tumor Emoblization radial access;  Surgeon: Ozzy Sebastian MD;  Location:  [facility];  Service: Interventional Radiology;  Laterality: N/A;   • EYE SURGERY  2017    EYE CANCER RIGHT EYE   • FINGER SURGERY Left     Pointer finger re-attached in 3rd Grade   • TONSILLECTOMY        General Information     Row Name 03/28/23 1306          OT Time and Intention    Document Type therapy note (daily note)  -MR     Mode of Treatment occupational therapy  -MR     Row Name 03/28/23 1306          General Information    Patient Profile Reviewed yes  -MR     Existing Precautions/Restrictions fall;other (see comments)  impulsive, L sided weakness/inattention, R visual deficit  -MR     Barriers to Rehab medically complex;cognitive status;visual deficit  -MR     Row Name 03/28/23 1306          Cognition    Orientation Status (Cognition) oriented x 3  -MR     Row Name 03/28/23 1306          Safety Issues, Functional Mobility    Safety Issues Affecting Function (Mobility) awareness of need for assistance;impulsivity;judgment;insight into deficits/self-awareness;safety precaution awareness;safety precautions follow-through/compliance;sequencing abilities  -MR     Impairments Affecting Function (Mobility) balance;cognition;coordination;endurance/activity tolerance;strength;postural/trunk control;motor planning;visual/perceptual;pain  -MR     Cognitive Impairments, Mobility Safety/Performance awareness, need for assistance;insight into deficits/self-awareness;judgment;impulsivity;problem-solving/reasoning;safety precaution awareness;safety  precaution follow-through;sequencing abilities  -MR           User Key  (r) = Recorded By, (t) = Taken By, (c) = Cosigned By    Initials Name Provider Type    Amanda Nielson, OT Occupational Therapist                 Mobility/ADL's     Row Name 03/28/23 1307          Bed Mobility    Comment, (Bed Mobility) Pt deferred OOB/EOB d/t fatigue. Despite education for getting to chair for lunch.  -MR     Row Name 03/28/23 1307          Transfers    Comment, (Transfers) Pt deferred OOB/EOB d/t fatigue. Despite education for getting to chair for lunch.  -MR     Row Name 03/28/23 1307          Activities of Daily Living    BADL Assessment/Intervention upper body dressing;lower body dressing;grooming  -MR     Row Name 03/28/23 1307          Lower Body Dressing Assessment/Training    Comment, (Lower Body Dressing) Pt deferred donning pants.  -MR     Row Name 03/28/23 1307          Grooming Assessment/Training    [place] Level (Grooming) wash face, hands;set up  -MR     Position (Grooming) sitting up in bed  -MR     Row Name 03/28/23 1307          Upper Body Dressing Assessment/Training    Comment, (Upper Body Dressing) Pt deferred donning hospital gown.  -MR           User Key  (r) = Recorded By, (t) = Taken By, (c) = Cosigned By    Initials Name Provider Type    Amanda Nielson, OT Occupational Therapist               Obj/Interventions    No documentation.                Goals/Plan    No documentation.                Clinical Impression     Row Name 03/28/23 1310          Pain Assessment    Pretreatment Pain Rating 0/10 - no pain  -MR     Posttreatment Pain Rating 0/10 - no pain  -MR     Pain Intervention(s) Repositioned  -MR     Row Name 03/28/23 1310          Plan of Care Review    Plan of Care Reviewed With patient  -MR     Outcome Evaluation Pt continues to present below baseline requiring assist for ADLs. Pt deferred OOB/EOB this date d/t fatigue. Pt deferred UB and LB dressing but demonstrated good effort w/  grooming tasks. Continue to progress as abler per current POC.  -MR     Row Name 03/28/23 1310          Therapy Plan Review/Discharge Plan (OT)    Anticipated Discharge Disposition (OT) inpatient rehabilitation facility  -MR     Row Name 03/28/23 1310          Vital Signs    Pre Systolic BP Rehab --  VSS  -MR     O2 Delivery Pre Treatment room air  -MR     O2 Delivery Intra Treatment room air  -MR     O2 Delivery Post Treatment room air  -MR     Pre Patient Position Supine  -MR     Intra Patient Position Standing  -MR     Post Patient Position Sitting  -MR     Row Name 03/28/23 1310          Positioning and Restraints    Pre-Treatment Position in bed  -MR     Post Treatment Position bed  -MR     In Bed notified nsg;side lying left;call light within reach;encouraged to call for assist;exit alarm on;side rails up x2  -MR           User Key  (r) = Recorded By, (t) = Taken By, (c) = Cosigned By    Initials Name Provider Type    Amanda Nielson OT Occupational Therapist               Outcome Measures     Row Name 03/28/23 1315          How much help from another is currently needed...    Putting on and taking off regular lower body clothing? 2  -MR     Bathing (including washing, rinsing, and drying) 2  -MR     Toileting (which includes using toilet bed pan or urinal) 2  -MR     Putting on and taking off regular upper body clothing 3  -MR     Taking care of personal grooming (such as brushing teeth) 3  -MR     Eating meals 3  -MR     AM-PAC 6 Clicks Score (OT) 15  -MR     Row Name 03/28/23 1315          Functional Assessment    Outcome Measure Options AM-PAC 6 Clicks Daily Activity (OT)  -MR           User Key  (r) = Recorded By, (t) = Taken By, (c) = Cosigned By    Initials Name Provider Type    Amanda Nielson OT Occupational Therapist                Occupational Therapy Education     Title: PT OT SLP Therapies (In Progress)     Topic: Occupational Therapy (In Progress)     Point: ADL training (In Progress)      Description:   Instruct learner(s) on proper safety adaptation and remediation techniques during self care or transfers.   Instruct in proper use of assistive devices.              Learning Progress Summary           Patient Acceptance, E, NR by MR at 3/28/2023 1315    Acceptance, E, NR by CS at 3/22/2023 1108    Acceptance, E, VU,NR by HOPE at 3/13/2023 1156    Comment: reason for consult, noted deficits, walker safety, concern over home return                   Point: Home exercise program (Not Started)     Description:   Instruct learner(s) on appropriate technique for monitoring, assisting and/or progressing therapeutic exercises/activities.              Learner Progress:  Not documented in this visit.          Point: Precautions (In Progress)     Description:   Instruct learner(s) on prescribed precautions during self-care and functional transfers.              Learning Progress Summary           Patient Acceptance, E, NR by MR at 3/28/2023 1315    Acceptance, E, NR by CS at 3/22/2023 1108    Acceptance, E, VU,NR by HOPE at 3/13/2023 1156    Comment: reason for consult, noted deficits, walker safety, concern over home return                   Point: Body mechanics (In Progress)     Description:   Instruct learner(s) on proper positioning and spine alignment during self-care, functional mobility activities and/or exercises.              Learning Progress Summary           Patient Acceptance, E, NR by MR at 3/28/2023 1315    Acceptance, E, NR by CS at 3/22/2023 1108                               User Key     Initials Effective Dates Name Provider Type Discipline     02/03/23 -  Monae Cohen OT Occupational Therapist OT     09/02/21 -  Phyllis Bowen OT Occupational Therapist OT    MR 09/22/22 -  Amanda Vega OT Occupational Therapist OT              OT Recommendation and Plan     Plan of Care Review  Plan of Care Reviewed With: patient  Outcome Evaluation: Pt continues to present below baseline requiring  assist for ADLs. Pt deferred OOB/EOB this date d/t fatigue. Pt deferred UB and LB dressing but demonstrated good effort w/ grooming tasks. Continue to progress as abler per current POC.     Time Calculation:    Time Calculation- OT     Row Name 03/28/23 1315             Time Calculation- OT    OT Start Time 1056  -MR      OT Received On 03/28/23  -MR         Timed Charges    47155 - OT Self Care/Mgmt Minutes 12  -MR         Total Minutes    Timed Charges Total Minutes 12  -MR       Total Minutes 12  -MR            User Key  (r) = Recorded By, (t) = Taken By, (c) = Cosigned By    Initials Name Provider Type    MR Amanda Vega OT Occupational Therapist              Therapy Charges for Today     Code Description Service Date Service Provider Modifiers Qty    05363763607 HC OT SELF CARE/MGMT/TRAIN EA 15 MIN 3/28/2023 Amanda Vega OT GO 1               Amanda Vega OT  3/28/2023    Electronically signed by Amanda Vega OT at 03/28/23 6200

## 2023-03-29 NOTE — CASE MANAGEMENT/SOCIAL WORK
Continued Stay Note  [facility]     Patient Name: Monty Nagel  MRN: [id number]  Today's Date: 3/29/2023    Admit Date: 3/11/2023    Plan: [facility]   Discharge Plan     Row Name 03/29/23 0900       Plan    Plan [facility]    Patient/Family in Agreement with Plan yes    Plan Comments Received a voicemail from Sarah at [facility] and they can offer a bed. Spoke with patient at bedside. Patient is agreeable to go to [facility] for rehab. Spoke with Sarah and ated the bed and she will start insurance precert. CM will continue to follow.    Final Discharge Disposition Code 03 - skilled nursing facility (SNF)               Discharge Codes    No documentation.               Expected Discharge Date and Time     Expected Discharge Date Expected Discharge Time    Mar 30, 2023             Krzysztof Caceres RN

## 2023-03-29 NOTE — PAYOR COMM NOTE
"Ashwini Centeno RN  Utilization Management  P:[phone]  F:[phone]    Auth# [id number]  Jonathan Barrera (67 y.o. Male)     Date of Birth   1955    Social Security Number       Address   [address] [place] [address]    Home Phone   [phone]    MRN   [id number]       [employer]   None    Marital Status   Single                            Admission Date   3/11/23    Admission Type   Emergency    Admitting Provider   Marlon Mckee MD    Attending Provider   Fiorella Manzanares MD    Department, Room/Bed   [facility] 3H, S371/1       Discharge Date       Discharge Disposition       Discharge Destination                               Attending Provider: Fiorella Manzanares MD    Allergies: No Known Allergies    Isolation: None   Infection: None   Code Status: CPR    Ht: 180.3 cm (71\")   Wt: 93.4 kg (206 lb)    Admission Cmt: None   Principal Problem: Meningioma (HCC) [D32.9]                 Active Insurance as of 3/11/2023     Primary Coverage     Payor Plan Insurance Group Employer/Plan Group    ANTHEM MEDICARE REPLACEMENT ANTHEM MEDICARE ADVANTAGE [id number]     Payor Plan Address Payor Plan Phone Number Payor Plan Fax Number Effective Dates    [address] [phone]  1/1/2020 - None Entered    [facility] [id number]       Subscriber Name Subscriber Birth Date Member ID       JONATHAN BARRERA 1955 [id number]                 Emergency Contacts      (Rel.) Home Phone Work Phone Mobile Phone    Marva Ku (Sister) [phone] -- [phone]              Current Facility-Administered Medications   Medication Dose Route Frequency Provider Last Rate Last Admin   • acetaminophen (TYLENOL) tablet 650 mg  650 mg Oral Q4H PRN Marlon Mckee MD   650 mg at 03/27/23 0815    Or   • acetaminophen (TYLENOL) 160 MG/5ML solution 650 mg  650 mg Oral Q4H PRN Marlon Mckee MD        Or   • acetaminophen (TYLENOL) suppository 650 mg  650 mg Rectal " Q4H PRN Marlon Mckee MD       • aspirin EC tablet 81 mg  81 mg Oral Daily Laine Barth APRN   81 mg at 03/29/23 1403   • atorvastatin (LIPITOR) tablet 40 mg  40 mg Oral Nightly Marlon Mckee MD   40 mg at 03/28/23 2051   • sennosides-docusate (PERICOLACE) 8.6-50 MG per tablet 2 tablet  2 tablet Oral BID Marlon Mckee MD   2 tablet at 03/24/23 0804    And   • polyethylene glycol (MIRALAX) packet 17 g  17 g Oral Daily PRN Marlon Mckee MD        And   • bisacodyl (DULCOLAX) EC tablet 5 mg  5 mg Oral Daily PRN Marlon Mckee MD   5 mg at 03/22/23 2127    And   • bisacodyl (DULCOLAX) suppository 10 mg  10 mg Rectal Daily PRN Marlon Mckee MD       • clopidogrel (PLAVIX) tablet 75 mg  75 mg Oral Daily Laine Barth APRN   75 mg at 03/29/23 1403   • dexamethasone (DECADRON) tablet 4 mg  4 mg Oral Q6H Marlon Mckee MD   4 mg at 03/29/23 1403   • docusate sodium (COLACE) capsule 100 mg  100 mg Oral BID PRN Marlon Mckee MD   100 mg at 03/28/23 2050   • Enoxaparin Sodium (LOVENOX) syringe 40 mg  40 mg Subcutaneous Daily Marlon Mckee MD   40 mg at 03/29/23 0912   • hydroCHLOROthiazide (MICROZIDE) capsule 12.5 mg  12.5 mg Oral Daily Fiorella Manzanares MD   12.5 mg at 03/29/23 0912   • levETIRAcetam (KEPPRA) tablet 1,000 mg  1,000 mg Oral Q12H Marlon Mckee MD   1,000 mg at 03/29/23 0911   • levothyroxine (SYNTHROID, LEVOTHROID) tablet 125 mcg  125 mcg Oral Daily Marlon Mckee MD   125 mcg at 03/29/23 0642   • lisinopril (PRINIVIL,ZESTRIL) tablet 10 mg  10 mg Oral Q24H Laine Barth APRN   10 mg at 03/29/23 1403   • magnesium hydroxide (MILK OF MAGNESIA) suspension 10 mL  10 mL Oral Daily PRN Marlon Mckee MD       • Magnesium Standard Dose Replacement - Initiate Nurse / BPA Driven Protocol   Does not apply PRN Marlon Mckee MD       • melatonin tablet 5 mg  5 mg  Oral Nightly PRN Marlon Mckee MD   5 mg at 03/28/23 2050   • naloxone (NARCAN) injection 0.4 mg  0.4 mg Intravenous Q5 Min PRN Marlon Mckee MD       • ondansetron (ZOFRAN) tablet 4 mg  4 mg Oral Q6H PRN Marlon Mckee MD        Or   • ondansetron (ZOFRAN) injection 4 mg  4 mg Intravenous Q6H PRN Marlon Mckee MD       • pantoprazole (PROTONIX) EC tablet 40 mg  40 mg Oral Q AM Marlon Mckee MD   40 mg at 03/29/23 0642   • Phosphorus Replacement - Initiate Nurse / BPA Driven Protocol   Does not apply PRN Marlon Mckee MD       • polyethylene glycol (MIRALAX) packet 17 g  17 g Oral Daily Marlon Mckee MD   17 g at 03/23/23 0829   • Potassium Replacement - Initiate Nurse / BPA Driven Protocol   Does not apply PRN Marlon Mckee MD       • povidone-iodine 10 % swab 1 each  1 application Topical Daily Adelina Lama, DO   1 each at 03/27/23 0817   • QUEtiapine (SEROquel) tablet 25 mg  25 mg Oral Nightly [employer], Irina, APRN   25 mg at 03/28/23 2051   • sodium chloride 0.9 % flush 10 mL  10 mL Intravenous PRN Marlon Mckee MD       • sodium chloride 0.9 % flush 10 mL  10 mL Intravenous Q12H Marlon Mckee MD   10 mL at 03/29/23 0913   • sodium chloride 0.9 % flush 10 mL  10 mL Intravenous PRN Marlon Mckee MD       • sodium chloride 0.9 % infusion 40 mL  40 mL Intravenous PRN Marlon Mckee MD       • thiamine (VITAMIN B-1) tablet 100 mg  100 mg Oral Daily Marlon Mckee MD   100 mg at 03/29/23 0911     Lab Results (last 48 hours)     Procedure Component Value Units Date/Time    Basic Metabolic Panel [[id number]]  (Abnormal) Collected: 03/29/23 0415    Specimen: Blood Updated: 03/29/23 0650     Glucose 119 mg/dL      BUN 25 mg/dL      Creatinine 0.71 mg/dL      Sodium 135 mmol/L      Potassium 4.5 mmol/L      Chloride 99 mmol/L      CO2 27.0 mmol/L      Calcium 7.7  mg/dL      BUN/Creatinine Ratio 35.2     Anion Gap 9.0 mmol/L      eGFR 99.3 mL/min/1.73     Narrative:      GFR Normal >60  Chronic Kidney Disease <60  Kidney Failure <15            Imaging Results (Last 48 Hours)     Procedure Component Value Units Date/Time    MRI Foot Right With & Without Contrast [[id number]] Collected: 03/28/23 0726     Updated: 03/28/23 0735    Narrative:        MRI FOOT RIGHT W WO CONTRAST    Date of Exam: 3/28/2023 2:23 AM EDT    Indication: Osteomyelitis, foot.  Open wound great toe     Comparison: None available.    Technique:  Routine multiplanar/multisequence sequence images of the right foot were obtained before and after the uneventful administration of  19 mL Multihance.      Findings:   There is an ulceration of the distal aspect of the great toe. However the bone marrow signal intensity appears normal at this time out finding of osteomyelitis. There is flexion of the interphalangeal joint of the great toe, second, third fourth and   fifth digits. There is mild joint space narrowing first metatarsophalangeal joint and first interphalangeal joint. There are no osseous erosions. There are no joint effusions. Hallux sesamoid bones of normal size, position and signal intensity. There are   no localized fluid collections to suggest abscess. The flexor and extensor tendons are intact and normal in appearance. The intrinsic muscles of the foot are normal in appearance. No pathologic enhancement is identified. There are no intermetatarsal   masses. There is moderate subtalar joint space narrowing and talonavicular joint space narrowing. There is a moderate sized os trigonum. Plantar fascia is intact.      Impression:      Impression:   No findings of osteomyelitis or abscess.  Ulceration distal phalanx of the great toe.  Osteoarthritis as described.    Electronically Signed: Jenni Andrew    3/28/2023 7:31 AM EDT    Workstation ID: [id number]        Operative/Procedure Notes (last 48 hours)   Notes from 23 1531 through 23 1531   No notes of this type exist for this encounter.          Physician Progress Notes (last 72 hours)      Laine Barth APRN at 23 1418              [facility] Services  PROGRESS NOTE    Patient Name: Monty Nagel  : 1955  MRN: [id number]    Date of Admission: 3/11/2023  Primary Care Physician: Tiffany Morales MD    Subjective   Subjective     CC: Follow-up meningioma    HPI:  Pt sitting up in bed frustrated because he can not get up out of bed by himself. MRI of right foot showed no evidence of osteomyelitis or abscess. There is an ulceration of the distal phalanx of the great toe. Spoke with neurosurgery and they instructed to restart ASA/Plavix and steroid taper was also clarified.     ROS:  Gen- No fevers, chills  CV- No chest pain, palpitations  Resp- No cough, dyspnea  GI- No N/V/D, abd pain        Objective   Objective     Vital Signs:   Temp:  [97.9 °F (36.6 °C)-98.8 °F (37.1 °C)] 98.2 °F (36.8 °C)  Heart Rate:  [] 96  Resp:  [16-20] 18  BP: (125-151)/() 125/79     Physical Exam:  Constitutional: Awake, alert, Chronically ill appearing  HENT: NCAT, mucous membranes moist  Respiratory: Clear to auscultation bilaterally, nonlabored respirations   Cardiovascular: RRR, no murmurs, rubs, or gallop  Gastrointestinal: Positive bowel sounds, soft, nontender, nondistended  Musculoskeletal: No bilateral ankle edema, right toe with dsg  Psychiatric: Flat ffect, cooperative  Neurologic: Oriented x 3, nonfocal.   Skin: No rashes      Results Reviewed:  LAB RESULTS:      Lab 23   WBC 14.02* 18.31*   HEMOGLOBIN 11.8* 12.7*   HEMATOCRIT 34.5* 36.9*   PLATELETS 215 169   NEUTROS ABS 12.89*  --    IMMATURE GRANS (ABS) 0.11*  --    LYMPHS ABS 0.41*  --    MONOS ABS 0.60  --    EOS ABS 0.00  --    MCV 86.9 87.6         Lab 23  0415 23  1823 23  0422 23  1839  03/23/23  0412   SODIUM 135*  --  131*  --  130*   POTASSIUM 4.5  --  4.3  --  4.7   CHLORIDE 99  --  97*  --  94*   CO2 27.0  --  30.0*  --  28.0   ANION GAP 9.0  --  4.0*  --  8.0   BUN 25*  --  20  --  25*   CREATININE 0.71*  --  0.41*  --  0.64*   EGFR 99.3  --  117.2  --  102.5   GLUCOSE 119*  --  128*  --  121*   CALCIUM 7.7*  --  7.6*  --  7.5*   MAGNESIUM  --   --   --   --  2.0   PHOSPHORUS  --  2.1* 2.1* 1.9* 2.2*         Lab 03/24/23  0422 03/23/23  0412   TOTAL PROTEIN  --  4.8*   ALBUMIN 2.5* 2.8*   GLOBULIN  --  2.0   ALT (SGPT)  --  17   AST (SGOT)  --  17   BILIRUBIN  --  0.6   ALK PHOS  --  52                     Brief Urine Lab Results  (Last result in the past 365 days)      Color   Clarity   Blood   Leuk Est   Nitrite   Protein   CREAT   Urine HCG        03/11/23 1058 Dark Yellow   Clear   Negative   Negative   Negative   Negative                 Microbiology Results Abnormal     None          MRI Foot Right With & Without Contrast    Result Date: 3/28/2023  MRI FOOT RIGHT W WO CONTRAST Date of Exam: 3/28/2023 2:23 AM EDT Indication: Osteomyelitis, foot.  Open wound great toe  Comparison: None available. Technique:  Routine multiplanar/multisequence sequence images of the right foot were obtained before and after the uneventful administration of  19 mL Multihance.  Findings: There is an ulceration of the distal aspect of the great toe. However the bone marrow signal intensity appears normal at this time out finding of osteomyelitis. There is flexion of the interphalangeal joint of the great toe, second, third fourth and fifth digits. There is mild joint space narrowing first metatarsophalangeal joint and first interphalangeal joint. There are no osseous erosions. There are no joint effusions. Hallux sesamoid bones of normal size, position and signal intensity. There are  no localized fluid collections to suggest abscess. The flexor and extensor tendons are intact and normal in appearance. The  intrinsic muscles of the foot are normal in appearance. No pathologic enhancement is identified. There are no intermetatarsal masses. There is moderate subtalar joint space narrowing and talonavicular joint space narrowing. There is a moderate sized os trigonum. Plantar fascia is intact.     Impression: Impression: No findings of osteomyelitis or abscess. Ulceration distal phalanx of the great toe. Osteoarthritis as described. Electronically Signed: Jenni Andrew  3/28/2023 7:31 AM EDT  Workstation ID: [id number]      Results for orders placed during the hospital encounter of 03/11/23    Adult Transthoracic Echo Complete w/ Color, Spectral and Contrast if necessary per protocol    Interpretation Summary  •  Left ventricular systolic function is normal. Estimated left ventricular EF = 55%  •  Biatrial enlargement.  •  Calcified aortic valve with mild aortic stenosis, mean gradient 10 mmHg, BANG 1.6 cm².  •  Moderate aortic insufficiency.  •  Mild mitral regurgitation.  •  Mild tricuspid regurgitation with normal RVSP.  •  Mild dilation of the ascending aorta (4.2 cm) is present.      Current medications:  Scheduled Meds:aspirin, 81 mg, Oral, Daily  atorvastatin, 40 mg, Oral, Nightly  clopidogrel, 75 mg, Oral, Daily  dexamethasone, 4 mg, Oral, Q6H  enoxaparin, 40 mg, Subcutaneous, Daily  hydroCHLOROthiazide Oral, 12.5 mg, Oral, Daily  levETIRAcetam, 1,000 mg, Oral, Q12H  levothyroxine, 125 mcg, Oral, Daily  lisinopril, 10 mg, Oral, Q24H  pantoprazole, 40 mg, Oral, Q AM  polyethylene glycol, 17 g, Oral, Daily  povidone-iodine, 1 application, Topical, Daily  QUEtiapine, 25 mg, Oral, Nightly  senna-docusate sodium, 2 tablet, Oral, BID  sodium chloride, 10 mL, Intravenous, Q12H  thiamine, 100 mg, Oral, Daily      Continuous Infusions:   PRN Meds:.•  acetaminophen **OR** acetaminophen **OR** acetaminophen  •  senna-docusate sodium **AND** polyethylene glycol **AND** bisacodyl **AND** bisacodyl  •  docusate sodium  •  magnesium  hydroxide  •  Magnesium Standard Dose Replacement - Follow Nurse / BPA Driven Protocol  •  melatonin  •  [] HYDROmorphone **AND** naloxone  •  ondansetron **OR** ondansetron  •  Phosphorus Replacement - Follow Nurse / BPA Driven Protocol  •  Potassium Replacement - Follow Nurse / BPA Driven Protocol  •  sodium chloride  •  sodium chloride  •  sodium chloride    Assessment & Plan   Assessment & Plan     Active Hospital Problems    Diagnosis  POA   • **Meningioma (HCC) [D32.9]  Yes   • Rheumatoid vs Psoriatic arthritis (HCC) [L40.50]  Yes   • Atrial fibrillation (HCC) [I48.91]  Yes   • Other recurrent depressive disorders (HCC) [F33.8]  Yes   • 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        Brief Hospital Course to date:  Monty Nagel is a 67 y.o. male with history of hypertension, retinal melatonin and subsequent right eye blindness, previous alcohol use who was admitted for meningioma, gait instability and general weakness.  Brain MRI showed 6.4 enhancing mass and he was evaluated by neurosurgery.  He underwent right frontal craniotomy on 3/21 as well as right middle meningeal artery embolization with Dr. Mckee in anticipation for tumor resection.  He was transferred from ICU to medical floor on      Meningioma,'s s/p tumor embolization 3/20 by Dr. Sebastian and resection 3/21 by Dr. Mckee  Generalized weakness, frequent falls  -Continue ASA Plavix when okay with neurosurgery  -Continue Decadron and Keppra, per neurosurgery plan for steroid taper at discharge  -PT/OT following, plan for rehab  -He will follow-up with Dr. Mckee in 2 weeks post-discharge     Hypertension  -BP is much improved, ok to resume antihypertensives  -Patient is on lisinopril- HCTZ- now resumed.     Hyperlipidemia  -Continue statin     New  A-fib  -Rates currently controlled  -Currently on subcu Lovenox     Hypothyroidism  History of thyroid ablation secondary to Graves' disease  - continue Synthroid     Former EtOH, heavy use  -Stopped drinking      Hyponatremia, not significant  -Monitor while on Keppra, encourage protein intake  -Na+ is low but stable, will continue to monitor for now.       Right great toe lesion  -Dr. Mark ferro, suspect chronic callus with remote trauma, concerning for osteomyelitis but   MRI right foot negative for osteo, shows ulceration of the distal phalanx of the great toe    Expected Discharge Location and Transportation: Rehab  Expected Discharge   Expected Discharge Date and Time     Expected Discharge Date Expected Discharge Time    Mar 30, 2023          DVT prophylaxis:  Medical and mechanical DVT prophylaxis orders are present.     AM-PAC 6 Clicks Score (PT): 20 (23 1331)    CODE STATUS:   Code Status and Medical Interventions:   Ordered at: 23 1235     Level Of Support Discussed With:    Patient     Code Status (Patient has no pulse and is not breathing):    CPR (Attempt to Resuscitate)     Medical Interventions (Patient has pulse or is breathing):    Full       LYNETTE Grewal  23        Electronically signed by Laine Barth APRN at 23 1418     Laine Barth APRN at 23 1416              [facility] Medicine Services  PROGRESS NOTE    Patient Name: Monty Nagel  : 1955  MRN: [id number]    Date of Admission: 3/11/2023  Primary Care Physician: Tiffany Morales MD    Subjective   Subjective     CC: Follow-up meningioma    HPI:  Pt sitting up in bed frustrated because he can not get up out of bed by himself. MRI of right foot showed no evidence of osteomyelitis or abscess. There is an ulceration of the distal phalanx of the great toe. Spoke with neurosurgery and they instructed to restart ASA/Plavix.     ROS:  Gen- No fevers, chills  CV-  No chest pain, palpitations  Resp- No cough, dyspnea  GI- No N/V/D, abd pain        Objective   Objective     Vital Signs:   Temp:  [97.9 °F (36.6 °C)-98.8 °F (37.1 °C)] 98.2 °F (36.8 °C)  Heart Rate:  [] 96  Resp:  [16-20] 18  BP: (125-151)/() 125/79     Physical Exam:  Constitutional: Awake, alert, Chronically ill appearing  HENT: NCAT, mucous membranes moist  Respiratory: Clear to auscultation bilaterally, nonlabored respirations   Cardiovascular: RRR, no murmurs, rubs, or gallop  Gastrointestinal: Positive bowel sounds, soft, nontender, nondistended  Musculoskeletal: No bilateral ankle edema, right toe with dsg  Psychiatric: Flat ffect, cooperative  Neurologic: Oriented x 3, nonfocal.   Skin: No rashes      Results Reviewed:  LAB RESULTS:      Lab 03/24/23 0422 03/23/23 0412   WBC 14.02* 18.31*   HEMOGLOBIN 11.8* 12.7*   HEMATOCRIT 34.5* 36.9*   PLATELETS 215 169   NEUTROS ABS 12.89*  --    IMMATURE GRANS (ABS) 0.11*  --    LYMPHS ABS 0.41*  --    MONOS ABS 0.60  --    EOS ABS 0.00  --    MCV 86.9 87.6         Lab 03/29/23 0415 03/24/23  1823 03/24/23 0422 03/23/23  1839 03/23/23 0412   SODIUM 135*  --  131*  --  130*   POTASSIUM 4.5  --  4.3  --  4.7   CHLORIDE 99  --  97*  --  94*   CO2 27.0  --  30.0*  --  28.0   ANION GAP 9.0  --  4.0*  --  8.0   BUN 25*  --  20  --  25*   CREATININE 0.71*  --  0.41*  --  0.64*   EGFR 99.3  --  117.2  --  102.5   GLUCOSE 119*  --  128*  --  121*   CALCIUM 7.7*  --  7.6*  --  7.5*   MAGNESIUM  --   --   --   --  2.0   PHOSPHORUS  --  2.1* 2.1* 1.9* 2.2*         Lab 03/24/23  0422 03/23/23  0412   TOTAL PROTEIN  --  4.8*   ALBUMIN 2.5* 2.8*   GLOBULIN  --  2.0   ALT (SGPT)  --  17   AST (SGOT)  --  17   BILIRUBIN  --  0.6   ALK PHOS  --  52                     Brief Urine Lab Results  (Last result in the past 365 days)      Color   Clarity   Blood   Leuk Est   Nitrite   Protein   CREAT   Urine HCG        03/11/23 1058 Dark Yellow   Clear   Negative    Negative   Negative   Negative                 Microbiology Results Abnormal     None          MRI Foot Right With & Without Contrast    Result Date: 3/28/2023  MRI FOOT RIGHT W WO CONTRAST Date of Exam: 3/28/2023 2:23 AM EDT Indication: Osteomyelitis, foot.  Open wound great toe  Comparison: None available. Technique:  Routine multiplanar/multisequence sequence images of the right foot were obtained before and after the uneventful administration of  19 mL Multihance.  Findings: There is an ulceration of the distal aspect of the great toe. However the bone marrow signal intensity appears normal at this time out finding of osteomyelitis. There is flexion of the interphalangeal joint of the great toe, second, third fourth and fifth digits. There is mild joint space narrowing first metatarsophalangeal joint and first interphalangeal joint. There are no osseous erosions. There are no joint effusions. Hallux sesamoid bones of normal size, position and signal intensity. There are  no localized fluid collections to suggest abscess. The flexor and extensor tendons are intact and normal in appearance. The intrinsic muscles of the foot are normal in appearance. No pathologic enhancement is identified. There are no intermetatarsal masses. There is moderate subtalar joint space narrowing and talonavicular joint space narrowing. There is a moderate sized os trigonum. Plantar fascia is intact.     Impression: Impression: No findings of osteomyelitis or abscess. Ulceration distal phalanx of the great toe. Osteoarthritis as described. Electronically Signed: Jenni Andrew  3/28/2023 7:31 AM EDT  Workstation ID: [id number]      Results for orders placed during the hospital encounter of 03/11/23    Adult Transthoracic Echo Complete w/ Color, Spectral and Contrast if necessary per protocol    Interpretation Summary  •  Left ventricular systolic function is normal. Estimated left ventricular EF = 55%  •  Biatrial enlargement.  •  Calcified  aortic valve with mild aortic stenosis, mean gradient 10 mmHg, BANG 1.6 cm².  •  Moderate aortic insufficiency.  •  Mild mitral regurgitation.  •  Mild tricuspid regurgitation with normal RVSP.  •  Mild dilation of the ascending aorta (4.2 cm) is present.      Current medications:  Scheduled Meds:aspirin, 81 mg, Oral, Daily  atorvastatin, 40 mg, Oral, Nightly  clopidogrel, 75 mg, Oral, Daily  dexamethasone, 4 mg, Oral, Q6H  enoxaparin, 40 mg, Subcutaneous, Daily  hydroCHLOROthiazide Oral, 12.5 mg, Oral, Daily  levETIRAcetam, 1,000 mg, Oral, Q12H  levothyroxine, 125 mcg, Oral, Daily  lisinopril, 10 mg, Oral, Q24H  pantoprazole, 40 mg, Oral, Q AM  polyethylene glycol, 17 g, Oral, Daily  povidone-iodine, 1 application, Topical, Daily  QUEtiapine, 25 mg, Oral, Nightly  senna-docusate sodium, 2 tablet, Oral, BID  sodium chloride, 10 mL, Intravenous, Q12H  thiamine, 100 mg, Oral, Daily      Continuous Infusions:   PRN Meds:.•  acetaminophen **OR** acetaminophen **OR** acetaminophen  •  senna-docusate sodium **AND** polyethylene glycol **AND** bisacodyl **AND** bisacodyl  •  docusate sodium  •  magnesium hydroxide  •  Magnesium Standard Dose Replacement - Follow Nurse / BPA Driven Protocol  •  melatonin  •  [] HYDROmorphone **AND** naloxone  •  ondansetron **OR** ondansetron  •  Phosphorus Replacement - Follow Nurse / BPA Driven Protocol  •  Potassium Replacement - Follow Nurse / BPA Driven Protocol  •  sodium chloride  •  sodium chloride  •  sodium chloride    Assessment & Plan   Assessment & Plan     Active Hospital Problems    Diagnosis  POA   • **Meningioma (HCC) [D32.9]  Yes   • Rheumatoid vs Psoriatic arthritis (HCC) [L40.50]  Yes   • Atrial fibrillation (HCC) [I48.91]  Yes   • Other recurrent depressive disorders (HCC) [F33.8]  Yes   • 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        Brief Hospital Course to date:  Monty Nagel is a 67 y.o. male with history of hypertension, retinal melatonin and subsequent right eye blindness, previous alcohol use who was admitted for meningioma, gait instability and general weakness.  Brain MRI showed 6.4 enhancing mass and he was evaluated by neurosurgery.  He underwent right frontal craniotomy on 3/21 as well as right middle meningeal artery embolization with Dr. Mckee in anticipation for tumor resection.  He was transferred from ICU to medical floor on 9/22     Meningioma,'s s/p tumor embolization 3/20 by Dr. Sebastian and resection 3/21 by Dr. Mckee  Generalized weakness, frequent falls  -Continue ASA Plavix when okay with neurosurgery  -Continue Decadron and Keppra, per neurosurgery plan for steroid taper at discharge  -PT/OT following, plan for rehab  -He will follow-up with Dr. Mckee in 2 weeks post-discharge     Hypertension  -BP is much improved, ok to resume antihypertensives  -Patient is on lisinopril- HCTZ- now resumed.     Hyperlipidemia  -Continue statin     New A-fib  -Rates currently controlled  -Currently on subcu Lovenox     Hypothyroidism  History of thyroid ablation secondary to Graves' disease  - continue Synthroid     Former EtOH, heavy use  -Stopped drinking 9/22     Hyponatremia, not significant  -Monitor while on Keppra, encourage protein intake  -Na+ is low but stable, will continue to monitor for now.       Right great toe lesion  -Dr. Mark ferro, suspect chronic callus with remote trauma, concerning for osteomyelitis but   MRI right foot negative for osteo, shows ulceration of the distal phalanx of the great toe    Expected Discharge Location and Transportation: Rehab  Expected Discharge   Expected Discharge Date and Time     Expected Discharge Date Expected Discharge Time    Mar 30, 2023          DVT prophylaxis:  Medical and mechanical DVT prophylaxis  orders are present.     AM-PAC 6 Clicks Score (PT): 20 (23 1331)    CODE STATUS:   Code Status and Medical Interventions:   Ordered at: 23 1235     Level Of Support Discussed With:    Patient     Code Status (Patient has no pulse and is not breathing):    CPR (Attempt to Resuscitate)     Medical Interventions (Patient has pulse or is breathing):    Full       LYNETTE Grewal  23        Electronically signed by Laine Barth APRN at 23 1418     Laine Barth APRN at 23 0948              [facility] Medicine Services  PROGRESS NOTE    Patient Name: Monty Nagel  : 1955  MRN: [id number]    Date of Admission: 3/11/2023  Primary Care Physician: Tiffany Morales MD    Subjective   Subjective     CC: Follow-up meningioma    HPI:  Pt sitting up in bed frustrated because he can not get up out of bed by himself. MRI of right foot showed no evidence of osteomyelitis or abscess. There is an ulceration of the distal phalanx of the great toe.     ROS:  Gen- No fevers, chills  CV- No chest pain, palpitations  Resp- No cough, dyspnea  GI- No N/V/D, abd pain        Objective   Objective     Vital Signs:   Temp:  [97.9 °F (36.6 °C)-98.8 °F (37.1 °C)] 97.9 °F (36.6 °C)  Heart Rate:  [] 92  Resp:  [16-20] 18  BP: (129-151)/() 151/104     Physical Exam:  Constitutional: Awake, alert, Chronically ill appearing  HENT: NCAT, mucous membranes moist  Respiratory: Clear to auscultation bilaterally, nonlabored respirations   Cardiovascular: RRR, no murmurs, rubs, or gallop  Gastrointestinal: Positive bowel sounds, soft, nontender, nondistended  Musculoskeletal: No bilateral ankle edema, right toe with dsg  Psychiatric: Flat ffect, cooperative  Neurologic: Oriented x 3, nonfocal.   Skin: No rashes      Results Reviewed:  LAB RESULTS:      Lab 23  0422 23  0412   WBC 14.02* 18.31*   HEMOGLOBIN 11.8* 12.7*   HEMATOCRIT 34.5* 36.9*   PLATELETS 215  169   NEUTROS ABS 12.89*  --    IMMATURE GRANS (ABS) 0.11*  --    LYMPHS ABS 0.41*  --    MONOS ABS 0.60  --    EOS ABS 0.00  --    MCV 86.9 87.6         Lab 03/29/23  0415 03/24/23  1823 03/24/23  0422 03/23/23  1839 03/23/23 0412   SODIUM 135*  --  131*  --  130*   POTASSIUM 4.5  --  4.3  --  4.7   CHLORIDE 99  --  97*  --  94*   CO2 27.0  --  30.0*  --  28.0   ANION GAP 9.0  --  4.0*  --  8.0   BUN 25*  --  20  --  25*   CREATININE 0.71*  --  0.41*  --  0.64*   EGFR 99.3  --  117.2  --  102.5   GLUCOSE 119*  --  128*  --  121*   CALCIUM 7.7*  --  7.6*  --  7.5*   MAGNESIUM  --   --   --   --  2.0   PHOSPHORUS  --  2.1* 2.1* 1.9* 2.2*         Lab 03/24/23 0422 03/23/23 0412   TOTAL PROTEIN  --  4.8*   ALBUMIN 2.5* 2.8*   GLOBULIN  --  2.0   ALT (SGPT)  --  17   AST (SGOT)  --  17   BILIRUBIN  --  0.6   ALK PHOS  --  52                     Brief Urine Lab Results  (Last result in the past 365 days)      Color   Clarity   Blood   Leuk Est   Nitrite   Protein   CREAT   Urine HCG        03/11/23 1058 Dark Yellow   Clear   Negative   Negative   Negative   Negative                 Microbiology Results Abnormal     None          MRI Foot Right With & Without Contrast    Result Date: 3/28/2023  MRI FOOT RIGHT W WO CONTRAST Date of Exam: 3/28/2023 2:23 AM EDT Indication: Osteomyelitis, foot.  Open wound great toe  Comparison: None available. Technique:  Routine multiplanar/multisequence sequence images of the right foot were obtained before and after the uneventful administration of  19 mL Multihance.  Findings: There is an ulceration of the distal aspect of the great toe. However the bone marrow signal intensity appears normal at this time out finding of osteomyelitis. There is flexion of the interphalangeal joint of the great toe, second, third fourth and fifth digits. There is mild joint space narrowing first metatarsophalangeal joint and first interphalangeal joint. There are no osseous erosions. There are no joint  effusions. Hallux sesamoid bones of normal size, position and signal intensity. There are  no localized fluid collections to suggest abscess. The flexor and extensor tendons are intact and normal in appearance. The intrinsic muscles of the foot are normal in appearance. No pathologic enhancement is identified. There are no intermetatarsal masses. There is moderate subtalar joint space narrowing and talonavicular joint space narrowing. There is a moderate sized os trigonum. Plantar fascia is intact.     Impression: Impression: No findings of osteomyelitis or abscess. Ulceration distal phalanx of the great toe. Osteoarthritis as described. Electronically Signed: Jenni Lorrie  3/28/2023 7:31 AM EDT  Workstation ID: [id number]      Results for orders placed during the hospital encounter of 03/11/23    Adult Transthoracic Echo Complete w/ Color, Spectral and Contrast if necessary per protocol    Interpretation Summary  •  Left ventricular systolic function is normal. Estimated left ventricular EF = 55%  •  Biatrial enlargement.  •  Calcified aortic valve with mild aortic stenosis, mean gradient 10 mmHg, BANG 1.6 cm².  •  Moderate aortic insufficiency.  •  Mild mitral regurgitation.  •  Mild tricuspid regurgitation with normal RVSP.  •  Mild dilation of the ascending aorta (4.2 cm) is present.      Current medications:  Scheduled Meds:atorvastatin, 40 mg, Oral, Nightly  dexamethasone, 4 mg, Oral, Q6H  enoxaparin, 40 mg, Subcutaneous, Daily  hydroCHLOROthiazide Oral, 12.5 mg, Oral, Daily  levETIRAcetam, 1,000 mg, Oral, Q12H  levothyroxine, 125 mcg, Oral, Daily  pantoprazole, 40 mg, Oral, Q AM  polyethylene glycol, 17 g, Oral, Daily  povidone-iodine, 1 application, Topical, Daily  QUEtiapine, 25 mg, Oral, Nightly  senna-docusate sodium, 2 tablet, Oral, BID  sodium chloride, 10 mL, Intravenous, Q12H  thiamine, 100 mg, Oral, Daily      Continuous Infusions:   PRN Meds:.•  acetaminophen **OR** acetaminophen **OR** acetaminophen  •   senna-docusate sodium **AND** polyethylene glycol **AND** bisacodyl **AND** bisacodyl  •  docusate sodium  •  magnesium hydroxide  •  Magnesium Standard Dose Replacement - Follow Nurse / BPA Driven Protocol  •  melatonin  •  [] HYDROmorphone **AND** naloxone  •  ondansetron **OR** ondansetron  •  Phosphorus Replacement - Follow Nurse / BPA Driven Protocol  •  Potassium Replacement - Follow Nurse / BPA Driven Protocol  •  sodium chloride  •  sodium chloride  •  sodium chloride    Assessment & Plan   Assessment & Plan     Active Hospital Problems    Diagnosis  POA   • **Meningioma (HCC) [D32.9]  Yes   • Rheumatoid vs Psoriatic arthritis (HCC) [L40.50]  Yes   • Atrial fibrillation (HCC) [I48.91]  Yes   • Other recurrent depressive disorders (HCC) [F33.8]  Yes   • 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        Brief Hospital Course to date:  Monty Nagel is a 67 y.o. male with history of hypertension, retinal melatonin and subsequent right eye blindness, previous alcohol use who was admitted for meningioma, gait instability and general weakness.  Brain MRI showed 6.4 enhancing mass and he was evaluated by neurosurgery.  He underwent right frontal craniotomy on 3/21 as well as right middle meningeal artery embolization with Dr. Mckee in anticipation for tumor resection.  He was transferred from ICU to medical floor on      Meningioma,'s s/p tumor embolization 3/20 by Dr. Sebastian and resection 3/21 by Dr. Mckee  Generalized weakness, frequent falls  -Continue ASA Plavix when okay with neurosurgery  -Continue Decadron and Keppra, per neurosurgery plan for steroid taper at discharge  -PT/OT following, plan for rehab  -He will follow-up with Dr. Mckee in 2 weeks post-discharge     Hypertension  -BP is much improved, ok to  resume antihypertensives  -Patient is on lisinopril- HCTZ- now resumed.     Hyperlipidemia  -Continue statin     New A-fib  -Rates currently controlled  -Currently on subcu Lovenox     Hypothyroidism  History of thyroid ablation secondary to Graves' disease  - continue Synthroid     Former EtOH, heavy use  -Stopped drinking      Hyponatremia, not significant  -Monitor while on Keppra, encourage protein intake  -Na+ is low but stable, will continue to monitor for now.       Right great toe lesion  -Dr. Flores following, suspect chronic callus with remote trauma, concerning for osteomyelitis but   MRI right foot negative for osteo, shows ulceration of the distal phalanx of the great toe    Expected Discharge Location and Transportation: Rehab  Expected Discharge   Expected Discharge Date and Time     Expected Discharge Date Expected Discharge Time    Mar 30, 2023          DVT prophylaxis:  Medical and mechanical DVT prophylaxis orders are present.     AM-PAC 6 Clicks Score (PT): 20 (23 1542)    CODE STATUS:   Code Status and Medical Interventions:   Ordered at: 23 1235     Level Of Support Discussed With:    Patient     Code Status (Patient has no pulse and is not breathing):    CPR (Attempt to Resuscitate)     Medical Interventions (Patient has pulse or is breathing):    Full       LYNETTE Grewal  23        Electronically signed by Laine Barth APRN at 23 0970     Fiorella Manzanares MD at 23 0940              [facility] Medicine Services  PROGRESS NOTE    Patient Name: Monty Nagel  : 1955  MRN: [id number]    Date of Admission: 3/11/2023  Primary Care Physician: Tiffany Morales MD    Subjective   Subjective     CC: Follow-up meningioma    HPI: Patient stated he did not get much sleep, did get MRI of his right foot last night    ROS:  Gen- No fevers, chills  CV- No chest pain, palpitations  Resp- No cough, dyspnea  GI- No N/V/D, abd  pain      Objective   Objective     Vital Signs:   Temp:  [97.7 °F (36.5 °C)-98.7 °F (37.1 °C)] 98.7 °F (37.1 °C)  Heart Rate:  [76-88] 77  Resp:  [16-20] 20  BP: (113-139)/() 124/84     Physical Exam:  Constitutional: Chronically ill-appearing male no acute distress, awake, alert  HENT: NCAT, mucous membranes moist  Respiratory: Clear to auscultation bilaterally, respiratory effort normal   Cardiovascular: RRR, no murmurs, rubs, or gallops  Gastrointestinal: Positive bowel sounds, soft, nontender, nondistended  Musculoskeletal: No bilateral ankle edema, right great toe under dressing  Psychiatric: Appropriate affect, cooperative  Neurologic: Oriented x 3, nonfocal  Skin: No rashes    Results Reviewed:  LAB RESULTS:      Lab 03/24/23 0422 03/23/23 0412 03/22/23  0435 03/21/23  0955   WBC 14.02* 18.31* 16.01*  --    HEMOGLOBIN 11.8* 12.7* 12.6*  --    HEMOGLOBIN, POC  --   --   --  10.5*   HEMATOCRIT 34.5* 36.9* 35.8*  --    HEMATOCRIT POC  --   --   --  31*   PLATELETS 215 169 211  --    NEUTROS ABS 12.89*  --  14.66*  --    IMMATURE GRANS (ABS) 0.11*  --  0.15*  --    LYMPHS ABS 0.41*  --  0.47*  --    MONOS ABS 0.60  --  0.71  --    EOS ABS 0.00  --  0.00  --    MCV 86.9 87.6 85.2  --          Lab 03/24/23  1823 03/24/23  0422 03/23/23  1839 03/23/23 0412 03/22/23  0435   SODIUM  --  131*  --  130* 132*   POTASSIUM  --  4.3  --  4.7 4.3   CHLORIDE  --  97*  --  94* 96*   CO2  --  30.0*  --  28.0 29.0   ANION GAP  --  4.0*  --  8.0 7.0   BUN  --  20  --  25* 26*   CREATININE  --  0.41*  --  0.64* 0.63*   EGFR  --  117.2  --  102.5 103.0   GLUCOSE  --  128*  --  121* 156*   CALCIUM  --  7.6*  --  7.5* 7.9*   IONIZED CALCIUM  --   --   --   --  1.16   MAGNESIUM  --   --   --  2.0 2.0   PHOSPHORUS 2.1* 2.1* 1.9* 2.2* 3.2         Lab 03/24/23  0422 03/23/23  0412   TOTAL PROTEIN  --  4.8*   ALBUMIN 2.5* 2.8*   GLOBULIN  --  2.0   ALT (SGPT)  --  17   AST (SGOT)  --  17   BILIRUBIN  --  0.6   ALK PHOS  --  52                      Lab 03/21/23  0955   PH, ARTERIAL 7.40     Brief Urine Lab Results  (Last result in the past 365 days)      Color   Clarity   Blood   Leuk Est   Nitrite   Protein   CREAT   Urine HCG        03/11/23 1058 Dark Yellow   Clear   Negative   Negative   Negative   Negative                 Microbiology Results Abnormal     None          MRI Foot Right With & Without Contrast    Result Date: 3/28/2023  MRI FOOT RIGHT W WO CONTRAST Date of Exam: 3/28/2023 2:23 AM EDT Indication: Osteomyelitis, foot.  Open wound great toe  Comparison: None available. Technique:  Routine multiplanar/multisequence sequence images of the right foot were obtained before and after the uneventful administration of  19 mL Multihance.  Findings: There is an ulceration of the distal aspect of the great toe. However the bone marrow signal intensity appears normal at this time out finding of osteomyelitis. There is flexion of the interphalangeal joint of the great toe, second, third fourth and fifth digits. There is mild joint space narrowing first metatarsophalangeal joint and first interphalangeal joint. There are no osseous erosions. There are no joint effusions. Hallux sesamoid bones of normal size, position and signal intensity. There are  no localized fluid collections to suggest abscess. The flexor and extensor tendons are intact and normal in appearance. The intrinsic muscles of the foot are normal in appearance. No pathologic enhancement is identified. There are no intermetatarsal masses. There is moderate subtalar joint space narrowing and talonavicular joint space narrowing. There is a moderate sized os trigonum. Plantar fascia is intact.     Impression: Impression: No findings of osteomyelitis or abscess. Ulceration distal phalanx of the great toe. Osteoarthritis as described. Electronically Signed: Jenni Andrew  3/28/2023 7:31 AM EDT  Workstation ID: [id number]      Results for orders placed during the hospital encounter of  03/11/23    Adult Transthoracic Echo Complete w/ Color, Spectral and Contrast if necessary per protocol    Interpretation Summary  •  Left ventricular systolic function is normal. Estimated left ventricular EF = 55%  •  Biatrial enlargement.  •  Calcified aortic valve with mild aortic stenosis, mean gradient 10 mmHg, BANG 1.6 cm².  •  Moderate aortic insufficiency.  •  Mild mitral regurgitation.  •  Mild tricuspid regurgitation with normal RVSP.  •  Mild dilation of the ascending aorta (4.2 cm) is present.      Current medications:  Scheduled Meds:atorvastatin, 40 mg, Oral, Nightly  dexamethasone, 4 mg, Oral, Q6H  enoxaparin, 40 mg, Subcutaneous, Daily  hydroCHLOROthiazide Oral, 12.5 mg, Oral, Daily  levETIRAcetam, 1,000 mg, Oral, Q12H  levothyroxine, 125 mcg, Oral, Daily  pantoprazole, 40 mg, Oral, Q AM  polyethylene glycol, 17 g, Oral, Daily  povidone-iodine, 1 application, Topical, Daily  QUEtiapine, 25 mg, Oral, Nightly  senna-docusate sodium, 2 tablet, Oral, BID  sodium chloride, 10 mL, Intravenous, Q12H  thiamine, 100 mg, Oral, Daily      Continuous Infusions:   PRN Meds:.•  acetaminophen **OR** acetaminophen **OR** acetaminophen  •  senna-docusate sodium **AND** polyethylene glycol **AND** bisacodyl **AND** bisacodyl  •  docusate sodium  •  HYDROmorphone **AND** naloxone  •  magnesium hydroxide  •  Magnesium Standard Dose Replacement - Follow Nurse / BPA Driven Protocol  •  melatonin  •  ondansetron **OR** ondansetron  •  Phosphorus Replacement - Follow Nurse / BPA Driven Protocol  •  Potassium Replacement - Follow Nurse / BPA Driven Protocol  •  sodium chloride  •  sodium chloride  •  sodium chloride    Assessment & Plan   Assessment & Plan     Active Hospital Problems    Diagnosis  POA   • **Meningioma (HCC) [D32.9]  Yes   • Rheumatoid vs Psoriatic arthritis (HCC) [L40.50]  Yes   • Atrial fibrillation (HCC) [I48.91]  Yes   • Other recurrent depressive disorders (HCC) [F33.8]  Yes   • Postablative  hypothyroidism [E89.0]  Yes   • Essential hypertension [I10]  Yes   • 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        Brief Hospital Course to date:  Monty Nagel is a 67 y.o. male with history of hypertension, retinal melatonin and subsequent right eye blindness, previous alcohol use who was admitted for meningioma, gait instability and general weakness.  Brain MRI showed 6.4 enhancing mass and he was evaluated by neurosurgery.  He underwent right frontal craniotomy on 3/21 as well as right middle meningeal artery embolization with Dr. Mckee in anticipation for tumor resection.  He was transferred from ICU to medical floor on 9/22     Meningioma,'s s/p tumor embolization 3/20 by Dr. Sebastian and resection 3/21 by Dr. Mckee  Generalized weakness, frequent falls  -Continue ASA Plavix when okay with neurosurgery  -Continue Decadron and Keppra, per neurosurgery plan for steroid taper at discharge  -PT/OT following, plan for rehab  -He will follow-up with Dr. Mckee in 2 weeks post-discharge     Hypertension  -BP is much improved, ok to resume antihypertensives  -Patient is on lisinopril- HCTZ, will resume HCTZ 12.5 mg daily today     Hyperlipidemia  -Continue statin     New A-fib  -Rates currently controlled  -Currently on subcu Lovenox     Hypothyroidism  History of thyroid ablation secondary to Graves' disease  - continue Synthroid     Former EtOH, heavy use  -Stopped drinking 9/22     Hyponatremia, not significant  -Monitor while on Keppra, encourage protein intake  -Na+ is low but stable, will continue to monitor for now.       Right great toe lesion  -Dr. Flores following, suspect chronic callus with remote trauma, possible osteomyelitis  -MRI right foot negative for osteo, shows ulceration of the distal phalanx of the great toe    Expected Discharge Location and Transportation: Rehab  Expected Discharge    Expected Discharge Date and Time     Expected Discharge Date Expected Discharge Time    Mar 30, 2023          DVT prophylaxis:  Medical and mechanical DVT prophylaxis orders are present.     AM-PAC 6 Clicks Score (PT): 17 (03/27/23 1541)    CODE STATUS:   Code Status and Medical Interventions:   Ordered at: 03/21/23 1235     Level Of Support Discussed With:    Patient     Code Status (Patient has no pulse and is not breathing):    CPR (Attempt to Resuscitate)     Medical Interventions (Patient has pulse or is breathing):    Full       Fiorella Manzanares MD  03/28/23        Electronically signed by Fiorella Manzanares MD at 03/28/23 0961     Stanislaw Flores MD at 03/28/23 0594          Cardiothoracic Surgery Progress Note      POD #:     LOS: 11 days      Subjective: Patient is awake and alert.  Chief complaint right great toe pain    Objective:  Vital Signs  Temp:  [97.7 °F (36.5 °C)-98 °F (36.7 °C)] 98 °F (36.7 °C)  Heart Rate:  [66-88] 76  Resp:  [16-18] 18  BP: (113-139)/() 118/79    Physical Exam:   General Appearance: Seems somewhat oriented   Lungs:   Heart:   Skin: The right great toe callus had Betadine swabbing and new Optifoam dressing applied   Incision:     Results:  Results from last 7 days   Lab Units 03/24/23  0422   WBC 10*3/mm3 14.02*   HEMOGLOBIN g/dL 11.8*   HEMATOCRIT % 34.5*   PLATELETS 10*3/mm3 215     Results from last 7 days   Lab Units 03/24/23  0422   SODIUM mmol/L 131*   POTASSIUM mmol/L 4.3   CHLORIDE mmol/L 97*   CO2 mmol/L 30.0*   BUN mg/dL 20   CREATININE mg/dL 0.41*   GLUCOSE mg/dL 128*   CALCIUM mg/dL 7.6*     MRI of the right foot pending    Assessment: #1.  Chronic callus of the right great toe with some associated remote trauma over 2 years ago.  #2.  Possible osteomyelitis of the right great toe distal phalanx MRI results pending  #3 meningioma of brain resected per neurosurgery over a week ago  Plan: Await MRI results of right foot.  overall medical management per hospitalist.   Neurosurgery to manage recent meningioma resection.      Stanislaw Flores MD - 23 - 05:25 EDT  Addendum: MRI was negative for any osteomyelitis of this right great toe distal phalanx.  We will sign off on daily visits.  Patient needs to follow-up with a podiatrist or his local family physician for care of this callus of this right great toe.  Call if needed.    Electronically signed by Stanislaw Flores MD, 23, 8:26 AM EDT.        Electronically signed by Stanislaw Flores MD at 23 0827     Fiorella Manzanares MD at 23 0636              [facility] Medicine Services  PROGRESS NOTE    Patient Name: Monty Nagel  : 1955  MRN: [id number]    Date of Admission: 3/11/2023  Primary Care Physician: Tiffany Morales MD    Subjective   Subjective     CC: Follow-up meningioma    HPI: Patient not have a very good night, did endorse some abdominal discomfort    ROS:  Gen- No fevers, chills  CV- No chest pain, palpitations  Resp- No cough, dyspnea  GI- No N/V/D, +abd pain    Objective   Objective     Vital Signs:   Temp:  [98.2 °F (36.8 °C)-99.2 °F (37.3 °C)] 98.2 °F (36.8 °C)  Heart Rate:  [73-84] 79  Resp:  [18-20] 18  BP: (118-146)/() 146/99     Physical Exam:  Constitutional: Chronically ill male, in no acute distress, awake, alert  HENT: NCAT, mucous membranes moist  Respiratory: Clear to auscultation bilaterally, respiratory effort normal   Cardiovascular: RRR, no murmurs, rubs, or gallops  Gastrointestinal: Positive bowel sounds, soft, nontender, nondistended  Musculoskeletal: No bilateral ankle edema, right great toe lesion  Psychiatric: Appropriate affect, cooperative  Neurologic: Oriented x 3, nonfocal  Skin: No rashes, cranial incision CDI    Results Reviewed:  LAB RESULTS:      Lab 23  0422 23  0412 23  0435 23  0955 23  0421   WBC 14.02* 18.31* 16.01*  --  9.20   HEMOGLOBIN 11.8* 12.7* 12.6*  --  11.4*   HEMOGLOBIN, POC  --   --   --   10.5*  --    HEMATOCRIT 34.5* 36.9* 35.8*  --  33.5*   HEMATOCRIT POC  --   --   --  31*  --    PLATELETS 215 169 211  --  200   NEUTROS ABS 12.89*  --  14.66*  --   --    IMMATURE GRANS (ABS) 0.11*  --  0.15*  --   --    LYMPHS ABS 0.41*  --  0.47*  --   --    MONOS ABS 0.60  --  0.71  --   --    EOS ABS 0.00  --  0.00  --   --    MCV 86.9 87.6 85.2  --  87.5         Lab 03/24/23 1823 03/24/23 0422 03/23/23 1839 03/23/23 0412 03/22/23 0435 03/21/23 0421   SODIUM  --  131*  --  130* 132* 132*   POTASSIUM  --  4.3  --  4.7 4.3 4.3   CHLORIDE  --  97*  --  94* 96* 97*   CO2  --  30.0*  --  28.0 29.0 30.0*   ANION GAP  --  4.0*  --  8.0 7.0 5.0   BUN  --  20  --  25* 26* 24*   CREATININE  --  0.41*  --  0.64* 0.63* 0.76   EGFR  --  117.2  --  102.5 103.0 97.3   GLUCOSE  --  128*  --  121* 156* 110*   CALCIUM  --  7.6*  --  7.5* 7.9* 7.7*   IONIZED CALCIUM  --   --   --   --  1.16  --    MAGNESIUM  --   --   --  2.0 2.0 2.0   PHOSPHORUS 2.1* 2.1* 1.9* 2.2* 3.2 3.5         Lab 03/24/23  0422 03/23/23  0412   TOTAL PROTEIN  --  4.8*   ALBUMIN 2.5* 2.8*   GLOBULIN  --  2.0   ALT (SGPT)  --  17   AST (SGOT)  --  17   BILIRUBIN  --  0.6   ALK PHOS  --  52                 Lab 03/21/23  0805   ABO TYPING A   RH TYPING Positive   ANTIBODY SCREEN Negative         Lab 03/21/23  0955   PH, ARTERIAL 7.40     Brief Urine Lab Results  (Last result in the past 365 days)      Color   Clarity   Blood   Leuk Est   Nitrite   Protein   CREAT   Urine HCG        03/11/23 1058 Dark Yellow   Clear   Negative   Negative   Negative   Negative                 Microbiology Results Abnormal     None          No radiology results from the last 24 hrs    Results for orders placed during the hospital encounter of 03/11/23    Adult Transthoracic Echo Complete w/ Color, Spectral and Contrast if necessary per protocol    Interpretation Summary  •  Left ventricular systolic function is normal. Estimated left ventricular EF = 55%  •  Biatrial  enlargement.  •  Calcified aortic valve with mild aortic stenosis, mean gradient 10 mmHg, BANG 1.6 cm².  •  Moderate aortic insufficiency.  •  Mild mitral regurgitation.  •  Mild tricuspid regurgitation with normal RVSP.  •  Mild dilation of the ascending aorta (4.2 cm) is present.      Current medications:  Scheduled Meds:atorvastatin, 40 mg, Oral, Nightly  dexamethasone, 4 mg, Oral, Q6H  enoxaparin, 40 mg, Subcutaneous, Daily  levETIRAcetam, 1,000 mg, Oral, Q12H  levothyroxine, 125 mcg, Oral, Daily  pantoprazole, 40 mg, Oral, Q AM  polyethylene glycol, 17 g, Oral, Daily  povidone-iodine, 1 application, Topical, Daily  senna-docusate sodium, 2 tablet, Oral, BID  sodium chloride, 10 mL, Intravenous, Q12H  thiamine, 100 mg, Oral, Daily      Continuous Infusions:   PRN Meds:.•  acetaminophen **OR** acetaminophen **OR** acetaminophen  •  senna-docusate sodium **AND** polyethylene glycol **AND** bisacodyl **AND** bisacodyl  •  docusate sodium  •  HYDROmorphone **AND** naloxone  •  magnesium hydroxide  •  Magnesium Standard Dose Replacement - Follow Nurse / BPA Driven Protocol  •  melatonin  •  ondansetron **OR** ondansetron  •  Phosphorus Replacement - Follow Nurse / BPA Driven Protocol  •  Potassium Replacement - Follow Nurse / BPA Driven Protocol  •  sodium chloride  •  sodium chloride  •  sodium chloride    Assessment & Plan   Assessment & Plan     Active Hospital Problems    Diagnosis  POA   • **Meningioma (HCC) [D32.9]  Yes   • Rheumatoid vs Psoriatic arthritis (HCC) [L40.50]  Yes   • Atrial fibrillation (HCC) [I48.91]  Yes   • Other recurrent depressive disorders (HCC) [F33.8]  Yes   • 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        Brief Hospital Course to date:  Monty Nagel is a 67 y.o. male with history of hypertension,  retinal melatonin and subsequent right eye blindness, previous alcohol use who was admitted for meningioma, gait instability and general weakness.  Brain MRI showed 6.4 enhancing mass and he was evaluated by neurosurgery.  He underwent right frontal craniotomy on 3/21 as well as right middle meningeal artery embolization with Dr. Mckee in anticipation for tumor resection.  He was transferred from ICU to medical floor on 9/22     Meningioma,'s s/p tumor embolization 3/20 by Dr. Sebastian and resection 3/21 by Dr. Mckee  Generalized weakness, frequent falls secondary to above  -Continue ASA Plavix when okay with neurosurgery  -Continue Decadron and Keppra, per neurosurgery plan for steroid taper at discharge  -PT/OT following, plan for rehab  -He will follow-up with Dr. Mckee in 2 weeks post-discharge     Hypertension  -BP is much improved, ok to resume antihypertensives  -Patient is on lisinopril- HCTZ, will resume HCTZ 12.5 mg daily today     Hyperlipidemia  -Continue statin     New A-fib  -Rates currently controlled  -Currently on subcu Lovenox     Hypothyroidism  History of thyroid ablation secondary to Graves' disease  - continue Synthroid     Former EtOH, heavy use  -Stopped drinking 9/22     Hyponatremia, not significant  -Monitor while on Keppra, encourage protein intake  -Na+ is low but stable, will continue to monitor for now.       Right great toe lesion  -PT-wound care following  -may need a bx, Dr. Flores consulted    Expected Discharge Location and Transportation: rehab  Expected Discharge   Expected Discharge Date and Time     Expected Discharge Date Expected Discharge Time    Mar 28, 2023            DVT prophylaxis:  Medical and mechanical DVT prophylaxis orders are present.     AM-PAC 6 Clicks Score (PT): 19 (03/26/23 0805)    CODE STATUS:   Code Status and Medical Interventions:   Ordered at: 03/21/23 1235     Level Of Support Discussed With:    Patient     Code Status (Patient has no pulse and is not  breathing):    CPR (Attempt to Resuscitate)     Medical Interventions (Patient has pulse or is breathing):    Full       Fiorella Manzanares MD  03/27/23      Electronically signed by Fiorella Manzanares MD at 03/27/23 0857          Consult Notes (last 72 hours)      Loan Pa PA-C at 03/27/23 0830      Consult Orders    1. Inpatient Vascular Surgery Consult [[id number]] ordered by Fiorella Manzanares MD at 03/26/23 0925          Attestation signed by Stanislaw Flores MD at 03/27/23 1250    I have reviewed this documentation and agree.    I examined the patient at the bedside.  This area of the right great toe represents chronic callus formation probably from chronic pressure ulceration.  He does have palpable pedal pulses.  I do not think that the patient's white blood cell count elevation is from this chronic callus ulceration.  There is no drainage from this ulcerative area no erythema and no specific tenderness.  It might not be a bad idea to go ahead with a MRI of this right foot to substantiate my clinical impression that the callus does not involve the bone.                  CTS Consult    Patient Care Team:  Tiffany Morales MD as PCP - General (Family Medicine)  Tisha Barth MD as Consulting Physician (Ophthalmology)      Reason for Consult:  Right great toe wound    HPI  Patient is a 67 y.o. male with a history of alcohol abuse, new onset atrial fibrillation, hypertension, depression, hypothyroidism, right eye blindness secondary to right retinal melanoma, and balance problems with dizziness who presented to [facility] ED on 3/11 with complaints of weakness and difficulty ambulating from his bedroom to bathroom for approximately 2 weeks.  MRI brain revealed a large right frontal lobe lesion consistent with meningioma and patient underwent preoperative embolization with PVA particles and patient underwent right frontal craniotomy for resection of dural mass with Dr. Mckee on 3/21. While in hospital, patient was  noted to have a right great toe wound for which we have been asked to evaluate.  MATTHEW's from 3/12/23 documented as normal.  Patient reports wound to right great toe has been there for approximately 2 years and he cannot recall any injury to the toe.  He denies pain, fever, chills.      Review of Systems    All other systems are reviewed and are negative unless otherwise stated in HPI.    History  Past Medical History:   Diagnosis Date   • Arthritis    • Hepatitis A    • Hypertension    • Melanoma (HCC) 2017    retina     Past Surgical History:   Procedure Laterality Date   • CRANIOTOMY FOR TUMOR N/A 3/21/2023    Procedure: CRANIOTOMY FOR TUMOR STEREOTACTIC WITH STEALTH;  Surgeon: Marlon Mckee MD;  Location:  [facility];  Service: Neurosurgery;  Laterality: N/A;   • EMBOLIZATION CEREBRAL N/A 3/20/2023    Procedure: Tumor Emoblization radial access;  Surgeon: Ozzy Sebastian MD;  Location:  [facility];  Service: Interventional Radiology;  Laterality: N/A;   • EYE SURGERY  2017    EYE CANCER RIGHT EYE   • FINGER SURGERY Left     Pointer finger re-attached in 3rd Grade   • TONSILLECTOMY       Family History   Problem Relation Age of Onset   • Diabetes Mother    • Heart disease Father      Social History     Tobacco Use   • Smoking status: Former     Packs/day: 1.00     Years: 15.00     Pack years: 15.00     Types: Cigarettes     Start date: 3/26/1981     Quit date: 1990     Years since quittin.2   • Smokeless tobacco: Former     Quit date: 1990   Vaping Use   • Vaping Use: Never used   Substance Use Topics   • Alcohol use: Not Currently     Comment: Quit Sept 10, 2022, had been using for 50 years   • Drug use: No     Medications Prior to Admission   Medication Sig Dispense Refill Last Dose   • levothyroxine (Synthroid) 125 MCG tablet Take 1 tablet by mouth Daily. 90 tablet 1    • lisinopril-hydrochlorothiazide (PRINZIDE,ZESTORETIC) 20-12.5 MG per tablet Take 1 tablet by mouth  Daily. 90 tablet 2      Allergies:  Patient has no known allergies.    Objective    Vital Signs  Temp:  [97.9 °F (36.6 °C)-99.2 °F (37.3 °C)] 97.9 °F (36.6 °C)  Heart Rate:  [66-84] 66  Resp:  [18-20] 18  BP: (118-146)/(81-99) 137/94    Physical Exam:  General Appearance: alert, appears stated age and cooperative  Head: normocephalic, without obvious abnormality and atraumatic  Neck: no adenopathy, suppple, trachea midline, no thyromegaly, no carotid bruit and no JVD  Lungs: clear to auscultation, respirations regular, respirations even and respirations unlabored  Heart: regular rhythm & normal rate, normal S1, S2, no murmur, no mary jane, no rub and no click  Abdomen: normal bowel sounds, no masses and soft non-tender  Extremities: moves extremities well and no edema  Pulses: Pulses palpable and equal bilaterally  Skin: right great toe with chronic appearing calcification/callous on distal aspect of toe.  No erythema, drainage or fluctuance  Neurologic: Mental Status orientated to person, place, time and situation          Data Review:  Results from last 7 days   Lab Units 03/24/23  0422   WBC 10*3/mm3 14.02*   HEMOGLOBIN g/dL 11.8*   HEMATOCRIT % 34.5*   PLATELETS 10*3/mm3 215     Results from last 7 days   Lab Units 03/24/23  0422   SODIUM mmol/L 131*   POTASSIUM mmol/L 4.3   CHLORIDE mmol/L 97*   CO2 mmol/L 30.0*   BUN mg/dL 20   CREATININE mg/dL 0.41*   GLUCOSE mg/dL 128*   CALCIUM mg/dL 7.6*     Coagulation: No results found for: INR, APTT  Cardiac markers:     ABGs:       Invalid input(s): PO2      Imaging Results (Last 72 Hours)     ** No results found for the last 72 hours. **          Carotid Duplex 3/12/23:  Interpretation Summary       •  No evidence of hemodynamically significant stenosis of bilateral carotid arteries.  •  Intimal thickening and mild bilateral scattered plaque is noted.      Echo 3/12/23:   Interpretation Summary       •  Left ventricular systolic function is normal. Estimated left  ventricular EF = 55%  •  Biatrial enlargement.  •  Calcified aortic valve with mild aortic stenosis, mean gradient 10 mmHg, BANG 1.6 cm².  •  Moderate aortic insufficiency.  •  Mild mitral regurgitation.  •  Mild tricuspid regurgitation with normal RVSP.  •  Mild dilation of the ascending aorta (4.2 cm) is present.      Assessment/Plan:    Patient may require imaging of right great toe and/or biopsy. Will discuss with Dr. Flores.      Loan Pa PA-C  03/27/23  08:30 EDT        Electronically signed by Stanislaw Flores MD at 03/27/23 9919

## 2023-03-29 NOTE — SIGNIFICANT NOTE
03/29/23 1550   SLP Deferred Reason   SLP Deferred Reason Routine  (Attempted to see patient this am for speech/cognitive tx. Patient working with PT. Will re-attempt as schedule permits.)

## 2023-03-29 NOTE — PROGRESS NOTES
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 Fall in Sept w dislocated shoulder and fx humerus    PSH:  He  has a past surgical history that includes Eye surgery (2017); Tonsillectomy; Finger surgery (Left); Craniotomy for Tumor (N/A, 3/21/2023); and embolization cerebral (N/A, 3/20/2023).      Applicable Nutrition Concerns:   Skin:  Oral:  GI:      Applicable Interval History:  Some contracture hands/feet noted on adm.   3/12 noted per SLP mild cognative linguistic d/o        Reported/Observed/Food/Nutrition Related History:     3/29  Pt eating well-94% x 8 meals and has extra sides ordered. Preferences obtained.    3/17  Pt with a good PO intake - observed 100% of breakfast. Expressed concern with consistency of oatmeal - educated on availability of food, verbalized understanding. Denies N/V/D.     3/12  Pt allows period of no intake was about 5 days within last month, not 2 wk as had reported earlier today. But, was indeed period of ~ no intake. Rpts liking hospital food very much, eating now w enthusiasm.       Labs    Labs Reviewed: Yes     Results from last 7 days   Lab Units 03/29/23  9361  "03/24/23  1823 03/24/23  0422 03/23/23  1839 03/23/23  0412   GLUCOSE mg/dL 119*  --  128*  --  121*   BUN mg/dL 25*  --  20  --  25*   CREATININE mg/dL 0.71*  --  0.41*  --  0.64*   SODIUM mmol/L 135*  --  131*  --  130*   CHLORIDE mmol/L 99  --  97*  --  94*   POTASSIUM mmol/L 4.5  --  4.3  --  4.7   PHOSPHORUS mg/dL  --  2.1* 2.1* 1.9* 2.2*   MAGNESIUM mg/dL  --   --   --   --  2.0   ALT (SGPT) U/L  --   --   --   --  17       Results from last 7 days   Lab Units 03/24/23 0422 03/23/23 0412   ALBUMIN g/dL 2.5* 2.8*           Lab Results   Lab Value Date/Time    HGBA1C 5.40 03/11/2023 1052    HGBA1C 5.30 11/15/2019 0936                 Medications    Medications Reviewed: Yes  Pertinent: Steroid, Keppra, Protonix, Pericolace, Thiamin  Infusion:  PRN: Colace      Intake/Ouptut 24 hrs (0701 - 0700)   I&O's Reviewed: Yes     Intake & Output (last day)       03/28 0701  03/29 0700 03/29 0701  03/30 0700    P.O. 1080 240    Total Intake(mL/kg) 1080 (11.6) 240 (2.6)    Urine (mL/kg/hr) 1300 (0.6) 450 (2.1)    Stool 0     Total Output 1300 450    Net -220 -210          Urine Unmeasured Occurrence 1 x     Stool Unmeasured Occurrence 2 x           Anthropometrics     Admission Height 175.3 cm (69\") Documented at 03/11/2023 1049   Admission Weight 93.9 kg (207 lb) Documented at 03/11/2023 1049Wt fr   Wt from Dec 2023      Height: Height: 180.3 cm (71\")  Last Filed Weight: Weight: 93.4 kg (206 lb) (03/22/23 2010)  Method: Weight Method: Bed scale  BMI: BMI (Calculated): 28.7  BMI classification: Overweight: 25.0-29.9kg/m2     UBW: Wt of 207 lbs on 12/14/22  Weight change: Loss of 29 lbs 14% body wt over 3mo    Nutrition Focused Physical Exam     Date: 3/12    Patient meets criteria for severe acute alnutrition diagnosis, see MSA note.    Current Nutrition Prescription     PO: Diet: Regular/House Diet; Texture: Regular Texture (IDDSI 7); Fluid Consistency: Thin (IDDSI 0)  Oral Nutrition Supplement: Boost Plus 3x/da  Intake: " 4 Days: 94% x 8 meals      Nutrition Diagnosis   Date: 3/12 Updated:   Problem Malnutrition  acute severe   Etiology Period of severe depressed intake    Signs/Symptoms Wt intake hx w wasting   Status:       Goal:   General: Nutrition to support treatment  PO: Maintain intake  EN/PN: N/A    Nutrition Intervention      Follow treatment progress, Care plan reviewed, Advise alternate selection, Advised available snacks, Encourage intake    Pt would like to continue Boost+ TID despite adequate oral intakes    Monitoring/Evaluation:   Per protocol, I&O, PO intake, Supplement intake, Pertinent labs, Weight, Symptoms      Dyan Silva RD  Time Spent: 20 min

## 2023-03-29 NOTE — PLAN OF CARE
Problem: Adult Inpatient Plan of Care  Goal: Plan of Care Review  Recent Flowsheet Documentation  Taken 3/29/2023 1327 by Valeria Gibson, PT  Progress: improving  Plan of Care Reviewed With: patient  Outcome Evaluation: Patient continues to demonstrated impulsive behavior during gait training. Recommend setting some guidelines prior to working with him.   Goal Outcome Evaluation:  Plan of Care Reviewed With: patient        Progress: improving  Outcome Evaluation: Patient continues to demonstrated impulsive behavior during gait training. Recommend setting some guidelines prior to working with him.

## 2023-03-29 NOTE — PROGRESS NOTES
[facility] Medicine Services  PROGRESS NOTE    Patient Name: Monty Nagel  : 1955  MRN: [id number]    Date of Admission: 3/11/2023  Primary Care Physician: Tiffany Morales MD    Subjective   Subjective     CC: Follow-up meningioma    HPI:  Pt sitting up in bed frustrated because he can not get up out of bed by himself. MRI of right foot showed no evidence of osteomyelitis or abscess. There is an ulceration of the distal phalanx of the great toe. Spoke with neurosurgery and they instructed to restart ASA/Plavix and steroid taper was also clarified.     ROS:  Gen- No fevers, chills  CV- No chest pain, palpitations  Resp- No cough, dyspnea  GI- No N/V/D, abd pain        Objective   Objective     Vital Signs:   Temp:  [97.9 °F (36.6 °C)-98.8 °F (37.1 °C)] 98.2 °F (36.8 °C)  Heart Rate:  [] 96  Resp:  [16-20] 18  BP: (125-151)/() 125/79     Physical Exam:  Constitutional: Awake, alert, Chronically ill appearing  HENT: NCAT, mucous membranes moist  Respiratory: Clear to auscultation bilaterally, nonlabored respirations   Cardiovascular: RRR, no murmurs, rubs, or gallop  Gastrointestinal: Positive bowel sounds, soft, nontender, nondistended  Musculoskeletal: No bilateral ankle edema, right toe with dsg  Psychiatric: Flat ffect, cooperative  Neurologic: Oriented x 3, nonfocal.   Skin: No rashes      Results Reviewed:  LAB RESULTS:      Lab 23   WBC 14.02* 18.31*   HEMOGLOBIN 11.8* 12.7*   HEMATOCRIT 34.5* 36.9*   PLATELETS 215 169   NEUTROS ABS 12.89*  --    IMMATURE GRANS (ABS) 0.11*  --    LYMPHS ABS 0.41*  --    MONOS ABS 0.60  --    EOS ABS 0.00  --    MCV 86.9 87.6         Lab 23  04123  1823 23  0422 23  1839 23   SODIUM 135*  --  131*  --  130*   POTASSIUM 4.5  --  4.3  --  4.7   CHLORIDE 99  --  97*  --  94*   CO2 27.0  --  30.0*  --  28.0   ANION GAP 9.0  --  4.0*  --  8.0   BUN 25*  --  20  --  25*    CREATININE 0.71*  --  0.41*  --  0.64*   EGFR 99.3  --  117.2  --  102.5   GLUCOSE 119*  --  128*  --  121*   CALCIUM 7.7*  --  7.6*  --  7.5*   MAGNESIUM  --   --   --   --  2.0   PHOSPHORUS  --  2.1* 2.1* 1.9* 2.2*         Lab 03/24/23  0422 03/23/23  0412   TOTAL PROTEIN  --  4.8*   ALBUMIN 2.5* 2.8*   GLOBULIN  --  2.0   ALT (SGPT)  --  17   AST (SGOT)  --  17   BILIRUBIN  --  0.6   ALK PHOS  --  52                     Brief Urine Lab Results  (Last result in the past 365 days)      Color   Clarity   Blood   Leuk Est   Nitrite   Protein   CREAT   Urine HCG        03/11/23 1058 Dark Yellow   Clear   Negative   Negative   Negative   Negative                 Microbiology Results Abnormal     None          MRI Foot Right With & Without Contrast    Result Date: 3/28/2023  MRI FOOT RIGHT W WO CONTRAST Date of Exam: 3/28/2023 2:23 AM EDT Indication: Osteomyelitis, foot.  Open wound great toe  Comparison: None available. Technique:  Routine multiplanar/multisequence sequence images of the right foot were obtained before and after the uneventful administration of  19 mL Multihance.  Findings: There is an ulceration of the distal aspect of the great toe. However the bone marrow signal intensity appears normal at this time out finding of osteomyelitis. There is flexion of the interphalangeal joint of the great toe, second, third fourth and fifth digits. There is mild joint space narrowing first metatarsophalangeal joint and first interphalangeal joint. There are no osseous erosions. There are no joint effusions. Hallux sesamoid bones of normal size, position and signal intensity. There are  no localized fluid collections to suggest abscess. The flexor and extensor tendons are intact and normal in appearance. The intrinsic muscles of the foot are normal in appearance. No pathologic enhancement is identified. There are no intermetatarsal masses. There is moderate subtalar joint space narrowing and talonavicular joint space  narrowing. There is a moderate sized os trigonum. Plantar fascia is intact.     Impression: Impression: No findings of osteomyelitis or abscess. Ulceration distal phalanx of the great toe. Osteoarthritis as described. Electronically Signed: Jenni Andrew  3/28/2023 7:31 AM EDT  Workstation ID: [id number]      Results for orders placed during the hospital encounter of 23    Adult Transthoracic Echo Complete w/ Color, Spectral and Contrast if necessary per protocol    Interpretation Summary  •  Left ventricular systolic function is normal. Estimated left ventricular EF = 55%  •  Biatrial enlargement.  •  Calcified aortic valve with mild aortic stenosis, mean gradient 10 mmHg, BANG 1.6 cm².  •  Moderate aortic insufficiency.  •  Mild mitral regurgitation.  •  Mild tricuspid regurgitation with normal RVSP.  •  Mild dilation of the ascending aorta (4.2 cm) is present.      Current medications:  Scheduled Meds:aspirin, 81 mg, Oral, Daily  atorvastatin, 40 mg, Oral, Nightly  clopidogrel, 75 mg, Oral, Daily  dexamethasone, 4 mg, Oral, Q6H  enoxaparin, 40 mg, Subcutaneous, Daily  hydroCHLOROthiazide Oral, 12.5 mg, Oral, Daily  levETIRAcetam, 1,000 mg, Oral, Q12H  levothyroxine, 125 mcg, Oral, Daily  lisinopril, 10 mg, Oral, Q24H  pantoprazole, 40 mg, Oral, Q AM  polyethylene glycol, 17 g, Oral, Daily  povidone-iodine, 1 application, Topical, Daily  QUEtiapine, 25 mg, Oral, Nightly  senna-docusate sodium, 2 tablet, Oral, BID  sodium chloride, 10 mL, Intravenous, Q12H  thiamine, 100 mg, Oral, Daily      Continuous Infusions:   PRN Meds:.•  acetaminophen **OR** acetaminophen **OR** acetaminophen  •  senna-docusate sodium **AND** polyethylene glycol **AND** bisacodyl **AND** bisacodyl  •  docusate sodium  •  magnesium hydroxide  •  Magnesium Standard Dose Replacement - Follow Nurse / BPA Driven Protocol  •  melatonin  •  [] HYDROmorphone **AND** naloxone  •  ondansetron **OR** ondansetron  •  Phosphorus Replacement -  Follow Nurse / BPA Driven Protocol  •  Potassium Replacement - Follow Nurse / BPA Driven Protocol  •  sodium chloride  •  sodium chloride  •  sodium chloride    Assessment & Plan   Assessment & Plan     Active Hospital Problems    Diagnosis  POA   • **Meningioma (HCC) [D32.9]  Yes   • Rheumatoid vs Psoriatic arthritis (HCC) [L40.50]  Yes   • Atrial fibrillation (HCC) [I48.91]  Yes   • Other recurrent depressive disorders (HCC) [F33.8]  Yes   • 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        Brief Hospital Course to date:  Monty Nagel is a 67 y.o. male with history of hypertension, retinal melatonin and subsequent right eye blindness, previous alcohol use who was admitted for meningioma, gait instability and general weakness.  Brain MRI showed 6.4 enhancing mass and he was evaluated by neurosurgery.  He underwent right frontal craniotomy on 3/21 as well as right middle meningeal artery embolization with Dr. Mckee in anticipation for tumor resection.  He was transferred from ICU to medical floor on 9/22     Meningioma,'s s/p tumor embolization 3/20 by Dr. Sebastian and resection 3/21 by Dr. Mckee  Generalized weakness, frequent falls  -Continue ASA Plavix when okay with neurosurgery  -Continue Decadron and Keppra, per neurosurgery plan for steroid taper at discharge  -PT/OT following, plan for rehab  -He will follow-up with Dr. Mckee in 2 weeks post-discharge     Hypertension  -BP is much improved, ok to resume antihypertensives  -Patient is on lisinopril- HCTZ- now resumed.     Hyperlipidemia  -Continue statin     New A-fib  -Rates currently controlled  -Currently on subcu Lovenox     Hypothyroidism  History of thyroid ablation secondary to Graves' disease  - continue Synthroid     Former EtOH, heavy use  -Stopped drinking  9/22     Hyponatremia, not significant  -Monitor while on Keppra, encourage protein intake  -Na+ is low but stable, will continue to monitor for now.       Right great toe lesion  -Dr. Flores following, suspect chronic callus with remote trauma, concerning for osteomyelitis but   MRI right foot negative for osteo, shows ulceration of the distal phalanx of the great toe    Expected Discharge Location and Transportation: Rehab  Expected Discharge   Expected Discharge Date and Time     Expected Discharge Date Expected Discharge Time    Mar 30, 2023          DVT prophylaxis:  Medical and mechanical DVT prophylaxis orders are present.     AM-PAC 6 Clicks Score (PT): 20 (03/29/23 1331)    CODE STATUS:   Code Status and Medical Interventions:   Ordered at: 03/21/23 1235     Level Of Support Discussed With:    Patient     Code Status (Patient has no pulse and is not breathing):    CPR (Attempt to Resuscitate)     Medical Interventions (Patient has pulse or is breathing):    Full       Laine Barth, APRN  03/29/23

## 2023-03-29 NOTE — PROGRESS NOTES
[facility] Medicine Services  PROGRESS NOTE    Patient Name: Monty Nagel  : 1955  MRN: [id number]    Date of Admission: 3/11/2023  Primary Care Physician: Tiffany Morales MD    Subjective   Subjective     CC: Follow-up meningioma    HPI:  Pt sitting up in bed frustrated because he can not get up out of bed by himself. MRI of right foot showed no evidence of osteomyelitis or abscess. There is an ulceration of the distal phalanx of the great toe.     ROS:  Gen- No fevers, chills  CV- No chest pain, palpitations  Resp- No cough, dyspnea  GI- No N/V/D, abd pain        Objective   Objective     Vital Signs:   Temp:  [97.9 °F (36.6 °C)-98.8 °F (37.1 °C)] 97.9 °F (36.6 °C)  Heart Rate:  [] 92  Resp:  [16-20] 18  BP: (129-151)/() 151/104     Physical Exam:  Constitutional: Awake, alert, Chronically ill appearing  HENT: NCAT, mucous membranes moist  Respiratory: Clear to auscultation bilaterally, nonlabored respirations   Cardiovascular: RRR, no murmurs, rubs, or gallop  Gastrointestinal: Positive bowel sounds, soft, nontender, nondistended  Musculoskeletal: No bilateral ankle edema, right toe with dsg  Psychiatric: Flat ffect, cooperative  Neurologic: Oriented x 3, nonfocal.   Skin: No rashes      Results Reviewed:  LAB RESULTS:      Lab 23   WBC 14.02* 18.31*   HEMOGLOBIN 11.8* 12.7*   HEMATOCRIT 34.5* 36.9*   PLATELETS 215 169   NEUTROS ABS 12.89*  --    IMMATURE GRANS (ABS) 0.11*  --    LYMPHS ABS 0.41*  --    MONOS ABS 0.60  --    EOS ABS 0.00  --    MCV 86.9 87.6         Lab 23   SODIUM 135*  --  131*  --  130*   POTASSIUM 4.5  --  4.3  --  4.7   CHLORIDE 99  --  97*  --  94*   CO2 27.0  --  30.0*  --  28.0   ANION GAP 9.0  --  4.0*  --  8.0   BUN 25*  --  20  --  25*   CREATININE 0.71*  --  0.41*  --  0.64*   EGFR 99.3  --  117.2  --  102.5   GLUCOSE 119*  --  128*  --   121*   CALCIUM 7.7*  --  7.6*  --  7.5*   MAGNESIUM  --   --   --   --  2.0   PHOSPHORUS  --  2.1* 2.1* 1.9* 2.2*         Lab 03/24/23  0422 03/23/23  0412   TOTAL PROTEIN  --  4.8*   ALBUMIN 2.5* 2.8*   GLOBULIN  --  2.0   ALT (SGPT)  --  17   AST (SGOT)  --  17   BILIRUBIN  --  0.6   ALK PHOS  --  52                     Brief Urine Lab Results  (Last result in the past 365 days)      Color   Clarity   Blood   Leuk Est   Nitrite   Protein   CREAT   Urine HCG        03/11/23 1058 Dark Yellow   Clear   Negative   Negative   Negative   Negative                 Microbiology Results Abnormal     None          MRI Foot Right With & Without Contrast    Result Date: 3/28/2023  MRI FOOT RIGHT W WO CONTRAST Date of Exam: 3/28/2023 2:23 AM EDT Indication: Osteomyelitis, foot.  Open wound great toe  Comparison: None available. Technique:  Routine multiplanar/multisequence sequence images of the right foot were obtained before and after the uneventful administration of  19 mL Multihance.  Findings: There is an ulceration of the distal aspect of the great toe. However the bone marrow signal intensity appears normal at this time out finding of osteomyelitis. There is flexion of the interphalangeal joint of the great toe, second, third fourth and fifth digits. There is mild joint space narrowing first metatarsophalangeal joint and first interphalangeal joint. There are no osseous erosions. There are no joint effusions. Hallux sesamoid bones of normal size, position and signal intensity. There are  no localized fluid collections to suggest abscess. The flexor and extensor tendons are intact and normal in appearance. The intrinsic muscles of the foot are normal in appearance. No pathologic enhancement is identified. There are no intermetatarsal masses. There is moderate subtalar joint space narrowing and talonavicular joint space narrowing. There is a moderate sized os trigonum. Plantar fascia is intact.     Impression: Impression:  No findings of osteomyelitis or abscess. Ulceration distal phalanx of the great toe. Osteoarthritis as described. Electronically Signed: Jenni Andrew  3/28/2023 7:31 AM EDT  Workstation ID: [id number]      Results for orders placed during the hospital encounter of 23    Adult Transthoracic Echo Complete w/ Color, Spectral and Contrast if necessary per protocol    Interpretation Summary  •  Left ventricular systolic function is normal. Estimated left ventricular EF = 55%  •  Biatrial enlargement.  •  Calcified aortic valve with mild aortic stenosis, mean gradient 10 mmHg, BANG 1.6 cm².  •  Moderate aortic insufficiency.  •  Mild mitral regurgitation.  •  Mild tricuspid regurgitation with normal RVSP.  •  Mild dilation of the ascending aorta (4.2 cm) is present.      Current medications:  Scheduled Meds:atorvastatin, 40 mg, Oral, Nightly  dexamethasone, 4 mg, Oral, Q6H  enoxaparin, 40 mg, Subcutaneous, Daily  hydroCHLOROthiazide Oral, 12.5 mg, Oral, Daily  levETIRAcetam, 1,000 mg, Oral, Q12H  levothyroxine, 125 mcg, Oral, Daily  pantoprazole, 40 mg, Oral, Q AM  polyethylene glycol, 17 g, Oral, Daily  povidone-iodine, 1 application, Topical, Daily  QUEtiapine, 25 mg, Oral, Nightly  senna-docusate sodium, 2 tablet, Oral, BID  sodium chloride, 10 mL, Intravenous, Q12H  thiamine, 100 mg, Oral, Daily      Continuous Infusions:   PRN Meds:.•  acetaminophen **OR** acetaminophen **OR** acetaminophen  •  senna-docusate sodium **AND** polyethylene glycol **AND** bisacodyl **AND** bisacodyl  •  docusate sodium  •  magnesium hydroxide  •  Magnesium Standard Dose Replacement - Follow Nurse / BPA Driven Protocol  •  melatonin  •  [] HYDROmorphone **AND** naloxone  •  ondansetron **OR** ondansetron  •  Phosphorus Replacement - Follow Nurse / BPA Driven Protocol  •  Potassium Replacement - Follow Nurse / BPA Driven Protocol  •  sodium chloride  •  sodium chloride  •  sodium chloride    Assessment & Plan   Assessment & Plan      Active Hospital Problems    Diagnosis  POA   • **Meningioma (HCC) [D32.9]  Yes   • Rheumatoid vs Psoriatic arthritis (HCC) [L40.50]  Yes   • Atrial fibrillation (HCC) [I48.91]  Yes   • Other recurrent depressive disorders (HCC) [F33.8]  Yes   • 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        Brief Hospital Course to date:  Monty Nagel is a 67 y.o. male with history of hypertension, retinal melanoma and subsequent right eye blindness, previous alcohol use who was admitted for meningioma, gait instability and general weakness.  Brain MRI showed 6.4 enhancing mass and he was evaluated by neurosurgery.  He underwent right frontal craniotomy on 3/21 as well as right middle meningeal artery embolization with Dr. Mckee in anticipation for tumor resection.  He was transferred from ICU to medical floor on 9/22     Meningioma,'s s/p tumor embolization 3/20 by Dr. Sebastian and resection 3/21 by Dr. Mckee  Generalized weakness, frequent falls  -Continue ASA Plavix when okay with neurosurgery  -Continue Decadron and Keppra, per neurosurgery plan for steroid taper at discharge  -PT/OT following, plan for rehab  -He will follow-up with Dr. Mckee in 2 weeks post-discharge     Hypertension  -BP is much improved, ok to resume antihypertensives  -Patient is on lisinopril- HCTZ- now resumed.     Hyperlipidemia  -Continue statin     New A-fib  -Rates currently controlled  -Currently on subcu Lovenox     Hypothyroidism  History of thyroid ablation secondary to Graves' disease  - continue Synthroid     Former EtOH, heavy use  -Stopped drinking 9/22     Hyponatremia, not significant  -Monitor while on Keppra, encourage protein intake  -Na+ is low but stable, will continue to monitor for now.       Right great toe lesion  -Dr. Flores following,  suspect chronic callus with remote trauma, concerning for osteomyelitis but   MRI right foot negative for osteo, shows ulceration of the distal phalanx of the great toe    Expected Discharge Location and Transportation: Rehab  Expected Discharge   Expected Discharge Date and Time     Expected Discharge Date Expected Discharge Time    Mar 30, 2023          DVT prophylaxis:  Medical and mechanical DVT prophylaxis orders are present.     AM-PAC 6 Clicks Score (PT): 20 (03/28/23 1542)    CODE STATUS:   Code Status and Medical Interventions:   Ordered at: 03/21/23 1235     Level Of Support Discussed With:    Patient     Code Status (Patient has no pulse and is not breathing):    CPR (Attempt to Resuscitate)     Medical Interventions (Patient has pulse or is breathing):    Full       Laine Barth, APRN  03/29/23

## 2023-03-29 NOTE — THERAPY TREATMENT NOTE
Patient Name: Monty Nagel  : 1955    MRN: [id number]                              Today's Date: 3/29/2023       Admit Date: 3/11/2023    Visit Dx:     ICD-10-CM ICD-9-CM   1. Generalized weakness  R53.1 780.79   2. History of alcoholism (HCC)  F10.21 V11.3   3. Inability to walk  R26.2 719.7   4. Confusion  R41.0 298.9   5. Hypoglycemia  E16.2 251.2   6. Poor nutrition  E63.9 269.9   7. Cognitive communication deficit  R41.841 799.52   8. Impaired functional mobility, balance, gait, and endurance  Z74.09 V49.89   9. 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   • Postablative hypothyroidism   • Screen for colon cancer   • Other recurrent depressive disorders (HCC)   • Balance problem   • Generalized weakness   • Atrial fibrillation (HCC)   • Severe malnutrition (HCC)   • Meningioma (HCC)   • Rheumatoid vs Psoriatic arthritis (HCC)     Past Medical History:   Diagnosis Date   • Arthritis    • Hepatitis A    • Hypertension    • Melanoma (HCC) 2017    retina     Past Surgical History:   Procedure Laterality Date   • CRANIOTOMY FOR TUMOR N/A 3/21/2023    Procedure: CRANIOTOMY FOR TUMOR STEREOTACTIC WITH STEALTH;  Surgeon: Marlon Mckee MD;  Location: [facility] OR;  Service: Neurosurgery;  Laterality: N/A;   • EMBOLIZATION CEREBRAL N/A 3/20/2023    Procedure: Tumor Emoblization radial access;  Surgeon: Ozzy Sebastian MD;  Location: [facility] INVASIVE LOCATION;  Service: Interventional Radiology;  Laterality: N/A;   • EYE SURGERY  2017    EYE CANCER RIGHT EYE   • FINGER SURGERY Left     Pointer finger re-attached in 3rd Grade   • TONSILLECTOMY        General Information     Row Name 23 7939          Physical Therapy Time and Intention    Document Type therapy note (daily note)  -SC     Mode of Treatment physical therapy;individual therapy  -SC     Row Name 23 1314           General Information    Patient Profile Reviewed yes  -SC     Existing Precautions/Restrictions fall;other (see comments)  impulsive, L side weakness, R visusl defitit  -SC     Row Name 03/29/23 1315          Cognition    Orientation Status (Cognition) oriented x 4  -SC     Row Name 03/29/23 1315          Safety Issues, Functional Mobility    Impairments Affecting Function (Mobility) coordination;motor control;postural/trunk control;visual/perceptual  -SC     Cognitive Impairments, Mobility Safety/Performance impulsivity;insight into deficits/self-awareness;problem-solving/reasoning  -SC     Comment, Safety Issues/Impairments (Mobility) alert, sometimes does not follow commands, impulsive  -SC           User Key  (r) = Recorded By, (t) = Taken By, (c) = Cosigned By    Initials Name Provider Type    SC Valeria Gibson PT Physical Therapist               Mobility     Row Name 03/29/23 1319          Bed Mobility    Scooting/Bridging [place] (Bed Mobility) independent  -SC     Supine-Sit [place] (Bed Mobility) independent  -SC     Sit-Supine [place] (Bed Mobility) independent  -SC     Comment, (Bed Mobility) gets out of bed quickly,  -SC     Row Name 03/29/23 1319          Transfers    Comment, (Transfers) cues for hand placement. Leaves walker behind before sitting  -SC     Row Name 03/29/23 1319          Sit-Stand Transfer    Sit-Stand [place] (Transfers) contact guard;verbal cues  -SC     Assistive Device (Sit-Stand Transfers) walker, front-wheeled  -SC     Row Name 03/29/23 1319          Gait/Stairs (Locomotion)    [place] Level (Gait) contact guard;verbal cues;1 person assist  -SC     Assistive Device (Gait) walker, front-wheeled  -SC     Distance in Feet (Gait) 900  -SC     Deviations/Abnormal Patterns (Gait) left sided deviations;stride length decreased;weight shifting decreased  -SC     Bilateral Gait Deviations forward flexed posture  -SC     Comment, (Gait/Stairs) Patient  demonstrated very quick ambulaiton on [place]. PT giving cues to slow down and stay closer to walker--no change noted with directions. Again PT requested patient to stop and rest but he refused. PT requested to end ambulation after 600 feet ,as L leg notedly fatiguing--- patient again refusing to do so.   at this point  -SC           User Key  (r) = Recorded By, (t) = Taken By, (c) = Cosigned By    Initials Name Provider Type    SC Valeria Gibson, PT Physical Therapist               Obj/Interventions     [facility] Name 03/29/23 1325          Motor Skills    Therapeutic Exercise --  exercise deferred due to high HR  -[facility] Name 03/29/23 1325          Balance    Dynamic Standing Balance 1-person assist;contact guard  -SC     Position/Device Used, Standing Balance walker, rolling;supported  -SC     Comment, Balance impulsive and does not listen to PT's recommendations to rest  -SC           User Key  (r) = Recorded By, (t) = Taken By, (c) = Cosigned By    Initials Name Provider Type    SC Valeria Gibson, PT Physical Therapist               Goals/Plan    No documentation.                Clinical Impression     [facility] Name 03/29/23 1327          Pain    Pretreatment Pain Rating 0/10 - no pain  -SC     Posttreatment Pain Rating 0/10 - no pain  -SC     Row Name 03/29/23 1327          Plan of Care Review    Plan of Care Reviewed With patient  -SC     Progress improving  -SC     Outcome Evaluation Patient continues to demonstrated impulsive behavior during gait training. Recommend setting some guidelines prior to working with him.  -[facility] Name 03/29/23 1327          Therapy Assessment/Plan (PT)    Rehab Potential (PT) good, to achieve stated therapy goals  -SC     Criteria for Skilled Interventions Met (PT) yes;skilled treatment is necessary;meets criteria  -SC     Therapy Frequency (PT) daily  -SC     Row Name 03/29/23 1327          Vital Signs    Intratreatment Heart Rate (beats/min) 135  afib  -SC     Posttreatment  Heart Rate (beats/min) 113  afib  -SC     Row Name 03/29/23 1327          Positioning and Restraints    Pre-Treatment Position in bed  -SC     Post Treatment Position bed  -SC     In Bed notified nsg;supine;call light within reach;encouraged to call for assist;exit alarm on  -SC           User Key  (r) = Recorded By, (t) = Taken By, (c) = Cosigned By    Initials Name Provider Type    SC Valeria Gibson PT Physical Therapist               Outcome Measures     Row Name 03/29/23 1331 03/29/23 0915       How much help from another person do you currently need...    Turning from your back to your side while in flat bed without using bedrails? 4  -SC 4  -MW    Moving from lying on back to sitting on the side of a flat bed without bedrails? 4  -SC 4  -MW    Moving to and from a bed to a chair (including a wheelchair)? 3  -SC 3  -MW    Standing up from a chair using your arms (e.g., wheelchair, bedside chair)? 3  -SC 3  -MW    Climbing 3-5 steps with a railing? 3  -SC 3  -MW    To walk in hospital room? 3  -SC 3  -MW    AM-PAC 6 Clicks Score (PT) 20  -SC 20  -MW    Highest level of mobility 6 --> Walked 10 steps or more  -SC 6 --> Walked 10 steps or more  -MW    Row Name 03/29/23 1331          Functional Assessment    Outcome Measure Options AM-PAC 6 Clicks Basic Mobility (PT)  -SC           User Key  (r) = Recorded By, (t) = Taken By, (c) = Cosigned By    Initials Name Provider Type    SC Valeria Gibson PT Physical Therapist    Jania Orlando, RN Registered Nurse                             Physical Therapy Education     Title: PT OT SLP Therapies (In Progress)     Topic: Physical Therapy (In Progress)     Point: Mobility training (In Progress)     Learning Progress Summary           Patient Acceptance, E, NR by SC at 3/29/2023 1333    Comment: reviewed safety with mobility    Acceptance, E,D, VU by LR at 3/28/2023 1542    Comment: Educated on safety with mobility, correct supine<->sit t/f technique, correct  sit<->stand t/f technique, correct gait mechanics, LE HEP, benefits of mobility, and progression of POC.    Eager, E, VU,DU,NR by SS at 3/27/2023 1541    Comment: Reviewed safety/technique w/bed mobility, transfers, ambulation, HEP, safety awareness/recommendations    Acceptance, E, NR by AE at 3/24/2023 1520    Acceptance, E,D, VU,NR by HP at 3/23/2023 1127    Acceptance, E,TB, NR by AY at 3/22/2023 1141    Acceptance, E, VU,NR by CM at 3/19/2023 1149    Acceptance, E, VU by CM at 3/17/2023 1517    Acceptance, E, VU,NR by LH at 3/15/2023 1407    Acceptance, E, NR by KG at 3/14/2023 1428    Acceptance, E, VU,NR by SJ at 3/12/2023 1542                   Point: Home exercise program (In Progress)     Learning Progress Summary           Patient Acceptance, E, NR by SC at 3/29/2023 1333    Comment: reviewed safety with mobility    Acceptance, E,D, VU by LR at 3/28/2023 1542    Comment: Educated on safety with mobility, correct supine<->sit t/f technique, correct sit<->stand t/f technique, correct gait mechanics, LE HEP, benefits of mobility, and progression of POC.    Eager, E, VU,DU,NR by SS at 3/27/2023 1541    Comment: Reviewed safety/technique w/bed mobility, transfers, ambulation, HEP, safety awareness/recommendations    Acceptance, E, NR by AE at 3/24/2023 1520    Acceptance, E,D, VU,NR by HP at 3/23/2023 1127    Acceptance, E,TB, NR by AY at 3/22/2023 1141    Acceptance, E, VU,NR by CM at 3/19/2023 1149    Acceptance, E, VU,NR by LH at 3/15/2023 1407    Acceptance, E, NR by KG at 3/14/2023 1428                   Point: Body mechanics (In Progress)     Learning Progress Summary           Patient Acceptance, E, NR by SC at 3/29/2023 1333    Comment: reviewed safety with mobility    Acceptance, E,D, VU by LR at 3/28/2023 1542    Comment: Educated on safety with mobility, correct supine<->sit t/f technique, correct sit<->stand t/f technique, correct gait mechanics, LE HEP, benefits of mobility, and progression of  POC.    Eager, E, VU,DU,NR by SS at 3/27/2023 1541    Comment: Reviewed safety/technique w/bed mobility, transfers, ambulation, HEP, safety awareness/recommendations    Acceptance, E, NR by AE at 3/24/2023 1520    Acceptance, E,D, VU,NR by HP at 3/23/2023 1127    Acceptance, E,TB, NR by AY at 3/22/2023 1141    Acceptance, E, VU,NR by CM at 3/19/2023 1149    Acceptance, E, VU by CM at 3/17/2023 1517    Acceptance, E, VU,NR by LH at 3/15/2023 1407    Acceptance, E, NR by KG at 3/14/2023 1428    Acceptance, E, VU,NR by SJ at 3/12/2023 1542                   Point: Precautions (In Progress)     Learning Progress Summary           Patient Acceptance, E, NR by SC at 3/29/2023 1333    Comment: reviewed safety with mobility    Acceptance, E,D, VU by LR at 3/28/2023 1542    Comment: Educated on safety with mobility, correct supine<->sit t/f technique, correct sit<->stand t/f technique, correct gait mechanics, LE HEP, benefits of mobility, and progression of POC.    Eager, E, VU,DU,NR by SS at 3/27/2023 1541    Comment: Reviewed safety/technique w/bed mobility, transfers, ambulation, HEP, safety awareness/recommendations    Acceptance, E, NR by AE at 3/24/2023 1520    Acceptance, E,D, VU,NR by HP at 3/23/2023 1127    Acceptance, E,TB, NR by AY at 3/22/2023 1141    Acceptance, E, VU,NR by CM at 3/19/2023 1149    Acceptance, E, VU by CM at 3/17/2023 1517    Acceptance, E, VU,NR by LH at 3/15/2023 1407    Acceptance, E, NR by KG at 3/14/2023 1428    Acceptance, E, VU,NR by SJ at 3/12/2023 1542                               User Key     Initials Effective Dates Name Provider Type Discipline    SC 02/03/23 -  Valeria Gibson PT Physical Therapist PT    SJ 02/03/23 - 03/12/23 Samara Harrison, PT Physical Therapist PT    LR 02/03/23 -  Gabriela Smith, PT Physical Therapist PT    KG 05/22/20 -  Cherelle Gaitan, PT Physical Therapist PT    AY 11/10/20 -  Sandee Spencer, PT Physical Therapist PT    LH 09/21/21 -   Tristian Ravi, PT Physical Therapist PT    HP 06/01/21 -  Kelly Mckeon, PT Physical Therapist PT    SS 06/01/21 -  Hyacinth King, PT Physical Therapist PT    AE 09/21/21 -  Andrew Centeno, PT Physical Therapist PT    CM 09/22/22 -  Yanet Calvin, PT Physical Therapist PT              PT Recommendation and Plan     Plan of Care Reviewed With: patient  Progress: improving  Outcome Evaluation: Patient continues to demonstrated impulsive behavior during gait training. Recommend setting some guidelines prior to working with him.     Time Calculation:    PT Charges     Row Name 03/29/23 1141             Time Calculation    Start Time 1141  -SC      PT Received On 03/29/23  -SC      PT Goal Re-Cert Due Date 04/01/23  -SC         Time Calculation- PT    Total Timed Code Minutes- PT 18 minute(s)  -SC         Timed Charges    51756 - Gait Training Minutes  18  -SC         Total Minutes    Timed Charges Total Minutes 18  -SC       Total Minutes 18  -SC            User Key  (r) = Recorded By, (t) = Taken By, (c) = Cosigned By    Initials Name Provider Type    SC Valeria Gibson, PT Physical Therapist              Therapy Charges for Today     Code Description Service Date Service Provider Modifiers Qty    57025458356 HC GAIT TRAINING EA 15 MIN 3/29/2023 Valeria Gibson PT GP 1          PT G-Codes  Outcome Measure Options: AM-PAC 6 Clicks Basic Mobility (PT)  AM-PAC 6 Clicks Score (PT): 20  AM-PAC 6 Clicks Score (OT): 15       Shearon AJerel Gibson PT  3/29/2023

## 2023-03-30 PROCEDURE — 97530 THERAPEUTIC ACTIVITIES: CPT

## 2023-03-30 PROCEDURE — 25010000002 ENOXAPARIN PER 10 MG: Performed by: NEUROLOGICAL SURGERY

## 2023-03-30 PROCEDURE — 97535 SELF CARE MNGMENT TRAINING: CPT

## 2023-03-30 PROCEDURE — 63710000001 DEXAMETHASONE PER 0.25 MG: Performed by: NEUROLOGICAL SURGERY

## 2023-03-30 PROCEDURE — 99231 SBSQ HOSP IP/OBS SF/LOW 25: CPT | Performed by: NURSE PRACTITIONER

## 2023-03-30 RX ORDER — CALCIUM CARBONATE 200(500)MG
2 TABLET,CHEWABLE ORAL 3 TIMES DAILY PRN
Status: DISCONTINUED | OUTPATIENT
Start: 2023-03-30 | End: 2023-04-03 | Stop reason: HOSPADM

## 2023-03-30 RX ADMIN — DEXAMETHASONE 4 MG: 4 TABLET ORAL at 18:17

## 2023-03-30 RX ADMIN — CLOPIDOGREL BISULFATE 75 MG: 75 TABLET ORAL at 08:48

## 2023-03-30 RX ADMIN — Medication 5 MG: at 20:08

## 2023-03-30 RX ADMIN — DEXAMETHASONE 4 MG: 4 TABLET ORAL at 05:16

## 2023-03-30 RX ADMIN — LEVETIRACETAM 1000 MG: 500 TABLET, FILM COATED ORAL at 20:08

## 2023-03-30 RX ADMIN — THIAMINE HCL TAB 100 MG 100 MG: 100 TAB at 08:48

## 2023-03-30 RX ADMIN — DEXAMETHASONE 4 MG: 4 TABLET ORAL at 23:23

## 2023-03-30 RX ADMIN — LEVOTHYROXINE SODIUM 125 MCG: 125 TABLET ORAL at 05:16

## 2023-03-30 RX ADMIN — HYDROCHLOROTHIAZIDE 12.5 MG: 12.5 CAPSULE ORAL at 08:48

## 2023-03-30 RX ADMIN — ATORVASTATIN CALCIUM 40 MG: 40 TABLET, FILM COATED ORAL at 20:08

## 2023-03-30 RX ADMIN — PANTOPRAZOLE SODIUM 40 MG: 40 TABLET, DELAYED RELEASE ORAL at 05:16

## 2023-03-30 RX ADMIN — LEVETIRACETAM 1000 MG: 500 TABLET, FILM COATED ORAL at 08:48

## 2023-03-30 RX ADMIN — ENOXAPARIN SODIUM 40 MG: 40 INJECTION SUBCUTANEOUS at 08:48

## 2023-03-30 RX ADMIN — ANTACID TABLETS 2 TABLET: 500 TABLET, CHEWABLE ORAL at 02:35

## 2023-03-30 RX ADMIN — ASPIRIN 81 MG: 81 TABLET, COATED ORAL at 08:48

## 2023-03-30 RX ADMIN — Medication 10 ML: at 08:50

## 2023-03-30 RX ADMIN — LISINOPRIL 10 MG: 10 TABLET ORAL at 08:48

## 2023-03-30 RX ADMIN — DEXAMETHASONE 4 MG: 4 TABLET ORAL at 01:12

## 2023-03-30 RX ADMIN — POVIDONE-IODINE 1 EACH: 10 SOLUTION TOPICAL at 11:13

## 2023-03-30 RX ADMIN — DEXAMETHASONE 4 MG: 4 TABLET ORAL at 11:13

## 2023-03-30 NOTE — PLAN OF CARE
Goal Outcome Evaluation:  Plan of Care Reviewed With: patient        Progress: improving  Outcome Evaluation: Pt is making functional improvements but requires CGA and v/c for safety and sequencing d/t impulsiveness. Pt demonstrating good efforts w/ ADLs this session but continues to present below baseline warranting continuation of skilled IP OT services. Continue to recommend inpatient rehab for best functional outcome.

## 2023-03-30 NOTE — PROGRESS NOTES
[facility] Medicine Services  PROGRESS NOTE    Patient Name: Monty Nagel  : 1955  MRN: [id number]    Date of Admission: 3/11/2023  Primary Care Physician: Tiffany Morales MD    Subjective   Subjective     CC: Follow-up meningioma    HPI:  Pt again ate 100% of his breakfast. He denies any issues today.  Planning to go to [facility] pending insurance pre-[facility].    Copied text in this note has been reviewed and is accurate as of 23.       ROS:  Gen- No fevers, chills  CV- No chest pain, palpitations  Resp- No cough, dyspnea  GI- No N/V/D, abd pain      Objective   Objective     Vital Signs:   Temp:  [97.9 °F (36.6 °C)-98.2 °F (36.8 °C)] 97.9 °F (36.6 °C)  Heart Rate:  [] 88  Resp:  [18] 18  BP: (125-150)/() 150/104     Physical Exam:  Constitutional: Awake, alert, Chronically ill appearing  HENT: NCAT, mucous membranes moist  Respiratory: Clear to auscultation bilaterally, nonlabored respirations   Cardiovascular: RRR, no murmurs, rubs, or gallop  Gastrointestinal: Positive bowel sounds, soft, nontender, nondistended  Musculoskeletal: No bilateral ankle edema, dsg to right great toe.   Psychiatric: Flat affect, cooperative  Neurologic: Oriented x 3, nonfocal, speech clear  Skin: No rashes        Results Reviewed:  LAB RESULTS:      Lab 23   WBC 14.02*   HEMOGLOBIN 11.8*   HEMATOCRIT 34.5*   PLATELETS 215   NEUTROS ABS 12.89*   IMMATURE GRANS (ABS) 0.11*   LYMPHS ABS 0.41*   MONOS ABS 0.60   EOS ABS 0.00   MCV 86.9         Lab 23  0415 23  1823 23  0422 23  1839   SODIUM 135*  --  131*  --    POTASSIUM 4.5  --  4.3  --    CHLORIDE 99  --  97*  --    CO2 27.0  --  30.0*  --    ANION GAP 9.0  --  4.0*  --    BUN 25*  --  20  --    CREATININE 0.71*  --  0.41*  --    EGFR 99.3  --  117.2  --    GLUCOSE 119*  --  128*  --    CALCIUM 7.7*  --  7.6*  --    PHOSPHORUS  --  2.1* 2.1* 1.9*         Lab 23  0422   ALBUMIN 2.5*                      Brief Urine Lab Results  (Last result in the past 365 days)      Color   Clarity   Blood   Leuk Est   Nitrite   Protein   CREAT   Urine HCG        23 1058 Dark Yellow   Clear   Negative   Negative   Negative   Negative                 Microbiology Results Abnormal     None          No radiology results from the last 24 hrs    Results for orders placed during the hospital encounter of 23    Adult Transthoracic Echo Complete w/ Color, Spectral and Contrast if necessary per protocol    Interpretation Summary  •  Left ventricular systolic function is normal. Estimated left ventricular EF = 55%  •  Biatrial enlargement.  •  Calcified aortic valve with mild aortic stenosis, mean gradient 10 mmHg, BANG 1.6 cm².  •  Moderate aortic insufficiency.  •  Mild mitral regurgitation.  •  Mild tricuspid regurgitation with normal RVSP.  •  Mild dilation of the ascending aorta (4.2 cm) is present.      Current medications:  Scheduled Meds:aspirin, 81 mg, Oral, Daily  atorvastatin, 40 mg, Oral, Nightly  clopidogrel, 75 mg, Oral, Daily  dexamethasone, 4 mg, Oral, Q6H  enoxaparin, 40 mg, Subcutaneous, Daily  hydroCHLOROthiazide Oral, 12.5 mg, Oral, Daily  levETIRAcetam, 1,000 mg, Oral, Q12H  levothyroxine, 125 mcg, Oral, Daily  lisinopril, 10 mg, Oral, Q24H  pantoprazole, 40 mg, Oral, Q AM  polyethylene glycol, 17 g, Oral, Daily  povidone-iodine, 1 application, Topical, Daily  senna-docusate sodium, 2 tablet, Oral, BID  sodium chloride, 10 mL, Intravenous, Q12H  thiamine, 100 mg, Oral, Daily      Continuous Infusions:   PRN Meds:.•  acetaminophen **OR** acetaminophen **OR** acetaminophen  •  senna-docusate sodium **AND** polyethylene glycol **AND** bisacodyl **AND** bisacodyl  •  calcium carbonate  •  docusate sodium  •  magnesium hydroxide  •  Magnesium Standard Dose Replacement - Follow Nurse / BPA Driven Protocol  •  melatonin  •  [] HYDROmorphone **AND** naloxone  •  ondansetron **OR**  ondansetron  •  Phosphorus Replacement - Follow Nurse / BPA Driven Protocol  •  Potassium Replacement - Follow Nurse / BPA Driven Protocol  •  sodium chloride  •  sodium chloride  •  sodium chloride    Assessment & Plan   Assessment & Plan     Active Hospital Problems    Diagnosis  POA   • **Meningioma (HCC) [D32.9]  Yes   • Rheumatoid vs Psoriatic arthritis (HCC) [L40.50]  Yes   • Atrial fibrillation (HCC) [I48.91]  Yes   • Other recurrent depressive disorders (HCC) [F33.8]  Yes   • 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        Brief Hospital Course to date:  Monty Nagel is a 67 y.o. male with history of hypertension, retinal melanoma and subsequent right eye blindness, previous alcohol use who was admitted for meningioma, gait instability and general weakness.  Brain MRI showed 6.4 enhancing mass and he was evaluated by neurosurgery.  He underwent right frontal craniotomy on 3/21 as well as right middle meningeal artery embolization with Dr. Mckee in anticipation for tumor resection.  He was transferred from ICU to medical floor on 9/22     Meningioma,'s s/p tumor embolization 3/20 by Dr. Sebastian and resection 3/21 by Dr. Mckee  Generalized weakness, frequent falls  -Continue ASA Plavix when okay with neurosurgery  -Continue Decadron and Keppra, per neurosurgery plan for steroid taper at discharge  -PT/OT following, plan for rehab  -He will follow-up with Dr. Mckee in 2 weeks post-discharge     Hypertension  -BP is much improved, ok to resume antihypertensives  -Patient is on lisinopril- HCTZ- now resumed.     Hyperlipidemia  -Continue statin     New A-fib  -Rates currently controlled  -Currently on subcu Lovenox     Hypothyroidism  History of thyroid ablation secondary to Graves' disease  - continue  Synthroid     Former EtOH, heavy use  -Stopped drinking 9/22     Hyponatremia, not significant  -Monitor while on Keppra, encourage protein intake  -Na+ is low but stable, will continue to monitor for now.       Right great toe lesion  -Dr. Mark ferro, suspect chronic callus with remote trauma, concerning for osteomyelitis but   MRI right foot negative for osteo, shows ulceration of the distal phalanx of the great toe    Expected Discharge Location and Transportation: Rehab  Expected Discharge 03/31/2023     DVT prophylaxis:  Medical and mechanical DVT prophylaxis orders are present.     AM-PAC 6 Clicks Score (PT): 20 (03/29/23 1331)    CODE STATUS:   Code Status and Medical Interventions:   Ordered at: 03/21/23 1235     Level Of Support Discussed With:    Patient     Code Status (Patient has no pulse and is not breathing):    CPR (Attempt to Resuscitate)     Medical Interventions (Patient has pulse or is breathing):    Full       Laine Barth, APRN  03/30/23

## 2023-03-30 NOTE — THERAPY TREATMENT NOTE
Patient Name: Monty Nagel  : 1955    MRN: [id number]                              Today's Date: 3/30/2023       Admit Date: 3/11/2023    Visit Dx:     ICD-10-CM ICD-9-CM   1. Generalized weakness  R53.1 780.79   2. History of alcoholism (HCC)  F10.21 V11.3   3. Inability to walk  R26.2 719.7   4. Confusion  R41.0 298.9   5. Hypoglycemia  E16.2 251.2   6. Poor nutrition  E63.9 269.9   7. Cognitive communication deficit  R41.841 799.52   8. Impaired functional mobility, balance, gait, and endurance  Z74.09 V49.89   9. 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   • Postablative hypothyroidism   • Screen for colon cancer   • Other recurrent depressive disorders (HCC)   • Balance problem   • Generalized weakness   • Atrial fibrillation (HCC)   • Severe malnutrition (HCC)   • Meningioma (HCC)   • Rheumatoid vs Psoriatic arthritis (HCC)     Past Medical History:   Diagnosis Date   • Arthritis    • Hepatitis A    • Hypertension    • Melanoma (HCC) 2017    retina     Past Surgical History:   Procedure Laterality Date   • CRANIOTOMY FOR TUMOR N/A 3/21/2023    Procedure: CRANIOTOMY FOR TUMOR STEREOTACTIC WITH STEALTH;  Surgeon: Marlon Mckee MD;  Location: [facility] OR;  Service: Neurosurgery;  Laterality: N/A;   • EMBOLIZATION CEREBRAL N/A 3/20/2023    Procedure: Tumor Emoblization radial access;  Surgeon: Ozzy Sebastian MD;  Location: [facility] INVASIVE LOCATION;  Service: Interventional Radiology;  Laterality: N/A;   • EYE SURGERY  2017    EYE CANCER RIGHT EYE   • FINGER SURGERY Left     Pointer finger re-attached in 3rd Grade   • TONSILLECTOMY        General Information     Row Name 23 1344          OT Time and Intention    Document Type therapy note (daily note)  -MR     Mode of Treatment occupational therapy  -MR     Row Name 23 1344          General Information     Patient Profile Reviewed yes  -MR     Existing Precautions/Restrictions fall;other (see comments)  impulsive, L side weakness, R visual deficit  -MR     Barriers to Rehab medically complex;cognitive status;visual deficit  -MR     Row Name 03/30/23 1344          Cognition    Orientation Status (Cognition) oriented x 4  -MR     Row Name 03/30/23 1344          Safety Issues, Functional Mobility    Safety Issues Affecting Function (Mobility) safety precaution awareness;safety precautions follow-through/compliance  -MR     Impairments Affecting Function (Mobility) coordination;motor control;postural/trunk control;visual/perceptual  -MR     Cognitive Impairments, Mobility Safety/Performance impulsivity;insight into deficits/self-awareness;problem-solving/reasoning;safety precaution awareness  -MR           User Key  (r) = Recorded By, (t) = Taken By, (c) = Cosigned By    Initials Name Provider Type     Amanda Vega, OT Occupational Therapist                 Mobility/ADL's     Row Name 03/30/23 1340          Bed Mobility    Bed Mobility sit-supine;supine-sit  -MR     Supine-Sit Bath (Bed Mobility) independent  -MR     Sit-Supine Bath (Bed Mobility) independent  -MR     Assistive Device (Bed Mobility) head of bed elevated;bed rails  -MR     Row Name 03/30/23 1345          Transfers    Transfers sit-stand transfer;toilet transfer  -MR     Row Name 03/30/23 1345          Sit-Stand Transfer    Sit-Stand Bath (Transfers) contact guard;verbal cues  -MR     Assistive Device (Sit-Stand Transfers) walker, front-wheeled  -MR     Comment, (Sit-Stand Transfer) v/c for hand placement, sequencing and safety  -MR     Row Name 03/30/23 1342          Toilet Transfer    Type (Toilet Transfer) sit-stand;stand-sit  -MR     Bath Level (Toilet Transfer) contact guard;nonverbal cues (demo/gesture);verbal cues  -MR     Assistive Device (Toilet Transfer) walker, front-wheeled;grab bars/safety frame  -MR     Row  Name 03/30/23 1345          Functional Mobility    Functional Mobility- Ind. Level contact guard assist  -MR     Functional Mobility- Device walker, front-wheeled  -MR     Functional Mobility-Distance (Feet) --  > HH distances  -MR     Row Name 03/30/23 1345          Activities of Daily Living    BADL Assessment/Intervention upper body dressing;grooming;toileting  -MR     Row Name 03/30/23 1345          Grooming Assessment/Training    [place] Level (Grooming) wash face, hands;set up;hair care, combing/brushing;moderate assist (50% patient effort)  -MR     Position (Grooming) edge of bed sitting  -MR     Row Name 03/30/23 1345          Upper Body Dressing Assessment/Training    [place] Level (Upper Body Dressing) don;doff;other (see comments)  hosptial gown  -MR     Position (Upper Body Dressing) edge of bed sitting  -MR     Row Name 03/30/23 1345          Toileting Assessment/Training    [place] Level (Toileting) adjust/manage clothing;contact guard assist;perform perineal hygiene;moderate assist (50% patient effort)  -MR     Position (Toileting) supported sitting;supported standing  -MR           User Key  (r) = Recorded By, (t) = Taken By, (c) = Cosigned By    Initials Name Provider Type    MR Amanda Vega, OT Occupational Therapist               Obj/Interventions     Row Name 03/30/23 1347          Balance    Balance Assessment sitting static balance;sitting dynamic balance;standing static balance;standing dynamic balance  -MR     Static Sitting Balance independent  -MR     Dynamic Sitting Balance independent  -MR     Position, Sitting Balance unsupported;sitting edge of bed  -MR     Static Standing Balance contact guard  -MR     Dynamic Standing Balance contact guard  -MR     Position/Device Used, Standing Balance supported;walker, front-wheeled  -MR     Balance Interventions sitting;standing;sit to stand;supported;dynamic;static;occupation based/functional task  -MR     Comment, Balance No  overt LOB noted w/ mobility. Pt requiring v/c for safety and sequencing at times.  -MR           User Key  (r) = Recorded By, (t) = Taken By, (c) = Cosigned By    Initials Name Provider Type     Amanda Vega, OT Occupational Therapist               Goals/Plan    No documentation.                Clinical Impression     Row Name 03/30/23 135          Pain Assessment    Pretreatment Pain Rating 0/10 - no pain  -MR     Posttreatment Pain Rating 0/10 - no pain  -MR     Pain Intervention(s) Ambulation/increased activity;Repositioned  -MR     Row Name 03/30/23 [facility]          Plan of Care Review    Plan of Care Reviewed With patient  -MR     Progress improving  -MR     Outcome Evaluation Pt is making functional improvements but requires CGA and v/c for safety and sequencing d/t impulsiveness. Pt demonstrating good efforts w/ ADLs this session but continues to present below baseline warranting continuation of skilled IP OT services. Continue to recommend inpatient rehab for best functional outcome.  -     Row Name 03/30/23 910          Therapy Plan Review/Discharge Plan (OT)    Anticipated Discharge Disposition (OT) inpatient rehabilitation facility  -MR     Row Name 03/30/23 1351          Vital Signs    Pre Systolic BP Rehab 150  -MR     Pre Treatment Diastolic   -MR     Post Systolic BP Rehab 144  -MR     Post Treatment Diastolic   -MR     Pretreatment Heart Rate (beats/min) 100  -MR     Intratreatment Heart Rate (beats/min) 130  -MR     Posttreatment Heart Rate (beats/min) 92  -MR     O2 Delivery Pre Treatment room air  -MR     O2 Delivery Intra Treatment room air  -MR     O2 Delivery Post Treatment room air  -MR     Pre Patient Position Supine  -MR     Intra Patient Position Standing  -MR     Post Patient Position Supine  -MR     Row Name 03/30/23 9546          Positioning and Restraints    Pre-Treatment Position in bed  -MR     Post Treatment Position bed  -MR     In Bed side rails up x2;fowlers;with  nsg;with other staff  -MR           User Key  (r) = Recorded By, (t) = Taken By, (c) = Cosigned By    Initials Name Provider Type    Amanda Nielson OT Occupational Therapist               Outcome Measures     Row Name 03/30/23 1353          How much help from another is currently needed...    Putting on and taking off regular lower body clothing? 2  -MR     Bathing (including washing, rinsing, and drying) 2  -MR     Toileting (which includes using toilet bed pan or urinal) 3  -MR     Putting on and taking off regular upper body clothing 3  -MR     Taking care of personal grooming (such as brushing teeth) 3  -MR     Eating meals 4  -MR     AM-PAC 6 Clicks Score (OT) 17  -MR     Row Name 03/30/23 0848          How much help from another person do you currently need...    Turning from your back to your side while in flat bed without using bedrails? 4  -KB     Moving from lying on back to sitting on the side of a flat bed without bedrails? 4  -KB     Moving to and from a bed to a chair (including a wheelchair)? 3  -KB     Standing up from a chair using your arms (e.g., wheelchair, bedside chair)? 3  -KB     Climbing 3-5 steps with a railing? 3  -KB     To walk in hospital room? 3  -KB     AM-PAC 6 Clicks Score (PT) 20  -KB     Highest level of mobility 6 --> Walked 10 steps or more  -KB     Row Name 03/30/23 1353          Functional Assessment    Outcome Measure Options AM-PAC 6 Clicks Daily Activity (OT)  -MR           User Key  (r) = Recorded By, (t) = Taken By, (c) = Cosigned By    Initials Name Provider Type    Kayce Flores RN Registered Nurse    Amanda Nielson, LUCERO Occupational Therapist                Occupational Therapy Education     Title: PT OT SLP Therapies (In Progress)     Topic: Occupational Therapy (In Progress)     Point: ADL training (In Progress)     Description:   Instruct learner(s) on proper safety adaptation and remediation techniques during self care or transfers.   Instruct in  proper use of assistive devices.              Learning Progress Summary           Patient Acceptance, E, NR by MR at 3/30/2023 1353    Acceptance, E, NR by MR at 3/28/2023 1315    Acceptance, E, NR by  at 3/22/2023 1108    Acceptance, E, VU,NR by HOPE at 3/13/2023 1156    Comment: reason for consult, noted deficits, walker safety, concern over home return                   Point: Home exercise program (Not Started)     Description:   Instruct learner(s) on appropriate technique for monitoring, assisting and/or progressing therapeutic exercises/activities.              Learner Progress:  Not documented in this visit.          Point: Precautions (In Progress)     Description:   Instruct learner(s) on prescribed precautions during self-care and functional transfers.              Learning Progress Summary           Patient Acceptance, E, NR by MR at 3/30/2023 1353    Acceptance, E, NR by MR at 3/28/2023 1315    Acceptance, E, NR by CS at 3/22/2023 1108    Acceptance, E, VU,NR by HOPE at 3/13/2023 1156    Comment: reason for consult, noted deficits, walker safety, concern over home return                   Point: Body mechanics (In Progress)     Description:   Instruct learner(s) on proper positioning and spine alignment during self-care, functional mobility activities and/or exercises.              Learning Progress Summary           Patient Acceptance, E, NR by MR at 3/30/2023 1353    Acceptance, E, NR by MR at 3/28/2023 1315    Acceptance, E, NR by  at 3/22/2023 1108                               User Key     Initials Effective Dates Name Provider Type Discipline     02/03/23 -  Monae Cohen, OT Occupational Therapist OT     09/02/21 -  Phyllis Bowen, OT Occupational Therapist OT     09/22/22 -  Amanda Vega, OT Occupational Therapist OT              OT Recommendation and Plan     Plan of Care Review  Plan of Care Reviewed With: patient  Progress: improving  Outcome Evaluation: Pt is making functional  improvements but requires CGA and v/c for safety and sequencing d/t impulsiveness. Pt demonstrating good efforts w/ ADLs this session but continues to present below baseline warranting continuation of skilled IP OT services. Continue to recommend inpatient rehab for best functional outcome.     Time Calculation:    Time Calculation- OT     Row Name 03/30/23 1354             Time Calculation- OT    OT Start Time 1027  -MR      OT Received On 03/30/23  -MR         Timed Charges    18065 - OT Therapeutic Activity Minutes 33  -MR      96746 - OT Self Care/Mgmt Minutes 15  -MR         Total Minutes    Timed Charges Total Minutes 48  -MR       Total Minutes 48  -MR            User Key  (r) = Recorded By, (t) = Taken By, (c) = Cosigned By    Initials Name Provider Type     Amanda Vega OT Occupational Therapist              Therapy Charges for Today     Code Description Service Date Service Provider Modifiers Qty    79393376247  OT THERAPEUTIC ACT EA 15 MIN 3/30/2023 Amanda Vega OT GO 2    75364480425 HC OT SELF CARE/MGMT/TRAIN EA 15 MIN 3/30/2023 Amanda Vega OT GO 1               Amanda Vega OT  3/30/2023

## 2023-03-30 NOTE — CASE MANAGEMENT/SOCIAL WORK
Continued Stay Note   [facility]     Patient Name: Monty Nagel  MRN: [id number]  Today's Date: 3/30/2023    Admit Date: 3/11/2023    Plan: [facility]   Discharge Plan     Row Name 03/30/23 0926       Plan    Plan [facility]    Patient/Family in Agreement with Plan yes    Plan Comments Spoke with patient at bedside. Plan is [facility] pending inusrance precert, which was started yesterday. CM continue to follow.    Final Discharge Disposition Code 03 - skilled nursing facility (SNF)               Discharge Codes    No documentation.               Expected Discharge Date and Time     Expected Discharge Date Expected Discharge Time    Mar 30, 2023             Krzysztof Caceres RN

## 2023-03-31 LAB
QT INTERVAL: 386 MS
QTC INTERVAL: 425 MS

## 2023-03-31 PROCEDURE — 25010000002 ENOXAPARIN PER 10 MG: Performed by: NEUROLOGICAL SURGERY

## 2023-03-31 PROCEDURE — 99232 SBSQ HOSP IP/OBS MODERATE 35: CPT | Performed by: INTERNAL MEDICINE

## 2023-03-31 PROCEDURE — 63710000001 DEXAMETHASONE PER 0.25 MG: Performed by: NEUROLOGICAL SURGERY

## 2023-03-31 PROCEDURE — 93005 ELECTROCARDIOGRAM TRACING: CPT | Performed by: INTERNAL MEDICINE

## 2023-03-31 PROCEDURE — 63710000001 DEXAMETHASONE PER 0.25 MG: Performed by: NURSE PRACTITIONER

## 2023-03-31 PROCEDURE — 93010 ELECTROCARDIOGRAM REPORT: CPT | Performed by: INTERNAL MEDICINE

## 2023-03-31 PROCEDURE — 99231 SBSQ HOSP IP/OBS SF/LOW 25: CPT | Performed by: NURSE PRACTITIONER

## 2023-03-31 RX ORDER — DEXAMETHASONE 4 MG/1
4 TABLET ORAL EVERY 12 HOURS SCHEDULED
Status: DISCONTINUED | OUTPATIENT
Start: 2023-03-31 | End: 2023-04-03 | Stop reason: HOSPADM

## 2023-03-31 RX ORDER — DEXAMETHASONE 4 MG/1
4 TABLET ORAL
Status: DISCONTINUED | OUTPATIENT
Start: 2023-04-05 | End: 2023-04-03 | Stop reason: HOSPADM

## 2023-03-31 RX ADMIN — POVIDONE-IODINE 1 EACH: 10 SOLUTION TOPICAL at 08:48

## 2023-03-31 RX ADMIN — Medication 12.5 MG: at 14:53

## 2023-03-31 RX ADMIN — LEVOTHYROXINE SODIUM 125 MCG: 125 TABLET ORAL at 05:00

## 2023-03-31 RX ADMIN — ATORVASTATIN CALCIUM 40 MG: 40 TABLET, FILM COATED ORAL at 20:03

## 2023-03-31 RX ADMIN — LISINOPRIL 10 MG: 10 TABLET ORAL at 08:48

## 2023-03-31 RX ADMIN — Medication 12.5 MG: at 20:03

## 2023-03-31 RX ADMIN — LEVETIRACETAM 1000 MG: 500 TABLET, FILM COATED ORAL at 08:48

## 2023-03-31 RX ADMIN — THIAMINE HCL TAB 100 MG 100 MG: 100 TAB at 08:48

## 2023-03-31 RX ADMIN — DEXAMETHASONE 4 MG: 4 TABLET ORAL at 13:14

## 2023-03-31 RX ADMIN — ASPIRIN 81 MG: 81 TABLET, COATED ORAL at 08:48

## 2023-03-31 RX ADMIN — LEVETIRACETAM 1000 MG: 500 TABLET, FILM COATED ORAL at 20:03

## 2023-03-31 RX ADMIN — DEXAMETHASONE 4 MG: 4 TABLET ORAL at 05:00

## 2023-03-31 RX ADMIN — CLOPIDOGREL BISULFATE 75 MG: 75 TABLET ORAL at 08:48

## 2023-03-31 RX ADMIN — PANTOPRAZOLE SODIUM 40 MG: 40 TABLET, DELAYED RELEASE ORAL at 05:00

## 2023-03-31 RX ADMIN — HYDROCHLOROTHIAZIDE 12.5 MG: 12.5 CAPSULE ORAL at 08:48

## 2023-03-31 RX ADMIN — Medication 5 MG: at 20:03

## 2023-03-31 RX ADMIN — DEXAMETHASONE 4 MG: 4 TABLET ORAL at 20:03

## 2023-03-31 RX ADMIN — ENOXAPARIN SODIUM 40 MG: 40 INJECTION SUBCUTANEOUS at 08:48

## 2023-03-31 NOTE — PROGRESS NOTES
3/31/2023    Mr. Simons is a 67-year-old gentleman status post craniotomy for tumor removal on 3/21/2023.  Patient has gone into atrial fibrillation and there is consideration for anticoagulation.  Neurosurgery would recommend follow-up CT scan of the head and if this is postoperatively stable then it would be appropriate to start anticoagulation with the discussion to the patient the risk of treatment.    I have relayed this information to Ms. Barth, FRANCINE with [facility] medicine.    Malorie Hoang PA-C

## 2023-03-31 NOTE — DISCHARGE PLACEMENT REQUEST
"Jonathan Barrera (67 y.o. Male)   Dk Caceres, RN Case Manager  [phone]    Date of Birth   1955    Social Security Number       Address   [address] [place] [address]    Home Phone   [phone]    MRN   [id number]       Adventism   None    Marital Status   Single                            Admission Date   3/11/23    Admission Type   Emergency    Admitting Provider   Marlon Mckee MD    Attending Provider   Olivia Decker II, DO    Department, Room/Bed   [facility] 3H, S371/1       Discharge Date       Discharge Disposition       Discharge Destination                               Attending Provider: Olivia Decker II, DO    Allergies: No Known Allergies    Isolation: None   Infection: None   Code Status: CPR    Ht: 180.3 cm (71\")   Wt: 93.4 kg (206 lb)    Admission Cmt: None   Principal Problem: Meningioma (HCC) [D32.9]                 Active Insurance as of 3/11/2023     Primary Coverage     Payor Plan Insurance Group Employer/Plan Group    ANTHEM MEDICARE REPLACEMENT ANTHEM MEDICARE ADVANTAGE [id number]     Payor Plan Address Payor Plan Phone Number Payor Plan Fax Number Effective Dates    [address] [phone]  1/1/2020 - None Entered    [facility] [id number]       Subscriber Name Subscriber Birth Date Member ID       JONATHAN BARRERA 1955 [id number]                 Emergency Contacts      (Rel.) Home Phone Work Phone Mobile Phone    Marva Ku (Sister) [phone] -- [phone]            Vital Signs (last day)     Date/Time Temp Temp src Pulse Resp BP Patient Position SpO2    03/31/23 0721 98.2 (36.8) -- -- -- -- -- --    03/30/23 1938 98 (36.7) Oral 93 16 114/83 Lying --    03/30/23 1504 97.7 (36.5) -- 98 18 119/86 Lying 96    03/30/23 1359 -- -- -- -- -- -- 97    03/30/23 1112 98.2 (36.8) Oral 96 18 133/95 Lying 94    03/30/23 0848 -- -- 88 -- 150/104 -- --    03/30/23 0706 97.9 (36.6) Oral 75 18 129/92 Lying 95    "       Current Facility-Administered Medications   Medication Dose Route Frequency Provider Last Rate Last Admin   • acetaminophen (TYLENOL) tablet 650 mg  650 mg Oral Q4H PRN Marlon Mckee MD   650 mg at 03/27/23 0815    Or   • acetaminophen (TYLENOL) 160 MG/5ML solution 650 mg  650 mg Oral Q4H PRN Marlon Mckee MD        Or   • acetaminophen (TYLENOL) suppository 650 mg  650 mg Rectal Q4H PRN Marlon Mckee MD       • aspirin EC tablet 81 mg  81 mg Oral Daily Laine Barth APRN   81 mg at 03/30/23 0848   • atorvastatin (LIPITOR) tablet 40 mg  40 mg Oral Nightly Marlon Mckee MD   40 mg at 03/30/23 2008   • sennosides-docusate (PERICOLACE) 8.6-50 MG per tablet 2 tablet  2 tablet Oral BID Marlon Mckee MD   2 tablet at 03/24/23 0804    And   • polyethylene glycol (MIRALAX) packet 17 g  17 g Oral Daily PRN Marlon Mckee MD        And   • bisacodyl (DULCOLAX) EC tablet 5 mg  5 mg Oral Daily PRN Marlon Mckee MD   5 mg at 03/22/23 2127    And   • bisacodyl (DULCOLAX) suppository 10 mg  10 mg Rectal Daily PRN Marlon Mckee MD       • calcium carbonate (TUMS) chewable tablet 500 mg (200 mg elemental)  2 tablet Oral TID PRN Sheyla Little APRN   2 tablet at 03/30/23 0235   • clopidogrel (PLAVIX) tablet 75 mg  75 mg Oral Daily Laine Barth APRN   75 mg at 03/30/23 0848   • dexamethasone (DECADRON) tablet 4 mg  4 mg Oral Q6H Marlon Mckee MD   4 mg at 03/31/23 0500   • docusate sodium (COLACE) capsule 100 mg  100 mg Oral BID PRN Marlon Mckee MD   100 mg at 03/28/23 2050   • Enoxaparin Sodium (LOVENOX) syringe 40 mg  40 mg Subcutaneous Daily Marlon Mckee MD   40 mg at 03/30/23 0848   • hydroCHLOROthiazide (MICROZIDE) capsule 12.5 mg  12.5 mg Oral Daily Fiorella Manzanares MD   12.5 mg at 03/30/23 0848   • levETIRAcetam (KEPPRA) tablet 1,000 mg  1,000 mg Oral Q12H Marlon Mckee,  MD   1,000 mg at 03/30/23 2008   • levothyroxine (SYNTHROID, LEVOTHROID) tablet 125 mcg  125 mcg Oral Daily Marlon Mckee MD   125 mcg at 03/31/23 0500   • lisinopril (PRINIVIL,ZESTRIL) tablet 10 mg  10 mg Oral Q24H Laine Barth APRN   10 mg at 03/30/23 0848   • magnesium hydroxide (MILK OF MAGNESIA) suspension 10 mL  10 mL Oral Daily PRN Marlon Mckee MD       • Magnesium Standard Dose Replacement - Initiate Nurse / BPA Driven Protocol   Does not apply PRN Marlon Mckee MD       • melatonin tablet 5 mg  5 mg Oral Nightly PRN Marlon Mckee MD   5 mg at 03/30/23 2008   • naloxone (NARCAN) injection 0.4 mg  0.4 mg Intravenous Q5 Min PRN Marlon Mckee MD       • ondansetron (ZOFRAN) tablet 4 mg  4 mg Oral Q6H PRN Marlon Mckee MD        Or   • ondansetron (ZOFRAN) injection 4 mg  4 mg Intravenous Q6H PRN Marlon Mckee MD       • pantoprazole (PROTONIX) EC tablet 40 mg  40 mg Oral Q AM Marlon Mckee MD   40 mg at 03/31/23 0500   • Phosphorus Replacement - Initiate Nurse / BPA Driven Protocol   Does not apply PRN Marlon Mckee MD       • polyethylene glycol (MIRALAX) packet 17 g  17 g Oral Daily Marlon Mckee MD   17 g at 03/23/23 0829   • Potassium Replacement - Initiate Nurse / BPA Driven Protocol   Does not apply PRN Marlon Mckee MD       • povidone-iodine 10 % swab 1 each  1 application Topical Daily Adelina Lama, DO   1 each at 03/30/23 1113   • sodium chloride 0.9 % flush 10 mL  10 mL Intravenous PRN Marlon Mckee MD       • sodium chloride 0.9 % flush 10 mL  10 mL Intravenous Q12H Marlon Mckee MD   10 mL at 03/30/23 0850   • sodium chloride 0.9 % flush 10 mL  10 mL Intravenous PRN Marlon Mckee MD       • sodium chloride 0.9 % infusion 40 mL  40 mL Intravenous PRN Marlon Mckee MD       • thiamine (VITAMIN B-1) tablet  100 mg  100 mg Oral Daily Marlon Mckee MD   100 mg at 23 0848        Physician Progress Notes (last 24 hours)      Laine Barth APRN at 23 0954              [facility] Medicine Services  PROGRESS NOTE    Patient Name: Monty Nagel  : 1955  MRN: [id number]    Date of Admission: 3/11/2023  Primary Care Physician: Tiffany Morales MD    Subjective   Subjective     CC: Follow-up meningioma    HPI:  Pt again ate 100% of his breakfast. He denies any issues today.  Planning to go to [facility] pending insurance pre-CERT.    Copied text in this note has been reviewed and is accurate as of 23.       ROS:  Gen- No fevers, chills  CV- No chest pain, palpitations  Resp- No cough, dyspnea  GI- No N/V/D, abd pain      Objective   Objective     Vital Signs:   Temp:  [97.9 °F (36.6 °C)-98.2 °F (36.8 °C)] 97.9 °F (36.6 °C)  Heart Rate:  [] 88  Resp:  [18] 18  BP: (125-150)/() 150/104     Physical Exam:  Constitutional: Awake, alert, Chronically ill appearing  HENT: NCAT, mucous membranes moist  Respiratory: Clear to auscultation bilaterally, nonlabored respirations   Cardiovascular: RRR, no murmurs, rubs, or gallop  Gastrointestinal: Positive bowel sounds, soft, nontender, nondistended  Musculoskeletal: No bilateral ankle edema, dsg to right great toe.   Psychiatric: Flat affect, cooperative  Neurologic: Oriented x 3, nonfocal, speech clear  Skin: No rashes        Results Reviewed:  LAB RESULTS:      Lab 23   WBC 14.02*   HEMOGLOBIN 11.8*   HEMATOCRIT 34.5*   PLATELETS 215   NEUTROS ABS 12.89*   IMMATURE GRANS (ABS) 0.11*   LYMPHS ABS 0.41*   MONOS ABS 0.60   EOS ABS 0.00   MCV 86.9         Lab 23  0415 23  1823 23  0422 23  1839   SODIUM 135*  --  131*  --    POTASSIUM 4.5  --  4.3  --    CHLORIDE 99  --  97*  --    CO2 27.0  --  30.0*  --    ANION GAP 9.0  --  4.0*  --    BUN 25*  --  20  --    CREATININE  0.71*  --  0.41*  --    EGFR 99.3  --  117.2  --    GLUCOSE 119*  --  128*  --    CALCIUM 7.7*  --  7.6*  --    PHOSPHORUS  --  2.1* 2.1* 1.9*         Lab 03/24/23  0422   ALBUMIN 2.5*                     Brief Urine Lab Results  (Last result in the past 365 days)      Color   Clarity   Blood   Leuk Est   Nitrite   Protein   CREAT   Urine HCG        03/11/23 1058 Dark Yellow   Clear   Negative   Negative   Negative   Negative                 Microbiology Results Abnormal     None          No radiology results from the last 24 hrs    Results for orders placed during the hospital encounter of 03/11/23    Adult Transthoracic Echo Complete w/ Color, Spectral and Contrast if necessary per protocol    Interpretation Summary  •  Left ventricular systolic function is normal. Estimated left ventricular EF = 55%  •  Biatrial enlargement.  •  Calcified aortic valve with mild aortic stenosis, mean gradient 10 mmHg, BANG 1.6 cm².  •  Moderate aortic insufficiency.  •  Mild mitral regurgitation.  •  Mild tricuspid regurgitation with normal RVSP.  •  Mild dilation of the ascending aorta (4.2 cm) is present.      Current medications:  Scheduled Meds:aspirin, 81 mg, Oral, Daily  atorvastatin, 40 mg, Oral, Nightly  clopidogrel, 75 mg, Oral, Daily  dexamethasone, 4 mg, Oral, Q6H  enoxaparin, 40 mg, Subcutaneous, Daily  hydroCHLOROthiazide Oral, 12.5 mg, Oral, Daily  levETIRAcetam, 1,000 mg, Oral, Q12H  levothyroxine, 125 mcg, Oral, Daily  lisinopril, 10 mg, Oral, Q24H  pantoprazole, 40 mg, Oral, Q AM  polyethylene glycol, 17 g, Oral, Daily  povidone-iodine, 1 application, Topical, Daily  senna-docusate sodium, 2 tablet, Oral, BID  sodium chloride, 10 mL, Intravenous, Q12H  thiamine, 100 mg, Oral, Daily      Continuous Infusions:   PRN Meds:.•  acetaminophen **OR** acetaminophen **OR** acetaminophen  •  senna-docusate sodium **AND** polyethylene glycol **AND** bisacodyl **AND** bisacodyl  •  calcium carbonate  •  docusate sodium  •   magnesium hydroxide  •  Magnesium Standard Dose Replacement - Follow Nurse / BPA Driven Protocol  •  melatonin  •  [] HYDROmorphone **AND** naloxone  •  ondansetron **OR** ondansetron  •  Phosphorus Replacement - Follow Nurse / BPA Driven Protocol  •  Potassium Replacement - Follow Nurse / BPA Driven Protocol  •  sodium chloride  •  sodium chloride  •  sodium chloride    Assessment & Plan   Assessment & Plan     Active Hospital Problems    Diagnosis  POA   • **Meningioma (HCC) [D32.9]  Yes   • Rheumatoid vs Psoriatic arthritis (HCC) [L40.50]  Yes   • Atrial fibrillation (HCC) [I48.91]  Yes   • Other recurrent depressive disorders (HCC) [F33.8]  Yes   • 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        Brief Hospital Course to date:  Motny Nagel is a 67 y.o. male with history of hypertension, retinal melatonin and subsequent right eye blindness, previous alcohol use who was admitted for meningioma, gait instability and general weakness.  Brain MRI showed 6.4 enhancing mass and he was evaluated by neurosurgery.  He underwent right frontal craniotomy on 3/21 as well as right middle meningeal artery embolization with Dr. Mckee in anticipation for tumor resection.  He was transferred from ICU to medical floor on      Meningioma,'s s/p tumor embolization 3/20 by Dr. Sebastian and resection 3/21 by Dr. Mckee  Generalized weakness, frequent falls  -Continue ASA Plavix when okay with neurosurgery  -Continue Decadron and Keppra, per neurosurgery plan for steroid taper at discharge  -PT/OT following, plan for rehab  -He will follow-up with Dr. Mckee in 2 weeks post-discharge     Hypertension  -BP is much improved, ok to resume antihypertensives  -Patient is on lisinopril- HCTZ- now resumed.     Hyperlipidemia  -Continue  statin     New A-fib  -Rates currently controlled  -Currently on subcu Lovenox     Hypothyroidism  History of thyroid ablation secondary to Graves' disease  - continue Synthroid     Former EtOH, heavy use  -Stopped drinking      Hyponatremia, not significant  -Monitor while on Keppra, encourage protein intake  -Na+ is low but stable, will continue to monitor for now.       Right great toe lesion  -Dr. Mark ferro, suspect chronic callus with remote trauma, concerning for osteomyelitis but   MRI right foot negative for osteo, shows ulceration of the distal phalanx of the great toe    Expected Discharge Location and Transportation: Rehab  Expected Discharge 2023     DVT prophylaxis:  Medical and mechanical DVT prophylaxis orders are present.     AM-PAC 6 Clicks Score (PT): 20 (23 1331)    CODE STATUS:   Code Status and Medical Interventions:   Ordered at: 23 1235     Level Of Support Discussed With:    Patient     Code Status (Patient has no pulse and is not breathing):    CPR (Attempt to Resuscitate)     Medical Interventions (Patient has pulse or is breathing):    Full       LYNETTE Grewal  23        Electronically signed by Laine Barth APRN at 23 1408          Physical Therapy Notes (most recent note)      Valeria Gibson, PT at 23 1141  Version 1 of 1         Patient Name: Monty Nagel  : 1955    MRN: [id number]                              Today's Date: 3/29/2023       Admit Date: 3/11/2023    Visit Dx:     ICD-10-CM ICD-9-CM   1. Generalized weakness  R53.1 780.79   2. History of alcoholism (HCC)  F10.21 V11.3   3. Inability to walk  R26.2 719.7   4. Confusion  R41.0 298.9   5. Hypoglycemia  E16.2 251.2   6. Poor nutrition  E63.9 269.9   7. Cognitive communication deficit  R41.841 799.52   8. Impaired functional mobility, balance, gait, and endurance  Z74.09 V49.89   9. 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   • Postablative hypothyroidism   • Screen for colon cancer   • Other recurrent depressive disorders (HCC)   • Balance problem   • Generalized weakness   • Atrial fibrillation (HCC)   • Severe malnutrition (HCC)   • Meningioma (HCC)   • Rheumatoid vs Psoriatic arthritis (HCC)     Past Medical History:   Diagnosis Date   • Arthritis    • Hepatitis A    • Hypertension    • Melanoma (HCC) 2017    retina     Past Surgical History:   Procedure Laterality Date   • CRANIOTOMY FOR TUMOR N/A 3/21/2023    Procedure: CRANIOTOMY FOR TUMOR STEREOTACTIC WITH STEALTH;  Surgeon: Marlon Mckee MD;  Location:  [facility];  Service: Neurosurgery;  Laterality: N/A;   • EMBOLIZATION CEREBRAL N/A 3/20/2023    Procedure: Tumor Emoblization radial access;  Surgeon: Ozzy Sebastian MD;  Location:  [facility] CATH INVASIVE LOCATION;  Service: Interventional Radiology;  Laterality: N/A;   • EYE SURGERY  2017    EYE CANCER RIGHT EYE   • FINGER SURGERY Left     Pointer finger re-attached in 3rd Grade   • TONSILLECTOMY        General Information     Row Name 03/29/23 1315          Physical Therapy Time and Intention    Document Type therapy note (daily note)  -SC     Mode of Treatment physical therapy;individual therapy  -SC     Row Name 03/29/23 1315          General Information    Patient Profile Reviewed yes  -SC     Existing Precautions/Restrictions fall;other (see comments)  impulsive, L side weakness, R visusl defitit  -SC     Row Name 03/29/23 1315          Cognition    Orientation Status (Cognition) oriented x 4  -SC     Row Name 03/29/23 1315          Safety Issues, Functional Mobility    Impairments Affecting Function (Mobility) coordination;motor control;postural/trunk control;visual/perceptual  -SC     Cognitive Impairments, Mobility Safety/Performance impulsivity;insight into deficits/self-awareness;problem-solving/reasoning  -SC     Comment,  Safety Issues/Impairments (Mobility) alert, sometimes does not follow commands, impulsive  -SC           User Key  (r) = Recorded By, (t) = Taken By, (c) = Cosigned By    Initials Name Provider Type    Valeria Yañez PT Physical Therapist               Mobility     Row Name 03/29/23 1319          Bed Mobility    Scooting/Bridging [place] (Bed Mobility) independent  -SC     Supine-Sit [place] (Bed Mobility) independent  -SC     Sit-Supine [place] (Bed Mobility) independent  -SC     Comment, (Bed Mobility) gets out of bed quickly,  -SC     Row Name 03/29/23 1319          Transfers    Comment, (Transfers) cues for hand placement. Leaves walker behind before sitting  -SC     Row Name 03/29/23 1319          Sit-Stand Transfer    Sit-Stand [place] (Transfers) contact guard;verbal cues  -SC     Assistive Device (Sit-Stand Transfers) walker, front-wheeled  -SC     Row Name 03/29/23 1319          Gait/Stairs (Locomotion)    [place] Level (Gait) contact guard;verbal cues;1 person assist  -SC     Assistive Device (Gait) walker, front-wheeled  -SC     Distance in Feet (Gait) 900  -SC     Deviations/Abnormal Patterns (Gait) left sided deviations;stride length decreased;weight shifting decreased  -SC     Bilateral Gait Deviations forward flexed posture  -SC     Comment, (Gait/Stairs) Patient demonstrated very quick ambulaiton on queen. PT giving cues to slow down and stay closer to walker--no change noted with directions. Again PT requested patient to stop and rest but he refused. PT requested to end ambulation after 600 feet ,as L leg notedly fatiguing--- patient again refusing to do so.   at this point  -SC           User Key  (r) = Recorded By, (t) = Taken By, (c) = Cosigned By    Initials Name Provider Type    Valeria Yañez PT Physical Therapist               Obj/Interventions     Row Name 03/29/23 1325          Motor Skills    Therapeutic Exercise --  exercise deferred due to high HR   -[facility] Name 03/29/23 1325          Balance    Dynamic Standing Balance 1-person assist;contact guard  -SC     Position/Device Used, Standing Balance walker, rolling;supported  -SC     Comment, Balance impulsive and does not listen to PT's recommendations to rest  -SC           User Key  (r) = Recorded By, (t) = Taken By, (c) = Cosigned By    Initials Name Provider Type    SC Valeria Gibson, PT Physical Therapist               Goals/Plan    No documentation.                Clinical Impression     [facility] Name 03/29/23 1327          Pain    Pretreatment Pain Rating 0/10 - no pain  -SC     Posttreatment Pain Rating 0/10 - no pain  -SC     Row Name 03/29/23 1327          Plan of Care Review    Plan of Care Reviewed With patient  -SC     Progress improving  -SC     Outcome Evaluation Patient continues to demonstrated impulsive behavior during gait training. Recommend setting some guidelines prior to working with him.  -[facility] Name 03/29/23 1327          Therapy Assessment/Plan (PT)    Rehab Potential (PT) good, to achieve stated therapy goals  -SC     Criteria for Skilled Interventions Met (PT) yes;skilled treatment is necessary;meets criteria  -SC     Therapy Frequency (PT) daily  -[facility] Name 03/29/23 1327          Vital Signs    Intratreatment Heart Rate (beats/min) 135  afib  -SC     Posttreatment Heart Rate (beats/min) 113  afib  -SC     Row Name 03/29/23 1327          Positioning and Restraints    Pre-Treatment Position in bed  -SC     Post Treatment Position bed  -SC     In Bed notified nsg;supine;call light within reach;encouraged to call for assist;exit alarm on  -SC           User Key  (r) = Recorded By, (t) = Taken By, (c) = Cosigned By    Initials Name Provider Type    SC Valeria Gibson, PT Physical Therapist               Outcome Measures     [facility] Name 03/29/23 1331 03/29/23 0915       How much help from another person do you currently need...    Turning from your back to your side while in flat  bed without using bedrails? 4  -SC 4  -MW    Moving from lying on back to sitting on the side of a flat bed without bedrails? 4  -SC 4  -MW    Moving to and from a bed to a chair (including a wheelchair)? 3  -SC 3  -MW    Standing up from a chair using your arms (e.g., wheelchair, bedside chair)? 3  -SC 3  -MW    Climbing 3-5 steps with a railing? 3  -SC 3  -MW    To walk in hospital room? 3  -SC 3  -MW    AM-PAC 6 Clicks Score (PT) 20  -SC 20  -MW    Highest level of mobility 6 --> Walked 10 steps or more  -SC 6 --> Walked 10 steps or more  -MW    Row Name 03/29/23 1331          Functional Assessment    Outcome Measure Options AM-PAC 6 Clicks Basic Mobility (PT)  -SC           User Key  (r) = Recorded By, (t) = Taken By, (c) = Cosigned By    Initials Name Provider Type    SC Valeria Gibson PT Physical Therapist    Jania Orlando RN Registered Nurse                             Physical Therapy Education     Title: PT OT SLP Therapies (In Progress)     Topic: Physical Therapy (In Progress)     Point: Mobility training (In Progress)     Learning Progress Summary           Patient Acceptance, E, NR by SC at 3/29/2023 1333    Comment: reviewed safety with mobility    Acceptance, E,D, VU by LR at 3/28/2023 1542    Comment: Educated on safety with mobility, correct supine<->sit t/f technique, correct sit<->stand t/f technique, correct gait mechanics, LE HEP, benefits of mobility, and progression of POC.    Eager, E, VU,DU,NR by SS at 3/27/2023 1541    Comment: Reviewed safety/technique w/bed mobility, transfers, ambulation, HEP, safety awareness/recommendations    Acceptance, E, NR by AE at 3/24/2023 1520    Acceptance, E,D, VU,NR by HP at 3/23/2023 1127    Acceptance, E,TB, NR by AY at 3/22/2023 1141    Acceptance, E, VU,NR by CM at 3/19/2023 1149    Acceptance, E, VU by CM at 3/17/2023 1517    Acceptance, E, VU,NR by LH at 3/15/2023 1407    Acceptance, E, NR by KG at 3/14/2023 1428    Acceptance, E, VU,NR by SJ at  3/12/2023 1542                   Point: Home exercise program (In Progress)     Learning Progress Summary           Patient Acceptance, E, NR by SC at 3/29/2023 1333    Comment: reviewed safety with mobility    Acceptance, E,D, VU by LR at 3/28/2023 1542    Comment: Educated on safety with mobility, correct supine<->sit t/f technique, correct sit<->stand t/f technique, correct gait mechanics, LE HEP, benefits of mobility, and progression of POC.    Eager, E, VU,DU,NR by SS at 3/27/2023 1541    Comment: Reviewed safety/technique w/bed mobility, transfers, ambulation, HEP, safety awareness/recommendations    Acceptance, E, NR by AE at 3/24/2023 1520    Acceptance, E,D, VU,NR by HP at 3/23/2023 1127    Acceptance, E,TB, NR by AY at 3/22/2023 1141    Acceptance, E, VU,NR by CM at 3/19/2023 1149    Acceptance, E, VU,NR by LH at 3/15/2023 1407    Acceptance, E, NR by KG at 3/14/2023 1428                   Point: Body mechanics (In Progress)     Learning Progress Summary           Patient Acceptance, E, NR by SC at 3/29/2023 1333    Comment: reviewed safety with mobility    Acceptance, E,D, VU by LR at 3/28/2023 1542    Comment: Educated on safety with mobility, correct supine<->sit t/f technique, correct sit<->stand t/f technique, correct gait mechanics, LE HEP, benefits of mobility, and progression of POC.    Eager, E, VU,DU,NR by SS at 3/27/2023 1541    Comment: Reviewed safety/technique w/bed mobility, transfers, ambulation, HEP, safety awareness/recommendations    Acceptance, E, NR by AE at 3/24/2023 1520    Acceptance, E,D, VU,NR by HP at 3/23/2023 1127    Acceptance, E,TB, NR by AY at 3/22/2023 1141    Acceptance, E, VU,NR by CM at 3/19/2023 1149    Acceptance, E, VU by CM at 3/17/2023 1517    Acceptance, E, VU,NR by LH at 3/15/2023 1407    Acceptance, E, NR by KG at 3/14/2023 1428    Acceptance, E, VU,NR by SJ at 3/12/2023 1542                   Point: Precautions (In Progress)     Learning Progress Summary            Patient Acceptance, E, NR by SC at 3/29/2023 1333    Comment: reviewed safety with mobility    Acceptance, E,D, VU by LR at 3/28/2023 1542    Comment: Educated on safety with mobility, correct supine<->sit t/f technique, correct sit<->stand t/f technique, correct gait mechanics, LE HEP, benefits of mobility, and progression of POC.    Eager, E, VU,DU,NR by SS at 3/27/2023 1541    Comment: Reviewed safety/technique w/bed mobility, transfers, ambulation, HEP, safety awareness/recommendations    Acceptance, E, NR by AE at 3/24/2023 1520    Acceptance, E,D, VU,NR by HP at 3/23/2023 1127    Acceptance, E,TB, NR by AY at 3/22/2023 1141    Acceptance, E, VU,NR by CM at 3/19/2023 1149    Acceptance, E, VU by CM at 3/17/2023 1517    Acceptance, E, VU,NR by LH at 3/15/2023 1407    Acceptance, E, NR by KG at 3/14/2023 1428    Acceptance, E, VU,NR by SJ at 3/12/2023 1542                               User Key     Initials Effective Dates Name Provider Type Discipline    SC 02/03/23 -  Valeria Gibson, PT Physical Therapist PT    SJ 02/03/23 - 03/12/23 Samara Harrison, PT Physical Therapist PT    LR 02/03/23 -  Gabriela Smith, PT Physical Therapist PT    KG 05/22/20 -  Cherelle Gaitan, PT Physical Therapist PT    AY 11/10/20 -  Sandee Spencer, PT Physical Therapist PT    LH 09/21/21 -  Tristian Ravi, PT Physical Therapist PT    HP 06/01/21 -  Kelly Mckeon, PT Physical Therapist PT    SS 06/01/21 -  Hyacinth King, PT Physical Therapist PT    AE 09/21/21 -  Andrew Centeno, PT Physical Therapist PT    CM 09/22/22 -  Yanet Calvin, PT Physical Therapist PT              PT Recommendation and Plan     Plan of Care Reviewed With: patient  Progress: improving  Outcome Evaluation: Patient continues to demonstrated impulsive behavior during gait training. Recommend setting some guidelines prior to working with him.     Time Calculation:    PT Charges     Row Name 03/29/23 1141             Time Calculation     Start Time 1141  -SC      PT Received On 23  -SC      PT Goal Re-Cert Due Date 23  -SC         Time Calculation- PT    Total Timed Code Minutes- PT 18 minute(s)  -SC         Timed Charges    44997 - Gait Training Minutes  18  -SC         Total Minutes    Timed Charges Total Minutes 18  -SC       Total Minutes 18  -SC            User Key  (r) = Recorded By, (t) = Taken By, (c) = Cosigned By    Initials Name Provider Type    SC Valeria Gibson PT Physical Therapist              Therapy Charges for Today     Code Description Service Date Service Provider Modifiers Qty    36434681264 HC GAIT TRAINING EA 15 MIN 3/29/2023 Valeria Gibosn, PT GP 1          PT G-Codes  Outcome Measure Options: AM-PAC 6 Clicks Basic Mobility (PT)  AM-PAC 6 Clicks Score (PT): 20  AM-PAC 6 Clicks Score (OT): 15       Shearon A. Paige PT  3/29/2023      Electronically signed by Valeria Gibson PT at 23 1335          Occupational Therapy Notes (most recent note)      Amanda Vega, OT at 23 1027          Patient Name: Monty Nagel  : 1955    MRN: [id number]                              Today's Date: 3/30/2023       Admit Date: 3/11/2023    Visit Dx:     ICD-10-CM ICD-9-CM   1. Generalized weakness  R53.1 780.79   2. History of alcoholism (HCC)  F10.21 V11.3   3. Inability to walk  R26.2 719.7   4. Confusion  R41.0 298.9   5. Hypoglycemia  E16.2 251.2   6. Poor nutrition  E63.9 269.9   7. Cognitive communication deficit  R41.841 799.52   8. Impaired functional mobility, balance, gait, and endurance  Z74.09 V49.89   9. 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   • Postablative hypothyroidism   • Screen for colon cancer   • Other recurrent depressive disorders (HCC)   • Balance problem   • Generalized weakness   • Atrial fibrillation (HCC)   • Severe malnutrition (HCC)   • Meningioma (HCC)   •  Rheumatoid vs Psoriatic arthritis (HCC)     Past Medical History:   Diagnosis Date   • Arthritis    • Hepatitis A    • Hypertension    • Melanoma (HCC) 2017    retina     Past Surgical History:   Procedure Laterality Date   • CRANIOTOMY FOR TUMOR N/A 3/21/2023    Procedure: CRANIOTOMY FOR TUMOR STEREOTACTIC WITH STEALTH;  Surgeon: Marlon Mckee MD;  Location: [facility] OR;  Service: Neurosurgery;  Laterality: N/A;   • EMBOLIZATION CEREBRAL N/A 3/20/2023    Procedure: Tumor Emoblization radial access;  Surgeon: Ozzy Sebastian MD;  Location: [facility] CATH INVASIVE LOCATION;  Service: Interventional Radiology;  Laterality: N/A;   • EYE SURGERY  2017    EYE CANCER RIGHT EYE   • FINGER SURGERY Left     Pointer finger re-attached in 3rd Grade   • TONSILLECTOMY        General Information     Row Name 03/30/23 1344          OT Time and Intention    Document Type therapy note (daily note)  -MR     Mode of Treatment occupational therapy  -MR     Row Name 03/30/23 1344          General Information    Patient Profile Reviewed yes  -MR     Existing Precautions/Restrictions fall;other (see comments)  impulsive, L side weakness, R visual deficit  -MR     Barriers to Rehab medically complex;cognitive status;visual deficit  -MR     Row Name 03/30/23 2234          Cognition    Orientation Status (Cognition) oriented x 4  -MR     Row Name 03/30/23 1347          Safety Issues, Functional Mobility    Safety Issues Affecting Function (Mobility) safety precaution awareness;safety precautions follow-through/compliance  -MR     Impairments Affecting Function (Mobility) coordination;motor control;postural/trunk control;visual/perceptual  -MR     Cognitive Impairments, Mobility Safety/Performance impulsivity;insight into deficits/self-awareness;problem-solving/reasoning;safety precaution awareness  -MR           User Key  (r) = Recorded By, (t) = Taken By, (c) = Cosigned By    Initials Name Provider Type    Amanda Nielson, OT  Occupational Therapist                 Mobility/ADL's     Row Name 03/30/23 1345          Bed Mobility    Bed Mobility sit-supine;supine-sit  -MR     Supine-Sit [place] (Bed Mobility) independent  -MR     Sit-Supine [place] (Bed Mobility) independent  -MR     Assistive Device (Bed Mobility) head of bed elevated;bed rails  -MR     Row Name 03/30/23 1345          Transfers    Transfers sit-stand transfer;toilet transfer  -MR     Row Name 03/30/23 1345          Sit-Stand Transfer    Sit-Stand [place] (Transfers) contact guard;verbal cues  -MR     Assistive Device (Sit-Stand Transfers) walker, front-wheeled  -MR     Comment, (Sit-Stand Transfer) v/c for hand placement, sequencing and safety  -MR     Row Name 03/30/23 1345          Toilet Transfer    Type (Toilet Transfer) sit-stand;stand-sit  -MR     [place] Level (Toilet Transfer) contact guard;nonverbal cues (demo/gesture);verbal cues  -MR     Assistive Device (Toilet Transfer) walker, front-wheeled;grab bars/safety frame  -MR     Row Name 03/30/23 1345          Functional Mobility    Functional Mobility- Ind. Level contact guard assist  -MR     Functional Mobility- Device walker, front-wheeled  -MR     Functional Mobility-Distance (Feet) --  > HH distances  -MR     Row Name 03/30/23 1345          Activities of Daily Living    BADL Assessment/Intervention upper body dressing;grooming;toileting  -MR     Row Name 03/30/23 1345          Grooming Assessment/Training    [place] Level (Grooming) wash face, hands;set up;hair care, combing/brushing;moderate assist (50% patient effort)  -MR     Position (Grooming) edge of bed sitting  -MR     Row Name 03/30/23 1345          Upper Body Dressing Assessment/Training    [place] Level (Upper Body Dressing) don;doff;other (see comments)  hosptial gown  -MR     Position (Upper Body Dressing) edge of bed sitting  -MR     Row Name 03/30/23 1345          Toileting Assessment/Training    [place] Level  (Toileting) adjust/manage clothing;contact guard assist;perform perineal hygiene;moderate assist (50% patient effort)  -MR     Position (Toileting) supported sitting;supported standing  -MR           User Key  (r) = Recorded By, (t) = Taken By, (c) = Cosigned By    Initials Name Provider Type    Amanda Nielson OT Occupational Therapist               Obj/Interventions     Row Name 03/30/23 4807          Balance    Balance Assessment sitting static balance;sitting dynamic balance;standing static balance;standing dynamic balance  -MR     Static Sitting Balance independent  -MR     Dynamic Sitting Balance independent  -MR     Position, Sitting Balance unsupported;sitting edge of bed  -MR     Static Standing Balance contact guard  -MR     Dynamic Standing Balance contact guard  -MR     Position/Device Used, Standing Balance supported;walker, front-wheeled  -MR     Balance Interventions sitting;standing;sit to stand;supported;dynamic;static;occupation based/functional task  -MR     Comment, Balance No overt LOB noted w/ mobility. Pt requiring v/c for safety and sequencing at times.  -MR           User Key  (r) = Recorded By, (t) = Taken By, (c) = Cosigned By    Initials Name Provider Type     Amanda Vega, LUCERO Occupational Therapist               Goals/Plan    No documentation.                Clinical Impression     Row Name 03/30/23 1351          Pain Assessment    Pretreatment Pain Rating 0/10 - no pain  -MR     Posttreatment Pain Rating 0/10 - no pain  -MR     Pain Intervention(s) Ambulation/increased activity;Repositioned  -MR     Row Name 03/30/23 1351          Plan of Care Review    Plan of Care Reviewed With patient  -MR     Progress improving  -MR     Outcome Evaluation Pt is making functional improvements but requires CGA and v/c for safety and sequencing d/t impulsiveness. Pt demonstrating good efforts w/ ADLs this session but continues to present below baseline warranting continuation of skilled IP OT  services. Continue to recommend inpatient rehab for best functional outcome.  -MR     Row Name 03/30/23 1351          Therapy Plan Review/Discharge Plan (OT)    Anticipated Discharge Disposition (OT) inpatient rehabilitation facility  -MR     Row Name 03/30/23 1351          Vital Signs    Pre Systolic BP Rehab 150  -MR     Pre Treatment Diastolic   -MR     Post Systolic BP Rehab 144  -MR     Post Treatment Diastolic   -MR     Pretreatment Heart Rate (beats/min) 100  -MR     Intratreatment Heart Rate (beats/min) 130  -MR     Posttreatment Heart Rate (beats/min) 92  -MR     O2 Delivery Pre Treatment room air  -MR     O2 Delivery Intra Treatment room air  -MR     O2 Delivery Post Treatment room air  -MR     Pre Patient Position Supine  -MR     Intra Patient Position Standing  -MR     Post Patient Position Supine  -MR     Row Name 03/30/23 1351          Positioning and Restraints    Pre-Treatment Position in bed  -MR     Post Treatment Position bed  -MR     In Bed side rails up x2;fowlers;with nsg;with other staff  -MR           User Key  (r) = Recorded By, (t) = Taken By, (c) = Cosigned By    Initials Name Provider Type    MR Amanda Vega, OT Occupational Therapist               Outcome Measures     Row Name 03/30/23 1353          How much help from another is currently needed...    Putting on and taking off regular lower body clothing? 2  -MR     Bathing (including washing, rinsing, and drying) 2  -MR     Toileting (which includes using toilet bed pan or urinal) 3  -MR     Putting on and taking off regular upper body clothing 3  -MR     Taking care of personal grooming (such as brushing teeth) 3  -MR     Eating meals 4  -MR     AM-PAC 6 Clicks Score (OT) 17  -MR     Row Name 03/30/23 0881          How much help from another person do you currently need...    Turning from your back to your side while in flat bed without using bedrails? 4  -KB     Moving from lying on back to sitting on the side of a flat  bed without bedrails? 4  -KB     Moving to and from a bed to a chair (including a wheelchair)? 3  -KB     Standing up from a chair using your arms (e.g., wheelchair, bedside chair)? 3  -KB     Climbing 3-5 steps with a railing? 3  -KB     To walk in hospital room? 3  -KB     AM-PAC 6 Clicks Score (PT) 20  -KB     Highest level of mobility 6 --> Walked 10 steps or more  -KB     Row Name 03/30/23 1353          Functional Assessment    Outcome Measure Options AM-PAC 6 Clicks Daily Activity (OT)  -MR           User Key  (r) = Recorded By, (t) = Taken By, (c) = Cosigned By    Initials Name Provider Type    Kayce Flores, RN Registered Nurse    Amanda Nielson, OT Occupational Therapist                Occupational Therapy Education     Title: PT OT SLP Therapies (In Progress)     Topic: Occupational Therapy (In Progress)     Point: ADL training (In Progress)     Description:   Instruct learner(s) on proper safety adaptation and remediation techniques during self care or transfers.   Instruct in proper use of assistive devices.              Learning Progress Summary           Patient Acceptance, E, NR by MR at 3/30/2023 1353    Acceptance, E, NR by MR at 3/28/2023 1315    Acceptance, E, NR by CS at 3/22/2023 1108    Acceptance, E, VU,NR by [facility]OPE at 3/13/2023 1156    Comment: reason for consult, noted deficits, walker safety, concern over home return                   Point: Home exercise program (Not Started)     Description:   Instruct learner(s) on appropriate technique for monitoring, assisting and/or progressing therapeutic exercises/activities.              Learner Progress:  Not documented in this visit.          Point: Precautions (In Progress)     Description:   Instruct learner(s) on prescribed precautions during self-care and functional transfers.              Learning Progress Summary           Patient Acceptance, E, NR by MR at 3/30/2023 1353    Acceptance, E, NR by MR at 3/28/2023 1315    Acceptance, E,  NR by CS at 3/22/2023 1108    Acceptance, E, VU,NR by  at 3/13/2023 1156    Comment: reason for consult, noted deficits, walker safety, concern over home return                   Point: Body mechanics (In Progress)     Description:   Instruct learner(s) on proper positioning and spine alignment during self-care, functional mobility activities and/or exercises.              Learning Progress Summary           Patient Acceptance, E, NR by MR at 3/30/2023 1353    Acceptance, E, NR by MR at 3/28/2023 1315    Acceptance, E, NR by CS at 3/22/2023 1108                               User Key     Initials Effective Dates Name Provider Type Discipline     02/03/23 -  Monae Cohen OT Occupational Therapist OT     09/02/21 -  Phyllis Bowen OT Occupational Therapist OT     09/22/22 -  Amanda Vega OT Occupational Therapist OT              OT Recommendation and Plan     Plan of Care Review  Plan of Care Reviewed With: patient  Progress: improving  Outcome Evaluation: Pt is making functional improvements but requires CGA and v/c for safety and sequencing d/t impulsiveness. Pt demonstrating good efforts w/ ADLs this session but continues to present below baseline warranting continuation of skilled IP OT services. Continue to recommend inpatient rehab for best functional outcome.     Time Calculation:    Time Calculation- OT     Row Name 03/30/23 1354             Time Calculation- OT    OT Start Time 1027  -MR      OT Received On 03/30/23  -MR         Timed Charges    98366 - OT Therapeutic Activity Minutes 33  -MR      28618 - OT Self Care/Mgmt Minutes 15  -MR         Total Minutes    Timed Charges Total Minutes 48  -MR       Total Minutes 48  -MR            User Key  (r) = Recorded By, (t) = Taken By, (c) = Cosigned By    Initials Name Provider Type    MR Amanda Vega, OT Occupational Therapist              Therapy Charges for Today     Code Description Service Date Service Provider Modifiers Qty    47924189506  HC OT THERAPEUTIC ACT EA 15 MIN 3/30/2023 Amanda Vega OT GO 2    97388822026 HC OT SELF CARE/MGMT/TRAIN EA 15 MIN 3/30/2023 Amanda Vega OT GO 1               Amanda Vega OT  3/30/2023    Electronically signed by Amanda Vega OT at 03/30/23 1356          Speech Language Pathology Notes (most recent note)      Angelina Sparks MS CCC-SLP at 03/29/23 1550             03/29/23 1550   SLP Deferred Reason   SLP Deferred Reason Routine  (Attempted to see patient this am for speech/cognitive tx. Patient working with PT. Will re-attempt as schedule permits.)         Electronically signed by Angelina Sparks MS CCC-SLP at 03/29/23 8908

## 2023-03-31 NOTE — CASE MANAGEMENT/SOCIAL WORK
Continued Stay Note   [facility]     Patient Name: Monty Nagel  MRN: [id number]  Today's Date: 3/31/2023    Admit Date: 3/11/2023    Plan: [facility]   Discharge Plan     Row Name 03/31/23 0753       Plan    Plan [place]    Patient/Family in Agreement with Plan yes    Plan Comments Faxed updated notes today. Plan is [facility] pending insurance approval. CM will continue to follow.    Final Discharge Disposition Code 03 - skilled nursing facility (SNF)               Discharge Codes    No documentation.               Expected Discharge Date and Time     Expected Discharge Date Expected Discharge Time    Mar 31, 2023             Krzysztof Caceres RN

## 2023-03-31 NOTE — CONSULTS
[facility] at [facility]  Cardiovascular Consultation Note    Reason for consultation: New onset A-fib present on admission    History of Present Illness:  67-year-old male with former alcohol abuser, previous Graves' disease status post treatment, hypothyroidism, hypertension, prior ocular melanoma status post eye surgery.  Who presented with poor balance and was found to have a large meningioma.  He initially underwent peripheral invasive coiling to reduce the vascularity.  On 321 he underwent right frontal craniotomy for removal.  We are asked to see the patient who was in A-fib on admission and remains in A-fib throughout this hospitalization.  The patient states he has had a murmur for a while.  He states he thinks he had a heart attack when he 53 years ago but never went to the hospital.  He does not feel his heart racing.  He occasionally gets dyspnea on exertion.  He has had no tightness heaviness squeezing pressure chest jaw throat arms.  No edema or claudication.  Has a history of significant hemorrhoidal bleeding    Cardiac risk factors: #1 male sex #2 age greater than 55 #3 hypertension #4 smoker    Past medical and surgical history, social and family history reviewed in EMR.    REVIEW OF SYSTEMS:     Past Medical History:   Diagnosis Date   • Arthritis    • Hepatitis A    • Hypertension    • Melanoma (HCC) 2017    retina     Past Surgical History:   Procedure Laterality Date   • CRANIOTOMY FOR TUMOR N/A 3/21/2023    Procedure: CRANIOTOMY FOR TUMOR STEREOTACTIC WITH STEALTH;  Surgeon: Marlon Mckee MD;  Location: [facility] OR;  Service: Neurosurgery;  Laterality: N/A;   • EMBOLIZATION CEREBRAL N/A 3/20/2023    Procedure: Tumor Emoblization radial access;  Surgeon: Ozzy Sebastian MD;  Location: [facility] CATH INVASIVE LOCATION;  Service: Interventional Radiology;  Laterality: N/A;   • EYE SURGERY  2017    EYE CANCER RIGHT EYE   • FINGER SURGERY Left     Pointer finger  re-attached in 3rd Grade   • TONSILLECTOMY       Social History     Socioeconomic History   • Marital status: Single   Tobacco Use   • Smoking status: Former     Packs/day: 1.00     Years: 15.00     Pack years: 15.00     Types: Cigarettes     Start date: 3/26/1981     Quit date: 1990     Years since quittin.2   • Smokeless tobacco: Former     Quit date: 1990   Vaping Use   • Vaping Use: Never used   Substance and Sexual Activity   • Alcohol use: Not Currently     Comment: Quit Sept 10, 2022, had been using for 50 years   • Drug use: No   • Sexual activity: Never     Family History   Problem Relation Age of Onset   • Diabetes Mother    • Heart disease Father        H&P ROS reviewed and pertinent CV ROS as noted in HPI.    Cardiac: New onset A-fib.  No prior cardiac problems  Respiratory: Occasional dyspnea.  No wheezing hemoptysis  Lower Extremities: Complains of sudden contraction of his upper and lower extremities  GI: No nausea vomiting diarrhea bright blood per rectum or melena states he had a large meal he feels overly full  Neuro: Patient complains of balance problems and staggering found of a large meningioma  Hematology: No bleeding diathesis ecchymosis or petechiae  Renal: No history of renal disease or hematuria  Musculoskeletal: Denies any specific joint pain  Endocrine: Has history of Graves' status post treatment and taking supplemental levothyroxine no diabetes  Constitutional: No fever chills or weight loss  Psych: Denies suicidal ideation      Physical Exam: General all EKG shows A-fib late transition no ischemia.  Review of telemetry shows the patient is having runs of A-fib RVR.       HEENT: No JVP or bruits.  Status post right enucleation       Respiratory: Equal bilateral symmetrical expansion clear auscultation bilaterally       Cardiovascular: Irregular rate and rhythm with a grade 3 over systolic ejection murmur no edema to palpation       GI: Soft flat nontender       Lower  Extremities: No lesions       Neuro: Facial expressions are symmetrical.  Moves all 4 extremities.       Skin: Warm and dry no edema to palpation       Psych: Pleasant affect oriented x3    Results Review: GFR is 117.2.  Potassium 4.5.  Blood pressures 1 14-1 51 output exceeds intake by 5.6 L.  Postop CT scan shows no hemorrhage with reduced edema           Vital Sign Min/Max for last 24 hours  Temp  Min: 97.7 °F (36.5 °C)  Max: 98.2 °F (36.8 °C)   BP  Min: 114/83  Max: 149/96   Pulse  Min: 81  Max: 102   Resp  Min: 16  Max: 18   SpO2  Min: 96 %  Max: 97 %   No data recorded      Intake/Output Summary (Last 24 hours) at 3/31/2023 1324  Last data filed at 3/31/2023 0900  Gross per 24 hour   Intake 660 ml   Output 1105 ml   Net -445 ml             Current Facility-Administered Medications:   •  acetaminophen (TYLENOL) tablet 650 mg, 650 mg, Oral, Q4H PRN, 650 mg at 03/27/23 0815 **OR** acetaminophen (TYLENOL) 160 MG/5ML solution 650 mg, 650 mg, Oral, Q4H PRN **OR** acetaminophen (TYLENOL) suppository 650 mg, 650 mg, Rectal, Q4H PRN, Marlon Mckee MD  •  aspirin EC tablet 81 mg, 81 mg, Oral, Daily, Laine Barth, APRN, 81 mg at 03/31/23 0848  •  atorvastatin (LIPITOR) tablet 40 mg, 40 mg, Oral, Nightly, Marlon Mckee MD, 40 mg at 03/30/23 2008  •  sennosides-docusate (PERICOLACE) 8.6-50 MG per tablet 2 tablet, 2 tablet, Oral, BID, 2 tablet at 03/24/23 0804 **AND** polyethylene glycol (MIRALAX) packet 17 g, 17 g, Oral, Daily PRN **AND** bisacodyl (DULCOLAX) EC tablet 5 mg, 5 mg, Oral, Daily PRN, 5 mg at 03/22/23 2127 **AND** bisacodyl (DULCOLAX) suppository 10 mg, 10 mg, Rectal, Daily PRN, Marlon Mckee MD  •  calcium carbonate (TUMS) chewable tablet 500 mg (200 mg elemental), 2 tablet, Oral, TID PRN, Sheyla Little APRN, 2 tablet at 03/30/23 0235  •  clopidogrel (PLAVIX) tablet 75 mg, 75 mg, Oral, Daily, Laine Barth APRN, 75 mg at 03/31/23 0848  •  dexamethasone (DECADRON)  tablet 4 mg, 4 mg, Oral, Q6H, Marlon Mckee MD, 4 mg at 23 1314  •  docusate sodium (COLACE) capsule 100 mg, 100 mg, Oral, BID PRN, Marlon Mckee MD, 100 mg at 23  •  Enoxaparin Sodium (LOVENOX) syringe 40 mg, 40 mg, Subcutaneous, Daily, Marlon Mckee MD, 40 mg at 23 0848  •  hydroCHLOROthiazide (MICROZIDE) capsule 12.5 mg, 12.5 mg, Oral, Daily, Fiorella Manzanares MD, 12.5 mg at 23 0848  •  levETIRAcetam (KEPPRA) tablet 1,000 mg, 1,000 mg, Oral, Q12H, Marlon Mckee MD, 1,000 mg at 23 08  •  levothyroxine (SYNTHROID, LEVOTHROID) tablet 125 mcg, 125 mcg, Oral, Daily, Marlon Mckee MD, 125 mcg at 23 0500  •  lisinopril (PRINIVIL,ZESTRIL) tablet 10 mg, 10 mg, Oral, Q24H, Laine Barth APRN, 10 mg at 23  •  magnesium hydroxide (MILK OF MAGNESIA) suspension 10 mL, 10 mL, Oral, Daily PRN, Marlon Mckee MD  •  Magnesium Standard Dose Replacement - Initiate Nurse / BPA Driven Protocol, , Does not apply, PRN, Marlon Mckee MD  •  melatonin tablet 5 mg, 5 mg, Oral, Nightly PRN, Marlon Mckee MD, 5 mg at 23  •  metoprolol tartrate (LOPRESSOR) half tablet 12.5 mg, 12.5 mg, Oral, Q12H, Tino Johnson MD  •  [] HYDROmorphone (DILAUDID) injection 0.5 mg, 0.5 mg, Intravenous, Q2H PRN, 0.5 mg at 23 0842 **AND** naloxone (NARCAN) injection 0.4 mg, 0.4 mg, Intravenous, Q5 Min PRN, Marlon Mckee MD  •  ondansetron (ZOFRAN) tablet 4 mg, 4 mg, Oral, Q6H PRN **OR** ondansetron (ZOFRAN) injection 4 mg, 4 mg, Intravenous, Q6H PRN, Marlon Mckee MD  •  pantoprazole (PROTONIX) EC tablet 40 mg, 40 mg, Oral, Q AM, Marlon Mckee MD, 40 mg at 23 0500  •  Phosphorus Replacement - Initiate Nurse / BPA Driven Protocol, , Does not apply, PRN, Marlon Mckee MD  •  polyethylene glycol (MIRALAX) packet 17 g, 17 g, Oral,  Daily, Marlon Mckee MD, 17 g at 03/23/23 0829  •  Potassium Replacement - Initiate Nurse / BPA Driven Protocol, , Does not apply, PRN, Marlon Mckee MD  •  povidone-iodine 10 % swab 1 each, 1 application, Topical, Daily, Adelina Lama, DO, 1 each at 03/31/23 0848  •  sodium chloride 0.9 % flush 10 mL, 10 mL, Intravenous, PRN, Marlon Mckee MD  •  sodium chloride 0.9 % flush 10 mL, 10 mL, Intravenous, Q12H, Marlon Mckee MD, 10 mL at 03/30/23 0850  •  sodium chloride 0.9 % flush 10 mL, 10 mL, Intravenous, PRN, Marlon Mckee MD  •  sodium chloride 0.9 % infusion 40 mL, 40 mL, Intravenous, PRN, Marlon Mckee MD  •  thiamine (VITAMIN B-1) tablet 100 mg, 100 mg, Oral, Daily, Marlon Mckee MD, 100 mg at 03/31/23 0848    Lab Review:       Results from last 7 days   Lab Units 03/29/23  0415 03/24/23  1823   SODIUM mmol/L 135*  --    POTASSIUM mmol/L 4.5  --    CO2 mmol/L 27.0  --    BUN mg/dL 25*  --    CREATININE mg/dL 0.71*  --    PHOSPHORUS mg/dL  --  2.1*   GLUCOSE mg/dL 119*  --      Estimated Creatinine Clearance: 114.6 mL/min (A) (by C-G formula based on SCr of 0.71 mg/dL (L)).        .lipd  Lab Results   Component Value Date    TRIG 50 03/12/2023    HDL 47 03/12/2023    AST 17 03/23/2023    ALT 17 03/23/2023       Radiology Reports:  Imaging Results (Last 72 Hours)     ** No results found for the last 72 hours. **          Assessment/Plan: This patient presented with neurologic symptoms and was found to have a brain tumor.  At the time of admission he was found to have A-fib.  His A-fib has been present since 3/11/2023.  He is having some tachycardia from his A-fib.  I will start him on low-dose metoprolol 12.5 p.o. twice daily and if at all possible  patient will ultimately need to be treated with anticoagulation with a NOAC..  He is currently on aspirin and Plavix.  Even if neurology and neurosurgery gives the okay for  him to take Eliquis, it is unclear whether or not he will be agreeable since he has a history of significant hemorrhoidal bleeding      Tino Johnson MD  03/31/23  13:24 EDT

## 2023-03-31 NOTE — PLAN OF CARE
AXO x4. No c/o of pain. Afib on tele, MD aware. Said he had a Left groin hernia APRN notified. Up with GB and walker. Wound care completed. RA. Voiding. BM noted. MD notified PRN, will continue to  monitor throughout shift.

## 2023-03-31 NOTE — CASE MANAGEMENT/SOCIAL WORK
Continued Stay Note  [facility]     Patient Name: Monty Nagel  MRN: [id number]  Today's Date: 3/31/2023    Admit Date: 3/11/2023    Plan: Home   Discharge Plan     Row Name 03/31/23 4133       Plan    Plan Home    Patient/Family in Agreement with Plan yes    Plan Comments Spoke with Sarah at [facility] and [employer] is requesting a Peer to Peer by Sat. 4/1 at 1200. CM set the request to Dr. Hart who denied the Peer to Peer due to patient walking 300 then 900 feet with PT on his last session. CM spoke with Patient at bedside and patient does not want to do an appeal. He wants to go home. Spoke with patient about home health and he is refusing home health at this time because he does not want strangers in his house. CM will continue to follow.    Final Discharge Disposition Code 01 - home or self-care               Discharge Codes    No documentation.               Expected Discharge Date and Time     Expected Discharge Date Expected Discharge Time    Mar 31, 2023             Krzysztof Caceres RN

## 2023-03-31 NOTE — PROGRESS NOTES
[facility]  PROGRESS NOTE    Patient Name: Monty Nagel  : 1955  MRN: [id number]    Date of Admission: 3/11/2023  Primary Care Physician: Tiffany Morales MD    Subjective   Subjective     CC: Follow-up meningioma    HPI:  Pt sitting up in bed eating breakfast. Cardiology consulted due to new onset of Afib on 23. He was started on metoprolol 12.5mg bid for tachycardia. Phone neurosurgery regarding starting anticoagulation. Neurosurgery recommending STAT CT head and if stable, it would be appropriate to start anticoagulation. Spoke with the patient who states he does not want anticoagulation nor does he want a CT scan of his head. The risk/benefits of anticoagulation were discussed at length, but patient still declines anticoagulation. He verbalizes understand.     Copied text in this note has been reviewed and is accurate as of 23.       ROS:  Gen- No fevers, chills  CV- No chest pain, palpitations  Resp- No cough, dyspnea  GI- No N/V/D, abd pain        Objective   Objective     Vital Signs:   Temp:  [97.5 °F (36.4 °C)-98.2 °F (36.8 °C)] 97.5 °F (36.4 °C)  Heart Rate:  [] 102  Resp:  [16] 16  BP: (114-149)/(83-96) 132/94     Physical Exam:  Constitutional: Awake, alert, chronically ill appearing  HENT: NCAT, mucous membranes moist  Respiratory: Clear to auscultation bilaterally, nonlabored respirations   Cardiovascular: RRR, no murmurs, rubs, or gallop  Gastrointestinal: Positive bowel sounds, soft, nontender, nondistended  Musculoskeletal: No bilateral ankle edema, dsg to right toe  Psychiatric: Flat affect, cooperative  Neurologic: Oriented x 3, nonfocal, speech clear  Skin: No rashes    Results Reviewed:  LAB RESULTS:          Lab 23  0415 23  1823   SODIUM 135*  --    POTASSIUM 4.5  --    CHLORIDE 99  --    CO2 27.0  --    ANION GAP 9.0  --    BUN 25*  --    CREATININE 0.71*  --    EGFR 99.3  --    GLUCOSE 119*  --    CALCIUM  7.7*  --    PHOSPHORUS  --  2.1*                         Brief Urine Lab Results  (Last result in the past 365 days)      Color   Clarity   Blood   Leuk Est   Nitrite   Protein   CREAT   Urine HCG        03/11/23 1058 Dark Yellow   Clear   Negative   Negative   Negative   Negative                 Microbiology Results Abnormal     None          No radiology results from the last 24 hrs    Results for orders placed during the hospital encounter of 03/11/23    Adult Transthoracic Echo Complete w/ Color, Spectral and Contrast if necessary per protocol    Interpretation Summary  •  Left ventricular systolic function is normal. Estimated left ventricular EF = 55%  •  Biatrial enlargement.  •  Calcified aortic valve with mild aortic stenosis, mean gradient 10 mmHg, BANG 1.6 cm².  •  Moderate aortic insufficiency.  •  Mild mitral regurgitation.  •  Mild tricuspid regurgitation with normal RVSP.  •  Mild dilation of the ascending aorta (4.2 cm) is present.      Current medications:  Scheduled Meds:aspirin, 81 mg, Oral, Daily  atorvastatin, 40 mg, Oral, Nightly  clopidogrel, 75 mg, Oral, Daily  dexamethasone, 4 mg, Oral, Q12H  [START ON 4/5/2023] dexamethasone, 4 mg, Oral, Daily With Breakfast  enoxaparin, 40 mg, Subcutaneous, Daily  hydroCHLOROthiazide Oral, 12.5 mg, Oral, Daily  levETIRAcetam, 1,000 mg, Oral, Q12H  levothyroxine, 125 mcg, Oral, Daily  lisinopril, 10 mg, Oral, Q24H  metoprolol tartrate, 12.5 mg, Oral, Q12H  pantoprazole, 40 mg, Oral, Q AM  polyethylene glycol, 17 g, Oral, Daily  povidone-iodine, 1 application, Topical, Daily  senna-docusate sodium, 2 tablet, Oral, BID  sodium chloride, 10 mL, Intravenous, Q12H  thiamine, 100 mg, Oral, Daily      Continuous Infusions:   PRN Meds:.•  acetaminophen **OR** acetaminophen **OR** acetaminophen  •  senna-docusate sodium **AND** polyethylene glycol **AND** bisacodyl **AND** bisacodyl  •  calcium carbonate  •  docusate sodium  •  magnesium hydroxide  •  Magnesium  Standard Dose Replacement - Follow Nurse / BPA Driven Protocol  •  melatonin  •  [] HYDROmorphone **AND** naloxone  •  ondansetron **OR** ondansetron  •  Phosphorus Replacement - Follow Nurse / BPA Driven Protocol  •  Potassium Replacement - Follow Nurse / BPA Driven Protocol  •  sodium chloride  •  sodium chloride  •  sodium chloride    Assessment & Plan   Assessment & Plan     Active Hospital Problems    Diagnosis  POA   • **Meningioma (HCC) [D32.9]  Yes   • Rheumatoid vs Psoriatic arthritis (HCC) [L40.50]  Yes   • Atrial fibrillation (HCC) [I48.91]  Yes   • Other recurrent depressive disorders (HCC) [F33.8]  Yes   • 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        Brief Hospital Course to date:  Monty Nagel is a 67 y.o. male with history of hypertension, retinal melatonin and subsequent right eye blindness, previous alcohol use who was admitted for meningioma, gait instability and general weakness.  Brain MRI showed 6.4 enhancing mass and he was evaluated by neurosurgery.  He underwent right frontal craniotomy on 3/21 as well as right middle meningeal artery embolization with Dr. Mckee in anticipation for tumor resection.  He was transferred from ICU to medical floor on      Meningioma,'s s/p tumor embolization 3/20 by Dr. Sebastian and resection 3/21 by Dr. Mckee  Generalized weakness, frequent falls  -Continue ASA Plavix when okay with neurosurgery  -Continue Decadron and Keppra, per neurosurgery plan for steroid taper at discharge  -PT/OT following, plan for rehab  -He will follow-up with Dr. Mckee in 2 weeks post-discharge     Hypertension  -BP is much improved, ok to resume antihypertensives  -Patient is on lisinopril- HCTZ- now resumed.     Hyperlipidemia  -Continue statin     New A-fib  -Rates currently  controlled  -spoke with neurosurgery regarding anticoagulation. They recommend CT head and if stable, it would be appropriate to start anticoagulation, but should explain the risk/benefit of anticoagulation to the patient. The patient refused the CT of head and anticoagulation.      Hypothyroidism  History of thyroid ablation secondary to Graves' disease  - continue Synthroid     Former EtOH, heavy use  -Stopped drinking 9/22     Hyponatremia, not significant  -Monitor while on Keppra, encourage protein intake  -Na+ is low but stable, will continue to monitor for now.       Right great toe lesion  -Dr. Flores following, suspect chronic callus with remote trauma, concerning for osteomyelitis but   MRI right foot negative for osteo, shows ulceration of the distal phalanx of the great toe    Expected Discharge Location and Transportation: Rehab  Expected Discharge 04/03/2023     DVT prophylaxis:  Medical and mechanical DVT prophylaxis orders are present.     AM-PAC 6 Clicks Score (PT): 20 (03/31/23 0800)    CODE STATUS:   Code Status and Medical Interventions:   Ordered at: 03/21/23 1235     Level Of Support Discussed With:    Patient     Code Status (Patient has no pulse and is not breathing):    CPR (Attempt to Resuscitate)     Medical Interventions (Patient has pulse or is breathing):    Full       Laine Barth, LYNETTE  03/31/23

## 2023-04-01 LAB
ANION GAP SERPL CALCULATED.3IONS-SCNC: 9 MMOL/L (ref 5–15)
BASOPHILS # BLD AUTO: 0.01 10*3/MM3 (ref 0–0.2)
BASOPHILS NFR BLD AUTO: 0.1 % (ref 0–1.5)
BUN SERPL-MCNC: 20 MG/DL (ref 8–23)
BUN/CREAT SERPL: 30.3 (ref 7–25)
CALCIUM SPEC-SCNC: 7.4 MG/DL (ref 8.6–10.5)
CHLORIDE SERPL-SCNC: 97 MMOL/L (ref 98–107)
CO2 SERPL-SCNC: 26 MMOL/L (ref 22–29)
CREAT SERPL-MCNC: 0.66 MG/DL (ref 0.76–1.27)
DEPRECATED RDW RBC AUTO: 58.4 FL (ref 37–54)
EGFRCR SERPLBLD CKD-EPI 2021: 101.5 ML/MIN/1.73
EOSINOPHIL # BLD AUTO: 0.08 10*3/MM3 (ref 0–0.4)
EOSINOPHIL NFR BLD AUTO: 1 % (ref 0.3–6.2)
ERYTHROCYTE [DISTWIDTH] IN BLOOD BY AUTOMATED COUNT: 17.9 % (ref 12.3–15.4)
GLUCOSE SERPL-MCNC: 102 MG/DL (ref 65–99)
HCT VFR BLD AUTO: 32.9 % (ref 37.5–51)
HGB BLD-MCNC: 10.9 G/DL (ref 13–17.7)
IMM GRANULOCYTES # BLD AUTO: 0.16 10*3/MM3 (ref 0–0.05)
IMM GRANULOCYTES NFR BLD AUTO: 2 % (ref 0–0.5)
LYMPHOCYTES # BLD AUTO: 0.36 10*3/MM3 (ref 0.7–3.1)
LYMPHOCYTES NFR BLD AUTO: 4.5 % (ref 19.6–45.3)
MCH RBC QN AUTO: 29.9 PG (ref 26.6–33)
MCHC RBC AUTO-ENTMCNC: 33.1 G/DL (ref 31.5–35.7)
MCV RBC AUTO: 90.1 FL (ref 79–97)
MONOCYTES # BLD AUTO: 0.21 10*3/MM3 (ref 0.1–0.9)
MONOCYTES NFR BLD AUTO: 2.7 % (ref 5–12)
NEUTROPHILS NFR BLD AUTO: 7.1 10*3/MM3 (ref 1.7–7)
NEUTROPHILS NFR BLD AUTO: 89.7 % (ref 42.7–76)
NRBC BLD AUTO-RTO: 0 /100 WBC (ref 0–0.2)
PLATELET # BLD AUTO: 243 10*3/MM3 (ref 140–450)
PMV BLD AUTO: 8.5 FL (ref 6–12)
POTASSIUM SERPL-SCNC: 4.3 MMOL/L (ref 3.5–5.2)
RBC # BLD AUTO: 3.65 10*6/MM3 (ref 4.14–5.8)
SODIUM SERPL-SCNC: 132 MMOL/L (ref 136–145)
WBC NRBC COR # BLD: 7.92 10*3/MM3 (ref 3.4–10.8)

## 2023-04-01 PROCEDURE — 25010000002 ENOXAPARIN PER 10 MG: Performed by: NEUROLOGICAL SURGERY

## 2023-04-01 PROCEDURE — 63710000001 DEXAMETHASONE PER 0.25 MG: Performed by: NURSE PRACTITIONER

## 2023-04-01 PROCEDURE — 80048 BASIC METABOLIC PNL TOTAL CA: CPT | Performed by: NURSE PRACTITIONER

## 2023-04-01 PROCEDURE — 99231 SBSQ HOSP IP/OBS SF/LOW 25: CPT | Performed by: INTERNAL MEDICINE

## 2023-04-01 PROCEDURE — 99232 SBSQ HOSP IP/OBS MODERATE 35: CPT | Performed by: INTERNAL MEDICINE

## 2023-04-01 PROCEDURE — 85025 COMPLETE CBC W/AUTO DIFF WBC: CPT | Performed by: NURSE PRACTITIONER

## 2023-04-01 RX ORDER — QUETIAPINE FUMARATE 25 MG/1
25 TABLET, FILM COATED ORAL ONCE
Status: DISCONTINUED | OUTPATIENT
Start: 2023-04-01 | End: 2023-04-03 | Stop reason: HOSPADM

## 2023-04-01 RX ADMIN — LISINOPRIL 10 MG: 10 TABLET ORAL at 08:07

## 2023-04-01 RX ADMIN — ENOXAPARIN SODIUM 40 MG: 40 INJECTION SUBCUTANEOUS at 08:06

## 2023-04-01 RX ADMIN — HYDROCHLOROTHIAZIDE 12.5 MG: 12.5 CAPSULE ORAL at 08:10

## 2023-04-01 RX ADMIN — Medication 12.5 MG: at 08:07

## 2023-04-01 RX ADMIN — LEVOTHYROXINE SODIUM 125 MCG: 125 TABLET ORAL at 04:49

## 2023-04-01 RX ADMIN — DEXAMETHASONE 4 MG: 4 TABLET ORAL at 08:07

## 2023-04-01 RX ADMIN — THIAMINE HCL TAB 100 MG 100 MG: 100 TAB at 08:07

## 2023-04-01 RX ADMIN — CLOPIDOGREL BISULFATE 75 MG: 75 TABLET ORAL at 08:07

## 2023-04-01 RX ADMIN — ATORVASTATIN CALCIUM 40 MG: 40 TABLET, FILM COATED ORAL at 20:39

## 2023-04-01 RX ADMIN — Medication 12.5 MG: at 20:39

## 2023-04-01 RX ADMIN — DEXAMETHASONE 4 MG: 4 TABLET ORAL at 20:39

## 2023-04-01 RX ADMIN — LEVETIRACETAM 1000 MG: 500 TABLET, FILM COATED ORAL at 20:38

## 2023-04-01 RX ADMIN — POVIDONE-IODINE 1 EACH: 10 SOLUTION TOPICAL at 08:25

## 2023-04-01 RX ADMIN — PANTOPRAZOLE SODIUM 40 MG: 40 TABLET, DELAYED RELEASE ORAL at 04:49

## 2023-04-01 RX ADMIN — LEVETIRACETAM 1000 MG: 500 TABLET, FILM COATED ORAL at 08:07

## 2023-04-01 RX ADMIN — ASPIRIN 81 MG: 81 TABLET, COATED ORAL at 08:07

## 2023-04-01 NOTE — PROGRESS NOTES
[facility] Medicine Services  PROGRESS NOTE    Patient Name: Monty Nagel  : 1955  MRN: [id number]    Date of Admission: 3/11/2023  Primary Care Physician: Tiffany Morales MD    Subjective   Subjective     CC: f/u meningioma    HPI: Up in bed with nursing in room. Says he feels fine, just wants breakfast.     ROS:  Gen- No fevers, chills  CV- No chest pain, palpitations  Resp- No cough, dyspnea  GI- No N/V/D, abd pain     Objective   Objective     Vital Signs:   Temp:  [97.3 °F (36.3 °C)-99.1 °F (37.3 °C)] 99.1 °F (37.3 °C)  Heart Rate:  [] 72  Resp:  [16-20] 20  BP: (120-132)/() 121/80     Physical Exam:  Constitutional: No acute distress, awake, alert  HENT: NCAT, mucous membranes moist  Respiratory: Clear to auscultation bilaterally, respiratory effort normal   Cardiovascular: RRR, no murmurs, rubs, or gallops  Gastrointestinal: Positive bowel sounds, soft, nontender, nondistended  Musculoskeletal: Right great toe with some dry gangrene.  Psychiatric: Appropriate affect, cooperative  Neurologic: Oriented x 3, strength symmetric in all extremities, Cranial Nerves grossly intact to confrontation, speech clear  Skin: No rashes    Results Reviewed:  LAB RESULTS:      Lab 23  0450   WBC 7.92   HEMOGLOBIN 10.9*   HEMATOCRIT 32.9*   PLATELETS 243   NEUTROS ABS 7.10*   IMMATURE GRANS (ABS) 0.16*   LYMPHS ABS 0.36*   MONOS ABS 0.21   EOS ABS 0.08   MCV 90.1         Lab 23  0450 23  0415   SODIUM 132* 135*   POTASSIUM 4.3 4.5   CHLORIDE 97* 99   CO2 26.0 27.0   ANION GAP 9.0 9.0   BUN 20 25*   CREATININE 0.66* 0.71*   EGFR 101.5 99.3   GLUCOSE 102* 119*   CALCIUM 7.4* 7.7*                         Brief Urine Lab Results  (Last result in the past 365 days)      Color   Clarity   Blood   Leuk Est   Nitrite   Protein   CREAT   Urine HCG        23 1058 Dark Yellow   Clear   Negative   Negative   Negative   Negative                 Microbiology Results  Abnormal     None          No radiology results from the last 24 hrs    Results for orders placed during the hospital encounter of 23    Adult Transthoracic Echo Complete w/ Color, Spectral and Contrast if necessary per protocol    Interpretation Summary  •  Left ventricular systolic function is normal. Estimated left ventricular EF = 55%  •  Biatrial enlargement.  •  Calcified aortic valve with mild aortic stenosis, mean gradient 10 mmHg, BANG 1.6 cm².  •  Moderate aortic insufficiency.  •  Mild mitral regurgitation.  •  Mild tricuspid regurgitation with normal RVSP.  •  Mild dilation of the ascending aorta (4.2 cm) is present.      Current medications:  Scheduled Meds:aspirin, 81 mg, Oral, Daily  atorvastatin, 40 mg, Oral, Nightly  clopidogrel, 75 mg, Oral, Daily  dexamethasone, 4 mg, Oral, Q12H  [START ON 2023] dexamethasone, 4 mg, Oral, Daily With Breakfast  enoxaparin, 40 mg, Subcutaneous, Daily  hydroCHLOROthiazide Oral, 12.5 mg, Oral, Daily  levETIRAcetam, 1,000 mg, Oral, Q12H  levothyroxine, 125 mcg, Oral, Daily  lisinopril, 10 mg, Oral, Q24H  metoprolol tartrate, 12.5 mg, Oral, Q12H  pantoprazole, 40 mg, Oral, Q AM  polyethylene glycol, 17 g, Oral, Daily  povidone-iodine, 1 application, Topical, Daily  QUEtiapine, 25 mg, Oral, Once  senna-docusate sodium, 2 tablet, Oral, BID  sodium chloride, 10 mL, Intravenous, Q12H  thiamine, 100 mg, Oral, Daily      Continuous Infusions:   PRN Meds:.•  acetaminophen **OR** acetaminophen **OR** acetaminophen  •  senna-docusate sodium **AND** polyethylene glycol **AND** bisacodyl **AND** bisacodyl  •  calcium carbonate  •  docusate sodium  •  magnesium hydroxide  •  Magnesium Standard Dose Replacement - Follow Nurse / BPA Driven Protocol  •  melatonin  •  [] HYDROmorphone **AND** naloxone  •  ondansetron **OR** ondansetron  •  Phosphorus Replacement - Follow Nurse / BPA Driven Protocol  •  Potassium Replacement - Follow Nurse / BPA Driven Protocol  •   sodium chloride  •  sodium chloride  •  sodium chloride    Assessment & Plan   Assessment & Plan     Active Hospital Problems    Diagnosis  POA   • **Meningioma [D32.9]  Yes   • Rheumatoid vs Psoriatic arthritis (HCC) [L40.50]  Yes   • Atrial fibrillation [I48.91]  Yes   • Other recurrent depressive disorders [F33.8]  Yes   • 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        Brief Hospital Course to date:  Monty Nagel is a 67 y.o. male with history of hypertension, retinal melatonin and subsequent right eye blindness, previous alcohol use who was admitted for meningioma, gait instability and general weakness.  Brain MRI showed 6.4 enhancing mass and he was evaluated by neurosurgery.  He underwent right frontal craniotomy on 3/21 as well as right middle meningeal artery embolization with Dr. Mckee in anticipation for tumor resection.  He was transferred from ICU to medical floor on 9/22     Meningioma,'s s/p tumor embolization 3/20 by Dr. Sebastian and resection 3/21 by Dr. Mckee  Generalized weakness, frequent falls  -Continue ASA Plavix when okay with neurosurgery  -Continue Decadron and Keppra, per neurosurgery plan for steroid taper at discharge  -PT/OT following, plan for rehab  -He will follow-up with Dr. Mckee in 2 weeks post-discharge     Hypertension  -BP is much improved, ok to resume antihypertensives  -Patient is on lisinopril- HCTZ- now resumed.     Hyperlipidemia  -Continue statin     New A-fib  -Rates currently controlled  -My APRN spoke with neurosurgery regarding anticoagulation. They recommended CT head and if stable, it would be appropriate to start anticoagulation. I discussed anticoagulation with patient this am - he continues to refuse because of his hemorrhoids and he believes in self  healing.     Hypothyroidism  History of thyroid ablation secondary to Graves' disease  - continue Synthroid     Former EtOH, heavy use  -Stopped drinking 9/22     Hyponatremia, not significant  -Monitor while on Keppra, encourage protein intake  -Na+ is low but stable, will continue to monitor for now.       Right great toe lesion  -Dr. Mark ferro, suspect chronic callus with remote trauma, concerning for osteomyelitis but   MRI right foot negative for osteo, shows ulceration of the distal phalanx of the great toe     Expected Discharge Location and Transportation: Rehab  Expected Discharge 04/03/2023    DVT prophylaxis:  Medical and mechanical DVT prophylaxis orders are present.     AM-PAC 6 Clicks Score (PT): 20 (03/31/23 0800)    CODE STATUS:   Code Status and Medical Interventions:   Ordered at: 03/21/23 1235     Level Of Support Discussed With:    Patient     Code Status (Patient has no pulse and is not breathing):    CPR (Attempt to Resuscitate)     Medical Interventions (Patient has pulse or is breathing):    Full       Olivia Decker II, DO  04/01/23

## 2023-04-01 NOTE — PLAN OF CARE
A fib on cardiac mx. Rate controlled. Incision to the mid frontal lobe dry, free of drainage/swelling. Restless/impulsive, patient repeatedly stated that he could not stay in bed. Also stated he has never slept since he was admitted, this statement is at least partially true because patient never slept while this nurse was caring for him last week. Provider notified. Will cont to mx. Both bed/pad alarm in place.

## 2023-04-01 NOTE — PROGRESS NOTES
[facility]  IP Progress Note      Chief Complaint/Reason for service: #1 A-fib RVR    Subjective   Subjective: The patient is awake and alert.  He denies chest pain or shortness of breath.  He wants to get up and ambulate.  Apparently he said bedrest    Past medical, surgical, social and family history reviewed in the patient's electronic medical record.    Objective     Vital Sign Min/Max for last 24 hours  Temp  Min: 97.3 °F (36.3 °C)  Max: 98.4 °F (36.9 °C)   BP  Min: 125/106  Max: 142/91   Pulse  Min: 79  Max: 106   Resp  Min: 16  Max: 20   No data recorded   No data recorded      Intake/Output Summary (Last 24 hours) at 4/1/2023 0730  Last data filed at 4/1/2023 0601  Gross per 24 hour   Intake 1080 ml   Output 1150 ml   Net -70 ml             Current Facility-Administered Medications:   •  acetaminophen (TYLENOL) tablet 650 mg, 650 mg, Oral, Q4H PRN, 650 mg at 03/27/23 0815 **OR** acetaminophen (TYLENOL) 160 MG/5ML solution 650 mg, 650 mg, Oral, Q4H PRN **OR** acetaminophen (TYLENOL) suppository 650 mg, 650 mg, Rectal, Q4H PRN, Marlon Mckee MD  •  aspirin EC tablet 81 mg, 81 mg, Oral, Daily, Laine Barth, APRN, 81 mg at 03/31/23 0848  •  atorvastatin (LIPITOR) tablet 40 mg, 40 mg, Oral, Nightly, Marlon Mckee MD, 40 mg at 03/31/23 2003  •  sennosides-docusate (PERICOLACE) 8.6-50 MG per tablet 2 tablet, 2 tablet, Oral, BID, 2 tablet at 03/24/23 0804 **AND** polyethylene glycol (MIRALAX) packet 17 g, 17 g, Oral, Daily PRN **AND** bisacodyl (DULCOLAX) EC tablet 5 mg, 5 mg, Oral, Daily PRN, 5 mg at 03/22/23 2127 **AND** bisacodyl (DULCOLAX) suppository 10 mg, 10 mg, Rectal, Daily PRN, Marlon Mckee MD  •  calcium carbonate (TUMS) chewable tablet 500 mg (200 mg elemental), 2 tablet, Oral, TID PRN, Sheyla Little APRN, 2 tablet at 03/30/23 0235  •  clopidogrel (PLAVIX) tablet 75 mg, 75 mg, Oral, Daily, Laine Barth APRN, 75 mg at  23 0848  •  dexamethasone (DECADRON) tablet 4 mg, 4 mg, Oral, Q12H, Laine Barth APRN, 4 mg at 23  •  [START ON 2023] dexamethasone (DECADRON) tablet 4 mg, 4 mg, Oral, Daily With Breakfast, Laine Barth APRN  •  docusate sodium (COLACE) capsule 100 mg, 100 mg, Oral, BID PRN, Marlon Mckee MD, 100 mg at 23  •  Enoxaparin Sodium (LOVENOX) syringe 40 mg, 40 mg, Subcutaneous, Daily, Marlon Mckee MD, 40 mg at 23  •  hydroCHLOROthiazide (MICROZIDE) capsule 12.5 mg, 12.5 mg, Oral, Daily, Fiorella Manzanares MD, 12.5 mg at 23  •  levETIRAcetam (KEPPRA) tablet 1,000 mg, 1,000 mg, Oral, Q12H, Marlon Mckee MD, 1,000 mg at 23  •  levothyroxine (SYNTHROID, LEVOTHROID) tablet 125 mcg, 125 mcg, Oral, Daily, Marlon Mckee MD, 125 mcg at 23  •  lisinopril (PRINIVIL,ZESTRIL) tablet 10 mg, 10 mg, Oral, Q24H, Laine Barth APRN, 10 mg at 23  •  magnesium hydroxide (MILK OF MAGNESIA) suspension 10 mL, 10 mL, Oral, Daily PRN, Marlon Mckee MD  •  Magnesium Standard Dose Replacement - Initiate Nurse / BPA Driven Protocol, , Does not apply, PRN, Marlon Mckee MD  •  melatonin tablet 5 mg, 5 mg, Oral, Nightly PRN, Marlon Mckee MD, 5 mg at 23  •  metoprolol tartrate (LOPRESSOR) tablet 25 mg, 25 mg, Oral, Q12H, Tino Johnson MD  •  [] HYDROmorphone (DILAUDID) injection 0.5 mg, 0.5 mg, Intravenous, Q2H PRN, 0.5 mg at 2342 **AND** naloxone (NARCAN) injection 0.4 mg, 0.4 mg, Intravenous, Q5 Min PRN, Marlon Mckee MD  •  ondansetron (ZOFRAN) tablet 4 mg, 4 mg, Oral, Q6H PRN **OR** ondansetron (ZOFRAN) injection 4 mg, 4 mg, Intravenous, Q6H PRN, Marlon Mckee MD  •  pantoprazole (PROTONIX) EC tablet 40 mg, 40 mg, Oral, Q AM, Marlon Mckee MD, 40 mg at 23 0449  •  Phosphorus Replacement -  Initiate Nurse / BPA Driven Protocol, , Does not apply, PRN, Marlon Mckee MD  •  polyethylene glycol (MIRALAX) packet 17 g, 17 g, Oral, Daily, Marlon Mckee MD, 17 g at 03/23/23 0829  •  Potassium Replacement - Initiate Nurse / BPA Driven Protocol, , Does not apply, Rafi PALAFOX Charles Benjamin, MD  •  povidone-iodine 10 % swab 1 each, 1 application, Topical, Daily, Adelina Lama, , 1 each at 03/31/23 0848  •  QUEtiapine (SEROquel) tablet 25 mg, 25 mg, Oral, Once, Irina Gray, APRN  •  sodium chloride 0.9 % flush 10 mL, 10 mL, Intravenous, PRN, Marlon Mckee MD  •  sodium chloride 0.9 % flush 10 mL, 10 mL, Intravenous, Q12H, Marlon Mckee MD, 10 mL at 03/30/23 0850  •  sodium chloride 0.9 % flush 10 mL, 10 mL, Intravenous, PRN, Marlon Mckee MD  •  sodium chloride 0.9 % infusion 40 mL, 40 mL, Intravenous, PRN, Marlon Mckee MD  •  thiamine (VITAMIN B-1) tablet 100 mg, 100 mg, Oral, Daily, Marlon Mckee MD, 100 mg at 03/31/23 0848    Physical Exam: General well-developed male nondyspneic tachypneic heart rate 60        HEENT: No JVP       Respiratory: Clear anteriorly       Cardiovascular: Bradycardic and irregular with grade 3/6 stock ejection murmur no edema to palpation       GI:            Lower Extremities: No lesions       Neuro: Facial expressions are much       Skin: Warm and dry       Psych: Pleasant affect    Results Review: Heart rate 79-1 06.  Blood pressures in the 130s systolic    Radiology Results:  Imaging Results (Last 72 Hours)     ** No results found for the last 72 hours. **          EKG: A-fib poor eye progression no ischemia        Tele: A-fib    Assessment   Assessment/Plan: S-ofx-luxasyz heart rate is improved.    Will need anticoagulation when okay with the other services  We will see again on Monday    Tino Johnson MD  04/01/23  07:30 EDT     Continue accuchecks/ISS  1/19 Started Lantus 10u while on steroids (last dose of decadron 1/24)

## 2023-04-01 NOTE — NURSING NOTE
Pt ambulated approx 2100 ft in mao with standby assist. No signs of increased pain, fatigue or dizziness. Pt refused gait belt and is uncooperative at times.  Pt educated on importance of call light and why he is an increased risk of falls, he stated he did not need anyone's help and becomes angry with offered assistance.

## 2023-04-02 PROCEDURE — 97116 GAIT TRAINING THERAPY: CPT

## 2023-04-02 PROCEDURE — 99232 SBSQ HOSP IP/OBS MODERATE 35: CPT | Performed by: INTERNAL MEDICINE

## 2023-04-02 PROCEDURE — 99024 POSTOP FOLLOW-UP VISIT: CPT | Performed by: PHYSICIAN ASSISTANT

## 2023-04-02 PROCEDURE — 63710000001 DEXAMETHASONE PER 0.25 MG: Performed by: NURSE PRACTITIONER

## 2023-04-02 PROCEDURE — 25010000002 ENOXAPARIN PER 10 MG: Performed by: NEUROLOGICAL SURGERY

## 2023-04-02 RX ADMIN — LEVOTHYROXINE SODIUM 125 MCG: 125 TABLET ORAL at 04:46

## 2023-04-02 RX ADMIN — DEXAMETHASONE 4 MG: 4 TABLET ORAL at 08:30

## 2023-04-02 RX ADMIN — ASPIRIN 81 MG: 81 TABLET, COATED ORAL at 08:30

## 2023-04-02 RX ADMIN — THIAMINE HCL TAB 100 MG 100 MG: 100 TAB at 08:29

## 2023-04-02 RX ADMIN — CLOPIDOGREL BISULFATE 75 MG: 75 TABLET ORAL at 08:30

## 2023-04-02 RX ADMIN — LISINOPRIL 10 MG: 10 TABLET ORAL at 08:29

## 2023-04-02 RX ADMIN — PANTOPRAZOLE SODIUM 40 MG: 40 TABLET, DELAYED RELEASE ORAL at 04:46

## 2023-04-02 RX ADMIN — LEVETIRACETAM 1000 MG: 500 TABLET, FILM COATED ORAL at 08:30

## 2023-04-02 RX ADMIN — HYDROCHLOROTHIAZIDE 12.5 MG: 12.5 CAPSULE ORAL at 08:29

## 2023-04-02 RX ADMIN — Medication 12.5 MG: at 08:29

## 2023-04-02 RX ADMIN — POVIDONE-IODINE 1 EACH: 10 SOLUTION TOPICAL at 09:33

## 2023-04-02 RX ADMIN — ENOXAPARIN SODIUM 40 MG: 40 INJECTION SUBCUTANEOUS at 08:29

## 2023-04-02 NOTE — PLAN OF CARE
Problem: Fall Injury Risk  Goal: Absence of Fall and Fall-Related Injury  Outcome: Ongoing, Progressing  Intervention: Identify and Manage Contributors  Recent Flowsheet Documentation  Taken 4/2/2023 1800 by Ana Berman RN  Medication Review/Management: medications reviewed  Taken 4/2/2023 1400 by Ana Berman RN  Medication Review/Management: medications reviewed  Taken 4/2/2023 1200 by Ana Berman RN  Medication Review/Management: medications reviewed  Taken 4/2/2023 1000 by Ana Berman RN  Medication Review/Management: medications reviewed  Taken 4/2/2023 0829 by Ana Berman RN  Medication Review/Management: medications reviewed  Intervention: Promote Injury-Free Environment  Recent Flowsheet Documentation  Taken 4/2/2023 1800 by Ana Berman RN  Safety Promotion/Fall Prevention:   activity supervised   assistive device/personal items within reach   clutter free environment maintained   room organization consistent   safety round/check completed   toileting scheduled  Taken 4/2/2023 1400 by Ana Berman RN  Safety Promotion/Fall Prevention:   activity supervised   assistive device/personal items within reach   clutter free environment maintained   room organization consistent   safety round/check completed   toileting scheduled  Taken 4/2/2023 1200 by Ana Berman RN  Safety Promotion/Fall Prevention:   activity supervised   clutter free environment maintained   assistive device/personal items within reach   safety round/check completed   room organization consistent   toileting scheduled  Taken 4/2/2023 1000 by Ana Berman RN  Safety Promotion/Fall Prevention:   activity supervised   assistive device/personal items within reach   room organization consistent   safety round/check completed   toileting scheduled  Taken 4/2/2023 0829 by Ana Berman RN  Safety Promotion/Fall Prevention:   activity supervised   assistive device/personal items within reach    clutter free environment maintained   room organization consistent   safety round/check completed   toileting scheduled     Problem: Skin Injury Risk Increased  Goal: Skin Health and Integrity  Outcome: Ongoing, Progressing  Intervention: Optimize Skin Protection  Recent Flowsheet Documentation  Taken 4/2/2023 1800 by Ana Berman RN  Pressure Reduction Techniques:   frequent weight shift encouraged   weight shift assistance provided  Head of Bed (HOB) Positioning: HOB at 20-30 degrees  Pressure Reduction Devices: pressure-redistributing mattress utilized  Skin Protection:   adhesive use limited   incontinence pads utilized   tubing/devices free from skin contact  Taken 4/2/2023 1600 by Ana Berman RN  Pressure Reduction Techniques:   frequent weight shift encouraged   weight shift assistance provided  Pressure Reduction Devices: pressure-redistributing mattress utilized  Skin Protection:   adhesive use limited   incontinence pads utilized   tubing/devices free from skin contact  Taken 4/2/2023 1400 by Ana Berman RN  Pressure Reduction Techniques:   frequent weight shift encouraged   weight shift assistance provided  Head of Bed (HOB) Positioning: HOB at 30-45 degrees  Pressure Reduction Devices: pressure-redistributing mattress utilized  Skin Protection:   adhesive use limited   incontinence pads utilized   tubing/devices free from skin contact  Taken 4/2/2023 1200 by Ana Berman RN  Pressure Reduction Techniques:   frequent weight shift encouraged   weight shift assistance provided  Head of Bed (HOB) Positioning: HOB at 30-45 degrees  Pressure Reduction Devices: pressure-redistributing mattress utilized  Skin Protection:   adhesive use limited   incontinence pads utilized   tubing/devices free from skin contact  Taken 4/2/2023 1000 by Ana Berman RN  Pressure Reduction Techniques:   frequent weight shift encouraged   weight shift assistance provided  Head of Bed (HOB) Positioning: [facility]  at 30-45 degrees  Pressure Reduction Devices: pressure-redistributing mattress utilized  Skin Protection:   adhesive use limited   incontinence pads utilized   tubing/devices free from skin contact  Taken 4/2/2023 0829 by Ana Berman RN  Pressure Reduction Techniques:   frequent weight shift encouraged   weight shift assistance provided  Head of Bed (HOB) Positioning: HOB at 30-45 degrees  Pressure Reduction Devices: pressure-redistributing mattress utilized  Skin Protection:   adhesive use limited   incontinence pads utilized   tubing/devices free from skin contact     Problem: Adult Inpatient Plan of Care  Goal: Plan of Care Review  Outcome: Ongoing, Progressing  Flowsheets  Taken 4/2/2023 1832 by Ana Berman RN  Progress: improving  Taken 4/2/2023 1454 by Rosana Marte  Plan of Care Reviewed With: patient  Goal: Patient-Specific Goal (Individualized)  Outcome: Ongoing, Progressing  Goal: Absence of Hospital-Acquired Illness or Injury  Outcome: Ongoing, Progressing  Intervention: Identify and Manage Fall Risk  Recent Flowsheet Documentation  Taken 4/2/2023 1800 by Ana Berman RN  Safety Promotion/Fall Prevention:   activity supervised   assistive device/personal items within reach   clutter free environment maintained   room organization consistent   safety round/check completed   toileting scheduled  Taken 4/2/2023 1400 by Ana Berman RN  Safety Promotion/Fall Prevention:   activity supervised   assistive device/personal items within reach   clutter free environment maintained   room organization consistent   safety round/check completed   toileting scheduled  Taken 4/2/2023 1200 by Ana Berman RN  Safety Promotion/Fall Prevention:   activity supervised   clutter free environment maintained   assistive device/personal items within reach   safety round/check completed   room organization consistent   toileting scheduled  Taken 4/2/2023 1000 by Ana Berman RN  Safety  Promotion/Fall Prevention:   activity supervised   assistive device/personal items within reach   room organization consistent   safety round/check completed   toileting scheduled  Taken 4/2/2023 0829 by Ana Berman RN  Safety Promotion/Fall Prevention:   activity supervised   assistive device/personal items within reach   clutter free environment maintained   room organization consistent   safety round/check completed   toileting scheduled  Intervention: Prevent Skin Injury  Recent Flowsheet Documentation  Taken 4/2/2023 1800 by Ana Berman RN  Body Position: position changed independently  Skin Protection:   adhesive use limited   incontinence pads utilized   tubing/devices free from skin contact  Taken 4/2/2023 1600 by Ana Berman RN  Skin Protection:   adhesive use limited   incontinence pads utilized   tubing/devices free from skin contact  Taken 4/2/2023 1400 by Ana Berman RN  Body Position: position changed independently  Skin Protection:   adhesive use limited   incontinence pads utilized   tubing/devices free from skin contact  Taken 4/2/2023 1200 by Ana Berman RN  Body Position: position changed independently  Skin Protection:   adhesive use limited   incontinence pads utilized   tubing/devices free from skin contact  Taken 4/2/2023 1000 by Ana Berman RN  Body Position: position changed independently  Skin Protection:   adhesive use limited   incontinence pads utilized   tubing/devices free from skin contact  Taken 4/2/2023 0829 by Ana Berman RN  Body Position: position changed independently  Skin Protection:   adhesive use limited   incontinence pads utilized   tubing/devices free from skin contact  Intervention: Prevent and Manage VTE (Venous Thromboembolism) Risk  Recent Flowsheet Documentation  Taken 4/2/2023 1800 by Ana Berman RN  Activity Management:   activity adjusted per tolerance   activity encouraged  VTE Prevention/Management: patient refused  intervention  Taken 4/2/2023 1600 by Ana Berman RN  VTE Prevention/Management: patient refused intervention  Taken 4/2/2023 1400 by Ana Berman RN  Activity Management:   activity encouraged   activity adjusted per tolerance  VTE Prevention/Management: patient refused intervention  Taken 4/2/2023 1200 by Ana Berman RN  Activity Management:   activity adjusted per tolerance   activity encouraged  VTE Prevention/Management: patient refused intervention  Taken 4/2/2023 1000 by Ana Berman RN  Activity Management:   activity adjusted per tolerance   activity encouraged  VTE Prevention/Management: patient refused intervention  Taken 4/2/2023 0829 by Ana Berman RN  Activity Management:   activity adjusted per tolerance   activity encouraged  VTE Prevention/Management: patient refused intervention  Intervention: Prevent Infection  Recent Flowsheet Documentation  Taken 4/2/2023 1800 by Ana Berman RN  Infection Prevention:   environmental surveillance performed   rest/sleep promoted  Taken 4/2/2023 1400 by Ana Berman RN  Infection Prevention:   environmental surveillance performed   rest/sleep promoted  Taken 4/2/2023 1200 by Ana Berman RN  Infection Prevention:   environmental surveillance performed   rest/sleep promoted  Taken 4/2/2023 1000 by Ana Berman RN  Infection Prevention:   environmental surveillance performed   rest/sleep promoted  Taken 4/2/2023 0829 by Ana Berman RN  Infection Prevention:   environmental surveillance performed   rest/sleep promoted  Goal: Optimal Comfort and Wellbeing  Outcome: Ongoing, Progressing  Intervention: Provide Person-Centered Care  Recent Flowsheet Documentation  Taken 4/2/2023 1800 by Ana Berman RN  Trust Relationship/Rapport: care explained  Taken 4/2/2023 1600 by Ana Berman RN  Trust Relationship/Rapport: care explained  Taken 4/2/2023 1400 by Ana Berman RN  Trust Relationship/Rapport: care  explained  Taken 4/2/2023 1200 by Ana Berman RN  Trust Relationship/Rapport: care explained  Taken 4/2/2023 1000 by Ana Berman RN  Trust Relationship/Rapport: care explained  Taken 4/2/2023 0829 by Ana Berman RN  Trust Relationship/Rapport: care explained  Goal: Readiness for Transition of Care  Outcome: Ongoing, Progressing     Problem: Adjustment to Surgery (Craniotomy/Craniectomy/Cranioplasty)  Goal: Optimal Coping with Surgery  Outcome: Ongoing, Progressing     Problem: Bleeding (Craniotomy/Craniectomy/Cranioplasty)  Goal: Absence of Bleeding  Outcome: Ongoing, Progressing     Problem: Bowel Motility Impaired (Craniotomy/Craniectomy/Cranioplasty)  Goal: Effective Bowel Elimination  Outcome: Ongoing, Progressing     Problem: Cerebral Tissue Perfusion Risk (Craniotomy/Craniectomy/Cranioplasty)  Goal: Optimal Cerebral Tissue Perfusion  Outcome: Ongoing, Progressing     Problem: Fluid and Electrolyte Imbalance (Craniotomy/Craniectomy/Cranioplasty)  Goal: Fluid and Electrolyte Balance  Outcome: Ongoing, Progressing     Problem: Functional Ability Impaired (Craniotomy/Craniectomy/Cranioplasty)  Goal: Optimal Functional Ability  Outcome: Ongoing, Progressing  Intervention: Optimize Functional Ability  Recent Flowsheet Documentation  Taken 4/2/2023 1800 by Ana Berman RN  Activity Management:   activity adjusted per tolerance   activity encouraged  Taken 4/2/2023 1400 by Ana Berman RN  Activity Management:   activity encouraged   activity adjusted per tolerance  Taken 4/2/2023 1200 by Ana Berman RN  Activity Management:   activity adjusted per tolerance   activity encouraged  Taken 4/2/2023 1000 by Ana Berman RN  Activity Management:   activity adjusted per tolerance   activity encouraged  Activity Assistance Provided: assistance, 1 person  Taken 4/2/2023 0829 by Ana Berman, RN  Activity Management:   activity adjusted per tolerance   activity encouraged  Activity  Assistance Provided: assistance, 1 person     Problem: Infection (Craniotomy/Craniectomy/Cranioplasty)  Goal: Absence of Infection Signs and Symptoms  Outcome: Ongoing, Progressing     Problem: Ongoing Anesthesia Effects (Craniotomy/Craniectomy/Cranioplasty)  Goal: Anesthesia/Sedation Recovery  Outcome: Ongoing, Progressing  Intervention: Optimize Anesthesia Recovery  Recent Flowsheet Documentation  Taken 4/2/2023 1400 by Ana Berman RN  Administration (IS): refused  Taken 4/2/2023 1200 by Ana Berman RN  Administration (IS): refused  Taken 4/2/2023 1000 by Ana Berman RN  Administration (IS): refused  Taken 4/2/2023 0829 by Ana Berman RN  Administration (IS): refused     Problem: Pain (Craniotomy/Craniectomy/Cranioplasty)  Goal: Acceptable Pain Control  Outcome: Ongoing, Progressing     Problem: Postoperative Nausea and Vomiting (Craniotomy/Craniectomy/Cranioplasty)  Goal: Nausea and Vomiting Relief  Outcome: Ongoing, Progressing     Problem: Postoperative Urinary Retention (Craniotomy/Craniectomy/Cranioplasty)  Goal: Effective Urinary Elimination  Outcome: Ongoing, Progressing     Problem: Respiratory Compromise (Craniotomy/Craniectomy/Cranioplasty)  Goal: Effective Oxygenation and Ventilation  Outcome: Ongoing, Progressing  Intervention: Optimize Oxygenation and Ventilation  Recent Flowsheet Documentation  Taken 4/2/2023 1800 by Ana Berman RN  Head of Bed (HOB) Positioning: HOB at 20-30 degrees  Taken 4/2/2023 1400 by Ana Berman RN  Head of Bed (HOB) Positioning: HOB at 30-45 degrees  Taken 4/2/2023 1200 by Ana Berman RN  Head of Bed (HOB) Positioning: HOB at 30-45 degrees  Taken 4/2/2023 1000 by Ana Berman RN  Head of Bed (HOB) Positioning: HOB at 30-45 degrees  Taken 4/2/2023 0829 by Ana Berman RN  Head of Bed (HOB) Positioning: HOB at 30-45 degrees   Goal Outcome Evaluation:           Progress: improving       Pt alert and oriented x4. VSS. RA.  Dressing applied to craniotomy site by [facility] PA. Patient stated he did not want to take any blood thinners after taking them this morning. Iodine applied to R great toe. Waiting on insurance approval for [email or web address]

## 2023-04-02 NOTE — PROGRESS NOTES
"    [facility]    Subjective   Monty Winter 1955 67 y.o. male     21    Chief complaint: \"I have ordered eggs, rivera and potatoes\"    HPI  12 Days Post-Op from right frontal crani for excision of a large meningioma. Continues with confusion.    Past Medical History:   Diagnosis Date   • Arthritis    • Hepatitis A    • Hypertension    • Melanoma 2017    retina        /92 (BP Location: Right arm, Patient Position: Lying)   Pulse 87   Temp 98.3 °F (36.8 °C) (Oral)   Resp 16   Ht 180.3 cm (71\")   Wt 93.4 kg (206 lb)   SpO2 93%   BMI 28.73 kg/m²      Scheduled Meds:aspirin, 81 mg, Oral, Daily  atorvastatin, 40 mg, Oral, Nightly  clopidogrel, 75 mg, Oral, Daily  dexamethasone, 4 mg, Oral, Q12H  [START ON 2023] dexamethasone, 4 mg, Oral, Daily With Breakfast  enoxaparin, 40 mg, Subcutaneous, Daily  hydroCHLOROthiazide Oral, 12.5 mg, Oral, Daily  levETIRAcetam, 1,000 mg, Oral, Q12H  levothyroxine, 125 mcg, Oral, Daily  lisinopril, 10 mg, Oral, Q24H  metoprolol tartrate, 12.5 mg, Oral, Q12H  pantoprazole, 40 mg, Oral, Q AM  polyethylene glycol, 17 g, Oral, Daily  povidone-iodine, 1 application, Topical, Daily  QUEtiapine, 25 mg, Oral, Once  senna-docusate sodium, 2 tablet, Oral, BID  sodium chloride, 10 mL, Intravenous, Q12H  thiamine, 100 mg, Oral, Daily        PRN Meds:.•  acetaminophen **OR** acetaminophen **OR** acetaminophen  •  senna-docusate sodium **AND** polyethylene glycol **AND** bisacodyl **AND** bisacodyl  •  calcium carbonate  •  docusate sodium  •  magnesium hydroxide  •  Magnesium Standard Dose Replacement - Follow Nurse / BPA Driven Protocol  •  melatonin  •  [] HYDROmorphone **AND** naloxone  •  ondansetron **OR** ondansetron  •  Phosphorus Replacement - Follow Nurse / BPA Driven Protocol  •  Potassium Replacement - Follow Nurse / BPA Driven Protocol  •  sodium chloride  •  sodium chloride  •  sodium chloride    CBC:    " "  Lab 04/01/23  0450   WBC 7.92   HEMOGLOBIN 10.9*   HEMATOCRIT 32.9*   PLATELETS 243   NEUTROS ABS 7.10*   IMMATURE GRANS (ABS) 0.16*   LYMPHS ABS 0.36*   MONOS ABS 0.21   EOS ABS 0.08   MCV 90.1      Basic Metabolic Panel    Sodium Sodium   Date Value Ref Range Status   04/01/2023 132 (L) 136 - 145 mmol/L Final      Potassium Potassium   Date Value Ref Range Status   04/01/2023 4.3 3.5 - 5.2 mmol/L Final      Chloride Chloride   Date Value Ref Range Status   04/01/2023 97 (L) 98 - 107 mmol/L Final      Bicarbonate No results found for: PLASMABICARB   BUN BUN   Date Value Ref Range Status   04/01/2023 20 8 - 23 mg/dL Final      Creatinine Creatinine   Date Value Ref Range Status   04/01/2023 0.66 (L) 0.76 - 1.27 mg/dL Final      Calcium Calcium   Date Value Ref Range Status   04/01/2023 7.4 (L) 8.6 - 10.5 mg/dL Final      Glucose      No components found for: GLUCOSE.*        Intake/Output                             03/31/23 0701 - 04/01/23 0700 04/01/23 0701 - 04/02/23 0700 04/02/23 0701 - 04/03/23 0700     0204-4908 7727-1307 Total 4833-6587 3721-5908 Total 4368-2581 6829-2452 Total                    Intake    P.O.  1080  -- 1080  1090  -- 1090  --  -- --    Total Intake 1080 -- 1080 1090 -- 1090 -- -- --       Output    Urine  300  850 1150  1715  425 2140  350  -- 350    Total Output  9968 682 2129 350 -- 350         Neurologic Exam  He is semi confused. He knows he had surgery.   Minor LLE weakness      MDM:  Assessment and plan:  1. Right frontal craniotomy for tumor removal 3/21/23. It appears he has been able to pick off the \"glue\" over his incision and it is barely intact at the lower pole. I have cleaned and re-dressed the incision.   2. Confused and inappropriate given his condition.  3. H/o etoh abuse  4. htn  5. afib-patient has denied anti-coagulation.     NS will check incision prior to transfer.    Any copied data from previous notes included in the (1) HPI, (2) PE, (3) MDM and/or " Assessment and Plan has been reviewed and accurate as of 4/2/23.      JOY Gutiérrez Tracy M II, DO   Clear

## 2023-04-02 NOTE — PROGRESS NOTES
"    [facility] Medicine Services  PROGRESS NOTE    Patient Name: Monty Nagel  : 1955  MRN: [id number]    Date of Admission: 3/11/2023  Primary Care Physician: Tiffany Morales MD    Subjective   Subjective     CC: f/u meningioma    HPI: Lying in bed. Quite nasty this morning. Asks me if I \"want his piss.\"  Altogether argumentative with nearly everything I say.    ROS:  Gen- No fevers, chills  CV- No chest pain, palpitations  Resp- No cough, dyspnea  GI- No N/V/D, abd pain     Objective   Objective     Vital Signs:   Temp:  [97.6 °F (36.4 °C)-98.9 °F (37.2 °C)] 98.1 °F (36.7 °C)  Heart Rate:  [78-96] 82  Resp:  [18-20] 18  BP: (105-142)/() 142/104     Physical Exam:  Constitutional: No acute distress, awake, alert  HENT: NCAT, mucous membranes moist  Respiratory: Clear to auscultation bilaterally, respiratory effort normal   Cardiovascular: RRR, no murmurs, rubs, or gallops  Gastrointestinal: Positive bowel sounds, soft, nontender, nondistended  Musculoskeletal: No bilateral ankle edema  Psychiatric: Agitated  Neurologic: Oriented x 3, strength symmetric in all extremities, Cranial Nerves grossly intact to confrontation, speech clear  Skin: No rashes    Results Reviewed:  LAB RESULTS:      Lab 23  0450   WBC 7.92   HEMOGLOBIN 10.9*   HEMATOCRIT 32.9*   PLATELETS 243   NEUTROS ABS 7.10*   IMMATURE GRANS (ABS) 0.16*   LYMPHS ABS 0.36*   MONOS ABS 0.21   EOS ABS 0.08   MCV 90.1         Lab 23  0450 23  0415   SODIUM 132* 135*   POTASSIUM 4.3 4.5   CHLORIDE 97* 99   CO2 26.0 27.0   ANION GAP 9.0 9.0   BUN 20 25*   CREATININE 0.66* 0.71*   EGFR 101.5 99.3   GLUCOSE 102* 119*   CALCIUM 7.4* 7.7*                         Brief Urine Lab Results  (Last result in the past 365 days)      Color   Clarity   Blood   Leuk Est   Nitrite   Protein   CREAT   Urine HCG        23 1058 Dark Yellow   Clear   Negative   Negative   Negative   Negative           "       Microbiology Results Abnormal     None          No radiology results from the last 24 hrs    Results for orders placed during the hospital encounter of 23    Adult Transthoracic Echo Complete w/ Color, Spectral and Contrast if necessary per protocol    Interpretation Summary  •  Left ventricular systolic function is normal. Estimated left ventricular EF = 55%  •  Biatrial enlargement.  •  Calcified aortic valve with mild aortic stenosis, mean gradient 10 mmHg, BANG 1.6 cm².  •  Moderate aortic insufficiency.  •  Mild mitral regurgitation.  •  Mild tricuspid regurgitation with normal RVSP.  •  Mild dilation of the ascending aorta (4.2 cm) is present.      Current medications:  Scheduled Meds:aspirin, 81 mg, Oral, Daily  atorvastatin, 40 mg, Oral, Nightly  clopidogrel, 75 mg, Oral, Daily  dexamethasone, 4 mg, Oral, Q12H  [START ON 2023] dexamethasone, 4 mg, Oral, Daily With Breakfast  enoxaparin, 40 mg, Subcutaneous, Daily  hydroCHLOROthiazide Oral, 12.5 mg, Oral, Daily  levETIRAcetam, 1,000 mg, Oral, Q12H  levothyroxine, 125 mcg, Oral, Daily  lisinopril, 10 mg, Oral, Q24H  metoprolol tartrate, 12.5 mg, Oral, Q12H  pantoprazole, 40 mg, Oral, Q AM  polyethylene glycol, 17 g, Oral, Daily  povidone-iodine, 1 application, Topical, Daily  QUEtiapine, 25 mg, Oral, Once  senna-docusate sodium, 2 tablet, Oral, BID  sodium chloride, 10 mL, Intravenous, Q12H  thiamine, 100 mg, Oral, Daily      Continuous Infusions:   PRN Meds:.•  acetaminophen **OR** acetaminophen **OR** acetaminophen  •  senna-docusate sodium **AND** polyethylene glycol **AND** bisacodyl **AND** bisacodyl  •  calcium carbonate  •  docusate sodium  •  magnesium hydroxide  •  Magnesium Standard Dose Replacement - Follow Nurse / BPA Driven Protocol  •  melatonin  •  [] HYDROmorphone **AND** naloxone  •  ondansetron **OR** ondansetron  •  Phosphorus Replacement - Follow Nurse / BPA Driven Protocol  •  Potassium Replacement - Follow Nurse /  BPA Driven Protocol  •  sodium chloride  •  sodium chloride  •  sodium chloride    Assessment & Plan   Assessment & Plan     Active Hospital Problems    Diagnosis  POA   • **Meningioma [D32.9]  Yes   • Rheumatoid vs Psoriatic arthritis (HCC) [L40.50]  Yes   • Atrial fibrillation [I48.91]  Yes   • Other recurrent depressive disorders [F33.8]  Yes   • 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        Brief Hospital Course to date:  Monty Nagel is a 67 y.o. male with history of hypertension, retinal melatonin and subsequent right eye blindness, previous alcohol use who was admitted for meningioma, gait instability and general weakness.  Brain MRI showed 6.4 enhancing mass and he was evaluated by neurosurgery.  He underwent right frontal craniotomy on 3/21 as well as right middle meningeal artery embolization with Dr. Mckee in anticipation for tumor resection.  He was transferred from ICU to medical floor on 9/22     Meningioma,'s s/p tumor embolization 3/20 by Dr. Sebastian and resection 3/21 by Dr. Mckee  Generalized weakness, frequent falls  -Continue ASA Plavix when okay with neurosurgery  -Continue Decadron and Keppra, per neurosurgery plan for steroid taper at discharge  -PT/OT following, plan for rehab  -He will follow-up with Dr. Mckee in 2 weeks post-discharge     Hypertension  -BP is much improved, ok to resume antihypertensives  -Patient is on lisinopril- HCTZ- now resumed.     Hyperlipidemia  -Continue statin     New A-fib  -Rates currently controlled  -My APRN spoke with neurosurgery regarding anticoagulation. They recommended CT head and if stable, it would be appropriate to start anticoagulation. I discussed anticoagulation with patient this am - he continues to refuse because of his hemorrhoids and he believes in  self healing.     Hypothyroidism  History of thyroid ablation secondary to Graves' disease  - continue Synthroid     Former EtOH, heavy use  -Stopped drinking 9/22     Hyponatremia, not significant  -Monitor while on Keppra, encourage protein intake  -Na+ is low but stable, will continue to monitor for now.       Right great toe lesion  -Dr. Mark ferro, suspect chronic callus with remote trauma, concerning for osteomyelitis but   MRI right foot negative for osteo, shows ulceration of the distal phalanx of the great toe    Expected Discharge Location and Transportation:   Expected Discharge   Expected Discharge Date and Time     Expected Discharge Date Expected Discharge Time    Mar 31, 2023            DVT prophylaxis:  Medical and mechanical DVT prophylaxis orders are present.     AM-PAC 6 Clicks Score (PT): 20 (04/01/23 0800)    CODE STATUS:   Code Status and Medical Interventions:   Ordered at: 03/21/23 1235     Level Of Support Discussed With:    Patient     Code Status (Patient has no pulse and is not breathing):    CPR (Attempt to Resuscitate)     Medical Interventions (Patient has pulse or is breathing):    Full       Olivia Decker II, DO  04/02/23

## 2023-04-02 NOTE — PLAN OF CARE
Goal Outcome Evaluation:  Plan of Care Reviewed With: patient        Progress: improving  Outcome Evaluation: Pt making improvements with strength, stability.  Pt ambulated 350', FWW, CGA with impulsive movement, irritability with cues, L leg fatigues.

## 2023-04-02 NOTE — THERAPY TREATMENT NOTE
Patient Name: Monty Nagel  : 1955    MRN: [id number]                              Today's Date: 2023       Admit Date: 3/11/2023    Visit Dx:     ICD-10-CM ICD-9-CM   1. Generalized weakness  R53.1 780.79   2. History of alcoholism  F10.21 V11.3   3. Inability to walk  R26.2 719.7   4. Confusion  R41.0 298.9   5. Hypoglycemia  E16.2 251.2   6. Poor nutrition  E63.9 269.9   7. Cognitive communication deficit  R41.841 799.52   8. Impaired functional mobility, balance, gait, and endurance  Z74.09 V49.89   9. 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   • Postablative hypothyroidism   • Screen for colon cancer   • Other recurrent depressive disorders   • Balance problem   • Generalized weakness   • Atrial fibrillation   • Severe malnutrition   • Meningioma   • Rheumatoid vs Psoriatic arthritis (HCC)     Past Medical History:   Diagnosis Date   • Arthritis    • Hepatitis A    • Hypertension    • Melanoma 2017    retina     Past Surgical History:   Procedure Laterality Date   • CRANIOTOMY FOR TUMOR N/A 3/21/2023    Procedure: CRANIOTOMY FOR TUMOR STEREOTACTIC WITH STEALTH;  Surgeon: Marlon Mckee MD;  Location: [facility] OR;  Service: Neurosurgery;  Laterality: N/A;   • EMBOLIZATION CEREBRAL N/A 3/20/2023    Procedure: Tumor Emoblization radial access;  Surgeon: Ozzy Sebastian MD;  Location: [facility] CATH INVASIVE LOCATION;  Service: Interventional Radiology;  Laterality: N/A;   • EYE SURGERY  2017    EYE CANCER RIGHT EYE   • FINGER SURGERY Left     Pointer finger re-attached in 3rd Grade   • TONSILLECTOMY        General Information     Row Name 23 1500 23 1440       Physical Therapy Time and Intention    Document Type progress note/recertification  -KG therapy note (daily note)  -KG    Mode of Treatment -- physical therapy  -KG    Row Name 23 1448          General  Information    Patient Profile Reviewed yes  -KG     Existing Precautions/Restrictions fall;other (see comments)  impulsive, L side weakness, R visual deficit  -KG     Row Name 04/02/23 1440          Cognition    Orientation Status (Cognition) oriented x 4  -KG     Row Name 04/02/23 1440          Safety Issues, Functional Mobility    Impairments Affecting Function (Mobility) coordination;motor control;postural/trunk control;visual/perceptual  -KG           User Key  (r) = Recorded By, (t) = Taken By, (c) = Cosigned By    Initials Name Provider Type    Rosana Quezada Physical Therapist               Mobility     Row Name 04/02/23 1441          Bed Mobility    Bed Mobility sit-supine;supine-sit  -KG     Supine-Sit Arthur (Bed Mobility) contact guard  -KG     Sit-Supine Arthur (Bed Mobility) contact guard  -KG     Comment, (Bed Mobility) impulsive  -KG     Row Name 04/02/23 1441          Transfers    Comment, (Transfers) pt very impulsive, irritable with cues  -KG     Row Name 04/02/23 1441          Sit-Stand Transfer    Sit-Stand Arthur (Transfers) contact guard;verbal cues  -KG     Assistive Device (Sit-Stand Transfers) walker, front-wheeled  -KG     Row Name 04/02/23 1441          Gait/Stairs (Locomotion)    Arthur Level (Gait) contact guard;verbal cues;1 person assist  -KG     Assistive Device (Gait) walker, front-wheeled  -KG     Distance in Feet (Gait) 350  -KG     Deviations/Abnormal Patterns (Gait) left sided deviations;stride length decreased;weight shifting decreased  -KG     Bilateral Gait Deviations forward flexed posture  -KG     Left Sided Gait Deviations foot drop/toe drag  -KG     Comment, (Gait/Stairs) Pt ambulated 350', FWW, CGA with impulsive movement, irritability with cues, L leg fatigues.  -KG           User Key  (r) = Recorded By, (t) = Taken By, (c) = Cosigned By    Initials Name Provider Type    Rosana Quezada Physical Therapist                Obj/Interventions     Row Name 04/02/23 1452          Balance    Dynamic Standing Balance contact guard  -KG     Position/Device Used, Standing Balance supported;walker, front-wheeled  -KG     Balance Interventions standing;sit to stand  -KG           User Key  (r) = Recorded By, (t) = Taken By, (c) = Cosigned By    Initials Name Provider Type    Rosana Quezada Physical Therapist               Goals/Plan     Row Name 04/02/23 1500          Bed Mobility Goal 1 (PT)    Activity/Assistive Device (Bed Mobility Goal 1, PT) sit to supine/supine to sit  -KG     Moore Level/Cues Needed (Bed Mobility Goal 1, PT) modified independence  -KG     Time Frame (Bed Mobility Goal 1, PT) long term goal (LTG);10 days  -KG     Progress/Outcomes (Bed Mobility Goal 1, PT) good progress toward goal  -KG     [facility] Name 04/02/23 1500          Transfer Goal 1 (PT)    Activity/Assistive Device (Transfer Goal 1, PT) sit-to-stand/stand-to-sit;bed-to-chair/chair-to-bed;walker, rolling  -KG     Moore Level/Cues Needed (Transfer Goal 1, PT) modified independence  -KG     Time Frame (Transfer Goal 1, PT) long term goal (LTG);10 days  -KG     Progress/Outcome (Transfer Goal 1, PT) good progress toward goal;goal ongoing  -KG     [facility] Name 04/02/23 1500          Gait Training Goal 1 (PT)    Activity/Assistive Device (Gait Training Goal 1, PT) gait (walking locomotion);assistive device use;walker, rolling;decrease fall risk;diminish gait deviation;improve balance and speed  -KG     Moore Level (Gait Training Goal 1, PT) modified independence  -KG     Distance (Gait Training Goal 1, PT) 500 feet  -KG     Time Frame (Gait Training Goal 1, PT) long term goal (LTG);10 days  -KG     Progress/Outcome (Gait Training Goal 1, PT) goal ongoing;continuing progress toward goal  -KG           User Key  (r) = Recorded By, (t) = Taken By, (c) = Cosigned By    Initials Name Provider Type    Rosana Quezada Physical Therapist                Clinical Impression     Row Name 04/02/23 1454          Pain    Pretreatment Pain Rating 0/10 - no pain  -KG     Posttreatment Pain Rating 0/10 - no pain  -KG     Row Name 04/02/23 1454          Plan of Care Review    Plan of Care Reviewed With patient  -KG     Progress improving  -KG     Outcome Evaluation Re-certif/ progress note: Pt making improvements with strength, stability.  Pt ambulated 350', FWW, CGA with impulsive movement, irritability with cues, L leg fatigues.  progressing towards goals  -KG     Row Name 04/02/23 1454          Positioning and Restraints    Pre-Treatment Position in bed  -KG     Post Treatment Position bed  -KG     In Bed call light within reach;encouraged to call for assist;exit alarm on;with family/caregiver  -KG           User Key  (r) = Recorded By, (t) = Taken By, (c) = Cosigned By    Initials Name Provider Type    Rosana Quezada Physical Therapist               Outcome Measures     Row Name 04/02/23 1457 04/02/23 0829       How much help from another person do you currently need...    Turning from your back to your side while in flat bed without using bedrails? 4  -KG 4  -GJ    Moving from lying on back to sitting on the side of a flat bed without bedrails? 4  -KG 4  -GJ    Moving to and from a bed to a chair (including a wheelchair)? 3  -KG 3  -GJ    Standing up from a chair using your arms (e.g., wheelchair, bedside chair)? 3  -KG 3  -GJ    Climbing 3-5 steps with a railing? 3  -KG 3  -GJ    To walk in hospital room? 3  -KG 3  -GJ    AM-PAC 6 Clicks Score (PT) 20  -KG 20  -GJ    Highest level of mobility 6 --> Walked 10 steps or more  -KG 6 --> Walked 10 steps or more  -GJ    Row Name 04/02/23 1457          Functional Assessment    Outcome Measure Options AM-PAC 6 Clicks Basic Mobility (PT)  -KG           User Key  (r) = Recorded By, (t) = Taken By, (c) = Cosigned By    Initials Name Provider Type    Ana Salmeron RN Registered Nurse    Rosana Quezada Physical  Therapist                             Physical Therapy Education     Title: PT OT SLP Therapies (In Progress)     Topic: Physical Therapy (In Progress)     Point: Mobility training (In Progress)     Learning Progress Summary           Patient Acceptance, E, NR by KG at 4/2/2023 1458    Acceptance, E, NR by SC at 3/29/2023 1333    Comment: reviewed safety with mobility    Acceptance, E,D, VU by LR at 3/28/2023 1542    Comment: Educated on safety with mobility, correct supine<->sit t/f technique, correct sit<->stand t/f technique, correct gait mechanics, LE HEP, benefits of mobility, and progression of POC.    Eager, E, VU,DU,NR by SS at 3/27/2023 1541    Comment: Reviewed safety/technique w/bed mobility, transfers, ambulation, HEP, safety awareness/recommendations    Acceptance, E, NR by AE at 3/24/2023 1520    Acceptance, E,D, VU,NR by HP at 3/23/2023 1127    Acceptance, E,TB, NR by AY at 3/22/2023 1141    Acceptance, E, VU,NR by CM at 3/19/2023 1149    Acceptance, E, VU by CM at 3/17/2023 1517    Acceptance, E, VU,NR by LH at 3/15/2023 1407    Acceptance, E, NR by KG1 at 3/14/2023 1428    Acceptance, E, VU,NR by SJ at 3/12/2023 1542                   Point: Home exercise program (In Progress)     Learning Progress Summary           Patient Acceptance, E, NR by KG at 4/2/2023 1458    Acceptance, E, NR by SC at 3/29/2023 1333    Comment: reviewed safety with mobility    Acceptance, E,D, VU by LR at 3/28/2023 1542    Comment: Educated on safety with mobility, correct supine<->sit t/f technique, correct sit<->stand t/f technique, correct gait mechanics, LE HEP, benefits of mobility, and progression of POC.    Eager, E, VU,DU,NR by SS at 3/27/2023 1541    Comment: Reviewed safety/technique w/bed mobility, transfers, ambulation, HEP, safety awareness/recommendations    Acceptance, E, NR by AE at 3/24/2023 1520    Acceptance, E,D, VU,NR by HP at 3/23/2023 1127    Acceptance, ETB, NR by AY at 3/22/2023 1141    Acceptance,  E, VU,NR by CM at 3/19/2023 1149    Acceptance, E, VU,NR by LH at 3/15/2023 1407    Acceptance, E, NR by KG1 at 3/14/2023 1428                   Point: Body mechanics (In Progress)     Learning Progress Summary           Patient Acceptance, E, NR by KG at 4/2/2023 1458    Acceptance, E, NR by SC at 3/29/2023 1333    Comment: reviewed safety with mobility    Acceptance, E,D, VU by LR at 3/28/2023 1542    Comment: Educated on safety with mobility, correct supine<->sit t/f technique, correct sit<->stand t/f technique, correct gait mechanics, LE HEP, benefits of mobility, and progression of POC.    Eager, E, VU,DU,NR by SS at 3/27/2023 1541    Comment: Reviewed safety/technique w/bed mobility, transfers, ambulation, HEP, safety awareness/recommendations    Acceptance, E, NR by AE at 3/24/2023 1520    Acceptance, E,D, VU,NR by HP at 3/23/2023 1127    Acceptance, E,TB, NR by AY at 3/22/2023 1141    Acceptance, E, VU,NR by CM at 3/19/2023 1149    Acceptance, E, VU by CM at 3/17/2023 1517    Acceptance, E, VU,NR by LH at 3/15/2023 1407    Acceptance, E, NR by KG1 at 3/14/2023 1428    Acceptance, E, VU,NR by SJ at 3/12/2023 1542                   Point: Precautions (In Progress)     Learning Progress Summary           Patient Acceptance, E, NR by KG at 4/2/2023 1458    Acceptance, E, NR by SC at 3/29/2023 1333    Comment: reviewed safety with mobility    Acceptance, E,D, VU by LR at 3/28/2023 1542    Comment: Educated on safety with mobility, correct supine<->sit t/f technique, correct sit<->stand t/f technique, correct gait mechanics, LE HEP, benefits of mobility, and progression of POC.    Eager, E, VU,DU,NR by SS at 3/27/2023 1541    Comment: Reviewed safety/technique w/bed mobility, transfers, ambulation, HEP, safety awareness/recommendations    Acceptance, E, NR by AE at 3/24/2023 1520    Acceptance, E,D, VU,NR by HP at 3/23/2023 1127    Acceptance, E,TB, NR by AY at 3/22/2023 1141    Acceptance, E, VU,NR by CM at  3/19/2023 1149    Acceptance, E, VU by CM at 3/17/2023 1517    Acceptance, E, VU,NR by LH at 3/15/2023 1407    Acceptance, E, NR by KG1 at 3/14/2023 1428    Acceptance, E, VU,NR by SJ at 3/12/2023 1542                               User Key     Initials Effective Dates Name Provider Type Discipline    SC 02/03/23 -  Valeria Gibson, PT Physical Therapist PT    SJ 02/03/23 - 03/12/23 Samara Harrison, PT Physical Therapist PT    LR 02/03/23 -  Gabriela Smith, PT Physical Therapist PT    KG1 05/22/20 -  Cherelle Giatan, PT Physical Therapist PT    KG 01/04/23 -  Rosana Marte Physical Therapist PT    AY 11/10/20 -  Sandee Spencer, PT Physical Therapist PT    LH 09/21/21 -  Tristian Ravi, PT Physical Therapist PT    HP 06/01/21 -  Kelly Mckeon, PT Physical Therapist PT    SS 06/01/21 -  Hyacinth King, PT Physical Therapist PT    AE 09/21/21 -  Andrew Centeno, PT Physical Therapist PT    CM 09/22/22 -  Yanet Calvin, PT Physical Therapist PT              PT Recommendation and Plan     Plan of Care Reviewed With: patient  Progress: improving  Outcome Evaluation: Re-certif/ progress note: Pt making improvements with strength, stability.  Pt ambulated 350', FWW, CGA with impulsive movement, irritability with cues, L leg fatigues.  progressing towards goals     Time Calculation:    PT Charges     Row Name 04/02/23 1458             Time Calculation    Start Time 1415  -KG      PT Received On 04/02/23  -KG      PT Goal Re-Cert Due Date 04/12/23  -KG         Time Calculation- PT    Total Timed Code Minutes- PT 23 minute(s)  -KG         Timed Charges    29503 - Gait Training Minutes  23  -KG         Total Minutes    Timed Charges Total Minutes 23  -KG       Total Minutes 23  -KG            User Key  (r) = Recorded By, (t) = Taken By, (c) = Cosigned By    Initials Name Provider Type    KG Rosana Marte Physical Therapist              Therapy Charges for Today     Code Description Service Date  Service Provider Modifiers Qty    [id number] HC GAIT TRAINING EA 15 MIN 4/2/2023 Rosana Marte GP 2          PT G-Codes  Outcome Measure Options: AM-PAC 6 Clicks Basic Mobility (PT)  AM-PAC 6 Clicks Score (PT): 20  AM-PAC 6 Clicks Score (OT): 17       Rosana Marte  4/2/2023

## 2023-04-02 NOTE — PLAN OF CARE
A fib on cardiac mx. Rate controlled. VSS on RA. Patient ambulates with SB assist. C/o insomnia, however refuses sleep aid. Will cont to mx. Call light in reach.

## 2023-04-03 ENCOUNTER — READMISSION MANAGEMENT (OUTPATIENT)
Dept: CALL CENTER | Facility: HOSPITAL | Age: 68
End: 2023-04-03
Payer: MEDICARE

## 2023-04-03 VITALS
HEART RATE: 99 BPM | RESPIRATION RATE: 16 BRPM | BODY MASS INDEX: 28.84 KG/M2 | TEMPERATURE: 97.6 F | SYSTOLIC BLOOD PRESSURE: 156 MMHG | HEIGHT: 71 IN | DIASTOLIC BLOOD PRESSURE: 109 MMHG | OXYGEN SATURATION: 98 % | WEIGHT: 206 LBS

## 2023-04-03 PROCEDURE — 99239 HOSP IP/OBS DSCHRG MGMT >30: CPT | Performed by: INTERNAL MEDICINE

## 2023-04-03 PROCEDURE — 99231 SBSQ HOSP IP/OBS SF/LOW 25: CPT | Performed by: INTERNAL MEDICINE

## 2023-04-03 RX ORDER — QUETIAPINE FUMARATE 25 MG/1
25 TABLET, FILM COATED ORAL DAILY
Qty: 30 TABLET | Refills: 0 | Status: SHIPPED | OUTPATIENT
Start: 2023-04-03

## 2023-04-03 RX ORDER — LEVETIRACETAM 1000 MG/1
1000 TABLET ORAL EVERY 12 HOURS SCHEDULED
Qty: 60 TABLET | Refills: 0 | Status: SHIPPED | OUTPATIENT
Start: 2023-04-03

## 2023-04-03 RX ORDER — PANTOPRAZOLE SODIUM 40 MG/1
40 TABLET, DELAYED RELEASE ORAL
Qty: 30 TABLET | Refills: 0 | Status: SHIPPED | OUTPATIENT
Start: 2023-04-04

## 2023-04-03 RX ORDER — ASPIRIN 81 MG/1
81 TABLET ORAL DAILY
Qty: 30 TABLET | Refills: 1 | Status: SHIPPED | OUTPATIENT
Start: 2023-04-03

## 2023-04-03 RX ORDER — CLOPIDOGREL BISULFATE 75 MG/1
75 TABLET ORAL DAILY
Qty: 30 TABLET | Refills: 1 | Status: SHIPPED | OUTPATIENT
Start: 2023-04-03

## 2023-04-03 RX ORDER — ATORVASTATIN CALCIUM 40 MG/1
40 TABLET, FILM COATED ORAL NIGHTLY
Qty: 90 TABLET | Refills: 1 | Status: SHIPPED | OUTPATIENT
Start: 2023-04-03

## 2023-04-03 RX ORDER — CHOLECALCIFEROL (VITAMIN D3) 125 MCG
5 CAPSULE ORAL NIGHTLY
Qty: 30 TABLET | Refills: 1 | Status: SHIPPED | OUTPATIENT
Start: 2023-04-03

## 2023-04-03 RX ADMIN — LISINOPRIL 10 MG: 10 TABLET ORAL at 08:31

## 2023-04-03 RX ADMIN — LEVOTHYROXINE SODIUM 125 MCG: 125 TABLET ORAL at 05:41

## 2023-04-03 NOTE — OUTREACH NOTE
Prep Survey    Flowsheet Row Responses   Scientologist facility patient discharged from? [facility]   Is LACE score < 7 ? No   Eligibility [facility]   Date of Admission 03/11/23   Date of Discharge 04/03/23   Discharge Disposition Home or Self Care   Discharge diagnosis Craniotomy for tumor with stealth   Does the patient have one of the following disease processes/diagnoses(primary or secondary)? General Surgery   Does the patient have Home health ordered? No   Is there a DME ordered? No   Prep survey completed? Yes          BRANDT JOSEPH - Registered Nurse

## 2023-04-03 NOTE — CASE MANAGEMENT/SOCIAL WORK
Case Management Discharge Note      Final Note: Mr. Nagel is being discharged home today. He does not want rehab or home health services. He will be transported home by [facility]. No additional discharge needs identified.         Selected Continued Care - Admitted Since 3/11/2023     Destination    No services have been selected for the patient.              Durable Medical Equipment    No services have been selected for the patient.              Dialysis/Infusion    No services have been selected for the patient.              Home Medical Care    No services have been selected for the patient.              Therapy    No services have been selected for the patient.              Community Resources    No services have been selected for the patient.              Community & DME    No services have been selected for the patient.                  Transportation Services  Taxi: other ([facility])    Final Discharge Disposition Code: 01 - home or self-care

## 2023-04-03 NOTE — PROGRESS NOTES
Monty Nagel  [id number]  1955   LOS: 17 days   Patient Care Team:  Tiffany Morales MD as PCP - General (Family Medicine)  Tisha Barth MD as Consulting Physician (Ophthalmology)    Chief Complaint:  AFIB / HTN / MENINGIOMA    Subjective     Angry about being confined to bed.  No cardiopulmonary complaints.  Unaware of atrial fibrillation.  Appetite excellent.    Objective     Vital Sign Min/Max for last 24 hours  Temp  Min: 97.6 °F (36.4 °C)  Max: 98.3 °F (36.8 °C)   BP  Min: 107/86  Max: 151/95   Pulse  Min: 69  Max: 102   Resp  Min: 16  Max: 18   SpO2  Min: 98 %  Max: 98 %               03/21/23  0612 03/22/23 2010   Weight: 90.7 kg (200 lb) 93.4 kg (206 lb)         Intake/Output Summary (Last 24 hours) at 4/3/2023 0820  Last data filed at 4/3/2023 0726  Gross per 24 hour   Intake --   Output 1550 ml   Net -1550 ml       Physical Exam:     General Appearance:    Alert, cooperative, in no acute distress   Lungs:     Clear to auscultation,respirations regular, even and                unlabored    Heart:    Irregular and normal rate, variable S1 and S2, grade 1/6 systolic murmur, no gallop, no rub, no click   Abdomen:  Extremities:   Soft, nontender, bowel sounds audible x4    No edema, normal range of motion   Pulses:   Pulses palpable and equal bilaterally      Results Review:   Results from last 7 days   Lab Units 04/01/23  0450 03/29/23  0415   SODIUM mmol/L 132* 135*   POTASSIUM mmol/L 4.3 4.5   CHLORIDE mmol/L 97* 99   CO2 mmol/L 26.0 27.0   BUN mg/dL 20 25*   CREATININE mg/dL 0.66* 0.71*   GLUCOSE mg/dL 102* 119*   CALCIUM mg/dL 7.4* 7.7*     Results from last 7 days   Lab Units 04/01/23  0450   WBC 10*3/mm3 7.92   HEMOGLOBIN g/dL 10.9*   HEMATOCRIT % 32.9*   PLATELETS 10*3/mm3 243     · NO NEW CXR / EKG / LAB RESULTS.    Medication Review: REVIEWED; NO DRIPS.    Assessment & Plan     Labile hypertension.  No new laboratory studies to review.  Will empirically increase metoprolol tartrate to  25 mg BID and continue to monitor.  We will reevaluate on 5 April 2023.      Meningioma    Essential hypertension    History of right retinal melanoma with right eye blindness    Postablative hypothyroidism    Other recurrent depressive disorders    Atrial fibrillation    Rheumatoid vs Psoriatic arthritis (HCC)        04/03/23  08:20 EDT

## 2023-04-03 NOTE — CASE MANAGEMENT/SOCIAL WORK
Continued Stay Note  [facility]     Patient Name: Monty Nagel  MRN: [id number]  Today's Date: 4/3/2023    Admit Date: 3/11/2023    Plan: Home   Discharge Plan     Row Name 04/03/23 1241       Plan    Plan Comments MSW notified pt's APS worker, Maame, that pt was discharging home today. APS will follow up with pt at home.                                               Discharge Codes    No documentation.               Expected Discharge Date and Time     Expected Discharge Date Expected Discharge Time    Apr 3, 2023             CARIDAD Esqueda

## 2023-04-03 NOTE — PAYOR COMM NOTE
"Ashwini Centeno RN  Utilization Management  P:[phone]  F:[phone]    Auth# [id number]    DC summary 4/3  Jonathan Barrera (67 y.o. Male)     Date of Birth   1955    Social Security Number       Address   [address] [address]    Home Phone   [phone]    MRN   [id number]       [place]   None    Marital Status   Single                            Admission Date   3/11/23    Admission Type   Emergency    Admitting Provider   Marlon Mckee MD    Attending Provider       Department, Room/Bed   [facility] 3H, S371/1       Discharge Date   4/3/2023    Discharge Disposition   Home or Self Care    Discharge Destination                               Attending Provider: (none)   Allergies: No Known Allergies    Isolation: None   Infection: None   Code Status: Prior    Ht: 180.3 cm (71\")   Wt: 93.4 kg (206 lb)    Admission Cmt: None   Principal Problem: Meningioma [D32.9]                 Active Insurance as of 3/11/2023     Primary Coverage     Payor Plan Insurance Group Employer/Plan Group    ANTHEM MEDICARE REPLACEMENT ANTHEM MEDICARE ADVANTAGE [id number]     Payor Plan Address Payor Plan Phone Number Payor Plan Fax Number Effective Dates    [address] [phone]  2020 - None Entered    [facility] [address]       Subscriber Name Subscriber Birth Date Member ID       JONATHAN ABRRERA 1955 [id number]                 Emergency Contacts      (Rel.) Home Phone Work Phone Mobile Phone    Marva Ku (Sister) [phone] -- [phone]               Discharge Summary      Olivia Decker II, DO at [address] [place] [address] Medicine Services  DISCHARGE SUMMARY    Patient Name: Jonathan Barrera  : 1955  MRN: [id number]    Date of Admission: 3/11/2023 10:43 AM  Date of Discharge:  4/3/2023  Primary Care Physician: Tiffany Morales MD    Consults     Date and Time Order Name Status Description    3/31/2023 " 10:42 AM Inpatient Cardiology Consult Completed     3/26/2023  9:25 AM Inpatient Vascular Surgery Consult Completed     3/11/2023 11:01 PM Inpatient Neurosurgery Consult Completed     3/11/2023  8:28 PM Inpatient Neurology Consult General Completed           Hospital Course     Presenting Problem:   Generalized weakness [R53.1]    Active Hospital Problems    Diagnosis  POA   • **Meningioma [D32.9]  Yes   • Rheumatoid vs Psoriatic arthritis (HCC) [L40.50]  Yes   • Atrial fibrillation [I48.91]  Yes   • Other recurrent depressive disorders [F33.8]  Yes   • 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          Hospital Course:  Monty Nagel is a 67 y.o. male with history of hypertension, retinal melatonin and subsequent right eye blindness, previous alcohol use who was admitted for meningioma, gait instability and general weakness. Brain MRI showed 6.4 enhancing mass and he was evaluated by neurosurgery.  He underwent right frontal craniotomy on 3/21 as well as right middle meningeal artery embolization with Dr. Mckee in anticipation for tumor resection.       Meningioma,'s s/p tumor embolization 3/20 by Dr. Sebastian and resection 3/21 by Dr. Mckee  Generalized weakness, frequent falls  -Patient followed by NS throughout stay. He underwent right frontal craniotomy on 3/21 + MMA. Underwent right frontal craniotomy w/ resection of dural mass on 3/21. He did fairly well post operatively.  -He will continue Decadron taper and Keppra at d/c. He will follow-up with Dr. Mckee in 2 weeks post-discharge  -PT/OT followed throughout stay. He was ambulating greater than 400 feet and was denied for rehab. He will be discharged home with home health at d/c.     New Onset A-fib  -My APRN spoke with neurosurgery regarding anticoagulation. They  recommended CT head and if stable, it would be appropriate to start anticoagulation. I discussed anticoagulation with patient this am - he continues to refuse because of his hemorrhoids and he believes in self healing.  -Cardiology followed the patient throughout stay. He was treated with metoprolol for rate control with adequate rate control on day of d/c. As above anticoagulation was recommended but patient refused. It is noted that he refused many of his meds throughout his stay.    Right great toe lesion  -Dr. Flores following, suspect chronic callus with remote trauma, concerning for osteomyelitis but   MRI right foot negative for osteo, shows ulceration of the distal phalanx of the great toe     Heavy ETOH abuse     Discharge Follow Up Recommendations for outpatient labs/diagnostics:   Dr. Mckee 2 weeks    Day of Discharge     HPI: Up in bed. Has been up walking around.     Review of Systems  Gen- No fevers, chills  CV- No chest pain, palpitations  Resp- No cough, dyspnea  GI- No N/V/D, abd pain    Vital Signs:   Temp:  [97.6 °F (36.4 °C)-98.3 °F (36.8 °C)] 97.6 °F (36.4 °C)  Heart Rate:  [] 99  Resp:  [16-18] 16  BP: (107-156)/() 156/109      Physical Exam:  Constitutional: No acute distress, awake, alert  HENT: NCAT, mucous membranes moist, crani incision dressed, c/d/i.  Respiratory: Clear to auscultation bilaterally, respiratory effort normal   Cardiovascular: RRR, no murmurs, rubs, or gallops  Gastrointestinal: Positive bowel sounds, soft, nontender, nondistended  Musculoskeletal: No bilateral ankle edema  Psychiatric: Appropriate affect, cooperative  Neurologic: Oriented x 3, strength symmetric in all extremities, Cranial Nerves grossly intact to confrontation, speech clear  Skin: No rashes    Pertinent  and/or Most Recent Results     LAB RESULTS:      Lab 04/01/23  0450   WBC 7.92   HEMOGLOBIN 10.9*   HEMATOCRIT 32.9*   PLATELETS 243   NEUTROS ABS 7.10*   IMMATURE GRANS (ABS) 0.16*   LYMPHS  ABS 0.36*   MONOS ABS 0.21   EOS ABS 0.08   MCV 90.1         Lab 04/01/23  0450 03/29/23  0415   SODIUM 132* 135*   POTASSIUM 4.3 4.5   CHLORIDE 97* 99   CO2 26.0 27.0   ANION GAP 9.0 9.0   BUN 20 25*   CREATININE 0.66* 0.71*   EGFR 101.5 99.3   GLUCOSE 102* 119*   CALCIUM 7.4* 7.7*                         Brief Urine Lab Results  (Last result in the past 365 days)      Color   Clarity   Blood   Leuk Est   Nitrite   Protein   CREAT   Urine HCG        03/11/23 1058 Dark Yellow   Clear   Negative   Negative   Negative   Negative               Microbiology Results (last 10 days)     ** No results found for the last 240 hours. **          MRI Foot Right With & Without Contrast    Result Date: 3/28/2023  MRI FOOT RIGHT W WO CONTRAST Date of Exam: 3/28/2023 2:23 AM EDT Indication: Osteomyelitis, foot.  Open wound great toe  Comparison: None available. Technique:  Routine multiplanar/multisequence sequence images of the right foot were obtained before and after the uneventful administration of  19 mL Multihance.  Findings: There is an ulceration of the distal aspect of the great toe. However the bone marrow signal intensity appears normal at this time out finding of osteomyelitis. There is flexion of the interphalangeal joint of the great toe, second, third fourth and fifth digits. There is mild joint space narrowing first metatarsophalangeal joint and first interphalangeal joint. There are no osseous erosions. There are no joint effusions. Hallux sesamoid bones of normal size, position and signal intensity. There are  no localized fluid collections to suggest abscess. The flexor and extensor tendons are intact and normal in appearance. The intrinsic muscles of the foot are normal in appearance. No pathologic enhancement is identified. There are no intermetatarsal masses. There is moderate subtalar joint space narrowing and talonavicular joint space narrowing. There is a moderate sized os trigonum. Plantar fascia is  intact.     Impression: No findings of osteomyelitis or abscess. Ulceration distal phalanx of the great toe. Osteoarthritis as described. Electronically Signed: Jenni Andrew  3/28/2023 7:31 AM EDT  Workstation ID: [id number]      Results for orders placed during the hospital encounter of 03/11/23    Doppler Ankle Brachial Index Single Level CAR    Interpretation Summary  •  Right Conclusion: The right MATTHEW is normal.  •  Left Conclusion: The left MATTHEW is normal.      Results for orders placed during the hospital encounter of 03/11/23    Doppler Ankle Brachial Index Single Level CAR    Interpretation Summary  •  Right Conclusion: The right MATTHEW is normal.  •  Left Conclusion: The left MATTHEW is normal.      Results for orders placed during the hospital encounter of 03/11/23    Adult Transthoracic Echo Complete w/ Color, Spectral and Contrast if necessary per protocol    Interpretation Summary  •  Left ventricular systolic function is normal. Estimated left ventricular EF = 55%  •  Biatrial enlargement.  •  Calcified aortic valve with mild aortic stenosis, mean gradient 10 mmHg, BANG 1.6 cm².  •  Moderate aortic insufficiency.  •  Mild mitral regurgitation.  •  Mild tricuspid regurgitation with normal RVSP.  •  Mild dilation of the ascending aorta (4.2 cm) is present.      Plan for Follow-up of Pending Labs/Results:     Discharge Details        Discharge Medications      New Medications      Instructions Start Date   aspirin 81 MG EC tablet   81 mg, Oral, Daily      atorvastatin 40 MG tablet  Commonly known as: LIPITOR   40 mg, Oral, Nightly      clopidogrel 75 MG tablet  Commonly known as: PLAVIX   75 mg, Oral, Daily      dexamethasone 2 MG tablet  Commonly known as: DECADRON   Take 2 tablets by mouth 2 (Two) Times a Day for 4 days, THEN 2 tablets Daily for 4 days, THEN 1 tablet 2 (Two) Times a Day for 4 days, THEN 1 tablet Daily for 4 days.   Start Date: March 22, 2023     dexamethasone 4 MG tablet  Commonly known as:  DECADRON   Take 1 tablet by mouth 2 (Two) Times a Day With Meals for 4 days. Take upon discharge from hospital. THEN take 1 tablet by mouth Daily With Breakfast for 4 days.   Start Date: March 28, 2023     dexamethasone 2 MG tablet  Commonly known as: DECADRON   Take 1 tablet by mouth 2 (Two) Times a Day With Meals for 4 days. Take upon completion of decadron 4 once a day. THEN Take 1 tablet by mouth Daily With Breakfast for 4 days.   Start Date: March 28, 2023     levETIRAcetam 1000 MG tablet  Commonly known as: KEPPRA   1,000 mg, Oral, Every 12 Hours Scheduled      melatonin 5 MG tablet tablet   5 mg, Oral, Nightly      metoprolol tartrate 25 MG tablet  Commonly known as: LOPRESSOR   25 mg, Oral, Every 12 Hours Scheduled      pantoprazole 40 MG EC tablet  Commonly known as: PROTONIX   40 mg, Oral, Every Early Morning   Start Date: April 4, 2023     QUEtiapine 25 MG tablet  Commonly known as: SEROquel   25 mg, Oral, Daily         Continue These Medications      Instructions Start Date   levothyroxine 125 MCG tablet  Commonly known as: Synthroid   125 mcg, Oral, Daily      lisinopril-hydrochlorothiazide 20-12.5 MG per tablet  Commonly known as: PRINZIDE,ZESTORETIC   1 tablet, Oral, Daily         ASK your doctor about these medications      Instructions Start Date   dexamethasone 4 MG tablet  Commonly known as: DECADRON  Ask about: Should I take this medication?   4 mg, Oral, 2 Times Daily With Meals, Take this upon discharge from [facility].      dexamethasone 2 MG tablet  Commonly known as: DECADRON  Ask about: Should I take this medication?   2 mg, Oral, Daily With Breakfast, Take upon completion of Decadron 2 mg twice a day.  Completion of this prescription marks the end of your steroid taper.      dexamethasone 4 MG tablet  Commonly known as: DECADRON  Ask about: Should I take this medication?   4 mg, Oral, Daily With Breakfast, Take upon completion of Decadron 4 mg twice a day.             No  Known Allergies      Discharge Disposition:  Home or Self Care    Diet:  Hospital:  Diet Order   Procedures   • Diet: Regular/House Diet; Texture: Regular Texture (IDDSI 7); Fluid Consistency: Thin (IDDSI 0)       Activity:      Restrictions or Other Recommendations:         CODE STATUS:    Code Status and Medical Interventions:   Ordered at: 03/21/23 1235     Level Of Support Discussed With:    Patient     Code Status (Patient has no pulse and is not breathing):    CPR (Attempt to Resuscitate)     Medical Interventions (Patient has pulse or is breathing):    Full       Future Appointments   Date Time Provider Department Center   4/12/2023  1:30 PM Tiffany Morales MD [facility]   4/17/2023  9:30 AM Malorie Hoang PA-C [facility]   4/1/2024  9:00 AM Tiffany Morales MD [facility]       Additional Instructions for the Follow-ups that You Need to Schedule     Discharge Follow-up with PCP   As directed       Currently Documented PCP:    Tiffany Morales MD    PCP Phone Number:    [phone]     Follow Up Details: 1 week hospital follow up         Discharge Follow-up with Specified Provider: NS; 2 Weeks   As directed      To: NS    Follow Up: 2 Weeks                     Olivia Decker II, DO  04/03/23      Time Spent on Discharge:  I spent  34  minutes on this discharge activity which included: face-to-face encounter with the patient, reviewing the data in the system, coordination of the care with the nursing staff as well as consultants, documentation, and entering orders.          Electronically signed by Olivia Decker II, DO at 04/03/23 9210

## 2023-04-03 NOTE — DISCHARGE SUMMARY
[facility] Medicine Services  DISCHARGE SUMMARY    Patient Name: Monty Nagel  : 1955  MRN: [id number]    Date of Admission: 3/11/2023 10:43 AM  Date of Discharge:  4/3/2023  Primary Care Physician: Tiffany Morales MD    Consults     Date and Time Order Name Status Description    3/31/2023 10:42 AM Inpatient Cardiology Consult Completed     3/26/2023  9:25 AM Inpatient Vascular Surgery Consult Completed     3/11/2023 11:01 PM Inpatient Neurosurgery Consult Completed     3/11/2023  8:28 PM Inpatient Neurology Consult General Completed           Hospital Course     Presenting Problem:   Generalized weakness [R53.1]    Active Hospital Problems    Diagnosis  POA   • **Meningioma [D32.9]  Yes   • Rheumatoid vs Psoriatic arthritis (HCC) [L40.50]  Yes   • Atrial fibrillation [I48.91]  Yes   • Other recurrent depressive disorders [F33.8]  Yes   • 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          Hospital Course:  Monty Nagel is a 67 y.o. male with history of hypertension, retinal melatonin and subsequent right eye blindness, previous alcohol use who was admitted for meningioma, gait instability and general weakness. Brain MRI showed 6.4 enhancing mass and he was evaluated by neurosurgery.  He underwent right frontal craniotomy on 3/21 as well as right middle meningeal artery embolization with Dr. Mckee in anticipation for tumor resection.       Meningioma,'s s/p tumor embolization 3/20 by Dr. Sebastian and resection 3/21 by Dr. Mckee  Generalized weakness, frequent falls  -Patient followed by NS throughout stay. He underwent right frontal craniotomy on 3/21 + MMA. Underwent right frontal craniotomy w/ resection of dural mass on 3/21. He did fairly well post operatively.  -He will continue Decadron  terri and Keppra at d/c. He will follow-up with Dr. Mckee in 2 weeks post-discharge  -PT/OT followed throughout stay. He was ambulating greater than 400 feet and was denied for rehab. He will be discharged home with home health at d/c.     New Onset A-fib  -My APRN spoke with neurosurgery regarding anticoagulation. They recommended CT head and if stable, it would be appropriate to start anticoagulation. I discussed anticoagulation with patient this am - he continues to refuse because of his hemorrhoids and he believes in self healing.  -Cardiology followed the patient throughout stay. He was treated with metoprolol for rate control with adequate rate control on day of d/c. As above anticoagulation was recommended but patient refused. It is noted that he refused many of his meds throughout his stay.    Right great toe lesion  -Dr. Flores following, suspect chronic callus with remote trauma, concerning for osteomyelitis but   MRI right foot negative for osteo, shows ulceration of the distal phalanx of the great toe     Heavy ETOH abuse     Discharge Follow Up Recommendations for outpatient labs/diagnostics:   Dr. Mckee 2 weeks    Day of Discharge     HPI: Up in bed. Has been up walking around.     Review of Systems  Gen- No fevers, chills  CV- No chest pain, palpitations  Resp- No cough, dyspnea  GI- No N/V/D, abd pain    Vital Signs:   Temp:  [97.6 °F (36.4 °C)-98.3 °F (36.8 °C)] 97.6 °F (36.4 °C)  Heart Rate:  [] 99  Resp:  [16-18] 16  BP: (107-156)/() 156/109      Physical Exam:  Constitutional: No acute distress, awake, alert  HENT: NCAT, mucous membranes moist, crani incision dressed, c/d/i.  Respiratory: Clear to auscultation bilaterally, respiratory effort normal   Cardiovascular: RRR, no murmurs, rubs, or gallops  Gastrointestinal: Positive bowel sounds, soft, nontender, nondistended  Musculoskeletal: No bilateral ankle edema  Psychiatric: Appropriate affect, cooperative  Neurologic: Oriented x 3,  strength symmetric in all extremities, Cranial Nerves grossly intact to confrontation, speech clear  Skin: No rashes    Pertinent  and/or Most Recent Results     LAB RESULTS:      Lab 04/01/23  0450   WBC 7.92   HEMOGLOBIN 10.9*   HEMATOCRIT 32.9*   PLATELETS 243   NEUTROS ABS 7.10*   IMMATURE GRANS (ABS) 0.16*   LYMPHS ABS 0.36*   MONOS ABS 0.21   EOS ABS 0.08   MCV 90.1         Lab 04/01/23  0450 03/29/23  0415   SODIUM 132* 135*   POTASSIUM 4.3 4.5   CHLORIDE 97* 99   CO2 26.0 27.0   ANION GAP 9.0 9.0   BUN 20 25*   CREATININE 0.66* 0.71*   EGFR 101.5 99.3   GLUCOSE 102* 119*   CALCIUM 7.4* 7.7*                         Brief Urine Lab Results  (Last result in the past 365 days)      Color   Clarity   Blood   Leuk Est   Nitrite   Protein   CREAT   Urine HCG        03/11/23 1058 Dark Yellow   Clear   Negative   Negative   Negative   Negative               Microbiology Results (last 10 days)     ** No results found for the last 240 hours. **          MRI Foot Right With & Without Contrast    Result Date: 3/28/2023  MRI FOOT RIGHT W WO CONTRAST Date of Exam: 3/28/2023 2:23 AM EDT Indication: Osteomyelitis, foot.  Open wound great toe  Comparison: None available. Technique:  Routine multiplanar/multisequence sequence images of the right foot were obtained before and after the uneventful administration of  19 mL Multihance.  Findings: There is an ulceration of the distal aspect of the great toe. However the bone marrow signal intensity appears normal at this time out finding of osteomyelitis. There is flexion of the interphalangeal joint of the great toe, second, third fourth and fifth digits. There is mild joint space narrowing first metatarsophalangeal joint and first interphalangeal joint. There are no osseous erosions. There are no joint effusions. Hallux sesamoid bones of normal size, position and signal intensity. There are  no localized fluid collections to suggest abscess. The flexor and extensor tendons are  intact and normal in appearance. The intrinsic muscles of the foot are normal in appearance. No pathologic enhancement is identified. There are no intermetatarsal masses. There is moderate subtalar joint space narrowing and talonavicular joint space narrowing. There is a moderate sized os trigonum. Plantar fascia is intact.     Impression: No findings of osteomyelitis or abscess. Ulceration distal phalanx of the great toe. Osteoarthritis as described. Electronically Signed: Jenni Andrew  3/28/2023 7:31 AM EDT  Workstation ID: [id number]      Results for orders placed during the hospital encounter of 03/11/23    Doppler Ankle Brachial Index Single Level CAR    Interpretation Summary  •  Right Conclusion: The right MATTHEW is normal.  •  Left Conclusion: The left MATTHEW is normal.      Results for orders placed during the hospital encounter of 03/11/23    Doppler Ankle Brachial Index Single Level CAR    Interpretation Summary  •  Right Conclusion: The right MATTHEW is normal.  •  Left Conclusion: The left MATTHEW is normal.      Results for orders placed during the hospital encounter of 03/11/23    Adult Transthoracic Echo Complete w/ Color, Spectral and Contrast if necessary per protocol    Interpretation Summary  •  Left ventricular systolic function is normal. Estimated left ventricular EF = 55%  •  Biatrial enlargement.  •  Calcified aortic valve with mild aortic stenosis, mean gradient 10 mmHg, BANG 1.6 cm².  •  Moderate aortic insufficiency.  •  Mild mitral regurgitation.  •  Mild tricuspid regurgitation with normal RVSP.  •  Mild dilation of the ascending aorta (4.2 cm) is present.      Plan for Follow-up of Pending Labs/Results:     Discharge Details        Discharge Medications      New Medications      Instructions Start Date   aspirin 81 MG EC tablet   81 mg, Oral, Daily      atorvastatin 40 MG tablet  Commonly known as: LIPITOR   40 mg, Oral, Nightly      clopidogrel 75 MG tablet  Commonly known as: PLAVIX   75 mg, Oral,  Daily      dexamethasone 2 MG tablet  Commonly known as: DECADRON   Take 2 tablets by mouth 2 (Two) Times a Day for 4 days, THEN 2 tablets Daily for 4 days, THEN 1 tablet 2 (Two) Times a Day for 4 days, THEN 1 tablet Daily for 4 days.   Start Date: March 22, 2023     dexamethasone 4 MG tablet  Commonly known as: DECADRON   Take 1 tablet by mouth 2 (Two) Times a Day With Meals for 4 days. Take upon discharge from hospital. THEN take 1 tablet by mouth Daily With Breakfast for 4 days.   Start Date: March 28, 2023     dexamethasone 2 MG tablet  Commonly known as: DECADRON   Take 1 tablet by mouth 2 (Two) Times a Day With Meals for 4 days. Take upon completion of decadron 4 once a day. THEN Take 1 tablet by mouth Daily With Breakfast for 4 days.   Start Date: March 28, 2023     levETIRAcetam 1000 MG tablet  Commonly known as: KEPPRA   1,000 mg, Oral, Every 12 Hours Scheduled      melatonin 5 MG tablet tablet   5 mg, Oral, Nightly      metoprolol tartrate 25 MG tablet  Commonly known as: LOPRESSOR   25 mg, Oral, Every 12 Hours Scheduled      pantoprazole 40 MG EC tablet  Commonly known as: PROTONIX   40 mg, Oral, Every Early Morning   Start Date: April 4, 2023     QUEtiapine 25 MG tablet  Commonly known as: SEROquel   25 mg, Oral, Daily         Continue These Medications      Instructions Start Date   levothyroxine 125 MCG tablet  Commonly known as: Synthroid   125 mcg, Oral, Daily      lisinopril-hydrochlorothiazide 20-12.5 MG per tablet  Commonly known as: PRINZIDE,ZESTORETIC   1 tablet, Oral, Daily         ASK your doctor about these medications      Instructions Start Date   dexamethasone 4 MG tablet  Commonly known as: DECADRON  Ask about: Should I take this medication?   4 mg, Oral, 2 Times Daily With Meals, Take this upon discharge from [facility].      dexamethasone 2 MG tablet  Commonly known as: DECADRON  Ask about: Should I take this medication?   2 mg, Oral, Daily With Breakfast, Take upon  completion of Decadron 2 mg twice a day.  Completion of this prescription marks the end of your steroid taper.      dexamethasone 4 MG tablet  Commonly known as: DECADRON  Ask about: Should I take this medication?   4 mg, Oral, Daily With Breakfast, Take upon completion of Decadron 4 mg twice a day.             No Known Allergies      Discharge Disposition:  Home or Self Care    Diet:  Hospital:  Diet Order   Procedures   • Diet: Regular/House Diet; Texture: Regular Texture (IDDSI 7); Fluid Consistency: Thin (IDDSI 0)       Activity:      Restrictions or Other Recommendations:         CODE STATUS:    Code Status and Medical Interventions:   Ordered at: 03/21/23 1235     Level Of Support Discussed With:    Patient     Code Status (Patient has no pulse and is not breathing):    CPR (Attempt to Resuscitate)     Medical Interventions (Patient has pulse or is breathing):    Full       Future Appointments   Date Time Provider Department Center   4/12/2023  1:30 PM Tiffany Morales MD [facility]   4/17/2023  9:30 AM Malorie Hoang PA-C [facility]   4/1/2024  9:00 AM Tiffany Morales MD [facility]       Additional Instructions for the Follow-ups that You Need to Schedule     Discharge Follow-up with PCP   As directed       Currently Documented PCP:    Tiffany Morales MD    PCP Phone Number:    [phone]     Follow Up Details: 1 week hospital follow up         Discharge Follow-up with Specified Provider: NS; 2 Weeks   As directed      To: NS    Follow Up: 2 Weeks                     Olivia Decker II, DO  04/03/23      Time Spent on Discharge:  I spent  34  minutes on this discharge activity which included: face-to-face encounter with the patient, reviewing the data in the system, coordination of the care with the nursing staff as well as consultants, documentation, and entering orders.

## 2023-04-03 NOTE — PLAN OF CARE
Problem: Fall Injury Risk  Goal: Absence of Fall and Fall-Related Injury  Outcome: Met  Intervention: Identify and Manage Contributors  Recent Flowsheet Documentation  Taken 4/3/2023 0833 by Ana Berman RN  Medication Review/Management: medications reviewed  Intervention: Promote Injury-Free Environment  Recent Flowsheet Documentation  Taken 4/3/2023 0833 by Ana Berman RN  Safety Promotion/Fall Prevention:   activity supervised   assistive device/personal items within reach   clutter free environment maintained   room organization consistent   safety round/check completed   toileting scheduled     Problem: Skin Injury Risk Increased  Goal: Skin Health and Integrity  Outcome: Met  Intervention: Optimize Skin Protection  Recent Flowsheet Documentation  Taken 4/3/2023 0833 by Ana Berman RN  Pressure Reduction Techniques:   frequent weight shift encouraged   weight shift assistance provided  Head of Bed (HOB) Positioning: HOB at 20-30 degrees  Pressure Reduction Devices: pressure-redistributing mattress utilized  Skin Protection:   adhesive use limited   incontinence pads utilized   tubing/devices free from skin contact     Problem: Adult Inpatient Plan of Care  Goal: Plan of Care Review  Outcome: Met  Flowsheets  Taken 4/3/2023 1051 by Ana Berman RN  Progress: no change  Taken 4/3/2023 0536 by Daysi Valdivia RN  Plan of Care Reviewed With: patient  Goal: Patient-Specific Goal (Individualized)  Outcome: Met  Goal: Absence of Hospital-Acquired Illness or Injury  Outcome: Met  Intervention: Identify and Manage Fall Risk  Recent Flowsheet Documentation  Taken 4/3/2023 0833 by Ana Berman RN  Safety Promotion/Fall Prevention:   activity supervised   assistive device/personal items within reach   clutter free environment maintained   room organization consistent   safety round/check completed   toileting scheduled  Intervention: Prevent Skin Injury  Recent Flowsheet Documentation  Taken  4/3/2023 0833 by Ana Berman RN  Body Position: position changed independently  Skin Protection:   adhesive use limited   incontinence pads utilized   tubing/devices free from skin contact  Intervention: Prevent and Manage VTE (Venous Thromboembolism) Risk  Recent Flowsheet Documentation  Taken 4/3/2023 0833 by Ana Berman RN  Activity Management:   activity adjusted per tolerance   activity encouraged  VTE Prevention/Management: patient refused intervention  Intervention: Prevent Infection  Recent Flowsheet Documentation  Taken 4/3/2023 0833 by Ana Berman RN  Infection Prevention:   environmental surveillance performed   rest/sleep promoted  Goal: Optimal Comfort and Wellbeing  Outcome: Met  Intervention: Provide Person-Centered Care  Recent Flowsheet Documentation  Taken 4/3/2023 0833 by Ana Berman RN  Trust Relationship/Rapport: care explained  Goal: Readiness for Transition of Care  Outcome: Met     Problem: Adjustment to Surgery (Craniotomy/Craniectomy/Cranioplasty)  Goal: Optimal Coping with Surgery  Outcome: Met     Problem: Bleeding (Craniotomy/Craniectomy/Cranioplasty)  Goal: Absence of Bleeding  Outcome: Met     Problem: Bowel Motility Impaired (Craniotomy/Craniectomy/Cranioplasty)  Goal: Effective Bowel Elimination  Outcome: Met     Problem: Cerebral Tissue Perfusion Risk (Craniotomy/Craniectomy/Cranioplasty)  Goal: Optimal Cerebral Tissue Perfusion  Outcome: Met     Problem: Fluid and Electrolyte Imbalance (Craniotomy/Craniectomy/Cranioplasty)  Goal: Fluid and Electrolyte Balance  Outcome: Met     Problem: Functional Ability Impaired (Craniotomy/Craniectomy/Cranioplasty)  Goal: Optimal Functional Ability  Outcome: Met  Intervention: Optimize Functional Ability  Recent Flowsheet Documentation  Taken 4/3/2023 0833 by Ana Berman RN  Activity Management:   activity adjusted per tolerance   activity encouraged     Problem: Infection (Craniotomy/Craniectomy/Cranioplasty)  Goal:  Absence of Infection Signs and Symptoms  Outcome: Met     Problem: Ongoing Anesthesia Effects (Craniotomy/Craniectomy/Cranioplasty)  Goal: Anesthesia/Sedation Recovery  Outcome: Met     Problem: Pain (Craniotomy/Craniectomy/Cranioplasty)  Goal: Acceptable Pain Control  Outcome: Met     Problem: Postoperative Nausea and Vomiting (Craniotomy/Craniectomy/Cranioplasty)  Goal: Nausea and Vomiting Relief  Outcome: Met     Problem: Postoperative Urinary Retention (Craniotomy/Craniectomy/Cranioplasty)  Goal: Effective Urinary Elimination  Outcome: Met     Problem: Respiratory Compromise (Craniotomy/Craniectomy/Cranioplasty)  Goal: Effective Oxygenation and Ventilation  Outcome: Met  Intervention: Optimize Oxygenation and Ventilation  Recent Flowsheet Documentation  Taken 4/3/2023 0826 by Ana Berman, RN  Head of Bed (HOB) Positioning: HOB at 20-30 degrees   Goal Outcome Evaluation:           Progress: no change

## 2023-04-03 NOTE — PLAN OF CARE
"Goal Outcome Evaluation:  Plan of Care Reviewed With: patient        Progress: no change   Pt is alert and oriented x4; forgetful at times. VSS on room air. Afib on tele. Pt has slept very little this shift. No c/o of pain. Incision to head is CDI. Refused all night time meds; pt said \"they make me itchy\". Very uncooperative and irritable at times. Skin rash noted to chest, abdomen, and back. Pt c/o that he is itchy; offered to get pt some meds ordered to help with itchiness; pt refused; \"said they won't work\". Up with stand-by and walker to the bathroom. Voiding spontaneously. Will continue to monitor.    "

## 2023-04-04 ENCOUNTER — HOSPITAL ENCOUNTER (OUTPATIENT)
Facility: HOSPITAL | Age: 68
Setting detail: OBSERVATION
Discharge: HOME | End: 2023-04-19
Attending: EMERGENCY MEDICINE | Admitting: INTERNAL MEDICINE
Payer: MEDICARE

## 2023-04-04 ENCOUNTER — READMISSION MANAGEMENT (OUTPATIENT)
Dept: CALL CENTER | Facility: HOSPITAL | Age: 68
End: 2023-04-04
Payer: MEDICARE

## 2023-04-04 ENCOUNTER — TRANSITIONAL CARE MANAGEMENT TELEPHONE ENCOUNTER (OUTPATIENT)
Dept: CALL CENTER | Facility: HOSPITAL | Age: 68
End: 2023-04-04
Payer: MEDICARE

## 2023-04-04 ENCOUNTER — APPOINTMENT (OUTPATIENT)
Dept: CT IMAGING | Facility: HOSPITAL | Age: 68
End: 2023-04-04
Payer: MEDICARE

## 2023-04-04 ENCOUNTER — APPOINTMENT (OUTPATIENT)
Dept: GENERAL RADIOLOGY | Facility: HOSPITAL | Age: 68
End: 2023-04-04
Payer: MEDICARE

## 2023-04-04 DIAGNOSIS — E87.1 HYPONATREMIA: ICD-10-CM

## 2023-04-04 DIAGNOSIS — Z85.820 HISTORY OF MELANOMA: ICD-10-CM

## 2023-04-04 DIAGNOSIS — D32.9 MENINGIOMA: ICD-10-CM

## 2023-04-04 DIAGNOSIS — R62.7 FAILURE TO THRIVE IN ADULT: ICD-10-CM

## 2023-04-04 DIAGNOSIS — F10.10 ALCOHOL ABUSE: ICD-10-CM

## 2023-04-04 DIAGNOSIS — Z12.11 SCREEN FOR COLON CANCER: ICD-10-CM

## 2023-04-04 DIAGNOSIS — I10 ESSENTIAL HYPERTENSION: ICD-10-CM

## 2023-04-04 DIAGNOSIS — R53.1 GENERALIZED WEAKNESS: Primary | ICD-10-CM

## 2023-04-04 DIAGNOSIS — E89.0 POSTABLATIVE HYPOTHYROIDISM: ICD-10-CM

## 2023-04-04 DIAGNOSIS — E83.51 HYPOCALCEMIA: ICD-10-CM

## 2023-04-04 DIAGNOSIS — Z86.018 HISTORY OF MENINGIOMA: ICD-10-CM

## 2023-04-04 DIAGNOSIS — F33.8 OTHER RECURRENT DEPRESSIVE DISORDERS: ICD-10-CM

## 2023-04-04 DIAGNOSIS — R26.89 BALANCE PROBLEM: ICD-10-CM

## 2023-04-04 DIAGNOSIS — I48.91 ATRIAL FIBRILLATION, UNSPECIFIED TYPE: ICD-10-CM

## 2023-04-04 DIAGNOSIS — E43 SEVERE MALNUTRITION: ICD-10-CM

## 2023-04-04 DIAGNOSIS — L40.50 PSORIATIC ARTHRITIS: ICD-10-CM

## 2023-04-04 DIAGNOSIS — E88.09 HYPOALBUMINEMIA: ICD-10-CM

## 2023-04-04 LAB
ALBUMIN SERPL-MCNC: 2.9 G/DL (ref 3.5–5.2)
ALBUMIN/GLOB SERPL: 1.3 G/DL
ALP SERPL-CCNC: 67 U/L (ref 39–117)
ALT SERPL W P-5'-P-CCNC: 48 U/L (ref 1–41)
AMPHET+METHAMPHET UR QL: NEGATIVE
AMPHETAMINES UR QL: NEGATIVE
ANION GAP SERPL CALCULATED.3IONS-SCNC: 11 MMOL/L (ref 5–15)
APAP SERPL-MCNC: <5 MCG/ML (ref 0–30)
AST SERPL-CCNC: 31 U/L (ref 1–40)
BARBITURATES UR QL SCN: NEGATIVE
BASOPHILS # BLD MANUAL: 0 10*3/MM3 (ref 0–0.2)
BASOPHILS NFR BLD MANUAL: 0 % (ref 0–1.5)
BENZODIAZ UR QL SCN: NEGATIVE
BILIRUB SERPL-MCNC: 0.9 MG/DL (ref 0–1.2)
BILIRUB UR QL STRIP: NEGATIVE
BUN SERPL-MCNC: 14 MG/DL (ref 8–23)
BUN/CREAT SERPL: 23 (ref 7–25)
BUPRENORPHINE SERPL-MCNC: NEGATIVE NG/ML
BURR CELLS BLD QL SMEAR: ABNORMAL
CALCIUM SPEC-SCNC: 7.6 MG/DL (ref 8.6–10.5)
CANNABINOIDS SERPL QL: NEGATIVE
CHLORIDE SERPL-SCNC: 92 MMOL/L (ref 98–107)
CLARITY UR: CLEAR
CO2 SERPL-SCNC: 25 MMOL/L (ref 22–29)
COCAINE UR QL: NEGATIVE
COLOR UR: YELLOW
CREAT SERPL-MCNC: 0.61 MG/DL (ref 0.76–1.27)
DEPRECATED RDW RBC AUTO: 55.8 FL (ref 37–54)
EGFRCR SERPLBLD CKD-EPI 2021: 104 ML/MIN/1.73
EOSINOPHIL # BLD MANUAL: 0.27 10*3/MM3 (ref 0–0.4)
EOSINOPHIL NFR BLD MANUAL: 5 % (ref 0.3–6.2)
ERYTHROCYTE [DISTWIDTH] IN BLOOD BY AUTOMATED COUNT: 17.9 % (ref 12.3–15.4)
ETHANOL BLD-MCNC: <10 MG/DL (ref 0–10)
GLOBULIN UR ELPH-MCNC: 2.3 GM/DL
GLUCOSE SERPL-MCNC: 75 MG/DL (ref 65–99)
GLUCOSE UR STRIP-MCNC: NEGATIVE MG/DL
HCT VFR BLD AUTO: 38 % (ref 37.5–51)
HGB BLD-MCNC: 13.1 G/DL (ref 13–17.7)
HGB UR QL STRIP.AUTO: NEGATIVE
HOLD SPECIMEN: NORMAL
KETONES UR QL STRIP: ABNORMAL
LEUKOCYTE ESTERASE UR QL STRIP.AUTO: NEGATIVE
LYMPHOCYTES # BLD MANUAL: 0.49 10*3/MM3 (ref 0.7–3.1)
LYMPHOCYTES NFR BLD MANUAL: 0 % (ref 5–12)
MAGNESIUM SERPL-MCNC: 2.1 MG/DL (ref 1.6–2.4)
MCH RBC QN AUTO: 30 PG (ref 26.6–33)
MCHC RBC AUTO-ENTMCNC: 34.5 G/DL (ref 31.5–35.7)
MCV RBC AUTO: 87.2 FL (ref 79–97)
METAMYELOCYTES NFR BLD MANUAL: 2 % (ref 0–0)
METHADONE UR QL SCN: NEGATIVE
MONOCYTES # BLD: 0 10*3/MM3 (ref 0.1–0.9)
NEUTROPHILS # BLD AUTO: 4.54 10*3/MM3 (ref 1.7–7)
NEUTROPHILS NFR BLD MANUAL: 58 % (ref 42.7–76)
NEUTS BAND NFR BLD MANUAL: 26 % (ref 0–5)
NITRITE UR QL STRIP: NEGATIVE
OPIATES UR QL: NEGATIVE
OVALOCYTES BLD QL SMEAR: ABNORMAL
OXYCODONE UR QL SCN: NEGATIVE
PCP UR QL SCN: NEGATIVE
PH UR STRIP.AUTO: 8 [PH] (ref 5–8)
PLAT MORPH BLD: NORMAL
PLATELET # BLD AUTO: 211 10*3/MM3 (ref 140–450)
PMV BLD AUTO: 7.9 FL (ref 6–12)
POTASSIUM SERPL-SCNC: 4.4 MMOL/L (ref 3.5–5.2)
PROPOXYPH UR QL: NEGATIVE
PROT SERPL-MCNC: 5.2 G/DL (ref 6–8.5)
PROT UR QL STRIP: NEGATIVE
RBC # BLD AUTO: 4.36 10*6/MM3 (ref 4.14–5.8)
SALICYLATES SERPL-MCNC: <0.3 MG/DL
SODIUM SERPL-SCNC: 128 MMOL/L (ref 136–145)
SP GR UR STRIP: 1.01 (ref 1–1.03)
T4 FREE SERPL-MCNC: 0.95 NG/DL (ref 0.93–1.7)
TRICYCLICS UR QL SCN: NEGATIVE
TROPONIN T SERPL HS-MCNC: 13 NG/L
TSH SERPL DL<=0.05 MIU/L-ACNC: 3.77 UIU/ML (ref 0.27–4.2)
UROBILINOGEN UR QL STRIP: ABNORMAL
VARIANT LYMPHS NFR BLD MANUAL: 3 % (ref 0–5)
VARIANT LYMPHS NFR BLD MANUAL: 6 % (ref 19.6–45.3)
WBC MORPH BLD: NORMAL
WBC NRBC COR # BLD: 5.41 10*3/MM3 (ref 3.4–10.8)
WHOLE BLOOD HOLD COAG: NORMAL
WHOLE BLOOD HOLD SPECIMEN: NORMAL

## 2023-04-04 PROCEDURE — G0378 HOSPITAL OBSERVATION PER HR: HCPCS

## 2023-04-04 PROCEDURE — 80143 DRUG ASSAY ACETAMINOPHEN: CPT | Performed by: EMERGENCY MEDICINE

## 2023-04-04 PROCEDURE — 85025 COMPLETE CBC W/AUTO DIFF WBC: CPT | Performed by: EMERGENCY MEDICINE

## 2023-04-04 PROCEDURE — 84439 ASSAY OF FREE THYROXINE: CPT | Performed by: EMERGENCY MEDICINE

## 2023-04-04 PROCEDURE — 80053 COMPREHEN METABOLIC PANEL: CPT | Performed by: EMERGENCY MEDICINE

## 2023-04-04 PROCEDURE — 99285 EMERGENCY DEPT VISIT HI MDM: CPT

## 2023-04-04 PROCEDURE — 83735 ASSAY OF MAGNESIUM: CPT | Performed by: EMERGENCY MEDICINE

## 2023-04-04 PROCEDURE — 84443 ASSAY THYROID STIM HORMONE: CPT | Performed by: EMERGENCY MEDICINE

## 2023-04-04 PROCEDURE — 80306 DRUG TEST PRSMV INSTRMNT: CPT | Performed by: EMERGENCY MEDICINE

## 2023-04-04 PROCEDURE — 82077 ASSAY SPEC XCP UR&BREATH IA: CPT | Performed by: EMERGENCY MEDICINE

## 2023-04-04 PROCEDURE — 80179 DRUG ASSAY SALICYLATE: CPT | Performed by: EMERGENCY MEDICINE

## 2023-04-04 PROCEDURE — 70450 CT HEAD/BRAIN W/O DYE: CPT

## 2023-04-04 PROCEDURE — 93005 ELECTROCARDIOGRAM TRACING: CPT | Performed by: EMERGENCY MEDICINE

## 2023-04-04 PROCEDURE — 99222 1ST HOSP IP/OBS MODERATE 55: CPT | Performed by: PHYSICIAN ASSISTANT

## 2023-04-04 PROCEDURE — 84484 ASSAY OF TROPONIN QUANT: CPT | Performed by: EMERGENCY MEDICINE

## 2023-04-04 PROCEDURE — 81003 URINALYSIS AUTO W/O SCOPE: CPT | Performed by: EMERGENCY MEDICINE

## 2023-04-04 PROCEDURE — 85007 BL SMEAR W/DIFF WBC COUNT: CPT | Performed by: EMERGENCY MEDICINE

## 2023-04-04 PROCEDURE — 71045 X-RAY EXAM CHEST 1 VIEW: CPT

## 2023-04-04 RX ORDER — HYDROCHLOROTHIAZIDE 12.5 MG/1
12.5 TABLET ORAL
Status: DISCONTINUED | OUTPATIENT
Start: 2023-04-05 | End: 2023-04-19 | Stop reason: HOSPADM

## 2023-04-04 RX ORDER — ONDANSETRON 4 MG/1
4 TABLET, FILM COATED ORAL EVERY 6 HOURS PRN
Status: DISCONTINUED | OUTPATIENT
Start: 2023-04-04 | End: 2023-04-19 | Stop reason: HOSPADM

## 2023-04-04 RX ORDER — LISINOPRIL 10 MG/1
10 TABLET ORAL
Status: DISCONTINUED | OUTPATIENT
Start: 2023-04-05 | End: 2023-04-19 | Stop reason: HOSPADM

## 2023-04-04 RX ORDER — CHOLECALCIFEROL (VITAMIN D3) 125 MCG
5 CAPSULE ORAL NIGHTLY PRN
Status: DISCONTINUED | OUTPATIENT
Start: 2023-04-04 | End: 2023-04-19 | Stop reason: HOSPADM

## 2023-04-04 RX ORDER — CLOPIDOGREL BISULFATE 75 MG/1
75 TABLET ORAL DAILY
Status: DISCONTINUED | OUTPATIENT
Start: 2023-04-05 | End: 2023-04-19 | Stop reason: HOSPADM

## 2023-04-04 RX ORDER — ASPIRIN 81 MG/1
81 TABLET ORAL DAILY
Status: DISCONTINUED | OUTPATIENT
Start: 2023-04-05 | End: 2023-04-19 | Stop reason: HOSPADM

## 2023-04-04 RX ORDER — ONDANSETRON 2 MG/ML
4 INJECTION INTRAMUSCULAR; INTRAVENOUS EVERY 6 HOURS PRN
Status: DISCONTINUED | OUTPATIENT
Start: 2023-04-04 | End: 2023-04-19 | Stop reason: HOSPADM

## 2023-04-04 RX ORDER — SODIUM CHLORIDE 9 MG/ML
40 INJECTION, SOLUTION INTRAVENOUS AS NEEDED
Status: DISCONTINUED | OUTPATIENT
Start: 2023-04-04 | End: 2023-04-07

## 2023-04-04 RX ORDER — ATORVASTATIN CALCIUM 40 MG/1
40 TABLET, FILM COATED ORAL NIGHTLY
Status: DISCONTINUED | OUTPATIENT
Start: 2023-04-04 | End: 2023-04-19 | Stop reason: HOSPADM

## 2023-04-04 RX ORDER — SODIUM CHLORIDE 0.9 % (FLUSH) 0.9 %
10 SYRINGE (ML) INJECTION EVERY 12 HOURS SCHEDULED
Status: DISCONTINUED | OUTPATIENT
Start: 2023-04-04 | End: 2023-04-07

## 2023-04-04 RX ORDER — ACETAMINOPHEN 325 MG/1
650 TABLET ORAL EVERY 4 HOURS PRN
Status: DISCONTINUED | OUTPATIENT
Start: 2023-04-04 | End: 2023-04-19 | Stop reason: HOSPADM

## 2023-04-04 RX ORDER — SODIUM CHLORIDE 0.9 % (FLUSH) 0.9 %
10 SYRINGE (ML) INJECTION AS NEEDED
Status: DISCONTINUED | OUTPATIENT
Start: 2023-04-04 | End: 2023-04-07

## 2023-04-04 RX ORDER — SODIUM CHLORIDE, SODIUM LACTATE, POTASSIUM CHLORIDE, CALCIUM CHLORIDE 600; 310; 30; 20 MG/100ML; MG/100ML; MG/100ML; MG/100ML
100 INJECTION, SOLUTION INTRAVENOUS CONTINUOUS
Status: DISCONTINUED | OUTPATIENT
Start: 2023-04-04 | End: 2023-04-06

## 2023-04-04 RX ORDER — QUETIAPINE FUMARATE 25 MG/1
25 TABLET, FILM COATED ORAL NIGHTLY
Status: DISCONTINUED | OUTPATIENT
Start: 2023-04-04 | End: 2023-04-19 | Stop reason: HOSPADM

## 2023-04-04 RX ORDER — SODIUM CHLORIDE 0.9 % (FLUSH) 0.9 %
10 SYRINGE (ML) INJECTION AS NEEDED
Status: DISCONTINUED | OUTPATIENT
Start: 2023-04-04 | End: 2023-04-19 | Stop reason: HOSPADM

## 2023-04-04 RX ORDER — PANTOPRAZOLE SODIUM 40 MG/1
40 TABLET, DELAYED RELEASE ORAL
Status: DISCONTINUED | OUTPATIENT
Start: 2023-04-05 | End: 2023-04-19 | Stop reason: HOSPADM

## 2023-04-04 RX ORDER — LEVOTHYROXINE SODIUM 0.12 MG/1
125 TABLET ORAL
Status: DISCONTINUED | OUTPATIENT
Start: 2023-04-05 | End: 2023-04-19 | Stop reason: HOSPADM
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
## 2023-04-04 NOTE — H&P
[facility] Medicine Services  HISTORY AND PHYSICAL    Patient Name: Monty Nagel  : 1955  MRN: [id number]  Primary Care Physician: Tiffany Morales MD  Date of admission: 2023    Subjective   Subjective     Chief Complaint:  Weakness    HPI:  Monty Nagel is a 67 y.o. male with a history of meningioma s/p craniotomy and resection, alcohol abuse, HTN, and Hx Graves Disease s/p thyroidectomy with post-surgical hypothyroidism. He was recently admitted here on 3/11 for generalized weakness and was found to have a meningioma.  Neurosurgery was consulted, and patient underwent a craniotomy with resection of the lesion.  After a lengthy hospital stay, patient was ultimately discharged home yesterday (4/3).  He reports that upon returning home, he laid in bed for approximately 14 hours.  Upon waking, he was contacted by  with [facility], and reports that he told them that he is able to get out of bed or feed himself, or do anything to take care of himself.  He was advised to call 911 and come here.  He does admit of some ongoing peripheral neuropathy, but otherwise has no complaints.  He denies fever, chills, chest pain, shortness of breath, cough, nausea, vomiting, or diarrhea.  He unfortunately notes that he has not taken any of his home medications since discharge.  He is a non-smoker.  He does have a history of alcohol use, but reports that he has not had an alcoholic drink in at least 7 months.        Review of Systems   Constitutional: Negative for chills, fatigue, fever and unexpected weight change.   HENT: Negative for nosebleeds, sore throat and trouble swallowing.    Eyes: Negative for photophobia and visual disturbance.   Respiratory: Negative for cough, shortness of breath and wheezing.    Gastrointestinal: Negative for abdominal pain, diarrhea, nausea and vomiting.   Genitourinary: Negative for dysuria and hematuria.   Musculoskeletal: Positive for arthralgias  (chronic). Negative for myalgias.   Skin: Positive for wound (surgical incision wound on scalp. Ulcer on great toe.).   Neurological: Positive for weakness (generalized). Negative for tremors, syncope and speech difficulty.   Psychiatric/Behavioral: Negative for confusion. The patient is not nervous/anxious.          Personal History     Past Medical History:   Diagnosis Date   • Arthritis    • Disease of thyroid gland    • Hepatitis A    • Hypertension    • Melanoma 2017    retina             Past Surgical History:   Procedure Laterality Date   • CRANIOTOMY FOR TUMOR N/A 3/21/2023    Procedure: CRANIOTOMY FOR TUMOR STEREOTACTIC WITH STEALTH;  Surgeon: Marlon Mckee MD;  Location:  [facility];  Service: Neurosurgery;  Laterality: N/A;   • EMBOLIZATION CEREBRAL N/A 3/20/2023    Procedure: Tumor Emoblization radial access;  Surgeon: Ozzy Sebastian MD;  Location:  [facility];  Service: Interventional Radiology;  Laterality: N/A;   • EYE SURGERY  2017    EYE CANCER RIGHT EYE   • FINGER SURGERY Left     Pointer finger re-attached in 3rd Grade   • TONSILLECTOMY         Family History:  family history includes Diabetes in his mother; Heart disease in his father.     Social History:  reports that he quit smoking about 33 years ago. His smoking use included cigarettes. He started smoking about 42 years ago. He has a 15.00 pack-year smoking history. He quit smokeless tobacco use about 33 years ago. He reports that he does not currently use alcohol. He reports that he does not use drugs.  Social History     Social History Narrative   • Not on file       Medications:  QUEtiapine, aspirin, atorvastatin, clopidogrel, dexamethasone, levETIRAcetam, levothyroxine, lisinopril-hydrochlorothiazide, melatonin, metoprolol tartrate, and pantoprazole    No Known Allergies    Objective   Objective     Vital Signs:   Temp:  [98.7 °F (37.1 °C)] 98.7 °F (37.1 °C)  Heart Rate:  [84-97] 91  Resp:  [16] 16  BP:  "(125-144)/() 141/90    Physical Exam  Constitutional:       General: He is not in acute distress.     Appearance: Normal appearance.      Comments: Disheveled appearing male in no acute distress.   HENT:      Head:      Comments: S/p craniotomy. See \"skin\"     Right Ear: External ear normal.      Left Ear: External ear normal.      Nose: Nose normal.   Eyes:      Extraocular Movements: Extraocular movements intact.      Conjunctiva/sclera: Conjunctivae normal.      Pupils: Pupils are equal, round, and reactive to light.   Cardiovascular:      Rate and Rhythm: Normal rate. Rhythm irregular.      Pulses: Normal pulses.      Heart sounds: Normal heart sounds. No murmur heard.  Pulmonary:      Effort: Pulmonary effort is normal. No respiratory distress.      Breath sounds: Normal breath sounds. No wheezing, rhonchi or rales.   Abdominal:      General: Bowel sounds are normal. There is no distension.      Tenderness: There is no abdominal tenderness. There is no guarding or rebound.   Musculoskeletal:         General: Normal range of motion.      Cervical back: No rigidity.      Right lower leg: No edema.      Left lower leg: No edema.   Skin:     General: Skin is warm and dry.      Coloration: Skin is not jaundiced.      Findings: Lesion (surgical incision on scalp. No evuidence of infectious process, drainage, or dehiscence. Small open ulcer on great toe. ) present. No rash.      Comments: Poor foot hygiene noted. Unkept toe nails.    Neurological:      General: No focal deficit present.      Mental Status: He is alert and oriented to person, place, and time.   Psychiatric:         Attention and Perception: Attention normal.         Mood and Affect: Mood normal.         Behavior: Behavior normal.         Thought Content: Thought content normal.          Result Review:  I have personally reviewed the results from the time of this admission to 4/4/2023 20:01 EDT and agree with these findings:  [x]  Laboratory list " / accordion  []  Microbiology  [x]  Radiology  [x]  EKG/Telemetry   []  Cardiology/Vascular   []  Pathology  [x]  Old records  []  Other:    LAB RESULTS:      Lab 04/04/23  1618 04/01/23  0450   WBC 5.41 7.92   HEMOGLOBIN 13.1 10.9*   HEMATOCRIT 38.0 32.9*   PLATELETS 211 243   NEUTROS ABS 4.54 7.10*   IMMATURE GRANS (ABS)  --  0.16*   LYMPHS ABS  --  0.36*   MONOS ABS  --  0.21   EOS ABS 0.27 0.08   MCV 87.2 90.1         Lab 04/04/23  1618 04/01/23  0450 03/29/23  0415   SODIUM 128* 132* 135*   POTASSIUM 4.4 4.3 4.5   CHLORIDE 92* 97* 99   CO2 25.0 26.0 27.0   ANION GAP 11.0 9.0 9.0   BUN 14 20 25*   CREATININE 0.61* 0.66* 0.71*   EGFR 104.0 101.5 99.3   GLUCOSE 75 102* 119*   CALCIUM 7.6* 7.4* 7.7*   MAGNESIUM 2.1  --   --    TSH 3.770  --   --          Lab 04/04/23  1618   TOTAL PROTEIN 5.2*   ALBUMIN 2.9*   GLOBULIN 2.3   ALT (SGPT) 48*   AST (SGOT) 31   BILIRUBIN 0.9   ALK PHOS 67         Lab 04/04/23  1618   HSTROP T 13                 Brief Urine Lab Results  (Last result in the past 365 days)      Color   Clarity   Blood   Leuk Est   Nitrite   Protein   CREAT   Urine HCG        04/04/23 1728 Yellow   Clear   Negative   Negative   Negative   Negative               Microbiology Results (last 10 days)     ** No results found for the last 240 hours. **          CT Head Without Contrast    Result Date: 4/4/2023  CT HEAD WO CONTRAST Date of Exam: 4/4/2023 3:53 PM EDT Indication: gen weakness. Comparison: Head CT 3/21/2023 Technique: Axial CT images were obtained of the head without contrast administration.  Reconstructed coronal and sagittal images were also obtained. Automated exposure control and iterative construction methods were used. Findings: Redemonstration of postsurgical changes of right frontal craniotomy for resection of previously seen large right frontal meningioma. Redemonstration of embolization material in the right and left middle cranial fossa. Much of the postoperative pneumocephalus has  resolved. There is a mixed density extradural collection along the right frontal convexity subjacent to the craniotomy appearing mostly hypodense, with some linear hyperdensity tracking along the right frontal falx (series 3 image 47 and series 5 image 32), compatible with some recent blood products. This extra-axial collection measures up to 7 mm in maximal width (series 3 image 34). There is 3 mm of leftward midline shift, previously 7 mm. No uncal or tonsillar herniation. No significant effacement of the ventricles or hydrocephalus. Mild decreased conspicuity of previously seen vasogenic edema throughout the right frontal lobe. No acute large territory infarct. There is intracranial atherosclerosis. Unremarkable appearance of the orbits. Expected postsurgical changes of the right frontal scalp soft tissues. No acute or suspicious bony findings. Mild mucosal thickening in the bilateral maxillary sinuses. The mastoid air cells are clear.     Impression: Impression: Evolving post surgical changes of recent right frontal craniotomy and resection of large right frontal meningioma. Much of the pneumocephalus has resolved, and there is decreased leftward midline shift. There is a thin mixed density extra-axial collection along the right frontal convexity subjacent to the craniotomy, appearing mostly hypodense, with some linear hyperdensity along the right frontal convexity and right falx which may reflect some small recent blood products. Vasogenic edema throughout the right frontal lobe appears somewhat improved. Electronically Signed: Yovain Cash  4/4/2023 4:23 PM EDT  Workstation ID: [id number]    XR Chest 1 View    Result Date: 4/4/2023  XR CHEST 1 VW Date of Exam: 4/4/2023 3:39 PM EDT Indication: Weak/Dizzy/AMS triage protocol. Comparison: 3/11/2023 FINDINGS: No focal airspace opacity. No pleural effusion or pneumothorax. Normal heart and mediastinal contours.     Impression: No evidence of acute disease in the  chest. Electronically Signed: Leodan Howard  4/4/2023 4:37 PM EDT  Workstation ID: [id number]      Results for orders placed during the hospital encounter of 03/11/23    Adult Transthoracic Echo Complete w/ Color, Spectral and Contrast if necessary per protocol    Interpretation Summary  •  Left ventricular systolic function is normal. Estimated left ventricular EF = 55%  •  Biatrial enlargement.  •  Calcified aortic valve with mild aortic stenosis, mean gradient 10 mmHg, BANG 1.6 cm².  •  Moderate aortic insufficiency.  •  Mild mitral regurgitation.  •  Mild tricuspid regurgitation with normal RVSP.  •  Mild dilation of the ascending aorta (4.2 cm) is present.      Assessment & Plan   Assessment & Plan       Generalized weakness    Meningioma    Essential hypertension    Postablative hypothyroidism    Atrial fibrillation    Rheumatoid vs Psoriatic arthritis (HCC)    Severe malnutrition    Alcohol abuse    Hypocalcemia    Hypoalbuminemia    Hyponatremia      Monty Nagel is a 67 y.o. male with a history of meningioma s/p craniotomy and resection, alcohol abuse, HTN, and Hx Graves Disease s/p thyroidectomy with post-surgical hypothyroidism who has returned to [facility] ED for being unable to take care of himself at home and poor living conditions.    Generalized weakness  Meningioma s/p craniotomy with resection.  Poor living conditions  -Patient is otherwise asymptomatic at this time.  Surgical site looks good.  -We will ask case management to see in the a.m. to discuss possible placement.  -PT/OT.  -Neuro checks  -Wound care to see in the a.m.  -It appears based off discharge note that he was supposed to be taking Decadron taper and Keppra at discharge.  However, he unfortunately reports that he has not been taking any medication since discharge.  -Consider neurosurgery consult if needed given recent surgical craniotomy with tumor resection.  -We will ask case management to see in the a.m. for possible placement at  "discharge given poor living conditions and patient's inability to care for himself.  -Seizure precautions.  -Appears he was started on Plavix during last admission. Will check P2Y12. Has not been taking it since discharge.     Atrial fibrillation  -Rate controlled today.  -Not currently on anticoagulation.  It appears that based on recent charting that patient has refused anticoagulation \"because of his hemorrhoids and he believes Insil feeling\".  -Appears he was started on Plavix during last admission. Will check P2Y12. Has not been taking it since discharge.     Right great toe ulcer.  -Appears present during recent admission.  MRI foot negative for osteo at that time.  -Dr. Flores was following during recent mission.  Will consult again if need be.  -Wound care to see.    HTN  HLD  -Has lisinopril-HCTZ and metoprolol on med list.  -Reports that he has not been taking any of these since discharge.  -We will continue for now.  -Continue statin    Hyponatremia  -Na+ 128.  -Likely secondary to dehydration secondary to poor p.o. intake.  -Gentle IV fluids overnight.  -Check serum Osm, urine Osm, and Urine Na+    Hypoalbuminemia  Hypocalcemia  Chronic Malnutrition  -Calcium 7.6.  Appears chronic.  -Albumin 2.9.  -Corrected calcium still only comes to 8.5.  -Nutrition consult for the a.m.  -It appears patient has a very poor living condition.  This on top of his inability to ambulate at home likely worsening his nutritional health.  We will ask case management to see in the a.m. for possible placement at discharge.    Hx of alcohol abuse  -Patient fortunately reports that he has not had an alcoholic drink since September 2022.    Postoperative hypothyroidism.  -Check TSH, T4  -Likely noncompliant with his home Synthroid given his admission of not taking any of his home medications at discharge.  -We will continue his home Synthroid here.           DVT prophylaxis:  SCDS    CODE STATUS:  CPR  Code Status (Patient has no " pulse and is not breathing): CPR (Attempt to Resuscitate)  Medical Interventions (Patient has pulse or is breathing): Full Support      Expected Discharge  1-2 days      This note has been completed as part of a split-shared workflow.     Signature: Electronically signed by Paulette Eubanks PA-C, 04/04/23, 7:50 PM EDT

## 2023-04-04 NOTE — ED PROVIDER NOTES
Subjective   History of Present Illness  67-year-old male presents for evaluation of generalized weakness and inability to care for himself.  Of note, the patient was admitted to our facility from March 11 through April 3.  He was discharged home yesterday.  He had a meningioma resected March 21 by Dr. Mckee.  He notes that since going home he has been unable to care for himself.  He has been experiencing significant generalized weakness to the point that he cannot walk or perform his ADLs.  He notes that he has no friends or family in the area to help him.  He called EMS today and was noted to be living in very poor living conditions.  They noted bedbugs and agree that the patient was in no way capable of taking care of himself in his current state.  He was subsequently brought to our facility to be evaluated.  The patient denies any pain.        Review of Systems   Neurological: Positive for weakness and headaches.   All other systems reviewed and are negative.      Past Medical History:   Diagnosis Date   • Arthritis    • Disease of thyroid gland    • Hepatitis A    • Hypertension    • Melanoma 2017    retina       No Known Allergies    Past Surgical History:   Procedure Laterality Date   • CRANIOTOMY FOR TUMOR N/A 3/21/2023    Procedure: CRANIOTOMY FOR TUMOR STEREOTACTIC WITH STEALTH;  Surgeon: Marlon Mckee MD;  Location: [facility] OR;  Service: Neurosurgery;  Laterality: N/A;   • EMBOLIZATION CEREBRAL N/A 3/20/2023    Procedure: Tumor Emoblization radial access;  Surgeon: Ozzy Sebastian MD;  Location: [facility] CATH INVASIVE LOCATION;  Service: Interventional Radiology;  Laterality: N/A;   • EYE SURGERY  2017    EYE CANCER RIGHT EYE   • FINGER SURGERY Left     Pointer finger re-attached in 3rd Grade   • TONSILLECTOMY         Family History   Problem Relation Age of Onset   • Diabetes Mother    • Heart disease Father        Social History     Socioeconomic History   • Marital status: Single   Tobacco  Use   • Smoking status: Former     Packs/day: 1.00     Years: 15.00     Pack years: 15.00     Types: Cigarettes     Start date: 3/26/1981     Quit date: 1990     Years since quittin.2   • Smokeless tobacco: Former     Quit date: 1990   Vaping Use   • Vaping Use: Never used   Substance and Sexual Activity   • Alcohol use: Not Currently     Comment: Quit Sept 10, 2022, had been using for 50 years   • Drug use: No   • Sexual activity: Never           Objective   Physical Exam  Vitals and nursing note reviewed.   Constitutional:       General: He is not in acute distress.     Appearance: He is well-developed. He is not diaphoretic.      Comments: Disheveled male, chronically ill-appearing   HENT:      Head: Normocephalic and atraumatic.   Eyes:      Pupils: Pupils are equal, round, and reactive to light.   Neck:      Vascular: No JVD.      Comments: No meningeal signs or nuchal rigidity  Cardiovascular:      Rate and Rhythm: Normal rate and regular rhythm.      Heart sounds: Normal heart sounds. No murmur heard.    No friction rub. No gallop.   Pulmonary:      Effort: Pulmonary effort is normal. No respiratory distress.      Breath sounds: Normal breath sounds. No wheezing or rales.   Abdominal:      General: Bowel sounds are normal. There is no distension.      Palpations: Abdomen is soft. There is no mass.      Tenderness: There is no abdominal tenderness. There is no guarding.   Musculoskeletal:         General: Normal range of motion.   Skin:     Comments: Scabbed over scar noted in vertical orientation to superior scalp, no surrounding warmth erythema noted, no palpable crepitus   Neurological:      Mental Status: He is alert and oriented to person, place, and time.      Comments: Mentating appropriately, answering questions appropriately, clear and fluent speech, moving all fours, following simple commands   Psychiatric:         Mood and Affect: Mood normal.         Thought Content: Thought content  normal.         Judgment: Judgment normal.         Procedures           ED Course  ED Course as of 04/04/23 1954   Tue Apr 04, 2023   [id number] 67-year-old male presents for evaluation of generalized weakness and inability to care for himself.  Of note, the patient was admitted to our facility from March 11 through April 3.  He was discharged home yesterday.  He had a meningioma resected on March 21 by Dr. Mckee.  He notes that since going home he has been unable to care for himself.  He has had generalized weakness to the point that he cannot walk or take care of himself.  He called EMS today and was noted to be living in very poor living conditions.  They noted bedbugs and agreed that the patient was in no shape to take care of himself.  The patient reports that he has no friends or family to help him help currently.  On arrival to the ED, the patient is chronically ill-appearing and disheveled.  Exam is benign. [DD]   1800 Labs remarkable for sodium of 128. [DD]   1800 I personally and independently viewed the patient's x-ray images myself, and I am in agreement with the radiologist's reading for final interpretation.   [DD]   1800 CT head results reviewed. [DD]   1801 I spoke with our  who evaluated the patient's current social situation and was in agreement that he is unable to care for himself at this time.  Given the patient's social situation and inability to care for himself, I discussed his case with Dr. Anne, and the patient will be admitted under her care for further evaluation and treatment.  The patient is aware/agreeable with the plan at this time. [DD]      ED Course User Index  [DD] Monty Mclaughlin MD                                          Recent Results (from the past 24 hour(s))   ECG 12 Lead ED Triage Standing Order; Weak / Dizzy / AMS    Collection Time: 04/04/23  3:52 PM   Result Value Ref Range    QT Interval 354 ms    QTC Interval 425 ms   Comprehensive Metabolic Panel     Collection Time: 04/04/23  4:18 PM    Specimen: Blood   Result Value Ref Range    Glucose 75 65 - 99 mg/dL    BUN 14 8 - 23 mg/dL    Creatinine 0.61 (L) 0.76 - 1.27 mg/dL    Sodium 128 (L) 136 - 145 mmol/L    Potassium 4.4 3.5 - 5.2 mmol/L    Chloride 92 (L) 98 - 107 mmol/L    CO2 25.0 22.0 - 29.0 mmol/L    Calcium 7.6 (L) 8.6 - 10.5 mg/dL    Total Protein 5.2 (L) 6.0 - 8.5 g/dL    Albumin 2.9 (L) 3.5 - 5.2 g/dL    ALT (SGPT) 48 (H) 1 - 41 U/L    AST (SGOT) 31 1 - 40 U/L    Alkaline Phosphatase 67 39 - 117 U/L    Total Bilirubin 0.9 0.0 - 1.2 mg/dL    Globulin 2.3 gm/dL    A/G Ratio 1.3 g/dL    BUN/Creatinine Ratio 23.0 7.0 - 25.0    Anion Gap 11.0 5.0 - 15.0 mmol/L    eGFR 104.0 >60.0 mL/min/1.73   Single High Sensitivity Troponin T    Collection Time: 04/04/23  4:18 PM    Specimen: Blood   Result Value Ref Range    HS Troponin T 13 <15 ng/L   Magnesium    Collection Time: 04/04/23  4:18 PM    Specimen: Blood   Result Value Ref Range    Magnesium 2.1 1.6 - 2.4 mg/dL   Green Top (Gel)    Collection Time: 04/04/23  4:18 PM   Result Value Ref Range    Extra Tube Hold for add-ons.    Lavender Top    Collection Time: 04/04/23  4:18 PM   Result Value Ref Range    Extra Tube hold for add-on    Gold Top - SST    Collection Time: 04/04/23  4:18 PM   Result Value Ref Range    Extra Tube Hold for add-ons.    Light Blue Top    Collection Time: 04/04/23  4:18 PM   Result Value Ref Range    Extra Tube Hold for add-ons.    CBC Auto Differential    Collection Time: 04/04/23  4:18 PM    Specimen: Blood   Result Value Ref Range    WBC 5.41 3.40 - 10.80 10*3/mm3    RBC 4.36 4.14 - 5.80 10*6/mm3    Hemoglobin 13.1 13.0 - 17.7 g/dL    Hematocrit 38.0 37.5 - 51.0 %    MCV 87.2 79.0 - 97.0 fL    MCH 30.0 26.6 - 33.0 pg    MCHC 34.5 31.5 - 35.7 g/dL    RDW 17.9 (H) 12.3 - 15.4 %    RDW-SD 55.8 (H) 37.0 - 54.0 fl    MPV 7.9 6.0 - 12.0 fL    Platelets 211 140 - 450 10*3/mm3   TSH    Collection Time: 04/04/23  4:18 PM    Specimen: Blood    Result Value Ref Range    TSH 3.770 0.270 - 4.200 uIU/mL   T4, Free    Collection Time: 04/04/23  4:18 PM    Specimen: Blood   Result Value Ref Range    Free T4 0.95 0.93 - 1.70 ng/dL   Acetaminophen Level    Collection Time: 04/04/23  4:18 PM    Specimen: Blood   Result Value Ref Range    Acetaminophen <5.0 0.0 - 30.0 mcg/mL   Ethanol    Collection Time: 04/04/23  4:18 PM    Specimen: Blood   Result Value Ref Range    Ethanol <10 0 - 10 mg/dL   Salicylate Level    Collection Time: 04/04/23  4:18 PM    Specimen: Blood   Result Value Ref Range    Salicylate <0.3 <=30.0 mg/dL   Manual Differential    Collection Time: 04/04/23  4:18 PM    Specimen: Blood   Result Value Ref Range    Neutrophil % 58.0 42.7 - 76.0 %    Lymphocyte % 6.0 (L) 19.6 - 45.3 %    Monocyte % 0.0 (L) 5.0 - 12.0 %    Eosinophil % 5.0 0.3 - 6.2 %    Basophil % 0.0 0.0 - 1.5 %    Bands %  26.0 (H) 0.0 - 5.0 %    Metamyelocyte % 2.0 (H) 0.0 - 0.0 %    Atypical Lymphocyte % 3.0 0.0 - 5.0 %    Neutrophils Absolute 4.54 1.70 - 7.00 10*3/mm3    Lymphocytes Absolute 0.49 (L) 0.70 - 3.10 10*3/mm3    Monocytes Absolute 0.00 (L) 0.10 - 0.90 10*3/mm3    Eosinophils Absolute 0.27 0.00 - 0.40 10*3/mm3    Basophils Absolute 0.00 0.00 - 0.20 10*3/mm3    [place] Cells Slight/1+ None Seen    Ovalocytes Slight/1+ None Seen    WBC Morphology Normal Normal    Platelet Morphology Normal Normal   Urinalysis With Microscopic If Indicated (No Culture) - Urine, Clean Catch    Collection Time: 04/04/23  5:28 PM    Specimen: Urine, Clean Catch   Result Value Ref Range    Color, UA Yellow Yellow, Straw    Appearance, UA Clear Clear    pH, UA 8.0 5.0 - 8.0    Specific Gravity, UA 1.015 1.001 - 1.030    Glucose, UA Negative Negative    Ketones, UA Trace (A) Negative    Bilirubin, UA Negative Negative    Blood, UA Negative Negative    Protein, UA Negative Negative    Leuk Esterase, UA Negative Negative    Nitrite, UA Negative Negative    Urobilinogen, UA 1.0 E.U./dL 0.2 - 1.0  E.U./dL   Urine Drug Screen - Urine, Clean Catch    Collection Time: 04/04/23  5:28 PM    Specimen: Urine, Clean Catch   Result Value Ref Range    THC, Screen, Urine Negative Negative    Phencyclidine (PCP), Urine Negative Negative    Cocaine Screen, Urine Negative Negative    Methamphetamine, Ur Negative Negative    Opiate Screen Negative Negative    Amphetamine Screen, Urine Negative Negative    Benzodiazepine Screen, Urine Negative Negative    Tricyclic Antidepressants Screen Negative Negative    Methadone Screen, Urine Negative Negative    Barbiturates Screen, Urine Negative Negative    Oxycodone Screen, Urine Negative Negative    Propoxyphene Screen Negative Negative    Buprenorphine, Screen, Urine Negative Negative     Note: In addition to lab results from this visit, the labs listed above may include labs taken at another facility or during a different encounter within the last 24 hours. Please correlate lab times with ED admission and discharge times for further clarification of the services performed during this visit.    CT Head Without Contrast   Final Result   Impression:   Evolving post surgical changes of recent right frontal craniotomy and resection of large right frontal meningioma. Much of the pneumocephalus has resolved, and there is decreased leftward midline shift. There is a thin mixed density extra-axial    collection along the right frontal convexity subjacent to the craniotomy, appearing mostly hypodense, with some linear hyperdensity along the right frontal convexity and right falx which may reflect some small recent blood products. Vasogenic edema    throughout the right frontal lobe appears somewhat improved.      Electronically Signed: Yovani Cash     4/4/2023 4:23 PM EDT     Workstation ID: [id number]      XR Chest 1 View   Final Result   No evidence of acute disease in the chest.      Electronically Signed: Leodan Howard     4/4/2023 4:37 PM EDT     Workstation ID: [id number]         Vitals:    04/04/23 1740 04/04/23 1800 04/04/23 1820 04/04/23 1840   BP: 136/94 144/100 144/94 141/90   BP Location:       Patient Position:       Pulse: 92 84 97 91   Resp:       Temp:       TempSrc:       SpO2: 99% 96% 96% 99%   Weight:       Height:         Medications   sodium chloride 0.9 % flush 10 mL (has no administration in time range)     ECG/EMG Results (last 24 hours)     Procedure Component Value Units Date/Time    ECG 12 Lead ED Triage Standing Order; Weak / Dizzy / AMS [[id number]] Collected: 04/04/23 1552     Updated: 04/04/23 1553     QT Interval 354 ms      QTC Interval 425 ms     Narrative:      Test Reason : ED Triage Standing Order~  Blood Pressure :   */*   mmHG  Vent. Rate :  87 BPM     Atrial Rate :  94 BPM     P-R Int :   * ms          QRS Dur :  72 ms      QT Int : 354 ms       P-R-T Axes :   * -31  27 degrees     QTc Int : 425 ms    Atrial fibrillation  Left axis deviation  Low voltage QRS  Abnormal ECG  When compared with ECG of 31-MAR-2023 08:21,  No significant change was found    Referred By: ED MD           Confirmed By:         ECG 12 Lead ED Triage Standing Order; Weak / Dizzy / AMS   Preliminary Result   Test Reason : ED Triage Standing Order~   Blood Pressure :   */*   mmHG   Vent. Rate :  87 BPM     Atrial Rate :  94 BPM      P-R Int :   * ms          QRS Dur :  72 ms       QT Int : 354 ms       P-R-T Axes :   * -31  27 degrees      QTc Int : 425 ms      Atrial fibrillation   Left axis deviation   Low voltage QRS   Abnormal ECG   When compared with ECG of 31-MAR-2023 08:21,   No significant change was found      Referred By: ED MD           Confirmed By:               MDM    Final diagnoses:   Generalized weakness   History of meningioma   Failure to thrive in adult   Hyponatremia       ED Disposition  ED Disposition     ED Disposition   Decision to Admit    Condition   --    Comment   Level of Care: Telemetry [5]   Diagnosis: Generalized weakness [645216]   Admitting  Physician: LISSY WARD [[id number]]   Attending Physician: LISSY WARD [[id number]]               No follow-up provider specified.       Medication List      No changes were made to your prescriptions during this visit.          Monty Mclaughlin MD  04/04/23 1956

## 2023-04-05 LAB
OSMOLALITY UR: 308 MOSM/KG (ref 300–1100)
SODIUM UR-SCNC: 108 MMOL/L

## 2023-04-05 PROCEDURE — G0378 HOSPITAL OBSERVATION PER HR: HCPCS

## 2023-04-05 PROCEDURE — 97116 GAIT TRAINING THERAPY: CPT

## 2023-04-05 PROCEDURE — 83935 ASSAY OF URINE OSMOLALITY: CPT | Performed by: PHYSICIAN ASSISTANT

## 2023-04-05 PROCEDURE — 84300 ASSAY OF URINE SODIUM: CPT | Performed by: PHYSICIAN ASSISTANT

## 2023-04-05 PROCEDURE — 99232 SBSQ HOSP IP/OBS MODERATE 35: CPT | Performed by: INTERNAL MEDICINE

## 2023-04-05 PROCEDURE — 97161 PT EVAL LOW COMPLEX 20 MIN: CPT

## 2023-04-05 PROCEDURE — 63710000001 DEXAMETHASONE PER 0.25 MG: Performed by: INTERNAL MEDICINE

## 2023-04-05 RX ORDER — DEXAMETHASONE 4 MG/1
2 TABLET ORAL EVERY 12 HOURS SCHEDULED
Status: DISCONTINUED | OUTPATIENT
Start: 2023-04-05 | End: 2023-04-19 | Stop reason: HOSPADM

## 2023-04-05 RX ORDER — LEVETIRACETAM 500 MG/1
1000 TABLET ORAL EVERY 12 HOURS SCHEDULED
Status: DISCONTINUED | OUTPATIENT
Start: 2023-04-05 | End: 2023-04-19 | Stop reason: HOSPADM

## 2023-04-05 RX ADMIN — DEXAMETHASONE 2 MG: 4 TABLET ORAL at 10:24

## 2023-04-05 RX ADMIN — LEVOTHYROXINE SODIUM 125 MCG: 125 TABLET ORAL at 05:31

## 2023-04-05 RX ADMIN — LEVETIRACETAM 1000 MG: 500 TABLET, FILM COATED ORAL at 10:25

## 2023-04-05 RX ADMIN — DEXAMETHASONE 2 MG: 4 TABLET ORAL at 22:00

## 2023-04-05 RX ADMIN — LISINOPRIL 10 MG: 10 TABLET ORAL at 10:22

## 2023-04-05 RX ADMIN — ASPIRIN 81 MG: 81 TABLET, COATED ORAL at 10:24

## 2023-04-05 RX ADMIN — METOPROLOL TARTRATE 25 MG: 25 TABLET, FILM COATED ORAL at 10:24

## 2023-04-05 RX ADMIN — QUETIAPINE FUMARATE 25 MG: 25 TABLET ORAL at 22:00

## 2023-04-05 RX ADMIN — LEVETIRACETAM 1000 MG: 500 TABLET, FILM COATED ORAL at 22:00

## 2023-04-05 RX ADMIN — PANTOPRAZOLE SODIUM 40 MG: 40 TABLET, DELAYED RELEASE ORAL at 05:31

## 2023-04-05 RX ADMIN — ATORVASTATIN CALCIUM 40 MG: 40 TABLET, FILM COATED ORAL at 22:00

## 2023-04-05 RX ADMIN — Medication 10 ML: at 10:22

## 2023-04-05 NOTE — PLAN OF CARE
Goal Outcome Evaluation:  Plan of Care Reviewed With: patient        Progress: no change  Outcome Evaluation: PT eval complete. Pt amb in room with Min A and FWW. Pt participated in functional balance activies with Mod A and activies highly challenging. Pt at high risk for falls. Recommend d/c to SNF when medically appropriate. Pt will need FWW at d/c.

## 2023-04-05 NOTE — PROGRESS NOTES
[facility] Medicine Services  PROGRESS NOTE    Patient Name: Monty Nagel  : 1955  MRN: [id number]    Date of Admission: 2023  Primary Care Physician: Tiffany Morales MD    Subjective   Subjective     CC:  Weakness     HPI:  No acute overnight events. States when he got home, he was weak. Unable to get up. States he sleeps on the floor and doesn't have a bed like here. Difficulty with his vision which he thinks is better. Wanted to call his brother but was unable to see his phone.     ROS:  Gen- No fevers, chills  CV- No chest pain, palpitations  Resp- No cough, dyspnea  GI- No N/V/D, abd pain     Objective   Objective     Vital Signs:   Temp:  [98 °F (36.7 °C)-98.7 °F (37.1 °C)] 98 °F (36.7 °C)  Heart Rate:  [84-97] 92  Resp:  [16-18] 18  BP: (117-144)/() 117/89     Physical Exam:  Constitutional: No acute distress, awake, alert; disheveled   HENT: NCAT, mucous membranes moist  Respiratory: Clear to auscultation bilaterally, respiratory effort normal   Cardiovascular: irregular, no murmurs, rubs, or gallops  Gastrointestinal: Positive bowel sounds, soft, nontender, nondistended  Musculoskeletal: No bilateral ankle edema  Psychiatric: cooperative  Neurologic: Oriented x 3, strength symmetric in all extremities, Cranial Nerves grossly intact to confrontation, speech clear  Skin: No rashes; rash upper arms     Results Reviewed:  LAB RESULTS:      Lab 23  1618 23  0450   WBC 5.41 7.92   HEMOGLOBIN 13.1 10.9*   HEMATOCRIT 38.0 32.9*   PLATELETS 211 243   NEUTROS ABS 4.54 7.10*   IMMATURE GRANS (ABS)  --  0.16*   LYMPHS ABS  --  0.36*   MONOS ABS  --  0.21   EOS ABS 0.27 0.08   MCV 87.2 90.1         Lab 23  1618 23  0450   SODIUM 128* 132*   POTASSIUM 4.4 4.3   CHLORIDE 92* 97*   CO2 25.0 26.0   ANION GAP 11.0 9.0   BUN 14 20   CREATININE 0.61* 0.66*   EGFR 104.0 101.5   GLUCOSE 75 102*   CALCIUM 7.6* 7.4*   MAGNESIUM 2.1  --    TSH 3.770  --           Lab 04/04/23  1618   TOTAL PROTEIN 5.2*   ALBUMIN 2.9*   GLOBULIN 2.3   ALT (SGPT) 48*   AST (SGOT) 31   BILIRUBIN 0.9   ALK PHOS 67         Lab 04/04/23  1618   HSTROP T 13                 Brief Urine Lab Results  (Last result in the past 365 days)      Color   Clarity   Blood   Leuk Est   Nitrite   Protein   CREAT   Urine HCG        04/04/23 1728 Yellow   Clear   Negative   Negative   Negative   Negative                 Microbiology Results Abnormal     None          CT Head Without Contrast    Result Date: 4/4/2023  CT HEAD WO CONTRAST Date of Exam: 4/4/2023 3:53 PM EDT Indication: gen weakness. Comparison: Head CT 3/21/2023 Technique: Axial CT images were obtained of the head without contrast administration.  Reconstructed coronal and sagittal images were also obtained. Automated exposure control and iterative construction methods were used. Findings: Redemonstration of postsurgical changes of right frontal craniotomy for resection of previously seen large right frontal meningioma. Redemonstration of embolization material in the right and left middle cranial fossa. Much of the postoperative pneumocephalus has resolved. There is a mixed density extradural collection along the right frontal convexity subjacent to the craniotomy appearing mostly hypodense, with some linear hyperdensity tracking along the right frontal falx (series 3 image 47 and series 5 image 32), compatible with some recent blood products. This extra-axial collection measures up to 7 mm in maximal width (series 3 image 34). There is 3 mm of leftward midline shift, previously 7 mm. No uncal or tonsillar herniation. No significant effacement of the ventricles or hydrocephalus. Mild decreased conspicuity of previously seen vasogenic edema throughout the right frontal lobe. No acute large territory infarct. There is intracranial atherosclerosis. Unremarkable appearance of the orbits. Expected postsurgical changes of the right frontal scalp soft  tissues. No acute or suspicious bony findings. Mild mucosal thickening in the bilateral maxillary sinuses. The mastoid air cells are clear.     Impression: Impression: Evolving post surgical changes of recent right frontal craniotomy and resection of large right frontal meningioma. Much of the pneumocephalus has resolved, and there is decreased leftward midline shift. There is a thin mixed density extra-axial collection along the right frontal convexity subjacent to the craniotomy, appearing mostly hypodense, with some linear hyperdensity along the right frontal convexity and right falx which may reflect some small recent blood products. Vasogenic edema throughout the right frontal lobe appears somewhat improved. Electronically Signed: Yovani Cash  4/4/2023 4:23 PM EDT  Workstation ID: [id number]    XR Chest 1 View    Result Date: 4/4/2023  XR CHEST 1 VW Date of Exam: 4/4/2023 3:39 PM EDT Indication: Weak/Dizzy/AMS triage protocol. Comparison: 3/11/2023 FINDINGS: No focal airspace opacity. No pleural effusion or pneumothorax. Normal heart and mediastinal contours.     Impression: No evidence of acute disease in the chest. Electronically Signed: Leodan Howard  4/4/2023 4:37 PM EDT  Workstation ID: [id number]      Results for orders placed during the hospital encounter of 03/11/23    Adult Transthoracic Echo Complete w/ Color, Spectral and Contrast if necessary per protocol    Interpretation Summary  •  Left ventricular systolic function is normal. Estimated left ventricular EF = 55%  •  Biatrial enlargement.  •  Calcified aortic valve with mild aortic stenosis, mean gradient 10 mmHg, BANG 1.6 cm².  •  Moderate aortic insufficiency.  •  Mild mitral regurgitation.  •  Mild tricuspid regurgitation with normal RVSP.  •  Mild dilation of the ascending aorta (4.2 cm) is present.      Current medications:  Scheduled Meds:aspirin, 81 mg, Oral, Daily  atorvastatin, 40 mg, Oral, Nightly  clopidogrel, 75 mg, Oral,  Daily  lisinopril, 10 mg, Oral, Q24H   And  hydroCHLOROthiazide, 12.5 mg, Oral, Q24H  levothyroxine, 125 mcg, Oral, Q AM  metoprolol tartrate, 25 mg, Oral, Q12H  pantoprazole, 40 mg, Oral, Q AM  QUEtiapine, 25 mg, Oral, Nightly  sodium chloride, 10 mL, Intravenous, Q12H      Continuous Infusions:lactated ringers, 100 mL/hr      PRN Meds:.•  acetaminophen  •  melatonin  •  ondansetron **OR** ondansetron  •  sodium chloride  •  sodium chloride  •  sodium chloride    Assessment & Plan   Assessment & Plan     Active Hospital Problems    Diagnosis  POA   • **Generalized weakness [R53.1]  Yes   • Alcohol abuse [F10.10]  Yes   • Hypocalcemia [E83.51]  Yes   • Hypoalbuminemia [E88.09]  Yes   • Hyponatremia [E87.1]  Yes   • Rheumatoid vs Psoriatic arthritis (HCC) [L40.50]  Yes   • Severe malnutrition [E43]  Yes   • Atrial fibrillation [I48.91]  Yes   • Meningioma [D32.9]  Yes   • Postablative 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        Brief Hospital Course to date:  Monty Nagel is a 67 y.o. male with a history of meningioma s/p craniotomy and resection, alcohol abuse, HTN, and Hx Graves Disease s/p thyroidectomy with post-surgical hypothyroidism who has returned to [facility] ED for being unable to take care of himself at home and poor living conditions.     Generalized weakness  Poor living conditions  - Recent admission 3/11-4/3. Denied rehab due to walking 400 ft   - CM/SW consult  - PT/OT   -Seizure precautions.    Meningioma s/p craniotomy with resection.  - Recent admission 3/11-4/3. He underwent right frontal craniotomy on 3/21 + MMA. Underwent right frontal craniotomy w/ resection of dural mass on 3/21  - Plan was for dexamethasone taper and keppra at DC -- patient has not been taking these medications   - Will resume keppra and dexamethasone 2 mg BID   - Neuro checks   - Neurosurgery consult      Atrial  fibrillation  -Rate controlled. Patient was offered anticoagulation and he declined it multiple times during his last admission. Reviewed anticoagulation again. He understands it is for stroke prevention and continues to decline.   - Continue metoprolol 25 mg BID      Right great toe ulcer.  -Appears present during recent admission.  MRI foot negative for osteo at that time.  -Dr. Flores was following during recent admission.   -Wound care to see.     HTN  HLD  -Has lisinopril-HCTZ and metoprolol on med list.  -Continue statin     Hyponatremia -  Clinically significant   -Na+ 128 on admission  -Likely secondary to dehydration secondary to poor p.o. intake.  - Monitor      Hypoalbuminemia  Hypocalcemia  Chronic Malnutrition  -Calcium 7.6 on admission   -It appears patient has a very poor living condition.  This on top of his inability to ambulate at home likely worsening his nutritional health.    - CM consult      Hx of alcohol abuse  -Patient fortunately reports that he has not had an alcoholic drink since September 2022.     Postoperative hypothyroidism.  - TSH and T4 within limits   - Continue home synthroid        Expected Discharge Location and Transportation:   Expected Discharge        DVT prophylaxis:  Mechanical DVT prophylaxis orders are present.          CODE STATUS:   Code Status and Medical Interventions:   Ordered at: 04/1955     Code Status (Patient has no pulse and is not breathing):    CPR (Attempt to Resuscitate)     Medical Interventions (Patient has pulse or is breathing):    Full Support       Mary Perales DO  04/05/23

## 2023-04-05 NOTE — OUTREACH NOTE
General Surgery Week 2 Survey    Flowsheet Row Responses   [facility] patient discharged from? [facility]   Does the patient have one of the following disease processes/diagnoses(primary or secondary)? General Surgery   Week 2 attempt successful? No   Unsuccessful attempts Attempt 1   Revoke Readmitted          Alaina LEMON - Registered Nurse

## 2023-04-05 NOTE — PLAN OF CARE
Goal Outcome Evaluation:               Pt arrived from the ED. He is alert and oriented X 4. VSS. RA. Voiding spontaneously using urinal.

## 2023-04-05 NOTE — SIGNIFICANT NOTE
Per ER, staff either identified a bed bug on or within patient's belongings, or was told by EMS that bed bugs were noted in home (per ER note). Once a patient on the floor, thoroughly inspected patient's room, and belongings, bed, mattress, and patient were absent for bed bugs.  Noted, however, patient does have blotchy rash that is noted over large portion of his body.  Dr. Perales advised of this. Patient reports that I should not concern myself about the rash and that he is not itching from it.  Removed patient from any isolation precautions.    Keyonna Reese, Charge RN

## 2023-04-05 NOTE — NURSING NOTE
"WOC consult for right great toe and skin.    Patient presents with chronic left plantar great toe callus that is dry, no redness warmth swelling firmness or pain.  No areas that appear to be draining.  Patient also has overgrown and possible fungal component to toenail area.    Scalp with incision that has dried and scabbed over but no evidence of bleeding, drainage, redness warmth or pain.    Recommendations: Paint right great toe and scalp incision separately with Betadine.    Waffle mattress in place, no heel offloading is used but per nursing patient has been refusing care.  And also states that he has been wanting a \"real bath\" and not a sponge bath.  Patient was educated on importance of pressure injury prevention and skin care.    Patient's bottom and heels were thoroughly assessed and there was no breakdown present.  Sensory Perception: 3-->slightly limited  Moisture: 3-->occasionally moist  Activity: 3-->walks occasionally  Mobility: 2-->very limited  Nutrition: 3-->adequate  Friction and Shear: 2-->potential problem  Nick Score: 16 (04/05/23 0400)    Pressure Injury Prevention Protocol (initiate for Nick Score of 18 or less):   *Keep skin dry, turn q 2 hr, keep heels elevated and offloaded with offloading heel boots.    *Apply z-guard to bottom and bony prominences daily and as needed with incontinence episodes.  *Follow C.A.R.E protocol if medical devices (Bipap, mullins, Ng tube, etc) are being used.  *Reduce layers under patient (one sheet as drawsheet and two incontinence pads) to allow ERICK to improve microclimate   *Clean skin gently with no-rinse PH-balanced cleanser and soft, disposable cloth (barrier wipes-blue pack).   *Raise knee-gatch before elevating HOB to reduce shearing      WOC will sign off.  "

## 2023-04-05 NOTE — CONSULTS
[facility]  Consults  Name: Monty Nagel  YOB: 1955  MRN: [id number]    Referring Provider: No ref. provider found     Patient Care Team:  Tiffany Morales MD as PCP - General (Family Medicine)  Tisha Barth MD as Consulting Physician (Ophthalmology)    Chief Complaint: Generalized weakness    Subjective     History of present illness: Patient is a 67-year-old male known to our practice for a large right-sided meningioma that was resected by Dr. Jose Mckee on 3/21/2023.  Prior to that resection he was admitted to the hospital with generalized weakness and imaging discovered meningioma.  During that hospitalization he expressed multiple times he was happy to be in a place where he was cared for and was not worried about falling and having no one find him.    After being discharged home patient was not taking steroids or Keppra, and yesterday he reports generalized weakness as well as visual changes so he called EMS who brought him to the emergency department for evaluation.  EMS reported that his home was in unkempt state and had bedbugs.  Patient has had a resolution of that weakness and visual changes, he reports that he feels fine right now though does not feel comfortable going home.  He denies headache, nausea, vomiting, fever, chills, discharge from his incision.      Review of Systems   Constitutional: Negative for chills and fever.   HENT: Negative for voice change.    Eyes: Positive for visual disturbance. Negative for photophobia.   Gastrointestinal: Negative for nausea and vomiting.   Musculoskeletal: Positive for gait problem. Negative for neck pain and neck stiffness.   Neurological: Positive for weakness. Negative for facial asymmetry, speech difficulty, light-headedness and headaches.   Psychiatric/Behavioral: Negative for confusion.       History  Past Medical History:   Diagnosis Date    Arthritis     Disease of  thyroid gland     Hepatitis A     Hypertension     Melanoma 2017    retina       Objective     Vital Signs:  Temp:  [98 °F (36.7 °C)-98.7 °F (37.1 °C)] 98.2 °F (36.8 °C)  Heart Rate:  [84-97] 84  Resp:  [16-18] 18  BP: (115-144)/() 115/76  Body mass index is 28.24 kg/m².    Physical Exam:  Physical Exam  Constitutional:       Appearance: Normal appearance.   Eyes:      General: No visual field deficit.  Musculoskeletal:      Cervical back: Normal range of motion.      Right lower leg: No edema.      Left lower leg: No edema.      Comments: 5 out of 5 strength in intrinsic hand muscles bilaterally, 5 out of 5 strength with hip flexion bilaterally.   Skin:     Comments: Incision on head is clean, dry, intact.   Neurological:      General: No focal deficit present.      Mental Status: He is alert and oriented to person, place, and time.      GCS: GCS eye subscore is 4. GCS verbal subscore is 5. GCS motor subscore is 6.      Cranial Nerves: Cranial nerves 2-12 are intact. No cranial nerve deficit, dysarthria or facial asymmetry.      Sensory: Sensation is intact. No sensory deficit.      Motor: Motor function is intact. No weakness, tremor or pronator drift.      Coordination: Coordination is intact. Finger-Nose-Finger Test normal.   Psychiatric:         Mood and Affect: Mood normal.         Behavior: Behavior normal.         Thought Content: Thought content normal.         Judgment: Judgment normal.         Data Review:  CT Head Without Contrast    Result Date: 4/4/2023  CT HEAD WO CONTRAST Date of Exam: 4/4/2023 3:53 PM EDT Indication: gen weakness. Comparison: Head CT 3/21/2023 Technique: Axial CT images were obtained of the head without contrast administration.  Reconstructed coronal and sagittal images were also obtained. Automated exposure control and iterative construction methods were used. Findings: Redemonstration of postsurgical changes of right frontal craniotomy for resection of previously seen large  right frontal meningioma. Redemonstration of embolization material in the right and left middle cranial fossa. Much of the postoperative pneumocephalus has resolved. There is a mixed density extradural collection along the right frontal convexity subjacent to the craniotomy appearing mostly hypodense, with some linear hyperdensity tracking along the right frontal falx (series 3 image 47 and series 5 image 32), compatible with some recent blood products. This extra-axial collection measures up to 7 mm in maximal width (series 3 image 34). There is 3 mm of leftward midline shift, previously 7 mm. No uncal or tonsillar herniation. No significant effacement of the ventricles or hydrocephalus. Mild decreased conspicuity of previously seen vasogenic edema throughout the right frontal lobe. No acute large territory infarct. There is intracranial atherosclerosis. Unremarkable appearance of the orbits. Expected postsurgical changes of the right frontal scalp soft tissues. No acute or suspicious bony findings. Mild mucosal thickening in the bilateral maxillary sinuses. The mastoid air cells are clear.     Impression: Impression: Evolving post surgical changes of recent right frontal craniotomy and resection of large right frontal meningioma. Much of the pneumocephalus has resolved, and there is decreased leftward midline shift. There is a thin mixed density extra-axial collection along the right frontal convexity subjacent to the craniotomy, appearing mostly hypodense, with some linear hyperdensity along the right frontal convexity and right falx which may reflect some small recent blood products. Vasogenic edema throughout the right frontal lobe appears somewhat improved. Electronically Signed: Yovani Cash  4/4/2023 4:23 PM EDT  Workstation ID: [id number]    CT Head Without Contrast    Result Date: 3/21/2023  CT HEAD WO CONTRAST Date of Exam: 3/21/2023 2:24 PM EDT Indication: Post Op Craniotomoy Evaluation. Comparison: MRI  3/11/2023 Technique: Axial CT images were obtained of the head without contrast administration.  Reconstructed coronal and sagittal images were also obtained. Automated exposure control and iterative construction methods were used. Findings: Postoperative changes are noted from interval right frontal craniotomy for resection of previously noted large enhancing right frontal lobe dural based mass, presumed meningioma. There is pneumocephalus and minimal extra-axial fluid and hemorrhage along the craniotomy margins. There is persistent edema seen in the right frontal lobe, with 11 mm of leftward midline shift, improved from preoperative comparison. There is no unexpected hemorrhage, new mass or evidence of territorial ischemia. There is persistent effacement of the right lateral ventricle, without evidence of entrapment or hydrocephalus. The orbits are normal. The paranasal sinuses are clear.     Impression: Impression: Expected postoperative changes from interval right frontal craniotomy for resection of previously noted large enhancing dural based mass, presumed meningioma. There is persistent right frontal lobe edema, mildly improved, with decreased midline shift. There is no unexpected hemorrhage, new mass or definite territorial ischemia. Electronically Signed: Leodan Sagastumeord  3/21/2023 3:10 PM EDT  Workstation ID: [id number]      Past Surgical History:   Procedure Laterality Date    CRANIOTOMY FOR TUMOR N/A 3/21/2023    Procedure: CRANIOTOMY FOR TUMOR STEREOTACTIC WITH STEALTH;  Surgeon: Marlon Mckee MD;  Location: [facility] OR;  Service: Neurosurgery;  Laterality: N/A;    EMBOLIZATION CEREBRAL N/A 3/20/2023    Procedure: Tumor Emoblization radial access;  Surgeon: Ozzy Sebastian MD;  Location:  [facility];  Service: Interventional Radiology;  Laterality: N/A;    EYE SURGERY  2017    EYE CANCER RIGHT EYE    FINGER SURGERY Left     Pointer finger re-attached in 3rd Grade     TONSILLECTOMY         Assessment & Plan   Diagnosis:  (R53.1) Generalized weakness    (Z86.018) History of meningioma    (R62.7) Failure to thrive in adult    (E87.1) Hyponatremia    Medical Decision Making:  Patient's generalized weakness is unlikely to be related to his surgery.  Recommend finishing his steroid taper, continuing his Keppra.  We do not recommend any further imaging unless clinically indicated.    Incision is in satisfactory condition.    There is no role for neurosurgical intervention at this time.  Please keep scheduled postop neurosurgical appointment.    Sherman Booth PA-C  04/05/23  14:02 EDT

## 2023-04-05 NOTE — THERAPY EVALUATION
Patient Name: Monty Nagel  : 1955    MRN: [id number]                              Today's Date: 2023       Admit Date: 2023    Visit Dx:     ICD-10-CM ICD-9-CM   1. Generalized weakness  R53.1 780.79   2. History of meningioma  Z86.018 V12.41   3. Failure to thrive in adult  R62.7 783.7   4. 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   • Postablative hypothyroidism   • Screen for colon cancer   • Other recurrent depressive disorders   • Balance problem   • Generalized weakness   • Atrial fibrillation   • Severe malnutrition   • Meningioma   • Rheumatoid vs Psoriatic arthritis (HCC)   • Alcohol abuse   • Hypocalcemia   • Hypoalbuminemia   • Hyponatremia     Past Medical History:   Diagnosis Date   • Arthritis    • Disease of thyroid gland    • Hepatitis A    • Hypertension    • Melanoma 2017    retina     Past Surgical History:   Procedure Laterality Date   • CRANIOTOMY FOR TUMOR N/A 3/21/2023    Procedure: CRANIOTOMY FOR TUMOR STEREOTACTIC WITH STEALTH;  Surgeon: Marlon Mckee MD;  Location: [facility] OR;  Service: Neurosurgery;  Laterality: N/A;   • EMBOLIZATION CEREBRAL N/A 3/20/2023    Procedure: Tumor Emoblization radial access;  Surgeon: Ozzy Sebastian MD;  Location: [facility] INVASIVE LOCATION;  Service: Interventional Radiology;  Laterality: N/A;   • EYE SURGERY  2017    EYE CANCER RIGHT EYE   • FINGER SURGERY Left     Pointer finger re-attached in 3rd Grade   • TONSILLECTOMY        General Information     Row Name 23 1032          Physical Therapy Time and Intention    Document Type evaluation  -HP     Mode of Treatment physical therapy  -     Row Name 23 1032          General Information    Patient Profile Reviewed yes  -HP     Prior Level of Function min assist:;all household mobility;community mobility;gait;transfer;bed mobility;ADL's  -HP      "Existing Precautions/Restrictions fall;seizures  contact precautions; s/p craniotomy 3/21/23  -     Barriers to Rehab cognitive status;visual deficit  -     Row Name 04/05/23 1032          Living Environment    People in Home alone  -     Row Name 04/05/23 1032          Home Main Entrance    Number of Stairs, Main Entrance six;other (see comments)  + one \"huge\" step  -     Row Name 04/05/23 1032          Cognition    Orientation Status (Cognition) oriented x 3  -     Row Name 04/05/23 1032          Safety Issues, Functional Mobility    Safety Issues Affecting Function (Mobility) safety precautions follow-through/compliance;insight into deficits/self-awareness;awareness of need for assistance;impulsivity;safety precaution awareness  -     Impairments Affecting Function (Mobility) balance;cognition;endurance/activity tolerance;strength  -     Cognitive Impairments, Mobility Safety/Performance insight into deficits/self-awareness;awareness, need for assistance  -           User Key  (r) = Recorded By, (t) = Taken By, (c) = Cosigned By    Initials Name Provider Type     Kelly Mckeon, PT Physical Therapist               Mobility     Row Name 04/05/23 1037          Bed Mobility    Bed Mobility supine-sit;sit-supine  -     Supine-Sit [place] (Bed Mobility) standby assist  -     Sit-Supine [place] (Bed Mobility) standby assist  -     Comment, (Bed Mobility) Pt impulsive with VC for safety awareness  -     Row Name 04/05/23 1037          Transfers    Comment, (Transfers) VC for safety awareness.  -[facility] Name 04/05/23 1037          Sit-Stand Transfer    Sit-Stand [place] (Transfers) contact guard  -     Assistive Device (Sit-Stand Transfers) walker, front-wheeled  -     Row Name 04/05/23 1037          Gait/Stairs (Locomotion)    [place] Level (Gait) minimum assist (75% patient effort);verbal cues;nonverbal cues (demo/gesture)  -     Assistive Device (Gait) walker, " front-wheeled  -     Distance in Feet (Gait) 70  -     Deviations/Abnormal Patterns (Gait) bilateral deviations;base of support, wide;weight shifting decreased;stride length decreased;gait speed decreased  -     Bilateral Gait Deviations forward flexed posture;heel strike decreased  -     Comment, (Gait/Stairs) Pt amb in room with FWW and Min A for stability. Pt demonstrated wide LE, decreased gait speed, forward flexed posture, and moderate unsteadiness. Activity limited by fatigue.  -           User Key  (r) = Recorded By, (t) = Taken By, (c) = Cosigned By    Initials Name Provider Type     Kelly Mckeon PT Physical Therapist               Obj/Interventions     Row Name 04/05/23 1048          Range of Motion Comprehensive    General Range of Motion no range of motion deficits identified  -     Row Name 04/05/23 1048          Strength Comprehensive (MMT)    General Manual Muscle Testing (MMT) Assessment lower extremity strength deficits identified  -     Comment, General Manual Muscle Testing (MMT) Assessment BLE grossly 4/5  -     Row Name 04/05/23 1048          Balance    Balance Assessment sitting static balance;sitting dynamic balance;sit to stand dynamic balance;standing static balance;standing dynamic balance  -     Static Sitting Balance standby assist  -     Dynamic Sitting Balance standby assist  -     Position, Sitting Balance sitting edge of bed  -     Static Standing Balance contact guard  -     Dynamic Standing Balance minimal assist  -     Position/Device Used, Standing Balance supported;walker, rolling  -     Balance Interventions sitting;standing;sit to stand;occupation based/functional task  -     Comment, Balance Higher level balance activies demonstrated moderate unsteadiness and higher risk of falls for functional tasks. Marching forward, tandem walking with high difficulty, static standing with eyes closed, narrow LE with eyes closed to simulate dark room  at night, single leg stance, and ambulation with head turn to simulate scanning environment all moderately difficult. Pt able to pick objects up from floor with mod A and UE support on bed rail.  -           User Key  (r) = Recorded By, (t) = Taken By, (c) = Cosigned By    Initials Name Provider Type    HP Kelly Mckeon, PT Physical Therapist               Goals/Plan     Row Name 04/05/23 1055          Bed Mobility Goal 1 (PT)    Activity/Assistive Device (Bed Mobility Goal 1, PT) sit to supine/supine to sit  -HP     [place] Level/Cues Needed (Bed Mobility Goal 1, PT) standby assist  -HP     Time Frame (Bed Mobility Goal 1, PT) long term goal (LTG);10 days  -HP     Progress/Outcomes (Bed Mobility Goal 1, PT) goal ongoing  -     Row Name 04/05/23 1055          Transfer Goal 1 (PT)    Activity/Assistive Device (Transfer Goal 1, PT) sit-to-stand/stand-to-sit  -HP     [place] Level/Cues Needed (Transfer Goal 1, PT) standby assist  -HP     Time Frame (Transfer Goal 1, PT) long term goal (LTG);10 days  -HP     Progress/Outcome (Transfer Goal 1, PT) goal ongoing  -     Row Name 04/05/23 1055          Gait Training Goal 1 (PT)    Activity/Assistive Device (Gait Training Goal 1, PT) gait (walking locomotion)  -HP     [place] Level (Gait Training Goal 1, PT) standby assist  -HP     Distance (Gait Training Goal 1, PT) 150  -HP     Time Frame (Gait Training Goal 1, PT) long term goal (LTG);10 days  -HP     Progress/Outcome (Gait Training Goal 1, PT) goal ongoing  -     Row Name 04/05/23 1055          Therapy Assessment/Plan (PT)    Planned Therapy Interventions (PT) balance training;bed mobility training;gait training;home exercise program;patient/family education;transfer training;stretching;strengthening;stair training;ROM (range of motion)  -           User Key  (r) = Recorded By, (t) = Taken By, (c) = Cosigned By    Initials Name Provider Type    Kelly Mccray, PT Physical Therapist                Clinical Impression     Row Name 04/05/23 1051          Pain    Pretreatment Pain Rating 0/10 - no pain  -HP     Posttreatment Pain Rating 0/10 - no pain  -HP     Row Name 04/05/23 1051          Plan of Care Review    Plan of Care Reviewed With patient  -HP     Progress no change  -HP     Outcome Evaluation PT eval complete. Pt amb in room with Min A and FWW. Pt participated in functional balance activies with Mod A and activies highly challenging. Pt at high risk for falls. Recommend d/c to SNF when medically appropriate. Pt will need FWW at d/c.  -HP     Row Name 04/05/23 1051          Therapy Assessment/Plan (PT)    Rehab Potential (PT) good, to achieve stated therapy goals  -     Criteria for Skilled Interventions Met (PT) yes;meets criteria;skilled treatment is necessary  -     Therapy Frequency (PT) daily  -HP     Row Name 04/05/23 1051          Vital Signs    Pre Systolic BP Rehab --  VSS  -HP     Pre Patient Position Supine  -HP     Intra Patient Position Standing  -HP     Post Patient Position Supine  -HP     Row Name 04/05/23 1051          Positioning and Restraints    Pre-Treatment Position in bed  -HP     Post Treatment Position bed  -HP     In Bed notified nsg;supine;fowlers;call light within reach;encouraged to call for assist;exit alarm on;side rails up x2;patient within staff view;side rails up x3  -           User Key  (r) = Recorded By, (t) = Taken By, (c) = Cosigned By    Initials Name Provider Type    HP Kelly Mckeon, PT Physical Therapist               Outcome Measures     Row Name 04/05/23 1056          How much help from another person do you currently need...    Turning from your back to your side while in flat bed without using bedrails? 3  -HP     Moving from lying on back to sitting on the side of a flat bed without bedrails? 3  -HP     Moving to and from a bed to a chair (including a wheelchair)? 3  -HP     Standing up from a chair using your arms (e.g., wheelchair, bedside  chair)? 3  -HP     Climbing 3-5 steps with a railing? 2  -HP     To walk in hospital room? 2  -HP     AM-PAC 6 Clicks Score (PT) 16  -HP     Highest level of mobility 5 --> Static standing  -     Row Name 04/05/23 1056          Functional Assessment    Outcome Measure Options AM-PAC 6 Clicks Basic Mobility (PT)  -           User Key  (r) = Recorded By, (t) = Taken By, (c) = Cosigned By    Initials Name Provider Type     Kelly Mckeon PT Physical Therapist                             Physical Therapy Education     Title: PT OT SLP Therapies (Done)     Topic: Physical Therapy (Done)     Point: Mobility training (Done)     Learning Progress Summary           Patient Acceptance, E,D, VU,NR by  at 4/5/2023 1056                   Point: Home exercise program (Done)     Learning Progress Summary           Patient Acceptance, E,D, VU,NR by  at 4/5/2023 1056                   Point: Body mechanics (Done)     Learning Progress Summary           Patient Acceptance, E,D, VU,NR by  at 4/5/2023 1056                   Point: Precautions (Done)     Learning Progress Summary           Patient Acceptance, E,D, VU,NR by  at 4/5/2023 1056                               User Key     Initials Effective Dates Name Provider Type Discipline     06/01/21 -  Kelly Mckeon, RIC Physical Therapist PT              PT Recommendation and Plan  Planned Therapy Interventions (PT): balance training, bed mobility training, gait training, home exercise program, patient/family education, transfer training, stretching, strengthening, stair training, ROM (range of motion)  Plan of Care Reviewed With: patient  Progress: no change  Outcome Evaluation: PT eval complete. Pt amb in room with Min A and FWW. Pt participated in functional balance activies with Mod A and activies highly challenging. Pt at high risk for falls. Recommend d/c to SNF when medically appropriate. Pt will need FWW at d/c.     Time Calculation:    PT Charges     Row Name  04/05/23 1000             Time Calculation    Start Time 1000  -HP      PT Received On 04/05/23  -HP      PT Goal Re-Cert Due Date 04/15/23  -HP         Timed Charges    48149 - Gait Training Minutes  10  -HP         Untimed Charges    PT Eval/Re-eval Minutes 47  -HP         Total Minutes    Timed Charges Total Minutes 10  -HP      Untimed Charges Total Minutes 47  -HP       Total Minutes 57  -HP            User Key  (r) = Recorded By, (t) = Taken By, (c) = Cosigned By    Initials Name Provider Type     Kelly Mckeon, PT Physical Therapist              Therapy Charges for Today     Code Description Service Date Service Provider Modifiers Qty    44076377675 HC GAIT TRAINING EA 15 MIN 4/5/2023 Kelly Mckeon, PT GP 1    93853731819 HC PT EVAL LOW COMPLEXITY 4 4/5/2023 Kelly Mckeon, PT GP 1          PT G-Codes  Outcome Measure Options: AM-PAC 6 Clicks Basic Mobility (PT)  AM-PAC 6 Clicks Score (PT): 16  PT Discharge Summary  Anticipated Discharge Disposition (PT): skilled nursing facility    Kelly Mckeon, RIC  4/5/2023

## 2023-04-05 NOTE — CONSULTS
Nutrition Services    Patient Name:  Monty Nagel  YOB: 1955  MRN: [id number]  Admit Date:  4/4/2023    RD rec'd consult for chronic poor intake. Pt is known to our services and seen by JEROMY at recent admit on 3/29. Pt eating well at last visit % of all meals, requesting additional meals and Boost+ provided TID per pt request despite adequate po intakes. RD will follow per protocol, please consult if acute nutritional needs arise.    Electronically signed by:  Dyan Silva RD  04/05/23 10:27 EDT

## 2023-04-05 NOTE — SIGNIFICANT NOTE
Pt arrived from the ED. He is alert and oriented X 4. Since arrival, patient has been refusing care. He refused IV fluid and medications. He stated that he is here to sleep, not for care. He also mentioned that he will only take two medications ( levothyroxine and lisinopril) during his stay.

## 2023-04-05 NOTE — CASE MANAGEMENT/SOCIAL WORK
Continued Stay Note  [facility]     Patient Name: Monty Nagel  MRN: [id number]  Today's Date: 4/5/2023    Admit Date: 4/4/2023    Plan: IDP   Discharge Plan      MSW contacted by MD regarding unsafe living conditions. Pt was discharged from [facility] on day ago on 4.3.23. Per chart, Pt lives alone in [place]. Pt has no family, friends, or caregivers to help provide care. Pt reports he normally walks and drives to places, but due to increased weakness no longer able to do that. Pt reports no warm water and unable to bathe. Pt reports having trouble obtaining food. Per chart, at discharge on 4.3.23 Pt refused rehab or HH. MSW attempted to contact Pt via hospital phone to see if agreeable to rehab or additional services, no answer. MSW spoke with Pt’s RN who reported Pt seems a little confused. MSW called Maame Zaragoza with APS and notified her that Pt is being readmitted. Maame reported she would come speak with Pt on 4.5.23 in mid-afternoon to do evaluation for possible guardianship. MSW updated RN and MD. MSW updated SW vernoner and team. SW and CM will continue to follow and assist with discharge plan.               Discharge Codes    No documentation.               Expected Discharge Date and Time     Expected Discharge Date Expected Discharge Time    Apr 8, 2023             CARIDAD Delarosa

## 2023-04-06 LAB
ANION GAP SERPL CALCULATED.3IONS-SCNC: 8 MMOL/L (ref 5–15)
BASOPHILS # BLD AUTO: 0.01 10*3/MM3 (ref 0–0.2)
BASOPHILS NFR BLD AUTO: 0.3 % (ref 0–1.5)
BUN SERPL-MCNC: 13 MG/DL (ref 8–23)
BUN/CREAT SERPL: 21.3 (ref 7–25)
CALCIUM SPEC-SCNC: 7.4 MG/DL (ref 8.6–10.5)
CHLORIDE SERPL-SCNC: 101 MMOL/L (ref 98–107)
CO2 SERPL-SCNC: 24 MMOL/L (ref 22–29)
CREAT SERPL-MCNC: 0.61 MG/DL (ref 0.76–1.27)
DEPRECATED RDW RBC AUTO: 58.4 FL (ref 37–54)
EGFRCR SERPLBLD CKD-EPI 2021: 104 ML/MIN/1.73
EOSINOPHIL # BLD AUTO: 0.02 10*3/MM3 (ref 0–0.4)
EOSINOPHIL NFR BLD AUTO: 0.5 % (ref 0.3–6.2)
ERYTHROCYTE [DISTWIDTH] IN BLOOD BY AUTOMATED COUNT: 18.4 % (ref 12.3–15.4)
GLUCOSE SERPL-MCNC: 134 MG/DL (ref 65–99)
HCT VFR BLD AUTO: 30 % (ref 37.5–51)
HGB BLD-MCNC: 10.3 G/DL (ref 13–17.7)
IMM GRANULOCYTES # BLD AUTO: 0.07 10*3/MM3 (ref 0–0.05)
IMM GRANULOCYTES NFR BLD AUTO: 1.9 % (ref 0–0.5)
LYMPHOCYTES # BLD AUTO: 0.38 10*3/MM3 (ref 0.7–3.1)
LYMPHOCYTES NFR BLD AUTO: 10.3 % (ref 19.6–45.3)
MCH RBC QN AUTO: 30.4 PG (ref 26.6–33)
MCHC RBC AUTO-ENTMCNC: 34.3 G/DL (ref 31.5–35.7)
MCV RBC AUTO: 88.5 FL (ref 79–97)
MONOCYTES # BLD AUTO: 0.09 10*3/MM3 (ref 0.1–0.9)
MONOCYTES NFR BLD AUTO: 2.4 % (ref 5–12)
NEUTROPHILS NFR BLD AUTO: 3.13 10*3/MM3 (ref 1.7–7)
NEUTROPHILS NFR BLD AUTO: 84.6 % (ref 42.7–76)
NRBC BLD AUTO-RTO: 0 /100 WBC (ref 0–0.2)
PLATELET # BLD AUTO: 205 10*3/MM3 (ref 140–450)
PMV BLD AUTO: 8.4 FL (ref 6–12)
POTASSIUM SERPL-SCNC: 4.9 MMOL/L (ref 3.5–5.2)
QT INTERVAL: 354 MS
QTC INTERVAL: 425 MS
RBC # BLD AUTO: 3.39 10*6/MM3 (ref 4.14–5.8)
SODIUM SERPL-SCNC: 133 MMOL/L (ref 136–145)
WBC NRBC COR # BLD: 3.7 10*3/MM3 (ref 3.4–10.8)

## 2023-04-06 PROCEDURE — 97116 GAIT TRAINING THERAPY: CPT

## 2023-04-06 PROCEDURE — G0378 HOSPITAL OBSERVATION PER HR: HCPCS

## 2023-04-06 PROCEDURE — 99232 SBSQ HOSP IP/OBS MODERATE 35: CPT | Performed by: INTERNAL MEDICINE

## 2023-04-06 PROCEDURE — 97165 OT EVAL LOW COMPLEX 30 MIN: CPT

## 2023-04-06 PROCEDURE — 97110 THERAPEUTIC EXERCISES: CPT

## 2023-04-06 PROCEDURE — 63710000001 DEXAMETHASONE PER 0.25 MG: Performed by: INTERNAL MEDICINE

## 2023-04-06 PROCEDURE — 97535 SELF CARE MNGMENT TRAINING: CPT

## 2023-04-06 PROCEDURE — 80048 BASIC METABOLIC PNL TOTAL CA: CPT | Performed by: PHYSICIAN ASSISTANT

## 2023-04-06 PROCEDURE — 85025 COMPLETE CBC W/AUTO DIFF WBC: CPT | Performed by: PHYSICIAN ASSISTANT

## 2023-04-06 RX ADMIN — LEVETIRACETAM 1000 MG: 500 TABLET, FILM COATED ORAL at 21:17

## 2023-04-06 RX ADMIN — CLOPIDOGREL BISULFATE 75 MG: 75 TABLET ORAL at 09:21

## 2023-04-06 RX ADMIN — LEVETIRACETAM 1000 MG: 500 TABLET, FILM COATED ORAL at 09:20

## 2023-04-06 RX ADMIN — ATORVASTATIN CALCIUM 40 MG: 40 TABLET, FILM COATED ORAL at 21:17

## 2023-04-06 RX ADMIN — QUETIAPINE FUMARATE 25 MG: 25 TABLET ORAL at 21:17

## 2023-04-06 RX ADMIN — DEXAMETHASONE 2 MG: 4 TABLET ORAL at 09:20

## 2023-04-06 RX ADMIN — Medication 10 ML: at 09:21

## 2023-04-06 RX ADMIN — METOPROLOL TARTRATE 25 MG: 25 TABLET, FILM COATED ORAL at 21:17

## 2023-04-06 RX ADMIN — ASPIRIN 81 MG: 81 TABLET, COATED ORAL at 09:20

## 2023-04-06 RX ADMIN — DEXAMETHASONE 2 MG: 4 TABLET ORAL at 21:18

## 2023-04-06 NOTE — PLAN OF CARE
Goal Outcome Evaluation:  Plan of Care Reviewed With: patient           Outcome Evaluation: Pt presenting below baselined w/ transfers, mobility, balance and safety limiting independence w/ ADL based task completion. Pt requiring cueing for safety and occasional redirection. Pt requiring assist w/ mobility, balance and ADLs this session. IP OT warranted to address deficits. Recommend SNF at d/c w/ transition to long term care/[facility] for best functional outcome.

## 2023-04-06 NOTE — PLAN OF CARE
Goal Outcome Evaluation:                 Problem: Fall Injury Risk  Goal: Absence of Fall and Fall-Related Injury  Intervention: Identify and Manage Contributors  Recent Flowsheet Documentation  Taken 4/6/2023 0200 by Donell Murray RN  Medication Review/Management: medications reviewed  Taken 4/6/2023 0000 by Donell Murray RN  Medication Review/Management: medications reviewed  Taken 4/5/2023 2200 by Donell Murray RN  Medication Review/Management: medications reviewed  Taken 4/5/2023 2000 by Donell Murray RN  Medication Review/Management: medications reviewed  Intervention: Promote Injury-Free Environment  Recent Flowsheet Documentation  Taken 4/6/2023 0200 by Donell Murray RN  Safety Promotion/Fall Prevention:   activity supervised   safety round/check completed   toileting scheduled  Taken 4/6/2023 0000 by Donell Murray RN  Safety Promotion/Fall Prevention:   activity supervised   safety round/check completed   toileting scheduled  Taken 4/5/2023 2200 by Donell Murray RN  Safety Promotion/Fall Prevention:   activity supervised   safety round/check completed   toileting scheduled  Taken 4/5/2023 2000 by Donell Murray RN  Safety Promotion/Fall Prevention:   activity supervised   safety round/check completed   toileting scheduled     VSS, voids well, rested throughout the night, will continue to monitor for changes.

## 2023-04-06 NOTE — THERAPY EVALUATION
Patient Name: Monty Nagel  : 1955    MRN: [id number]                              Today's Date: 2023       Admit Date: 2023    Visit Dx:     ICD-10-CM ICD-9-CM   1. Generalized weakness  R53.1 780.79   2. History of meningioma  Z86.018 V12.41   3. Failure to thrive in adult  R62.7 783.7   4. 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   • Postablative hypothyroidism   • Screen for colon cancer   • Other recurrent depressive disorders   • Balance problem   • Generalized weakness   • Atrial fibrillation   • Severe malnutrition   • Meningioma   • Rheumatoid vs Psoriatic arthritis (HCC)   • Alcohol abuse   • Hypocalcemia   • Hypoalbuminemia   • Hyponatremia     Past Medical History:   Diagnosis Date   • Arthritis    • Disease of thyroid gland    • Hepatitis A    • Hypertension    • Melanoma 2017    retina     Past Surgical History:   Procedure Laterality Date   • CRANIOTOMY FOR TUMOR N/A 3/21/2023    Procedure: CRANIOTOMY FOR TUMOR STEREOTACTIC WITH STEALTH;  Surgeon: Marlon Mckee MD;  Location: [facility] OR;  Service: Neurosurgery;  Laterality: N/A;   • EMBOLIZATION CEREBRAL N/A 3/20/2023    Procedure: Tumor Emoblization radial access;  Surgeon: Ozzy Sebastian MD;  Location: [facility] CATH INVASIVE LOCATION;  Service: Interventional Radiology;  Laterality: N/A;   • EYE SURGERY  2017    EYE CANCER RIGHT EYE   • FINGER SURGERY Left     Pointer finger re-attached in 3rd Grade   • TONSILLECTOMY        General Information     Row Name 23 1135          OT Time and Intention    Document Type evaluation  -MR     Mode of Treatment occupational therapy  -MR     Row Name 23 1135          General Information    Patient Profile Reviewed yes  -MR     Prior Level of Function min assist:;all household mobility;community mobility;gait;transfer;bed mobility;ADL's  -MR     Existing  "Precautions/Restrictions fall;seizures;other (see comments)  s/p craniotomy 3/21/23  -MR     Barriers to Rehab cognitive status;visual deficit  -MR     Row Name 04/06/23 1135          Living Environment    People in Home alone  -MR     Row Name 04/06/23 1135          Home Main Entrance    Number of Stairs, Main Entrance six;other (see comments)  + one \"huge\" step  -MR     Row Name 04/06/23 1135          Cognition    Orientation Status (Cognition) oriented x 3  -MR     Row Name 04/06/23 1135          Safety Issues, Functional Mobility    Safety Issues Affecting Function (Mobility) safety precautions follow-through/compliance;insight into deficits/self-awareness;awareness of need for assistance;safety precaution awareness  -MR     Impairments Affecting Function (Mobility) balance;cognition;endurance/activity tolerance;strength  -MR     Cognitive Impairments, Mobility Safety/Performance insight into deficits/self-awareness;awareness, need for assistance  -MR           User Key  (r) = Recorded By, (t) = Taken By, (c) = Cosigned By    Initials Name Provider Type    MR Amanda Vega, OT Occupational Therapist                 Mobility/ADL's     Row Name 04/06/23 1136          Bed Mobility    Bed Mobility supine-sit  -MR     Supine-Sit [place] (Bed Mobility) standby assist  -MR     Comment, (Bed Mobility) Pt advanced to EOB w/ bed in completely flat position w/o use of bed rails.  -MR     Row Name 04/06/23 1136          Transfers    Transfers sit-stand transfer;toilet transfer  -MR     Row Name 04/06/23 1136          Sit-Stand Transfer    Sit-Stand [place] (Transfers) contact guard  -MR     Assistive Device (Sit-Stand Transfers) walker, front-wheeled  -MR     Row Name 04/06/23 1136          Toilet Transfer    Type (Toilet Transfer) sit-stand;stand-sit  -MR     [place] Level (Toilet Transfer) contact guard  -MR     Assistive Device (Toilet Transfer) walker, front-wheeled  -MR     Row Name 04/06/23 1136       "    Functional Mobility    Functional Mobility- Ind. Level contact guard assist;verbal cues required;nonverbal cues required (demo/gesture)  -MR     Functional Mobility- Device walker, front-wheeled  -MR     Functional Mobility-Distance (Feet) --  > HH distances  -MR     Functional Mobility- Comment Pt requiring occasional v/c for sequencing and safety.  -MR     Row Name 04/06/23 1136          Activities of Daily Living    BADL Assessment/Intervention lower body dressing;grooming;toileting  -MR     Row Name 04/06/23 1136          Lower Body Dressing Assessment/Training    [place] Level (Lower Body Dressing) other (see comments);contact guard assist  adjusting snaps of pants in standing  -MR     Position (Lower Body Dressing) unsupported standing  -MR     Row Name 04/06/23 1136          Grooming Assessment/Training    [place] Level (Grooming) wash face, hands;standby assist  -MR     Position (Grooming) sink side  -MR     Row Name 04/06/23 1136          Toileting Assessment/Training    [place] Level (Toileting) adjust/manage clothing;contact guard assist;perform perineal hygiene;independent  -MR     Assistive Devices (Toileting) commode  -MR     Position (Toileting) supported sitting;supported standing  -MR           User Key  (r) = Recorded By, (t) = Taken By, (c) = Cosigned By    Initials Name Provider Type    MR Amanda Vega, OT Occupational Therapist               Obj/Interventions     Row Name 04/06/23 1138          Sensory Assessment (Somatosensory)    Sensory Assessment (Somatosensory) UE sensation intact  -MR     Row Name 04/06/23 1138          Vision Assessment/Intervention    Visual Impairment/Limitations WFL;other (see comments)  Baseline visual deficits in R eye from cancer  -MR     Row Name 04/06/23 1138          Range of Motion Comprehensive    General Range of Motion bilateral upper extremity ROM WFL  -MR     Row Name 04/06/23 1138          Strength Comprehensive (MMT)    Comment,  General Manual Muscle Testing (MMT) Assessment BUE grossly 4/5 in all functional planes  -MR     Row Name 04/06/23 1138          Balance    Balance Assessment sitting static balance;sitting dynamic balance;standing static balance;standing dynamic balance  -MR     Static Sitting Balance standby assist  -MR     Dynamic Sitting Balance standby assist  -MR     Position, Sitting Balance unsupported;sitting edge of bed;other (see comments)  commode  -MR     Static Standing Balance contact guard  -MR     Dynamic Standing Balance contact guard  -MR     Position/Device Used, Standing Balance supported;walker, front-wheeled  -MR     Balance Interventions sitting;standing;sit to stand;supported;static;dynamic;occupation based/functional task  -MR           User Key  (r) = Recorded By, (t) = Taken By, (c) = Cosigned By    Initials Name Provider Type    Amanda Nielson, LUCERO Occupational Therapist               Goals/Plan     Row Name 04/06/23 1142          Bathing Goal 1 (OT)    Activity/Device (Bathing Goal 1, OT) upper body bathing;lower body bathing  -MR     [place] Level/Cues Needed (Bathing Goal 1, OT) supervision required  -MR     Time Frame (Bathing Goal 1, OT) long term goal (LTG);by discharge  -MR     Progress/Outcomes (Bathing Goal 1, OT) new goal  -MR     Row Name 04/06/23 1142          Dressing Goal 1 (OT)    Activity/Device (Dressing Goal 1, OT) upper body dressing;lower body dressing  -MR     [place]/Cues Needed (Dressing Goal 1, OT) standby assist  -MR     Time Frame (Dressing Goal 1, OT) long term goal (LTG);by discharge  -MR     Progress/Outcome (Dressing Goal 1, OT) new goal  -MR     Row Name 04/06/23 1142          Therapy Assessment/Plan (OT)    Planned Therapy Interventions (OT) activity tolerance training;adaptive equipment training;BADL retraining;functional balance retraining;IADL retraining;occupation/activity based interventions;patient/caregiver education/training;transfer/mobility  retraining;strengthening exercise;ROM/therapeutic exercise;cognitive/visual perception retraining  -MR           User Key  (r) = Recorded By, (t) = Taken By, (c) = Cosigned By    Initials Name Provider Type    MR VegaAmanda, OT Occupational Therapist               Clinical Impression     Row Name 04/06/23 1139          Pain Assessment    Pretreatment Pain Rating 0/10 - no pain  -MR     Posttreatment Pain Rating 0/10 - no pain  -MR     Pain Intervention(s) Ambulation/increased activity;Repositioned  -MR     Row Name 04/06/23 1139          Plan of Care Review    Plan of Care Reviewed With patient  -MR     Outcome Evaluation Pt presenting below baselined w/ transfers, mobility, balance and safety limiting independence w/ ADL based task completion. Pt requiring cueing for safety and occasional redirection. Pt requiring assist w/ mobility, balance and ADLs this session. IP OT warranted to address deficits. Recommend SNF at d/c w/ transition to long term care/halfway for best functional outcome.  -MR     Row Name 04/06/23 1139          Therapy Assessment/Plan (OT)    Patient/Family Therapy Goal Statement (OT) Return to PLOF  -MR     Rehab Potential (OT) good, to achieve stated therapy goals  -MR     Criteria for Skilled Therapeutic Interventions Met (OT) yes;meets criteria;skilled treatment is necessary  -MR     Therapy Frequency (OT) daily  -MR     Row Name 04/06/23 1139          Therapy Plan Review/Discharge Plan (OT)    Anticipated Discharge Disposition (OT) skilled nursing facility  -MR     Row Name 04/06/23 1139          Vital Signs    Pre Systolic BP Rehab 98  -MR     Pre Treatment Diastolic BP 63  -MR     Pretreatment Heart Rate (beats/min) 82  -MR     Intratreatment Heart Rate (beats/min) 132  -MR     Posttreatment Heart Rate (beats/min) 89  -MR     O2 Delivery Pre Treatment room air  -MR     O2 Delivery Intra Treatment room air  -MR     O2 Delivery Post Treatment room air  -MR     Pre Patient Position Supine  -MR      Intra Patient Position Standing  -MR     Post Patient Position Standing  -MR     Row Name 04/06/23 1139          Positioning and Restraints    Pre-Treatment Position in bed  -MR     Post Treatment Position bathroom  -MR     Bathroom with other staff  Pt left standing at sink w/ RN present  -MR           User Key  (r) = Recorded By, (t) = Taken By, (c) = Cosigned By    Initials Name Provider Type    Amanda Nielson, OT Occupational Therapist               Outcome Measures     Row Name 04/06/23 1142          How much help from another is currently needed...    Putting on and taking off regular lower body clothing? 3  -MR     Bathing (including washing, rinsing, and drying) 3  -MR     Toileting (which includes using toilet bed pan or urinal) 3  -MR     Putting on and taking off regular upper body clothing 3  -MR     Taking care of personal grooming (such as brushing teeth) 4  -MR     Eating meals 4  -MR     AM-PAC 6 Clicks Score (OT) 20  -MR     Row Name 04/06/23 1047 04/06/23 0800       How much help from another person do you currently need...    Turning from your back to your side while in flat bed without using bedrails? 4  -FW 3  -AC    Moving from lying on back to sitting on the side of a flat bed without bedrails? 3  -FW 3  -AC    Moving to and from a bed to a chair (including a wheelchair)? 3  -FW 3  -AC    Standing up from a chair using your arms (e.g., wheelchair, bedside chair)? 3  -FW 3  -AC    Climbing 3-5 steps with a railing? 3  -FW 3  -AC    To walk in hospital room? 3  -FW 3  -AC    AM-PAC 6 Clicks Score (PT) 19  -FW 18  -AC    Highest level of mobility 6 --> Walked 10 steps or more  -FW 6 --> Walked 10 steps or more  -AC    Row Name 04/06/23 1142 04/06/23 1047       Functional Assessment    Outcome Measure Options AM-PAC 6 Clicks Daily Activity (OT)  -MR Timed Up and Go (TUG);AM-PAC 6 Clicks Basic Mobility (PT)  -FW          User Key  (r) = Recorded By, (t) = Taken By, (c) = Cosigned By     Initials Name Provider Type    AC Karen Avila, RN Registered Nurse    Bayron Garibay, PT Physical Therapist    MR Amanda Vega, OT Occupational Therapist                Occupational Therapy Education     Title: PT OT SLP Therapies (Done)     Topic: Occupational Therapy (Done)     Point: ADL training (Done)     Description:   Instruct learner(s) on proper safety adaptation and remediation techniques during self care or transfers.   Instruct in proper use of assistive devices.              Learning Progress Summary           Patient Acceptance, E, VU,NR by MR at 4/6/2023 1143                   Point: Home exercise program (Done)     Description:   Instruct learner(s) on appropriate technique for monitoring, assisting and/or progressing therapeutic exercises/activities.              Learning Progress Summary           Patient Acceptance, E, VU,NR by MR at 4/6/2023 1143                   Point: Precautions (Done)     Description:   Instruct learner(s) on prescribed precautions during self-care and functional transfers.              Learning Progress Summary           Patient Acceptance, E, VU,NR by MR at 4/6/2023 1143                   Point: Body mechanics (Done)     Description:   Instruct learner(s) on proper positioning and spine alignment during self-care, functional mobility activities and/or exercises.              Learning Progress Summary           Patient Acceptance, E, VU,NR by MR at 4/6/2023 1143                               User Key     Initials Effective Dates Name Provider Type Discipline    MR 09/22/22 -  Amanda Vega, LUCERO Occupational Therapist OT              OT Recommendation and Plan  Planned Therapy Interventions (OT): activity tolerance training, adaptive equipment training, BADL retraining, functional balance retraining, IADL retraining, occupation/activity based interventions, patient/caregiver education/training, transfer/mobility retraining, strengthening exercise, ROM/therapeutic  exercise, cognitive/visual perception retraining  Therapy Frequency (OT): daily  Plan of Care Review  Plan of Care Reviewed With: patient  Outcome Evaluation: Pt presenting below baselined w/ transfers, mobility, balance and safety limiting independence w/ ADL based task completion. Pt requiring cueing for safety and occasional redirection. Pt requiring assist w/ mobility, balance and ADLs this session. IP OT warranted to address deficits. Recommend SNF at d/c w/ transition to long term care/[facility] for best functional outcome.     Time Calculation:    Time Calculation- OT     Row Name 04/06/23 1144 04/06/23 1049          Time Calculation- OT    OT Start Time 0929  -MR --     OT Received On 04/06/23  -MR --     OT Goal Re-Cert Due Date 04/16/23  -MR --        Timed Charges    90860 - Gait Training Minutes  -- 12  -FW     19259 - OT Self Care/Mgmt Minutes 8  -MR --        Untimed Charges    OT Eval/Re-eval Minutes 46  -MR --        Total Minutes    Timed Charges Total Minutes 8  -MR 12  -FW     Untimed Charges Total Minutes 46  -MR --      Total Minutes 54  -MR 12  -FW           User Key  (r) = Recorded By, (t) = Taken By, (c) = Cosigned By    Initials Name Provider Type    FW Bayron Lea, RIC Physical Therapist    MR Amanda Vega OT Occupational Therapist              Therapy Charges for Today     Code Description Service Date Service Provider Modifiers Qty    [id number]  OT SELF CARE/MGMT/TRAIN EA 15 MIN 4/6/2023 Amanda Vega OT GO 1    [id number]  OT EVAL LOW COMPLEXITY 4 4/6/2023 Amanda Vega OT GO 1               Amanda Vega OT  4/6/2023

## 2023-04-06 NOTE — THERAPY TREATMENT NOTE
Patient Name: Monty Nagel  : 1955    MRN: [id number]                              Today's Date: 2023       Admit Date: 2023    Visit Dx:     ICD-10-CM ICD-9-CM   1. Generalized weakness  R53.1 780.79   2. History of meningioma  Z86.018 V12.41   3. Failure to thrive in adult  R62.7 783.7   4. 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   • Postablative hypothyroidism   • Screen for colon cancer   • Other recurrent depressive disorders   • Balance problem   • Generalized weakness   • Atrial fibrillation   • Severe malnutrition   • Meningioma   • Rheumatoid vs Psoriatic arthritis (HCC)   • Alcohol abuse   • Hypocalcemia   • Hypoalbuminemia   • Hyponatremia     Past Medical History:   Diagnosis Date   • Arthritis    • Disease of thyroid gland    • Hepatitis A    • Hypertension    • Melanoma 2017    retina     Past Surgical History:   Procedure Laterality Date   • CRANIOTOMY FOR TUMOR N/A 3/21/2023    Procedure: CRANIOTOMY FOR TUMOR STEREOTACTIC WITH STEALTH;  Surgeon: Marlon Mckee MD;  Location: [facility] at the [facility] OR;  Service: Neurosurgery;  Laterality: N/A;   • EMBOLIZATION CEREBRAL N/A 3/20/2023    Procedure: Tumor Emoblization radial access;  Surgeon: Ozzy Sebastian MD;  Location: [facility] INVASIVE LOCATION;  Service: Interventional Radiology;  Laterality: N/A;   • EYE SURGERY  2017    EYE CANCER RIGHT EYE   • FINGER SURGERY Left     Pointer finger re-attached in 3rd Grade   • TONSILLECTOMY        General Information     Row Name 23 1037          Physical Therapy Time and Intention    Document Type therapy note (daily note)  -FW     Mode of Treatment physical therapy  -FW     Row Name 23 1037          General Information    Patient Profile Reviewed yes  -FW     Existing Precautions/Restrictions fall;seizures  s/p craniotomy 3/21/23  -FW     Barriers to Rehab cognitive  status;visual deficit  -FW     Row Name 04/06/23 1037          Cognition    Orientation Status (Cognition) oriented x 3  -FW     Row Name 04/06/23 1037          Safety Issues, Functional Mobility    Impairments Affecting Function (Mobility) balance;cognition;endurance/activity tolerance;strength  -FW           User Key  (r) = Recorded By, (t) = Taken By, (c) = Cosigned By    Initials Name Provider Type    FW Bayron Lea PT Physical Therapist               Mobility     Row Name 04/06/23 1038          Bed Mobility    Bed Mobility supine-sit;sit-supine  -FW     Supine-Sit Rankin (Bed Mobility) standby assist  -FW     Sit-Supine Rankin (Bed Mobility) standby assist  -FW     Comment, (Bed Mobility) vc for sequencing  -FW     Row Name 04/06/23 1038          Sit-Stand Transfer    Sit-Stand Rankin (Transfers) contact guard  -FW     Assistive Device (Sit-Stand Transfers) walker, front-wheeled  -FW     Row Name 04/06/23 1038          Gait/Stairs (Locomotion)    Rankin Level (Gait) minimum assist (75% patient effort);verbal cues;nonverbal cues (demo/gesture)  -FW     Assistive Device (Gait) walker, front-wheeled  -FW     Distance in Feet (Gait) 80+80  -FW     Deviations/Abnormal Patterns (Gait) bilateral deviations;base of support, wide;weight shifting decreased;stride length decreased;gait speed decreased;steppage  -FW     Bilateral Gait Deviations forward flexed posture;heel strike decreased  -FW     Comment, (Gait/Stairs) steppage gait pattern L>R- mildly unsteady at times and required vc to stay w/ in RW LE  -FW           User Key  (r) = Recorded By, (t) = Taken By, (c) = Cosigned By    Initials Name Provider Type    FW Bayron Lea PT Physical Therapist               Obj/Interventions     Row Name 04/06/23 1039          Motor Skills    Therapeutic Exercise hip;ankle;knee  10 reps of : standing hip abd, standing calf raises, standing marches  -FW     Row Name 04/06/23 1039           Balance    Balance Assessment sitting static balance;sitting dynamic balance;standing static balance;standing dynamic balance  -FW     Static Sitting Balance standby assist  -FW     Dynamic Sitting Balance standby assist  -FW     Position, Sitting Balance sitting edge of bed  -FW     Static Standing Balance contact guard  -FW     Dynamic Standing Balance minimal assist  -FW     Position/Device Used, Standing Balance supported;walker, front-wheeled  -FW     Comment, Balance 5STS: 26.62 seconds, TU.2 second average  -FW           User Key  (r) = Recorded By, (t) = Taken By, (c) = Cosigned By    Initials Name Provider Type    FW Bayron Lea PT Physical Therapist               Goals/Plan    No documentation.                Clinical Impression     Row Name 23 1042          Pain    Pretreatment Pain Rating 0/10 - no pain  -FW     Posttreatment Pain Rating 0/10 - no pain  -FW     Row Name 23 1042          Plan of Care Review    Plan of Care Reviewed With patient  -FW     Progress improving  -FW     Outcome Evaluation Pt ambulated 80'x2 min A w/ a RW demonstrating a steppage gait pattern. Pt performed quick screen functional balance/gait tests indicating that he is a moderate fall risk (5STS: 26.62 seconds, TU.2 second average). Pt would benefit from SnF with increased supervision/assistance s/p dc from rehab.  -     Row Name 23 1042          Vital Signs    Pre Systolic BP Rehab --  vss  -FW     O2 Delivery Pre Treatment room air  -FW     O2 Delivery Intra Treatment room air  -FW     O2 Delivery Post Treatment room air  -FW     Pre Patient Position Supine  -FW     Intra Patient Position Standing  -FW     Post Patient Position Supine  -FW     Row Name 23 1042          Positioning and Restraints    Pre-Treatment Position in bed  -FW     Post Treatment Position bed  -FW     In Bed notified nsg;encouraged to call for assist;exit alarm on;call light within reach  -FW           User Key   (r) = Recorded By, (t) = Taken By, (c) = Cosigned By    Initials Name Provider Type    Bayron Garibay PT Physical Therapist               Outcome Measures     Row Name 04/06/23 1047 04/06/23 0800       How much help from another person do you currently need...    Turning from your back to your side while in flat bed without using bedrails? 4  -FW 3  -AC    Moving from lying on back to sitting on the side of a flat bed without bedrails? 3  -FW 3  -AC    Moving to and from a bed to a chair (including a wheelchair)? 3  -FW 3  -AC    Standing up from a chair using your arms (e.g., wheelchair, bedside chair)? 3  -FW 3  -AC    Climbing 3-5 steps with a railing? 3  -FW 3  -AC    To walk in hospital room? 3  -FW 3  -AC    AM-PAC 6 Clicks Score (PT) 19  - 18  -AC    Highest level of mobility 6 --> Walked 10 steps or more  - 6 --> Walked 10 steps or more  -    Row Name 04/06/23 1047          Timed Up and Go (TUG)    TUG Test 1 19 seconds  -     TUG Test 2 19.4 seconds  -FW     Row Name 04/06/23 1047          Functional Assessment    Outcome Measure Options Timed Up and Go (TUG);AM-PAC 6 Clicks Basic Mobility (PT)  -FW     Row Name 04/06/23 1047          5 Times Sit to Stand    5 Times Sit to Stand (seconds) 26.62 seconds  -           User Key  (r) = Recorded By, (t) = Taken By, (c) = Cosigned By    Initials Name Provider Type     Karen Avila, SUSAN Registered Nurse    Bayron Garibay, RIC Physical Therapist                             Physical Therapy Education     Title: PT OT SLP Therapies (Done)     Topic: Physical Therapy (Done)     Point: Mobility training (Done)     Learning Progress Summary           Patient Acceptance, E, VU by  at 4/6/2023 1048    Acceptance, E,D, VU,NR by  at 4/5/2023 1056                   Point: Home exercise program (Done)     Learning Progress Summary           Patient Acceptance, E, VU by  at 4/6/2023 1048    Acceptance, E,D, VU,NR by  at 4/5/2023 1056                    Point: Body mechanics (Done)     Learning Progress Summary           Patient Acceptance, E, VU by  at 2023 1048    Acceptance, E,D, VU,NR by  at 2023 1056                   Point: Precautions (Done)     Learning Progress Summary           Patient Acceptance, E, VU by  at 2023 1048    Acceptance, E,D, VU,NR by  at 2023 1056                               User Key     Initials Effective Dates Name Provider Type Discipline     22 -  Bayron Lea, RIC Physical Therapist PT     21 -  Kelly Mckeon PT Physical Therapist PT              PT Recommendation and Plan     Plan of Care Reviewed With: patient  Progress: improving  Outcome Evaluation: Pt ambulated 80'x2 min A w/ a RW demonstrating a steppage gait pattern. Pt performed quick screen functional balance/gait tests indicating that he is a moderate fall risk (5STS: 26.62 seconds, TU.2 second average). Pt would benefit from SnF with increased supervision/assistance s/p dc from rehab.     Time Calculation:    PT Charges     Row Name 23 1049             Time Calculation    Start Time 1005  -FW      PT Received On 23  -FW      PT Goal Re-Cert Due Date 04/15/23  -         Timed Charges    10025 - PT Therapeutic Exercise Minutes 10  -FW      62553 - Gait Training Minutes  12  -FW      22649 - PT Therapeutic Activity Minutes 4  -FW         Total Minutes    Timed Charges Total Minutes 26  -FW       Total Minutes 26  -FW            User Key  (r) = Recorded By, (t) = Taken By, (c) = Cosigned By    Initials Name Provider Type     Bayron Lea PT Physical Therapist              Therapy Charges for Today     Code Description Service Date Service Provider Modifiers Qty    77730133169 HC PT THER PROC EA 15 MIN 2023 Bayron Lea, PT GP 1    30028325198 HC GAIT TRAINING EA 15 MIN 2023 Bayron Lea, PT GP 1          PT G-Codes  Outcome Measure Options: Timed Up and Go (TUG), AM-PAC 6  Clicks Basic Mobility (PT)  AM-PAC 6 Clicks Score (PT): 19  TUG Test 1: 19 seconds  TUG Test 2: 19.4 seconds  PT Discharge Summary  Anticipated Discharge Disposition (PT): skilled nursing facility    Bayron Lea, RIC  4/6/2023

## 2023-04-06 NOTE — PLAN OF CARE
Goal Outcome Evaluation:         Alert, oriented x 4. VSS. Ambulates with assist x 1, walker, and gait belt. Voiding well per bathroom. No c/o pain. Will continue to monitor.

## 2023-04-06 NOTE — DISCHARGE PLACEMENT REQUEST
"Jonathan Barrera (67 y.o. Male)     Date of Birth   1955    Social Security Number       Address   [address] [place] [address]    Home Phone   [phone]    MRN   [id number]       Adventism   None    Marital Status   Single                            Admission Date   4/4/23    Admission Type   Emergency    Admitting Provider   Mary Perales DO    Attending Provider   Mary Perales DO    Department, Room/Bed   [facility] 3G, S351/1       Discharge Date       Discharge Disposition       Discharge Destination                               Attending Provider: Mary Perales DO    Allergies: No Known Allergies    Isolation: None   Infection: None   Code Status: CPR    Ht: 180.3 cm (71\")   Wt: 91.9 kg (202 lb 8 oz)    Admission Cmt: None   Principal Problem: Generalized weakness [R53.1]                 Active Insurance as of 4/4/2023     Primary Coverage     Payor Plan Insurance Group Employer/Plan Group    ANTHEM MEDICARE REPLACEMENT ANTHEM MEDICARE ADVANTAGE [id number]     Payor Plan Address Payor Plan Phone Number Payor Plan Fax Number Effective Dates    [address] [phone]  1/1/2020 - None Entered    [facility] [address]       Subscriber Name Subscriber Birth Date Member ID       JONATHAN BARRERA 1955 [id number]                 Emergency Contacts      (Rel.) Home Phone Work Phone Mobile Phone    Marva Ku (Sister) [phone] -- [phone]            Insurance Information                ANTHEM MEDICARE REPLACEMENT/ANTHEM MEDICARE ADVANTAGE Phone: [phone]    Subscriber: Jonathan Barrera Subscriber#: [id number]    Group#: [id number] Precert#: --         Bayron Lea, RIC   Physical Therapist  Physical Therapy  Plan of Care      Signed  Date of Service:  04/06/23 1005  Creation Time:  04/06/23 1049     Signed          Goal Outcome Evaluation:  Plan of Care Reviewed With: patient  Progress: improving  Outcome Evaluation: Pt ambulated 80'x2 " min A w/ a RW demonstrating a steppage gait pattern. Pt performed quick screen functional balance/gait tests indicating that he is a moderate fall risk (5STS: 26.62 seconds, TU.2 second average). Pt would benefit from SnF with increased supervision/assistance s/p dc from rehab.                 Amanda Vega OT   Occupational Therapist  Specialty:  Occupational Therapy  Plan of Care      Signed  Date of Service:  23  Creation Time:  23 1143     Signed          Goal Outcome Evaluation:  Plan of Care Reviewed With: patient  Outcome Evaluation: Pt presenting below baselined w/ transfers, mobility, balance and safety limiting independence w/ ADL based task completion. Pt requiring cueing for safety and occasional redirection. Pt requiring assist w/ mobility, balance and ADLs this session. IP OT warranted to address deficits. Recommend SNF at d/c w/ transition to long term care/[facility] for best functional outcome.                     History & Physical      Paulette Eubanks PA-C at 23 1904     Attestation signed by Anna Anne MD at 23    I have reviewed this documentation and agree.                      [facility] Medicine Services  HISTORY AND PHYSICAL    Patient Name: Monty Nagel  : 1955  MRN: [id number]  Primary Care Physician: Tiffany Morales MD  Date of admission: 2023    Subjective    Subjective     Chief Complaint:  Weakness    HPI:  Monty Nagel is a 67 y.o. male with a history of meningioma s/p craniotomy and resection, alcohol abuse, HTN, and Hx Graves Disease s/p thyroidectomy with post-surgical hypothyroidism. He was recently admitted here on 3/11 for generalized weakness and was found to have a meningioma.  Neurosurgery was consulted, and patient underwent a craniotomy with resection of the lesion.  After a lengthy hospital stay, patient was ultimately discharged home yesterday (4/3).  He reports that upon returning  home, he laid in bed for approximately 14 hours.  Upon waking, he was contacted by  with [facility], and reports that he told them that he is able to get out of bed or feed himself, or do anything to take care of himself.  He was advised to call 911 and come here.  He does admit of some ongoing peripheral neuropathy, but otherwise has no complaints.  He denies fever, chills, chest pain, shortness of breath, cough, nausea, vomiting, or diarrhea.  He unfortunately notes that he has not taken any of his home medications since discharge.  He is a non-smoker.  He does have a history of alcohol use, but reports that he has not had an alcoholic drink in at least 7 months.        Review of Systems   Constitutional: Negative for chills, fatigue, fever and unexpected weight change.   HENT: Negative for nosebleeds, sore throat and trouble swallowing.    Eyes: Negative for photophobia and visual disturbance.   Respiratory: Negative for cough, shortness of breath and wheezing.    Gastrointestinal: Negative for abdominal pain, diarrhea, nausea and vomiting.   Genitourinary: Negative for dysuria and hematuria.   Musculoskeletal: Positive for arthralgias (chronic). Negative for myalgias.   Skin: Positive for wound (surgical incision wound on scalp. Ulcer on great toe.).   Neurological: Positive for weakness (generalized). Negative for tremors, syncope and speech difficulty.   Psychiatric/Behavioral: Negative for confusion. The patient is not nervous/anxious.          Personal History     Past Medical History:   Diagnosis Date   • Arthritis    • Disease of thyroid gland    • Hepatitis A    • Hypertension    • Melanoma 2017    retina             Past Surgical History:   Procedure Laterality Date   • CRANIOTOMY FOR TUMOR N/A 3/21/2023    Procedure: CRANIOTOMY FOR TUMOR STEREOTACTIC WITH STEALTH;  Surgeon: Marlon Mckee MD;  Location: [facility];  Service: Neurosurgery;  Laterality: N/A;   • EMBOLIZATION CEREBRAL  "N/A 3/20/2023    Procedure: Tumor Emoblization radial access;  Surgeon: Ozzy Sebastian MD;  Location: [facility] INVASIVE LOCATION;  Service: Interventional Radiology;  Laterality: N/A;   • EYE SURGERY  2017    EYE CANCER RIGHT EYE   • FINGER SURGERY Left     Pointer finger re-attached in 3rd Grade   • TONSILLECTOMY         Family History:  family history includes Diabetes in his mother; Heart disease in his father.     Social History:  reports that he quit smoking about 33 years ago. His smoking use included cigarettes. He started smoking about 42 years ago. He has a 15.00 pack-year smoking history. He quit smokeless tobacco use about 33 years ago. He reports that he does not currently use alcohol. He reports that he does not use drugs.  Social History     Social History Narrative   • Not on file       Medications:  QUEtiapine, aspirin, atorvastatin, clopidogrel, dexamethasone, levETIRAcetam, levothyroxine, lisinopril-hydrochlorothiazide, melatonin, metoprolol tartrate, and pantoprazole    No Known Allergies    Objective    Objective     Vital Signs:   Temp:  [98.7 °F (37.1 °C)] 98.7 °F (37.1 °C)  Heart Rate:  [84-97] 91  Resp:  [16] 16  BP: (125-144)/() 141/90    Physical Exam  Constitutional:       General: He is not in acute distress.     Appearance: Normal appearance.      Comments: Disheveled appearing male in no acute distress.   HENT:      Head:      Comments: S/p craniotomy. See \"skin\"     Right Ear: External ear normal.      Left Ear: External ear normal.      Nose: Nose normal.   Eyes:      Extraocular Movements: Extraocular movements intact.      Conjunctiva/sclera: Conjunctivae normal.      Pupils: Pupils are equal, round, and reactive to light.   Cardiovascular:      Rate and Rhythm: Normal rate. Rhythm irregular.      Pulses: Normal pulses.      Heart sounds: Normal heart sounds. No murmur heard.  Pulmonary:      Effort: Pulmonary effort is normal. No respiratory distress.      Breath sounds: " Normal breath sounds. No wheezing, rhonchi or rales.   Abdominal:      General: Bowel sounds are normal. There is no distension.      Tenderness: There is no abdominal tenderness. There is no guarding or rebound.   Musculoskeletal:         General: Normal range of motion.      Cervical back: No rigidity.      Right lower leg: No edema.      Left lower leg: No edema.   Skin:     General: Skin is warm and dry.      Coloration: Skin is not jaundiced.      Findings: Lesion (surgical incision on scalp. No evuidence of infectious process, drainage, or dehiscence. Small open ulcer on great toe. ) present. No rash.      Comments: Poor foot hygiene noted. Unkept toe nails.    Neurological:      General: No focal deficit present.      Mental Status: He is alert and oriented to person, place, and time.   Psychiatric:         Attention and Perception: Attention normal.         Mood and Affect: Mood normal.         Behavior: Behavior normal.         Thought Content: Thought content normal.          Result Review:  I have personally reviewed the results from the time of this admission to 4/4/2023 20:01 EDT and agree with these findings:  [x]  Laboratory list / accordion  []  Microbiology  [x]  Radiology  [x]  EKG/Telemetry   []  Cardiology/Vascular   []  Pathology  [x]  Old records  []  Other:    LAB RESULTS:      Lab 04/04/23  1618 04/01/23  0450   WBC 5.41 7.92   HEMOGLOBIN 13.1 10.9*   HEMATOCRIT 38.0 32.9*   PLATELETS 211 243   NEUTROS ABS 4.54 7.10*   IMMATURE GRANS (ABS)  --  0.16*   LYMPHS ABS  --  0.36*   MONOS ABS  --  0.21   EOS ABS 0.27 0.08   MCV 87.2 90.1         Lab 04/04/23  1618 04/01/23  0450 03/29/23  0415   SODIUM 128* 132* 135*   POTASSIUM 4.4 4.3 4.5   CHLORIDE 92* 97* 99   CO2 25.0 26.0 27.0   ANION GAP 11.0 9.0 9.0   BUN 14 20 25*   CREATININE 0.61* 0.66* 0.71*   EGFR 104.0 101.5 99.3   GLUCOSE 75 102* 119*   CALCIUM 7.6* 7.4* 7.7*   MAGNESIUM 2.1  --   --    TSH 3.770  --   --          Lab 04/04/23 1618    TOTAL PROTEIN 5.2*   ALBUMIN 2.9*   GLOBULIN 2.3   ALT (SGPT) 48*   AST (SGOT) 31   BILIRUBIN 0.9   ALK PHOS 67         Lab 04/04/23  1618   HSTROP T 13                 Brief Urine Lab Results  (Last result in the past 365 days)      Color   Clarity   Blood   Leuk Est   Nitrite   Protein   CREAT   Urine HCG        04/04/23 1728 Yellow   Clear   Negative   Negative   Negative   Negative               Microbiology Results (last 10 days)     ** No results found for the last 240 hours. **          CT Head Without Contrast    Result Date: 4/4/2023  CT HEAD WO CONTRAST Date of Exam: 4/4/2023 3:53 PM EDT Indication: gen weakness. Comparison: Head CT 3/21/2023 Technique: Axial CT images were obtained of the head without contrast administration.  Reconstructed coronal and sagittal images were also obtained. Automated exposure control and iterative construction methods were used. Findings: Redemonstration of postsurgical changes of right frontal craniotomy for resection of previously seen large right frontal meningioma. Redemonstration of embolization material in the right and left middle cranial fossa. Much of the postoperative pneumocephalus has resolved. There is a mixed density extradural collection along the right frontal convexity subjacent to the craniotomy appearing mostly hypodense, with some linear hyperdensity tracking along the right frontal falx (series 3 image 47 and series 5 image 32), compatible with some recent blood products. This extra-axial collection measures up to 7 mm in maximal width (series 3 image 34). There is 3 mm of leftward midline shift, previously 7 mm. No uncal or tonsillar herniation. No significant effacement of the ventricles or hydrocephalus. Mild decreased conspicuity of previously seen vasogenic edema throughout the right frontal lobe. No acute large territory infarct. There is intracranial atherosclerosis. Unremarkable appearance of the orbits. Expected postsurgical changes of the  right frontal scalp soft tissues. No acute or suspicious bony findings. Mild mucosal thickening in the bilateral maxillary sinuses. The mastoid air cells are clear.     Impression: Impression: Evolving post surgical changes of recent right frontal craniotomy and resection of large right frontal meningioma. Much of the pneumocephalus has resolved, and there is decreased leftward midline shift. There is a thin mixed density extra-axial collection along the right frontal convexity subjacent to the craniotomy, appearing mostly hypodense, with some linear hyperdensity along the right frontal convexity and right falx which may reflect some small recent blood products. Vasogenic edema throughout the right frontal lobe appears somewhat improved. Electronically Signed: Yovani Cash  4/4/2023 4:23 PM EDT  Workstation ID: [id number]    XR Chest 1 View    Result Date: 4/4/2023  XR CHEST 1 VW Date of Exam: 4/4/2023 3:39 PM EDT Indication: Weak/Dizzy/AMS triage protocol. Comparison: 3/11/2023 FINDINGS: No focal airspace opacity. No pleural effusion or pneumothorax. Normal heart and mediastinal contours.     Impression: No evidence of acute disease in the chest. Electronically Signed: Leodan Howard  4/4/2023 4:37 PM EDT  Workstation ID: [id number]      Results for orders placed during the hospital encounter of 03/11/23    Adult Transthoracic Echo Complete w/ Color, Spectral and Contrast if necessary per protocol    Interpretation Summary  •  Left ventricular systolic function is normal. Estimated left ventricular EF = 55%  •  Biatrial enlargement.  •  Calcified aortic valve with mild aortic stenosis, mean gradient 10 mmHg, BANG 1.6 cm².  •  Moderate aortic insufficiency.  •  Mild mitral regurgitation.  •  Mild tricuspid regurgitation with normal RVSP.  •  Mild dilation of the ascending aorta (4.2 cm) is present.      Assessment & Plan   Assessment & Plan       Generalized weakness    Meningioma    Essential hypertension     "Postablative hypothyroidism    Atrial fibrillation    Rheumatoid vs Psoriatic arthritis (HCC)    Severe malnutrition    Alcohol abuse    Hypocalcemia    Hypoalbuminemia    Hyponatremia      Monty Nagel is a 67 y.o. male with a history of meningioma s/p craniotomy and resection, alcohol abuse, HTN, and Hx Graves Disease s/p thyroidectomy with post-surgical hypothyroidism who has returned to [facility] for being unable to take care of himself at home and poor living conditions.    Generalized weakness  Meningioma s/p craniotomy with resection.  Poor living conditions  -Patient is otherwise asymptomatic at this time.  Surgical site looks good.  -We will ask case management to see in the a.m. to discuss possible placement.  -PT/OT.  -Neuro checks  -Wound care to see in the a.m.  -It appears based off discharge note that he was supposed to be taking Decadron taper and Keppra at discharge.  However, he unfortunately reports that he has not been taking any medication since discharge.  -Consider neurosurgery consult if needed given recent surgical craniotomy with tumor resection.  -We will ask case management to see in the a.m. for possible placement at discharge given poor living conditions and patient's inability to care for himself.  -Seizure precautions.  -Appears he was started on Plavix during last admission. Will check P2Y12. Has not been taking it since discharge.     Atrial fibrillation  -Rate controlled today.  -Not currently on anticoagulation.  It appears that based on recent charting that patient has refused anticoagulation \"because of his hemorrhoids and he believes Insil feeling\".  -Appears he was started on Plavix during last admission. Will check P2Y12. Has not been taking it since discharge.     Right great toe ulcer.  -Appears present during recent admission.  MRI foot negative for osteo at that time.  -Dr. Flores was following during recent mission.  Will consult again if need be.  -Wound care to " see.    HTN  HLD  -Has lisinopril-HCTZ and metoprolol on med list.  -Reports that he has not been taking any of these since discharge.  -We will continue for now.  -Continue statin    Hyponatremia  -Na+ 128.  -Likely secondary to dehydration secondary to poor p.o. intake.  -Gentle IV fluids overnight.  -Check serum Osm, urine Osm, and Urine Na+    Hypoalbuminemia  Hypocalcemia  Chronic Malnutrition  -Calcium 7.6.  Appears chronic.  -Albumin 2.9.  -Corrected calcium still only comes to 8.5.  -Nutrition consult for the a.m.  -It appears patient has a very poor living condition.  This on top of his inability to ambulate at home likely worsening his nutritional health.  We will ask case management to see in the a.m. for possible placement at discharge.    Hx of alcohol abuse  -Patient fortunately reports that he has not had an alcoholic drink since September 2022.    Postoperative hypothyroidism.  -Check TSH, T4  -Likely noncompliant with his home Synthroid given his admission of not taking any of his home medications at discharge.  -We will continue his home Synthroid here.           DVT prophylaxis:  SCDS    CODE STATUS:  CPR  Code Status (Patient has no pulse and is not breathing): CPR (Attempt to Resuscitate)  Medical Interventions (Patient has pulse or is breathing): Full Support      Expected Discharge  1-2 days      This note has been completed as part of a split-shared workflow.     Signature: Electronically signed by Paulette Eubanks PA-C, 04/04/23, 7:50 PM EDT              Electronically signed by Anna Anne MD at 04/04/23 7445         Current Facility-Administered Medications   Medication Dose Route Frequency Provider Last Rate Last Admin   • acetaminophen (TYLENOL) tablet 650 mg  650 mg Oral Q4H PRN Paulette Eubanks PA-C       • aspirin EC tablet 81 mg  81 mg Oral Daily Paulette Eubanks PA-C   81 mg at 04/06/23 0920   • atorvastatin (LIPITOR) tablet 40 mg  40 mg Oral Nightly  Paulette Eubanks PA-C   40 mg at 23 2200   • clopidogrel (PLAVIX) tablet 75 mg  75 mg Oral Daily Paulette Eubanks PA-C   75 mg at 23 0921   • dexamethasone (DECADRON) tablet 2 mg  2 mg Oral Q12H Henok Peralesey, DO   2 mg at 23 0920   • lisinopril (PRINIVIL,ZESTRIL) tablet 10 mg  10 mg Oral Q24H Paulette Eubanks PA-C   10 mg at 23 1022    And   • hydroCHLOROthiazide (HYDRODIURIL) oral 12.5 mg  12.5 mg Oral Q24H Paulette Eubanks PA-C       • levETIRAcetam (KEPPRA) tablet 1,000 mg  1,000 mg Oral Q12H Henok Peralesey, DO   1,000 mg at 23 0920   • levothyroxine (SYNTHROID, LEVOTHROID) tablet 125 mcg  125 mcg Oral Q AM Paulette Eubanks PA-C   125 mcg at 23 0531   • melatonin tablet 5 mg  5 mg Oral Nightly PRN Paulette Eubanks PA-C       • metoprolol tartrate (LOPRESSOR) tablet 25 mg  25 mg Oral Q12H Paulette Eubanks PA-C   25 mg at 23 1024   • ondansetron (ZOFRAN) tablet 4 mg  4 mg Oral Q6H PRN Paulette Eubanks PA-C        Or   • ondansetron (ZOFRAN) injection 4 mg  4 mg Intravenous Q6H PRN Paulette Eubanks PA-C       • pantoprazole (PROTONIX) EC tablet 40 mg  40 mg Oral Q AM Paulette Eubanks PA-C   40 mg at 23 0531   • QUEtiapine (SEROquel) tablet 25 mg  25 mg Oral Nightly Paulette Eubanks PA-C   25 mg at 23 220   • sodium chloride 0.9 % flush 10 mL  10 mL Intravenous PRN Monty Mclaughlin MD       • sodium chloride 0.9 % flush 10 mL  10 mL Intravenous Q12H Paulette Eubanks PA-C   10 mL at 23 0921   • sodium chloride 0.9 % flush 10 mL  10 mL Intravenous PRN Paulette Eubanks PA-C       • sodium chloride 0.9 % infusion 40 mL  40 mL Intravenous PRN Paulette Eubanks PA-C            Physical Therapy Notes (most recent note)      Bayron Lea, PT at 23 1005  Version 1 of 1         Patient Name: Monty Nagel  : 1955    MRN: [id number]                               Today's Date: 4/6/2023       Admit Date: 4/4/2023    Visit Dx:     ICD-10-CM ICD-9-CM   1. Generalized weakness  R53.1 780.79   2. History of meningioma  Z86.018 V12.41   3. Failure to thrive in adult  R62.7 783.7   4. 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   • Postablative hypothyroidism   • Screen for colon cancer   • Other recurrent depressive disorders   • Balance problem   • Generalized weakness   • Atrial fibrillation   • Severe malnutrition   • Meningioma   • Rheumatoid vs Psoriatic arthritis (HCC)   • Alcohol abuse   • Hypocalcemia   • Hypoalbuminemia   • Hyponatremia     Past Medical History:   Diagnosis Date   • Arthritis    • Disease of thyroid gland    • Hepatitis A    • Hypertension    • Melanoma 2017    retina     Past Surgical History:   Procedure Laterality Date   • CRANIOTOMY FOR TUMOR N/A 3/21/2023    Procedure: CRANIOTOMY FOR TUMOR STEREOTACTIC WITH STEALTH;  Surgeon: Marlon Mckee MD;  Location: [facility];  Service: Neurosurgery;  Laterality: N/A;   • EMBOLIZATION CEREBRAL N/A 3/20/2023    Procedure: Tumor Emoblization radial access;  Surgeon: Ozzy Sebastian MD;  Location: [facility] CATH INVASIVE LOCATION;  Service: Interventional Radiology;  Laterality: N/A;   • EYE SURGERY  2017    EYE CANCER RIGHT EYE   • FINGER SURGERY Left     Pointer finger re-attached in 3rd Grade   • TONSILLECTOMY        General Information     Row Name 04/06/23 1037          Physical Therapy Time and Intention    Document Type therapy note (daily note)  -FW     Mode of Treatment physical therapy  -FW     Row Name 04/06/23 1037          General Information    Patient Profile Reviewed yes  -FW     Existing Precautions/Restrictions fall;seizures  s/p craniotomy 3/21/23  -FW     Barriers to Rehab cognitive status;visual deficit  -FW     Row Name 04/06/23 1037           Cognition    Orientation Status (Cognition) oriented x 3  -FW     Row Name 04/06/23 1037          Safety Issues, Functional Mobility    Impairments Affecting Function (Mobility) balance;cognition;endurance/activity tolerance;strength  -FW           User Key  (r) = Recorded By, (t) = Taken By, (c) = Cosigned By    Initials Name Provider Type     Bayron Lae PT Physical Therapist               Mobility     Row Name 04/06/23 1038          Bed Mobility    Bed Mobility supine-sit;sit-supine  -FW     Supine-Sit Ismay (Bed Mobility) standby assist  -FW     Sit-Supine Ismay (Bed Mobility) standby assist  -FW     Comment, (Bed Mobility) vc for sequencing  -FW     Row Name 04/06/23 1038          Sit-Stand Transfer    Sit-Stand [place]may (Transfers) contact guard  -FW     Assistive Device (Sit-Stand Transfers) walker, front-wheeled  -FW     Row Name 04/06/23 1038          Gait/Stairs (Locomotion)    [place]may Level (Gait) minimum assist (75% patient effort);verbal cues;nonverbal cues (demo/gesture)  -FW     Assistive Device (Gait) walker, front-wheeled  -FW     Distance in Feet (Gait) 80+80  -FW     Deviations/Abnormal Patterns (Gait) bilateral deviations;base of support, wide;weight shifting decreased;stride length decreased;gait speed decreased;steppage  -FW     Bilateral Gait Deviations forward flexed posture;heel strike decreased  -FW     Comment, (Gait/Stairs) steppage gait pattern L>R- mildly unsteady at times and required vc to stay w/ in RW LE  -FW           User Key  (r) = Recorded By, (t) = Taken By, (c) = Cosigned By    Initials Name Provider Type     Bayron Lea PT Physical Therapist               Obj/Interventions     Row Name 04/06/23 1039          Motor Skills    Therapeutic Exercise hip;ankle;knee  10 reps of : standing hip abd, standing calf raises, standing marches  -FW     Row Name 04/06/23 1039          Balance    Balance Assessment sitting static balance;sitting  dynamic balance;standing static balance;standing dynamic balance  -FW     Static Sitting Balance standby assist  -FW     Dynamic Sitting Balance standby assist  -FW     Position, Sitting Balance sitting edge of bed  -FW     Static Standing Balance contact guard  -FW     Dynamic Standing Balance minimal assist  -FW     Position/Device Used, Standing Balance supported;walker, front-wheeled  -FW     Comment, Balance 5STS: 26.62 seconds, TU.2 second average  -FW           User Key  (r) = Recorded By, (t) = Taken By, (c) = Cosigned By    Initials Name Provider Type    FW Bayron Lea PT Physical Therapist               Goals/Plan    No documentation.                Clinical Impression     Row Name 23 1042          Pain    Pretreatment Pain Rating 0/10 - no pain  -FW     Posttreatment Pain Rating 0/10 - no pain  -FW     Row Name 23 1042          Plan of Care Review    Plan of Care Reviewed With patient  -FW     Progress improving  -FW     Outcome Evaluation Pt ambulated 80'x2 min A w/ a RW demonstrating a steppage gait pattern. Pt performed quick screen functional balance/gait tests indicating that he is a moderate fall risk (5STS: 26.62 seconds, TU.2 second average). Pt would benefit from SnF with increased supervision/assistance s/p dc from rehab.  -     Row Name 23 1042          Vital Signs    Pre Systolic BP Rehab --  vss  -FW     O2 Delivery Pre Treatment room air  -FW     O2 Delivery Intra Treatment room air  -FW     O2 Delivery Post Treatment room air  -FW     Pre Patient Position Supine  -FW     Intra Patient Position Standing  -FW     Post Patient Position Supine  -FW     Row Name 23 1042          Positioning and Restraints    Pre-Treatment Position in bed  -FW     Post Treatment Position bed  -FW     In Bed notified nsg;encouraged to call for assist;exit alarm on;call light within reach  -FW           User Key  (r) = Recorded By, (t) = Taken By, (c) = Cosigned By     Initials Name Provider Type    Bayron Garibay PT Physical Therapist               Outcome Measures     Row Name 04/06/23 1047 04/06/23 0800       How much help from another person do you currently need...    Turning from your back to your side while in flat bed without using bedrails? 4  -FW 3  -AC    Moving from lying on back to sitting on the side of a flat bed without bedrails? 3  -FW 3  -AC    Moving to and from a bed to a chair (including a wheelchair)? 3  -FW 3  -AC    Standing up from a chair using your arms (e.g., wheelchair, bedside chair)? 3  -FW 3  -AC    Climbing 3-5 steps with a railing? 3  -FW 3  -AC    To walk in hospital room? 3  -FW 3  -AC    AM-PAC 6 Clicks Score (PT) 19  - 18  -AC    Highest level of mobility 6 --> Walked 10 steps or more  -FW 6 --> Walked 10 steps or more  -AC    Row Name 04/06/23 1047          Timed Up and Go (TUG)    TUG Test 1 19 seconds  -     TUG Test 2 19.4 seconds  -     Row Name 04/06/23 1047          Functional Assessment    Outcome Measure Options Timed Up and Go (TUG);AM-PAC 6 Clicks Basic Mobility (PT)  -     Row Name 04/06/23 1047          5 Times Sit to Stand    5 Times Sit to Stand (seconds) 26.62 seconds  -           User Key  (r) = Recorded By, (t) = Taken By, (c) = Cosigned By    Initials Name Provider Type     Karen Avila RN Registered Nurse    Bayron Garibay PT Physical Therapist                             Physical Therapy Education     Title: PT OT SLP Therapies (Done)     Topic: Physical Therapy (Done)     Point: Mobility training (Done)     Learning Progress Summary           Patient Acceptance, E, VU by  at 4/6/2023 1048    Acceptance, E,D, VU,NR by  at 4/5/2023 1056                   Point: Home exercise program (Done)     Learning Progress Summary           Patient Acceptance, E, VU by  at 4/6/2023 1048    Acceptance, E,D, VU,NR by  at 4/5/2023 1056                   Point: Body mechanics (Done)     Learning Progress  Summary           Patient Acceptance, E, VU by  at 2023 1048    Acceptance, E,D, VU,NR by  at 2023 1056                   Point: Precautions (Done)     Learning Progress Summary           Patient Acceptance, E, VU by  at 2023 1048    Acceptance, E,D, VU,NR by  at 2023 1056                               User Key     Initials Effective Dates Name Provider Type Discipline     22 -  Bayron Lea, RIC Physical Therapist PT     21 -  Kelly Mckeon PT Physical Therapist PT              PT Recommendation and Plan     Plan of Care Reviewed With: patient  Progress: improving  Outcome Evaluation: Pt ambulated 80'x2 min A w/ a RW demonstrating a steppage gait pattern. Pt performed quick screen functional balance/gait tests indicating that he is a moderate fall risk (5STS: 26.62 seconds, TU.2 second average). Pt would benefit from SnF with increased supervision/assistance s/p dc from rehab.     Time Calculation:    PT Charges     Row Name 23 1049             Time Calculation    Start Time 1005  -FW      PT Received On 23  -FW      PT Goal Re-Cert Due Date 04/15/23  -FW         Timed Charges    71292 - PT Therapeutic Exercise Minutes 10  -FW      23975 - Gait Training Minutes  12  -FW      77869 - PT Therapeutic Activity Minutes 4  -FW         Total Minutes    Timed Charges Total Minutes 26  -FW       Total Minutes 26  -FW            User Key  (r) = Recorded By, (t) = Taken By, (c) = Cosigned By    Initials Name Provider Type     Bayron Lea PT Physical Therapist              Therapy Charges for Today     Code Description Service Date Service Provider Modifiers Qty    27670248948 HC PT THER PROC EA 15 MIN 2023 Bayron Lea, PT GP 1    11499892544 HC GAIT TRAINING EA 15 MIN 2023 Bayron Lea, PT GP 1          PT G-Codes  Outcome Measure Options: Timed Up and Go (TUG), AM-PAC 6 Clicks Basic Mobility (PT)  AM-PAC 6 Clicks Score (PT):  19  TUG Test 1: 19 seconds  TUG Test 2: 19.4 seconds  PT Discharge Summary  Anticipated Discharge Disposition (PT): skilled nursing facility    Bayron Lea, PT  2023      Electronically signed by Bayron Lea, PT at 23 1051          Occupational Therapy Notes (most recent note)      Amanda Vega, OT at 23 0929          Patient Name: Monty Nagel  : 1955    MRN: [id number]                              Today's Date: 2023       Admit Date: 2023    Visit Dx:     ICD-10-CM ICD-9-CM   1. Generalized weakness  R53.1 780.79   2. History of meningioma  Z86.018 V12.41   3. Failure to thrive in adult  R62.7 783.7   4. 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   • Postablative hypothyroidism   • Screen for colon cancer   • Other recurrent depressive disorders   • Balance problem   • Generalized weakness   • Atrial fibrillation   • Severe malnutrition   • Meningioma   • Rheumatoid vs Psoriatic arthritis (HCC)   • Alcohol abuse   • Hypocalcemia   • Hypoalbuminemia   • Hyponatremia     Past Medical History:   Diagnosis Date   • Arthritis    • Disease of thyroid gland    • Hepatitis A    • Hypertension    • Melanoma 2017    retina     Past Surgical History:   Procedure Laterality Date   • CRANIOTOMY FOR TUMOR N/A 3/21/2023    Procedure: CRANIOTOMY FOR TUMOR STEREOTACTIC WITH STEALTH;  Surgeon: Marlon Mckee MD;  Location: [facility] OR;  Service: Neurosurgery;  Laterality: N/A;   • EMBOLIZATION CEREBRAL N/A 3/20/2023    Procedure: Tumor Emoblization radial access;  Surgeon: Ozzy Sebastian MD;  Location: [facility] CATH INVASIVE LOCATION;  Service: Interventional Radiology;  Laterality: N/A;   • EYE SURGERY  2017    EYE CANCER RIGHT EYE   • FINGER SURGERY Left     Pointer finger re-attached in 3rd Grade   • TONSILLECTOMY        General Information     Row Name  "04/06/23 1135          OT Time and Intention    Document Type evaluation  -MR     Mode of Treatment occupational therapy  -MR     Row Name 04/06/23 1135          General Information    Patient Profile Reviewed yes  -MR     Prior Level of Function min assist:;all household mobility;community mobility;gait;transfer;bed mobility;ADL's  -MR     Existing Precautions/Restrictions fall;seizures;other (see comments)  s/p craniotomy 3/21/23  -MR     Barriers to Rehab cognitive status;visual deficit  -MR     Row Name 04/06/23 1135          Living Environment    People in Home alone  -MR     Row Name 04/06/23 1135          Home Main Entrance    Number of Stairs, Main Entrance six;other (see comments)  + one \"huge\" step  -MR     Row Name 04/06/23 1135          Cognition    Orientation Status (Cognition) oriented x 3  -MR     Row Name 04/06/23 1135          Safety Issues, Functional Mobility    Safety Issues Affecting Function (Mobility) safety precautions follow-through/compliance;insight into deficits/self-awareness;awareness of need for assistance;safety precaution awareness  -MR     Impairments Affecting Function (Mobility) balance;cognition;endurance/activity tolerance;strength  -MR     Cognitive Impairments, Mobility Safety/Performance insight into deficits/self-awareness;awareness, need for assistance  -MR           User Key  (r) = Recorded By, (t) = Taken By, (c) = Cosigned By    Initials Name Provider Type    MR Gary Amanda, OT Occupational Therapist                 Mobility/ADL's     Row Name 04/06/23 1136          Bed Mobility    Bed Mobility supine-sit  -MR     Supine-Sit [place] (Bed Mobility) standby assist  -MR     Comment, (Bed Mobility) Pt advanced to EOB w/ bed in completely flat position w/o use of bed rails.  -MR     Row Name 04/06/23 1136          Transfers    Transfers sit-stand transfer;toilet transfer  -MR     Row Name 04/06/23 1136          Sit-Stand Transfer    Sit-Stand [place] (Transfers) " contact guard  -MR     Assistive Device (Sit-Stand Transfers) walker, front-wheeled  -MR     Row Name 04/06/23 1136          Toilet Transfer    Type (Toilet Transfer) sit-stand;stand-sit  -MR     [place] Level (Toilet Transfer) contact guard  -MR     Assistive Device (Toilet Transfer) walker, front-wheeled  -MR     Row Name 04/06/23 1136          Functional Mobility    Functional Mobility- Ind. Level contact guard assist;verbal cues required;nonverbal cues required (demo/gesture)  -MR     Functional Mobility- Device walker, front-wheeled  -MR     Functional Mobility-Distance (Feet) --  > HH distances  -MR     Functional Mobility- Comment Pt requiring occasional v/c for sequencing and safety.  -MR     Row Name 04/06/23 1136          Activities of Daily Living    BADL Assessment/Intervention lower body dressing;grooming;toileting  -MR     Row Name 04/06/23 1136          Lower Body Dressing Assessment/Training    [place] Level (Lower Body Dressing) other (see comments);contact guard assist  adjusting snaps of pants in standing  -MR     Position (Lower Body Dressing) unsupported standing  -MR     Row Name 04/06/23 1136          Grooming Assessment/Training    [place] Level (Grooming) wash face, hands;standby assist  -MR     Position (Grooming) sink side  -MR     Row Name 04/06/23 1136          Toileting Assessment/Training    [place] Level (Toileting) adjust/manage clothing;contact guard assist;perform perineal hygiene;independent  -MR     Assistive Devices (Toileting) commode  -MR     Position (Toileting) supported sitting;supported standing  -           User Key  (r) = Recorded By, (t) = Taken By, (c) = Cosigned By    Initials Name Provider Type    Amanda Nielson OT Occupational Therapist               Obj/Interventions     Row Name 04/06/23 1138          Sensory Assessment (Somatosensory)    Sensory Assessment (Somatosensory) UE sensation intact  -MR     Row Name 04/06/23 1138           Vision Assessment/Intervention    Visual Impairment/Limitations WFL;other (see comments)  Baseline visual deficits in R eye from cancer  -MR     Row Name 04/06/23 1138          Range of Motion Comprehensive    General Range of Motion bilateral upper extremity ROM WFL  -MR     Row Name 04/06/23 1138          Strength Comprehensive (MMT)    Comment, General Manual Muscle Testing (MMT) Assessment BUE grossly 4/5 in all functional planes  -MR     Row Name 04/06/23 1138          Balance    Balance Assessment sitting static balance;sitting dynamic balance;standing static balance;standing dynamic balance  -MR     Static Sitting Balance standby assist  -MR     Dynamic Sitting Balance standby assist  -MR     Position, Sitting Balance unsupported;sitting edge of bed;other (see comments)  commode  -MR     Static Standing Balance contact guard  -MR     Dynamic Standing Balance contact guard  -MR     Position/Device Used, Standing Balance supported;walker, front-wheeled  -MR     Balance Interventions sitting;standing;sit to stand;supported;static;dynamic;occupation based/functional task  -MR           User Key  (r) = Recorded By, (t) = Taken By, (c) = Cosigned By    Initials Name Provider Type    MR Amanda Vega, OT Occupational Therapist               Goals/Plan     Row Name 04/06/23 1142          Bathing Goal 1 (OT)    Activity/Device (Bathing Goal 1, OT) upper body bathing;lower body bathing  -MR     [place] Level/Cues Needed (Bathing Goal 1, OT) supervision required  -MR     Time Frame (Bathing Goal 1, OT) long term goal (LTG);by discharge  -MR     Progress/Outcomes (Bathing Goal 1, OT) new goal  -MR     Row Name 04/06/23 1142          Dressing Goal 1 (OT)    Activity/Device (Dressing Goal 1, OT) upper body dressing;lower body dressing  -MR     [place]/Cues Needed (Dressing Goal 1, OT) standby assist  -MR     Time Frame (Dressing Goal 1, OT) long term goal (LTG);by discharge  -MR     Progress/Outcome (Dressing  Goal 1, OT) new goal  -MR     Nikolas Name 04/06/23 1142          Therapy Assessment/Plan (OT)    Planned Therapy Interventions (OT) activity tolerance training;adaptive equipment training;BADL retraining;functional balance retraining;IADL retraining;occupation/activity based interventions;patient/caregiver education/training;transfer/mobility retraining;strengthening exercise;ROM/therapeutic exercise;cognitive/visual perception retraining  -MR           User Key  (r) = Recorded By, (t) = Taken By, (c) = Cosigned By    Initials Name Provider Type    Amanda Nielson, OT Occupational Therapist               Clinical Impression     Row Name 04/06/23 1139          Pain Assessment    Pretreatment Pain Rating 0/10 - no pain  -MR     Posttreatment Pain Rating 0/10 - no pain  -MR     Pain Intervention(s) Ambulation/increased activity;Repositioned  -MR     Nikolas Name 04/06/23 1139          Plan of Care Review    Plan of Care Reviewed With patient  -MR     Outcome Evaluation Pt presenting below baselined w/ transfers, mobility, balance and safety limiting independence w/ ADL based task completion. Pt requiring cueing for safety and occasional redirection. Pt requiring assist w/ mobility, balance and ADLs this session. IP OT warranted to address deficits. Recommend SNF at d/c w/ transition to long term care/[facility] for best functional outcome.  -MR     Row Name 04/06/23 1139          Therapy Assessment/Plan (OT)    Patient/Family Therapy Goal Statement (OT) Return to PLOF  -MR     Rehab Potential (OT) good, to achieve stated therapy goals  -MR     Criteria for Skilled Therapeutic Interventions Met (OT) yes;meets criteria;skilled treatment is necessary  -MR     Therapy Frequency (OT) daily  -MR     Row Name 04/06/23 1139          Therapy Plan Review/Discharge Plan (OT)    Anticipated Discharge Disposition (OT) skilled nursing facility  -MR     Nikolas Name 04/06/23 1139          Vital Signs    Pre Systolic BP Rehab 98  -MR     Pre  Treatment Diastolic BP 63  -MR     Pretreatment Heart Rate (beats/min) 82  -MR     Intratreatment Heart Rate (beats/min) 132  -MR     Posttreatment Heart Rate (beats/min) 89  -MR     O2 Delivery Pre Treatment room air  -MR     O2 Delivery Intra Treatment room air  -MR     O2 Delivery Post Treatment room air  -MR     Pre Patient Position Supine  -MR     Intra Patient Position Standing  -MR     Post Patient Position Standing  -MR     Row Name 04/06/23 1139          Positioning and Restraints    Pre-Treatment Position in bed  -MR     Post Treatment Position bathroom  -MR     Bathroom with other staff  Pt left standing at sink w/ RN present  -MR           User Key  (r) = Recorded By, (t) = Taken By, (c) = Cosigned By    Initials Name Provider Type    MR Amanda Vega, OT Occupational Therapist               Outcome Measures     Row Name 04/06/23 1142          How much help from another is currently needed...    Putting on and taking off regular lower body clothing? 3  -MR     Bathing (including washing, rinsing, and drying) 3  -MR     Toileting (which includes using toilet bed pan or urinal) 3  -MR     Putting on and taking off regular upper body clothing 3  -MR     Taking care of personal grooming (such as brushing teeth) 4  -MR     Eating meals 4  -MR     AM-PAC 6 Clicks Score (OT) 20  -MR     Row Name 04/06/23 1047 04/06/23 0800       How much help from another person do you currently need...    Turning from your back to your side while in flat bed without using bedrails? 4  -FW 3  -AC    Moving from lying on back to sitting on the side of a flat bed without bedrails? 3  -FW 3  -AC    Moving to and from a bed to a chair (including a wheelchair)? 3  -FW 3  -AC    Standing up from a chair using your arms (e.g., wheelchair, bedside chair)? 3  -FW 3  -AC    Climbing 3-5 steps with a railing? 3  -FW 3  -AC    To walk in hospital room? 3  -FW 3  -AC    AM-PAC 6 Clicks Score (PT) 19  -FW 18  -AC    Highest level of  mobility 6 --> Walked 10 steps or more  -FW 6 --> Walked 10 steps or more  -    Row Name 04/06/23 1142 04/06/23 1047       Functional Assessment    Outcome Measure Options AM-PAC 6 Clicks Daily Activity (OT)  -MR Timed Up and Go (TUG);AM-PAC 6 Clicks Basic Mobility (PT)  -FW          User Key  (r) = Recorded By, (t) = Taken By, (c) = Cosigned By    Initials Name Provider Type    AC Karen Avila, RN Registered Nurse    Bayron Garibay, PT Physical Therapist    Amanda Nielson, OT Occupational Therapist                Occupational Therapy Education     Title: PT OT SLP Therapies (Done)     Topic: Occupational Therapy (Done)     Point: ADL training (Done)     Description:   Instruct learner(s) on proper safety adaptation and remediation techniques during self care or transfers.   Instruct in proper use of assistive devices.              Learning Progress Summary           Patient Acceptance, E, VU,NR by MR at 4/6/2023 1143                   Point: Home exercise program (Done)     Description:   Instruct learner(s) on appropriate technique for monitoring, assisting and/or progressing therapeutic exercises/activities.              Learning Progress Summary           Patient Acceptance, E, VU,NR by MR at 4/6/2023 1143                   Point: Precautions (Done)     Description:   Instruct learner(s) on prescribed precautions during self-care and functional transfers.              Learning Progress Summary           Patient Acceptance, E, VU,NR by MR at 4/6/2023 1143                   Point: Body mechanics (Done)     Description:   Instruct learner(s) on proper positioning and spine alignment during self-care, functional mobility activities and/or exercises.              Learning Progress Summary           Patient Acceptance, E, VU,NR by MR at 4/6/2023 1143                               User Key     Initials Effective Dates Name Provider Type Discipline    MR 09/22/22 -  Amanda Vega, OT Occupational Therapist OT               OT Recommendation and Plan  Planned Therapy Interventions (OT): activity tolerance training, adaptive equipment training, BADL retraining, functional balance retraining, IADL retraining, occupation/activity based interventions, patient/caregiver education/training, transfer/mobility retraining, strengthening exercise, ROM/therapeutic exercise, cognitive/visual perception retraining  Therapy Frequency (OT): daily  Plan of Care Review  Plan of Care Reviewed With: patient  Outcome Evaluation: Pt presenting below baselined w/ transfers, mobility, balance and safety limiting independence w/ ADL based task completion. Pt requiring cueing for safety and occasional redirection. Pt requiring assist w/ mobility, balance and ADLs this session. IP OT warranted to address deficits. Recommend SNF at d/c w/ transition to long term care/custodial for best functional outcome.     Time Calculation:    Time Calculation- OT     Row Name 04/06/23 1144 04/06/23 1049          Time Calculation- OT    OT Start Time 0929  -MR --     OT Received On 04/06/23  -MR --     OT Goal Re-Cert Due Date 04/16/23  -MR --        Timed Charges    13643 - Gait Training Minutes  -- 12  -FW     89239 - OT Self Care/Mgmt Minutes 8  -MR --        Untimed Charges    OT Eval/Re-eval Minutes 46  -MR --        Total Minutes    Timed Charges Total Minutes 8  -MR 12  -FW     Untimed Charges Total Minutes 46  -MR --      Total Minutes 54  -MR 12  -FW           User Key  (r) = Recorded By, (t) = Taken By, (c) = Cosigned By    Initials Name Provider Type    FW Bayron Lea, PT Physical Therapist    MR Amanda Vega, OT Occupational Therapist              Therapy Charges for Today     Code Description Service Date Service Provider Modifiers Qty    30608970960  OT SELF CARE/MGMT/TRAIN EA 15 MIN 4/6/2023 Amanda Vega OT GO 1    [id number]  OT EVAL LOW COMPLEXITY 4 4/6/2023 Amanda Vega OT GO 1               Amanda Vega  OT  4/6/2023    Electronically signed by Amanda Vega, OT at 04/06/23 1147

## 2023-04-06 NOTE — CASE MANAGEMENT/SOCIAL WORK
Continued Stay Note  [facility]     Patient Name: Monty Nagel  MRN: [id number]  Today's Date: 4/6/2023    Admit Date: 4/4/2023    Plan: Rehab   Discharge Plan     Row Name 04/06/23 1434       Plan    Plan Rehab    Plan Comments Patient had been accepted by [facility] but ended up not going as insurance had declined. Patient is a re-admit with ongoing needs for rehab. Updated records sent to [facility] again to see if they would be able to have insurance approval at this time. Sarah from the facility will forward on to the insurance company.     Final Discharge Disposition Code 03 - skilled nursing facility (SNF)               Discharge Codes    No documentation.               Expected Discharge Date and Time     Expected Discharge Date Expected Discharge Time    Apr 8, 2023             Hyacinth Faustin RN

## 2023-04-06 NOTE — PROGRESS NOTES
[facility] Medicine Services  PROGRESS NOTE    Patient Name: Monty Nagel  : 1955  MRN: [id number]    Date of Admission: 2023  Primary Care Physician: Tiffany Morales MD    Subjective   Subjective     CC:  Weakness     HPI:  No acute events. States he is ok. Worked with therapy.     ROS:  Gen- No fevers, chills  CV- No chest pain, palpitations  Resp- No cough, dyspnea  GI- No N/V/D, abd pain     Objective   Objective     Vital Signs:   Temp:  [97.6 °F (36.4 °C)-98.4 °F (36.9 °C)] 97.8 °F (36.6 °C)  Heart Rate:  [67-89] 85  Resp:  [16-18] 18  BP: ()/(63-85) 103/65     Physical Exam:  Constitutional: No acute distress, awake, alert; disheveled   HENT: NCAT, mucous membranes moist  Respiratory: Clear to auscultation bilaterally, respiratory effort normal   Cardiovascular: irregylar, no murmurs, rubs, or gallops  Gastrointestinal: Positive bowel sounds, soft, nontender, nondistended  Musculoskeletal: No bilateral ankle edema  Psychiatric: cooperative  Neurologic: Oriented x 3, strength symmetric in all extremities, Cranial Nerves grossly intact to confrontation, speech clear      Results Reviewed:  LAB RESULTS:      Lab 23  16123  045   WBC 3.70 5.41 7.92   HEMOGLOBIN 10.3* 13.1 10.9*   HEMATOCRIT 30.0* 38.0 32.9*   PLATELETS 205 211 243   NEUTROS ABS 3.13 4.54 7.10*   IMMATURE GRANS (ABS) 0.07*  --  0.16*   LYMPHS ABS 0.38*  --  0.36*   MONOS ABS 0.09*  --  0.21   EOS ABS 0.02 0.27 0.08   MCV 88.5 87.2 90.1         Lab 23  1618 23  045   SODIUM 133* 128* 132*   POTASSIUM 4.9 4.4 4.3   CHLORIDE 101 92* 97*   CO2 24.0 25.0 26.0   ANION GAP 8.0 11.0 9.0   BUN 13 14 20   CREATININE 0.61* 0.61* 0.66*   EGFR 104.0 104.0 101.5   GLUCOSE 134* 75 102*   CALCIUM 7.4* 7.6* 7.4*   MAGNESIUM  --  2.1  --    TSH  --  3.770  --          Lab 23  1618   TOTAL PROTEIN 5.2*   ALBUMIN 2.9*   GLOBULIN 2.3   ALT (SGPT) 48*   AST  (SGOT) 31   BILIRUBIN 0.9   ALK PHOS 67         Lab 04/04/23  1618   HSTROP T 13                 Brief Urine Lab Results  (Last result in the past 365 days)      Color   Clarity   Blood   Leuk Est   Nitrite   Protein   CREAT   Urine HCG        04/04/23 1728 Yellow   Clear   Negative   Negative   Negative   Negative                 Microbiology Results Abnormal     None          CT Head Without Contrast    Result Date: 4/4/2023  CT HEAD WO CONTRAST Date of Exam: 4/4/2023 3:53 PM EDT Indication: gen weakness. Comparison: Head CT 3/21/2023 Technique: Axial CT images were obtained of the head without contrast administration.  Reconstructed coronal and sagittal images were also obtained. Automated exposure control and iterative construction methods were used. Findings: Redemonstration of postsurgical changes of right frontal craniotomy for resection of previously seen large right frontal meningioma. Redemonstration of embolization material in the right and left middle cranial fossa. Much of the postoperative pneumocephalus has resolved. There is a mixed density extradural collection along the right frontal convexity subjacent to the craniotomy appearing mostly hypodense, with some linear hyperdensity tracking along the right frontal falx (series 3 image 47 and series 5 image 32), compatible with some recent blood products. This extra-axial collection measures up to 7 mm in maximal width (series 3 image 34). There is 3 mm of leftward midline shift, previously 7 mm. No uncal or tonsillar herniation. No significant effacement of the ventricles or hydrocephalus. Mild decreased conspicuity of previously seen vasogenic edema throughout the right frontal lobe. No acute large territory infarct. There is intracranial atherosclerosis. Unremarkable appearance of the orbits. Expected postsurgical changes of the right frontal scalp soft tissues. No acute or suspicious bony findings. Mild mucosal thickening in the bilateral maxillary  sinuses. The mastoid air cells are clear.     Impression: Impression: Evolving post surgical changes of recent right frontal craniotomy and resection of large right frontal meningioma. Much of the pneumocephalus has resolved, and there is decreased leftward midline shift. There is a thin mixed density extra-axial collection along the right frontal convexity subjacent to the craniotomy, appearing mostly hypodense, with some linear hyperdensity along the right frontal convexity and right falx which may reflect some small recent blood products. Vasogenic edema throughout the right frontal lobe appears somewhat improved. Electronically Signed: Yovani Cash  4/4/2023 4:23 PM EDT  Workstation ID: [id number]    XR Chest 1 View    Result Date: 4/4/2023  XR CHEST 1 VW Date of Exam: 4/4/2023 3:39 PM EDT Indication: Weak/Dizzy/AMS triage protocol. Comparison: 3/11/2023 FINDINGS: No focal airspace opacity. No pleural effusion or pneumothorax. Normal heart and mediastinal contours.     Impression: No evidence of acute disease in the chest. Electronically Signed: Leodan Howard  4/4/2023 4:37 PM EDT  Workstation ID: [id number]      Results for orders placed during the hospital encounter of 03/11/23    Adult Transthoracic Echo Complete w/ Color, Spectral and Contrast if necessary per protocol    Interpretation Summary  •  Left ventricular systolic function is normal. Estimated left ventricular EF = 55%  •  Biatrial enlargement.  •  Calcified aortic valve with mild aortic stenosis, mean gradient 10 mmHg, BANG 1.6 cm².  •  Moderate aortic insufficiency.  •  Mild mitral regurgitation.  •  Mild tricuspid regurgitation with normal RVSP.  •  Mild dilation of the ascending aorta (4.2 cm) is present.      Current medications:  Scheduled Meds:aspirin, 81 mg, Oral, Daily  atorvastatin, 40 mg, Oral, Nightly  clopidogrel, 75 mg, Oral, Daily  dexamethasone, 2 mg, Oral, Q12H  lisinopril, 10 mg, Oral, Q24H   And  hydroCHLOROthiazide, 12.5 mg, Oral,  Q24H  levETIRAcetam, 1,000 mg, Oral, Q12H  levothyroxine, 125 mcg, Oral, Q AM  metoprolol tartrate, 25 mg, Oral, Q12H  pantoprazole, 40 mg, Oral, Q AM  QUEtiapine, 25 mg, Oral, Nightly  sodium chloride, 10 mL, Intravenous, Q12H      Continuous Infusions:   PRN Meds:.•  acetaminophen  •  melatonin  •  ondansetron **OR** ondansetron  •  sodium chloride  •  sodium chloride  •  sodium chloride    Assessment & Plan   Assessment & Plan     Active Hospital Problems    Diagnosis  POA   • **Generalized weakness [R53.1]  Yes   • Alcohol abuse [F10.10]  Yes   • Hypocalcemia [E83.51]  Yes   • Hypoalbuminemia [E88.09]  Yes   • Hyponatremia [E87.1]  Yes   • Rheumatoid vs Psoriatic arthritis (HCC) [L40.50]  Yes   • Severe malnutrition [E43]  Yes   • Atrial fibrillation [I48.91]  Yes   • Meningioma [D32.9]  Yes   • Postablative 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        Brief Hospital Course to date:  Monty Nagel is a 67 y.o. male with a history of meningioma s/p craniotomy and resection, alcohol abuse, HTN, and Hx Graves Disease s/p thyroidectomy with post-surgical hypothyroidism who has returned to [facility] ED for being unable to take care of himself at home and poor living conditions.     Generalized weakness  Poor living conditions  - Recent admission 3/11-4/3. Denied rehab due to walking 400 ft   - CM/SW consult  - PT/OT -- rehab recs by PT      Meningioma s/p craniotomy with resection.  - Recent admission 3/11-4/3. He underwent right frontal craniotomy on 3/21 + MMA embolization.  - Neurosurgery evaluated this admission. Overall stable post op course.   - Plan was for dexamethasone taper and keppra at DC -- patient has not been taking these medications. He has them with him and they were provided by meds to beds prior to last DC.   - Will resume keppra and dexamethasone 2 mg BID.      Atrial fibrillation  -Rate controlled.  Patient was offered anticoagulation and he declined it multiple times during his last admission. Reviewed anticoagulation again. He understands it is for stroke prevention and continues to decline.   - Continue metoprolol 25 mg BID      Right great toe ulcer.  -Appears present during recent admission.  MRI foot negative for osteo at that time.  -Dr. Flores was following during recent admission.   -Wound care consulted      HTN  HLD  -Has lisinopril-HCTZ and metoprolol on med list. Continue.   -Continue statin     Hyponatremia -  Clinically significant. Resolved.   -Na+ 128 on admission  -Likely secondary to dehydration secondary to poor p.o. intake.  - Improved      Hypoalbuminemia  Hypocalcemia  Chronic Malnutrition  -It appears patient has a very poor living condition.  This on top of his inability to ambulate at home likely worsening his nutritional health.      Hx of alcohol abuse  -Patient fortunately reports that he has not had an alcoholic drink since September 2022.     Postoperative hypothyroidism.  - TSH and T4 within limits   - Continue home synthroid     Expected Discharge Location and Transportation: rehab recs.   Expected Discharge   Expected Discharge Date and Time     Expected Discharge Date Expected Discharge Time    Apr 8, 2023            DVT prophylaxis:  Mechanical DVT prophylaxis orders are present.     AM-PAC 6 Clicks Score (PT): 19 (04/06/23 2939)    CODE STATUS:   Code Status and Medical Interventions:   Ordered at: 04/1955     Code Status (Patient has no pulse and is not breathing):    CPR (Attempt to Resuscitate)     Medical Interventions (Patient has pulse or is breathing):    Full Support       Mary Perales DO  04/06/23

## 2023-04-06 NOTE — PLAN OF CARE
Goal Outcome Evaluation:  Plan of Care Reviewed With: patient        Progress: improving  Outcome Evaluation: Pt ambulated 80'x2 min A w/ a RW demonstrating a steppage gait pattern. Pt performed quick screen functional balance/gait tests indicating that he is a moderate fall risk (5STS: 26.62 seconds, TU.2 second average). Pt would benefit from SnF with increased supervision/assistance s/p dc from rehab.

## 2023-04-07 PROCEDURE — G0378 HOSPITAL OBSERVATION PER HR: HCPCS

## 2023-04-07 PROCEDURE — 99232 SBSQ HOSP IP/OBS MODERATE 35: CPT | Performed by: INTERNAL MEDICINE

## 2023-04-07 PROCEDURE — 63710000001 DEXAMETHASONE PER 0.25 MG: Performed by: INTERNAL MEDICINE

## 2023-04-07 RX ADMIN — LISINOPRIL 10 MG: 10 TABLET ORAL at 08:01

## 2023-04-07 RX ADMIN — ASPIRIN 81 MG: 81 TABLET, COATED ORAL at 08:01

## 2023-04-07 RX ADMIN — DEXAMETHASONE 2 MG: 4 TABLET ORAL at 19:56

## 2023-04-07 RX ADMIN — LEVETIRACETAM 1000 MG: 500 TABLET, FILM COATED ORAL at 08:01

## 2023-04-07 RX ADMIN — LEVETIRACETAM 1000 MG: 500 TABLET, FILM COATED ORAL at 19:55

## 2023-04-07 RX ADMIN — LEVOTHYROXINE SODIUM 125 MCG: 125 TABLET ORAL at 06:08

## 2023-04-07 RX ADMIN — CLOPIDOGREL BISULFATE 75 MG: 75 TABLET ORAL at 08:01

## 2023-04-07 RX ADMIN — DEXAMETHASONE 2 MG: 4 TABLET ORAL at 08:01

## 2023-04-07 RX ADMIN — Medication 10 ML: at 09:51

## 2023-04-07 RX ADMIN — QUETIAPINE FUMARATE 25 MG: 25 TABLET ORAL at 19:55

## 2023-04-07 RX ADMIN — METOPROLOL TARTRATE 25 MG: 25 TABLET, FILM COATED ORAL at 08:02

## 2023-04-07 RX ADMIN — ATORVASTATIN CALCIUM 40 MG: 40 TABLET, FILM COATED ORAL at 19:55

## 2023-04-07 RX ADMIN — PANTOPRAZOLE SODIUM 40 MG: 40 TABLET, DELAYED RELEASE ORAL at 06:08

## 2023-04-07 RX ADMIN — METOPROLOL TARTRATE 25 MG: 25 TABLET, FILM COATED ORAL at 19:55

## 2023-04-07 RX ADMIN — HYDROCHLOROTHIAZIDE 12.5 MG: 12.5 CAPSULE ORAL at 08:01

## 2023-04-07 NOTE — PROGRESS NOTES
[facility] Medicine Services  PROGRESS NOTE    Patient Name: Monty Nagel  : 1955  MRN: [id number]    Date of Admission: 2023  Primary Care Physician: Tiffany Morales MD    Subjective   Subjective     CC:  Weakness     HPI:  No acute events. States he feels ok today.     ROS:  Gen- No fevers, chills  CV- No chest pain, palpitations  Resp- No cough, dyspnea  GI- No N/V/D, abd pain     Objective   Objective     Vital Signs:   Temp:  [97.7 °F (36.5 °C)-98.2 °F (36.8 °C)] 97.9 °F (36.6 °C)  Heart Rate:  [] 83  Resp:  [18] 18  BP: (112-136)/(75-97) 123/80     Physical Exam:  Constitutional: No acute distress, awake, alert  HENT: NCAT, mucous membranes moist  Respiratory: Clear to auscultation bilaterally, respiratory effort normal   Cardiovascular: irregular, no murmurs, rubs, or gallops  Gastrointestinal: Positive bowel sounds, soft, nontender, nondistended  Musculoskeletal: No bilateral ankle edema  Psychiatric: Appropriate affect, cooperative  Neurologic: Oriented x 3, strength symmetric in all extremities, Cranial Nerves grossly intact to confrontation, speech clear  Skin: No rashes, incision clean/dry     Results Reviewed:  LAB RESULTS:      Lab 23  16123   WBC 3.70 5.41 7.92   HEMOGLOBIN 10.3* 13.1 10.9*   HEMATOCRIT 30.0* 38.0 32.9*   PLATELETS 205 211 243   NEUTROS ABS 3.13 4.54 7.10*   IMMATURE GRANS (ABS) 0.07*  --  0.16*   LYMPHS ABS 0.38*  --  0.36*   MONOS ABS 0.09*  --  0.21   EOS ABS 0.02 0.27 0.08   MCV 88.5 87.2 90.1         Lab 23  1618 23  045   SODIUM 133* 128* 132*   POTASSIUM 4.9 4.4 4.3   CHLORIDE 101 92* 97*   CO2 24.0 25.0 26.0   ANION GAP 8.0 11.0 9.0   BUN 13 14 20   CREATININE 0.61* 0.61* 0.66*   EGFR 104.0 104.0 101.5   GLUCOSE 134* 75 102*   CALCIUM 7.4* 7.6* 7.4*   MAGNESIUM  --  2.1  --    TSH  --  3.770  --          Lab 23  1618   TOTAL PROTEIN 5.2*   ALBUMIN 2.9*   GLOBULIN  2.3   ALT (SGPT) 48*   AST (SGOT) 31   BILIRUBIN 0.9   ALK PHOS 67         Lab 04/04/23  1618   HSTROP T 13                 Brief Urine Lab Results  (Last result in the past 365 days)      Color   Clarity   Blood   Leuk Est   Nitrite   Protein   CREAT   Urine HCG        04/04/23 1728 Yellow   Clear   Negative   Negative   Negative   Negative                 Microbiology Results Abnormal     None          No radiology results from the last 24 hrs    Results for orders placed during the hospital encounter of 03/11/23    Adult Transthoracic Echo Complete w/ Color, Spectral and Contrast if necessary per protocol    Interpretation Summary  •  Left ventricular systolic function is normal. Estimated left ventricular EF = 55%  •  Biatrial enlargement.  •  Calcified aortic valve with mild aortic stenosis, mean gradient 10 mmHg, BANG 1.6 cm².  •  Moderate aortic insufficiency.  •  Mild mitral regurgitation.  •  Mild tricuspid regurgitation with normal RVSP.  •  Mild dilation of the ascending aorta (4.2 cm) is present.      Current medications:  Scheduled Meds:aspirin, 81 mg, Oral, Daily  atorvastatin, 40 mg, Oral, Nightly  clopidogrel, 75 mg, Oral, Daily  dexamethasone, 2 mg, Oral, Q12H  lisinopril, 10 mg, Oral, Q24H   And  hydroCHLOROthiazide, 12.5 mg, Oral, Q24H  levETIRAcetam, 1,000 mg, Oral, Q12H  levothyroxine, 125 mcg, Oral, Q AM  metoprolol tartrate, 25 mg, Oral, Q12H  pantoprazole, 40 mg, Oral, Q AM  QUEtiapine, 25 mg, Oral, Nightly  sodium chloride, 10 mL, Intravenous, Q12H      Continuous Infusions:   PRN Meds:.•  acetaminophen  •  melatonin  •  ondansetron **OR** ondansetron  •  sodium chloride  •  sodium chloride  •  sodium chloride    Assessment & Plan   Assessment & Plan     Active Hospital Problems    Diagnosis  POA   • **Generalized weakness [R53.1]  Yes   • Alcohol abuse [F10.10]  Yes   • Hypocalcemia [E83.51]  Yes   • Hypoalbuminemia [E88.09]  Yes   • Hyponatremia [E87.1]  Yes   • Rheumatoid vs Psoriatic  arthritis (HCC) [L40.50]  Yes   • Severe malnutrition [E43]  Yes   • Atrial fibrillation [I48.91]  Yes   • Meningioma [D32.9]  Yes   • Postablative 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        Brief Hospital Course to date:  Monty Nagel is a 67 y.o. male with a history of meningioma s/p craniotomy and resection, alcohol abuse, HTN, and Hx Graves Disease s/p thyroidectomy with post-surgical hypothyroidism who has returned to [facility] ED for being unable to take care of himself at home and poor living conditions.     Generalized weakness  Poor living conditions  - Recent admission 3/11-4/3. Denied rehab due to walking 400 ft   - CM/SW consult  - PT/OT -- rehab recs by PT. CM working on referrals.      Meningioma s/p craniotomy with resection.  - Recent admission 3/11-4/3. He underwent right frontal craniotomy on 3/21 + MMA embolization.  - Neurosurgery evaluated this admission. Overall stable post op course.   - Plan was for dexamethasone taper and keppra at DC -- patient has not been taking these medications. They were provided by meds to beds prior to last DC.   - Will resume keppra and dexamethasone 2 mg BID.      Atrial fibrillation  -Rate controlled. Patient was offered anticoagulation and he declined it multiple times during his last admission. Reviewed anticoagulation again this admission. He understands it is for stroke prevention and continues to decline.   - Continue metoprolol 25 mg BID      Right great toe ulcer.  -Appears present during recent admission.  MRI foot negative for osteo at that time.  -Dr. Flores was following during recent admission.   -Wound care consulted during admission      HTN  HLD  -Has lisinopril-HCTZ and metoprolol on med list. Continue home meds    -Continue statin     Hyponatremia -  Clinically significant. Resolved.   - Na+ 128 on admission. Trended up   - Improved       Hypoalbuminemia  Hypocalcemia  Chronic Malnutrition  -It appears patient has a very poor living condition.  This on top of his inability to ambulate at home likely worsening his nutritional health.    - Social work consulted      Hx of alcohol abuse  -Patient fortunately reports that he has not had an alcoholic drink since September 2022.     Postoperative hypothyroidism  - TSH and T4 within limits   - Continue home synthroid     Expected Discharge Location and Transportation: rehab vs home  Expected Discharge   Expected Discharge Date and Time     Expected Discharge Date Expected Discharge Time    Apr 8, 2023            DVT prophylaxis:  Mechanical DVT prophylaxis orders are present.     AM-PAC 6 Clicks Score (PT): 19 (04/07/23 0800)    CODE STATUS:   Code Status and Medical Interventions:   Ordered at: 04/1955     Code Status (Patient has no pulse and is not breathing):    CPR (Attempt to Resuscitate)     Medical Interventions (Patient has pulse or is breathing):    Full Support       Mary Perales DO  04/07/23

## 2023-04-07 NOTE — CASE MANAGEMENT/SOCIAL WORK
Continued Stay Note  [facility]     Patient Name: Monty Nagel  MRN: [id number]  Today's Date: 4/7/2023    Admit Date: 4/4/2023    Plan: [facility]   Discharge Plan     Row Name 04/07/23 1505       Plan    Plan [facility]    Plan Comments Awaiting word for SNF approval with his insurance for [facility]. I updated patient. He could ride Wheelchair van shuttle when it comes through.    Final Discharge Disposition Code 03 - skilled nursing facility (SNF)               Discharge Codes    No documentation.               Expected Discharge Date and Time     Expected Discharge Date Expected Discharge Time    Apr 8, 2023             Hyacinth Faustin RN

## 2023-04-07 NOTE — PLAN OF CARE
Goal Outcome Evaluation:         Alert, oriented x 4, and pleasant. VSS. Ambulates with assist x 1, voiding well per bathroom. No c/o pain. Will continue to monitor.

## 2023-04-07 NOTE — PLAN OF CARE
Goal Outcome Evaluation:                 Problem: Fall Injury Risk  Goal: Absence of Fall and Fall-Related Injury  Intervention: Identify and Manage Contributors  Recent Flowsheet Documentation  Taken 4/7/2023 0400 by Donell Murray RN  Medication Review/Management: medications reviewed  Taken 4/7/2023 0200 by Donell Murray RN  Medication Review/Management: medications reviewed  Taken 4/7/2023 0000 by Donell Murray RN  Medication Review/Management: medications reviewed  Taken 4/6/2023 2200 by Donell Murray RN  Medication Review/Management: medications reviewed  Taken 4/6/2023 2000 by Donell Murray RN  Medication Review/Management: medications reviewed  Intervention: Promote Injury-Free Environment  Recent Flowsheet Documentation  Taken 4/7/2023 0400 by Donell Murray RN  Safety Promotion/Fall Prevention:   activity supervised   safety round/check completed   toileting scheduled  Taken 4/7/2023 0200 by Donell Murray RN  Safety Promotion/Fall Prevention:   activity supervised   safety round/check completed   toileting scheduled  Taken 4/7/2023 0000 by Donell Murray RN  Safety Promotion/Fall Prevention:   activity supervised   safety round/check completed   toileting scheduled  Taken 4/6/2023 2200 by Donell Murray RN  Safety Promotion/Fall Prevention:   activity supervised   safety round/check completed   toileting scheduled  Taken 4/6/2023 2000 by Donell Murray RN  Safety Promotion/Fall Prevention:   activity supervised   safety round/check completed   toileting scheduled    VSS, voids well, rested throughout the night, ambulates within the room, will continue to monitor for changes.

## 2023-04-08 PROCEDURE — G0378 HOSPITAL OBSERVATION PER HR: HCPCS

## 2023-04-08 PROCEDURE — 99231 SBSQ HOSP IP/OBS SF/LOW 25: CPT | Performed by: NURSE PRACTITIONER

## 2023-04-08 PROCEDURE — 63710000001 DEXAMETHASONE PER 0.25 MG: Performed by: INTERNAL MEDICINE

## 2023-04-08 RX ADMIN — DEXAMETHASONE 2 MG: 4 TABLET ORAL at 19:31

## 2023-04-08 RX ADMIN — DEXAMETHASONE 2 MG: 4 TABLET ORAL at 08:11

## 2023-04-08 RX ADMIN — LISINOPRIL 10 MG: 10 TABLET ORAL at 08:11

## 2023-04-08 RX ADMIN — CLOPIDOGREL BISULFATE 75 MG: 75 TABLET ORAL at 08:10

## 2023-04-08 RX ADMIN — LEVETIRACETAM 1000 MG: 500 TABLET, FILM COATED ORAL at 19:30

## 2023-04-08 RX ADMIN — HYDROCHLOROTHIAZIDE 12.5 MG: 12.5 CAPSULE ORAL at 08:10

## 2023-04-08 RX ADMIN — ATORVASTATIN CALCIUM 40 MG: 40 TABLET, FILM COATED ORAL at 19:31

## 2023-04-08 RX ADMIN — PANTOPRAZOLE SODIUM 40 MG: 40 TABLET, DELAYED RELEASE ORAL at 05:08

## 2023-04-08 RX ADMIN — ASPIRIN 81 MG: 81 TABLET, COATED ORAL at 08:11

## 2023-04-08 RX ADMIN — LEVOTHYROXINE SODIUM 125 MCG: 125 TABLET ORAL at 05:08

## 2023-04-08 RX ADMIN — METOPROLOL TARTRATE 25 MG: 25 TABLET, FILM COATED ORAL at 19:30

## 2023-04-08 RX ADMIN — METOPROLOL TARTRATE 25 MG: 25 TABLET, FILM COATED ORAL at 08:10

## 2023-04-08 RX ADMIN — QUETIAPINE FUMARATE 25 MG: 25 TABLET ORAL at 19:30

## 2023-04-08 RX ADMIN — LEVETIRACETAM 1000 MG: 500 TABLET, FILM COATED ORAL at 08:10

## 2023-04-08 NOTE — PLAN OF CARE
Patient remains restless/impulsive. Frequent reeducation required. Incisions to the mod frontal head NAZARIO free of redness/swelling or drainage. Patient ambulates with walker/SB assist. VSS on RA. A Fib on cardiac mx. Rate remains less than 90. Denies pain/discomfort at this time. Will cont to mx. Call light in reach.

## 2023-04-08 NOTE — PROGRESS NOTES
[facility] Medicine Services  PROGRESS NOTE    Patient Name: Monty Nagel  : 1955  MRN: [id number]    Date of Admission: 2023  Primary Care Physician: Tiffany Morales MD    Subjective   Subjective     CC:  Weakness     HPI:  Pt sleeping on arrival, awaken easily with conversation. Denies any issues today.     Copied text in this note has been reviewed and is accurate as of 23.     ROS:  Gen- No fevers, chills  CV- No chest pain, palpitations  Resp- No cough, dyspnea  GI- No N/V/D, abd pain      Objective   Objective     Vital Signs:   Temp:  [97.6 °F (36.4 °C)-98.3 °F (36.8 °C)] 98.3 °F (36.8 °C)  Heart Rate:  [70-93] 93  Resp:  [18] 18  BP: (122-136)/(80-97) 127/92     Physical Exam:  Constitutional: sleeping, awaken easily, NAD  HENT: NCAT, mucous membranes moist  Respiratory: Clear to auscultation bilaterally, nonlabored respirations   Cardiovascular: IRR, no murmurs, rubs, or gallop  Gastrointestinal: Positive bowel sounds, soft, nontender, nondistended  Musculoskeletal: No bilateral ankle edema  Psychiatric: Appropriate affect, cooperative  Neurologic: Oriented x 3, strength symmetric in all extremities, Cranial Nerves grossly intact to confrontation, speech clear  Skin: No rashes      Results Reviewed:  LAB RESULTS:      Lab 23  0453 23  1618   WBC 3.70 5.41   HEMOGLOBIN 10.3* 13.1   HEMATOCRIT 30.0* 38.0   PLATELETS 205 211   NEUTROS ABS 3.13 4.54   IMMATURE GRANS (ABS) 0.07*  --    LYMPHS ABS 0.38*  --    MONOS ABS 0.09*  --    EOS ABS 0.02 0.27   MCV 88.5 87.2         Lab 23  0453 23  1618   SODIUM 133* 128*   POTASSIUM 4.9 4.4   CHLORIDE 101 92*   CO2 24.0 25.0   ANION GAP 8.0 11.0   BUN 13 14   CREATININE 0.61* 0.61*   EGFR 104.0 104.0   GLUCOSE 134* 75   CALCIUM 7.4* 7.6*   MAGNESIUM  --  2.1   TSH  --  3.770         Lab 23  1618   TOTAL PROTEIN 5.2*   ALBUMIN 2.9*   GLOBULIN 2.3   ALT (SGPT) 48*   AST (SGOT) 31   BILIRUBIN 0.9    ALK PHOS 67         Lab 04/04/23  1618   HSTROP T 13                 Brief Urine Lab Results  (Last result in the past 365 days)      Color   Clarity   Blood   Leuk Est   Nitrite   Protein   CREAT   Urine HCG        04/04/23 1728 Yellow   Clear   Negative   Negative   Negative   Negative                 Microbiology Results Abnormal     None          No radiology results from the last 24 hrs    Results for orders placed during the hospital encounter of 03/11/23    Adult Transthoracic Echo Complete w/ Color, Spectral and Contrast if necessary per protocol    Interpretation Summary  •  Left ventricular systolic function is normal. Estimated left ventricular EF = 55%  •  Biatrial enlargement.  •  Calcified aortic valve with mild aortic stenosis, mean gradient 10 mmHg, BANG 1.6 cm².  •  Moderate aortic insufficiency.  •  Mild mitral regurgitation.  •  Mild tricuspid regurgitation with normal RVSP.  •  Mild dilation of the ascending aorta (4.2 cm) is present.      Current medications:  Scheduled Meds:aspirin, 81 mg, Oral, Daily  atorvastatin, 40 mg, Oral, Nightly  clopidogrel, 75 mg, Oral, Daily  dexamethasone, 2 mg, Oral, Q12H  lisinopril, 10 mg, Oral, Q24H   And  hydroCHLOROthiazide, 12.5 mg, Oral, Q24H  levETIRAcetam, 1,000 mg, Oral, Q12H  levothyroxine, 125 mcg, Oral, Q AM  metoprolol tartrate, 25 mg, Oral, Q12H  pantoprazole, 40 mg, Oral, Q AM  QUEtiapine, 25 mg, Oral, Nightly      Continuous Infusions:   PRN Meds:.•  acetaminophen  •  melatonin  •  ondansetron **OR** ondansetron  •  sodium chloride    Assessment & Plan   Assessment & Plan     Active Hospital Problems    Diagnosis  POA   • **Generalized weakness [R53.1]  Yes   • Alcohol abuse [F10.10]  Yes   • Hypocalcemia [E83.51]  Yes   • Hypoalbuminemia [E88.09]  Yes   • Hyponatremia [E87.1]  Yes   • Rheumatoid vs Psoriatic arthritis (HCC) [L40.50]  Yes   • Severe malnutrition [E43]  Yes   • Atrial fibrillation [I48.91]  Yes   • Meningioma [D32.9]  Yes   •  Postablative 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        Brief Hospital Course to date:  Monty Nagel is a 67 y.o. male with a history of meningioma s/p craniotomy and resection, alcohol abuse, HTN, and Hx Graves Disease s/p thyroidectomy with post-surgical hypothyroidism who has returned to [facility] ED for being unable to take care of himself at home and poor living conditions.  Patient found with right great toe ulce with MRI negative for osteomyelitis.  Wound care consult was placed.  Patient with history of atrial fibrillation was repeatedly declined anticoagulation but continues on metoprolol 25 mg twice daily.     Generalized weakness  Poor living conditions  - Recent admission 3/11-4/3. Denied rehab due to walking 400 ft   - CM/SW consult  - PT/OT -- rehab recs by PT. CM working on referrals.      Meningioma s/p craniotomy with resection.  - Recent admission 3/11-4/3. He underwent right frontal craniotomy on 3/21 + MMA embolization.  - Neurosurgery evaluated this admission. Overall stable post op course.   - Plan was for dexamethasone taper and keppra at DC -- patient has not been taking these medications. They were provided by meds to beds prior to last DC.   - Will resume keppra and dexamethasone 2 mg BID.      Atrial fibrillation  -Rate controlled. Patient was offered anticoagulation and he declined it multiple times during his last admission. Reviewed anticoagulation again this admission. He understands it is for stroke prevention and continues to decline.   - Continue metoprolol 25 mg BID      Right great toe ulcer.  -Appears present during recent admission.  MRI foot negative for osteo at that time.  -Dr. Flores was following during recent admission.   -Wound care consulted during admission      HTN  HLD  -Has lisinopril-HCTZ and metoprolol on med list. Continue home meds    -Continue  statin     Hyponatremia -  Clinically significant. Resolved.   - Na+ 128 on admission. Trended up   - Improved      Hypoalbuminemia  Hypocalcemia  Chronic Malnutrition  -It appears patient has a very poor living condition.  This on top of his inability to ambulate at home likely worsening his nutritional health.    - Social work consulted      Hx of alcohol abuse  -Patient fortunately reports that he has not had an alcoholic drink since September 2022.     Postoperative hypothyroidism  - TSH and T4 within limits   - Continue home synthroid     Expected Discharge Location and Transportation: rehab vs home  Expected Discharge   Expected Discharge Date and Time     Expected Discharge Date Expected Discharge Time    Apr 8, 2023            DVT prophylaxis:  Mechanical DVT prophylaxis orders are present.     AM-PAC 6 Clicks Score (PT): 20 (04/08/23 0800)    CODE STATUS:   Code Status and Medical Interventions:   Ordered at: 04/1955     Code Status (Patient has no pulse and is not breathing):    CPR (Attempt to Resuscitate)     Medical Interventions (Patient has pulse or is breathing):    Full Support       Laine Barth, APRN  04/08/23

## 2023-04-09 LAB
ANION GAP SERPL CALCULATED.3IONS-SCNC: 6 MMOL/L (ref 5–15)
BASOPHILS # BLD AUTO: 0.02 10*3/MM3 (ref 0–0.2)
BASOPHILS NFR BLD AUTO: 0.3 % (ref 0–1.5)
BUN SERPL-MCNC: 19 MG/DL (ref 8–23)
BUN/CREAT SERPL: 25.7 (ref 7–25)
CALCIUM SPEC-SCNC: 8 MG/DL (ref 8.6–10.5)
CHLORIDE SERPL-SCNC: 99 MMOL/L (ref 98–107)
CO2 SERPL-SCNC: 30 MMOL/L (ref 22–29)
CREAT SERPL-MCNC: 0.74 MG/DL (ref 0.76–1.27)
DEPRECATED RDW RBC AUTO: 64 FL (ref 37–54)
EGFRCR SERPLBLD CKD-EPI 2021: 98.1 ML/MIN/1.73
EOSINOPHIL # BLD AUTO: 0.12 10*3/MM3 (ref 0–0.4)
EOSINOPHIL NFR BLD AUTO: 2.1 % (ref 0.3–6.2)
ERYTHROCYTE [DISTWIDTH] IN BLOOD BY AUTOMATED COUNT: 19.4 % (ref 12.3–15.4)
GLUCOSE SERPL-MCNC: 89 MG/DL (ref 65–99)
HCT VFR BLD AUTO: 36 % (ref 37.5–51)
HGB BLD-MCNC: 11.7 G/DL (ref 13–17.7)
IMM GRANULOCYTES # BLD AUTO: 0.13 10*3/MM3 (ref 0–0.05)
IMM GRANULOCYTES NFR BLD AUTO: 2.3 % (ref 0–0.5)
LYMPHOCYTES # BLD AUTO: 0.91 10*3/MM3 (ref 0.7–3.1)
LYMPHOCYTES NFR BLD AUTO: 15.9 % (ref 19.6–45.3)
MCH RBC QN AUTO: 29.8 PG (ref 26.6–33)
MCHC RBC AUTO-ENTMCNC: 32.5 G/DL (ref 31.5–35.7)
MCV RBC AUTO: 91.8 FL (ref 79–97)
MONOCYTES # BLD AUTO: 0.28 10*3/MM3 (ref 0.1–0.9)
MONOCYTES NFR BLD AUTO: 4.9 % (ref 5–12)
NEUTROPHILS NFR BLD AUTO: 4.26 10*3/MM3 (ref 1.7–7)
NEUTROPHILS NFR BLD AUTO: 74.5 % (ref 42.7–76)
NRBC BLD AUTO-RTO: 0 /100 WBC (ref 0–0.2)
PLATELET # BLD AUTO: 223 10*3/MM3 (ref 140–450)
PMV BLD AUTO: 8.5 FL (ref 6–12)
POTASSIUM SERPL-SCNC: 4.2 MMOL/L (ref 3.5–5.2)
RBC # BLD AUTO: 3.92 10*6/MM3 (ref 4.14–5.8)
SODIUM SERPL-SCNC: 135 MMOL/L (ref 136–145)
WBC NRBC COR # BLD: 5.72 10*3/MM3 (ref 3.4–10.8)

## 2023-04-09 PROCEDURE — 85025 COMPLETE CBC W/AUTO DIFF WBC: CPT | Performed by: NURSE PRACTITIONER

## 2023-04-09 PROCEDURE — G0378 HOSPITAL OBSERVATION PER HR: HCPCS

## 2023-04-09 PROCEDURE — 80048 BASIC METABOLIC PNL TOTAL CA: CPT | Performed by: NURSE PRACTITIONER

## 2023-04-09 PROCEDURE — 99231 SBSQ HOSP IP/OBS SF/LOW 25: CPT | Performed by: NURSE PRACTITIONER

## 2023-04-09 PROCEDURE — 63710000001 DEXAMETHASONE PER 0.25 MG: Performed by: INTERNAL MEDICINE

## 2023-04-09 RX ADMIN — HYDROCHLOROTHIAZIDE 12.5 MG: 12.5 CAPSULE ORAL at 09:22

## 2023-04-09 RX ADMIN — LISINOPRIL 10 MG: 10 TABLET ORAL at 09:18

## 2023-04-09 RX ADMIN — LEVETIRACETAM 1000 MG: 500 TABLET, FILM COATED ORAL at 19:51

## 2023-04-09 RX ADMIN — DEXAMETHASONE 2 MG: 4 TABLET ORAL at 19:51

## 2023-04-09 RX ADMIN — METOPROLOL TARTRATE 25 MG: 25 TABLET, FILM COATED ORAL at 19:51

## 2023-04-09 RX ADMIN — ATORVASTATIN CALCIUM 40 MG: 40 TABLET, FILM COATED ORAL at 19:52

## 2023-04-09 RX ADMIN — LEVETIRACETAM 1000 MG: 500 TABLET, FILM COATED ORAL at 09:19

## 2023-04-09 RX ADMIN — LEVOTHYROXINE SODIUM 125 MCG: 125 TABLET ORAL at 04:11

## 2023-04-09 RX ADMIN — DEXAMETHASONE 2 MG: 4 TABLET ORAL at 09:18

## 2023-04-09 RX ADMIN — PANTOPRAZOLE SODIUM 40 MG: 40 TABLET, DELAYED RELEASE ORAL at 04:11

## 2023-04-09 RX ADMIN — CLOPIDOGREL BISULFATE 75 MG: 75 TABLET ORAL at 09:18

## 2023-04-09 RX ADMIN — ASPIRIN 81 MG: 81 TABLET, COATED ORAL at 09:19

## 2023-04-09 RX ADMIN — METOPROLOL TARTRATE 25 MG: 25 TABLET, FILM COATED ORAL at 09:18

## 2023-04-09 RX ADMIN — QUETIAPINE FUMARATE 25 MG: 25 TABLET ORAL at 19:52

## 2023-04-09 NOTE — PLAN OF CARE
VSS on RA. A fib on cardiac mx. Rate controlled. Patient ambulates with SB assist. Cooperative with nursing care. Rests in bed without sx/si of pain/acute distress at this time. Will cont to mx. Call light in reach.

## 2023-04-09 NOTE — PROGRESS NOTES
[facility] Medicine Services  PROGRESS NOTE    Patient Name: Monty Nagel  : 1955  MRN: [id number]    Date of Admission: 2023  Primary Care Physician: Tiffany Morales MD    Subjective   Subjective     CC:  Weakness     HPI:  Pt awake, sitting up in bed. He denies any issues today.     Copied text in this note has been reviewed and is accurate as of 23.     ROS:  Gen- No fevers, chills  CV- No chest pain, palpitations  Resp- No cough, dyspnea  GI- No N/V/D, abd pain        Objective   Objective     Vital Signs:   Temp:  [97.9 °F (36.6 °C)-98.4 °F (36.9 °C)] 98.1 °F (36.7 °C)  Heart Rate:  [67-90] 90  Resp:  [16-18] 16  BP: (110-128)/(78-94) 112/82     Physical Exam:  Constitutional: Awake, alert,NAD  HENT: NCAT, mucous membranes moist  Respiratory: Clear to auscultation bilaterally, nonlabored respirations   Cardiovascular: RRR, no murmurs, rubs, or gallop  Gastrointestinal: Positive bowel sounds, soft, nontender, nondistended  Musculoskeletal: No bilateral ankle edema  Psychiatric: Appropriate affect, cooperative  Neurologic: Oriented x 3, strength symmetric in all extremities, Cranial Nerves grossly intact to confrontation, speech clear  Skin: No rashes. Scalp: Incision CDI and NAZARIO    Results Reviewed:  LAB RESULTS:      Lab 23  0412 23  0453 23  1618   WBC 5.72 3.70 5.41   HEMOGLOBIN 11.7* 10.3* 13.1   HEMATOCRIT 36.0* 30.0* 38.0   PLATELETS 223 205 211   NEUTROS ABS 4.26 3.13 4.54   IMMATURE GRANS (ABS) 0.13* 0.07*  --    LYMPHS ABS 0.91 0.38*  --    MONOS ABS 0.28 0.09*  --    EOS ABS 0.12 0.02 0.27   MCV 91.8 88.5 87.2         Lab 23  0412 23  0453 23  1618   SODIUM 135* 133* 128*   POTASSIUM 4.2 4.9 4.4   CHLORIDE 99 101 92*   CO2 30.0* 24.0 25.0   ANION GAP 6.0 8.0 11.0   BUN 19 13 14   CREATININE 0.74* 0.61* 0.61*   EGFR 98.1 104.0 104.0   GLUCOSE 89 134* 75   CALCIUM 8.0* 7.4* 7.6*   MAGNESIUM  --   --  2.1   TSH  --   --   3.770         Lab 04/04/23  1618   TOTAL PROTEIN 5.2*   ALBUMIN 2.9*   GLOBULIN 2.3   ALT (SGPT) 48*   AST (SGOT) 31   BILIRUBIN 0.9   ALK PHOS 67         Lab 04/04/23  1618   HSTROP T 13                 Brief Urine Lab Results  (Last result in the past 365 days)      Color   Clarity   Blood   Leuk Est   Nitrite   Protein   CREAT   Urine HCG        04/04/23 1728 Yellow   Clear   Negative   Negative   Negative   Negative                 Microbiology Results Abnormal     None          No radiology results from the last 24 hrs    Results for orders placed during the hospital encounter of 03/11/23    Adult Transthoracic Echo Complete w/ Color, Spectral and Contrast if necessary per protocol    Interpretation Summary  •  Left ventricular systolic function is normal. Estimated left ventricular EF = 55%  •  Biatrial enlargement.  •  Calcified aortic valve with mild aortic stenosis, mean gradient 10 mmHg, BANG 1.6 cm².  •  Moderate aortic insufficiency.  •  Mild mitral regurgitation.  •  Mild tricuspid regurgitation with normal RVSP.  •  Mild dilation of the ascending aorta (4.2 cm) is present.      Current medications:  Scheduled Meds:aspirin, 81 mg, Oral, Daily  atorvastatin, 40 mg, Oral, Nightly  clopidogrel, 75 mg, Oral, Daily  dexamethasone, 2 mg, Oral, Q12H  lisinopril, 10 mg, Oral, Q24H   And  hydroCHLOROthiazide, 12.5 mg, Oral, Q24H  levETIRAcetam, 1,000 mg, Oral, Q12H  levothyroxine, 125 mcg, Oral, Q AM  metoprolol tartrate, 25 mg, Oral, Q12H  pantoprazole, 40 mg, Oral, Q AM  QUEtiapine, 25 mg, Oral, Nightly      Continuous Infusions:   PRN Meds:.•  acetaminophen  •  melatonin  •  ondansetron **OR** ondansetron  •  sodium chloride    Assessment & Plan   Assessment & Plan     Active Hospital Problems    Diagnosis  POA   • **Generalized weakness [R53.1]  Yes   • Alcohol abuse [F10.10]  Yes   • Hypocalcemia [E83.51]  Yes   • Hypoalbuminemia [E88.09]  Yes   • Hyponatremia [E87.1]  Yes   • Rheumatoid vs Psoriatic  arthritis (HCC) [L40.50]  Yes   • Severe malnutrition [E43]  Yes   • Atrial fibrillation [I48.91]  Yes   • Meningioma [D32.9]  Yes   • Postablative 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        Brief Hospital Course to date:  Monty Nagel is a 67 y.o. male with a history of meningioma s/p craniotomy and resection, alcohol abuse, HTN, and Hx Graves Disease s/p thyroidectomy with post-surgical hypothyroidism who has returned to [facility] ED for being unable to take care of himself at home and poor living conditions.  Patient found with right great toe ulce with MRI negative for osteomyelitis.  Wound care consult was placed.  Patient with history of atrial fibrillation was repeatedly declined anticoagulation but continues on metoprolol 25 mg twice daily.     Generalized weakness  Poor living conditions  - Recent admission 3/11-4/3. Denied rehab due to walking 400 ft   - CM/SW consult  - PT/OT -- rehab recs by PT. CM working on referrals.      Meningioma s/p craniotomy with resection.  - Recent admission 3/11-4/3. He underwent right frontal craniotomy on 3/21 + MMA embolization.  - Neurosurgery evaluated this admission. Overall stable post op course.   - Plan was for dexamethasone taper and keppra at DC -- patient has not been taking these medications. They were provided by meds to beds prior to last DC.   - Will resume keppra and dexamethasone 2 mg BID.      Atrial fibrillation  -Rate controlled. Patient was offered anticoagulation and he declined it multiple times during his last admission. Reviewed anticoagulation again this admission. He understands it is for stroke prevention and continues to decline.   - Continue metoprolol 25 mg BID      Right great toe ulcer.  -Appears present during recent admission.  MRI foot negative for osteo at that time.  -Dr. Flores was following during recent admission.   -Wound care consulted  during admission      HTN  HLD  -Has lisinopril-HCTZ and metoprolol on med list. Continue home meds    -Continue statin     Hyponatremia -  Clinically significant. Resolved.   - Na+ 128 on admission. Trended up   - Improved      Hypoalbuminemia  Hypocalcemia  Chronic Malnutrition  -It appears patient has a very poor living condition.  This on top of his inability to ambulate at home likely worsening his nutritional health.    - Social work consulted      Hx of alcohol abuse  -Patient fortunately reports that he has not had an alcoholic drink since September 2022.     Postoperative hypothyroidism  - TSH and T4 within limits   - Continue home synthroid     Expected Discharge Location and Transportation: rehab vs home  Expected Discharge   Expected Discharge Date and Time     Expected Discharge Date Expected Discharge Time    Apr 8, 2023            DVT prophylaxis:  Mechanical DVT prophylaxis orders are present.     AM-PAC 6 Clicks Score (PT): 20 (04/08/23 0800)    CODE STATUS:   Code Status and Medical Interventions:   Ordered at: 04/1955     Code Status (Patient has no pulse and is not breathing):    CPR (Attempt to Resuscitate)     Medical Interventions (Patient has pulse or is breathing):    Full Support       Laine Barth, APRN  04/09/23

## 2023-04-09 NOTE — PLAN OF CARE
Goal Outcome Evaluation:               Patient VSS No adventitious findings noted upon primary assessment. Patient tolerated meds and assessments well without too much disdain. Will continue to monitor and follow patient and physician plan of care per md order.

## 2023-04-10 PROCEDURE — 63710000001 DEXAMETHASONE PER 0.25 MG: Performed by: INTERNAL MEDICINE

## 2023-04-10 PROCEDURE — G0378 HOSPITAL OBSERVATION PER HR: HCPCS

## 2023-04-10 PROCEDURE — 99024 POSTOP FOLLOW-UP VISIT: CPT

## 2023-04-10 PROCEDURE — 99231 SBSQ HOSP IP/OBS SF/LOW 25: CPT | Performed by: NURSE PRACTITIONER

## 2023-04-10 RX ADMIN — ASPIRIN 81 MG: 81 TABLET, COATED ORAL at 08:20

## 2023-04-10 RX ADMIN — HYDROCHLOROTHIAZIDE 12.5 MG: 12.5 CAPSULE ORAL at 08:20

## 2023-04-10 RX ADMIN — METOPROLOL TARTRATE 25 MG: 25 TABLET, FILM COATED ORAL at 08:20

## 2023-04-10 RX ADMIN — METOPROLOL TARTRATE 25 MG: 25 TABLET, FILM COATED ORAL at 20:36

## 2023-04-10 RX ADMIN — ATORVASTATIN CALCIUM 40 MG: 40 TABLET, FILM COATED ORAL at 20:37

## 2023-04-10 RX ADMIN — QUETIAPINE FUMARATE 25 MG: 25 TABLET ORAL at 20:36

## 2023-04-10 RX ADMIN — PANTOPRAZOLE SODIUM 40 MG: 40 TABLET, DELAYED RELEASE ORAL at 04:44

## 2023-04-10 RX ADMIN — LEVETIRACETAM 1000 MG: 500 TABLET, FILM COATED ORAL at 20:36

## 2023-04-10 RX ADMIN — CLOPIDOGREL BISULFATE 75 MG: 75 TABLET ORAL at 08:20

## 2023-04-10 RX ADMIN — LEVOTHYROXINE SODIUM 125 MCG: 125 TABLET ORAL at 04:44

## 2023-04-10 RX ADMIN — LISINOPRIL 10 MG: 10 TABLET ORAL at 08:20

## 2023-04-10 RX ADMIN — DEXAMETHASONE 2 MG: 4 TABLET ORAL at 20:36

## 2023-04-10 RX ADMIN — LEVETIRACETAM 1000 MG: 500 TABLET, FILM COATED ORAL at 08:20

## 2023-04-10 RX ADMIN — DEXAMETHASONE 2 MG: 4 TABLET ORAL at 08:20

## 2023-04-10 NOTE — PROGRESS NOTES
[facility] Medicine Services  PROGRESS NOTE    Patient Name: Monty Nagel  : 1955  MRN: [id number]    Date of Admission: 2023  Primary Care Physician: Tiffany Morales MD    Subjective   Subjective     CC:  Weakness     HPI:  Pt resting in bed, awaiting to go to rehab. Denies headache, dizziness.     Copied text in this note has been reviewed and is accurate as of 04/10/23.     ROS:  Gen- No fevers, chills  CV- No chest pain, palpitations  Resp- No cough, dyspnea  GI- No N/V/D, abd pain    Objective   Objective     Vital Signs:   Temp:  [97.6 °F (36.4 °C)-98 °F (36.7 °C)] 98 °F (36.7 °C)  Heart Rate:  [71-97] 73  Resp:  [16] 16  BP: (111-136)/(76-94) 120/82     Physical Exam:  Constitutional: Awake, alert, NAD  HENT: NCAT, mucous membranes moist  Respiratory: Clear to auscultation bilaterally, nonlabored respirations   Cardiovascular: RRR, no murmurs, rubs, or gallops  Gastrointestinal: Positive bowel sounds, soft, nontender, nondistended  Musculoskeletal: No bilateral ankle edema  Psychiatric: Appropriate affect, cooperative  Neurologic: Oriented x 3, nonfocal, speech clear  Skin: No rashes      Results Reviewed:  LAB RESULTS:      Lab 23  0453 23  1618   WBC 5.72 3.70 5.41   HEMOGLOBIN 11.7* 10.3* 13.1   HEMATOCRIT 36.0* 30.0* 38.0   PLATELETS 223 205 211   NEUTROS ABS 4.26 3.13 4.54   IMMATURE GRANS (ABS) 0.13* 0.07*  --    LYMPHS ABS 0.91 0.38*  --    MONOS ABS 0.28 0.09*  --    EOS ABS 0.12 0.02 0.27   MCV 91.8 88.5 87.2         Lab 23  0453 23  1618   SODIUM 135* 133* 128*   POTASSIUM 4.2 4.9 4.4   CHLORIDE 99 101 92*   CO2 30.0* 24.0 25.0   ANION GAP 6.0 8.0 11.0   BUN 19 13 14   CREATININE 0.74* 0.61* 0.61*   EGFR 98.1 104.0 104.0   GLUCOSE 89 134* 75   CALCIUM 8.0* 7.4* 7.6*   MAGNESIUM  --   --  2.1   TSH  --   --  3.770         Lab 23  1618   TOTAL PROTEIN 5.2*   ALBUMIN 2.9*   GLOBULIN 2.3   ALT (SGPT) 48*    AST (SGOT) 31   BILIRUBIN 0.9   ALK PHOS 67         Lab 04/04/23  1618   HSTROP T 13                 Brief Urine Lab Results  (Last result in the past 365 days)      Color   Clarity   Blood   Leuk Est   Nitrite   Protein   CREAT   Urine HCG        04/04/23 1728 Yellow   Clear   Negative   Negative   Negative   Negative                 Microbiology Results Abnormal     None          No radiology results from the last 24 hrs    Results for orders placed during the hospital encounter of 03/11/23    Adult Transthoracic Echo Complete w/ Color, Spectral and Contrast if necessary per protocol    Interpretation Summary  •  Left ventricular systolic function is normal. Estimated left ventricular EF = 55%  •  Biatrial enlargement.  •  Calcified aortic valve with mild aortic stenosis, mean gradient 10 mmHg, BANG 1.6 cm².  •  Moderate aortic insufficiency.  •  Mild mitral regurgitation.  •  Mild tricuspid regurgitation with normal RVSP.  •  Mild dilation of the ascending aorta (4.2 cm) is present.      Current medications:  Scheduled Meds:aspirin, 81 mg, Oral, Daily  atorvastatin, 40 mg, Oral, Nightly  clopidogrel, 75 mg, Oral, Daily  dexamethasone, 2 mg, Oral, Q12H  lisinopril, 10 mg, Oral, Q24H   And  hydroCHLOROthiazide, 12.5 mg, Oral, Q24H  levETIRAcetam, 1,000 mg, Oral, Q12H  levothyroxine, 125 mcg, Oral, Q AM  metoprolol tartrate, 25 mg, Oral, Q12H  pantoprazole, 40 mg, Oral, Q AM  QUEtiapine, 25 mg, Oral, Nightly      Continuous Infusions:   PRN Meds:.•  acetaminophen  •  melatonin  •  ondansetron **OR** ondansetron  •  sodium chloride    Assessment & Plan   Assessment & Plan     Active Hospital Problems    Diagnosis  POA   • **Generalized weakness [R53.1]  Yes   • Alcohol abuse [F10.10]  Yes   • Hypocalcemia [E83.51]  Yes   • Hypoalbuminemia [E88.09]  Yes   • Hyponatremia [E87.1]  Yes   • Rheumatoid vs Psoriatic arthritis (HCC) [L40.50]  Yes   • Severe malnutrition [E43]  Yes   • Atrial fibrillation [I48.91]  Yes   •  Meningioma [D32.9]  Yes   • Postablative 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        Brief Hospital Course to date:  Monty Nagel is a 67 y.o. male with a history of meningioma s/p craniotomy and resection, alcohol abuse, HTN, and Hx Graves Disease s/p thyroidectomy with post-surgical hypothyroidism who has returned to [facility] ED for being unable to take care of himself at home and poor living conditions.  Patient found with right great toe ulce with MRI negative for osteomyelitis.  Wound care consult was placed.  Patient with history of atrial fibrillation was repeatedly declined anticoagulation but continues on metoprolol 25 mg twice daily.     Generalized weakness  Poor living conditions  - Recent admission 3/11-4/3. Denied rehab due to walking 400 ft   - CM/SW consult  - PT/OT -- rehab recs by PT. CM working on referrals.      Meningioma s/p craniotomy with resection.  - Recent admission 3/11-4/3. He underwent right frontal craniotomy on 3/21 + MMA embolization.  - Neurosurgery evaluated this admission. Overall stable post op course.   - Plan was for dexamethasone taper and keppra at DC -- patient has not been taking these medications. They were provided by meds to beds prior to last DC.   - Will resume keppra and dexamethasone 2 mg BID.      Atrial fibrillation  -Rate controlled. Patient was offered anticoagulation and he declined it multiple times during his last admission. Reviewed anticoagulation again this admission. He understands it is for stroke prevention and continues to decline.   - Continue metoprolol 25 mg BID      Right great toe ulcer.  -Appears present during recent admission.  MRI foot negative for osteo at that time.  -Dr. Flores was following during recent admission.   -Wound care consulted during admission      HTN  HLD  -Has lisinopril-HCTZ and metoprolol on med list. Continue home meds     -Continue statin     Hyponatremia -  Clinically significant. Resolved.   - Na+ 128 on admission. Trended up   - Improved      Hypoalbuminemia  Hypocalcemia  Chronic Malnutrition  -It appears patient has a very poor living condition.  This on top of his inability to ambulate at home likely worsening his nutritional health.    - Social work consulted      Hx of alcohol abuse  -Patient fortunately reports that he has not had an alcoholic drink since September 2022.     Postoperative hypothyroidism  - TSH and T4 within limits   - Continue home synthroid     Expected Discharge Location and Transportation: rehab vs home  Expected Discharge   Expected Discharge Date and Time     Expected Discharge Date Expected Discharge Time    Apr 11, 2023            DVT prophylaxis:  Mechanical DVT prophylaxis orders are present.     AM-PAC 6 Clicks Score (PT): 20 (04/08/23 0800)    CODE STATUS:   Code Status and Medical Interventions:   Ordered at: 04/1955     Code Status (Patient has no pulse and is not breathing):    CPR (Attempt to Resuscitate)     Medical Interventions (Patient has pulse or is breathing):    Full Support       Laine Barth, APRN  04/10/23

## 2023-04-10 NOTE — PROGRESS NOTES
"    [facility]    Monty Winter  1955  [id number]      Generalized weakness    Essential hypertension    Postablative hypothyroidism    Atrial fibrillation    Severe malnutrition    Meningioma    Rheumatoid vs Psoriatic arthritis (HCC)    Alcohol abuse    Hypocalcemia    Hypoalbuminemia    Hyponatremia      Admit Diagnosis: Generalized weakness [R53.1]  Date of Admission: 4/4/2023  3:30 PM     Interval History: Patient eager to go into nursing home, no acute events since I last saw him.    Post-Op Day: 20  Surgery Performed: Right frontal craniotomy for resection of dural mass    Physical Exam:  Blood pressure 120/82, pulse 73, temperature 98 °F (36.7 °C), temperature source Oral, resp. rate 16, height 180.3 cm (71\"), weight 91.9 kg (202 lb 8 oz), SpO2 99 %.  No intake/output data recorded.  Physical Exam   Patient is laying flat in the bed he is pleasant, conversational, responds all commands.  Cranial nerves grossly intact.  Incision is clean, dry, intact.    Data Review:   (All imaging reviewed independently unless state otherwise)  No new data to review at this time.    Diagnosis:  (R53.1) Generalized weakness    (Z86.018) History of meningioma    (R62.7) Failure to thrive in adult    (E87.1) Hyponatremia  Generalized weakness [R53.1]    Medical Decision Making:   Patient has done quite well in the postoperative window, recommend keeping him on Keppra as well as completing his Decadron taper.  Patient can follow-up with our office in approximately 2 months with an updated MRI brain with and without contrast.   I have placed order for MRI as well as instructions for follow-up appointment.  We are happy to refill his Keppra prescription as needed.  No further recommendations from neurosurgery at this time.    Sherman Booth PA-C  4/10/2023  Surgeon: aMrlon Mckee MD  "

## 2023-04-10 NOTE — PLAN OF CARE
VSS on RA, A fib on cardiac mx with controlled HR. Patient ambulates with SB assist using walker. Semi independent on ADL. Currently rests in bed without sx/si of pain/acute distress. Will cont to mx. Call light in reach.

## 2023-04-11 ENCOUNTER — TELEPHONE (OUTPATIENT)
Dept: FAMILY MEDICINE CLINIC | Facility: CLINIC | Age: 68
End: 2023-04-11

## 2023-04-11 PROCEDURE — G0378 HOSPITAL OBSERVATION PER HR: HCPCS

## 2023-04-11 PROCEDURE — 99232 SBSQ HOSP IP/OBS MODERATE 35: CPT | Performed by: HOSPITALIST

## 2023-04-11 PROCEDURE — 63710000001 DEXAMETHASONE PER 0.25 MG: Performed by: INTERNAL MEDICINE

## 2023-04-11 RX ORDER — HYDRALAZINE HYDROCHLORIDE 20 MG/ML
10 INJECTION INTRAMUSCULAR; INTRAVENOUS ONCE
Status: DISCONTINUED | OUTPATIENT
Start: 2023-04-11 | End: 2023-04-11

## 2023-04-11 RX ADMIN — LEVOTHYROXINE SODIUM 125 MCG: 125 TABLET ORAL at 06:23

## 2023-04-11 RX ADMIN — LISINOPRIL 10 MG: 10 TABLET ORAL at 10:13

## 2023-04-11 RX ADMIN — PANTOPRAZOLE SODIUM 40 MG: 40 TABLET, DELAYED RELEASE ORAL at 06:23

## 2023-04-11 RX ADMIN — DEXAMETHASONE 2 MG: 4 TABLET ORAL at 10:12

## 2023-04-11 RX ADMIN — LEVETIRACETAM 1000 MG: 500 TABLET, FILM COATED ORAL at 10:13

## 2023-04-11 RX ADMIN — ASPIRIN 81 MG: 81 TABLET, COATED ORAL at 10:12

## 2023-04-11 RX ADMIN — METOPROLOL TARTRATE 25 MG: 25 TABLET, FILM COATED ORAL at 10:12

## 2023-04-11 NOTE — CASE MANAGEMENT/SOCIAL WORK
Continued Stay Note  [facility]     Patient Name: Motny Nagel  MRN: [id number]  Today's Date: 4/11/2023    Admit Date: 4/4/2023    Plan: [facility]   Discharge Plan     Row Name 04/11/23 1157       Plan    Plan Comments MSW notified pt's APS worker, Maame, of pt's discharge home today with home health services. Maame will follow up with pt at home.               Discharge Codes    No documentation.               Expected Discharge Date and Time     Expected Discharge Date Expected Discharge Time    Apr 11, 2023             CARIDAD Esqueda

## 2023-04-11 NOTE — TELEPHONE ENCOUNTER
DEEAP WOULD LIKE TO MAKE SURE DR MARY WILL BE FOLLOWING HOME HEALTH CARE FOR THE PATIENT.    PHONE: [phone]

## 2023-04-11 NOTE — CASE MANAGEMENT/SOCIAL WORK
Continued Stay Note  [facility]     Patient Name: Monty Nagel  MRN: [id number]  Today's Date: 4/11/2023    Admit Date: 4/4/2023    Plan: Rehab vs home   Discharge Plan     Row Name 04/11/23 1236       Plan    Plan Rehab vs home    Plan Comments Patient tells me today he wants rehab. [facility] has the referral, working on insurance last week but no beds today. I will make additional referrals. I  Do have [facility] nursing and medical social worker ordered, if he decides for home.     Final Discharge Disposition Code                Discharge Codes    No documentation.               Expected Discharge Date and Time     Expected Discharge Date Expected Discharge Time    Apr 11, 2023             Hyacinth Faustin RN

## 2023-04-11 NOTE — PROGRESS NOTES
[facility] Medicine Services  PROGRESS NOTE    Patient Name: Monty Nagel  : 1955  MRN: [id number]    Date of Admission: 2023  Primary Care Physician: Tiffany Morales MD    Subjective   Subjective     CC:   Inability to care for himself and generalized weakness    HPI:   Says he is not sleeping and wants to get out of here.  Discussed home vs rehab    ROS:  Gen- denies chills, denies fevers  CV- No chest pain, palpitations  Resp- No cough, dyspnea  GI- No N/V/D, abd pain    Objective   Objective     Vital Signs:   Temp:  [97.5 °F (36.4 °C)-97.8 °F (36.6 °C)] 97.5 °F (36.4 °C)  Heart Rate:  [] 98  Resp:  [16-18] 16  BP: (101-136)/(64-96) 125/88     Physical Exam:    Constitutional: awake, NAD  HENT: NCAT, no JVD  Respiratory: Clear to auscultation bilaterally, nonlabored respirations   Cardiovascular:  s1 and s2, RRR  Gastrointestinal: Positive bowel sounds, soft, nontender, nondistended  Musculoskeletal: No bilateral ankle edema  Psychiatric: Appropriate affect, cooperative  Neurologic: Oriented x 3, nonfocal, speech clear  Skin: dry      Results Reviewed:  LAB RESULTS:      Lab 23  0412 23   WBC 5.72 3.70   HEMOGLOBIN 11.7* 10.3*   HEMATOCRIT 36.0* 30.0*   PLATELETS 223 205   NEUTROS ABS 4.26 3.13   IMMATURE GRANS (ABS) 0.13* 0.07*   LYMPHS ABS 0.91 0.38*   MONOS ABS 0.28 0.09*   EOS ABS 0.12 0.02   MCV 91.8 88.5         Lab 23  0412 23  0453   SODIUM 135* 133*   POTASSIUM 4.2 4.9   CHLORIDE 99 101   CO2 30.0* 24.0   ANION GAP 6.0 8.0   BUN 19 13   CREATININE 0.74* 0.61*   EGFR 98.1 104.0   GLUCOSE 89 134*   CALCIUM 8.0* 7.4*                         Brief Urine Lab Results  (Last result in the past 365 days)      Color   Clarity   Blood   Leuk Est   Nitrite   Protein   CREAT   Urine HCG        23 1728 Yellow   Clear   Negative   Negative   Negative   Negative                 Microbiology Results Abnormal     None          No  radiology results from the last 24 hrs    Results for orders placed during the hospital encounter of 03/11/23    Adult Transthoracic Echo Complete w/ Color, Spectral and Contrast if necessary per protocol    Interpretation Summary  •  Left ventricular systolic function is normal. Estimated left ventricular EF = 55%  •  Biatrial enlargement.  •  Calcified aortic valve with mild aortic stenosis, mean gradient 10 mmHg, BANG 1.6 cm².  •  Moderate aortic insufficiency.  •  Mild mitral regurgitation.  •  Mild tricuspid regurgitation with normal RVSP.  •  Mild dilation of the ascending aorta (4.2 cm) is present.      Current medications:  Scheduled Meds:aspirin, 81 mg, Oral, Daily  atorvastatin, 40 mg, Oral, Nightly  clopidogrel, 75 mg, Oral, Daily  dexamethasone, 2 mg, Oral, Q12H  lisinopril, 10 mg, Oral, Q24H   And  hydroCHLOROthiazide, 12.5 mg, Oral, Q24H  levETIRAcetam, 1,000 mg, Oral, Q12H  levothyroxine, 125 mcg, Oral, Q AM  metoprolol tartrate, 25 mg, Oral, Q12H  pantoprazole, 40 mg, Oral, Q AM  QUEtiapine, 25 mg, Oral, Nightly      Continuous Infusions:   PRN Meds:.•  acetaminophen  •  melatonin  •  ondansetron **OR** ondansetron  •  sodium chloride    Assessment & Plan   Assessment & Plan     Active Hospital Problems    Diagnosis  POA   • **Generalized weakness [R53.1]  Yes   • Alcohol abuse [F10.10]  Yes   • Hypocalcemia [E83.51]  Yes   • Hypoalbuminemia [E88.09]  Yes   • Hyponatremia [E87.1]  Yes   • Rheumatoid vs Psoriatic arthritis (HCC) [L40.50]  Yes   • Severe malnutrition [E43]  Yes   • Atrial fibrillation [I48.91]  Yes   • Meningioma [D32.9]  Yes   • Postablative 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        Brief Hospital Course to date:  Monty Nagel is a 67 y.o. male with a history of meningioma s/p craniotomy and resection, alcohol abuse, HTN, and Hx Graves Disease s/p thyroidectomy with post-surgical  hypothyroidism who has returned to [facility] ED for being unable to take care of himself at home and poor living conditions.  Patient found with right great toe ulce with MRI negative for osteomyelitis.  Wound care consult was placed.  Patient with history of atrial fibrillation was repeatedly declined anticoagulation but continues on metoprolol 25 mg twice daily.     Generalized weakness  Poor living conditions  - Recent admission 3/11-4/3. Denied rehab due to walking 400 ft   - CM/SW consult  - PT/OT -- rehab recs by PT  -- Home is not a great option for this patient  -- Case management working on rehab     Meningioma s/p craniotomy with resection.  - Recent admission 3/11-4/3. He underwent right frontal craniotomy on 3/21 + MMA embolization.  - Neurosurgery evaluated this admission. Overall stable post op course.   - Plan was for dexamethasone taper and keppra at DC -- patient has not been taking these medications. They were provided by meds to beds prior to last DC.   - Will resume keppra and dexamethasone 2 mg BID.      Atrial fibrillation  -Rate controlled. Patient was offered anticoagulation and he declined it multiple times during his last admission. Reviewed anticoagulation again this admission. He understands it is for stroke prevention and continues to decline.   - Continue metoprolol 25 mg BID   - He is currently on dual antiplatelet therapy     Right great toe ulcer.  -Appears present during recent admission.  MRI foot negative for osteo at that time.  -Dr. Flores was following during recent admission.   -Wound care consulted during admission      HTN  HLD  -Has lisinopril-HCTZ and metoprolol on med list. Continue home meds    -Continue statin     Hyponatremia -  Clinically significant. Resolved.   - Na+ 128 on admission. Trended up   - sodium was 135 at last check     Hypoalbuminemia  Hypocalcemia  Chronic Malnutrition  -It appears patient has a very poor living condition.  This on top of his  inability to ambulate at home likely worsening his nutritional health.    - Social work consulted      Hx of alcohol abuse  -Patient fortunately reports that he has not had an alcoholic drink since September 2022.     Postoperative hypothyroidism  - TSH and T4 within limits   - Levothyroxine 125 mcg oral daily    Expected Discharge Location and Transportation:  Inpatient Rehab  Expected Discharge   Expected Discharge Date and Time     Expected Discharge Date Expected Discharge Time    Apr 13, 2023            DVT prophylaxis:  Mechanical DVT prophylaxis orders are present.     AM-PAC 6 Clicks Score (PT): 20 (04/08/23 0800)    CODE STATUS:   Code Status and Medical Interventions:   Ordered at: 04/1955     Code Status (Patient has no pulse and is not breathing):    CPR (Attempt to Resuscitate)     Medical Interventions (Patient has pulse or is breathing):    Full Support       Guru Lei MD  04/11/23

## 2023-04-11 NOTE — PLAN OF CARE
Problem: Fall Injury Risk  Goal: Absence of Fall and Fall-Related Injury  Outcome: Ongoing, Progressing  Intervention: Identify and Manage Contributors  Recent Flowsheet Documentation  Taken 4/11/2023 1600 by Sandee Patterson RN  Medication Review/Management: medications reviewed  Taken 4/11/2023 1400 by Sandee Patterson RN  Medication Review/Management: medications reviewed  Taken 4/11/2023 1200 by Sandee Patterson RN  Medication Review/Management: medications reviewed  Taken 4/11/2023 1000 by Sandee Patterson RN  Medication Review/Management: medications reviewed  Taken 4/11/2023 0800 by Sandee Patterson RN  Medication Review/Management: medications reviewed  Intervention: Promote Injury-Free Environment  Recent Flowsheet Documentation  Taken 4/11/2023 1600 by Sandee Patterson RN  Safety Promotion/Fall Prevention:   safety round/check completed   assistive device/personal items within reach  Taken 4/11/2023 1400 by Sandee Patterson RN  Safety Promotion/Fall Prevention:   safety round/check completed   assistive device/personal items within reach  Taken 4/11/2023 1200 by Sandee Patterson RN  Safety Promotion/Fall Prevention:   safety round/check completed   fall prevention program maintained   elopement precautions   clutter free environment maintained   assistive device/personal items within reach   activity supervised  Taken 4/11/2023 1000 by Sandee Patterson RN  Safety Promotion/Fall Prevention:   safety round/check completed   assistive device/personal items within reach  Taken 4/11/2023 0800 by Sandee Patterson RN  Safety Promotion/Fall Prevention:   safety round/check completed   activity supervised   assistive device/personal items within reach   fall prevention program maintained     Problem: Skin Injury Risk Increased  Goal: Skin Health and Integrity  Outcome: Ongoing, Progressing  Intervention: Optimize Skin Protection  Recent Flowsheet Documentation  Taken 4/11/2023 1600 by Sandee Patterson RN  Head of Bed (\[facility]\"")  Positioning: HOB elevated  Taken 4/11/2023 1400 by Sandee Patterson RN  Head of Bed ([facility]) Positioning: HOB elevated  Taken 4/11/2023 1200 by Sandee Patterson RN  Head of Bed ([facility]) Positioning: HOB elevated  Taken 4/11/2023 1000 by Sandee Patterson RN  Head of Bed ([facility]) Positioning: HOB elevated  Taken 4/11/2023 0800 by Sandee Patterson RN  Pressure Reduction Techniques: frequent weight shift encouraged  Head of Bed ([facility]) Positioning: HOB elevated  Pressure Reduction Devices: pressure-redistributing mattress utilized  Skin Protection:   adhesive use limited   incontinence pads utilized     Problem: Hypertension Comorbidity  Goal: Blood Pressure in Desired Range  Outcome: Ongoing, Progressing  Intervention: Maintain Blood Pressure Management  Recent Flowsheet Documentation  Taken 4/11/2023 1600 by Sandee Patterson RN  Medication Review/Management: medications reviewed  Taken 4/11/2023 1400 by Sandee Patterson RN  Medication Review/Management: medications reviewed  Taken 4/11/2023 1200 by Sandee Patterson RN  Medication Review/Management: medications reviewed  Taken 4/11/2023 1000 by Sandee Patterson RN  Medication Review/Management: medications reviewed  Taken 4/11/2023 0800 by Sandee Patterson RN  Medication Review/Management: medications reviewed     Problem: Behavioral Health Comorbidity  Goal: Maintenance of Behavioral Health Symptom Control  Outcome: Ongoing, Progressing  Intervention: Maintain Behavioral Health Symptom Control  Recent Flowsheet Documentation  Taken 4/11/2023 1600 by Sandee Patterson RN  Medication Review/Management: medications reviewed  Taken 4/11/2023 1400 by Sandee Patterson RN  Medication Review/Management: medications reviewed  Taken 4/11/2023 1200 by Sandee Patterson RN  Medication Review/Management: medications reviewed  Taken 4/11/2023 1000 by Sandee Patterson RN  Medication Review/Management: medications reviewed  Taken 4/11/2023 0800 by Sandee Patterson RN  Medication Review/Management: medications reviewed    Goal Outcome Evaluation:

## 2023-04-12 PROCEDURE — G0378 HOSPITAL OBSERVATION PER HR: HCPCS

## 2023-04-12 PROCEDURE — 97116 GAIT TRAINING THERAPY: CPT

## 2023-04-12 PROCEDURE — 97110 THERAPEUTIC EXERCISES: CPT

## 2023-04-12 PROCEDURE — 63710000001 DEXAMETHASONE PER 0.25 MG: Performed by: INTERNAL MEDICINE

## 2023-04-12 PROCEDURE — 99232 SBSQ HOSP IP/OBS MODERATE 35: CPT | Performed by: HOSPITALIST

## 2023-04-12 PROCEDURE — 97535 SELF CARE MNGMENT TRAINING: CPT

## 2023-04-12 PROCEDURE — 97530 THERAPEUTIC ACTIVITIES: CPT

## 2023-04-12 RX ADMIN — ASPIRIN 81 MG: 81 TABLET, COATED ORAL at 08:26

## 2023-04-12 RX ADMIN — LEVOTHYROXINE SODIUM 125 MCG: 125 TABLET ORAL at 05:39

## 2023-04-12 RX ADMIN — METOPROLOL TARTRATE 25 MG: 25 TABLET, FILM COATED ORAL at 08:26

## 2023-04-12 RX ADMIN — LISINOPRIL 10 MG: 10 TABLET ORAL at 08:26

## 2023-04-12 RX ADMIN — DEXAMETHASONE 2 MG: 4 TABLET ORAL at 08:26

## 2023-04-12 NOTE — PLAN OF CARE
Patient with no changes overnight. VSS. No complaint of pain. He is now refusing to take medications. He has not slept.

## 2023-04-12 NOTE — PLAN OF CARE
Goal Outcome Evaluation:  Plan of Care Reviewed With: patient        Progress: improving  Outcome Evaluation: Pt continues to present below baseline d/t cognition requiring occasional cues for balance and safety. Pt highly motivated throughout session demonstrating good effort w/ dynamic balance, ADLs and endurance based tasks. Continue to recommend SNF for best functional outcome d/t safety concerns w/ pt's cognition.

## 2023-04-12 NOTE — PROGRESS NOTES
[facility] Medicine Services  PROGRESS NOTE    Patient Name: Monty Nagel  : 1955  MRN: [id number]    Date of Admission: 2023  Primary Care Physician: Tiffany Morales MD    Subjective   Subjective     CC:    Weakness of the generalized kind    HPI:   Not sleeping but does not want sleeping medication on discussion today.   Says he thinks he needs one more day.   Worked with therapy today and was walking around unit.    ROS:  Gen- no fevers, no chills  CV- No chest pain, palpitations  Resp- No cough, dyspnea  GI- No N/V/D, abd pain    Objective   Objective     Vital Signs:   Temp:  [97.5 °F (36.4 °C)-98.4 °F (36.9 °C)] 98.3 °F (36.8 °C)  Heart Rate:  [68-93] 93  Resp:  [18] 18  BP: (115-167)/() 133/75     Physical Exam:    Constitutional:  No acute distress, awake  HENT: NCAT, no JVD  Respiratory: Clear to auscultation bilaterally, weak inspiratory effort  Cardiovascular:   RRR, s1 and s2  Gastrointestinal: Positive bowel sounds, soft, nontender, nondistended  Musculoskeletal: No bilateral ankle edema  Psychiatric: Appropriate affect, cooperative  Neurologic: Oriented x 3, nonfocal, speech clear  Skin: + skin tenting      Results Reviewed:  LAB RESULTS:      Lab 23  0412 23   WBC 5.72 3.70   HEMOGLOBIN 11.7* 10.3*   HEMATOCRIT 36.0* 30.0*   PLATELETS 223 205   NEUTROS ABS 4.26 3.13   IMMATURE GRANS (ABS) 0.13* 0.07*   LYMPHS ABS 0.91 0.38*   MONOS ABS 0.28 0.09*   EOS ABS 0.12 0.02   MCV 91.8 88.5         Lab 23  0412 23   SODIUM 135* 133*   POTASSIUM 4.2 4.9   CHLORIDE 99 101   CO2 30.0* 24.0   ANION GAP 6.0 8.0   BUN 19 13   CREATININE 0.74* 0.61*   EGFR 98.1 104.0   GLUCOSE 89 134*   CALCIUM 8.0* 7.4*                         Brief Urine Lab Results  (Last result in the past 365 days)      Color   Clarity   Blood   Leuk Est   Nitrite   Protein   CREAT   Urine HCG        23 1728 Yellow   Clear   Negative   Negative   Negative    Negative                 Microbiology Results Abnormal     None          No radiology results from the last 24 hrs    Results for orders placed during the hospital encounter of 03/11/23    Adult Transthoracic Echo Complete w/ Color, Spectral and Contrast if necessary per protocol    Interpretation Summary  •  Left ventricular systolic function is normal. Estimated left ventricular EF = 55%  •  Biatrial enlargement.  •  Calcified aortic valve with mild aortic stenosis, mean gradient 10 mmHg, BANG 1.6 cm².  •  Moderate aortic insufficiency.  •  Mild mitral regurgitation.  •  Mild tricuspid regurgitation with normal RVSP.  •  Mild dilation of the ascending aorta (4.2 cm) is present.      Current medications:  Scheduled Meds:aspirin, 81 mg, Oral, Daily  atorvastatin, 40 mg, Oral, Nightly  clopidogrel, 75 mg, Oral, Daily  dexamethasone, 2 mg, Oral, Q12H  lisinopril, 10 mg, Oral, Q24H   And  hydroCHLOROthiazide, 12.5 mg, Oral, Q24H  levETIRAcetam, 1,000 mg, Oral, Q12H  levothyroxine, 125 mcg, Oral, Q AM  metoprolol tartrate, 25 mg, Oral, Q12H  pantoprazole, 40 mg, Oral, Q AM  QUEtiapine, 25 mg, Oral, Nightly      Continuous Infusions:   PRN Meds:.•  acetaminophen  •  melatonin  •  ondansetron **OR** ondansetron  •  sodium chloride    Assessment & Plan   Assessment & Plan     Active Hospital Problems    Diagnosis  POA   • **Generalized weakness [R53.1]  Yes   • Alcohol abuse [F10.10]  Yes   • Hypocalcemia [E83.51]  Yes   • Hypoalbuminemia [E88.09]  Yes   • Hyponatremia [E87.1]  Yes   • Rheumatoid vs Psoriatic arthritis (HCC) [L40.50]  Yes   • Severe malnutrition [E43]  Yes   • Atrial fibrillation [I48.91]  Yes   • Meningioma [D32.9]  Yes   • Postablative 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        Brief Hospital Course to date:  Monty Nagel is a 67 y.o. male with a history of meningioma s/p craniotomy and resection, alcohol  abuse, HTN, and Hx Graves Disease s/p thyroidectomy with post-surgical hypothyroidism who has returned to [facility] ED for being unable to take care of himself at home and poor living conditions.  Patient found with right great toe ulce with MRI negative for osteomyelitis.  Wound care consult was placed.  Patient with history of atrial fibrillation was repeatedly declined anticoagulation but continues on metoprolol 25 mg twice daily.     Generalized weakness  Poor living conditions  -  Recent admission 3/11-4/3  -  Case management working on rehab  -  Working with OT and PT  -- Home is not a great option for this patient  --Therapy recommending SNF rehab for best functional outcome     Meningioma s/p craniotomy with resection.  - Recent admission 3/11-4/3. He underwent right frontal craniotomy on 3/21 + MMA embolization.  - Neurosurgery evaluated this admission. Overall stable post op course.   - Plan was for dexamethasone taper and keppra at DC -- patient has not been taking these medications. They were provided by meds to beds prior to last DC.   - Will resume keppra and dexamethasone 2 mg BID.      Atrial fibrillation  -Rate controlled. Patient was offered anticoagulation and he declined it multiple times during his last admission. Reviewed anticoagulation again this admission. He understands it is for stroke prevention and continues to decline.   -On aspirin plus Plavix  -Lopressor 25 mg oral every 12 hours     Right great toe ulcer.  -Appears present during recent admission.  MRI foot negative for osteo at that time.  -Dr. Flores was following during recent admission.   -Wound care consulted during admission      HTN  HLD  -Has lisinopril-HCTZ and metoprolol on med list. Continue home meds    -Continue statin     Hyponatremia  - Sodium 135  - Had low sodium in the year 2022 1 year ago    Hypoalbuminemia  Hypocalcemia  Chronic Malnutrition  -It appears patient has a very poor living condition.  This on top of  his inability to ambulate at home likely worsening his nutritional health.    - Social work consulted      Hx of alcohol abuse  -Patient fortunately reports that he has not had an alcoholic drink since September 2022.     Postoperative hypothyroidism  - TSH and T4 within limits   - Levothyroxine 125 mcg oral daily    Expected Discharge Location and Transportation:  Inpatient Rehab  Expected Discharge   Expected Discharge Date and Time     Expected Discharge Date Expected Discharge Time    Apr 13, 2023            DVT prophylaxis:  Mechanical DVT prophylaxis orders are present.     AM-PAC 6 Clicks Score (PT): 19 (04/12/23 9684)    CODE STATUS:   Code Status and Medical Interventions:   Ordered at: 04/1955     Code Status (Patient has no pulse and is not breathing):    CPR (Attempt to Resuscitate)     Medical Interventions (Patient has pulse or is breathing):    Full Support       Guru Lei MD  04/12/23

## 2023-04-12 NOTE — THERAPY TREATMENT NOTE
Patient Name: Monty Nagel  : 1955    MRN: [id number]                              Today's Date: 2023       Admit Date: 2023    Visit Dx:     ICD-10-CM ICD-9-CM   1. Generalized weakness  R53.1 780.79   2. History of meningioma  Z86.018 V12.41   3. Failure to thrive in adult  R62.7 783.7   4. Hyponatremia  E87.1 276.1   5. Hypoalbuminemia  E88.09 273.8   6. Hypocalcemia  E83.51 275.41   7. Alcohol abuse  F10.10 305.00   8. Rheumatoid vs Psoriatic arthritis (HCC)  L40.50 696.0   9. Meningioma  D32.9 225.2   10. Severe malnutrition  E43 261   11. Atrial fibrillation, unspecified type  I48.91 427.31   12. Balance problem  R26.89 781.99   13. Other recurrent depressive disorders  F33.8 296.99   14. Screen for colon cancer  Z12.11 V76.51   15. Postablative hypothyroidism  E89.0 244.1   16. History of right retinal melanoma with right eye blindness  Z85.820 V10.82   17. Essential hypertension  I10 401.9     Patient Active Problem List   Diagnosis   • Essential hypertension   • History of right retinal melanoma with right eye blindness   • Postablative hypothyroidism   • Screen for colon cancer   • Other recurrent depressive disorders   • Balance problem   • Generalized weakness   • Atrial fibrillation   • Severe malnutrition   • Meningioma   • Rheumatoid vs Psoriatic arthritis (HCC)   • Alcohol abuse   • Hypocalcemia   • Hypoalbuminemia   • Hyponatremia     Past Medical History:   Diagnosis Date   • Arthritis    • Disease of thyroid gland    • Hepatitis A    • Hypertension    • Melanoma 2017    retina     Past Surgical History:   Procedure Laterality Date   • CRANIOTOMY FOR TUMOR N/A 3/21/2023    Procedure: CRANIOTOMY FOR TUMOR STEREOTACTIC WITH STEALTH;  Surgeon: Marlon Mckee MD;  Location: [facility] OR;  Service: Neurosurgery;  Laterality: N/A;   • EMBOLIZATION CEREBRAL N/A 3/20/2023    Procedure: Tumor Emoblization radial access;  Surgeon: Ozzy Sebastian MD;  Location: [facility] CATH INVASIVE  LOCATION;  Service: Interventional Radiology;  Laterality: N/A;   • EYE SURGERY  2017    EYE CANCER RIGHT EYE   • FINGER SURGERY Left     Pointer finger re-attached in 3rd Grade   • TONSILLECTOMY        General Information     Row Name 04/12/23 1439          Physical Therapy Time and Intention    Document Type therapy note (daily note)  -MB     Mode of Treatment physical therapy  -MB     Row Name 04/12/23 1439          General Information    Patient Profile Reviewed yes  -MB     Existing Precautions/Restrictions fall;seizures;other (see comments)  s/p craniotomy 3/21/23  -MB     Barriers to Rehab medically complex;cognitive status;visual deficit  -MB     Row Name 04/12/23 1439          Cognition    Orientation Status (Cognition) oriented x 3  -MB     Row Name 04/12/23 1439          Safety Issues, Functional Mobility    Safety Issues Affecting Function (Mobility) awareness of need for assistance;insight into deficits/self-awareness;safety precaution awareness;safety precautions follow-through/compliance;sequencing abilities  -MB     Impairments Affecting Function (Mobility) balance;cognition;endurance/activity tolerance;strength  -MB           User Key  (r) = Recorded By, (t) = Taken By, (c) = Cosigned By    Initials Name Provider Type    Kim Montana, PT Physical Therapist               Mobility     Row Name 04/12/23 1601          Bed Mobility    Supine-Sit [place] (Bed Mobility) standby assist  -MB     Sit-Supine [place] (Bed Mobility) standby assist  -MB     Assistive Device (Bed Mobility) head of bed elevated;bed rails  -MB     Comment, (Bed Mobility) Pt. advanced to EOB w/ SBA for safety and cueing to wait for adequate set up prior to standing.  -MB     Row Name 04/12/23 1601          Transfers    Comment, (Transfers) VCs to reach back w/ UEs for contolled sit.  -MB     Row Name 04/12/23 1601          Sit-Stand Transfer    Sit-Stand [place] (Transfers) standby assist;verbal cues  -MB      Assistive Device (Sit-Stand Transfers) walker, front-wheeled  -MB     Row Name 04/12/23 1601          Gait/Stairs (Locomotion)    [place] Level (Gait) minimum assist (75% patient effort);verbal cues  -MB     Assistive Device (Gait) walker, front-wheeled  -MB     Distance in Feet (Gait) 110  -MB     Deviations/Abnormal Patterns (Gait) base of support, wide;weight shifting decreased;stride length decreased;gait speed decreased;steppage  -MB     Bilateral Gait Deviations forward flexed posture;heel strike decreased  -MB     Comment, (Gait/Stairs) Pt. ambulated w/ step through gait pattern w/ VCs/tactile cues for upright posture/forward gaze and safe use of RW (to keep closer to LE).  -MB           User Key  (r) = Recorded By, (t) = Taken By, (c) = Cosigned By    Initials Name Provider Type    Kim Montana, PT Physical Therapist               Obj/Interventions     Row Name 04/12/23 1611          Motor Skills    Therapeutic Exercise hip;knee;ankle  -MB     Row Name 04/12/23 1611          Hip (Therapeutic Exercise)    Hip (Therapeutic Exercise) strengthening exercise  -MB     Hip Strengthening (Therapeutic Exercise) bilateral;marching while seated;aBduction;15 repititions  -MB     Row Name 04/12/23 1611          Knee (Therapeutic Exercise)    Knee (Therapeutic Exercise) strengthening exercise  -MB     Knee Strengthening (Therapeutic Exercise) bilateral;LAQ (long arc quad);15 repititions  -MB     Row Name 04/12/23 1611          Ankle (Therapeutic Exercise)    Ankle (Therapeutic Exercise) strengthening exercise  -MB     Ankle Strengthening (Therapeutic Exercise) bilateral;dorsiflexion;plantarflexion;15 repititions  -MB     Row Name 04/12/23 1611          Balance    Balance Assessment standing static balance;standing dynamic balance  -MB     Static Standing Balance contact guard  -MB     Dynamic Standing Balance contact guard  -MB     Position/Device Used, Standing Balance supported;walker, rolling  -MB      Balance Interventions standing;sit to stand;weight shifting activity;dynamic reaching  -MB           User Key  (r) = Recorded By, (t) = Taken By, (c) = Cosigned By    Initials Name Provider Type    Kim Montana, PT Physical Therapist               Goals/Plan    No documentation.                Clinical Impression     Row Name 04/12/23 1612          Pain    Pretreatment Pain Rating 0/10 - no pain  -MB     Posttreatment Pain Rating 0/10 - no pain  -MB     Row Name 04/12/23 1612          Plan of Care Review    Plan of Care Reviewed With patient  -MB     Progress improving  -MB     Outcome Evaluation Patient demonstrates improving independence w/ mobility, but continues to be limited by weakness, decreased coordination, unsteady gait, and remains below his functional baseline. PT continues to recommend SNF rehab at D/C for best functional outcome.  -MB     Row Name 04/12/23 1612          Vital Signs    Pre Systolic BP Rehab --  VSS. RN cleared for PT.  -MB     Pre Patient Position Supine  -MB     Intra Patient Position Standing  -MB     Post Patient Position Supine  -MB     Row Name 04/12/23 1612          Positioning and Restraints    Pre-Treatment Position in bed  -MB     Post Treatment Position bed  -MB     In Bed notified nsg;supine;call light within reach;encouraged to call for assist;exit alarm on  -MB           User Key  (r) = Recorded By, (t) = Taken By, (c) = Cosigned By    Initials Name Provider Type    Kim Montana, PT Physical Therapist               Outcome Measures     Row Name 04/12/23 1614          How much help from another person do you currently need...    Turning from your back to your side while in flat bed without using bedrails? 4  -MB     Moving from lying on back to sitting on the side of a flat bed without bedrails? 3  -MB     Moving to and from a bed to a chair (including a wheelchair)? 3  -MB     Standing up from a chair using your arms (e.g., wheelchair, bedside chair)? 3   -MB     Climbing 3-5 steps with a railing? 3  -MB     To walk in hospital room? 3  -MB     AM-PAC 6 Clicks Score (PT) 19  -MB     Highest level of mobility 6 --> Walked 10 steps or more  -MB     Row Name 04/12/23 1614 04/12/23 1426       Functional Assessment    Outcome Measure Options AM-PAC 6 Clicks Basic Mobility (PT)  -MB AM-PAC 6 Clicks Daily Activity (OT)  -MR          User Key  (r) = Recorded By, (t) = Taken By, (c) = Cosigned By    Initials Name Provider Type    MB Kim Lora, PT Physical Therapist    MR Amanda Vega, OT Occupational Therapist                             Physical Therapy Education     Title: PT OT SLP Therapies (Done)     Topic: Physical Therapy (Done)     Point: Mobility training (Done)     Learning Progress Summary           Patient Acceptance, E, VU by  at 4/6/2023 1048    Acceptance, E,D, VU,NR by  at 4/5/2023 1056                   Point: Home exercise program (Done)     Learning Progress Summary           Patient Acceptance, E, VU by  at 4/6/2023 1048    Acceptance, E,D, VU,NR by  at 4/5/2023 1056                   Point: Body mechanics (Done)     Learning Progress Summary           Patient Acceptance, E, VU by  at 4/6/2023 1048    Acceptance, E,D, VU,NR by  at 4/5/2023 1056                   Point: Precautions (Done)     Learning Progress Summary           Patient Acceptance, E, VU by  at 4/6/2023 1048    Acceptance, E,D, VU,NR by  at 4/5/2023 1056                               User Key     Initials Effective Dates Name Provider Type Discipline     05/05/22 -  Bayron Lea, PT Physical Therapist PT     06/01/21 -  Kelly Mckeon, PT Physical Therapist PT              PT Recommendation and Plan     Plan of Care Reviewed With: patient  Progress: improving  Outcome Evaluation: Patient demonstrates improving independence w/ mobility, but continues to be limited by weakness, decreased coordination, unsteady gait, and remains below his functional  baseline. PT continues to recommend SNF rehab at D/C for best functional outcome.     Time Calculation:    PT Charges     Row Name 04/12/23 1614             Time Calculation    Start Time 1439  -MB      PT Received On 04/12/23  -MB      PT Goal Re-Cert Due Date 04/15/23  -MB         Time Calculation- PT    Total Timed Code Minutes- PT 36 minute(s)  -MB         Timed Charges    58629 - PT Therapeutic Exercise Minutes 16  -MB      16157 - Gait Training Minutes  20  -MB         Total Minutes    Timed Charges Total Minutes 36  -MB       Total Minutes 36  -MB            User Key  (r) = Recorded By, (t) = Taken By, (c) = Cosigned By    Initials Name Provider Type    Kim Montana, PT Physical Therapist              Therapy Charges for Today     Code Description Service Date Service Provider Modifiers Qty    41320548811 HC PT THER PROC EA 15 MIN 4/12/2023 Kim Lora, PT GP 1    33896198719 HC GAIT TRAINING EA 15 MIN 4/12/2023 Kim Lora, PT GP 1          PT G-Codes  Outcome Measure Options: AM-PAC 6 Clicks Basic Mobility (PT)  AM-PAC 6 Clicks Score (PT): 19  AM-PAC 6 Clicks Score (OT): 20  TUG Test 1: 19 seconds  TUG Test 2: 19.4 seconds  PT Discharge Summary  Anticipated Discharge Disposition (PT): skilled nursing facility    Kim Lora PT  4/12/2023

## 2023-04-12 NOTE — THERAPY TREATMENT NOTE
Patient Name: Monty Nagel  : 1955    MRN: [id number]                              Today's Date: 2023       Admit Date: 2023    Visit Dx:     ICD-10-CM ICD-9-CM   1. Generalized weakness  R53.1 780.79   2. History of meningioma  Z86.018 V12.41   3. Failure to thrive in adult  R62.7 783.7   4. Hyponatremia  E87.1 276.1   5. Hypoalbuminemia  E88.09 273.8   6. Hypocalcemia  E83.51 275.41   7. Alcohol abuse  F10.10 305.00   8. Rheumatoid vs Psoriatic arthritis (HCC)  L40.50 696.0   9. Meningioma  D32.9 225.2   10. Severe malnutrition  E43 261   11. Atrial fibrillation, unspecified type  I48.91 427.31   12. Balance problem  R26.89 781.99   13. Other recurrent depressive disorders  F33.8 296.99   14. Screen for colon cancer  Z12.11 V76.51   15. Postablative hypothyroidism  E89.0 244.1   16. History of right retinal melanoma with right eye blindness  Z85.820 V10.82   17. Essential hypertension  I10 401.9     Patient Active Problem List   Diagnosis   • Essential hypertension   • History of right retinal melanoma with right eye blindness   • Postablative hypothyroidism   • Screen for colon cancer   • Other recurrent depressive disorders   • Balance problem   • Generalized weakness   • Atrial fibrillation   • Severe malnutrition   • Meningioma   • Rheumatoid vs Psoriatic arthritis (HCC)   • Alcohol abuse   • Hypocalcemia   • Hypoalbuminemia   • Hyponatremia     Past Medical History:   Diagnosis Date   • Arthritis    • Disease of thyroid gland    • Hepatitis A    • Hypertension    • Melanoma 2017    retina     Past Surgical History:   Procedure Laterality Date   • CRANIOTOMY FOR TUMOR N/A 3/21/2023    Procedure: CRANIOTOMY FOR TUMOR STEREOTACTIC WITH STEALTH;  Surgeon: Marlon Mckee MD;  [facility]  Service: Neurosurgery;  Laterality: N/A;   • EMBOLIZATION CEREBRAL N/A 3/20/2023    Procedure: Tumor Emoblization radial access;  Surgeon: Ozzy Sebastian MD;  [facility];  Service: Interventional Radiology;  Laterality: N/A;   • EYE SURGERY  2017    EYE CANCER RIGHT EYE   • FINGER SURGERY Left     Pointer finger re-attached in 3rd Grade   • TONSILLECTOMY        General Information     Row Name 04/12/23 1419          OT Time and Intention    Document Type therapy note (daily note)  -MR     Mode of Treatment occupational therapy  -MR     Row Name 04/12/23 1419          General Information    Patient Profile Reviewed yes  -MR     Existing Precautions/Restrictions fall;seizures;other (see comments)  s/p craniotomy 3/21/23  -MR     Barriers to Rehab cognitive status;visual deficit  -MR     Row Name 04/12/23 1419          Cognition    Orientation Status (Cognition) oriented x 3  -MR     Row Name 04/12/23 1419          Safety Issues, Functional Mobility    Safety Issues Affecting Function (Mobility) safety precaution awareness;safety precautions follow-through/compliance;insight into deficits/self-awareness;awareness of need for assistance  -MR     Impairments Affecting Function (Mobility) balance;cognition;endurance/activity tolerance;strength  -MR     Cognitive Impairments, Mobility Safety/Performance insight into deficits/self-awareness;awareness, need for assistance  -MR           User Key  (r) = Recorded By, (t) = Taken By, (c) = Cosigned By    Initials Name Provider Type    MR Amanda Vega, OT Occupational Therapist                 Mobility/ADL's     Row Name 04/12/23 1419          Bed Mobility    Bed Mobility supine-sit;sit-supine  -MR     Supine-Sit [place] (Bed Mobility) standby assist  -MR     Sit-Supine [place] (Bed Mobility) standby assist  -MR     Assistive Device (Bed Mobility) head of bed elevated;bed rails  -MR     Row Name 04/12/23 1419          Transfers    Transfers sit-stand transfer  -MR     Comment, (Transfers) Pt completed multiple sit to stands from couch (lower surface) w/ SBA. V/c for hand placement, sequencing and safety.  -MR     Row Name  04/12/23 1419          Sit-Stand Transfer    Sit-Stand [place] (Transfers) standby assist  -MR     Assistive Device (Sit-Stand Transfers) walker, front-wheeled  -MR     Row Name 04/12/23 1419          Functional Mobility    Functional Mobility- Ind. Level contact guard assist;verbal cues required;nonverbal cues required (demo/gesture)  -MR     Functional Mobility- Device walker, front-wheeled  -MR     Functional Mobility-Distance (Feet) --  > HH distances  -MR     Functional Mobility- Comment Pt requiring occasional v/c for sequencing and safety during mobility. Pt slightly impulsive at times.  -MR     Row Name 04/12/23 1419          Activities of Daily Living    BADL Assessment/Intervention lower body dressing  -MR     Row Name 04/12/23 1419          Lower Body Dressing Assessment/Training    [place] Level (Lower Body Dressing) don;doff;pants/bottoms;socks;supervision  -MR     Position (Lower Body Dressing) unsupported sitting;unsupported standing  -MR           User Key  (r) = Recorded By, (t) = Taken By, (c) = Cosigned By    Initials Name Provider Type    MR Amanda Vega OT Occupational Therapist               Obj/Interventions     Row Name 04/12/23 1421          Balance    Balance Assessment sitting static balance;sitting dynamic balance;standing static balance;standing dynamic balance  -MR     Static Sitting Balance standby assist  -MR     Dynamic Sitting Balance standby assist  -MR     Position, Sitting Balance unsupported;sitting edge of bed;other (see comments)  sitting on couch  -MR     Static Standing Balance contact guard  -MR     Dynamic Standing Balance contact guard  -MR     Position/Device Used, Standing Balance supported;walker, front-wheeled  -MR     Balance Interventions sitting;standing;sit to stand;supported;static;dynamic;minimal challenge;occupation based/functional task;dynamic reaching  -MR     Comment, Balance Pt demonstrating good effort w/ high level balance task reaching out  of LE and across midline to toss objects toward targets.  -MR           User Key  (r) = Recorded By, (t) = Taken By, (c) = Cosigned By    Initials Name Provider Type    Amanda Nielson OT Occupational Therapist               Goals/Plan    No documentation.                Clinical Impression     Row Name 04/12/23 1423          Pain Assessment    Pretreatment Pain Rating 0/10 - no pain  -MR     Posttreatment Pain Rating 0/10 - no pain  -MR     Pain Intervention(s) Ambulation/increased activity;Repositioned  -MR     Row Name 04/12/23 1423          Plan of Care Review    Plan of Care Reviewed With patient  -MR     Progress improving  -MR     Outcome Evaluation Pt continues to present below baseline d/t cognition requiring occasional cues for balance and safety. Pt highly motivated throughout session demonstrating good effort w/ dynamic balance, ADLs and endurance based tasks. Continue to recommend SNF for best functional outcome d/t safety concerns w/ pt's cognition.  -MR     Row Name 04/12/23 1423          Therapy Plan Review/Discharge Plan (OT)    Anticipated Discharge Disposition (OT) skilled nursing facility  -MR     Row Name 04/12/23 1423          Vital Signs    Pre Systolic BP Rehab --  VSS  -MR     O2 Delivery Pre Treatment room air  -MR     O2 Delivery Intra Treatment room air  -MR     O2 Delivery Post Treatment room air  -MR     Pre Patient Position Supine  -MR     Intra Patient Position Standing  -MR     Post Patient Position Supine  -MR     Row Name 04/12/23 1423          Positioning and Restraints    Pre-Treatment Position in bed  -MR     Post Treatment Position bed  -MR     In Bed supine;call light within reach;encouraged to call for assist;exit alarm on;side rails up x2;notified nsg;patient within staff view  -MR           User Key  (r) = Recorded By, (t) = Taken By, (c) = Cosigned By    Initials Name Provider Type    Amanda Nielson, LUCERO Occupational Therapist               Outcome Measures     Row  Name 04/12/23 1426          How much help from another is currently needed...    Putting on and taking off regular lower body clothing? 3  -MR     Bathing (including washing, rinsing, and drying) 3  -MR     Toileting (which includes using toilet bed pan or urinal) 3  -MR     Putting on and taking off regular upper body clothing 3  -MR     Taking care of personal grooming (such as brushing teeth) 4  -MR     Eating meals 4  -MR     AM-PAC 6 Clicks Score (OT) 20  -MR     Row Name 04/12/23 1426          Functional Assessment    Outcome Measure Options AM-PAC 6 Clicks Daily Activity (OT)  -MR           User Key  (r) = Recorded By, (t) = Taken By, (c) = Cosigned By    Initials Name Provider Type    Amanda Nielson, OT Occupational Therapist                Occupational Therapy Education     Title: PT OT SLP Therapies (Done)     Topic: Occupational Therapy (Done)     Point: ADL training (Done)     Description:   Instruct learner(s) on proper safety adaptation and remediation techniques during self care or transfers.   Instruct in proper use of assistive devices.              Learning Progress Summary           Patient Acceptance, E, VU,NR by MR at 4/12/2023 1427    Acceptance, E, VU,NR by MR at 4/6/2023 1143                   Point: Home exercise program (Done)     Description:   Instruct learner(s) on appropriate technique for monitoring, assisting and/or progressing therapeutic exercises/activities.              Learning Progress Summary           Patient Acceptance, E, VU,NR by MR at 4/12/2023 1427    Acceptance, E, VU,NR by MR at 4/6/2023 1143                   Point: Precautions (Done)     Description:   Instruct learner(s) on prescribed precautions during self-care and functional transfers.              Learning Progress Summary           Patient Acceptance, E, VU,NR by MR at 4/12/2023 1427    Acceptance, E, VU,NR by MR at 4/6/2023 1143                   Point: Body mechanics (Done)     Description:   Instruct  learner(s) on proper positioning and spine alignment during self-care, functional mobility activities and/or exercises.              Learning Progress Summary           Patient Acceptance, E, VU,NR by MR at 4/12/2023 1427    Acceptance, E, VU,NR by MR at 4/6/2023 1143                               User Key     Initials Effective Dates Name Provider Type Discipline    MR 09/22/22 -  Amanda Vega OT Occupational Therapist OT              OT Recommendation and Plan  Planned Therapy Interventions (OT): activity tolerance training, adaptive equipment training, BADL retraining, functional balance retraining, IADL retraining, occupation/activity based interventions, patient/caregiver education/training, transfer/mobility retraining, strengthening exercise, ROM/therapeutic exercise, cognitive/visual perception retraining  Therapy Frequency (OT): daily  Plan of Care Review  Plan of Care Reviewed With: patient  Progress: improving  Outcome Evaluation: Pt continues to present below baseline d/t cognition requiring occasional cues for balance and safety. Pt highly motivated throughout session demonstrating good effort w/ dynamic balance, ADLs and endurance based tasks. Continue to recommend SNF for best functional outcome d/t safety concerns w/ pt's cognition.     Time Calculation:    Time Calculation- OT     Row Name 04/12/23 1427             Time Calculation- OT    OT Start Time 1328  -MR      OT Received On 04/12/23  -MR         Timed Charges    90631 - OT Therapeutic Exercise Minutes 8  -MR      02299 - OT Therapeutic Activity Minutes 10  -MR      30625 - OT Self Care/Mgmt Minutes 20  -MR         Total Minutes    Timed Charges Total Minutes 38  -MR       Total Minutes 38  -MR            User Key  (r) = Recorded By, (t) = Taken By, (c) = Cosigned By    Initials Name Provider Type     Amanda Vega OT Occupational Therapist              Therapy Charges for Today     Code Description Service Date Service Provider  Modifiers Qty    94103088062 HC OT SELF CARE/MGMT/TRAIN EA 15 MIN 4/12/2023 Amanda Vega OT GO 1    03468057338 HC OT THERAPEUTIC ACT EA 15 MIN 4/12/2023 Amanda Vega OT GO 1    41396550467 HC OT THER PROC EA 15 MIN 4/12/2023 Amanda Vega OT GO 1               Amanda Vega OT  4/12/2023

## 2023-04-12 NOTE — PLAN OF CARE
Goal Outcome Evaluation:  Plan of Care Reviewed With: patient        Progress: improving  Outcome Evaluation: Patient demonstrates improving independence w/ mobility, but continues to be limited by weakness, decreased coordination, unsteady gait, and remains below his functional baseline. PT continues to recommend SNF rehab at D/C for best functional outcome.

## 2023-04-12 NOTE — CASE MANAGEMENT/SOCIAL WORK
Continued Stay Note   [facility]aria     Patient Name: Monty Nagel  MRN: [id number]  Today's Date: 4/12/2023    Admit Date: 4/4/2023    Plan: Rehab   Discharge Plan     Row Name 04/12/23 1555       Plan    Plan Rehab    Plan Comments I spoke with therapy with recommendations for rehab. [facility] with [facility] will submit when notes are in. I will update patient.    Final Discharge Disposition Code 03 - skilled nursing facility (SNF)               Discharge Codes    No documentation.               Expected Discharge Date and Time     Expected Discharge Date Expected Discharge Time    Apr 13, 2023             Hyacinth Faustin RN

## 2023-04-12 NOTE — PLAN OF CARE
Problem: Fall Injury Risk  Goal: Absence of Fall and Fall-Related Injury  Outcome: Ongoing, Progressing  Intervention: Identify and Manage Contributors  Recent Flowsheet Documentation  Taken 4/12/2023 1600 by Sandee Patterson RN  Medication Review/Management: medications reviewed  Taken 4/12/2023 1400 by Sandee Patterson RN  Medication Review/Management: medications reviewed  Taken 4/12/2023 1200 by Sandee Patterson RN  Medication Review/Management: medications reviewed  Taken 4/12/2023 1000 by Sandee Patterson RN  Medication Review/Management: medications reviewed  Taken 4/12/2023 0800 by Sandee Patterson RN  Medication Review/Management: medications reviewed  Intervention: Promote Injury-Free Environment  Recent Flowsheet Documentation  Taken 4/12/2023 1600 by Sandee Patterson RN  Safety Promotion/Fall Prevention:   safety round/check completed   assistive device/personal items within reach   fall prevention program maintained  Taken 4/12/2023 1400 by Sandee Patterson RN  Safety Promotion/Fall Prevention:   assistive device/personal items within reach   fall prevention program maintained  Taken 4/12/2023 1200 by Sandee Patterson RN  Safety Promotion/Fall Prevention: safety round/check completed  Taken 4/12/2023 1000 by Sandee Patterson RN  Safety Promotion/Fall Prevention:   safety round/check completed   assistive device/personal items within reach   clutter free environment maintained   fall prevention program maintained  Taken 4/12/2023 0800 by Sandee Patterson RN  Safety Promotion/Fall Prevention:   safety round/check completed   assistive device/personal items within reach     Problem: Skin Injury Risk Increased  Goal: Skin Health and Integrity  Outcome: Ongoing, Progressing  Intervention: Optimize Skin Protection  Recent Flowsheet Documentation  Taken 4/12/2023 1600 by Sandee Patterson RN  Head of Bed (HOB) Positioning: HOB elevated  Taken 4/12/2023 1400 by Sandee Patterson RN  Head of Bed (HOB) Positioning: HOB elevated  Taken  4/12/2023 1200 by Sandee Patterson RN  Head of Bed ([facility]) Positioning: HOB elevated  Taken 4/12/2023 1000 by Sandee Patterson RN  Head of Bed ([facility]) Positioning: HOB elevated  Taken 4/12/2023 0800 by Sandee Patterson RN  Pressure Reduction Techniques: frequent weight shift encouraged  Pressure Reduction Devices: pressure-redistributing mattress utilized  Skin Protection:   adhesive use limited   incontinence pads utilized     Problem: Hypertension Comorbidity  Goal: Blood Pressure in Desired Range  Outcome: Ongoing, Progressing  Intervention: Maintain Blood Pressure Management  Recent Flowsheet Documentation  Taken 4/12/2023 1600 by Sandee Patterson RN  Medication Review/Management: medications reviewed  Taken 4/12/2023 1400 by Sandee Patterson RN  Medication Review/Management: medications reviewed  Taken 4/12/2023 1200 by Sandee Patterson RN  Medication Review/Management: medications reviewed  Taken 4/12/2023 1000 by Sandee Patterson RN  Medication Review/Management: medications reviewed  Taken 4/12/2023 0800 by Sandee Patterson RN  Medication Review/Management: medications reviewed   Goal Outcome Evaluation:

## 2023-04-13 LAB
ALBUMIN SERPL-MCNC: 3.3 G/DL (ref 3.5–5.2)
ALBUMIN/GLOB SERPL: 1.6 G/DL
ALP SERPL-CCNC: 86 U/L (ref 39–117)
ALT SERPL W P-5'-P-CCNC: 27 U/L (ref 1–41)
ANION GAP SERPL CALCULATED.3IONS-SCNC: 7 MMOL/L (ref 5–15)
AST SERPL-CCNC: 13 U/L (ref 1–40)
BILIRUB SERPL-MCNC: 0.6 MG/DL (ref 0–1.2)
BUN SERPL-MCNC: 19 MG/DL (ref 8–23)
BUN/CREAT SERPL: 28.8 (ref 7–25)
CALCIUM SPEC-SCNC: 8.3 MG/DL (ref 8.6–10.5)
CHLORIDE SERPL-SCNC: 98 MMOL/L (ref 98–107)
CO2 SERPL-SCNC: 29 MMOL/L (ref 22–29)
CREAT SERPL-MCNC: 0.66 MG/DL (ref 0.76–1.27)
DEPRECATED RDW RBC AUTO: 64.3 FL (ref 37–54)
EGFRCR SERPLBLD CKD-EPI 2021: 100.9 ML/MIN/1.73
ERYTHROCYTE [DISTWIDTH] IN BLOOD BY AUTOMATED COUNT: 19.6 % (ref 12.3–15.4)
GLOBULIN UR ELPH-MCNC: 2.1 GM/DL
GLUCOSE SERPL-MCNC: 83 MG/DL (ref 65–99)
HCT VFR BLD AUTO: 35.9 % (ref 37.5–51)
HGB BLD-MCNC: 11.8 G/DL (ref 13–17.7)
MCH RBC QN AUTO: 30.3 PG (ref 26.6–33)
MCHC RBC AUTO-ENTMCNC: 32.9 G/DL (ref 31.5–35.7)
MCV RBC AUTO: 92.1 FL (ref 79–97)
PLATELET # BLD AUTO: 255 10*3/MM3 (ref 140–450)
PMV BLD AUTO: 7.9 FL (ref 6–12)
POTASSIUM SERPL-SCNC: 5.3 MMOL/L (ref 3.5–5.2)
PROT SERPL-MCNC: 5.4 G/DL (ref 6–8.5)
RBC # BLD AUTO: 3.9 10*6/MM3 (ref 4.14–5.8)
SODIUM SERPL-SCNC: 134 MMOL/L (ref 136–145)
WBC NRBC COR # BLD: 8.54 10*3/MM3 (ref 3.4–10.8)

## 2023-04-13 PROCEDURE — 99233 SBSQ HOSP IP/OBS HIGH 50: CPT | Performed by: FAMILY MEDICINE

## 2023-04-13 PROCEDURE — 80053 COMPREHEN METABOLIC PANEL: CPT | Performed by: HOSPITALIST

## 2023-04-13 PROCEDURE — 85027 COMPLETE CBC AUTOMATED: CPT | Performed by: HOSPITALIST

## 2023-04-13 PROCEDURE — G0378 HOSPITAL OBSERVATION PER HR: HCPCS

## 2023-04-13 RX ADMIN — LEVOTHYROXINE SODIUM 125 MCG: 125 TABLET ORAL at 04:12

## 2023-04-13 NOTE — CASE MANAGEMENT/SOCIAL WORK
Continued Stay Note  [facility]     Patient Name: Monty Nagel  MRN: [id number]  Today's Date: 4/13/2023    Admit Date: 4/4/2023    Plan: [facility]   Discharge Plan     Row Name 04/13/23 1540       Plan    Plan [facility]    Plan Comments Awaiting insurance approval for [facility].    Final Discharge Disposition Code 03 - skilled nursing facility (SNF)               Discharge Codes    No documentation.               Expected Discharge Date and Time     Expected Discharge Date Expected Discharge Time    Apr 14, 2023             Hyacinth Faustin RN

## 2023-04-13 NOTE — PROGRESS NOTES
[facility] Medicine Services  PROGRESS NOTE    Patient Name: Motny Nagel  : 1955  MRN: [id number]    Date of Admission: 2023  Primary Care Physician: Tiffany Morales MD    Subjective   Subjective     CC:      F/U generalized weakness     HPI:     Patient seen and examined. Says he is hungry. Asking for cheese. Ate all of his breakfast. Tells me he can't go to rehab until I can ensure he will have food in his house and he pays [employer] $2,000 in taxes.     ROS:  Gen- no fevers, no chills  CV- No chest pain, palpitations  Resp- No cough, dyspnea  GI- No N/V/D, abd pain    Objective   Objective     Vital Signs:   Temp:  [97.4 °F (36.3 °C)-98.5 °F (36.9 °C)] 97.4 °F (36.3 °C)  Heart Rate:  [] 93  Resp:  [16-20] 20  BP: (123-134)/(75-93) 130/93     Physical Exam:  Constitutional: No acute distress, awake, alert  HENT: NCAT, mucous membranes moist  Respiratory: Clear to auscultation bilaterally, respiratory effort normal   Cardiovascular: RRR, no murmurs, rubs, or gallops  Gastrointestinal: Positive bowel sounds, soft, nontender, nondistended  Musculoskeletal: No bilateral ankle edema  Psychiatric: Appropriate affect, cooperative  Neurologic: Oriented x 3, OVIEDO, speech clear  Skin: No rashes      Results Reviewed:  LAB RESULTS:      Lab 2323 23   WBC 8.54 5.72   HEMOGLOBIN 11.8* 11.7*   HEMATOCRIT 35.9* 36.0*   PLATELETS 255 223   NEUTROS ABS  --  4.26   IMMATURE GRANS (ABS)  --  0.13*   LYMPHS ABS  --  0.91   MONOS ABS  --  0.28   EOS ABS  --  0.12   MCV 92.1 91.8         Lab 2323 23   SODIUM 134* 135*   POTASSIUM 5.3* 4.2   CHLORIDE 98 99   CO2 29.0 30.0*   ANION GAP 7.0 6.0   BUN 19 19   CREATININE 0.66* 0.74*   EGFR 100.9 98.1   GLUCOSE 83 89   CALCIUM 8.3* 8.0*         Lab 23  0623   TOTAL PROTEIN 5.4*   ALBUMIN 3.3*   GLOBULIN 2.1   ALT (SGPT) 27   AST (SGOT) 13   BILIRUBIN 0.6   ALK PHOS 86                      Brief Urine Lab Results  (Last result in the past 365 days)      Color   Clarity   Blood   Leuk Est   Nitrite   Protein   CREAT   Urine HCG        04/04/23 1728 Yellow   Clear   Negative   Negative   Negative   Negative                 Microbiology Results Abnormal     None          No radiology results from the last 24 hrs    Results for orders placed during the hospital encounter of 03/11/23    Adult Transthoracic Echo Complete w/ Color, Spectral and Contrast if necessary per protocol    Interpretation Summary  •  Left ventricular systolic function is normal. Estimated left ventricular EF = 55%  •  Biatrial enlargement.  •  Calcified aortic valve with mild aortic stenosis, mean gradient 10 mmHg, BANG 1.6 cm².  •  Moderate aortic insufficiency.  •  Mild mitral regurgitation.  •  Mild tricuspid regurgitation with normal RVSP.  •  Mild dilation of the ascending aorta (4.2 cm) is present.      Current medications:  Scheduled Meds:aspirin, 81 mg, Oral, Daily  atorvastatin, 40 mg, Oral, Nightly  clopidogrel, 75 mg, Oral, Daily  dexamethasone, 2 mg, Oral, Q12H  lisinopril, 10 mg, Oral, Q24H   And  hydroCHLOROthiazide, 12.5 mg, Oral, Q24H  levETIRAcetam, 1,000 mg, Oral, Q12H  levothyroxine, 125 mcg, Oral, Q AM  metoprolol tartrate, 25 mg, Oral, Q12H  pantoprazole, 40 mg, Oral, Q AM  QUEtiapine, 25 mg, Oral, Nightly  sodium zirconium cyclosilicate, 10 g, Oral, Once      Continuous Infusions:   PRN Meds:.•  acetaminophen  •  melatonin  •  ondansetron **OR** ondansetron  •  sodium chloride    Assessment & Plan   Assessment & Plan     Active Hospital Problems    Diagnosis  POA   • **Generalized weakness [R53.1]  Yes   • Alcohol abuse [F10.10]  Yes   • Hypocalcemia [E83.51]  Yes   • Hypoalbuminemia [E88.09]  Yes   • Hyponatremia [E87.1]  Yes   • Rheumatoid vs Psoriatic arthritis (HCC) [L40.50]  Yes   • Severe malnutrition [E43]  Yes   • Atrial fibrillation [I48.91]  Yes   • Meningioma [D32.9]  Yes   • Postablative  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        Brief Hospital Course to date:  Monty Nagel is a 67 y.o. male with a history of meningioma s/p craniotomy and resection, alcohol abuse, HTN, and Hx Graves Disease s/p thyroidectomy with post-surgical hypothyroidism who has returned to [facility] ED for being unable to take care of himself at home and poor living conditions.  Patient found with right great toe ulce with MRI negative for osteomyelitis.  Wound care consult was placed.  Patient with history of atrial fibrillation was repeatedly declined anticoagulation but continues on metoprolol 25 mg twice daily.    This patient's problems and plans were partially entered by my partner and updated as appropriate by me 04/13/23.  All problems are new to me today     Generalized weakness  Poor living conditions  -  Recent admission 3/11-4/3  -  PT/OT  - Plan is rehab currently      Meningioma s/p craniotomy with resection.  - Recent admission 3/11-4/3. He underwent right frontal craniotomy on 3/21 + MMA embolization.  - Neurosurgery evaluated this admission. Overall stable post op course.   - Plan was for dexamethasone taper and keppra at DC -- patient had not been taking these medications. They were provided by meds to beds prior to last DC.   - Continue Keppra and Dexamethasone 2 mg BID.      Atrial fibrillation  -Rate controlled. Patient was offered anticoagulation and he declined it multiple times during his last admission. Partner reviewed anticoagulation again this admission. He understands it is for stroke prevention and continues to decline.   -On aspirin plus Plavix  -Lopressor 25 mg oral every 12 hours     Right great toe ulcer.  -Appears present during recent admission.  MRI foot negative for osteo at that time.  -Dr. Flores was following during recent admission.   -[facility]     HTN  HLD  -Has lisinopril-HCTZ and metoprolol  on med list. Continue home meds    -Continue statin     Hyponatremia  - stable     Hypoalbuminemia  Hypocalcemia  Chronic Malnutrition  -It appears patient has a very poor living condition.  This on top of his inability to ambulate at home likely worsening his nutritional health.    -CM/SW     Hx of alcohol abuse  -Patient fortunately reports that he has not had an alcoholic drink since September 2022.     Postoperative hypothyroidism  - TSH and T4 within limits   - Levothyroxine 125 mcg oral daily    Hyperkalemia  -Lokelma x1     Expected Discharge Location and Transportation:  Rehab  Expected Discharge   Expected Discharge Date and Time     Expected Discharge Date Expected Discharge Time    Apr 14, 2023            DVT prophylaxis:  Mechanical DVT prophylaxis orders are present.     AM-PAC 6 Clicks Score (PT): 19 (04/12/23 1614)    CODE STATUS:   Code Status and Medical Interventions:   Ordered at: 04/1955     Code Status (Patient has no pulse and is not breathing):    CPR (Attempt to Resuscitate)     Medical Interventions (Patient has pulse or is breathing):    Full Support       Tuyet Ventura, DO  04/13/23

## 2023-04-13 NOTE — PLAN OF CARE
"Goal Outcome Evaluation:         VSS, pt refused all medication and states that we have been giving him meds that keeps him awake.  I attempted to educate pt. On the meds and he \"did not want to be bothered\".   He also would not let me do a full assessment on him.  Will continue to monitor.   Marya Nix RN        "

## 2023-04-13 NOTE — PLAN OF CARE
VSS, afib on tele, room air. Denies pain. Refused bedtime medications. Ambulated SBA gait belt and walker 5000ft. AM labs pending. Hospitalist following. CM working on discharge home vs SNF tbd.       Goal Outcome Evaluation:

## 2023-04-14 LAB
BILIRUB UR QL STRIP: NEGATIVE
CLARITY UR: CLEAR
COLOR UR: YELLOW
GLUCOSE UR STRIP-MCNC: NEGATIVE MG/DL
HGB UR QL STRIP.AUTO: NEGATIVE
KETONES UR QL STRIP: NEGATIVE
LEUKOCYTE ESTERASE UR QL STRIP.AUTO: NEGATIVE
NITRITE UR QL STRIP: NEGATIVE
PH UR STRIP.AUTO: 7.5 [PH] (ref 5–8)
PROT UR QL STRIP: NEGATIVE
SP GR UR STRIP: 1.02 (ref 1–1.03)
UROBILINOGEN UR QL STRIP: NORMAL

## 2023-04-14 PROCEDURE — 97116 GAIT TRAINING THERAPY: CPT

## 2023-04-14 PROCEDURE — G0378 HOSPITAL OBSERVATION PER HR: HCPCS

## 2023-04-14 PROCEDURE — 99231 SBSQ HOSP IP/OBS SF/LOW 25: CPT | Performed by: FAMILY MEDICINE

## 2023-04-14 PROCEDURE — 97535 SELF CARE MNGMENT TRAINING: CPT

## 2023-04-14 PROCEDURE — 97110 THERAPEUTIC EXERCISES: CPT

## 2023-04-14 PROCEDURE — 81003 URINALYSIS AUTO W/O SCOPE: CPT | Performed by: FAMILY MEDICINE

## 2023-04-14 PROCEDURE — 97530 THERAPEUTIC ACTIVITIES: CPT

## 2023-04-14 NOTE — CASE MANAGEMENT/SOCIAL WORK
Continued Stay Note  [facility]     Patient Name: Monty Nagel  MRN: [id number]  Today's Date: 4/14/2023    Admit Date: 4/4/2023    Plan: [facility]   Discharge Plan     Row Name 04/14/23 1234       Plan    Plan [facility]    Plan Comments Ina with [facility] tells me his insurance is still in process. They had to send additional clinical yesterday. She is hoping to be able to have insurance authorization and receive him later today. Ina has week end  info and will reach out over week end, if not today.  I will ask for [employer] wheelchair, once I hear.     Final Discharge Disposition Code 03 - skilled nursing facility (SNF)               Discharge Codes    No documentation.               Expected Discharge Date and Time     Expected Discharge Date Expected Discharge Time    Apr 14, 2023             Hyacinth Faustin RN

## 2023-04-14 NOTE — PLAN OF CARE
Goal Outcome Evaluation:               Pt VSS, RA, No adventitious findings noted upon primary assessment. Refusing medications, physician aware. Will continue to monitor

## 2023-04-14 NOTE — THERAPY TREATMENT NOTE
Patient Name: Monty Nagel  : 1955    MRN: [id number]                              Today's Date: 2023       Admit Date: 2023    Visit Dx:     ICD-10-CM ICD-9-CM   1. Generalized weakness  R53.1 780.79   2. History of meningioma  Z86.018 V12.41   3. Failure to thrive in adult  R62.7 783.7   4. Hyponatremia  E87.1 276.1   5. Hypoalbuminemia  E88.09 273.8   6. Hypocalcemia  E83.51 275.41   7. Alcohol abuse  F10.10 305.00   8. Rheumatoid vs Psoriatic arthritis (HCC)  L40.50 696.0   9. Meningioma  D32.9 225.2   10. Severe malnutrition  E43 261   11. Atrial fibrillation, unspecified type  I48.91 427.31   12. Balance problem  R26.89 781.99   13. Other recurrent depressive disorders  F33.8 296.99   14. Screen for colon cancer  Z12.11 V76.51   15. Postablative hypothyroidism  E89.0 244.1   16. History of right retinal melanoma with right eye blindness  Z85.820 V10.82   17. Essential hypertension  I10 401.9     Patient Active Problem List   Diagnosis   • Essential hypertension   • History of right retinal melanoma with right eye blindness   • Postablative hypothyroidism   • Screen for colon cancer   • Other recurrent depressive disorders   • Balance problem   • Generalized weakness   • Atrial fibrillation   • Severe malnutrition   • Meningioma   • Rheumatoid vs Psoriatic arthritis (HCC)   • Alcohol abuse   • Hypocalcemia   • Hypoalbuminemia   • Hyponatremia     Past Medical History:   Diagnosis Date   • Arthritis    • Disease of thyroid gland    • Hepatitis A    • Hypertension    • Melanoma 2017    retina     Past Surgical History:   Procedure Laterality Date   • CRANIOTOMY FOR TUMOR N/A 3/21/2023    Procedure: CRANIOTOMY FOR TUMOR STEREOTACTIC WITH STEALTH;  Surgeon: Marlon Mckee MD;  Location: [facility] OR;  Service: Neurosurgery;  Laterality: N/A;   • EMBOLIZATION CEREBRAL N/A 3/20/2023    Procedure: Tumor Emoblization radial access;  Surgeon: Ozzy Sebastian MD;  Location: [facility] CATH INVASIVE  LOCATION;  Service: Interventional Radiology;  Laterality: N/A;   • EYE SURGERY  2017    EYE CANCER RIGHT EYE   • FINGER SURGERY Left     Pointer finger re-attached in 3rd Grade   • TONSILLECTOMY        General Information     Row Name 04/14/23 0931          Physical Therapy Time and Intention    Document Type therapy note (daily note)  -AS     Mode of Treatment physical therapy  -AS     Row Name 04/14/23 0931          General Information    Patient Profile Reviewed yes  -AS     Existing Precautions/Restrictions fall;seizures;other (see comments)  s/p craniotomy, R eye visual deficit(wears glasses but does not have at hospital)  -AS     Barriers to Rehab visual deficit;medically complex  -AS     Row Name 04/14/23 0931          Cognition    Orientation Status (Cognition) oriented x 3  -AS     Row Name 04/14/23 0931          Safety Issues, Functional Mobility    Safety Issues Affecting Function (Mobility) awareness of need for assistance;insight into deficits/self-awareness;safety precautions follow-through/compliance;sequencing abilities;positioning of assistive device  -AS     Impairments Affecting Function (Mobility) balance;cognition;endurance/activity tolerance;strength  -AS     Cognitive Impairments, Mobility Safety/Performance awareness, need for assistance;insight into deficits/self-awareness;judgment;safety precaution awareness;safety precaution follow-through;sequencing abilities  -AS     Comment, Safety Issues/Impairments (Mobility) alert and following commands  -AS           User Key  (r) = Recorded By, (t) = Taken By, (c) = Cosigned By    Initials Name Provider Type    AS Sandee Wilkerson PTA Physical Therapist Assistant               Mobility     Row Name 04/14/23 0933          Bed Mobility    Supine-Sit [place] (Bed Mobility) standby assist  -AS     Sit-Supine [place] (Bed Mobility) standby assist  -AS     Assistive Device (Bed Mobility) head of bed elevated;bed rails  -AS     Comment,  (Bed Mobility) patient didnot require physical assist, line management only  -AS     Row Name 04/14/23 0933          Transfers    Comment, (Transfers) verbal cues for safe hand placement  -AS     Row Name 04/14/23 0933          Bed-Chair Transfer    Bed-Chair [place] (Transfers) not tested  -AS     Comment, (Bed-Chair Transfer) patient refused UIC and reported he wanted to sleep as he did not sleep well last night  -AS     Row Name 04/14/23 0933          Sit-Stand Transfer    Sit-Stand [place] (Transfers) standby assist  -AS     Assistive Device (Sit-Stand Transfers) walker, front-wheeled  -AS     Comment, (Sit-Stand Transfer) cues to reach back when sitting  -AS     Row Name 04/14/23 0933          Gait/Stairs (Locomotion)    [place] Level (Gait) verbal cues;contact guard;1 person assist  -AS     Assistive Device (Gait) walker, front-wheeled  -AS     Distance in Feet (Gait) 120  -AS     Deviations/Abnormal Patterns (Gait) base of support, wide;weight shifting decreased;stride length decreased;gait speed decreased;steppage  -AS     Bilateral Gait Deviations forward flexed posture;heel strike decreased  -AS     Comment, (Gait/Stairs) patient ambulated 120 feet with CGA and step through gait pattern, rolling walker for support. Verbal cues for improved posture and keeping walker close. No LOB or unsteadiness noticed, Patient with c/o weakness at end of walk.  -AS           User Key  (r) = Recorded By, (t) = Taken By, (c) = Cosigned By    Initials Name Provider Type    AS Sandee Wilkerson PTA Physical Therapist Assistant               Obj/Interventions     Row Name 04/14/23 0936          Motor Skills    Therapeutic Exercise knee;ankle  -AS     Row Name 04/14/23 0936          Knee (Therapeutic Exercise)    Knee (Therapeutic Exercise) strengthening exercise  -AS     Knee Strengthening (Therapeutic Exercise) bilateral;marching while seated;LAQ (long arc quad);sitting;heel slides;supine;10 repetitions   -AS     [facility] Name 04/14/23 0936          Ankle (Therapeutic Exercise)    Ankle (Therapeutic Exercise) AROM (active range of motion)  -AS     Ankle Strengthening (Therapeutic Exercise) bilateral;dorsiflexion;plantarflexion;sitting;15 repititions  -AS           User Key  (r) = Recorded By, (t) = Taken By, (c) = Cosigned By    Initials Name Provider Type    AS Sandee Wilkerson PTA Physical Therapist Assistant               Goals/Plan    No documentation.                Clinical Impression     [facility] Name 04/14/23 0936          Pain    Pretreatment Pain Rating 0/10 - no pain  -AS     Posttreatment Pain Rating 0/10 - no pain  -AS     Row Name 04/14/23 0936          Plan of Care Review    Plan of Care Reviewed With patient  -AS     Progress improving  -AS     Outcome Evaluation patient ambulated 120 feet with CGA and step through gait pattern, rolling walker for support. Verbal cues for improved posture and keeping walker close. No LOB or unsteadiness noticed, Patient with c/o weakness at end of walk. Recommend SNF at d/c fot best functional outcome.  -AS     [facility] Name 04/14/23 0936          Positioning and Restraints    Pre-Treatment Position in bed  -AS     Post Treatment Position bed  -AS     In Bed supine;call light within reach;encouraged to call for assist;exit alarm on;notified nsg;side rails up x3  -AS           User Key  (r) = Recorded By, (t) = Taken By, (c) = Cosigned By    Initials Name Provider Type    AS Sandee Wilkerson PTA Physical Therapist Assistant               Outcome Measures     Row Name 04/14/23 0938 04/14/23 0800       How much help from another person do you currently need...    Turning from your back to your side while in flat bed without using bedrails? 4  -AS 4  -NR    Moving from lying on back to sitting on the side of a flat bed without bedrails? 4  -AS 3  -NR    Moving to and from a bed to a chair (including a wheelchair)? 3  -AS 3  -NR    Standing up from a chair using your arms (e.g.,  wheelchair, bedside chair)? 3  -AS 3  -NR    Climbing 3-5 steps with a railing? 3  -AS 3  -NR    To walk in hospital room? 3  -AS 3  -NR    AM-PAC 6 Clicks Score (PT) 20  -AS 19  -NR    Highest level of mobility 6 --> Walked 10 steps or more  -AS 6 --> Walked 10 steps or more  -NR    Row Name 04/14/23 0938          Functional Assessment    Outcome Measure Options AM-PAC 6 Clicks Basic Mobility (PT)  -AS           User Key  (r) = Recorded By, (t) = Taken By, (c) = Cosigned By    Initials Name Provider Type    AS Sandee Wilkerson, PTA Physical Therapist Assistant    NR El Bur[place] RN Registered Nurse                             Physical Therapy Education     Title: PT OT SLP Therapies (In Progress)     Topic: Physical Therapy (In Progress)     Point: Mobility training (In Progress)     Learning Progress Summary           Patient Acceptance, E, NR by AS at 4/14/2023 0938    Acceptance, E, VU by FW at 4/6/2023 1048    Acceptance, E,D, VU,NR by HP at 4/5/2023 1056                   Point: Home exercise program (In Progress)     Learning Progress Summary           Patient Acceptance, E, NR by AS at 4/14/2023 0938    Acceptance, E, VU by FW at 4/6/2023 1048    Acceptance, E,D, VU,NR by  at 4/5/2023 1056                   Point: Body mechanics (In Progress)     Learning Progress Summary           Patient Acceptance, E, NR by AS at 4/14/2023 0938    Acceptance, E, VU by FW at 4/6/2023 1048    Acceptance, E,D, VU,NR by  at 4/5/2023 1056                   Point: Precautions (In Progress)     Learning Progress Summary           Patient Acceptance, E, NR by AS at 4/14/2023 0938    Acceptance, E, VU by FW at 4/6/2023 1048    Acceptance, E,D, VU,NR by  at 4/5/2023 1056                               User Key     Initials Effective Dates Name Provider Type Discipline    AS 02/03/23 -  Sandee Wilkerson, [place] Physical Therapist Assistant PT    FW 05/05/22 -  Bayron Lea PT Physical Therapist PT    HP 06/01/21  -  Kelly Mckeon, PT Physical Therapist PT              PT Recommendation and Plan     Plan of Care Reviewed With: patient  Progress: improving  Outcome Evaluation: patient ambulated 120 feet with CGA and step through gait pattern, rolling walker for support. Verbal cues for improved posture and keeping walker close. No LOB or unsteadiness noticed, Patient with c/o weakness at end of walk. Recommend SNF at d/c fot best functional outcome.     Time Calculation:    PT Charges     Row Name 04/14/23 0938             Time Calculation    Start Time 0914  -AS      PT Received On 04/14/23  -AS      PT Goal Re-Cert Due Date 04/15/23  -AS         Timed Charges    25675 - PT Therapeutic Exercise Minutes 10  -AS      02172 - Gait Training Minutes  14  -AS         Total Minutes    Timed Charges Total Minutes 24  -AS       Total Minutes 24  -AS            User Key  (r) = Recorded By, (t) = Taken By, (c) = Cosigned By    Initials Name Provider Type    AS Sandee Wilkerson PTA Physical Therapist Assistant              Therapy Charges for Today     Code Description Service Date Service Provider Modifiers Qty    93438960611 HC PT THER PROC EA 15 MIN 4/14/2023 Sandee Wilkerson PTA GP 1    77839426670 HC GAIT TRAINING EA 15 MIN 4/14/2023 Sandee Wilkerson PTA GP 1          PT G-Codes  Outcome Measure Options: AM-PAC 6 Clicks Basic Mobility (PT)  AM-PAC 6 Clicks Score (PT): 20  AM-PAC 6 Clicks Score (OT): 20  TUG Test 1: 19 seconds  TUG Test 2: 19.4 seconds       Sandee Wilkerson PTA  4/14/2023

## 2023-04-14 NOTE — PLAN OF CARE
Goal Outcome Evaluation:               Pt is alert and oriented X 4. VSS. RA. Pt continued to refuse medications, assessment and care. Voiding spontaneously using urinal. BM this shift .

## 2023-04-14 NOTE — PLAN OF CARE
Goal Outcome Evaluation:  Plan of Care Reviewed With: patient        Progress: improving  Outcome Evaluation: Pt continues to demonstrate functional improvements w/ dynamic tasks. Pt continues to require v/c for safety, sequencing and occasional impulsiveness. Good effort noted w/ BUE strengthening and dynamic balance tasks. Continue to progress as able per current POC. Recommend SNF at d/c for best functional outcome and pt's safety.

## 2023-04-14 NOTE — PROGRESS NOTES
"    [facility]  PROGRESS NOTE    Patient Name: Monty Nagel  : 1955  MRN: [id number]    Date of Admission: 2023  Primary Care Physician: Tiffany Morales MD    Subjective   Subjective     CC:      F/U generalized weakness     HPI:     Patient seen and examined. Patient has been refusing all of this oral medications. Also refuses to take anything IV. Says he is not taking any more \"chemicals\" during his hospital stay. Does complain of some difficulty urinating and some discomfort over his bladder.    ROS:  Gen- no fevers, no chills  CV- No chest pain, palpitations  Resp- No cough, dyspnea  GI- No N/V/D, abd pain  : per above     Objective   Objective     Vital Signs:   Temp:  [97.7 °F (36.5 °C)-98.5 °F (36.9 °C)] 98.4 °F (36.9 °C)  Heart Rate:  [] 87  Resp:  [16-20] 18  BP: (114-132)/(72-97) 124/88     Physical Exam:  Constitutional: No acute distress, awake, alert  HENT: NCAT, mucous membranes moist  Respiratory: Clear to auscultation bilaterally, respiratory effort normal   Cardiovascular: RRR, no murmurs, rubs, or gallops  Gastrointestinal: Positive bowel sounds, soft, nontender, nondistended  Musculoskeletal: No bilateral ankle edema  Psychiatric: Appropriate affect, cooperative  Neurologic: Oriented x 3, OVIEDO, speech clear  Skin: No rashes  *No change in exam    Results Reviewed:  LAB RESULTS:      Lab 23  0623   WBC 8.54 5.72   HEMOGLOBIN 11.8* 11.7*   HEMATOCRIT 35.9* 36.0*   PLATELETS 255 223   NEUTROS ABS  --  4.26   IMMATURE GRANS (ABS)  --  0.13*   LYMPHS ABS  --  0.91   MONOS ABS  --  0.28   EOS ABS  --  0.12   MCV 92.1 91.8         Lab 23  0623 23   SODIUM 134* 135*   POTASSIUM 5.3* 4.2   CHLORIDE 98 99   CO2 29.0 30.0*   ANION GAP 7.0 6.0   BUN 19 19   CREATININE 0.66* 0.74*   EGFR 100.9 98.1   GLUCOSE 83 89   CALCIUM 8.3* 8.0*         Lab 23  0623   TOTAL PROTEIN 5.4*   ALBUMIN 3.3*   GLOBULIN 2.1 "   ALT (SGPT) 27   AST (SGOT) 13   BILIRUBIN 0.6   ALK PHOS 86                     Brief Urine Lab Results  (Last result in the past 365 days)      Color   Clarity   Blood   Leuk Est   Nitrite   Protein   CREAT   Urine HCG        04/04/23 1728 Yellow   Clear   Negative   Negative   Negative   Negative                 Microbiology Results Abnormal     None          No radiology results from the last 24 hrs    Results for orders placed during the hospital encounter of 03/11/23    Adult Transthoracic Echo Complete w/ Color, Spectral and Contrast if necessary per protocol    Interpretation Summary  •  Left ventricular systolic function is normal. Estimated left ventricular EF = 55%  •  Biatrial enlargement.  •  Calcified aortic valve with mild aortic stenosis, mean gradient 10 mmHg, BANG 1.6 cm².  •  Moderate aortic insufficiency.  •  Mild mitral regurgitation.  •  Mild tricuspid regurgitation with normal RVSP.  •  Mild dilation of the ascending aorta (4.2 cm) is present.      Current medications:  Scheduled Meds:aspirin, 81 mg, Oral, Daily  atorvastatin, 40 mg, Oral, Nightly  clopidogrel, 75 mg, Oral, Daily  dexamethasone, 2 mg, Oral, Q12H  lisinopril, 10 mg, Oral, Q24H   And  hydroCHLOROthiazide, 12.5 mg, Oral, Q24H  levETIRAcetam, 1,000 mg, Oral, Q12H  levothyroxine, 125 mcg, Oral, Q AM  metoprolol tartrate, 25 mg, Oral, Q12H  pantoprazole, 40 mg, Oral, Q AM  QUEtiapine, 25 mg, Oral, Nightly  sodium zirconium cyclosilicate, 10 g, Oral, Once      Continuous Infusions:   PRN Meds:.•  acetaminophen  •  melatonin  •  ondansetron **OR** ondansetron  •  sodium chloride    Assessment & Plan   Assessment & Plan     Active Hospital Problems    Diagnosis  POA   • **Generalized weakness [R53.1]  Yes   • Alcohol abuse [F10.10]  Yes   • Hypocalcemia [E83.51]  Yes   • Hypoalbuminemia [E88.09]  Yes   • Hyponatremia [E87.1]  Yes   • Rheumatoid vs Psoriatic arthritis (HCC) [L40.50]  Yes   • Severe malnutrition [E43]  Yes   • Atrial  fibrillation [I48.91]  Yes   • Meningioma [D32.9]  Yes   • Postablative 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        Brief Hospital Course to date:  Monty Nagel is a 67 y.o. male with a history of meningioma s/p craniotomy and resection, alcohol abuse, HTN, and Hx Graves Disease s/p thyroidectomy with post-surgical hypothyroidism who has returned to [facility] ED for being unable to take care of himself at home and poor living conditions.  Patient found with right great toe ulce with MRI negative for osteomyelitis.  Wound care consult was placed.  Patient with history of atrial fibrillation was repeatedly declined anticoagulation but continues on metoprolol 25 mg twice daily.    This patient's problems and plans were partially entered by my partner and updated as appropriate by me 04/14/23.     Generalized weakness  Poor living conditions  -  Recent admission 3/11-4/3  -  PT/OT  -  Plan is rehab at [facility], patient medically ready      Meningioma s/p craniotomy with resection.  - Recent admission 3/11-4/3. He underwent right frontal craniotomy on 3/21 + MMA embolization.  - Neurosurgery evaluated this admission. Overall stable post op course.   - Plan was for dexamethasone taper and keppra at DC -- patient had not been taking these medications. They were provided by meds to beds prior to last DC.   - Keppra and Dexamethasone are ordered but patient refusing oral or IV medications. Educated on adverse outcomes by not taking them.     Atrial fibrillation  -Rate controlled. Patient was offered anticoagulation and he declined it multiple times during his last admission. Partner reviewed anticoagulation again this admission. He understands it is for stroke prevention and continues to decline.   -On aspirin plus Plavix and Lopresor but refusing      Right great toe ulcer.  -Appears present during recent admission.  MRI  foot negative for osteo at that time.  -Dr. Flores was following during recent admission.   -WOC     HTN  HLD  -refusing home meds      Hyponatremia  - stable     Hypoalbuminemia  Hypocalcemia  Chronic Malnutrition  -It appears patient has a very poor living condition.  This on top of his inability to ambulate at home likely worsening his nutritional health.    -CM/SW     Hx of alcohol abuse  -Patient fortunately reports that he has not had an alcoholic drink since September 2022.     Postoperative hypothyroidism  - TSH and T4 within limits   - Levothyroxine 125 mcg oral daily (REFUSING)    Hyperkalemia  -Lokelma x1 ordered 4/13 but patient refused. Also refuses lab draw     Difficulty urinating/bladder pain  -Obtain UA    Expected Discharge Location and Transportation:  Rehab  Expected Discharge   Expected Discharge Date and Time     Expected Discharge Date Expected Discharge Time    Apr 14, 2023            DVT prophylaxis:  Mechanical DVT prophylaxis orders are present.     AM-PAC 6 Clicks Score (PT): 20 (04/14/23 0938)    CODE STATUS:   Code Status and Medical Interventions:   Ordered at: 04/1955     Code Status (Patient has no pulse and is not breathing):    CPR (Attempt to Resuscitate)     Medical Interventions (Patient has pulse or is breathing):    Full Support       Tuyet Ventura, DO  04/14/23

## 2023-04-14 NOTE — THERAPY TREATMENT NOTE
Patient Name: Monty Nagel  : 1955    MRN: [id number]                              Today's Date: 2023       Admit Date: 2023    Visit Dx:     ICD-10-CM ICD-9-CM   1. Generalized weakness  R53.1 780.79   2. History of meningioma  Z86.018 V12.41   3. Failure to thrive in adult  R62.7 783.7   4. Hyponatremia  E87.1 276.1   5. Hypoalbuminemia  E88.09 273.8   6. Hypocalcemia  E83.51 275.41   7. Alcohol abuse  F10.10 305.00   8. Rheumatoid vs Psoriatic arthritis (HCC)  L40.50 696.0   9. Meningioma  D32.9 225.2   10. Severe malnutrition  E43 261   11. Atrial fibrillation, unspecified type  I48.91 427.31   12. Balance problem  R26.89 781.99   13. Other recurrent depressive disorders  F33.8 296.99   14. Screen for colon cancer  Z12.11 V76.51   15. Postablative hypothyroidism  E89.0 244.1   16. History of right retinal melanoma with right eye blindness  Z85.820 V10.82   17. Essential hypertension  I10 401.9     Patient Active Problem List   Diagnosis   • Essential hypertension   • History of right retinal melanoma with right eye blindness   • Postablative hypothyroidism   • Screen for colon cancer   • Other recurrent depressive disorders   • Balance problem   • Generalized weakness   • Atrial fibrillation   • Severe malnutrition   • Meningioma   • Rheumatoid vs Psoriatic arthritis (HCC)   • Alcohol abuse   • Hypocalcemia   • Hypoalbuminemia   • Hyponatremia     Past Medical History:   Diagnosis Date   • Arthritis    • Disease of thyroid gland    • Hepatitis A    • Hypertension    • Melanoma 2017    retina     Past Surgical History:   Procedure Laterality Date   • CRANIOTOMY FOR TUMOR N/A 3/21/2023    Procedure: CRANIOTOMY FOR TUMOR STEREOTACTIC WITH STEALTH;  Surgeon: Marlon Mckee MD;  Location: [facility] OR;  Service: Neurosurgery;  Laterality: N/A;   • EMBOLIZATION CEREBRAL N/A 3/20/2023    Procedure: Tumor Emoblization radial access;  Surgeon: Ozzy Sebastian MD;  Location: [facility] CATH INVASIVE  LOCATION;  Service: Interventional Radiology;  Laterality: N/A;   • EYE SURGERY  2017    EYE CANCER RIGHT EYE   • FINGER SURGERY Left     Pointer finger re-attached in 3rd Grade   • TONSILLECTOMY        General Information     Row Name 04/14/23 1432          OT Time and Intention    Document Type therapy note (daily note)  -MR     Mode of Treatment occupational therapy  -MR     Row Name 04/14/23 1432          General Information    Patient Profile Reviewed yes  -MR     Existing Precautions/Restrictions fall;seizures;other (see comments)  s/p craniotomy, R eye visual deficit(wears glasses but does not have at hospital)  -MR     Barriers to Rehab medically complex;visual deficit  -MR     Row Name 04/14/23 1432          Cognition    Orientation Status (Cognition) oriented x 3  -MR     Row Name 04/14/23 1432          Safety Issues, Functional Mobility    Safety Issues Affecting Function (Mobility) awareness of need for assistance;insight into deficits/self-awareness;safety precaution awareness;sequencing abilities;safety precautions follow-through/compliance;positioning of assistive device  -MR     Impairments Affecting Function (Mobility) balance;cognition;endurance/activity tolerance;strength  -MR     Cognitive Impairments, Mobility Safety/Performance awareness, need for assistance;insight into deficits/self-awareness;safety precaution awareness;safety precaution follow-through;sequencing abilities;judgment;problem-solving/reasoning  -MR           User Key  (r) = Recorded By, (t) = Taken By, (c) = Cosigned By    Initials Name Provider Type    MR Amanda Vega, OT Occupational Therapist                 Mobility/ADL's     Row Name 04/14/23 1433          Bed Mobility    Bed Mobility supine-sit  -MR     Supine-Sit [place] (Bed Mobility) standby assist  -MR     Row Name 04/14/23 1433          Transfers    Transfers sit-stand transfer;toilet transfer  -MR     Row Name 04/14/23 1433          Sit-Stand Transfer     Sit-Stand [place] (Transfers) contact guard  -MR     Assistive Device (Sit-Stand Transfers) walker, front-wheeled  -MR     Comment, (Sit-Stand Transfer) v/c for hand placement, sequencing and safety w/ transfers.  -MR     Row Name 04/14/23 1433          Toilet Transfer    Type (Toilet Transfer) stand-sit  -MR     [place] Level (Toilet Transfer) contact guard;verbal cues  -MR     Assistive Device (Toilet Transfer) walker, front-wheeled  -MR     Row Name 04/14/23 1433          Functional Mobility    Functional Mobility- Ind. Level contact guard assist;verbal cues required;nonverbal cues required (demo/gesture)  -MR     Functional Mobility- Device walker, front-wheeled  -MR     Functional Mobility-Distance (Feet) --  > HH Distances  -MR     Row Name 04/14/23 1433          Activities of Daily Living    BADL Assessment/Intervention toileting;upper body dressing;lower body dressing  -MR     Row Name 04/14/23 1433          Lower Body Dressing Assessment/Training    [place] Level (Lower Body Dressing) other (see comments);pants/bottoms;supervision  -MR     Position (Lower Body Dressing) unsupported standing  -MR     Row Name 04/14/23 1433          Toileting Assessment/Training    [place] Level (Toileting) adjust/manage clothing;supervision  -MR     Position (Toileting) unsupported standing  -MR     Row Name 04/14/23 1433          Upper Body Dressing Assessment/Training    [place] Level (Upper Body Dressing) other (see comments)  adjusting front button shirt  -MR     Position (Upper Body Dressing) unsupported standing  -MR           User Key  (r) = Recorded By, (t) = Taken By, (c) = Cosigned By    Initials Name Provider Type    MR Amanda Vega OT Occupational Therapist               Obj/Interventions     Row Name 04/14/23 1435          Shoulder (Therapeutic Exercise)    Shoulder (Therapeutic Exercise) strengthening exercise  -MR     Shoulder Strengthening (Therapeutic Exercise) bilateral;other  (see comments);green;20 repititions;sitting  resisted rowing  -MR     Row Name 04/14/23 1435          Elbow/Forearm (Therapeutic Exercise)    Elbow/Forearm (Therapeutic Exercise) AROM (active range of motion)  -MR     Elbow/Forearm AROM (Therapeutic Exercise) bilateral;flexion;extension;20 repititions  -MR     Row Name 04/14/23 1435          Motor Skills    Therapeutic Exercise shoulder;elbow/forearm  -MR     Row Name 04/14/23 1435          Balance    Balance Assessment sitting static balance;sitting dynamic balance;standing static balance;standing dynamic balance  -MR     Static Sitting Balance standby assist  -MR     Dynamic Sitting Balance standby assist  -MR     Position, Sitting Balance unsupported;sitting edge of bed;other (see comments)  commode and on couch  -MR     Static Standing Balance contact guard  -MR     Dynamic Standing Balance contact guard  -MR     Position/Device Used, Standing Balance supported;walker, rolling  -MR     Balance Interventions sitting;standing;sit to stand;supported;static;dynamic;occupation based/functional task  -MR     Comment, Balance Pt demonstrating good effort w/ dynamic balance tasks, continues to require v/c for hand placement, sequencing and safety.  -MR           User Key  (r) = Recorded By, (t) = Taken By, (c) = Cosigned By    Initials Name Provider Type    MR Amanda Vega, OT Occupational Therapist               Goals/Plan    No documentation.                Clinical Impression     Row Name 04/14/23 1441          Pain Assessment    Pretreatment Pain Rating 0/10 - no pain  -MR     Posttreatment Pain Rating 0/10 - no pain  -MR     Pain Intervention(s) Ambulation/increased activity;Repositioned  -MR     Row Name 04/14/23 1441          Plan of Care Review    Plan of Care Reviewed With patient  -MR     Progress improving  -MR     Outcome Evaluation Pt continues to demonstrate functional improvements w/ dynamic tasks. Pt continues to require v/c for safety, sequencing and  occasional impulsiveness. Good effort noted w/ BUE strengthening and dynamic balance tasks. Continue to progress as able per current POC. Recommend SNF at d/c for best functional outcome and pt's safety.  -MR     Row Name 04/14/23 1441          Therapy Plan Review/Discharge Plan (OT)    Anticipated Discharge Disposition (OT) skilled nursing facility  -MR     Row Name 04/14/23 1441          Vital Signs    Pre Systolic BP Rehab 130  -MR     Pre Treatment Diastolic BP 99  -MR     Pretreatment Heart Rate (beats/min) 99  -MR     Intratreatment Heart Rate (beats/min) 199  -MR     Posttreatment Heart Rate (beats/min) 90  -MR     O2 Delivery Pre Treatment room air  -MR     O2 Delivery Intra Treatment room air  -MR     O2 Delivery Post Treatment room air  -MR     Pre Patient Position Supine  -MR     Intra Patient Position Standing  -MR     Post Patient Position Sitting  -MR     Row Name 04/14/23 1441          Positioning and Restraints    Pre-Treatment Position in bed  -MR     Post Treatment Position bathroom  -MR     Bathroom sitting;with nsg  Pt left sitting on the toilet w/ RN present.  -MR           User Key  (r) = Recorded By, (t) = Taken By, (c) = Cosigned By    Initials Name Provider Type    MR Amanda Vega, OT Occupational Therapist               Outcome Measures     Row Name 04/14/23 1447          How much help from another is currently needed...    Putting on and taking off regular lower body clothing? 3  -MR     Bathing (including washing, rinsing, and drying) 3  -MR     Toileting (which includes using toilet bed pan or urinal) 4  -MR     Putting on and taking off regular upper body clothing 3  -MR     Taking care of personal grooming (such as brushing teeth) 4  -MR     Eating meals 4  -MR     AM-PAC 6 Clicks Score (OT) 21  -MR     Row Name 04/14/23 0938 04/14/23 0800       How much help from another person do you currently need...    Turning from your back to your side while in flat bed without using bedrails?  4  -AS 4  -NR    Moving from lying on back to sitting on the side of a flat bed without bedrails? 4  -AS 3  -NR    Moving to and from a bed to a chair (including a wheelchair)? 3  -AS 3  -NR    Standing up from a chair using your arms (e.g., wheelchair, bedside chair)? 3  -AS 3  -NR    Climbing 3-5 steps with a railing? 3  -AS 3  -NR    To walk in hospital room? 3  -AS 3  -NR    AM-PAC 6 Clicks Score (PT) 20  -AS 19  -NR    Highest level of mobility 6 --> Walked 10 steps or more  -AS 6 --> Walked 10 steps or more  -NR    Row Name 04/14/23 1447 04/14/23 0938       Functional Assessment    Outcome Measure Options AM-PAC 6 Clicks Daily Activity (OT)  -MR AM-PAC 6 Clicks Basic Mobility (PT)  -AS          User Key  (r) = Recorded By, (t) = Taken By, (c) = Cosigned By    Initials Name Provider Type    AS Sandee Wilkerson PTA Physical Therapist Assistant    Amanda Nielson, OT Occupational Therapist    NR El Burden, RN Registered Nurse                Occupational Therapy Education     Title: PT OT SLP Therapies (In Progress)     Topic: Occupational Therapy (Done)     Point: ADL training (Done)     Description:   Instruct learner(s) on proper safety adaptation and remediation techniques during self care or transfers.   Instruct in proper use of assistive devices.              Learning Progress Summary           Patient Acceptance, E, VU by MR at 4/14/2023 1449    Acceptance, E, VU,NR by MR at 4/12/2023 1427    Acceptance, E, VU,NR by MR at 4/6/2023 1143                   Point: Home exercise program (Done)     Description:   Instruct learner(s) on appropriate technique for monitoring, assisting and/or progressing therapeutic exercises/activities.              Learning Progress Summary           Patient Acceptance, E, VU by MR at 4/14/2023 1449    Acceptance, E, VU,NR by MR at 4/12/2023 1427    Acceptance, E, VU,NR by MR at 4/6/2023 1143                   Point: Precautions (Done)     Description:   Instruct  learner(s) on prescribed precautions during self-care and functional transfers.              Learning Progress Summary           Patient Acceptance, E, VU by MR at 4/14/2023 1449    Acceptance, E, VU,NR by MR at 4/12/2023 1427    Acceptance, E, VU,NR by MR at 4/6/2023 1143                   Point: Body mechanics (Done)     Description:   Instruct learner(s) on proper positioning and spine alignment during self-care, functional mobility activities and/or exercises.              Learning Progress Summary           Patient Acceptance, E, VU by MR at 4/14/2023 1449    Acceptance, E, VU,NR by MR at 4/12/2023 1427    Acceptance, E, VU,NR by MR at 4/6/2023 1143                               User Key     Initials Effective Dates Name Provider Type Discipline    MR 09/22/22 -  Amanda Vega OT Occupational Therapist OT              OT Recommendation and Plan  Planned Therapy Interventions (OT): activity tolerance training, adaptive equipment training, BADL retraining, functional balance retraining, IADL retraining, occupation/activity based interventions, patient/caregiver education/training, transfer/mobility retraining, strengthening exercise, ROM/therapeutic exercise, cognitive/visual perception retraining  Therapy Frequency (OT): daily  Plan of Care Review  Plan of Care Reviewed With: patient  Progress: improving  Outcome Evaluation: Pt continues to demonstrate functional improvements w/ dynamic tasks. Pt continues to require v/c for safety, sequencing and occasional impulsiveness. Good effort noted w/ BUE strengthening and dynamic balance tasks. Continue to progress as able per current POC. Recommend SNF at d/c for best functional outcome and pt's safety.     Time Calculation:    Time Calculation- OT     Row Name 04/14/23 1449 04/14/23 0938          Time Calculation- OT    OT Start Time 1327  -MR --     OT Received On 04/14/23  -MR --        Timed Charges    52933 - OT Therapeutic Exercise Minutes 10  -MR --     88547  - Gait Training Minutes  -- 14  -AS     79376 - OT Therapeutic Activity Minutes 13  -MR --     78886 - OT Self Care/Mgmt Minutes 15  -MR --        Total Minutes    Timed Charges Total Minutes 38  -MR 14  -AS      Total Minutes 38  -MR 14  -AS           User Key  (r) = Recorded By, (t) = Taken By, (c) = Cosigned By    Initials Name Provider Type    AS Sandee Wilkerson PTA Physical Therapist Assistant    MR Amanda Vega OT Occupational Therapist              Therapy Charges for Today     Code Description Service Date Service Provider Modifiers Qty    29317193470 HC OT THER PROC EA 15 MIN 4/14/2023 Amanda Vega OT GO 1    09878287552 HC OT THERAPEUTIC ACT EA 15 MIN 4/14/2023 Amanda Vega OT GO 1    19406732593 HC OT SELF CARE/MGMT/TRAIN EA 15 MIN 4/14/2023 Amanda Vega OT GO 1               Amanda Vega OT  4/14/2023

## 2023-04-14 NOTE — PLAN OF CARE
Goal Outcome Evaluation:  Plan of Care Reviewed With: patient        Progress: improving  Outcome Evaluation: patient ambulated 120 feet with CGA and step through gait pattern, rolling walker for support. Verbal cues for improved posture and keeping walker close. No LOB or unsteadiness noticed, Patient with c/o weakness at end of walk. Recommend SNF at d/c fot best functional outcome.

## 2023-04-15 PROCEDURE — G0378 HOSPITAL OBSERVATION PER HR: HCPCS

## 2023-04-15 PROCEDURE — 99232 SBSQ HOSP IP/OBS MODERATE 35: CPT | Performed by: NURSE PRACTITIONER

## 2023-04-15 NOTE — PROGRESS NOTES
[facility] Medicine Services  PROGRESS NOTE    Patient Name: Monty Nagel  : 1955  MRN: [id number]    Date of Admission: 2023  Primary Care Physician: Tiffany Morales MD    Subjective   Subjective     CC:  F/u generalized weakness    HPI:  Patient resting in bed. Denies concerns. He agrees to take his levothyroxine now but does not want to take other meds. Discussed again the importance of anticoagulation and rate control for a-fib. Not on tele monitor now. Denies seizure history. Says urinary symptoms have improved. UA normal.    ROS:  Gen- No fevers, chills  CV- No chest pain, palpitations  Resp- No cough, dyspnea  GI- No N/V/D, abd pain    Objective   Objective     Vital Signs:   Temp:  [97.7 °F (36.5 °C)-99 °F (37.2 °C)] 98.4 °F (36.9 °C)  Heart Rate:  [74-95] 79  Resp:  [18] 18  BP: (104-135)/(70-95) 104/76     Physical Exam:  Constitutional: No acute distress, awake, alert  HENT: NCAT, mucous membranes moist  Respiratory: Clear to auscultation bilaterally, respiratory effort normal   Cardiovascular: RRR, no murmurs, rubs, or gallops  Gastrointestinal: Positive bowel sounds, soft, nontender, nondistended  Musculoskeletal: No bilateral ankle edema  Psychiatric: Appropriate affect, cooperative  Neurologic: Oriented x 3, moves all extremities, Cranial Nerves grossly intact to confrontation, speech clear  Skin: No rashes      Results Reviewed:  LAB RESULTS:      Lab 23  0623 23   WBC 8.54 5.72   HEMOGLOBIN 11.8* 11.7*   HEMATOCRIT 35.9* 36.0*   PLATELETS 255 223   NEUTROS ABS  --  4.26   IMMATURE GRANS (ABS)  --  0.13*   LYMPHS ABS  --  0.91   MONOS ABS  --  0.28   EOS ABS  --  0.12   MCV 92.1 91.8         Lab 23  0623 23   SODIUM 134* 135*   POTASSIUM 5.3* 4.2   CHLORIDE 98 99   CO2 29.0 30.0*   ANION GAP 7.0 6.0   BUN 19 19   CREATININE 0.66* 0.74*   EGFR 100.9 98.1   GLUCOSE 83 89   CALCIUM 8.3* 8.0*         Lab 23  0623   TOTAL  PROTEIN 5.4*   ALBUMIN 3.3*   GLOBULIN 2.1   ALT (SGPT) 27   AST (SGOT) 13   BILIRUBIN 0.6   ALK PHOS 86                     Brief Urine Lab Results  (Last result in the past 365 days)      Color   Clarity   Blood   Leuk Est   Nitrite   Protein   CREAT   Urine HCG        04/14/23 1021 Yellow   Clear   Negative   Negative   Negative   Negative                 Microbiology Results Abnormal     None          No radiology results from the last 24 hrs    Results for orders placed during the hospital encounter of 03/11/23    Adult Transthoracic Echo Complete w/ Color, Spectral and Contrast if necessary per protocol    Interpretation Summary  •  Left ventricular systolic function is normal. Estimated left ventricular EF = 55%  •  Biatrial enlargement.  •  Calcified aortic valve with mild aortic stenosis, mean gradient 10 mmHg, BANG 1.6 cm².  •  Moderate aortic insufficiency.  •  Mild mitral regurgitation.  •  Mild tricuspid regurgitation with normal RVSP.  •  Mild dilation of the ascending aorta (4.2 cm) is present.      Current medications:  Scheduled Meds:aspirin, 81 mg, Oral, Daily  atorvastatin, 40 mg, Oral, Nightly  clopidogrel, 75 mg, Oral, Daily  dexamethasone, 2 mg, Oral, Q12H  lisinopril, 10 mg, Oral, Q24H   And  hydroCHLOROthiazide, 12.5 mg, Oral, Q24H  levETIRAcetam, 1,000 mg, Oral, Q12H  levothyroxine, 125 mcg, Oral, Q AM  metoprolol tartrate, 25 mg, Oral, Q12H  pantoprazole, 40 mg, Oral, Q AM  QUEtiapine, 25 mg, Oral, Nightly  sodium zirconium cyclosilicate, 10 g, Oral, Once      Continuous Infusions:   PRN Meds:.•  acetaminophen  •  melatonin  •  ondansetron **OR** ondansetron  •  sodium chloride    Assessment & Plan   Assessment & Plan     Active Hospital Problems    Diagnosis  POA   • **Generalized weakness [R53.1]  Yes   • Alcohol abuse [F10.10]  Yes   • Hypocalcemia [E83.51]  Yes   • Hypoalbuminemia [E88.09]  Yes   • Hyponatremia [E87.1]  Yes   • Rheumatoid vs Psoriatic arthritis (HCC) [L40.50]  Yes   •  Severe malnutrition [E43]  Yes   • Atrial fibrillation [I48.91]  Yes   • Meningioma [D32.9]  Yes   • Postablative 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        Brief Hospital Course to date:  Monty Nagel is a 68 y.o. male  [facility] meningioma s/p craniotomy and resection who presented back to ED day after discharge, 4/4/23, for being unable to care for himself at home as well as poor living conditions. Patient has addition PMH significant for a-fib (has repeatedly declined anticoagulation), alcohol abuse, HTN, and Hx Graves disease s/p thyroidectomy with post-surgical hypothyroidism.     This patient's problems and plans were partially entered by my partner and updated as appropriate by me 04/15/23.     Generalized weakness  Poor living conditions  -  PT/OT following  -  Case management following  -  Plan is rehab at [facility], patient medically ready      Meningioma s/p craniotomy with resection.  - Recent admission 3/11-4/3. He underwent right frontal craniotomy on 3/21 + MMA embolization.  - Neurosurgery evaluated this admission. Overall stable post op course.   - Plan was for dexamethasone taper and keppra at DC, but patient had not been taking these medications. They were provided by meds to beds prior to last DC.   - Keppra and Dexamethasone are ordered but patient is refusing oral or IV medications. Educated on adverse outcomes by not taking them.     Atrial fibrillation  -Rate controlled. Patient was offered anticoagulation and he declined it multiple times during his last admission. Partner reviewed anticoagulation again this admission. He understands it is for stroke prevention and continues to decline.   -On aspirin plus Plavix and Lopressor but refusing      Right great toe ulcer  -Was present during recent admission.  MRI foot negative for osteo at that time.  -Dr. Flores was  following during recent admission.   -WOC following     HTN  HLD  -refusing home meds      Hyponatremia  - stable      Hypoalbuminemia  Hypocalcemia  Chronic Malnutrition  -It appears patient has a very poor living condition.  This on top of his inability to ambulate at home is likely worsening his nutritional health.    -CM/SW     Hx of alcohol abuse  -Patient fortunately reports that he has not had an alcoholic drink since September 2022.     Postoperative hypothyroidism  - TSH and T4 within limits   - Levothyroxine 125 mcg oral daily, agrees to take it today     Hyperkalemia  -Lokelma x1 ordered 4/13 but patient refused. Also refusing lab draws     Difficulty urinating/bladder pain  -UA 4/14 unremarkable    Expected Discharge Location and Transportation: [facility], medical van  Expected Discharge pending insurance approval  Expected Discharge Date and Time     Expected Discharge Date Expected Discharge Time    Apr 17, 2023            DVT prophylaxis:  Mechanical DVT prophylaxis orders are present.     AM-PAC 6 Clicks Score (PT): 20 (04/14/23 0938)    CODE STATUS:   Code Status and Medical Interventions:   Ordered at: 04/1955     Code Status (Patient has no pulse and is not breathing):    CPR (Attempt to Resuscitate)     Medical Interventions (Patient has pulse or is breathing):    Full Support       Maritza Hsu, APRN  04/15/23

## 2023-04-16 PROCEDURE — 97116 GAIT TRAINING THERAPY: CPT

## 2023-04-16 PROCEDURE — G0378 HOSPITAL OBSERVATION PER HR: HCPCS

## 2023-04-16 PROCEDURE — 97110 THERAPEUTIC EXERCISES: CPT

## 2023-04-16 PROCEDURE — 99232 SBSQ HOSP IP/OBS MODERATE 35: CPT | Performed by: NURSE PRACTITIONER

## 2023-04-16 RX ADMIN — LEVOTHYROXINE SODIUM 125 MCG: 125 TABLET ORAL at 05:26

## 2023-04-16 NOTE — PLAN OF CARE
Patient continues to refuse medication.  VSS.  Ambulates frequently with SBA, gait belt and straight cane.  Answers all orientation questions correctly.

## 2023-04-16 NOTE — THERAPY PROGRESS REPORT/RE-CERT
Patient Name: Monty Nagel  : 1955    MRN: [id number]                              Today's Date: 2023       Admit Date: 2023    Visit Dx:     ICD-10-CM ICD-9-CM   1. Generalized weakness  R53.1 780.79   2. History of meningioma  Z86.018 V12.41   3. Failure to thrive in adult  R62.7 783.7   4. Hyponatremia  E87.1 276.1   5. Hypoalbuminemia  E88.09 273.8   6. Hypocalcemia  E83.51 275.41   7. Alcohol abuse  F10.10 305.00   8. Rheumatoid vs Psoriatic arthritis (HCC)  L40.50 696.0   9. Meningioma  D32.9 225.2   10. Severe malnutrition  E43 261   11. Atrial fibrillation, unspecified type  I48.91 427.31   12. Balance problem  R26.89 781.99   13. Other recurrent depressive disorders  F33.8 296.99   14. Screen for colon cancer  Z12.11 V76.51   15. Postablative hypothyroidism  E89.0 244.1   16. History of right retinal melanoma with right eye blindness  Z85.820 V10.82   17. Essential hypertension  I10 401.9     Patient Active Problem List   Diagnosis   • Essential hypertension   • History of right retinal melanoma with right eye blindness   • Postablative hypothyroidism   • Screen for colon cancer   • Other recurrent depressive disorders   • Balance problem   • Generalized weakness   • Atrial fibrillation   • Severe malnutrition   • Meningioma   • Rheumatoid vs Psoriatic arthritis (HCC)   • Alcohol abuse   • Hypocalcemia   • Hypoalbuminemia   • Hyponatremia     Past Medical History:   Diagnosis Date   • Arthritis    • Disease of thyroid gland    • Hepatitis A    • Hypertension    • Melanoma 2017    retina     Past Surgical History:   Procedure Laterality Date   • CRANIOTOMY FOR TUMOR N/A 3/21/2023    Procedure: CRANIOTOMY FOR TUMOR STEREOTACTIC WITH STEALTH;  Surgeon: Marlon Mckee MD;  Location: [facility] OR;  Service: Neurosurgery;  Laterality: N/A;   • EMBOLIZATION CEREBRAL N/A 3/20/2023    Procedure: Tumor Emoblization radial access;  Surgeon: Ozzy Sebastian MD;  Location: [facility] CATH INVASIVE  LOCATION;  Service: Interventional Radiology;  Laterality: N/A;   • EYE SURGERY  2017    EYE CANCER RIGHT EYE   • FINGER SURGERY Left     Pointer finger re-attached in 3rd Grade   • TONSILLECTOMY        General Information     Row Name 04/16/23 1522          Physical Therapy Time and Intention    Document Type therapy note (daily note)  -AB     Mode of Treatment physical therapy  -AB     Row Name 04/16/23 1522          General Information    Patient Profile Reviewed yes  -AB     Existing Precautions/Restrictions fall;seizures;other (see comments)  s/p craniotomy, R eye visual deficit(wears glasses but does not have at hospital)  -AB     Barriers to Rehab medically complex;visual deficit  -AB     Row Name 04/16/23 1522          Living Environment    People in Home alone  -AB     Row Name 04/16/23 1522          Cognition    Orientation Status (Cognition) oriented x 3  -AB     Row Name 04/16/23 1522          Safety Issues, Functional Mobility    Safety Issues Affecting Function (Mobility) awareness of need for assistance;insight into deficits/self-awareness;safety precaution awareness;safety precautions follow-through/compliance;positioning of assistive device;sequencing abilities;impulsivity  -AB     Impairments Affecting Function (Mobility) balance;cognition;endurance/activity tolerance;strength  -AB     Cognitive Impairments, Mobility Safety/Performance awareness, need for assistance;impulsivity;insight into deficits/self-awareness;safety precaution awareness;safety precaution follow-through;sequencing abilities  -AB     Comment, Safety Issues/Impairments (Mobility) Pt following commands but impulsive with mobility.  -AB           User Key  (r) = Recorded By, (t) = Taken By, (c) = Cosigned By    Initials Name Provider Type    AB Sandee Easton, PT Physical Therapist               Mobility     Row Name 04/16/23 1523          Bed Mobility    Bed Mobility supine-sit;sit-supine  -AB     Supine-Sit [place] (Bed  Mobility) standby assist  -AB     Sit-Supine [place] (Bed Mobility) standby assist  -AB     Assistive Device (Bed Mobility) head of bed elevated;bed rails  -AB     Comment, (Bed Mobility) Cues to wait for PT.  -AB     Row Name 04/16/23 1523          Transfers    Comment, (Transfers) Cues for hand placement, sequencing, and safety awareness.  -AB     Row Name 04/16/23 1523          Bed-Chair Transfer    Bed-Chair [place] (Transfers) not tested  -AB     Row Name 04/16/23 1523          Sit-Stand Transfer    Sit-Stand [place] (Transfers) contact guard  -AB     Assistive Device (Sit-Stand Transfers) cane, straight  -AB     Row Name 04/16/23 1523          Gait/Stairs (Locomotion)    [place] Level (Gait) verbal cues;contact guard;1 person assist  -AB     Assistive Device (Gait) cane, straight  -AB     Distance in Feet (Gait) 100'+50'  -AB     Deviations/Abnormal Patterns (Gait) base of support, wide;weight shifting decreased;stride length decreased;gait speed decreased;steppage  -AB     Bilateral Gait Deviations forward flexed posture;heel strike decreased  -AB     [place] Level (Stairs) unable to assess  -AB     Comment, (Gait/Stairs) Pt demo's step through gait pattern with decreased stride length and forward flexed posture. Cues for sequencing with cane and improved posture. No LOB or knee buckling. Further gait limited by fatigue.  -AB           User Key  (r) = Recorded By, (t) = Taken By, (c) = Cosigned By    Initials Name Provider Type    AB Sandee Easton, PT Physical Therapist               Obj/Interventions     Row Name 04/16/23 1528          Motor Skills    Therapeutic Exercise hip;knee;ankle  -AB     Row Name 04/16/23 1528          Hip (Therapeutic Exercise)    Hip (Therapeutic Exercise) strengthening exercise;isometric exercises  -AB     Hip Isometrics (Therapeutic Exercise) bilateral;10 repetitions  -AB     Hip Strengthening (Therapeutic Exercise) bilateral;aBduction;aDduction;10  repetitions  -AB     Row Name 04/16/23 1528          Knee (Therapeutic Exercise)    Knee (Therapeutic Exercise) strengthening exercise  -AB     Knee Strengthening (Therapeutic Exercise) bilateral;SLR (straight leg raise);heel slides;10 repetitions  -AB     Row Name 04/16/23 1528          Ankle (Therapeutic Exercise)    Ankle (Therapeutic Exercise) AROM (active range of motion)  -AB     Ankle AROM (Therapeutic Exercise) bilateral;dorsiflexion;plantarflexion;10 repetitions  -AB     Row Name 04/16/23 1528          Balance    Balance Assessment sitting static balance;sitting dynamic balance;standing static balance;standing dynamic balance  -AB     Static Sitting Balance standby assist  -AB     Dynamic Sitting Balance standby assist  -AB     Position, Sitting Balance unsupported;sitting edge of bed  -AB     Static Standing Balance contact guard;1-person assist  -AB     Dynamic Standing Balance contact guard;1-person assist;verbal cues  -AB     Position/Device Used, Standing Balance supported;cane, straight  -AB     Balance Interventions sitting;sit to stand;standing;static;dynamic;supported  -AB     Comment, Balance No LOB.  -AB           User Key  (r) = Recorded By, (t) = Taken By, (c) = Cosigned By    Initials Name Provider Type    AB Sandee Easton, PT Physical Therapist               Goals/Plan     Row Name 04/16/23 1534          Bed Mobility Goal 1 (PT)    Activity/Assistive Device (Bed Mobility Goal 1, PT) sit to supine/supine to sit  -AB     [place] Level/Cues Needed (Bed Mobility Goal 1, PT) independent  -AB     Time Frame (Bed Mobility Goal 1, PT) long term goal (LTG);10 days  -AB     Progress/Outcomes (Bed Mobility Goal 1, PT) goal revised this date  -AB     Row Name 04/16/23 1534          Transfer Goal 1 (PT)    Activity/Assistive Device (Transfer Goal 1, PT) sit-to-stand/stand-to-sit;cane, straight  -AB     [place] Level/Cues Needed (Transfer Goal 1, PT) modified independence  -AB     Time Frame  (Transfer Goal 1, PT) long term goal (LTG);10 days  -AB     Progress/Outcome (Transfer Goal 1, PT) goal revised this date  -AB     Row Name 04/16/23 1534          Gait Training Goal 1 (PT)    Activity/Assistive Device (Gait Training Goal 1, PT) gait (walking locomotion)  -AB     [place] Level (Gait Training Goal 1, PT) modified independence  -AB     Distance (Gait Training Goal 1, PT) 150  -AB     Time Frame (Gait Training Goal 1, PT) long term goal (LTG);10 days  -AB     Progress/Outcome (Gait Training Goal 1, PT) goal revised this date  -AB     Row Name 04/16/23 1534          Therapy Assessment/Plan (PT)    Planned Therapy Interventions (PT) bed mobility training;balance training;gait training;home exercise program;postural re-education;patient/family education;strengthening;stretching;transfer training;neuromuscular re-education  -AB           User Key  (r) = Recorded By, (t) = Taken By, (c) = Cosigned By    Initials Name Provider Type    AB Sandee Easton, PT Physical Therapist               Clinical Impression     Row Name 04/16/23 1529          Pain    Pretreatment Pain Rating 0/10 - no pain  -AB     Posttreatment Pain Rating 0/10 - no pain  -AB     Row Name 04/16/23 1529          Plan of Care Review    Plan of Care Reviewed With patient  -AB     Progress improving  -AB     Outcome Evaluation PT re-cert completed. Pt is impulsive with all movements and requires max cues for safety awareness. Ambulatory distance improved this session with no LOB or knee buckling. HEP completed. Pt continues to be limited by weakness, impaired cognition, poor safety awarness, and decreased balance causing functional mobility below baseline. Goals revised to reflect pts improved status. Pt will benefit from further IPPT for addressing deficits. PT rec d/c to SNF.  -AB     Row Name 04/16/23 1529          Vital Signs    Pre Systolic BP Rehab 95  -AB     Pre Treatment Diastolic BP 68  -AB     Post Systolic BP Rehab 138  -AB      Post Treatment Diastolic BP 81  -AB     O2 Delivery Pre Treatment room air  -AB     O2 Delivery Intra Treatment room air  -AB     O2 Delivery Post Treatment room air  -AB     Pre Patient Position Supine  -AB     Intra Patient Position Standing  -AB     Post Patient Position Supine  -AB     Row Name 04/16/23 1529          Positioning and Restraints    Pre-Treatment Position in bed  -AB     Post Treatment Position bed  -AB     In Bed notified nsg;supine;call light within reach;encouraged to call for assist;exit alarm on  -AB           User Key  (r) = Recorded By, (t) = Taken By, (c) = Cosigned By    Initials Name Provider Type    Sandee Obando, PT Physical Therapist               Outcome Measures     Row Name 04/16/23 1535          How much help from another person do you currently need...    Turning from your back to your side while in flat bed without using bedrails? 4  -AB     Moving from lying on back to sitting on the side of a flat bed without bedrails? 4  -AB     Moving to and from a bed to a chair (including a wheelchair)? 3  -AB     Standing up from a chair using your arms (e.g., wheelchair, bedside chair)? 3  -AB     Climbing 3-5 steps with a railing? 3  -AB     To walk in hospital room? 3  -AB     AM-PAC 6 Clicks Score (PT) 20  -AB     Highest level of mobility 6 --> Walked 10 steps or more  -AB           User Key  (r) = Recorded By, (t) = Taken By, (c) = Cosigned By    Initials Name Provider Type    Sandee Obando, PT Physical Therapist                             Physical Therapy Education     Title: PT OT SLP Therapies (In Progress)     Topic: Physical Therapy (In Progress)     Point: Mobility training (In Progress)     Learning Progress Summary           Patient Acceptance, E,D, NR by AB at 4/16/2023 1536    Acceptance, E, NR by AS at 4/14/2023 0938    Acceptance, E, VU by FW at 4/6/2023 1048    Acceptance, E,D, VU,NR by HP at 4/5/2023 1056                   Point: Home exercise program (In  Progress)     Learning Progress Summary           Patient Acceptance, E,D, NR by AB at 4/16/2023 1536    Acceptance, E, NR by AS at 4/14/2023 0938    Acceptance, E, VU by FW at 4/6/2023 1048    Acceptance, E,D, VU,NR by  at 4/5/2023 1056                   Point: Body mechanics (In Progress)     Learning Progress Summary           Patient Acceptance, E,D, NR by AB at 4/16/2023 1536    Acceptance, E, NR by AS at 4/14/2023 0938    Acceptance, E, VU by FW at 4/6/2023 1048    Acceptance, E,D, VU,NR by HP at 4/5/2023 1056                   Point: Precautions (In Progress)     Learning Progress Summary           Patient Acceptance, E,D, NR by AB at 4/16/2023 1536    Acceptance, E, NR by AS at 4/14/2023 0938    Acceptance, E, VU by FW at 4/6/2023 1048    Acceptance, E,D, VU,NR by  at 4/5/2023 1056                               User Key     Initials Effective Dates Name Provider Type Discipline    AS 02/03/23 -  Sandee Wilkerson, PTA Physical Therapist Assistant PT    FW 05/05/22 -  Bayron Lea, PT Physical Therapist PT     06/01/21 -  Kelly Mckeon, PT Physical Therapist PT    AB 09/22/22 -  Sandee Easton, PT Physical Therapist PT              PT Recommendation and Plan  Planned Therapy Interventions (PT): bed mobility training, balance training, gait training, home exercise program, postural re-education, patient/family education, strengthening, stretching, transfer training, neuromuscular re-education  Plan of Care Reviewed With: patient  Progress: improving  Outcome Evaluation: PT re-cert completed. Pt is impulsive with all movements and requires max cues for safety awareness. Ambulatory distance improved this session with no LOB or knee buckling. HEP completed. Pt continues to be limited by weakness, impaired cognition, poor safety awarness, and decreased balance causing functional mobility below baseline. Goals revised to reflect pts improved status. Pt will benefit from further IPPT for addressing  deficits. PT rec d/c to SNF.     Time Calculation:    PT Charges     Row Name 04/16/23 1536             Time Calculation    Start Time 1426  -AB      PT Received On 04/16/23  -AB      PT Goal Re-Cert Due Date 04/26/23  -AB         Timed Charges    79694 - PT Therapeutic Exercise Minutes 10  -AB      29806 - Gait Training Minutes  14  -AB         Total Minutes    Timed Charges Total Minutes 24  -AB       Total Minutes 24  -AB            User Key  (r) = Recorded By, (t) = Taken By, (c) = Cosigned By    Initials Name Provider Type    AB Sandee Easton, PT Physical Therapist              Therapy Charges for Today     Code Description Service Date Service Provider Modifiers Qty    34171001168 HC PT THER PROC EA 15 MIN 4/16/2023 Sandee Easton, PT GP 1    89400054208 HC GAIT TRAINING EA 15 MIN 4/16/2023 Sandee Easton, PT GP 1          PT G-Codes  Outcome Measure Options: AM-PAC 6 Clicks Daily Activity (OT)  AM-PAC 6 Clicks Score (PT): 20  AM-PAC 6 Clicks Score (OT): 21  TUG Test 1: 19 seconds  TUG Test 2: 19.4 seconds       Sandee Easton PT  4/16/2023

## 2023-04-16 NOTE — PLAN OF CARE
Goal Outcome Evaluation:  Plan of Care Reviewed With: patient        Progress: improving  Outcome Evaluation: PT re-cert completed. Pt is impulsive with all movements and requires max cues for safety awareness. Ambulatory distance improved this session with no LOB or knee buckling. HEP completed. Pt continues to be limited by weakness, impaired cognition, poor safety awarness, and decreased balance causing functional mobility below baseline. Goals revised to reflect pts improved status. Pt will benefit from further IPPT for addressing deficits. PT rec d/c to SNF.

## 2023-04-16 NOTE — PROGRESS NOTES
[facility] Medicine Services  PROGRESS NOTE    Patient Name: Monty Nagel  : 1955  MRN: [id number]    Date of Admission: 2023  Primary Care Physician: Tiffany Morales MD    Subjective   Subjective     CC:  F/u generalized weakness    HPI:   Patient resting in bed.  Again denies concerns.  States he took his levothyroxine this morning.  NAD.    ROS:   Gen- No fevers, chills  CV- No chest pain, palpitations  Resp- No cough, dyspnea  GI- No N/V/D, abd pain    Objective   Objective     Vital Signs:   Temp:  [97.9 °F (36.6 °C)-98.5 °F (36.9 °C)] 97.9 °F (36.6 °C)  Heart Rate:  [79-85] 80  Resp:  [18] 18  BP: (104-120)/(75-82) 110/75     Physical Exam:   Constitutional: No acute distress, awake, alert  HENT: NCAT, mucous membranes moist, blind right eye  Respiratory: Clear to auscultation bilaterally, respiratory effort normal   Cardiovascular: RRR, no murmurs, rubs, or gallops  Gastrointestinal: Positive bowel sounds, soft, nontender, nondistended  Musculoskeletal: No bilateral ankle edema  Psychiatric: Appropriate affect, cooperative  Neurologic: Oriented x 3, moves all extremities, Cranial Nerves grossly intact to confrontation, speech clear  Skin: No rashes      Results Reviewed:  LAB RESULTS:      Lab 23  0623   WBC 8.54   HEMOGLOBIN 11.8*   HEMATOCRIT 35.9*   PLATELETS 255   MCV 92.1         Lab 23  0623   SODIUM 134*   POTASSIUM 5.3*   CHLORIDE 98   CO2 29.0   ANION GAP 7.0   BUN 19   CREATININE 0.66*   EGFR 100.9   GLUCOSE 83   CALCIUM 8.3*         Lab 23  0623   TOTAL PROTEIN 5.4*   ALBUMIN 3.3*   GLOBULIN 2.1   ALT (SGPT) 27   AST (SGOT) 13   BILIRUBIN 0.6   ALK PHOS 86                     Brief Urine Lab Results  (Last result in the past 365 days)      Color   Clarity   Blood   Leuk Est   Nitrite   Protein   CREAT   Urine HCG        23 1021 Yellow   Clear   Negative   Negative   Negative   Negative                 Microbiology Results Abnormal      None          No radiology results from the last 24 hrs    Results for orders placed during the hospital encounter of 03/11/23    Adult Transthoracic Echo Complete w/ Color, Spectral and Contrast if necessary per protocol    Interpretation Summary  •  Left ventricular systolic function is normal. Estimated left ventricular EF = 55%  •  Biatrial enlargement.  •  Calcified aortic valve with mild aortic stenosis, mean gradient 10 mmHg, BANG 1.6 cm².  •  Moderate aortic insufficiency.  •  Mild mitral regurgitation.  •  Mild tricuspid regurgitation with normal RVSP.  •  Mild dilation of the ascending aorta (4.2 cm) is present.      Current medications:  Scheduled Meds:aspirin, 81 mg, Oral, Daily  atorvastatin, 40 mg, Oral, Nightly  clopidogrel, 75 mg, Oral, Daily  dexamethasone, 2 mg, Oral, Q12H  lisinopril, 10 mg, Oral, Q24H   And  hydroCHLOROthiazide, 12.5 mg, Oral, Q24H  levETIRAcetam, 1,000 mg, Oral, Q12H  levothyroxine, 125 mcg, Oral, Q AM  metoprolol tartrate, 25 mg, Oral, Q12H  pantoprazole, 40 mg, Oral, Q AM  QUEtiapine, 25 mg, Oral, Nightly  sodium zirconium cyclosilicate, 10 g, Oral, Once      Continuous Infusions:   PRN Meds:.•  acetaminophen  •  melatonin  •  ondansetron **OR** ondansetron  •  sodium chloride    Assessment & Plan   Assessment & Plan     Active Hospital Problems    Diagnosis  POA   • **Generalized weakness [R53.1]  Yes   • Alcohol abuse [F10.10]  Yes   • Hypocalcemia [E83.51]  Yes   • Hypoalbuminemia [E88.09]  Yes   • Hyponatremia [E87.1]  Yes   • Rheumatoid vs Psoriatic arthritis (HCC) [L40.50]  Yes   • Severe malnutrition [E43]  Yes   • Atrial fibrillation [I48.91]  Yes   • Meningioma [D32.9]  Yes   • Postablative 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        Brief Hospital Course to date:  Monty Nagel is a 68 y.o. male  with admission to [facility]2023 for meningioma s/p craniotomy and resection  who presented back to ED day after discharge, 4/4/23, for being unable to care for himself at home as well as poor living conditions. Patient has addition PMH significant for a-fib (has repeatedly declined anticoagulation), alcohol abuse, HTN, and Hx Graves disease s/p thyroidectomy with post-surgical hypothyroidism.     This patient's problems and plans were partially entered by my partner and updated as appropriate by me 04/16/23.     Generalized weakness  Poor living conditions  -  PT/OT following  -  Case management following  -  Plan is rehab at [facility], patient medically ready      Meningioma s/p craniotomy with resection.  - Recent admission 3/11-4/3. He underwent right frontal craniotomy on 3/21 + MMA embolization.  - Neurosurgery evaluated this admission. Overall stable post op course.   - Plan was for dexamethasone taper and keppra at DC, but patient had not been taking these medications. They were provided by meds to beds prior to last DC.   - Keppra and Dexamethasone are ordered but patient is refusing oral or IV medications. Educated on adverse outcomes by not taking them.     Atrial fibrillation  -Patient was offered anticoagulation and declined it multiple times during his last admission. Partner reviewed anticoagulation again this admission. He understands it is for stroke prevention and continues to decline.   -On aspirin plus Plavix and Lopressor but refusing  both meds, again discussed importance of meds for rate control  -Rate controlled for now     Right great toe ulcer  -Was present during recent admission.  MRI foot negative for osteo at that time.  -Dr. Flores was following during recent admission.   -WOC following     HTN  HLD  -refusing home meds   -BP stable, normotensive     Hyponatremia  -Has been stable, refusing labs     Hypoalbuminemia  Hypocalcemia  Chronic Malnutrition  -It appears patient has very poor living conditions.  This on top of his inability to ambulate at home is  likely worsening his nutritional health.    -Nutrition following  -CM/SW     Hx of alcohol abuse  -Patient fortunately reports that he has not had an alcoholic drink since September 2022.     Postoperative hypothyroidism  - TSH and T4 within limits   - Levothyroxine 125 mcg oral daily     Hyperkalemia  -Lokelma x1 ordered 4/13 but patient refused, also refusing lab draws     Difficulty urinating/bladder pain  -UA 4/14 unremarkable    Expected Discharge Location and Transportation: [facility], medical van  Expected Discharge pending insurance approval  Expected Discharge Date and Time     Expected Discharge Date Expected Discharge Time    Apr 17, 2023            DVT prophylaxis:  Mechanical DVT prophylaxis orders are present.     AM-PAC 6 Clicks Score (PT): 20 (04/14/23 0938)    CODE STATUS:   Code Status and Medical Interventions:   Ordered at: 04/1955     Code Status (Patient has no pulse and is not breathing):    CPR (Attempt to Resuscitate)     Medical Interventions (Patient has pulse or is breathing):    Full Support       Maritza Hsu, APRN  04/16/23

## 2023-04-17 PROCEDURE — G0378 HOSPITAL OBSERVATION PER HR: HCPCS

## 2023-04-17 PROCEDURE — 99232 SBSQ HOSP IP/OBS MODERATE 35: CPT | Performed by: NURSE PRACTITIONER

## 2023-04-17 RX ADMIN — LEVOTHYROXINE SODIUM 125 MCG: 125 TABLET ORAL at 05:37

## 2023-04-17 NOTE — PROGRESS NOTES
Nutrition Services    Patient Name:  Monty Nagel  YOB: 1955  MRN: [id number]  Admit Date:  4/4/2023    Pt identified 2/2 LOS. No nutrition risk noted at this time. Please consult for intake avg <50% or significant weight change.        Electronically signed by:  Dyan Silva RD  04/17/23 07:03 EDT

## 2023-04-17 NOTE — PLAN OF CARE
Patient continues to refuse medication.  VSS.  Ambulates frequently with SBA, gait belt and straight cane.  Answers all orientation questions correctly.  Denied rehab.

## 2023-04-17 NOTE — CASE MANAGEMENT/SOCIAL WORK
Continued Stay Note  [facility]     Patient Name: Monty Nagel  MRN: [id number]  Today's Date: 4/17/2023    Admit Date: 4/4/2023    Plan: [facility]   Discharge Plan     Row Name 04/17/23 1111       Plan    Plan [facility]    Patient/Family in Agreement with Plan yes    Plan Comments Continue to await insurance approval for [facility]. Ina, admissions liaison with [facility], called today to inform case management that she will be calling the insurance to see if they need any additional information. Case management will continue to follow.    Final Discharge Disposition Code 03 - skilled nursing facility (SNF)               Discharge Codes    No documentation.               Expected Discharge Date and Time     Expected Discharge Date Expected Discharge Time    Apr 17, 2023             Bong Perez RN

## 2023-04-18 PROCEDURE — 97116 GAIT TRAINING THERAPY: CPT

## 2023-04-18 PROCEDURE — G0378 HOSPITAL OBSERVATION PER HR: HCPCS

## 2023-04-18 PROCEDURE — 97535 SELF CARE MNGMENT TRAINING: CPT

## 2023-04-18 PROCEDURE — 99231 SBSQ HOSP IP/OBS SF/LOW 25: CPT | Performed by: FAMILY MEDICINE

## 2023-04-18 RX ADMIN — LEVOTHYROXINE SODIUM 125 MCG: 125 TABLET ORAL at 05:15

## 2023-04-18 NOTE — THERAPY DISCHARGE NOTE
Acute Care - Occupational Therapy Discharge  [facility]    Patient Name: Monty Nagel  : 1955    MRN: [id number]                              Today's Date: 2023       Admit Date: 2023    Visit Dx:     ICD-10-CM ICD-9-CM   1. Generalized weakness  R53.1 780.79   2. History of meningioma  Z86.018 V12.41   3. Failure to thrive in adult  R62.7 783.7   4. Hyponatremia  E87.1 276.1   5. Hypoalbuminemia  E88.09 273.8   6. Hypocalcemia  E83.51 275.41   7. Alcohol abuse  F10.10 305.00   8. Rheumatoid vs Psoriatic arthritis (HCC)  L40.50 696.0   9. Meningioma  D32.9 225.2   10. Severe malnutrition  E43 261   11. Atrial fibrillation, unspecified type  I48.91 427.31   12. Balance problem  R26.89 781.99   13. Other recurrent depressive disorders  F33.8 296.99   14. Screen for colon cancer  Z12.11 V76.51   15. Postablative hypothyroidism  E89.0 244.1   16. History of right retinal melanoma with right eye blindness  Z85.820 V10.82   17. Essential hypertension  I10 401.9     Patient Active Problem List   Diagnosis   • Essential hypertension   • History of right retinal melanoma with right eye blindness   • Postablative hypothyroidism   • Screen for colon cancer   • Other recurrent depressive disorders   • Balance problem   • Generalized weakness   • Atrial fibrillation   • Severe malnutrition   • Meningioma   • Rheumatoid vs Psoriatic arthritis (HCC)   • Alcohol abuse   • Hypocalcemia   • Hypoalbuminemia   • Hyponatremia     Past Medical History:   Diagnosis Date   • Arthritis    • Disease of thyroid gland    • Hepatitis A    • Hypertension    • Melanoma 2017    retina     Past Surgical History:   Procedure Laterality Date   • CRANIOTOMY FOR TUMOR N/A 3/21/2023    Procedure: CRANIOTOMY FOR TUMOR STEREOTACTIC WITH STEALTH;  Surgeon: Marlon Mckee MD;  Location: [facility];  Service: Neurosurgery;  Laterality: N/A;   • EMBOLIZATION CEREBRAL N/A 3/20/2023    Procedure: Tumor Emoblization radial access;   Surgeon: Ozzy Sebastian MD;  Location: [facility] INVASIVE LOCATION;  Service: Interventional Radiology;  Laterality: N/A;   • EYE SURGERY  2017    EYE CANCER RIGHT EYE   • FINGER SURGERY Left     Pointer finger re-attached in 3rd Grade   • TONSILLECTOMY        General Information     Row Name 04/18/23 1518          OT Time and Intention    Document Type discharge treatment  -AN     Mode of Treatment occupational therapy  -AN     Row Name 04/18/23 1518          General Information    Patient Profile Reviewed yes  -AN     Existing Precautions/Restrictions fall;seizures;other (see comments)  s/p crani  -AN     Barriers to Rehab medically complex;visual deficit  -AN     Row Name 04/18/23 1518          Cognition    Orientation Status (Cognition) oriented x 3  -AN     Row Name 04/18/23 1518          Safety Issues, Functional Mobility    Safety Issues Affecting Function (Mobility) --  -AN     Impairments Affecting Function (Mobility) cognition  -AN     Cognitive Impairments, Mobility Safety/Performance judgment  -AN           User Key  (r) = Recorded By, (t) = Taken By, (c) = Cosigned By    Initials Name Provider Type    AN Dulce Maria Costello OT Occupational Therapist               Mobility/ADL's     Row Name 04/18/23 1530          Bed Mobility    Bed Mobility supine-sit-supine  -AN     Supine-Sit-Supine [place] (Bed Mobility) independent  -AN     [facility] Name 04/18/23 1530          Transfers    Transfers sit-stand transfer;stand-sit transfer  -AN     Comment, (Transfers) pt demo'd safe transfers  -AN     Row Name 04/18/23 1530          Sit-Stand Transfer    Sit-Stand [place] (Transfers) independent  -AN     Assistive Device (Sit-Stand Transfers) cane, straight  -AN     Row Name 04/18/23 1530          Stand-Sit Transfer    Stand-Sit [place] (Transfers) independent  -AN     Assistive Device (Stand-Sit Transfers) cane, straight  -AN     [facility] Name 04/18/23 1530          Functional Mobility    Functional  Mobility- Ind. Level supervision required  progressing to Mod I  -AN     Functional Mobility-Distance (Feet) --  >household distance  -AN     Functional Mobility- Comment pt ambulated >household distances using his cane with supervision progressing to indendent with no LOB. pt participated in search and locate of 5 items demonstrating ability to remember location of items as well  -AN     Row Name 04/18/23 1530          Activities of Daily Living    BADL Assessment/Intervention grooming  -AN     Row Name 04/18/23 1530          Grooming Assessment/Training    [place] Level (Grooming) wash face, hands;oral care regimen;independent  -AN     Position (Grooming) sink side  -AN           User Key  (r) = Recorded By, (t) = Taken By, (c) = Cosigned By    Initials Name Provider Type    Dulce Maria Mcconnell OT Occupational Therapist               Obj/Interventions     Row Name 04/18/23 1540 04/18/23 1534       Balance    Balance Assessment sitting static balance;sitting dynamic balance;sit to stand dynamic balance;standing static balance;standing dynamic balance  -AN sitting static balance;sitting dynamic balance;sit to stand dynamic balance;standing static balance;standing dynamic balance  -AN    Static Sitting Balance independent  -AN independent  -AN    Dynamic Sitting Balance independent  -AN independent  -AN    Position, Sitting Balance unsupported  -AN unsupported  -AN    Sit to Stand Dynamic Balance independent  -AN independent  -AN    Static Standing Balance independent  -AN independent  -AN    Dynamic Standing Balance independent  -AN independent  -AN    Position/Device Used, Standing Balance unsupported  -AN unsupported  -AN    Balance Interventions -- sit to stand;standing;supported;static;dynamic  -AN    Comment, Balance no LOB throughout  -AN supervision progressing to Mod I using cane  -AN          User Key  (r) = Recorded By, (t) = Taken By, (c) = Cosigned By    Initials Name Provider Type    JINA Costello  LUCERO العراقي Occupational Therapist               Goals/Plan     Row Name 04/18/23 1551          Bathing Goal 1 (OT)    Activity/Device (Bathing Goal 1, OT) upper body bathing;lower body bathing  -AN     [place] Level/Cues Needed (Bathing Goal 1, OT) supervision required  -AN     Time Frame (Bathing Goal 1, OT) long term goal (LTG);by discharge  -AN     Progress/Outcomes (Bathing Goal 1, OT) goal met  -AN     Row Name 04/18/23 1551          Dressing Goal 1 (OT)    Activity/Device (Dressing Goal 1, OT) upper body dressing;lower body dressing  -AN     [place]/Cues Needed (Dressing Goal 1, OT) standby assist  -AN     Time Frame (Dressing Goal 1, OT) long term goal (LTG);by discharge  -AN     Progress/Outcome (Dressing Goal 1, OT) goal met  -AN           User Key  (r) = Recorded By, (t) = Taken By, (c) = Cosigned By    Initials Name Provider Type    AN Dulce Maria Costello OT Occupational Therapist               Clinical Impression     Row Name 04/18/23 1540          Pain Assessment    Pretreatment Pain Rating 0/10 - no pain  -AN     Posttreatment Pain Rating 0/10 - no pain  -AN     Pre/Posttreatment Pain Comment asymptomatic  -AN     Pain Intervention(s) Repositioned;Ambulation/increased activity  -AN     Row Name 04/18/23 1549          Plan of Care Review    Plan of Care Reviewed With patient  -AN     Progress no change  -AN     Outcome Evaluation Pt participated in therapeutic activities challenging patients balance and cognition, in addition to ambulating around the unit x2 with independence using cane. Pt has met all OT goals at this time and will be discharged.  -AN     Row Name 04/18/23 1547          Therapy Plan Review/Discharge Plan (OT)    Anticipated Discharge Disposition (OT) assisted living  -AN     Row Name 04/18/23 1540          Vital Signs    Pre Systolic BP Rehab --  VSS  -AN     O2 Delivery Pre Treatment room air  -AN     O2 Delivery Intra Treatment room air  -AN     O2 Delivery Post Treatment  room air  -AN     Pre Patient Position Supine  -AN     Intra Patient Position Standing  -AN     Post Patient Position Supine  -AN     Row Name 04/18/23 1543          Positioning and Restraints    Pre-Treatment Position in bed  -AN     Post Treatment Position bed  -AN     In Bed notified nsg;supine;exit alarm on;encouraged to call for assist;call light within reach;side rails up x2  -AN           User Key  (r) = Recorded By, (t) = Taken By, (c) = Cosigned By    Initials Name Provider Type    Dulce Maria Mcconnell OT Occupational Therapist               Outcome Measures     Row Name 04/18/23 1554          How much help from another is currently needed...    Putting on and taking off regular lower body clothing? 4  -AN     Bathing (including washing, rinsing, and drying) 4  -AN     Toileting (which includes using toilet bed pan or urinal) 4  -AN     Putting on and taking off regular upper body clothing 4  -AN     Taking care of personal grooming (such as brushing teeth) 4  -AN     Eating meals 4  -AN     AM-PAC 6 Clicks Score (OT) 24  -AN     Row Name 04/18/23 1542          How much help from another person do you currently need...    Turning from your back to your side while in flat bed without using bedrails? 4  -SC     Moving from lying on back to sitting on the side of a flat bed without bedrails? 4  -SC     Moving to and from a bed to a chair (including a wheelchair)? 4  -SC     Standing up from a chair using your arms (e.g., wheelchair, bedside chair)? 4  -SC     Climbing 3-5 steps with a railing? 3  -SC     To walk in hospital room? 3  -SC     AM-PAC 6 Clicks Score (PT) 22  -SC     Highest level of mobility 7 --> Walked 25 feet or more  -SC     Row Name 04/18/23 1554 04/18/23 1542       Functional Assessment    Outcome Measure Options AM-PAC 6 Clicks Daily Activity (OT)  -AN AM-PAC 6 Clicks Basic Mobility (PT)  -SC          User Key  (r) = Recorded By, (t) = Taken By, (c) = Cosigned By    Initials Name Provider  Type    SC Valeria Gibson, PT Physical Therapist    Dulce Maria Mcconnell OT Occupational Therapist              Occupational Therapy Education     Title: PT OT SLP Therapies (In Progress)     Topic: Occupational Therapy (Done)     Point: ADL training (Done)     Description:   Instruct learner(s) on proper safety adaptation and remediation techniques during self care or transfers.   Instruct in proper use of assistive devices.              Learning Progress Summary           Patient Acceptance, E, VU by AN at 4/18/2023 1555    Acceptance, E, VU by MR at 4/14/2023 1449    Acceptance, E, VU,NR by MR at 4/12/2023 1427    Acceptance, E, VU,NR by MR at 4/6/2023 1143                   Point: Home exercise program (Done)     Description:   Instruct learner(s) on appropriate technique for monitoring, assisting and/or progressing therapeutic exercises/activities.              Learning Progress Summary           Patient Acceptance, E, VU by MR at 4/14/2023 1449    Acceptance, E, VU,NR by MR at 4/12/2023 1427    Acceptance, E, VU,NR by MR at 4/6/2023 1143                   Point: Precautions (Done)     Description:   Instruct learner(s) on prescribed precautions during self-care and functional transfers.              Learning Progress Summary           Patient Acceptance, E, VU by AN at 4/18/2023 1555    Acceptance, E, VU by MR at 4/14/2023 1449    Acceptance, E, VU,NR by MR at 4/12/2023 1427    Acceptance, E, VU,NR by MR at 4/6/2023 1143                   Point: Body mechanics (Done)     Description:   Instruct learner(s) on proper positioning and spine alignment during self-care, functional mobility activities and/or exercises.              Learning Progress Summary           Patient Acceptance, E, VU by AN at 4/18/2023 1555    Acceptance, E, VU by MR at 4/14/2023 1449    Acceptance, E, VU,NR by MR at 4/12/2023 1427    Acceptance, E, VU,NR by MR at 4/6/2023 1143                               User Key     Initials Effective  Dates Name Provider Type Discipline    AN 09/21/21 -  Dulce Maria Costello OT Occupational Therapist OT    MR 09/22/22 -  Amanda Vega OT Occupational Therapist OT              OT Recommendation and Plan     Plan of Care Review  Plan of Care Reviewed With: patient  Progress: no change  Outcome Evaluation: Pt participated in therapeutic activities challenging patients balance and cognition, in addition to ambulating around the unit x2 with independence using cane. Pt has met all OT goals at this time and will be discharged.  Plan of Care Reviewed With: patient  Outcome Evaluation: Pt participated in therapeutic activities challenging patients balance and cognition, in addition to ambulating around the unit x2 with independence using cane. Pt has met all OT goals at this time and will be discharged.     Time Calculation:    Time Calculation- OT     Row Name 04/18/23 1555 04/18/23 1503          Time Calculation- OT    OT Start Time 1330  -AN --     OT Received On 04/18/23  -AN --        Timed Charges    83933 - Gait Training Minutes  -- 23  -SC     04893 - OT Self Care/Mgmt Minutes 30  -AN --        Total Minutes    Timed Charges Total Minutes 30  -AN 23  -SC      Total Minutes 30  -AN 23  -SC           User Key  (r) = Recorded By, (t) = Taken By, (c) = Cosigned By    Initials Name Provider Type    SC Valeria Gibson PT Physical Therapist    Dulce Maria Mcconnell, LUCERO Occupational Therapist              Therapy Charges for Today     Code Description Service Date Service Provider Modifiers Qty    97752184151 HC OT SELF CARE/MGMT/TRAIN EA 15 MIN 4/18/2023 Dulce Maria Costello OT GO 2             OT Discharge Summary  Anticipated Discharge Disposition (OT): assisted living  Reason for Discharge: All goals achieved    Dulce Maria Costello OT  4/18/2023

## 2023-04-18 NOTE — THERAPY TREATMENT NOTE
Patient Name: Monty Nagel  : 1955    MRN: [id number]                              Today's Date: 2023       Admit Date: 2023    Visit Dx:     ICD-10-CM ICD-9-CM   1. Generalized weakness  R53.1 780.79   2. History of meningioma  Z86.018 V12.41   3. Failure to thrive in adult  R62.7 783.7   4. Hyponatremia  E87.1 276.1   5. Hypoalbuminemia  E88.09 273.8   6. Hypocalcemia  E83.51 275.41   7. Alcohol abuse  F10.10 305.00   8. Rheumatoid vs Psoriatic arthritis (HCC)  L40.50 696.0   9. Meningioma  D32.9 225.2   10. Severe malnutrition  E43 261   11. Atrial fibrillation, unspecified type  I48.91 427.31   12. Balance problem  R26.89 781.99   13. Other recurrent depressive disorders  F33.8 296.99   14. Screen for colon cancer  Z12.11 V76.51   15. Postablative hypothyroidism  E89.0 244.1   16. History of right retinal melanoma with right eye blindness  Z85.820 V10.82   17. Essential hypertension  I10 401.9     Patient Active Problem List   Diagnosis   • Essential hypertension   • History of right retinal melanoma with right eye blindness   • Postablative hypothyroidism   • Screen for colon cancer   • Other recurrent depressive disorders   • Balance problem   • Generalized weakness   • Atrial fibrillation   • Severe malnutrition   • Meningioma   • Rheumatoid vs Psoriatic arthritis (HCC)   • Alcohol abuse   • Hypocalcemia   • Hypoalbuminemia   • Hyponatremia     Past Medical History:   Diagnosis Date   • Arthritis    • Disease of thyroid gland    • Hepatitis A    • Hypertension    • Melanoma 2017    retina     Past Surgical History:   Procedure Laterality Date   • CRANIOTOMY FOR TUMOR N/A 3/21/2023    Procedure: CRANIOTOMY FOR TUMOR STEREOTACTIC WITH STEALTH;  Surgeon: Marlon Mckee MD;  Location: [facility] OR;  Service: Neurosurgery;  Laterality: N/A;   • EMBOLIZATION CEREBRAL N/A 3/20/2023    Procedure: Tumor Emoblization radial access;  Surgeon: Ozzy Sebastian MD;  Location: [facility] CATH INVASIVE  LOCATION;  Service: Interventional Radiology;  Laterality: N/A;   • EYE SURGERY  2017    EYE CANCER RIGHT EYE   • FINGER SURGERY Left     Pointer finger re-attached in 3rd Grade   • TONSILLECTOMY        General Information     [facility] Name 04/18/23 1529          Physical Therapy Time and Intention    Document Type therapy note (daily note)  -SC     Mode of Treatment physical therapy  -[facility] Name 04/18/23 1529          General Information    Patient Profile Reviewed yes  -SC     Existing Precautions/Restrictions fall;seizures;other (see comments)  R eye visual deficit, s/p crani  -[facility] Name 04/18/23 1529          Cognition    Orientation Status (Cognition) oriented x 3  -[facility] Name 04/18/23 1529          Safety Issues, Functional Mobility    Impairments Affecting Function (Mobility) balance;cognition;strength  -SC     Cognitive Impairments, Mobility Safety/Performance impulsivity;judgment  -SC     Comment, Safety Issues/Impairments (Mobility) alert, following commends with repeted cues, impulsive--refusing to use walker,. Patient hopped out of bed quickly before gt belt doned. PT insisted he use a gt belt  -SC           User Key  (r) = Recorded By, (t) = Taken By, (c) = Cosigned By    Initials Name Provider Type    SC Valeria Gibson, PT Physical Therapist               Mobility     [facility] Name 04/18/23 1531          Bed Mobility    Bed Mobility supine-sit-supine  -SC     Supine-Sit [place] (Bed Mobility) independent  -SC     Supine-Sit-Supine [place] (Bed Mobility) independent  -SC     Comment, (Bed Mobility) easily gets out of bed  -[facility] Name 04/18/23 1531          Transfers    Comment, (Transfers) Easily stands up. Very impulsive. stands up before PT donned gait belt.  -[facility] Name 04/18/23 1531          Sit-Stand Transfer    Sit-Stand [place] (Transfers) standby assist  -SC     Assistive Device (Sit-Stand Transfers) cane, straight  -[facility] Name 04/18/23 1531           Gait/Stairs (Locomotion)    [place] Level (Gait) verbal cues;contact guard;1 person assist  -SC     Assistive Device (Gait) cane, straight  -SC     Distance in Feet (Gait) 600  -SC     Deviations/Abnormal Patterns (Gait) base of support, wide;weight shifting decreased;stride length decreased;gait speed decreased;steppage  -SC     Bilateral Gait Deviations forward flexed posture;heel strike decreased  -SC     Assistive Device (Stairs) cane, straight  -SC     Handrail Location (Stairs) left side (ascending)  -SC     Number of Steps (Stairs) 5+5+5  -SC     Comment, (Gait/Stairs) Patient demonstrated unsteady ambulation . Needed multiple cues to slow down. Some sway noted when walking. Cues to take standing rest breaks, but patient refused. On stairs he was given cues for sequencing cane. Demonstrated one mild loss of balance requireing PT contact assistance. Patient insistant on doing more stairs than PT agreed to. PT giving cues to pay attention to any fatigue--he again refused to stop and rest dispite his short breathing.  -SC           User Key  (r) = Recorded By, (t) = Taken By, (c) = Cosigned By    Initials Name Provider Type    Valeria Yañez, PT Physical Therapist               Obj/Interventions     Row Name 04/18/23 1537          Balance    Dynamic Standing Balance 1-person assist;contact guard  -SC     Position/Device Used, Standing Balance supported;cane, straight  -SC     Comment, Balance unsteady, impulsive not using good judgement about his fatigue status, mild loss of balance on stairs  -SC           User Key  (r) = Recorded By, (t) = Taken By, (c) = Cosigned By    Initials Name Provider Type    SC Valeria Gibson, PT Physical Therapist               Goals/Plan    No documentation.                Clinical Impression     Row Name 04/18/23 1540          Pain    Pretreatment Pain Rating 0/10 - no pain  -SC     Posttreatment Pain Rating 0/10 - no pain  -SC     Row Name 04/18/23 1540          Plan of  Care Review    Progress improving  -SC     Outcome Evaluation Patient remains impulsive, demonstrating difficulty with judgement and safety. He needs cues to slow down and attend to his feelings of fatigue. He had one mild loss of balance on the stairs today. Continue to recommend SNF  -[facility] Name 04/18/23 8740          Therapy Assessment/Plan (PT)    Rehab Potential (PT) good, to achieve stated therapy goals  -SC     Criteria for Skilled Interventions Met (PT) yes;meets criteria;skilled treatment is necessary  -SC     Therapy Frequency (PT) daily  -[facility] Name 04/18/23 5627          Positioning and Restraints    Pre-Treatment Position in bed  -SC     Post Treatment Position bed  -SC     In Bed call light within reach;encouraged to call for assist;exit alarm on  -SC           User Key  (r) = Recorded By, (t) = Taken By, (c) = Cosigned By    Initials Name Provider Type    Valeria Yañez PT Physical Therapist               Outcome Measures     Row Name 04/18/23 2761          How much help from another person do you currently need...    Turning from your back to your side while in flat bed without using bedrails? 4  -SC     Moving from lying on back to sitting on the side of a flat bed without bedrails? 4  -SC     Moving to and from a bed to a chair (including a wheelchair)? 4  -SC     Standing up from a chair using your arms (e.g., wheelchair, bedside chair)? 4  -SC     Climbing 3-5 steps with a railing? 3  -SC     To walk in hospital room? 3  -SC     AM-PAC 6 Clicks Score (PT) 22  -SC     Highest level of mobility 7 --> Walked 25 feet or more  -[facility] Name 04/18/23 7147          Functional Assessment    Outcome Measure Options AM-PAC 6 Clicks Basic Mobility (PT)  -SC           User Key  (r) = Recorded By, (t) = Taken By, (c) = Cosigned By    Initials Name Provider Type    Valeria Yañez PT Physical Therapist                             Physical Therapy Education     Title: PT OT SLP Therapies  (In Progress)     Topic: Physical Therapy (In Progress)     Point: Mobility training (In Progress)     Learning Progress Summary           Patient Eager, E, NR by SC at 4/18/2023 1543    Comment: reviewed safety with mobility    Acceptance, E,D, NR by AB at 4/16/2023 1536    Acceptance, E, NR by AS at 4/14/2023 0938    Acceptance, E, VU by FW at 4/6/2023 1048    Acceptance, E,D, VU,NR by HP at 4/5/2023 1056                   Point: Home exercise program (In Progress)     Learning Progress Summary           Patient Eager, E, NR by SC at 4/18/2023 1543    Comment: reviewed safety with mobility    Acceptance, E,D, NR by AB at 4/16/2023 1536    Acceptance, E, NR by AS at 4/14/2023 0938    Acceptance, E, VU by FW at 4/6/2023 1048    Acceptance, E,D, VU,NR by HP at 4/5/2023 1056                   Point: Body mechanics (In Progress)     Learning Progress Summary           Patient Eager, E, NR by SC at 4/18/2023 1543    Comment: reviewed safety with mobility    Acceptance, E,D, NR by AB at 4/16/2023 1536    Acceptance, E, NR by AS at 4/14/2023 0938    Acceptance, E, VU by FW at 4/6/2023 1048    Acceptance, E,D, VU,NR by HP at 4/5/2023 1056                   Point: Precautions (In Progress)     Learning Progress Summary           Patient Eager, E, NR by SC at 4/18/2023 1543    Comment: reviewed safety with mobility    Acceptance, E,D, NR by AB at 4/16/2023 1536    Acceptance, E, NR by AS at 4/14/2023 0938    Acceptance, E, VU by FW at 4/6/2023 1048    Acceptance, E,D, VU,NR by HP at 4/5/2023 1056                               User Key     Initials Effective Dates Name Provider Type Discipline    SC 02/03/23 -  Valeria Gibson, PT Physical Therapist PT    AS 02/03/23 -  Sandee Wilkerson, PTA Physical Therapist Assistant PT    FW 05/05/22 -  Bayron Lea, PT Physical Therapist PT    HP 06/01/21 -  Kelly Mckeon, PT Physical Therapist PT    AB 09/22/22 -  Sandee Easton, PT Physical Therapist PT              PT  Recommendation and Plan     Progress: improving  Outcome Evaluation: Patient remains impulsive, demonstrating difficulty with judgement and safety. He needs cues to slow down and attend to his feelings of fatigue. He had one mild loss of balance on the stairs today. Continue to recommend SNF     Time Calculation:    PT Charges     Row Name 04/18/23 1503             Time Calculation    Start Time 1503  -SC      PT Received On 04/18/23  -SC      PT Goal Re-Cert Due Date 04/26/23  -SC         Time Calculation- PT    Total Timed Code Minutes- PT 23 minute(s)  -SC         Timed Charges    23025 - Gait Training Minutes  23  -SC         Total Minutes    Timed Charges Total Minutes 23  -SC       Total Minutes 23  -SC            User Key  (r) = Recorded By, (t) = Taken By, (c) = Cosigned By    Initials Name Provider Type    SC Valeria Gibson PT Physical Therapist              Therapy Charges for Today     Code Description Service Date Service Provider Modifiers Qty    [id number] HC GAIT TRAINING EA 15 MIN 4/18/2023 Valeria Gibson PT GP 2          PT G-Codes  Outcome Measure Options: AM-PAC 6 Clicks Basic Mobility (PT)  AM-PAC 6 Clicks Score (PT): 22  AM-PAC 6 Clicks Score (OT): 21  TUG Test 1: 19 seconds  TUG Test 2: 19.4 seconds  PT Discharge Summary  Anticipated Discharge Disposition (PT): skilled nursing facility    Valeria Gibson PT  4/18/2023

## 2023-04-18 NOTE — PROGRESS NOTES
[facility] Medicine Services  PROGRESS NOTE    Patient Name: Monty Nagel  : 1955  MRN: [id number]    Date of Admission: 2023  Primary Care Physician: Tiffany Morales MD    Subjective   Subjective     CC:  F/u generalized weakness    HPI:   Patient seen and examined. No complaints. He was denied by rehab. Tells me he can't go home until we can guarantee he will have food at his house. Refusing all his meds except Synthroid.    ROS:   Gen- No fevers, chills  CV- No chest pain, palpitations  Resp- No cough, dyspnea  GI- No N/V/D, abd pain    Objective   Objective     Vital Signs:   Temp:  [97.6 °F (36.4 °C)-98.7 °F (37.1 °C)] 98.7 °F (37.1 °C)  Heart Rate:  [81] 81  Resp:  [16-18] 18  BP: (118-138)/(80-97) 128/88     Physical Exam:   Constitutional: No acute distress, awake, alert  HENT: NCAT, mucous membranes moist, blind right eye  Respiratory: Clear to auscultation bilaterally, respiratory effort normal   Cardiovascular: RRR, no murmurs, rubs, or gallops  Gastrointestinal: Positive bowel sounds, soft, nontender, nondistended  Musculoskeletal: No bilateral ankle edema  Psychiatric: Flat affect, cooperative  Neurologic: Oriented x 3, OVIEDO, speech clear  Skin: No rashes    Results Reviewed:  LAB RESULTS:      Lab 23  0623   WBC 8.54   HEMOGLOBIN 11.8*   HEMATOCRIT 35.9*   PLATELETS 255   MCV 92.1         Lab 23  0623   SODIUM 134*   POTASSIUM 5.3*   CHLORIDE 98   CO2 29.0   ANION GAP 7.0   BUN 19   CREATININE 0.66*   EGFR 100.9   GLUCOSE 83   CALCIUM 8.3*         Lab 23  0623   TOTAL PROTEIN 5.4*   ALBUMIN 3.3*   GLOBULIN 2.1   ALT (SGPT) 27   AST (SGOT) 13   BILIRUBIN 0.6   ALK PHOS 86                     Brief Urine Lab Results  (Last result in the past 365 days)      Color   Clarity   Blood   Leuk Est   Nitrite   Protein   CREAT   Urine HCG        23 1021 Yellow   Clear   Negative   Negative   Negative   Negative                 Microbiology Results  Abnormal     None          No radiology results from the last 24 hrs    Results for orders placed during the hospital encounter of 03/11/23    Adult Transthoracic Echo Complete w/ Color, Spectral and Contrast if necessary per protocol    Interpretation Summary  •  Left ventricular systolic function is normal. Estimated left ventricular EF = 55%  •  Biatrial enlargement.  •  Calcified aortic valve with mild aortic stenosis, mean gradient 10 mmHg, BANG 1.6 cm².  •  Moderate aortic insufficiency.  •  Mild mitral regurgitation.  •  Mild tricuspid regurgitation with normal RVSP.  •  Mild dilation of the ascending aorta (4.2 cm) is present.      Current medications:  Scheduled Meds:aspirin, 81 mg, Oral, Daily  atorvastatin, 40 mg, Oral, Nightly  clopidogrel, 75 mg, Oral, Daily  dexamethasone, 2 mg, Oral, Q12H  lisinopril, 10 mg, Oral, Q24H   And  hydroCHLOROthiazide, 12.5 mg, Oral, Q24H  levETIRAcetam, 1,000 mg, Oral, Q12H  levothyroxine, 125 mcg, Oral, Q AM  metoprolol tartrate, 25 mg, Oral, Q12H  pantoprazole, 40 mg, Oral, Q AM  QUEtiapine, 25 mg, Oral, Nightly  sodium zirconium cyclosilicate, 10 g, Oral, Once      Continuous Infusions:   PRN Meds:.•  acetaminophen  •  melatonin  •  ondansetron **OR** ondansetron  •  sodium chloride    Assessment & Plan   Assessment & Plan     Active Hospital Problems    Diagnosis  POA   • **Generalized weakness [R53.1]  Yes   • Alcohol abuse [F10.10]  Yes   • Hypocalcemia [E83.51]  Yes   • Hypoalbuminemia [E88.09]  Yes   • Hyponatremia [E87.1]  Yes   • Rheumatoid vs Psoriatic arthritis (HCC) [L40.50]  Yes   • Severe malnutrition [E43]  Yes   • Atrial fibrillation [I48.91]  Yes   • Meningioma [D32.9]  Yes   • Postablative 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        Brief Hospital Course to date:  Monty Nagel is a 68 y.o. male  with [facility] for meningioma s/p craniotomy  and resection who presented back to ED day after discharge, 4/4/23, for being unable to care for himself at home as well as poor living conditions. Patient has addition PMH significant for a-fib (has repeatedly declined anticoagulation), alcohol abuse, HTN, and Hx Graves disease s/p thyroidectomy with post-surgical hypothyroidism.     This patient's problems and plans were partially entered by my partner and updated as appropriate by me 04/18/23.     Generalized weakness  Poor living conditions  -  PT/OT following  -  Ambulate TID as able  -  Patient was denied by rehab. Peer to peer done by Dr Gunter and again denied. Pt did not want to do an appeal. Discussed with CM. Patient needs to go home. SW will see to discuss meal options. Apparently APS has been involved in patient's case as well.      Meningioma s/p craniotomy with resection.  - Recent admission 3/11-4/3. He underwent right frontal craniotomy on 3/21 + MMA embolization.  - Neurosurgery evaluated this admission. Overall stable post op course.   - Plan was for dexamethasone taper and keppra at DC, but patient had not been taking these medications. They were provided by meds to beds prior to last DC.   - Keppra and Dexamethasone are ordered but patient is refusing oral or IV medications. Educated on adverse outcomes by not taking them.     Atrial fibrillation  -Patient was offered anticoagulation and declined it multiple times during his last admission. Partner reviewed anticoagulation again this admission. He understands it is for stroke prevention and continues to decline.   -On aspirin plus Plavix and Lopressor but refusing both meds, again discussed importance of meds for rate control  -Rate controlled for now     Right great toe ulcer  -Was present during recent admission.  MRI foot negative for osteo at that time.  -Dr. Flores was following during recent admission.   -WOC following     HTN  HLD  -refusing home meds   -BP stable,  normotensive     Hyponatremia  -Has been stable, refusing labs     Hypoalbuminemia  Hypocalcemia  Chronic Malnutrition  -It appears patient has very poor living conditions.  This on top of his inability to ambulate at home is likely worsening his nutritional health.    -CM/SW to follow-up on this today     Hx of alcohol abuse  -Patient fortunately reports that he has not had an alcoholic drink since September 2022.     Postoperative hypothyroidism  - TSH and T4 within limits   - Levothyroxine 125 mcg oral daily     Hyperkalemia  -Lokelma x1 ordered 4/13 but patient refused, also refusing lab draws     Difficulty urinating/bladder pain  -UA 4/14 unremarkable    *Patient is medically ready to discharge from the hospital. CM and SW to follow-up on living conditions today. He is refusing all medications except Synthroid. No further benefit to patient being hospitalized inpatient.    Expected Discharge Location and Transportation: HOME  Expected Discharge  Expected Discharge Date and Time     Expected Discharge Date Expected Discharge Time    Apr 18, 2023            DVT prophylaxis:  Mechanical DVT prophylaxis orders are present.     AM-PAC 6 Clicks Score (PT): 20 (04/16/23 5728)    CODE STATUS:   Code Status and Medical Interventions:   Ordered at: 04/1955     Code Status (Patient has no pulse and is not breathing):    CPR (Attempt to Resuscitate)     Medical Interventions (Patient has pulse or is breathing):    Full Support       Tuyet Ventura, DO  04/18/23

## 2023-04-18 NOTE — CASE MANAGEMENT/SOCIAL WORK
Continued Stay Note  [facility]     Patient Name: Monty Nagel  MRN: [id number]  Today's Date: 4/18/2023    Admit Date: 4/4/2023    Plan: home   Discharge Plan     Row Name 04/18/23 1256       Plan    Plan Comments MSW notified pt's APS worker that pt will be discharging home. Pt has been set up with [facility] and MSW spoke with Marva at [facility] and verified this. MSW also tried to call meals [employer] wheels and left a voicemail. Pt will be able to set this up for immediate assistance by calling their number. MSW spoke with pt at bedside and provided the meals on wheels information as well as the home care program information through [facility]. Pt will also have a  with home health to assist as well as his APS worker that can help with any community resources.    Row Name 04/18/23 1156                                          Discharge Codes    No documentation.               Expected Discharge Date and Time     Expected Discharge Date Expected Discharge Time    Apr 18, 2023             CARIDAD Esqueda

## 2023-04-18 NOTE — PLAN OF CARE
Goal Outcome Evaluation:           Progress: improving  Outcome Evaluation: Patient remains impulsive, demonstrating difficulty with judgement and safety. He needs cues to slow down and attend to his feelings of fatigue. He had one mild loss of balance on the stairs today. Continue to recommend SNF

## 2023-04-18 NOTE — CASE MANAGEMENT/SOCIAL WORK
Continued Stay Note  [facility]     Patient Name: Monty Nagel  MRN: [id number]  Today's Date: 4/18/2023    Admit Date: 4/4/2023    Plan: home   Discharge Plan     Row Name 04/18/23 1156       Plan    Plan home    Patient/Family in Agreement with Plan yes    Plan Comments I spoke with patient in room regarding discharge planning.  His insursance has denied SNF placement.  A peer to peer was completed and SNF was still denied.   I asked patient if he wanted to do an appeal and he stated he wanted to go home.   He will need transportation and assistance with setting up meals being delivered.  He also has an APS referral.  I will notifiy  for assistance with this.    Final Discharge Disposition Code 01 - home or self-care               Discharge Codes    No documentation.               Expected Discharge Date and Time     Expected Discharge Date Expected Discharge Time    Apr 18, 2023             Elizabeth Garcia RN

## 2023-04-18 NOTE — PLAN OF CARE
Goal Outcome Evaluation:  Plan of Care Reviewed With: patient        Progress: no change  Outcome Evaluation: Pt participated in therapeutic activities challenging patients balance and cognition, in addition to ambulating around the unit x2 with independence using cane. Pt has met all OT goals at this time and will be discharged.

## 2023-04-19 ENCOUNTER — HOME HEALTH ADMISSION (OUTPATIENT)
Dept: HOME HEALTH SERVICES | Facility: HOME HEALTHCARE | Age: 68
End: 2023-04-19
Payer: COMMERCIAL

## 2023-04-19 ENCOUNTER — READMISSION MANAGEMENT (OUTPATIENT)
Dept: CALL CENTER | Facility: HOSPITAL | Age: 68
End: 2023-04-19
Payer: MEDICARE

## 2023-04-19 VITALS
SYSTOLIC BLOOD PRESSURE: 137 MMHG | HEART RATE: 74 BPM | BODY MASS INDEX: 28.35 KG/M2 | DIASTOLIC BLOOD PRESSURE: 96 MMHG | RESPIRATION RATE: 18 BRPM | TEMPERATURE: 99.4 F | OXYGEN SATURATION: 96 % | HEIGHT: 71 IN | WEIGHT: 202.5 LBS

## 2023-04-19 PROCEDURE — G0378 HOSPITAL OBSERVATION PER HR: HCPCS

## 2023-04-19 RX ORDER — LISINOPRIL AND HYDROCHLOROTHIAZIDE 20; 12.5 MG/1; MG/1
0.5 TABLET ORAL DAILY
Qty: 90 TABLET | Refills: 2
Start: 2023-04-19 | End: 2023-04-21 | Stop reason: SDUPTHER

## 2023-04-19 NOTE — PLAN OF CARE
"Goal Outcome Evaluation:   Patient being discharged home. AVS provided and instructions provided to patient, with emphasis placed on taking the Keppra. Patient stated that he \"rather not\" when trying to talk to him about taking his medication. Belongings have been gathered in bags, including items from the security safe, and items being held in the med room.              "

## 2023-04-19 NOTE — PROGRESS NOTES
Discussed home health with patient and he is agreeable to [facility]. PCP is Dr Tiffany Morales and she will follow for home health. Jono CHARLES([facility])Hospital Liaison

## 2023-04-19 NOTE — CASE MANAGEMENT/SOCIAL WORK
Case Management Discharge Note      Final Note: Mr. Nagel will be discharged home today. [facility] will follow Mr. Nagel at home with skilled nursing and social work. He will be transported home via [facility]. No additional discharge needs identified at this time.         Selected Continued Care - Admitted Since 4/4/2023     Destination    No services have been selected for the patient.              Durable Medical Equipment    No services have been selected for the patient.              Dialysis/Infusion    No services have been selected for the patient.              Home Medical Care Coordination complete.    Service Provider Selected Services Address Phone Fax Patient Preferred    [facility] [address] [address] [phone] --          Therapy    No services have been selected for the patient.              Community Resources    No services have been selected for the patient.              Community & DME    No services have been selected for the patient.                  Transportation Services  Taxi: other ([facility])    Final Discharge Disposition Code: 06 - home with home health care

## 2023-04-19 NOTE — DISCHARGE SUMMARY
[facility] Medicine Services  DISCHARGE SUMMARY    Patient Name: Monty Nagel  : 1955  MRN: [id number]    Date of Admission: 2023  3:30 PM  Date of Discharge:  2023  Primary Care Physician: Tiffany Morales MD    Consults     Date and Time Order Name Status Description    2023 12:22 PM Inpatient Neurosurgery Consult Completed     3/31/2023 10:42 AM Inpatient Cardiology Consult Completed     3/26/2023  9:25 AM Inpatient Vascular Surgery Consult Completed     3/11/2023 11:01 PM Inpatient Neurosurgery Consult Completed     3/11/2023  8:28 PM Inpatient Neurology Consult General Completed           Hospital Course     Presenting Problem:   Generalized weakness [R53.1]    Active Hospital Problems    Diagnosis  POA   • **Generalized weakness [R53.1]  Yes   • Alcohol abuse [F10.10]  Yes   • Hypocalcemia [E83.51]  Yes   • Hypoalbuminemia [E88.09]  Yes   • Hyponatremia [E87.1]  Yes   • Rheumatoid vs Psoriatic arthritis (HCC) [L40.50]  Yes   • Severe malnutrition [E43]  Yes   • Atrial fibrillation [I48.91]  Yes   • Meningioma [D32.9]  Yes   • Postablative 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          Hospital Course:  Monty Nagel is a 68 y.o. male  with admission to [facility]3/11-4/3/2023 for meningioma s/p craniotomy and resection who presented back to ED day after discharge, 23, for being unable to care for himself at home as well as poor living conditions. Patient has addition PMH significant for a-fib (has repeatedly declined anticoagulation), alcohol abuse, HTN, and Hx Graves disease s/p thyroidectomy with post-surgical hypothyroidism.      Generalized weakness  Poor living conditions  -  PT/OT following  -  Ambulate TID as able  -  Patient was denied by rehab. Peer to peer done by Dr Gunter and again denied. Pt did not want to do an appeal. Discussed with CM. Patient needs to go  home. SW will see to discuss meal options. Apparently APS has been involved in patient's case as well.      Meningioma s/p craniotomy with resection.  - Recent admission 3/11-4/3. He underwent right frontal craniotomy on 3/21 + MMA embolization.  - Neurosurgery evaluated this admission. Overall stable post op course.   - Plan was for dexamethasone taper and keppra at DC, but patient had not been taking these medications. They were provided by meds to beds prior to last DC.   - Keppra and Dexamethasone are ordered but patient is refusing oral or IV medications. Educated on adverse outcomes by not taking them.     Atrial fibrillation  -Patient was offered anticoagulation and declined it multiple times during his last admission. Partner reviewed anticoagulation again this admission. He understands it is for stroke prevention and continues to decline.   -On aspirin plus Plavix and Lopressor but refusing both meds, again discussed importance of meds for rate control  -Rate controlled for now     Right great toe ulcer  -Was present during recent admission.  MRI foot negative for osteo at that time.  -Dr. Flores was following during recent admission.   -WOC following     HTN  HLD  -refusing home meds   -BP stable, normotensive     Hyponatremia  -Has been stable, refusing labs     Hypoalbuminemia  Hypocalcemia  Chronic Malnutrition  -It appears patient has very poor living conditions.  This on top of his inability to ambulate at home is likely worsening his nutritional health.    -CM/SW to follow-up      Hx of alcohol abuse  -Patient fortunately reports that he has not had an alcoholic drink since September 2022.     Postoperative hypothyroidism  - TSH and T4 within limits   - Levothyroxine 125 mcg oral daily     Hyperkalemia  -Lokelma x1 ordered 4/13 but patient refused, also refusing lab draws     Difficulty urinating/bladder pain  -UA 4/14 unremarkable    Discharge Follow Up Recommendations for outpatient  labs/diagnostics:  PCP 1 week   Neurosurgery as prev scheduled postoperatively     Day of Discharge     HPI:   No new needs or concerns. No pain. Asking about getting food for home, updated on plan for box lunches and Meals on Wheels. Continues to refuse to take prescribed medications for recent craniotomy related to meningioma removal, understands risk of seizures. No f/c, n/v/d, soa or cp     Review of Systems  All other systems negative     Vital Signs:          Physical Exam:  Constitutional: No acute distress, awake, alert  HENT: NCAT, mucous membranes moist  Respiratory: Clear to auscultation bilaterally, respiratory effort normal   Cardiovascular: RRR, no murmurs, rubs, or gallops  Gastrointestinal: Positive bowel sounds, soft, nontender, nondistended  Musculoskeletal: No bilateral ankle edema  Psychiatric: Flat affect, cooperative  Neurologic: Oriented x 3,OVIEDO, speech clear  Skin: No rashes     Pertinent  and/or Most Recent Results     LAB RESULTS:                              Brief Urine Lab Results  (Last result in the past 365 days)      Color   Clarity   Blood   Leuk Est   Nitrite   Protein   CREAT   Urine HCG        04/14/23 1021 Yellow   Clear   Negative   Negative   Negative   Negative               Microbiology Results (last 10 days)     ** No results found for the last 240 hours. **          No radiology results for the last 10 days    Results for orders placed during the hospital encounter of 03/11/23    Doppler Ankle Brachial Index Single Level CAR    Interpretation Summary  •  Right Conclusion: The right MATTHEW is normal.  •  Left Conclusion: The left MATTHEW is normal.      Results for orders placed during the hospital encounter of 03/11/23    Doppler Ankle Brachial Index Single Level CAR    Interpretation Summary  •  Right Conclusion: The right MATTHEW is normal.  •  Left Conclusion: The left MATTHEW is normal.      Results for orders placed during the hospital encounter of 03/11/23    Adult Transthoracic  Echo Complete w/ Color, Spectral and Contrast if necessary per protocol    Interpretation Summary  •  Left ventricular systolic function is normal. Estimated left ventricular EF = 55%  •  Biatrial enlargement.  •  Calcified aortic valve with mild aortic stenosis, mean gradient 10 mmHg, BANG 1.6 cm².  •  Moderate aortic insufficiency.  •  Mild mitral regurgitation.  •  Mild tricuspid regurgitation with normal RVSP.  •  Mild dilation of the ascending aorta (4.2 cm) is present.      Discharge Details        Discharge Medications      Continue These Medications      Instructions Start Date   Aspirin Adult Low Strength 81 MG EC tablet  Generic drug: aspirin   81 mg, Oral, Daily      atorvastatin 40 MG tablet  Commonly known as: LIPITOR   40 mg, Oral, Nightly      clopidogrel 75 MG tablet  Commonly known as: PLAVIX   75 mg, Oral, Daily      levETIRAcetam 1000 MG tablet  Commonly known as: KEPPRA   1,000 mg, Oral, Every 12 Hours Scheduled      melatonin 5 MG tablet tablet   5 mg, Oral, Nightly      metoprolol tartrate 25 MG tablet  Commonly known as: LOPRESSOR   25 mg, Oral, Every 12 Hours Scheduled      pantoprazole 40 MG EC tablet  Commonly known as: PROTONIX   40 mg, Oral, Every Early Morning      QUEtiapine 25 MG tablet  Commonly known as: SEROquel   25 mg, Oral, Daily         Stop These Medications    dexamethasone 2 MG tablet  Commonly known as: DECADRON     dexamethasone 4 MG tablet  Commonly known as: DECADRON     lisinopril-hydrochlorothiazide 20-12.5 MG per tablet  Commonly known as: PRINZIDE,ZESTORETIC        ASK your doctor about these medications      Instructions Start Date   dexamethasone 2 MG tablet  Commonly known as: DECADRON  Ask about: Should I take this medication?   Take 2 tablets by mouth 2 (Two) Times a Day for 4 days, THEN 2 tablets Daily for 4 days, THEN 1 tablet 2 (Two) Times a Day for 4 days, THEN 1 tablet Daily for 4 days.   Start Date: March 22, 2023            No Known Allergies      Discharge  Disposition:  Home or Self Care    Diet:  Hospital:  No active diet order      Activity:  Activity Instructions     Activity as Tolerated     Additional Activity Instructions:      Ambulate frequently              CODE STATUS:    Code Status and Medical Interventions:   Ordered at: 04/1955     Code Status (Patient has no pulse and is not breathing):    CPR (Attempt to Resuscitate)     Medical Interventions (Patient has pulse or is breathing):    Full Support       Future Appointments   Date Time Provider Department Center   5/2/2023  1:30 PM Tiffany Mary MD [facility]   6/13/2023  2:15 PM [facility]   6/14/2023 10:30 AM Marlon Mckee MD [facility]   4/1/2024  9:00 AM Tiffany Mary MD [facility]       Additional Instructions for the Follow-ups that You Need to Schedule     MRI Brain With & Without Contrast   Apr 15, 2023      Please have mri completed close to follow up appointment with Dr. Mckee which will middle of June    Ambulatory Referral to Home Health   As directed      Face to Face Visit Date: 4/11/2023    Follow-up provider for Plan of Care?: I treated the patient in an acute care facility and will not continue treatment after discharge.    Follow-up provider: TIFFANY MARY [[id number]]    Reason/Clinical Findings: generalized weakness    Describe mobility limitations that make leaving home difficult: lives alone    Nursing/Therapeutic Services Requested: Skilled Nursing Medical / Social Work    Skilled nursing orders: Medication education    Social work orders: Community resources    Frequency: Other (2-3 times a week)                     Caryn Jimenez, APRN  04/26/23      Time Spent on Discharge:  I spent  40 minutes on this discharge activity which included: face-to-face encounter with the patient, reviewing the data in the system, coordination of the care with the nursing staff as well as consultants, documentation, and entering orders.

## 2023-04-19 NOTE — PLAN OF CARE
VSS on RA. Radial pulse grossly irregular. Patient refused all of night time medications. Denies pain/discomfort at this time. Will cont to mx. Call light in reach.

## 2023-04-19 NOTE — NURSING NOTE
Prior to discharge today, patient bag of previous [facility] returned to patient (bag never opened), along with miscellaneous belongings we had kept in med room for him.  Security also came and returned items that they had been keeping for him. Ten boxed lunches, along with oatmeal, have been sent with patient, to provide meals until Monday when [employer] Whe[employer] will begin delivering to his home. Patient showered prior to discharging home today.    Keyonna Reese, Charge RN

## 2023-04-19 NOTE — CASE MANAGEMENT/SOCIAL WORK
Continued Stay Note  [facility]     Patient Name: Monty Nagel  MRN: [id number]  Today's Date: 4/19/2023    Admit Date: 4/4/2023    Plan: Home with [facility]   Discharge Plan     Row Name 04/19/23 1205       Plan    Plan Comments MSW spoke with pt at bedside and pt was agreeable for MSW to assist pt with calling meals on wheels to have it set up. Pt spoke with meals on wheels representative and got set up for their service to start on Monday. In the case pt reports he still may not be able to drive to the grocery until then to have food, pt is being sent home with several boxed lunches and some other food so he will have food to get him through until Monday when Meals on wheels services start. MSW also notified APS worker of pt's discharge today. Once pt ready, MSW will arrange lyft transportation to get pt home.    Row Name 04/19/23 1122       Plan    Plan Home with [facility]    Patient/Family in Agreement with Plan yes    Final Discharge Disposition Code 06 - home with home health care    Final Note Mr. Nagel will be discharged home today. [facility] will follow Mr. Nagel at home with skilled nursing and social work. He will be transported home via LYFT. No additional discharge needs identified at this time.               Discharge Codes    No documentation.               Expected Discharge Date and Time     Expected Discharge Date Expected Discharge Time    Apr 19, 2023             CARIDAD Esqueda

## 2023-04-20 ENCOUNTER — TRANSITIONAL CARE MANAGEMENT TELEPHONE ENCOUNTER (OUTPATIENT)
Dept: CALL CENTER | Facility: HOSPITAL | Age: 68
End: 2023-04-20
Payer: MEDICARE

## 2023-04-20 NOTE — OUTREACH NOTE
Call Center TCM Note    Flowsheet Row Responses   [facility] patient discharged from? [facility]   Does the patient have one of the following disease processes/diagnoses(primary or secondary)? Other   TCM attempt successful? No   Unsuccessful attempts Attempt 2   Call Status Left message          Belen Guo RN    4/20/2023, 16:33 EDT

## 2023-04-20 NOTE — OUTREACH NOTE
Prep Survey    Flowsheet Row Responses   [facility] patient discharged from? [facility]   Is LACE score < 7 ? No   Eligibility [facility]   Date of Admission 04/04/23   Date of Discharge 04/19/23   Discharge Disposition Home or Self Care   Discharge diagnosis Generalized weakness   Does the patient have one of the following disease processes/diagnoses(primary or secondary)? Other   Does the patient have Home health ordered? Yes   What is the Home health agency?  [facility]    Is there a DME ordered? No   Prep survey completed? Yes          BRANDT JOSEPH - Registered Nurse

## 2023-04-20 NOTE — OUTREACH NOTE
Call Center TCM Note    Flowsheet Row Responses   [facility] patient discharged from? [place]   Does the patient have one of the following disease processes/diagnoses(primary or secondary)? Other   TCM attempt successful? No   Unsuccessful attempts Attempt 1          Belen Guo RN    4/20/2023, 10:24 EDT

## 2023-04-21 ENCOUNTER — TRANSITIONAL CARE MANAGEMENT TELEPHONE ENCOUNTER (OUTPATIENT)
Dept: CALL CENTER | Facility: HOSPITAL | Age: 68
End: 2023-04-21
Payer: MEDICARE

## 2023-04-21 DIAGNOSIS — I10 ESSENTIAL HYPERTENSION: ICD-10-CM

## 2023-04-21 DIAGNOSIS — E03.9 ACQUIRED HYPOTHYROIDISM: ICD-10-CM

## 2023-04-21 RX ORDER — LISINOPRIL AND HYDROCHLOROTHIAZIDE 20; 12.5 MG/1; MG/1
0.5 TABLET ORAL DAILY
Qty: 90 TABLET | Refills: 2
Start: 2023-04-21 | End: 2023-04-24

## 2023-04-21 RX ORDER — LEVOTHYROXINE SODIUM 0.12 MG/1
125 TABLET ORAL DAILY
Qty: 90 TABLET | Refills: 1 | Status: SHIPPED | OUTPATIENT
Start: 2023-04-21

## 2023-04-21 NOTE — OUTREACH NOTE
Call Center TCM Note    Flowsheet Row Responses   [facility] patient discharged from? [place]   Does the patient have one of the following disease processes/diagnoses(primary or secondary)? Other   TCM attempt successful? No   Unsuccessful attempts Attempt 3   Call Status Left message          Blayne Valencia RN    4/21/2023, 13:46 EDT

## 2023-04-21 NOTE — TELEPHONE ENCOUNTER
Caller: Monty Nagel    Relationship: Self    Best call back number:     Requested Prescriptions:   Requested Prescriptions     Pending Prescriptions Disp Refills   • lisinopril-hydrochlorothiazide (PRINZIDE,ZESTORETIC) 20-12.5 MG per tablet 90 tablet 2     Sig: Take 0.5 tablets by mouth Daily.   • levothyroxine (Synthroid) 125 MCG tablet 90 tablet 1     Sig: Take 1 tablet by mouth Daily.        Pharmacy where request should be sent: [facility] [id number] - [address] - [phone]  - [phone] FX     Last office visit with prescribing clinician: 12/14/2022   Last telemedicine visit with prescribing clinician: 5/2/2023   Next office visit with prescribing clinician: 5/2/2023     Additional details provided by patient: PATIENT IS OUT OF MEDICATION AND HAS BEEN IN THE HOSPITAL THE PAST MONTH AND HALF    Does the patient have less than a 3 day supply:  [x] Yes  [] No      Amalia Sheridan Rep   04/21/23 10:54 EDT

## 2023-04-22 DIAGNOSIS — I10 ESSENTIAL HYPERTENSION: ICD-10-CM

## 2023-04-24 RX ORDER — LISINOPRIL AND HYDROCHLOROTHIAZIDE 20; 12.5 MG/1; MG/1
TABLET ORAL
Qty: 90 TABLET | Refills: 2 | Status: SHIPPED | OUTPATIENT
Start: 2023-04-24

## 2023-04-26 ENCOUNTER — TELEPHONE (OUTPATIENT)
Dept: FAMILY MEDICINE CLINIC | Facility: CLINIC | Age: 68
End: 2023-04-26

## 2023-04-26 DIAGNOSIS — L60.3 DYSTROPHIC NAIL: Primary | ICD-10-CM

## 2023-04-26 NOTE — TELEPHONE ENCOUNTER
I called the pt and he states that his toenails are growing in very thick and hard. He trying to start walking again from his brain surgery and needs a referral to podiatry to get his toenails taken care of, please advise.

## 2023-04-26 NOTE — TELEPHONE ENCOUNTER
Caller: Monty Nagel    Relationship: Self    Best call back number:[phone]    What is the best time to reach you: ANYTIME 04/26/23    Who are you requesting to speak with (clinical staff, provider,  specific staff member): DR MARY    What was the call regarding: PATIENT WOULD LIKE TO  SPEAK WITH PCP ABOUT AN APPOINTMENT WITH PODIATRY.    Do you require a callback: YES

## 2023-04-28 ENCOUNTER — TELEPHONE (OUTPATIENT)
Dept: FAMILY MEDICINE CLINIC | Facility: CLINIC | Age: 68
End: 2023-04-28
Payer: MEDICARE

## 2023-04-28 NOTE — TELEPHONE ENCOUNTER
HUB TO READ. LEFT PATIENT A V/M WITH CONTACT INFO FOR [facility] SO HE CAN SCHEDULE AN APPOINTMENT. PHONE [phone]

## 2023-04-28 NOTE — TELEPHONE ENCOUNTER
Caller: Monty Nagel    Relationship: Self    Best call back number:     What is the best time to reach you: ANYTIME    Who are you requesting to speak with (clinical staff, provider,  specific staff member): CLINICAL STAFF    Do you know the name of the person who called: MONTY    What was the call regarding: PATIENT WOULD LIKE A CALL BACK REGARDING HIS REFERRAL TO PODIATRY; HE NEEDS TO KNOW THE NAME, LOCATION, AND PHONE NUMBER    Do you require a callback: YES

## 2023-06-13 ENCOUNTER — HOSPITAL ENCOUNTER (OUTPATIENT)
Dept: MRI IMAGING | Facility: HOSPITAL | Age: 68
Discharge: HOME | End: 2023-06-13
Payer: MEDICARE

## 2023-06-13 DIAGNOSIS — Z86.018 HISTORY OF MENINGIOMA: ICD-10-CM

## 2023-06-13 PROCEDURE — A9577 INJ MULTIHANCE: HCPCS | Performed by: NEUROLOGICAL SURGERY

## 2023-06-13 PROCEDURE — 82565 ASSAY OF CREATININE: CPT

## 2023-06-13 PROCEDURE — 0 GADOBENATE DIMEGLUMINE 529 MG/ML SOLUTION: Performed by: NEUROLOGICAL SURGERY

## 2023-06-13 PROCEDURE — 70553 MRI BRAIN STEM W/O & W/DYE: CPT

## 2023-06-13 RX ADMIN — GADOBENATE DIMEGLUMINE 18 ML: 529 INJECTION, SOLUTION INTRAVENOUS at 15:19

## 2023-06-14 ENCOUNTER — LAB (OUTPATIENT)
Dept: LAB | Facility: HOSPITAL | Age: 68
End: 2023-06-14
Payer: MEDICARE

## 2023-06-14 ENCOUNTER — OFFICE VISIT (OUTPATIENT)
Dept: NEUROSURGERY | Facility: CLINIC | Age: 68
End: 2023-06-14
Payer: MEDICARE

## 2023-06-14 VITALS
WEIGHT: 200.8 LBS | BODY MASS INDEX: 28.11 KG/M2 | DIASTOLIC BLOOD PRESSURE: 70 MMHG | SYSTOLIC BLOOD PRESSURE: 112 MMHG | HEIGHT: 71 IN

## 2023-06-14 DIAGNOSIS — Z98.890 HISTORY OF CRANIOTOMY: ICD-10-CM

## 2023-06-14 DIAGNOSIS — D42.0 ATYPICAL MENINGIOMA OF BRAIN: ICD-10-CM

## 2023-06-14 DIAGNOSIS — D42.0 ATYPICAL MENINGIOMA OF BRAIN: Primary | ICD-10-CM

## 2023-06-14 LAB
CRP SERPL-MCNC: <0.3 MG/DL (ref 0–0.5)
ERYTHROCYTE [SEDIMENTATION RATE] IN BLOOD: 24 MM/HR (ref 0–20)
PROLACTIN SERPL-MCNC: 12.2 NG/ML (ref 4.04–15.2)

## 2023-06-14 PROCEDURE — 99024 POSTOP FOLLOW-UP VISIT: CPT | Performed by: NEUROLOGICAL SURGERY

## 2023-06-14 PROCEDURE — 36415 COLL VENOUS BLD VENIPUNCTURE: CPT

## 2023-06-14 PROCEDURE — 3074F SYST BP LT 130 MM HG: CPT | Performed by: NEUROLOGICAL SURGERY

## 2023-06-14 PROCEDURE — 85652 RBC SED RATE AUTOMATED: CPT

## 2023-06-14 PROCEDURE — 84146 ASSAY OF PROLACTIN: CPT

## 2023-06-14 PROCEDURE — 3078F DIAST BP <80 MM HG: CPT | Performed by: NEUROLOGICAL SURGERY

## 2023-06-14 PROCEDURE — 86140 C-REACTIVE PROTEIN: CPT

## 2023-06-14 NOTE — PROGRESS NOTES
"Subjective     Chief Complaint: Follow-up right frontal lobe craniotomy    Patient ID: Monty Nagel is a 68 y.o. male is here today for follow-up.    History of Present Illness    This is a 68-year-old man in whom I performed a right frontal lobe craniotomy in March of this year for a large meningioma.  He has a somewhat complicated home living situation and was lost to follow-up.  He presents today to discuss the results of his most recent surveillance MRI.    He denies any headaches, nausea, vomiting, chills, fevers, TIA, or strokelike symptoms.  He does report that he has had some intermittent drainage from his incision which has tapered off over the last week or so, but has noticed some spotting on his pillow intermittently over the course of the last few months.    The following portions of the patient's history were reviewed and updated as appropriate: allergies, current medications, past family history, past medical history, past social history, past surgical history and problem list.    Family history:   Family History   Problem Relation Age of Onset    Diabetes Mother     Heart disease Father        Social history:   Social History     Socioeconomic History    Marital status: Single   Tobacco Use    Smoking status: Former     Packs/day: 1.00     Years: 15.00     Pack years: 15.00     Types: Cigarettes     Start date: 3/26/1981     Quit date: 1990     Years since quittin.4    Smokeless tobacco: Former     Quit date: 1990   Vaping Use    Vaping Use: Never used   Substance and Sexual Activity    Alcohol use: Not Currently     Comment: Quit Sept 10, 2022, had been using for 50 years    Drug use: No    Sexual activity: Never       Review of Systems    Objective   Blood pressure 112/70, height 180.3 cm (71\"), weight 91.1 kg (200 lb 12.8 oz).  Body mass index is 28.01 kg/m².    Physical Exam  Vitals reviewed.   Constitutional:       General: He is not in acute distress.     Appearance: He is " well-developed. He is not diaphoretic.   HENT:      Head: Normocephalic and atraumatic.   Pulmonary:      Effort: Pulmonary effort is normal.   Skin:     General: Skin is warm and dry.   Neurological:      Mental Status: He is alert and oriented to person, place, and time.      Comments: He is alert, pleasant, conversant, and appropriate.  His affect is somewhat flat although my recollection is that this is his baseline.  He has profound weakness of his right hand as well as ulnar drift in his bilateral upper extremities   Psychiatric:         Behavior: Behavior normal.       Assessment & Plan     Independent Review of Radiographic Studies:      Available for my review is a MRI of the brain which was performed on June 13, 2023.  There is diffuse enhancement with in the surgical resection cavity as well as some effacement of the ventricle and perilesional edema.    Medical Decision Making:      I am a little concerned he may have a chronic infection based on the MRI findings and the intermittent drainage she is had from his incision.  Have ordered a sed rate, CRP, and procalcitonin I would like to follow-up with him in 2 weeks.  I reviewed the signs and symptoms of wound infection and cerebritis with the patient.  I directed him to contact my office with new or worsening symptoms.  I will follow-up with him in 2 weeks, or sooner if clinically indicated.    Diagnoses and all orders for this visit:    1. Atypical meningioma of brain (Primary)  -     C-reactive Protein; Future    2. History of craniotomy  -     C-reactive Protein; Future  -     Sedimentation Rate; Future  -     Prolactin; Future        No follow-ups on file.           This document signed by MAYCOL Mckee MD June 14, 2023 10:36 EDT

## 2023-06-15 LAB — CREAT BLDA-MCNC: 0.7 MG/DL (ref 0.6–1.3)

## 2023-10-18 DIAGNOSIS — E03.9 ACQUIRED HYPOTHYROIDISM: ICD-10-CM

## 2023-10-18 RX ORDER — LEVOTHYROXINE SODIUM 0.12 MG/1
125 TABLET ORAL DAILY
Qty: 90 TABLET | Refills: 1 | Status: SHIPPED | OUTPATIENT
Start: 2023-10-18

## 2023-11-01 ENCOUNTER — TELEPHONE (OUTPATIENT)
Dept: FAMILY MEDICINE CLINIC | Facility: CLINIC | Age: 68
End: 2023-11-01
Payer: MEDICARE

## 2023-11-01 NOTE — TELEPHONE ENCOUNTER
He can stop the lisinopril hydrochlorothiazide, monitor blood pressure closely and if he needs to increase his metoprolol dose he could take 2 of those but he will need to follow-up to see if another medication needs to be added

## 2023-11-01 NOTE — TELEPHONE ENCOUNTER
Hub can read        Lvm for pt to call back   He can stop the lisinopril hydrochlorothiazide, monitor blood pressure closely and if he needs to increase his metoprolol dose he could take 2 of those but he will need to follow-up to see if another medication needs to be added

## 2023-11-01 NOTE — TELEPHONE ENCOUNTER
Caller: Monty Nagel    Relationship: Self    Best call back number: [phone]    Which medication are you concerned about: LISINOPRIL-HCTZ    Who prescribed you this medication: DR MARY    What are your concerns: PATIENT WOULD LIKE TO STOP TAKING THIS MEDICATION BECAUSE OF ADVERSE SIDE EFFECTS. PLEASE ADVISE

## 2023-12-05 ENCOUNTER — LAB (OUTPATIENT)
Dept: LAB | Facility: HOSPITAL | Age: 68
End: 2023-12-05
Payer: MEDICARE

## 2023-12-05 ENCOUNTER — OFFICE VISIT (OUTPATIENT)
Dept: FAMILY MEDICINE CLINIC | Facility: CLINIC | Age: 68
End: 2023-12-05
Payer: MEDICARE

## 2023-12-05 VITALS
TEMPERATURE: 97.1 F | HEART RATE: 78 BPM | RESPIRATION RATE: 22 BRPM | BODY MASS INDEX: 26.36 KG/M2 | SYSTOLIC BLOOD PRESSURE: 180 MMHG | OXYGEN SATURATION: 97 % | DIASTOLIC BLOOD PRESSURE: 100 MMHG | WEIGHT: 189 LBS

## 2023-12-05 DIAGNOSIS — I10 ESSENTIAL (PRIMARY) HYPERTENSION: ICD-10-CM

## 2023-12-05 DIAGNOSIS — R01.1 MURMUR, CARDIAC: ICD-10-CM

## 2023-12-05 DIAGNOSIS — S91.109S OPEN TOE WOUND, SEQUELA: ICD-10-CM

## 2023-12-05 DIAGNOSIS — E74.819 DISORDERS OF GLUCOSE TRANSPORT, UNSPECIFIED: ICD-10-CM

## 2023-12-05 DIAGNOSIS — E89.0 POSTABLATIVE HYPOTHYROIDISM: Chronic | ICD-10-CM

## 2023-12-05 DIAGNOSIS — I10 ESSENTIAL HYPERTENSION: ICD-10-CM

## 2023-12-05 DIAGNOSIS — Z85.820 HISTORY OF MELANOMA: ICD-10-CM

## 2023-12-05 DIAGNOSIS — Z91.148 NONCOMPLIANCE WITH MEDICATION REGIMEN: ICD-10-CM

## 2023-12-05 DIAGNOSIS — F10.10 ALCOHOL ABUSE: ICD-10-CM

## 2023-12-05 DIAGNOSIS — E03.9 ACQUIRED HYPOTHYROIDISM: ICD-10-CM

## 2023-12-05 DIAGNOSIS — E89.0 POSTABLATIVE HYPOTHYROIDISM: ICD-10-CM

## 2023-12-05 DIAGNOSIS — I48.91 ATRIAL FIBRILLATION, UNSPECIFIED TYPE: ICD-10-CM

## 2023-12-05 DIAGNOSIS — I48.91 ATRIAL FIBRILLATION, UNSPECIFIED TYPE: Primary | ICD-10-CM

## 2023-12-05 DIAGNOSIS — I15.1 HYPERTENSION SECONDARY TO OTHER RENAL DISORDERS: ICD-10-CM

## 2023-12-05 DIAGNOSIS — D32.9 MENINGIOMA: ICD-10-CM

## 2023-12-05 LAB
ALBUMIN SERPL-MCNC: 4.4 G/DL (ref 3.5–5.2)
ALBUMIN UR-MCNC: <1.2 MG/DL
ALBUMIN/GLOB SERPL: 1.9 G/DL
ALP SERPL-CCNC: 129 U/L (ref 39–117)
ALT SERPL W P-5'-P-CCNC: 24 U/L (ref 1–41)
ANION GAP SERPL CALCULATED.3IONS-SCNC: 11.3 MMOL/L (ref 5–15)
AST SERPL-CCNC: 23 U/L (ref 1–40)
BASOPHILS # BLD AUTO: 0.1 10*3/MM3 (ref 0–0.2)
BASOPHILS NFR BLD AUTO: 1.5 % (ref 0–1.5)
BILIRUB SERPL-MCNC: 0.7 MG/DL (ref 0–1.2)
BUN SERPL-MCNC: 17 MG/DL (ref 8–23)
BUN/CREAT SERPL: 19.1 (ref 7–25)
CALCIUM SPEC-SCNC: 9.5 MG/DL (ref 8.6–10.5)
CHLORIDE SERPL-SCNC: 102 MMOL/L (ref 98–107)
CHOLEST SERPL-MCNC: 191 MG/DL (ref 0–200)
CO2 SERPL-SCNC: 26.7 MMOL/L (ref 22–29)
CREAT SERPL-MCNC: 0.89 MG/DL (ref 0.76–1.27)
DEPRECATED RDW RBC AUTO: 44.2 FL (ref 37–54)
EGFRCR SERPLBLD CKD-EPI 2021: 92.2 ML/MIN/1.73
EOSINOPHIL # BLD AUTO: 0.54 10*3/MM3 (ref 0–0.4)
EOSINOPHIL NFR BLD AUTO: 8.1 % (ref 0.3–6.2)
ERYTHROCYTE [DISTWIDTH] IN BLOOD BY AUTOMATED COUNT: 13.3 % (ref 12.3–15.4)
GLOBULIN UR ELPH-MCNC: 2.3 GM/DL
GLUCOSE SERPL-MCNC: 85 MG/DL (ref 65–99)
HBA1C MFR BLD: 5.4 % (ref 4.8–5.6)
HCT VFR BLD AUTO: 40 % (ref 37.5–51)
HDLC SERPL-MCNC: 68 MG/DL (ref 40–60)
HGB BLD-MCNC: 13.2 G/DL (ref 13–17.7)
IMM GRANULOCYTES # BLD AUTO: 0.01 10*3/MM3 (ref 0–0.05)
IMM GRANULOCYTES NFR BLD AUTO: 0.1 % (ref 0–0.5)
LDLC SERPL CALC-MCNC: 112 MG/DL (ref 0–100)
LDLC/HDLC SERPL: 1.64 {RATIO}
LYMPHOCYTES # BLD AUTO: 1.6 10*3/MM3 (ref 0.7–3.1)
LYMPHOCYTES NFR BLD AUTO: 24 % (ref 19.6–45.3)
MCH RBC QN AUTO: 29.9 PG (ref 26.6–33)
MCHC RBC AUTO-ENTMCNC: 33 G/DL (ref 31.5–35.7)
MCV RBC AUTO: 90.7 FL (ref 79–97)
MONOCYTES # BLD AUTO: 0.51 10*3/MM3 (ref 0.1–0.9)
MONOCYTES NFR BLD AUTO: 7.6 % (ref 5–12)
NEUTROPHILS NFR BLD AUTO: 3.92 10*3/MM3 (ref 1.7–7)
NEUTROPHILS NFR BLD AUTO: 58.7 % (ref 42.7–76)
NRBC BLD AUTO-RTO: 0 /100 WBC (ref 0–0.2)
PLATELET # BLD AUTO: 258 10*3/MM3 (ref 140–450)
PMV BLD AUTO: 10.4 FL (ref 6–12)
POTASSIUM SERPL-SCNC: 4.2 MMOL/L (ref 3.5–5.2)
PROT SERPL-MCNC: 6.7 G/DL (ref 6–8.5)
RBC # BLD AUTO: 4.41 10*6/MM3 (ref 4.14–5.8)
SODIUM SERPL-SCNC: 140 MMOL/L (ref 136–145)
TRIGL SERPL-MCNC: 57 MG/DL (ref 0–150)
TSH SERPL DL<=0.05 MIU/L-ACNC: 2.47 UIU/ML (ref 0.27–4.2)
VLDLC SERPL-MCNC: 11 MG/DL (ref 5–40)
WBC NRBC COR # BLD AUTO: 6.68 10*3/MM3 (ref 3.4–10.8)

## 2023-12-05 PROCEDURE — 80061 LIPID PANEL: CPT

## 2023-12-05 PROCEDURE — 83036 HEMOGLOBIN GLYCOSYLATED A1C: CPT

## 2023-12-05 PROCEDURE — 80053 COMPREHEN METABOLIC PANEL: CPT

## 2023-12-05 PROCEDURE — 82043 UR ALBUMIN QUANTITATIVE: CPT

## 2023-12-05 PROCEDURE — 84443 ASSAY THYROID STIM HORMONE: CPT

## 2023-12-05 PROCEDURE — 85025 COMPLETE CBC W/AUTO DIFF WBC: CPT

## 2023-12-05 RX ORDER — METOPROLOL TARTRATE 50 MG/1
50 TABLET, FILM COATED ORAL EVERY 12 HOURS SCHEDULED
Qty: 90 TABLET | Refills: 1 | Status: SHIPPED | OUTPATIENT
Start: 2023-12-05

## 2023-12-05 RX ORDER — LEVOTHYROXINE SODIUM 0.12 MG/1
125 TABLET ORAL DAILY
Qty: 90 TABLET | Refills: 1 | Status: SHIPPED | OUTPATIENT
Start: 2023-12-05

## 2023-12-05 NOTE — PROGRESS NOTES
"Subjective   Motny Nagel is a 68 y.o. male.     History of Present Illness it looks like the last time I saw this patient was December 14, 2022.  Since that time he has been admitted to the hospital a couple times at [facility] for a meningioma gait instability and weakness.  He is followed by Dr. Mckee with neurosurgery.  He underwent a right frontal craniotomy was discharged to rehab in March 2022.    During that hospitalization in March she also had a new onset of atrial fibrillation.  He reports that he believes in self healing and did not want to do any anticoagulation and refused many medications throughout the hospitalization.    It also looks like he was admitted to the hospital at [facility] 1 month later with generalized weakness.  Of note he also has a history of heavy alcohol abuse rheumatoid versus psoriatic arthritis.  Severe malnutrition he is status post total thyroidectomy for Graves' disease with hypothyroidism surgical.    Through the years or through the past year [employer] Services has been involved in this patient's care multiple times.    He has had a chronic right great toe ulcer that was MRI on 1 admission and it was found to not have osteomyelitis.  He was following with Dr. Keys.    He historically has been known to refuse his blood pressure medicines.  And has chronic malnutrition and lives in very poor living condition.  Previously he had reported he had not had any alcohol since September 2022.    He reports \"I quit taking lisinopril\".  He is taking synthroid.      He wants to know if his blood pressure is reducible as he is concerned about kidney wellness.  He doesn't report he has a place to check bp.      He has been jogging daily for 4 months.  He is trying to get it down without lisinopril and thought the lisinopril was causing kidneys to be sore and his endurance to be down.     He reports the lisinopril was treating him like a tough streak and tenderizing his " whole boday.     He also has hemorrhoids and does not want his veins and arteries relaxed.     He has been sober for 1 1/2 months.     He quit lisinopril oct 13.      He has been caring for his foot and toe wound by himself. He has been using collagen pads that podiatrist game him because it didn't work and it pissed him off when they sent him a bill for $1000.00.          The following portions of the patient's history were reviewed and updated as appropriate: allergies, current medications, past family history, past medical history, past social history, past surgical history, and problem list.    Review of Systems   Constitutional:  Negative for chills, fatigue, fever and unexpected weight change.   HENT:  Negative for congestion, ear pain, nosebleeds, rhinorrhea, sinus pressure, sneezing, sore throat and trouble swallowing.    Eyes:  Negative for itching and visual disturbance.   Respiratory:  Negative for cough, chest tightness, shortness of breath and wheezing.    Cardiovascular:  Negative for chest pain, palpitations and leg swelling.   Gastrointestinal:  Negative for abdominal pain, anal bleeding, blood in stool, constipation, diarrhea, nausea and vomiting.   Endocrine: Negative for cold intolerance, heat intolerance, polydipsia and polyuria.   Genitourinary:  Negative for difficulty urinating, frequency, hematuria and urgency.   Musculoskeletal:  Negative for back pain, gait problem and myalgias.   Skin:  Negative for rash and wound.   Neurological:  Negative for dizziness, weakness, numbness and headaches.   Hematological:  Negative for adenopathy. Does not bruise/bleed easily.   Psychiatric/Behavioral:  Negative for agitation, confusion, decreased concentration and suicidal ideas. The patient is not nervous/anxious.        Objective     Vitals:    12/05/23 0822   BP: 180/100   Pulse: 78   Resp: 22   Temp: 97.1 °F (36.2 °C)   SpO2: 97%   Weight: 85.7 kg (189 lb)       Physical Exam  Vitals and nursing note  reviewed.   Constitutional:       Appearance: He is well-developed.   HENT:      Head: Normocephalic and atraumatic.   Eyes:      General: No scleral icterus.        Right eye: No discharge.         Left eye: No discharge.      Conjunctiva/sclera: Conjunctivae normal.      Pupils: Pupils are equal, round, and reactive to light.   Neck:      Thyroid: No thyromegaly.      Vascular: No JVD.      Trachea: No tracheal deviation.   Cardiovascular:      Rate and Rhythm: Normal rate. Rhythm irregular.      Heart sounds: Murmur heard.      No friction rub. No gallop.   Pulmonary:      Effort: Pulmonary effort is normal. No respiratory distress.      Breath sounds: Normal breath sounds. No wheezing or rales.   Chest:      Chest wall: No tenderness.   Abdominal:      General: Bowel sounds are normal. There is no distension.      Palpations: Abdomen is soft. There is no mass.      Tenderness: There is no abdominal tenderness.   Musculoskeletal:         General: Normal range of motion.      Cervical back: Normal range of motion and neck supple.   Skin:     General: Skin is warm and dry.   Neurological:      Mental Status: He is alert and oriented to person, place, and time.      Cranial Nerves: No cranial nerve deficit.      Coordination: Coordination normal.      Deep Tendon Reflexes: Reflexes are normal and symmetric. Reflexes normal.   Psychiatric:         Behavior: Behavior normal.         Thought Content: Thought content normal.         Judgment: Judgment normal.         Assessment & Plan     Problem List Items Addressed This Visit          Advance Directives and General Issues    Noncompliance with medication regimen       Cardiac and Vasculature    Essential (primary) hypertension    Relevant Medications    metoprolol tartrate (LOPRESSOR) 50 MG tablet    Other Relevant Orders    Lipid Panel    Atrial fibrillation - Primary    Relevant Medications    metoprolol tartrate (LOPRESSOR) 50 MG tablet    Other Relevant Orders     Lipid Panel    Adult Transthoracic Echo Complete W/ Cont if Necessary Per Protocol    Murmur, cardiac    Hypertension secondary to other renal disorders    Relevant Medications    metoprolol tartrate (LOPRESSOR) 50 MG tablet    Other Relevant Orders    Adult Transthoracic Echo Complete W/ Cont if Necessary Per Protocol       Endocrine and Metabolic    Postablative hypothyroidism    Relevant Medications    levothyroxine (SYNTHROID, LEVOTHROID) 125 MCG tablet    metoprolol tartrate (LOPRESSOR) 50 MG tablet    Other Relevant Orders    TSH    Acquired hypothyroidism    Relevant Medications    levothyroxine (SYNTHROID, LEVOTHROID) 125 MCG tablet    metoprolol tartrate (LOPRESSOR) 50 MG tablet       Hematology and Neoplasia    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  He has refused medications in the past but I recommended he start the metoprolol 50 mg twice daily and we will recheck his blood pressure.  I have refilled his Synthroid which seems to be the only medication that he is compliant with.  We will do his labs today.  He declines a referral to colorectal for his hemorrhoids.

## 2023-12-06 DIAGNOSIS — R74.8 ELEVATED ALKALINE PHOSPHATASE LEVEL: Primary | ICD-10-CM

## 2023-12-07 ENCOUNTER — TELEPHONE (OUTPATIENT)
Dept: FAMILY MEDICINE CLINIC | Facility: CLINIC | Age: 68
End: 2023-12-07
Payer: MEDICARE

## 2023-12-07 NOTE — TELEPHONE ENCOUNTER
Okay for the HUB to relay:    Defy the patient that all of his labs looked okay with the exception of his alkaline phosphatase which is a enzyme that comes from the tubes and the liver and the ducts of the biliary tree.  We will recheck this in 4 to 6 weeks to see if it has improved and may consider additional lab work or imaging if this level stays elevated.

## 2024-01-30 ENCOUNTER — TELEPHONE (OUTPATIENT)
Dept: FAMILY MEDICINE CLINIC | Facility: CLINIC | Age: 69
End: 2024-01-30
Payer: MEDICARE

## 2024-01-30 NOTE — TELEPHONE ENCOUNTER
Hub can read         Please ask pt if he is still taking the lisinopril medication, we had gotten a request form the pharmacy and just ant to make sure he is taking it or not

## 2024-01-30 NOTE — TELEPHONE ENCOUNTER
It is not on med list.  We will need to call patient to see if he has been taking this medication at home.

## 2024-03-05 ENCOUNTER — TELEPHONE (OUTPATIENT)
Dept: FAMILY MEDICINE CLINIC | Facility: CLINIC | Age: 69
End: 2024-03-05

## 2024-05-28 ENCOUNTER — TELEPHONE (OUTPATIENT)
Dept: FAMILY MEDICINE CLINIC | Facility: CLINIC | Age: 69
End: 2024-05-28

## 2024-05-28 NOTE — TELEPHONE ENCOUNTER
“Please be informed that patient has passed. Patient has been marked  in the system. The date of death is: 2024    Caller: ISAIAH    Relationship:  [facility]    Best call back number: [phone]    Did the patient have surgery within 30 days of their passing (Y/N): UNKNOWN

## (undated) DEVICE — INTENDED USE FOR SURGICAL MARKING ON INTACT SKIN, ALSO PROVIDES A PERMANENT METHOD OF IDENTIFYING OBJECTS IN THE OPERATING ROOM: Brand: WRITESITE® REGULAR TIP SKIN MARKER

## (undated) DEVICE — SYR CONTRL PRESS/LO FIX/M/LL W/THMB/RNG 10ML

## (undated) DEVICE — REMOV ADHS SKIN DETACHOL LIQ .5OZ

## (undated) DEVICE — GLV SURG PREMIERPRO MIC LTX PF SZ7 BRN

## (undated) DEVICE — CVR PROB ULTRASND/TRANSD W/GEL 7X11IN STRL

## (undated) DEVICE — GLIDESHEATH BASIC HYDROPHILIC COATED INTRODUCER SHEATH: Brand: GLIDESHEATH

## (undated) DEVICE — CATH ANGIO SIM2 HNB5.0 .038 100 P NS

## (undated) DEVICE — SUT VICYL 2/0 CR8 18IN DYED J726D

## (undated) DEVICE — NEURO SPONGES: Brand: DEROYAL

## (undated) DEVICE — TOOL MR8-F2/7TA23 MR8 F2/7CM TAPER 2.3MM: Brand: MIDAS REX MR8

## (undated) DEVICE — GLV SURG PREMIERPRO MIC LTX PF SZ8.5 BRN

## (undated) DEVICE — SUT NUROLON 4/0 TF18 CR8 I8IN C584D

## (undated) DEVICE — DETACHMENT SYSTEM: Brand: INZONE

## (undated) DEVICE — STRAIGHT MICRO DIAMETER TIP, UNIVERSAL

## (undated) DEVICE — ELECTRD BLD EZ CLN MOD XLNG 2.75IN

## (undated) DEVICE — GLV SURG PREMIERPRO MIC LTX PF SZ6.5 BRN

## (undated) DEVICE — LEX NEURO ANGIOGRAPHY: Brand: MEDLINE INDUSTRIES, INC.

## (undated) DEVICE — GW INQWIRE FC PTFE STD J/1.5 .035 260

## (undated) DEVICE — PK CRANI 10

## (undated) DEVICE — GUIDEWIRE WITH ICE™ HYDROPHILIC COATING: Brand: TRANSEND™ EX

## (undated) DEVICE — MAYFIELD® DISPOSABLE ADULT SKULL PIN (PLASTIC BASE): Brand: MAYFIELD®

## (undated) DEVICE — CATH SELCT NEURON SIM 5F 130CM

## (undated) DEVICE — RADIFOCUS TORQUE DEVICE MULTI-TORQUE VISE: Brand: RADIFOCUS TORQUE DEVICE

## (undated) DEVICE — TOOL MR8-10MH30 MR8 10CM MATCH 3MM: Brand: MIDAS REX MR8

## (undated) DEVICE — NDL HYPO ECLPS SFTY 25G 1 1/2IN

## (undated) DEVICE — TP SILK DURAPORE 3IN

## (undated) DEVICE — RUBBERBAND LF STRL PK/2

## (undated) DEVICE — SPNG GZ WOVN 4X4IN 12PLY 10/BX STRL

## (undated) DEVICE — TOOL MR8-7TA11 MR8 7CM TAPER 1.1MM: Brand: MIDAS REX MR8

## (undated) DEVICE — CATH MIC RENEGADE 2MARK 3F 10X150CM

## (undated) DEVICE — NDL ANGIOGR ADV THN SMOTH SGLWALL 21G 1.5

## (undated) DEVICE — LIMB HOLDER, WRIST/ANKLE: Brand: DEROYAL

## (undated) DEVICE — PERFORATOR CRANI 14MM 11MM

## (undated) DEVICE — IRRIGATOR BULB ASEPTO 60CC STRL

## (undated) DEVICE — RADIFOCUS GLIDEWIRE: Brand: GLIDEWIRE

## (undated) DEVICE — DISPOSABLE TUBING SET AND EXTENDER FILTER TUBING

## (undated) DEVICE — DRSNG GZ PETROLTM XEROFORM CURAD 1X8IN STRL

## (undated) DEVICE — STRAP POSTN KN/BDY FM 5X72IN DISP

## (undated) DEVICE — ROTATING HEMOSTATIC VALVE .096": Brand: RHV

## (undated) DEVICE — KT CATH GUIDE BENCHMARK 071 STR 95CM W/CATH SELECT 5F

## (undated) DEVICE — DRV BATRY MATRIXPRO STRL

## (undated) DEVICE — DRSNG SURESITE WNDW 2.38X2.75

## (undated) DEVICE — APPL DURAPREP IODOPHOR APL 26ML

## (undated) DEVICE — BLANKT WARM LOWR/BDY 100X120CM

## (undated) DEVICE — STERILE LATEX POWDER FREE SURGICAL GLOVES WITH HYDROGEL COATING: Brand: PROTEXIS

## (undated) DEVICE — SPHR MARKR STEALTH STATION

## (undated) DEVICE — DETACH COIL INSNT

## (undated) DEVICE — PENCL ROCKRSWCH MEGADYNE W/HOLSTR 10FT SS

## (undated) DEVICE — ELECTRD NDL EZ CLN MOD 2.75IN

## (undated) DEVICE — CRANIOTOMY DRAPE, STERILE: Brand: MEDLINE

## (undated) DEVICE — STPCK 3/WY HP M/RA W/OFF/HNDL 1050PSI STRL